# Patient Record
Sex: MALE | Race: WHITE | NOT HISPANIC OR LATINO | Employment: OTHER | URBAN - METROPOLITAN AREA
[De-identification: names, ages, dates, MRNs, and addresses within clinical notes are randomized per-mention and may not be internally consistent; named-entity substitution may affect disease eponyms.]

---

## 2017-03-13 ENCOUNTER — TELEPHONE (OUTPATIENT)
Dept: GASTROENTEROLOGY | Facility: CLINIC | Age: 72
End: 2017-03-13

## 2017-03-13 NOTE — TELEPHONE ENCOUNTER
----- Message from Tracie Bridges sent at 3/13/2017  1:43 PM CDT -----  No. [phone]     Patient received a letter.  He needs to schedule a 3 year follow up colonoscopy.   He would like a Wednesday in [place].  Please call.

## 2017-03-13 NOTE — TELEPHONE ENCOUNTER
Spoke with patient and a office visit is [facility] for 3/21/17, pt is on Plavix needs o/v prior to procedure. Old records scan in under media

## 2017-03-21 ENCOUNTER — OFFICE VISIT (OUTPATIENT)
Dept: GASTROENTEROLOGY | Facility: CLINIC | Age: 72
End: 2017-03-21
Payer: MEDICARE

## 2017-03-21 VITALS
HEART RATE: 53 BPM | BODY MASS INDEX: 30.55 KG/M2 | HEIGHT: 70 IN | WEIGHT: 213.38 LBS | SYSTOLIC BLOOD PRESSURE: 130 MMHG | DIASTOLIC BLOOD PRESSURE: 65 MMHG

## 2017-03-21 DIAGNOSIS — Z85.038 HISTORY OF COLON CANCER: Primary | ICD-10-CM

## 2017-03-21 DIAGNOSIS — I25.10 CORONARY ARTERY DISEASE INVOLVING NATIVE CORONARY ARTERY OF NATIVE HEART WITHOUT ANGINA PECTORIS: ICD-10-CM

## 2017-03-21 PROCEDURE — 99213 OFFICE O/P EST LOW 20 MIN: CPT | Mod: PBBFAC,PN | Performed by: INTERNAL MEDICINE

## 2017-03-21 PROCEDURE — 99999 PR PBB SHADOW E&M-EST. PATIENT-LVL III: CPT | Mod: PBBFAC,,, | Performed by: INTERNAL MEDICINE

## 2017-03-21 PROCEDURE — 99214 OFFICE O/P EST MOD 30 MIN: CPT | Mod: S$PBB,,, | Performed by: INTERNAL MEDICINE

## 2017-03-21 RX ORDER — FENOFIBRATE 134 MG/1
134 CAPSULE ORAL NIGHTLY
COMMUNITY
End: 2019-01-24 | Stop reason: SDUPTHER

## 2017-03-21 NOTE — PATIENT INSTRUCTIONS
Colonoscopy     A camera attached to a flexible tube with a viewing lens is used to take video pictures.     Colonoscopy is a test to view the inside of your lower digestive tract (colon and rectum). Sometimes it can show the last part of the small intestine (ileum). During the test, small pieces of tissue may be removed for testing. This is called a biopsy. Small growths, such as polyps, may also be removed.   Why is colonoscopy done?  The test is done to help look for colon cancer. And it can help find the source of abdominal pain, bleeding, and changes in bowel habits. It may be needed once a year, depending on factors such as your:  · Age  · Health history  · Family health history  · Symptoms  · Results from any prior colonoscopy  Risks and possible complications  These include:  · Bleeding               · A puncture or tear in the colon   · Risks of anesthesia  · A cancer lesion not being seen  Getting ready   To prepare for the test:  · Talk with your healthcare provider about the risks of the test (see below). Also ask your healthcare provider about alternatives to the test.  · Tell your healthcare provider about any medicines you take. Also tell him or her about any health conditions you may have.  · Make sure your rectum and colon are empty for the test. Follow the diet and bowel prep instructions exactly. If you dont, the test may need to be rescheduled.  · Plan for a friend or family member to drive you home after the test.     Colonoscopy provides an inside view of the entire colon.     You may discuss the results with your doctor right away or at a future visit.  During the test   The test is usually done in the hospital on an outpatient basis. This means you go home the same day. The procedure takes about 30 minutes. During that time:  · You are given relaxing (sedating) medicine through an IV line. You may be drowsy, or fully asleep.  · The healthcare provider will first give you a physical exam to  check for anal and rectal problems.  · Then the anus is lubricated and the scope inserted.  · If you are awake, you may have a feeling similar to needing to have a bowel movement. You may also feel pressure as air is pumped into the colon. Its OK to pass gas during the procedure.  · Biopsy, polyp removal, or other treatments may be done during the test.  After the test   You may have gas right after the test. It can help to try to pass it to help prevent later bloating. Your healthcare provider may discuss the results with you right away. Or you may need to schedule a follow-up visit to talk about the results. After the test, you can go back to your normal eating and other activities. You may be tired from the sedation and need to rest for a few hours.  Date Last Reviewed: 11/1/2016  © 1293-1285 [employer]. [address] [place] [address], [place] [address]. All rights reserved. This information is not intended as a substitute for professional medical care. Always follow your healthcare professional's instructions.

## 2017-03-21 NOTE — PROGRESS NOTES
Subjective:      Patient ID: Cy Rutherford is a 71 y.o. male.    Chief Complaint: Colonoscopy    HPI:   Patient is a 71-year-old male presenting for GI followup.    Patient has a prior history of a small rectal cancer treated with transanal resection in 2002.    He subsequently required chemotherapy and irradiation as well for local recurrence.  By 2007 he had a normal colonoscopy aside from sigmoid diverticula.  2013 a small colon adenoma was removed.  Patient has had no significant cardiac events since his last colonoscopy 2013.  Plavix was held for 5 days for that exam.  Coronary artery disease which required coronary artery stent placement 2011    past medical history includes diabetes, hypertension.    Diverticular bleed in 2002 .  He was noted to have diverticulosis and hemorrhoids.  Quit smoking in 1990.  Alcohol occasional.  Family history positive for colon polyps in his sister  Is currently on Plavix and aspirin.  GI systems review is negative    Review of patient's allergies indicates:  No Known Allergies  Past Medical History:   Diagnosis Date    Acute coronary syndrome     2011 ASMI    Coronary artery disease     Hypertension      Past Surgical History:   Procedure Laterality Date    COLONOSCOPY      CORONARY ANGIOPLASTY      MARIAN to LAD and LCX     Family History   Problem Relation Age of Onset    No Known Problems Mother     No Known Problems Father     Colon polyps Sister      Social History     Social History    Marital status:      Spouse name: N/A    Number of children: N/A    Years of education: N/A     Occupational History    Not on file.     Social History Main Topics    Smoking status: Never Smoker    Smokeless tobacco: Never Used    Alcohol use No    Drug use: No    Sexual activity: Not Currently     Other Topics Concern    Not on file     Social History Narrative    No narrative on file       Review of Systems:  Constitutional: Negative for appetite change.   HENT:  "Negative for trouble swallowing.   Eyes: Negative for photophobia.   Respiratory: Negative for cough and shortness of breath.   Cardiovascular: Negative for palpitations.   Gastrointestinal: See HPI for details.  Genitourinary: Negative for frequency and hematuria.   Skin: Negative for rash.   Neurological: Negative for weakness and headaches.   Hematological: Negative.   Psychiatric/Behavioral: Negative for suicidal ideas and behavioral problems.     Objective:     /65 (BP Location: Right arm, Patient Position: Sitting)  Pulse (!) 53  Ht 5' 10" (1.778 m)  Wt 96.8 kg (213 lb 6.4 oz)  BMI 30.62 kg/m2    Physical Exam:  Eyes: Pupils are equal, round, and reactive to light.   Neck: Supple. No mass  Cardiovascular: Regular rhythm . No murmur   Pulmonary/Chest: Lungs clear   Abdominal: Soft. No mass palpated. Nontender, no guarding. Positive bowel sounds   Musculoskeletal: No deformity. No edema.   Psychiatric: Alert and oriented    Assessment:     1. History of colon cancer    2. Coronary artery disease involving native coronary artery of native heart without angina pectoris      Plan:     Cy DARDEN was seen today for colonoscopy.    Diagnoses and all orders for this visit:    History of colon cancer    Coronary artery disease involving native coronary artery of native heart without angina pectoris      Plan:  Colonoscopy  Hold Plavix for 5 days prior to the procedure if okay with cardiology  Continue 81 mg aspirin, throughout    Primary physician German    "

## 2017-03-21 NOTE — MR AVS SNAPSHOT
[facility]  [facility] [address] [place] [address]  Phone: [phone]  Fax: [phone]                  Cy Rutherford   3/21/2017 4:00 PM   Office Visit    Description:  Male : 1945   Provider:  Ariel Garibay Jr., MD   Department:  [facility]           Reason for Visit     Colonoscopy           Diagnoses this Visit        Comments    History of colon cancer    -  Primary     Coronary artery disease involving native coronary artery of native heart without angina pectoris                To Do List           Future Appointments        Provider Department Dept Phone    3/30/2017 8:00 AM James Sanchez MD [facility] [phone]      Goals (5 Years of Data)     None      [facility]     [facility] -  Assistance  Registered nurses in the [facility] provide clinical advisement, health education, appointment booking, and other advisory services.  Call for this free service at [phone].             Medications           Message regarding Medications     Verify the changes and/or additions to your medication regime listed below are the same as discussed with your clinician today.  If any of these changes or additions are incorrect, please notify your healthcare provider.             Verify that the below list of medications is an accurate representation of the medications you are currently taking.  If none reported, the list may be blank. If incorrect, please contact your healthcare provider. Carry this list with you in case of emergency.           Current Medications     amlodipine (NORVASC) 10 MG tablet Take 1 tablet (10 mg total) by mouth once daily.    aspirin (ECOTRIN) 81 MG EC tablet Take 81 mg by mouth once daily.    clopidogrel (PLAVIX) 75 mg tablet Take 1 tablet (75 mg total) by mouth once daily.    fenofibrate micronized (LOFIBRA) 134 MG Cap Take 134 mg by mouth nightly.    insulin glargine  "(LANTUS) 100 unit/mL injection Inject into the skin every evening.      INVOKANA 100 mg Tab Take 1 tablet by mouth once daily.    losartan (COZAAR) 50 MG tablet Take 1 tablet by mouth once a day    metformin (GLUCOPHAGE) 500 MG tablet Take 1 tablet by mouth 2 (two) times daily.    metoprolol succinate (TOPROL-XL) 50 MG 24 hr tablet Take 1 tablet (50 mg total) by mouth once daily.    rosuvastatin (CRESTOR) 20 MG tablet Take 1 tablet (20 mg total) by mouth once daily.    hydrochlorothiazide (HYDRODIURIL) 25 MG tablet Take 1 tablet (25 mg total) by mouth once daily.           Clinical Reference Information           Your Vitals Were     BP Pulse Height Weight BMI    130/65 (BP Location: Right arm, Patient Position: Sitting) 53 5' 10" (1.778 m) 96.8 kg (213 lb 6.4 oz) 30.62 kg/m2      Blood Pressure          Most Recent Value    BP  130/65      Allergies as of 3/21/2017     No Known Allergies      Immunizations Administered on Date of Encounter - 3/21/2017     None      Instructions      Colonoscopy     A camera attached to a flexible tube with a viewing lens is used to take video pictures.     Colonoscopy is a test to view the inside of your lower digestive tract (colon and rectum). Sometimes it can show the last part of the small intestine (ileum). During the test, small pieces of tissue may be removed for testing. This is called a biopsy. Small growths, such as polyps, may also be removed.   Why is colonoscopy done?  The test is done to help look for colon cancer. And it can help find the source of abdominal pain, bleeding, and changes in bowel habits. It may be needed once a year, depending on factors such as your:  · Age  · Health history  · Family health history  · Symptoms  · Results from any prior colonoscopy  Risks and possible complications  These include:  · Bleeding               · A puncture or tear in the colon   · Risks of anesthesia  · A cancer lesion not being seen  Getting ready   To prepare for the " test:  · Talk with your healthcare provider about the risks of the test (see below). Also ask your healthcare provider about alternatives to the test.  · Tell your healthcare provider about any medicines you take. Also tell him or her about any health conditions you may have.  · Make sure your rectum and colon are empty for the test. Follow the diet and bowel prep instructions exactly. If you dont, the test may need to be rescheduled.  · Plan for a friend or family member to drive you home after the test.     Colonoscopy provides an inside view of the entire colon.     You may discuss the results with your doctor right away or at a future visit.  During the test   The test is usually done in the hospital on an outpatient basis. This means you go home the same day. The procedure takes about 30 minutes. During that time:  · You are given relaxing (sedating) medicine through an IV line. You may be drowsy, or fully asleep.  · The healthcare provider will first give you a physical exam to check for anal and rectal problems.  · Then the anus is lubricated and the scope inserted.  · If you are awake, you may have a feeling similar to needing to have a bowel movement. You may also feel pressure as air is pumped into the colon. Its OK to pass gas during the procedure.  · Biopsy, polyp removal, or other treatments may be done during the test.  After the test   You may have gas right after the test. It can help to try to pass it to help prevent later bloating. Your healthcare provider may discuss the results with you right away. Or you may need to schedule a follow-up visit to talk about the results. After the test, you can go back to your normal eating and other activities. You may be tired from the sedation and need to rest for a few hours.  Date Last Reviewed: 11/1/2016  © 0013-4260 [employer]. [address] [place] [address], [place] [address]. All rights reserved. This information is not intended as a substitute  for professional medical care. Always follow your healthcare professional's instructions.             Language Assistance Services     ATTENTION: Language assistance services are available, free of charge. Please call [phone].      ATENCIÓN: Si zonia alfaro, tiene a blackburn disposición servicios gratuitos de asistencia lingüística. Llame al [phone].     CHÚ Ý: N?u b?n nói Ti?ng Vi?t, có các d?ch v? h? tr? ngôn ng? mi?n phí dành cho b?n. G?i s? [phone].         [facility] complies with applicable Federal civil rights laws and does not discriminate on the basis of race, color, national origin, age, disability, or sex.

## 2017-03-30 ENCOUNTER — OFFICE VISIT (OUTPATIENT)
Dept: CARDIOLOGY | Facility: CLINIC | Age: 72
End: 2017-03-30
Payer: MEDICARE

## 2017-03-30 VITALS
BODY MASS INDEX: 30.49 KG/M2 | OXYGEN SATURATION: 98 % | SYSTOLIC BLOOD PRESSURE: 137 MMHG | WEIGHT: 213 LBS | HEART RATE: 58 BPM | DIASTOLIC BLOOD PRESSURE: 62 MMHG | HEIGHT: 70 IN

## 2017-03-30 DIAGNOSIS — I73.9 PAD (PERIPHERAL ARTERY DISEASE): ICD-10-CM

## 2017-03-30 DIAGNOSIS — E11.8 TYPE 2 DIABETES MELLITUS WITH COMPLICATION, UNSPECIFIED LONG TERM INSULIN USE STATUS: ICD-10-CM

## 2017-03-30 DIAGNOSIS — I24.9 ACUTE CORONARY SYNDROME: ICD-10-CM

## 2017-03-30 DIAGNOSIS — I10 ESSENTIAL HYPERTENSION: ICD-10-CM

## 2017-03-30 DIAGNOSIS — I25.10 CORONARY ARTERY DISEASE INVOLVING NATIVE CORONARY ARTERY OF NATIVE HEART WITHOUT ANGINA PECTORIS: Primary | ICD-10-CM

## 2017-03-30 DIAGNOSIS — Z95.5 STATUS POST CORONARY ARTERY STENT PLACEMENT: ICD-10-CM

## 2017-03-30 DIAGNOSIS — I21.09: ICD-10-CM

## 2017-03-30 PROCEDURE — 99999 PR PBB SHADOW E&M-EST. PATIENT-LVL III: CPT | Mod: PBBFAC,,, | Performed by: INTERNAL MEDICINE

## 2017-03-30 PROCEDURE — 99213 OFFICE O/P EST LOW 20 MIN: CPT | Mod: S$PBB,,, | Performed by: INTERNAL MEDICINE

## 2017-03-30 PROCEDURE — 99213 OFFICE O/P EST LOW 20 MIN: CPT | Mod: PBBFAC,PO | Performed by: INTERNAL MEDICINE

## 2017-03-30 RX ORDER — ROSUVASTATIN CALCIUM 20 MG/1
TABLET, FILM COATED ORAL
COMMUNITY
Start: 2017-01-14 | End: 2017-03-30 | Stop reason: SDUPTHER

## 2017-03-30 NOTE — LETTER
March 30, 2017      Ariel Garibay Jr., MD  [address]  [address]  [place] [address]           [facility]  [address], [address]  [facility] [address]  Phone: [phone]  Fax: [phone]          Patient: Cy Rutherford   MR Number: [id number]   YOB: 1945   Date of Visit: 3/30/2017       Dear Dr. Ariel Garibay Jr.:    Thank you for referring Cy Rutherford to me for evaluation. Attached you will find relevant portions of my assessment and plan of care.    If you have questions, please do not hesitate to call me. I look forward to following Cy Rutherford along with you.    Sincerely,    James Sanchez MD    Enclosure  CC:  No Recipients    If you would like to receive this communication electronically, please contact [email or web address] or [phone] to request more information on [facility] access.    For providers and/or their staff who would like to refer a patient to [facility], please contact us through our one-stop-shop provider referral line, [facility], at [phone].    If you feel you have received this communication in error or would no longer like to receive these types of communications, please e-mail [email or web address]

## 2017-03-30 NOTE — PROGRESS NOTES
Subjective:    Patient ID:  Cy Rutherford is a 71 y.o. male who presents for follow-up of Coronary Artery Disease and Peripheral Arterial Disease      HPI  70 y/o male former pt of Dr Fish. He has a hx of CAD presenting with syncope 2011 s/p PCI with MARIAN to LAD and LCX, PAD s/p PTA with MARIAN (SES) to LSFA and PTA to LTPT with resolution of claudication in LLE (RLE 1V AT run off to foot with exertional right calf cramping/claudication), HLD, DM. He is doing well from a cardiac perspective and has no active cardiac complaints. He denies CP, SOB/NASSAR, orthopnea, PND, palps, syncope. He is compliant with his meds. Remote smoking hx. Has RLE calf claudication after walking about half a mile. This has progressed since last visit where he stated he was able to walk about 1 mile before claudication. No non healing ulceration or evidence of limb ischemia.     Review of Systems   Constitution: Negative for weakness and malaise/fatigue.   HENT: Negative for congestion and headaches.    Eyes: Negative for blurred vision.   Cardiovascular: Positive for claudication. Negative for chest pain, cyanosis, dyspnea on exertion, irregular heartbeat, leg swelling, near-syncope, orthopnea, palpitations, paroxysmal nocturnal dyspnea and syncope.   Respiratory: Negative for shortness of breath.    Endocrine: Negative for polyuria.   Hematologic/Lymphatic: Negative for bleeding problem.   Skin: Negative for itching and rash.   Musculoskeletal: Negative for joint swelling, muscle cramps and muscle weakness.   Gastrointestinal: Negative for abdominal pain, hematemesis, hematochezia, melena, nausea and vomiting.   Genitourinary: Negative for dysuria and hematuria.   Neurological: Negative for dizziness, focal weakness, light-headedness and loss of balance.   Psychiatric/Behavioral: Negative for depression. The patient is not nervous/anxious.         Objective:    Physical Exam   Constitutional: He is oriented to person, place, and time. He  appears well-developed and well-nourished.   HENT:   Head: Normocephalic and atraumatic.   Neck: Neck supple. No JVD present.   Cardiovascular: Normal rate and regular rhythm.    Murmur heard.   Systolic murmur is present with a grade of 2/6   Pulses:       Carotid pulses are 2+ on the right side, and 2+ on the left side.       Radial pulses are 2+ on the right side, and 2+ on the left side.        Femoral pulses are 2+ on the right side, and 2+ on the left side.       Dorsalis pedis pulses are 1+ on the right side, and 1+ on the left side.   Pulmonary/Chest: Effort normal and breath sounds normal.   Abdominal: Soft. Bowel sounds are normal.   Musculoskeletal: He exhibits no edema.   Neurological: He is alert and oriented to person, place, and time.   Skin: Skin is warm and dry.   Psychiatric: He has a normal mood and affect. His behavior is normal. Thought content normal.         Assessment:       1. Coronary artery disease involving native coronary artery of native heart without angina pectoris    2. Acute coronary syndrome    3. Acute MI anterior wall first episode care    4. Essential hypertension    5. PAD (peripheral artery disease)    6. Type 2 diabetes mellitus with complication, unspecified long term insulin use status    7. Status post coronary artery stent placement      70 y/o male with hx and presentation as above. Doing well from a cardiac perspective with no active cardiac complaints, able to accomplish ADLs (>4 METs), minimal clinical risk factors. No absolute contraindications for upcoming procedure and he can hold plavix 5 days before and restart after.        Plan:       -The pt is at an acceptable risk from a cardiac perspective for upcoming noncardiac procedure  -OK to hold Plavix 5 days before procedure and restart immediately after  -f/u in 1 year

## 2017-04-03 ENCOUNTER — TELEPHONE (OUTPATIENT)
Dept: GASTROENTEROLOGY | Facility: CLINIC | Age: 72
End: 2017-04-03

## 2017-04-03 NOTE — TELEPHONE ENCOUNTER
----- Message from Ariel Garibay Jr., MD sent at 3/30/2017  1:29 PM CDT -----  This message is an okay to hold Plavix    Did you get this message as well?      ----- Message -----     From: James Sanchez MD     Sent: 3/30/2017  10:08 AM       To: Ariel Garibay Jr., MD

## 2017-04-06 RX ORDER — LOSARTAN POTASSIUM 50 MG/1
50 TABLET ORAL DAILY
Qty: 90 TABLET | Refills: 3 | Status: SHIPPED | OUTPATIENT
Start: 2017-04-06 | End: 2017-04-17 | Stop reason: SDUPTHER

## 2017-04-10 ENCOUNTER — TELEPHONE (OUTPATIENT)
Dept: GASTROENTEROLOGY | Facility: CLINIC | Age: 72
End: 2017-04-10

## 2017-04-10 NOTE — TELEPHONE ENCOUNTER
Patient is scheduled for Colonoscopy at [facility] on 4/20/17, prep information was given at ofice visit. Approval was given from  for patinet to hold Plavix for 5-7 days prior to procedure, pt was called and notified to hold is Plavix.

## 2017-04-17 RX ORDER — LOSARTAN POTASSIUM 50 MG/1
50 TABLET ORAL DAILY
Qty: 90 TABLET | Refills: 3 | Status: SHIPPED | OUTPATIENT
Start: 2017-04-17 | End: 2018-03-07 | Stop reason: SDUPTHER

## 2017-05-02 ENCOUNTER — TELEPHONE (OUTPATIENT)
Dept: GASTROENTEROLOGY | Facility: CLINIC | Age: 72
End: 2017-05-02

## 2017-09-25 DIAGNOSIS — I10 ESSENTIAL HYPERTENSION: ICD-10-CM

## 2017-09-25 DIAGNOSIS — I25.10 CORONARY ARTERY DISEASE, ANGINA PRESENCE UNSPECIFIED, UNSPECIFIED VESSEL OR LESION TYPE, UNSPECIFIED WHETHER NATIVE OR TRANSPLANTED HEART: ICD-10-CM

## 2017-09-26 RX ORDER — METOPROLOL SUCCINATE 50 MG/1
TABLET, EXTENDED RELEASE ORAL
Qty: 90 TABLET | Refills: 1 | Status: SHIPPED | OUTPATIENT
Start: 2017-09-26 | End: 2018-02-13 | Stop reason: SDUPTHER

## 2017-09-26 RX ORDER — CLOPIDOGREL BISULFATE 75 MG/1
TABLET ORAL
Qty: 90 TABLET | Refills: 1 | Status: SHIPPED | OUTPATIENT
Start: 2017-09-26 | End: 2018-02-13 | Stop reason: SDUPTHER

## 2017-09-26 RX ORDER — HYDROCHLOROTHIAZIDE 25 MG/1
TABLET ORAL
Qty: 90 TABLET | Refills: 1 | Status: SHIPPED | OUTPATIENT
Start: 2017-09-26 | End: 2018-02-13 | Stop reason: SDUPTHER

## 2017-09-26 RX ORDER — ROSUVASTATIN CALCIUM 20 MG/1
TABLET, COATED ORAL
Qty: 90 TABLET | Refills: 1 | Status: SHIPPED | OUTPATIENT
Start: 2017-09-26 | End: 2018-02-13 | Stop reason: SDUPTHER

## 2017-09-26 RX ORDER — AMLODIPINE BESYLATE 10 MG/1
TABLET ORAL
Qty: 90 TABLET | Refills: 1 | Status: SHIPPED | OUTPATIENT
Start: 2017-09-26 | End: 2018-02-13 | Stop reason: SDUPTHER

## 2017-11-10 ENCOUNTER — PATIENT MESSAGE (OUTPATIENT)
Dept: RESEARCH | Facility: HOSPITAL | Age: 72
End: 2017-11-10

## 2018-02-13 DIAGNOSIS — I10 ESSENTIAL HYPERTENSION: ICD-10-CM

## 2018-02-13 DIAGNOSIS — I25.10 CORONARY ARTERY DISEASE, ANGINA PRESENCE UNSPECIFIED, UNSPECIFIED VESSEL OR LESION TYPE, UNSPECIFIED WHETHER NATIVE OR TRANSPLANTED HEART: ICD-10-CM

## 2018-02-15 RX ORDER — ROSUVASTATIN CALCIUM 20 MG/1
TABLET, COATED ORAL
Qty: 90 TABLET | Refills: 1 | Status: SHIPPED | OUTPATIENT
Start: 2018-02-15 | End: 2018-09-24 | Stop reason: SDUPTHER

## 2018-02-15 RX ORDER — AMLODIPINE BESYLATE 10 MG/1
TABLET ORAL
Qty: 90 TABLET | Refills: 1 | Status: SHIPPED | OUTPATIENT
Start: 2018-02-15 | End: 2018-09-24 | Stop reason: SDUPTHER

## 2018-02-15 RX ORDER — METOPROLOL SUCCINATE 50 MG/1
TABLET, EXTENDED RELEASE ORAL
Qty: 90 TABLET | Refills: 1 | Status: SHIPPED | OUTPATIENT
Start: 2018-02-15 | End: 2018-06-12 | Stop reason: SDUPTHER

## 2018-02-15 RX ORDER — HYDROCHLOROTHIAZIDE 25 MG/1
TABLET ORAL
Qty: 90 TABLET | Refills: 1 | Status: SHIPPED | OUTPATIENT
Start: 2018-02-15 | End: 2018-09-24 | Stop reason: SDUPTHER

## 2018-02-15 RX ORDER — CLOPIDOGREL BISULFATE 75 MG/1
TABLET ORAL
Qty: 90 TABLET | Refills: 1 | Status: SHIPPED | OUTPATIENT
Start: 2018-02-15 | End: 2018-05-03

## 2018-02-28 DIAGNOSIS — I25.10 CORONARY ARTERY DISEASE, ANGINA PRESENCE UNSPECIFIED, UNSPECIFIED VESSEL OR LESION TYPE, UNSPECIFIED WHETHER NATIVE OR TRANSPLANTED HEART: ICD-10-CM

## 2018-02-28 DIAGNOSIS — I10 ESSENTIAL HYPERTENSION: ICD-10-CM

## 2018-03-01 RX ORDER — CLOPIDOGREL BISULFATE 75 MG/1
TABLET ORAL
Qty: 90 TABLET | Refills: 1 | Status: SHIPPED | OUTPATIENT
Start: 2018-03-01 | End: 2018-05-03

## 2018-03-08 RX ORDER — LOSARTAN POTASSIUM 50 MG/1
50 TABLET ORAL DAILY
Qty: 90 TABLET | Refills: 3 | Status: SHIPPED | OUTPATIENT
Start: 2018-03-08 | End: 2018-06-12 | Stop reason: SDUPTHER

## 2018-05-01 ENCOUNTER — HOSPITAL ENCOUNTER (EMERGENCY)
Facility: HOSPITAL | Age: 73
Discharge: HOME | End: 2018-05-01
Attending: FAMILY MEDICINE
Payer: MEDICARE

## 2018-05-01 VITALS
SYSTOLIC BLOOD PRESSURE: 193 MMHG | HEIGHT: 70 IN | OXYGEN SATURATION: 98 % | TEMPERATURE: 98 F | DIASTOLIC BLOOD PRESSURE: 86 MMHG | BODY MASS INDEX: 28.63 KG/M2 | HEART RATE: 68 BPM | WEIGHT: 200 LBS | RESPIRATION RATE: 18 BRPM

## 2018-05-01 DIAGNOSIS — L91.8 SKIN TAG: Primary | ICD-10-CM

## 2018-05-01 PROCEDURE — 99281 EMR DPT VST MAYX REQ PHY/QHP: CPT

## 2018-05-01 RX ORDER — AMOXICILLIN 500 MG
1 CAPSULE ORAL EVERY MORNING
COMMUNITY

## 2018-05-02 NOTE — ED PROVIDER NOTES
"Encounter Date: 5/1/2018       History     Chief Complaint   Patient presents with    Wound Check     pt reports difficulty controlling bleeding to R neck s/p "picking tick off last night but it may have been a skin tag"; reports being on Plavix presently     72-year-old male patient who tried to pull a skin tag off of his right side of his neck has some oozing the nurse had some pressure and it stopped bleeding otherwise patient has no other injuries no other areas of bleeding no signs of infection patient initially thought it was a tick although it is still a residual skin tag present.          Review of patient's allergies indicates:  No Known Allergies  Past Medical History:   Diagnosis Date    Acute coronary syndrome     2011 ASMI    Coronary artery disease     Hypertension      Past Surgical History:   Procedure Laterality Date    COLONOSCOPY      CORONARY ANGIOPLASTY      MARIAN to LAD and LCX     Family History   Problem Relation Age of Onset    No Known Problems Mother     No Known Problems Father     Colon polyps Sister      Social History   Substance Use Topics    Smoking status: Never Smoker    Smokeless tobacco: Never Used    Alcohol use No     Review of Systems   Constitutional: Negative for fever.   HENT: Negative for sore throat.    Respiratory: Negative for shortness of breath.    Cardiovascular: Negative for chest pain.   Gastrointestinal: Negative for nausea.   Genitourinary: Negative for dysuria.   Musculoskeletal: Negative for back pain.   Skin: Positive for wound. Negative for rash.   Neurological: Negative for weakness.   Hematological: Does not bruise/bleed easily.   All other systems reviewed and are negative.      Physical Exam     Initial Vitals [05/01/18 2008]   BP Pulse Resp Temp SpO2   (!) 193/86 68 18 97.9 °F (36.6 °C) 98 %      MAP       121.67         Physical Exam    Nursing note and vitals reviewed.  Constitutional: He appears well-developed and well-nourished.   HENT: "   Head: Normocephalic and atraumatic.   Eyes: Conjunctivae and EOM are normal. Pupils are equal, round, and reactive to light.   Neck: Normal range of motion. Neck supple.   Cardiovascular: Normal rate, regular rhythm and normal heart sounds.   Pulmonary/Chest: Breath sounds normal.   Abdominal: Soft. Bowel sounds are normal.   Musculoskeletal: Normal range of motion.   Neurological: He is alert. He has normal reflexes.   Skin:   Mild bleeding at the base of the skin tag cauterize with silver nitrate stick with no bruising or bleeding.         ED Course   Procedures  Labs Reviewed - No data to display          Medical Decision Making:   Initial Assessment:   Patient sitting in no distress and pleasant. Patient has no other complaints other than documented.     Differential Diagnosis:   Laceration  Cellulitis  Abscess  infection                        Clinical Impression:   The encounter diagnosis was Skin tag.                           Abhay Epstein MD  05/01/18 2030

## 2018-05-02 NOTE — ED NOTES
Pt thought he had a tick on him and pulled it - it was a skin tag. He is taking plavix at home and unable to control the bleeding. I held pressure for 10 minutes and the bleeding stopped and Dr. Epstein came in to put silver nitrate sticks on it.

## 2018-05-03 ENCOUNTER — OFFICE VISIT (OUTPATIENT)
Dept: CARDIOLOGY | Facility: CLINIC | Age: 73
End: 2018-05-03
Payer: MEDICARE

## 2018-05-03 VITALS
HEART RATE: 60 BPM | DIASTOLIC BLOOD PRESSURE: 60 MMHG | BODY MASS INDEX: 31.07 KG/M2 | HEIGHT: 68 IN | SYSTOLIC BLOOD PRESSURE: 120 MMHG | WEIGHT: 205 LBS

## 2018-05-03 DIAGNOSIS — I25.10 CORONARY ARTERY DISEASE INVOLVING NATIVE CORONARY ARTERY OF NATIVE HEART WITHOUT ANGINA PECTORIS: Primary | ICD-10-CM

## 2018-05-03 DIAGNOSIS — I10 ESSENTIAL HYPERTENSION: ICD-10-CM

## 2018-05-03 DIAGNOSIS — I73.9 PAD (PERIPHERAL ARTERY DISEASE): ICD-10-CM

## 2018-05-03 DIAGNOSIS — E78.5 HYPERLIPIDEMIA, UNSPECIFIED HYPERLIPIDEMIA TYPE: ICD-10-CM

## 2018-05-03 DIAGNOSIS — I24.9 ACUTE CORONARY SYNDROME: ICD-10-CM

## 2018-05-03 DIAGNOSIS — Z95.5 STATUS POST CORONARY ARTERY STENT PLACEMENT: ICD-10-CM

## 2018-05-03 DIAGNOSIS — I21.09: ICD-10-CM

## 2018-05-03 PROCEDURE — 99214 OFFICE O/P EST MOD 30 MIN: CPT | Mod: S$PBB,,, | Performed by: INTERNAL MEDICINE

## 2018-05-03 PROCEDURE — 99213 OFFICE O/P EST LOW 20 MIN: CPT | Mod: PBBFAC,PO | Performed by: INTERNAL MEDICINE

## 2018-05-03 PROCEDURE — 99999 PR PBB SHADOW E&M-EST. PATIENT-LVL III: CPT | Mod: PBBFAC,,, | Performed by: INTERNAL MEDICINE

## 2018-05-03 RX ORDER — NITROGLYCERIN 0.4 MG/1
0.4 TABLET SUBLINGUAL EVERY 5 MIN PRN
Qty: 30 TABLET | Refills: 11 | Status: ON HOLD | OUTPATIENT
Start: 2018-05-03 | End: 2018-05-11 | Stop reason: HOSPADM

## 2018-05-03 NOTE — PROGRESS NOTES
Subjective:    Patient ID:  Cy Rutherford is a 72 y.o. male who presents for evaluation of Coronary Artery Disease      HPI  73 y/o male former pt of Dr Fish. He has a hx of CAD presenting with syncope 2011 s/p PCI with MARIAN to LAD and LCX, PAD s/p PTA with MARIAN (SES) to LSFA and PTA to LTPT with resolution of claudication in LLE (RLE 1V AT run off to foot with exertional right calf cramping/claudication), HLD, DM. He is doing well from a cardiac perspective and has no active cardiac complaints. He denies regular CP, SOB/NASSAR, orthopnea, PND, palps, syncope. He is compliant with his meds. Remote smoking hx. No non healing ulceration or evidence of limb ischemia. Currently with Rodolfo IIa claudication, non lifestyle limiting and unchanged. Has very infrequent substernal mild chest tightness which lasts seconds and resolves spontaneously. Recently seen in ED for bleeding lesion that was difficult to control and had to be cauterized.     Review of Systems   Constitution: Negative for weakness and malaise/fatigue.   HENT: Negative for congestion.    Eyes: Negative for blurred vision.   Cardiovascular: Negative for chest pain, claudication, cyanosis, dyspnea on exertion, irregular heartbeat, leg swelling, near-syncope, orthopnea, palpitations, paroxysmal nocturnal dyspnea and syncope.   Respiratory: Negative for shortness of breath.    Endocrine: Negative for polyuria.   Hematologic/Lymphatic: Negative for bleeding problem.   Skin: Negative for itching and rash.   Musculoskeletal: Negative for joint swelling, muscle cramps and muscle weakness.   Gastrointestinal: Negative for abdominal pain, hematemesis, hematochezia, melena, nausea and vomiting.   Genitourinary: Negative for dysuria and hematuria.   Neurological: Negative for dizziness, focal weakness, headaches, light-headedness and loss of balance.   Psychiatric/Behavioral: Negative for depression. The patient is not nervous/anxious.         Objective:    Physical  Exam   Constitutional: He is oriented to person, place, and time. He appears well-developed and well-nourished.   HENT:   Head: Normocephalic and atraumatic.   Neck: Neck supple. No JVD present.   Cardiovascular: Normal rate and regular rhythm.    Murmur heard.   Systolic murmur is present with a grade of 2/6   Pulses:       Carotid pulses are 2+ on the right side, and 2+ on the left side.       Radial pulses are 2+ on the right side, and 2+ on the left side.        Femoral pulses are 2+ on the right side, and 2+ on the left side.       Dorsalis pedis pulses are 1+ on the right side, and 1+ on the left side.   Pulmonary/Chest: Effort normal and breath sounds normal.   Abdominal: Soft. Bowel sounds are normal.   Musculoskeletal: He exhibits no edema.   Neurological: He is alert and oriented to person, place, and time.   Skin: Skin is warm and dry.   Psychiatric: He has a normal mood and affect. His behavior is normal. Thought content normal.         Assessment:       1. Coronary artery disease involving native coronary artery of native heart without angina pectoris    2. Status post coronary artery stent placement    3. Acute MI anterior wall first episode care    4. Acute coronary syndrome    5. Essential hypertension    6. Hyperlipidemia, unspecified hyperlipidemia type    7. PAD (peripheral artery disease)      73 y/o female with hx and presentation as above. Doing well from a cardiac and PAD perspective. OK to D/C plavix. Counseled to stay active. Wife  in January.       Plan:       -D/C plavix  -f/u in 1 year

## 2018-05-07 ENCOUNTER — HOSPITAL ENCOUNTER (INPATIENT)
Facility: HOSPITAL | Age: 73
LOS: 1 days | Discharge: ANOTHER HOSPITAL | DRG: 064 | End: 2018-05-08
Attending: SURGERY | Admitting: INTERNAL MEDICINE
Payer: MEDICARE

## 2018-05-07 DIAGNOSIS — I63.9 STROKE: ICD-10-CM

## 2018-05-07 DIAGNOSIS — I63.9 CEREBROVASCULAR ACCIDENT (CVA), UNSPECIFIED MECHANISM: Primary | ICD-10-CM

## 2018-05-07 DIAGNOSIS — T86.19 HYPERTENSIVE ARTERIONEPHROSCLEROSIS OF TRANSPLANTED KIDNEY: ICD-10-CM

## 2018-05-07 DIAGNOSIS — I12.9 HYPERTENSIVE ARTERIONEPHROSCLEROSIS OF TRANSPLANTED KIDNEY: ICD-10-CM

## 2018-05-07 LAB
ALBUMIN SERPL BCP-MCNC: 4.2 G/DL
ALLENS TEST: ABNORMAL
ALP SERPL-CCNC: 46 U/L
ALT SERPL W/O P-5'-P-CCNC: 47 U/L
ANION GAP SERPL CALC-SCNC: 12 MMOL/L
APTT BLDCRRT: 27.9 SEC
AST SERPL-CCNC: 38 U/L
BASOPHILS # BLD AUTO: 0.03 K/UL
BASOPHILS NFR BLD: 0.4 %
BILIRUB SERPL-MCNC: 0.3 MG/DL
BUN SERPL-MCNC: 16 MG/DL
CALCIUM SERPL-MCNC: 9.8 MG/DL
CHLORIDE SERPL-SCNC: 103 MMOL/L
CO2 SERPL-SCNC: 29 MMOL/L
CREAT SERPL-MCNC: 0.88 MG/DL
DELSYS: ABNORMAL
DIFFERENTIAL METHOD: ABNORMAL
EOSINOPHIL # BLD AUTO: 0.1 K/UL
EOSINOPHIL NFR BLD: 1.4 %
ERYTHROCYTE [DISTWIDTH] IN BLOOD BY AUTOMATED COUNT: 14.7 %
EST. GFR  (AFRICAN AMERICAN): >60 ML/MIN/1.73 M^2
EST. GFR  (NON AFRICAN AMERICAN): >60 ML/MIN/1.73 M^2
GLUCOSE SERPL-MCNC: 214 MG/DL (ref 70–110)
GLUCOSE SERPL-MCNC: 235 MG/DL
HCO3 UR-SCNC: 30.6 MMOL/L (ref 24–28)
HCT VFR BLD AUTO: 45.7 %
HCT VFR BLD CALC: 41 %PCV (ref 36–54)
HGB BLD-MCNC: 14 G/DL
HGB BLD-MCNC: 14.9 G/DL
INR PPP: 1
LYMPHOCYTES # BLD AUTO: 0.9 K/UL
LYMPHOCYTES NFR BLD: 11.8 %
MCH RBC QN AUTO: 28.9 PG
MCHC RBC AUTO-ENTMCNC: 32.6 G/DL
MCV RBC AUTO: 89 FL
MONOCYTES # BLD AUTO: 0.5 K/UL
MONOCYTES NFR BLD: 7 %
NEUTROPHILS # BLD AUTO: 5.7 K/UL
NEUTROPHILS NFR BLD: 79 %
PCO2 BLDA: 43.6 MMHG (ref 35–45)
PH SMN: 7.45 [PH] (ref 7.35–7.45)
PLATELET # BLD AUTO: 256 K/UL
PMV BLD AUTO: 10.6 FL
PO2 BLDA: 44 MMHG (ref 40–60)
POC BE: 7 MMOL/L
POC IONIZED CALCIUM: 1.2 MMOL/L (ref 1.06–1.42)
POC SATURATED O2: 82 % (ref 95–100)
POC TCO2: 32 MMOL/L (ref 24–29)
POCT GLUCOSE: 129 MG/DL (ref 70–110)
POCT GLUCOSE: 214 MG/DL (ref 70–110)
POCT GLUCOSE: 73 MG/DL (ref 70–110)
POCT GLUCOSE: 84 MG/DL (ref 70–110)
POTASSIUM BLD-SCNC: 4.9 MMOL/L (ref 3.5–5.1)
POTASSIUM SERPL-SCNC: 4.1 MMOL/L
PROT SERPL-MCNC: 7 G/DL
PROTHROMBIN TIME: 11.2 SEC
RBC # BLD AUTO: 5.16 M/UL
SAMPLE: ABNORMAL
SITE: ABNORMAL
SODIUM BLD-SCNC: 140 MMOL/L (ref 136–145)
SODIUM SERPL-SCNC: 144 MMOL/L
T4 FREE SERPL-MCNC: 0.91 NG/DL
TSH SERPL DL<=0.005 MIU/L-ACNC: 4.97 UIU/ML
WBC # BLD AUTO: 7.26 K/UL

## 2018-05-07 PROCEDURE — 63600175 PHARM REV CODE 636 W HCPCS: Performed by: SURGERY

## 2018-05-07 PROCEDURE — 93010 ELECTROCARDIOGRAM REPORT: CPT | Mod: ,,, | Performed by: INTERNAL MEDICINE

## 2018-05-07 PROCEDURE — 85025 COMPLETE CBC W/AUTO DIFF WBC: CPT

## 2018-05-07 PROCEDURE — 93005 ELECTROCARDIOGRAM TRACING: CPT

## 2018-05-07 PROCEDURE — 25000003 PHARM REV CODE 250: Performed by: INTERNAL MEDICINE

## 2018-05-07 PROCEDURE — G0378 HOSPITAL OBSERVATION PER HR: HCPCS

## 2018-05-07 PROCEDURE — 85610 PROTHROMBIN TIME: CPT

## 2018-05-07 PROCEDURE — 25500020 PHARM REV CODE 255: Performed by: INTERNAL MEDICINE

## 2018-05-07 PROCEDURE — 94760 N-INVAS EAR/PLS OXIMETRY 1: CPT

## 2018-05-07 PROCEDURE — A9585 GADOBUTROL INJECTION: HCPCS | Performed by: INTERNAL MEDICINE

## 2018-05-07 PROCEDURE — G0425 INPT/ED TELECONSULT30: HCPCS | Mod: GT,,, | Performed by: PSYCHIATRY & NEUROLOGY

## 2018-05-07 PROCEDURE — 84443 ASSAY THYROID STIM HORMONE: CPT

## 2018-05-07 PROCEDURE — 82800 BLOOD PH: CPT

## 2018-05-07 PROCEDURE — 25000003 PHARM REV CODE 250: Performed by: SURGERY

## 2018-05-07 PROCEDURE — 99900035 HC TECH TIME PER 15 MIN (STAT)

## 2018-05-07 PROCEDURE — 96374 THER/PROPH/DIAG INJ IV PUSH: CPT

## 2018-05-07 PROCEDURE — A4216 STERILE WATER/SALINE, 10 ML: HCPCS | Performed by: INTERNAL MEDICINE

## 2018-05-07 PROCEDURE — 80053 COMPREHEN METABOLIC PANEL: CPT

## 2018-05-07 PROCEDURE — 84439 ASSAY OF FREE THYROXINE: CPT

## 2018-05-07 PROCEDURE — 82565 ASSAY OF CREATININE: CPT

## 2018-05-07 PROCEDURE — 85730 THROMBOPLASTIN TIME PARTIAL: CPT

## 2018-05-07 PROCEDURE — 82962 GLUCOSE BLOOD TEST: CPT

## 2018-05-07 PROCEDURE — 63600175 PHARM REV CODE 636 W HCPCS: Performed by: INTERNAL MEDICINE

## 2018-05-07 PROCEDURE — 99285 EMERGENCY DEPT VISIT HI MDM: CPT | Mod: 25

## 2018-05-07 RX ORDER — LABETALOL HYDROCHLORIDE 5 MG/ML
10 INJECTION, SOLUTION INTRAVENOUS
Status: DISCONTINUED | OUTPATIENT
Start: 2018-05-07 | End: 2018-05-08 | Stop reason: HOSPADM

## 2018-05-07 RX ORDER — SODIUM CHLORIDE 0.9 % (FLUSH) 0.9 %
3 SYRINGE (ML) INJECTION EVERY 8 HOURS
Status: DISCONTINUED | OUTPATIENT
Start: 2018-05-07 | End: 2018-05-08 | Stop reason: HOSPADM

## 2018-05-07 RX ORDER — CLOPIDOGREL BISULFATE 75 MG/1
75 TABLET ORAL DAILY
Status: DISCONTINUED | OUTPATIENT
Start: 2018-05-08 | End: 2018-05-07

## 2018-05-07 RX ORDER — ENOXAPARIN SODIUM 100 MG/ML
40 INJECTION SUBCUTANEOUS EVERY 24 HOURS
Status: DISCONTINUED | OUTPATIENT
Start: 2018-05-07 | End: 2018-05-08 | Stop reason: HOSPADM

## 2018-05-07 RX ORDER — CLOPIDOGREL BISULFATE 75 MG/1
75 TABLET ORAL DAILY
Status: DISCONTINUED | OUTPATIENT
Start: 2018-05-07 | End: 2018-05-07

## 2018-05-07 RX ORDER — CLOPIDOGREL BISULFATE 75 MG/1
300 TABLET ORAL ONCE
Status: DISCONTINUED | OUTPATIENT
Start: 2018-05-07 | End: 2018-05-07

## 2018-05-07 RX ORDER — GLUCAGON 1 MG
1 KIT INJECTION
Status: DISCONTINUED | OUTPATIENT
Start: 2018-05-07 | End: 2018-05-08 | Stop reason: HOSPADM

## 2018-05-07 RX ORDER — ASPIRIN 81 MG/1
81 TABLET ORAL DAILY
Status: DISCONTINUED | OUTPATIENT
Start: 2018-05-08 | End: 2018-05-08 | Stop reason: HOSPADM

## 2018-05-07 RX ORDER — SODIUM CHLORIDE 9 MG/ML
INJECTION, SOLUTION INTRAVENOUS CONTINUOUS
Status: DISCONTINUED | OUTPATIENT
Start: 2018-05-07 | End: 2018-05-08 | Stop reason: HOSPADM

## 2018-05-07 RX ORDER — CLOPIDOGREL BISULFATE 75 MG/1
75 TABLET ORAL DAILY
Status: DISCONTINUED | OUTPATIENT
Start: 2018-05-07 | End: 2018-05-08 | Stop reason: HOSPADM

## 2018-05-07 RX ORDER — NAPROXEN SODIUM 220 MG/1
81 TABLET, FILM COATED ORAL DAILY
Status: DISCONTINUED | OUTPATIENT
Start: 2018-05-08 | End: 2018-05-07

## 2018-05-07 RX ORDER — ATORVASTATIN CALCIUM 40 MG/1
80 TABLET, FILM COATED ORAL DAILY
Status: DISCONTINUED | OUTPATIENT
Start: 2018-05-08 | End: 2018-05-07

## 2018-05-07 RX ORDER — CLOPIDOGREL BISULFATE 75 MG/1
225 TABLET ORAL ONCE
Status: COMPLETED | OUTPATIENT
Start: 2018-05-07 | End: 2018-05-07

## 2018-05-07 RX ORDER — INSULIN ASPART 100 [IU]/ML
1-10 INJECTION, SOLUTION INTRAVENOUS; SUBCUTANEOUS EVERY 6 HOURS PRN
Status: DISCONTINUED | OUTPATIENT
Start: 2018-05-07 | End: 2018-05-08 | Stop reason: HOSPADM

## 2018-05-07 RX ORDER — GADOBUTROL 604.72 MG/ML
10 INJECTION INTRAVENOUS
Status: COMPLETED | OUTPATIENT
Start: 2018-05-07 | End: 2018-05-07

## 2018-05-07 RX ORDER — ASPIRIN 325 MG
325 TABLET, DELAYED RELEASE (ENTERIC COATED) ORAL
Status: COMPLETED | OUTPATIENT
Start: 2018-05-07 | End: 2018-05-07

## 2018-05-07 RX ORDER — ATORVASTATIN CALCIUM 40 MG/1
40 TABLET, FILM COATED ORAL DAILY
Status: DISCONTINUED | OUTPATIENT
Start: 2018-05-08 | End: 2018-05-08 | Stop reason: HOSPADM

## 2018-05-07 RX ORDER — HYDRALAZINE HYDROCHLORIDE 20 MG/ML
20 INJECTION INTRAMUSCULAR; INTRAVENOUS
Status: COMPLETED | OUTPATIENT
Start: 2018-05-07 | End: 2018-05-07

## 2018-05-07 RX ADMIN — CLOPIDOGREL BISULFATE 225 MG: 75 TABLET ORAL at 10:05

## 2018-05-07 RX ADMIN — NITROGLYCERIN 2 INCH: 20 OINTMENT TOPICAL at 11:05

## 2018-05-07 RX ADMIN — HYDRALAZINE HYDROCHLORIDE 20 MG: 20 INJECTION INTRAMUSCULAR; INTRAVENOUS at 11:05

## 2018-05-07 RX ADMIN — ASPIRIN 325 MG: 325 TABLET, DELAYED RELEASE ORAL at 02:05

## 2018-05-07 RX ADMIN — SODIUM CHLORIDE, PRESERVATIVE FREE 3 ML: 5 INJECTION INTRAVENOUS at 11:05

## 2018-05-07 RX ADMIN — GADOBUTROL 10 ML: 604.72 INJECTION INTRAVENOUS at 07:05

## 2018-05-07 RX ADMIN — CLOPIDOGREL BISULFATE 75 MG: 75 TABLET ORAL at 05:05

## 2018-05-07 RX ADMIN — ENOXAPARIN SODIUM 40 MG: 100 INJECTION SUBCUTANEOUS at 05:05

## 2018-05-07 RX ADMIN — SODIUM CHLORIDE: 0.9 INJECTION, SOLUTION INTRAVENOUS at 05:05

## 2018-05-07 NOTE — PLAN OF CARE
Problem: Patient Care Overview  Goal: Plan of Care Review  Outcome: Ongoing (interventions implemented as appropriate)   05/07/18 7666   Coping/Psychosocial   Plan Of Care Reviewed With patient   Received via stretcher from [facility] on stable conditions. Care plan reviewed with Pt verbalized plan of care. AAOx4, no respiratory distress noted, on room air. Orientation to room provided, voices understanding. NSR per cardiac monitor, no red alarm noted. Denies any pain of discomfort, education provided on medication effect and side effect, voices understanding. Safety measures maintained, call light within his reach, no apparent distress noted, bed in low position, bed alarm on, educated on the importance of calling as needed, voices understanding, stable at this time.

## 2018-05-07 NOTE — SUBJECTIVE & OBJECTIVE
Woke up with symptoms?: no  Last known normal: Last Known Normal Date: 05/06/18 Last Known Normal Time: 1700    Recent bleeding noted: no  Does the patient take any Blood Thinners? no  Medications: Antiplatelets:  aspirin      Past Medical History: hypertension, MI/CAD and PVD    Past Surgical History: no major surgeries within the last 2 weeks    Family History: no relevant history    Social History: no smoking, no drinking, no drugs    Allergies:   No known drug allergies    Review of Systems   Constitutional: Negative for chills, diaphoresis and fever.   HENT: Negative for hearing loss, tinnitus and trouble swallowing.    Eyes: Negative for photophobia and visual disturbance.   Respiratory: Negative for chest tightness and shortness of breath.    Cardiovascular: Negative for chest pain and palpitations.   Gastrointestinal: Negative for abdominal pain and vomiting.   Endocrine: Negative for cold intolerance and polyuria.   Genitourinary: Negative for hematuria.   Musculoskeletal: Negative for back pain, gait problem, myalgias and neck pain.   Skin: Negative for rash.   Neurological: Positive for weakness. Negative for dizziness, facial asymmetry, speech difficulty, numbness and headaches.   Hematological: Does not bruise/bleed easily.   Psychiatric/Behavioral: Negative for agitation, behavioral problems and confusion.     Objective:   Vitals: Blood pressure (!) 171/76, pulse 65, temperature 98.4 °F (36.9 °C), temperature source Oral, resp. rate (!) 21, weight 90.7 kg (200 lb), SpO2 98 %. BP: 171/76, Respiratory Rate: 16 and Heart Rate: 68    CT READ: Yes  No hemmorhage. No mass effect. No early infarct signs.     Physical Exam   Constitutional: He is oriented to person, place, and time. He appears well-developed and well-nourished.   HENT:   Head: Normocephalic and atraumatic.   Eyes: EOM are normal. Pupils are equal, round, and reactive to light.   Neck: Normal range of motion. Neck supple.   Cardiovascular:  Normal rate and regular rhythm.    Pulmonary/Chest: Effort normal. No respiratory distress.   Abdominal: Soft. He exhibits no mass.   Genitourinary:   Genitourinary Comments: No performed     Musculoskeletal: Normal range of motion. He exhibits no edema.   Neurological: He is oriented to person, place, and time. He displays normal reflexes. No cranial nerve deficit or sensory deficit. He exhibits normal muscle tone. Coordination normal.   Skin: No rash noted. No erythema.   Psychiatric: He has a normal mood and affect. His behavior is normal.

## 2018-05-07 NOTE — ED PROVIDER NOTES
Encounter Date: 5/7/2018       History     Chief Complaint   Patient presents with    Extremity Weakness     from Dr. Ricardo right arm weakness thst started at 5pm last eveing     Patient was sent over from his primary doctor's office for a acute stroke workup.  The patient stated that his right arm and his hand became weak around 5 PM last night 17 hours ago.  When he woke up this morning the disability was still there.  He has no complaint of a headache or any other symptoms with his legs or speech.  He drove his car to the doctor's office He denies any facial palsy.  He has a history of hypertension and coronary artery disease.  He was on Plavix but he is not on anticoagulants.  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   Extremity Weakness   This is a new problem. The current episode started yesterday. The problem occurs constantly. The problem has not changed since onset.Pertinent negatives include no chest pain, no headaches and no shortness of breath. Nothing aggravates the symptoms. Nothing relieves the symptoms.     Review of patient's allergies indicates:  No Known Allergies  Past Medical History:   Diagnosis Date    Acute coronary syndrome     2011 ASMI    Coronary artery disease     Hypertension      Past Surgical History:   Procedure Laterality Date    COLONOSCOPY      CORONARY ANGIOPLASTY      MARIAN to LAD and LCX     Family History   Problem Relation Age of Onset    No Known Problems Mother     No Known Problems Father     Colon polyps Sister      Social History   Substance Use Topics    Smoking status: Never Smoker    Smokeless tobacco: Never Used    Alcohol use No     Review of Systems   Constitutional: Negative.    Eyes: Negative.    Respiratory: Negative for shortness of breath.    Cardiovascular: Negative for chest pain.    Gastrointestinal: Negative.    Endocrine: Negative.    Genitourinary: Negative.    Musculoskeletal: Positive for extremity weakness.   Skin: Negative.    Allergic/Immunologic: Negative.    Neurological: Negative for headaches.   Hematological: Negative.    Psychiatric/Behavioral: Negative.        Physical Exam     Initial Vitals [05/07/18 1049]   BP Pulse Resp Temp SpO2   (!) 212/97 66 20 98.4 °F (36.9 °C) 99 %      MAP       135.33         Physical Exam    Nursing note and vitals reviewed.  Constitutional: He appears well-developed and well-nourished.   HENT:   Head: Normocephalic.   Eyes: Conjunctivae are normal.   Neck: Normal range of motion. Neck supple.   Cardiovascular: Normal rate and intact distal pulses.   Pulmonary/Chest: Breath sounds normal.   Abdominal: Soft.   Neurological: He is alert and oriented to person, place, and time. He displays normal reflexes. No cranial nerve deficit or sensory deficit.   Palsy of right arm, especially for fine motor but no paresis         ED Course   Procedures  Labs Reviewed   COMPREHENSIVE METABOLIC PANEL - Abnormal; Notable for the following:        Result Value    Glucose 235 (*)     ALT 47 (*)     All other components within normal limits   TSH - Abnormal; Notable for the following:     TSH 4.970 (*)     All other components within normal limits   CBC W/ AUTO DIFFERENTIAL - Abnormal; Notable for the following:     RDW 14.7 (*)     Lymph # 0.9 (*)     Gran% 79.0 (*)     Lymph% 11.8 (*)     All other components within normal limits   POCT GLUCOSE MONITORING CONTINUOUS - Abnormal; Notable for the following:     POC Glucose 214 (*)     All other components within normal limits   ISTAT PROCEDURE - Abnormal; Notable for the following:     POC PH 7.454 (*)     POC HCO3 30.6 (*)     POC SATURATED O2 82 (*)     POC TCO2 32 (*)     All other components within normal limits   POCT GLUCOSE - Abnormal; Notable for the following:     POCT Glucose 214 (*)     All other components  within normal limits   PROTIME-INR   APTT   T4, FREE     EKG Readings: (Independently Interpreted)   Rhythm: Normal Sinus Rhythm. Ectopy: No Ectopy. Conduction: Normal. ST Segments: Normal ST Segments. T Waves: Normal. Clinical Impression: Normal Sinus Rhythm          Medical Decision Making:   Initial Assessment:   17 hour history of right-sided motor deficit involving arm and hypertensive crisis  ED Management:  Code stroke was called on arrival and neurology consult on telemetry stroke and CT results shows a small left acute subcortical infarct.  Aspirin was ordered Blood pressure was 212/100 on admit.  Because the patient presented 17 hours after symptoms started and he has a history of coronary artery disease felt it was appropriate to lower the blood pressure to below 200.  The [facility] neurologist recommended admit to hospital Patient's neurologic symptoms have improved 30 minutes after he arrived to the ED  Other:   I discussed test(s) with the performing physician.                      Clinical Impression:   The primary encounter diagnosis was Cerebrovascular accident (CVA), unspecified mechanism. Diagnoses of Stroke and Hypertensive arterionephrosclerosis of transplanted kidney were also pertinent to this visit.                           SYDNEE Bonner III, MD  05/07/18 1227       SYDNEE Bonner III, MD  05/07/18 1236       SYDNEE Bonner III, MD  05/07/18 1308       SYDNEE Bonner III, MD  05/07/18 5856

## 2018-05-07 NOTE — CONSULTS
[facility]  Vascular Neurology  Comprehensive Stroke Center  Tele-Consultation Note      Consults    Consulting Provider: Spoke Physician:: SYDNEE GUTIÉRREZ  Current Providers  No providers found    Patient Location: [facility]  Spoke hospital nurse at bedside with patient assisting consultant.     Patient information was obtained from patient.       Assessment/Plan:   73 y/o with HTN, CAD s/p stenting, PAD s/p stent placement, presents with acute onset right sided weakness that started yesterday at 5 pm and remains virtually unchanged.  NIHSS 2. CT head without acute abnormality.  Suspect small acute left subcortical infarct. He is out of the window for treatment with iv alteplase, and his deficits don't suggest LVO.  Admit for stroke work up: MRI/MRA brain, MRA neck, TTE, lipid profile, hemoglobin A1c.  Neurology, PT consults. ASA, atorvastatin.        STROKE DOCUMENTATION     Acute Stroke Times:   Acute Stroke Times   Last Known Normal Date: 05/06/18  Last Known Normal Time: 1700  Symptom Onset Date: 05/06/18  Symptom Onset Time: 1750  Stroke Team Called Date: 05/07/18  Stroke Team Called Time: 1145  Stroke Team Arrival Date: 05/07/18  Stroke Team Arrival Time: 1145  CT Interpretation Time: 1137  Decision to Treat Time for Alteplase:  (No iv alteplase candidate)  Decision to Treat Time for IR:  (No IR candidate)    NIH Scale:  Interval: baseline (upon arrival/admit)  1a. Level Of Consciousness: 0-->Alert: keenly responsive  1b. LOC Questions: 0-->Answers both questions correctly  1c. LOC Commands: 0-->Performs both tasks correctly  2. Best Gaze: 0-->Normal  3. Visual: 0-->No visual loss  4. Facial Palsy: 0-->Normal symmetrical movements  5a. Motor Arm, Left: 0-->No drift: limb holds 90 (or 45) degrees for full 10 secs  5b. Motor Arm, Right: 1-->Drift: limb holds 90 (or 45) degrees, but drifts down before full 10 secs: does not hit bed or  other support  6a. Motor Leg, Left: 0-->No drift: leg holds 30 degree position for full 5 secs  6b. Motor Leg, Right: 1-->Drift: leg falls by the end of the 5-sec period but does not hit bed  7. Limb Ataxia: 0-->Absent  8. Sensory: 0-->Normal: no sensory loss  9. Best Language: 0-->No aphasia: normal  10. Dysarthria: 0-->Normal  11. Extinction and Inattention (formerly Neglect): 0-->No abnormality  Total (NIH Stroke Scale): 2     Modified Freedom Score: 0  [place] Coma Scale:15   ABCD2 Score:    [id number] Score:   HAS -BLED Score:   ICH Score:   Hunt & Soto Classification:       Diagnoses: small acute left subcortical infarct.   No new Assessment & Plan notes have been filed under this hospital service since the last note was generated.  Service: Vascular Neurology      Blood pressure (!) 171/76, pulse 65, temperature 98.4 °F (36.9 °C), temperature source Oral, resp. rate (!) 21, weight 90.7 kg (200 lb), SpO2 98 %.  Alteplase Eligible?: No  Alteplase Recommendation: Alteplase not recommended due to Outside of treatment window   Possible Interventional Revascularization Candidate? No; No significant neurological deficit    Disposition Recommendation: admit to inpatient      Subjective:     History of Present Illness: 71 y/o with HTN, CAD s/p stenting, PAD s/p stent placement, presents with acute onset right sided weakness that started yesterday at 5 pm and remains virtually unchanged. Never had similar symptoms before.  Said that yesterday afternoon he started noticing some trouble walking and using his right hand. Then, had persistence of such symptoms when he woke up today and decided to go and see his physician who sent him to the ED for stroke evaluation.  No notes on file      Woke up with symptoms?: no  Last known normal: Last Known Normal Date: 05/06/18 Last Known Normal Time: 1700    Recent bleeding noted: no  Does the patient take any Blood Thinners? no  Medications: Antiplatelets:  aspirin      Past  Medical History: hypertension, MI/CAD and PVD    Past Surgical History: no major surgeries within the last 2 weeks    Family History: no relevant history    Social History: no smoking, no drinking, no drugs    Allergies:   No known drug allergies    Review of Systems   Constitutional: Negative for chills, diaphoresis and fever.   HENT: Negative for hearing loss, tinnitus and trouble swallowing.    Eyes: Negative for photophobia and visual disturbance.   Respiratory: Negative for chest tightness and shortness of breath.    Cardiovascular: Negative for chest pain and palpitations.   Gastrointestinal: Negative for abdominal pain and vomiting.   Endocrine: Negative for cold intolerance and polyuria.   Genitourinary: Negative for hematuria.   Musculoskeletal: Negative for back pain, gait problem, myalgias and neck pain.   Skin: Negative for rash.   Neurological: Positive for weakness. Negative for dizziness, facial asymmetry, speech difficulty, numbness and headaches.   Hematological: Does not bruise/bleed easily.   Psychiatric/Behavioral: Negative for agitation, behavioral problems and confusion.     Objective:   Vitals: Blood pressure (!) 171/76, pulse 65, temperature 98.4 °F (36.9 °C), temperature source Oral, resp. rate (!) 21, weight 90.7 kg (200 lb), SpO2 98 %. BP: 171/76, Respiratory Rate: 16 and Heart Rate: 68    CT READ: Yes  No hemmorhage. No mass effect. No early infarct signs.     Physical Exam   Constitutional: He is oriented to person, place, and time. He appears well-developed and well-nourished.   HENT:   Head: Normocephalic and atraumatic.   Eyes: EOM are normal. Pupils are equal, round, and reactive to light.   Neck: Normal range of motion. Neck supple.   Cardiovascular: Normal rate and regular rhythm.    Pulmonary/Chest: Effort normal. No respiratory distress.   Abdominal: Soft. He exhibits no mass.   Genitourinary:   Genitourinary Comments: No performed     Musculoskeletal: Normal range of motion. He  exhibits no edema.   Neurological: He is oriented to person, place, and time. He displays normal reflexes. No cranial nerve deficit or sensory deficit. He exhibits normal muscle tone. Coordination normal.   Skin: No rash noted. No erythema.   Psychiatric: He has a normal mood and affect. His behavior is normal.             Recommended the emergency room physician to have a brief discussion with the patient and/or family if available regarding the risks and benefits of treatment, and to briefly document the occurrence of that discussion in his clinical encounter note.     The attending portion of this evaluation, treatment, and documentation was performed per Jaron Voss MD via audiovisual.    Billing code:  (non-intervention mild to moderate stroke, TIA, some mimics)    · This patient has a critical neurological condition/illness, with some potential for high morbidity and mortality.  · There is a moderate probability for acute neurological change leading to clinical and possibly life-threatening deterioration requiring highest level of physician preparedness for urgent intervention.  · Care was coordinated with other physicians involved in the patient's care.  · Radiologic studies and laboratory data were reviewed and interpreted, and plan of care was re-assessed based on the results.  · Diagnosis, treatment options and prognosis may have been discussed with the patient and/or family members or caregiver.      Consult End Time: 11:53 am    Jaron Voss MD  Comprehensive Stroke Center  Vascular Neurology   [facility]

## 2018-05-07 NOTE — ED NOTES
Results for WIL STAUFFER (MRN [id number]) as of 5/7/2018 11:54   Ref. Range 5/7/2018 11:45   POC Sodium Latest Ref Range: 136 - 145 mmol/L 140   POC Potassium Latest Ref Range: 3.5 - 5.1 mmol/L 4.9   POC Ionized Calcium Latest Ref Range: 1.06 - 1.42 mmol/L 1.20   POC Hematocrit Latest Ref Range: 36 - 54 %PCV 41   POC HEMOGLOBIN Latest Units: g/dL 14   POC PH Latest Ref Range: 7.35 - 7.45  7.454 (H)   POC PCO2 Latest Ref Range: 35 - 45 mmHg 43.6   POC PO2 Latest Ref Range: 40 - 60 mmHg 44   POC BE Latest Ref Range: -2 to 2 mmol/L 7   POC HCO3 Latest Ref Range: 24 - 28 mmol/L 30.6 (H)   POC SATURATED O2 Latest Ref Range: 95 - 100 % 82 (L)   POC TCO2 Latest Ref Range: 24 - 29 mmol/L 32 (H)   Sample Unknown VENOUS   DelSys Unknown Room Air   Allens Test Unknown N/A   Site Unknown Other   Results reported to Dr. Jagdish Bonner in physician mary @ 1146 on 5[facility]7/2018 [facility].

## 2018-05-07 NOTE — H&P
[facility] Internal Medicine History and Physical - Resident Note    Admitting Team: [facility] Internal Medicine Team A  Attending Physician: Dr. Acuna  Resident: Dr. Hand  Interns: Dr. Sanchez     Date of Admit: 5/7/2018    Chief Complaint     Focal weakness for 1 day     Subjective:      History of Present Illness:  Cy Rutherford is a 72 y.o. male who  has a past medical history of Acute coronary syndrome; Coronary artery disease; and Hypertension. DM II, PVD, HLD. The patient presented to [facility] on 5/7/2018 with a primary complaint of Extremity Weakness (from Dr. Ricardo right arm weakness thst started at 5pm last eveing)      Patient was in usual state of health until rising to go to restroom around 5pm, day prior to presentation when noticed discoordination of gait and abnormal use of R arm. The symptoms persisted to next morning so went to see his primary care doctor who directed him to emergency dept. Code stroke called and vascular neuro evaluated, patient not a candidate for tpa. Patient states his symptoms have persisted since onset, maybe have worsened slightly. He personally has not noticed droop of face but feels his voice is slightly slurred. Notably stopped his plavix as directed on Friday, had been taking since MI and stents in 2011.  Denies chest pain, palpitations, change in bowel or bladder function, fever, chills, shortness of breath, history of A fib, or headache/ vision change.     Past Medical History:  Past Medical History:   Diagnosis Date    Acute coronary syndrome     2011 ASMI    Coronary artery disease     Hypertension        Past Surgical History:  Past Surgical History:   Procedure Laterality Date    COLONOSCOPY      CORONARY ANGIOPLASTY      MARIAN to LAD and LCX       Allergies:  Review of patient's allergies indicates:  No Known Allergies    Home Medications:  Prior to Admission medications    Medication Sig Start Date End Date Taking? Authorizing Provider   amLODIPine  (NORVASC) 10 MG tablet TAKE 1 TABLET BY MOUTH ONCE DAILY 2/15/18  Yes KATHLEEN Jones ANP   aspirin (ECOTRIN) 81 MG EC tablet Take 81 mg by mouth once daily.   Yes Historical Provider, MD   fenofibrate micronized (LOFIBRA) 134 MG Cap Take 134 mg by mouth nightly.   Yes Historical Provider, MD   fish oil-omega-3 fatty acids 300-1,000 mg capsule Take by mouth once daily.   Yes Historical Provider, MD   hydroCHLOROthiazide (HYDRODIURIL) 25 MG tablet TAKE 1 TABLET BY MOUTH ONCE DAILY 2/15/18  Yes KATHLEEN Jones ANP   insulin glargine (LANTUS) 100 unit/mL injection Inject into the skin every evening.     Yes Historical Provider, MD   INVOKANA 100 mg Tab Take 1 tablet by mouth once daily. 5/9/16  Yes Historical Provider, MD   losartan (COZAAR) 50 MG tablet Take 1 tablet (50 mg total) by mouth once daily. 3/8/18  Yes James Sanchez MD   metformin (GLUCOPHAGE) 500 MG tablet Take 1 tablet by mouth 2 (two) times daily. 5/9/16  Yes Historical Provider, MD   metoprolol succinate (TOPROL-XL) 50 MG 24 hr tablet TAKE 1 TABLET BY MOUTH ONCE DAILY 2/15/18  Yes KATHLEEN Jones ANP   nitroGLYCERIN (NITROSTAT) 0.4 MG SL tablet Place 1 tablet (0.4 mg total) under the tongue every 5 (five) minutes as needed for Chest pain. 5/3/18 5/3/19 Yes Jaems Sanchez MD   rosuvastatin (CRESTOR) 20 MG tablet TAKE 1 TABLET BY MOUTH ONCE DAILY 2/15/18  Yes KATHLEEN Jones, ANP       Family History:  Family History   Problem Relation Age of Onset    No Known Problems Mother     No Known Problems Father     Colon polyps Sister        Social History:  Social History   Substance Use Topics    Smoking status: Never Smoker    Smokeless tobacco: Never Used    Alcohol use No   25 p/year smoker, stopped 30 years ago     Review of Systems:  Pertinent positives and negatives listed in HPI. All other systems are reviewed and are negative.    Health Maintaince :   Primary Care Physician: Heide Porter  Immunizations:    TDap is up to date.  Influenza is up to date.  Pneumovax is up to date.  Cancer Screening:  Colonoscopy: is up to date     Objective:   Last 24 Hour Vital Signs:  BP  Min: 122/58  Max: 212/97  Temp  Av.3 °F (36.8 °C)  Min: 98.2 °F (36.8 °C)  Max: 98.4 °F (36.9 °C)  Pulse  Av.8  Min: 60  Max: 71  Resp  Av.1  Min: 9  Max: 21  SpO2  Av.7 %  Min: 97 %  Max: 100 %  Weight  Av.7 kg (200 lb)  Min: 90.7 kg (200 lb)  Max: 90.7 kg (200 lb)  Body mass index is 30.41 kg/m².  No intake/output data recorded.    Physical Examination:  General: Alert and awake in no apparent distress  Head:  Normocephalic and atraumatic  Eyes:  PERRL; EOMi with anicteric sclera and clear conjunctivae  Mouth:  Oropharynx clear and without exudate; moist mucous membranes  Cardio:  Normal rate and regular rhythm with normal S1 and S2; no murmurs or rubs  Resp:  CTAB; respirations unlabored; no wheezes, crackles or rhonchi  Abdom: Soft, NTND with normoactive bowel sounds  Extrem: Warm and well-perfused with no clubbing, cyanosis or edema  Skin:  No rashes, lesions, or color changes  Pulses: 2+ and symmetric distally  Neuro:  AAOx3; cooperative and pleasant, slight decreased tone R cheek, mild slurring of speech, pronator drift of R arm but doesn't fall to bed, interosseous weakness of r hand and decreased cordination of R hand/ arm    Laboratory:  Most Recent Data:  CBC:   Lab Results   Component Value Date    WBC 7.26 2018    HGB 14.9 2018    HCT 41 2018     2018    MCV 89 2018    RDW 14.7 (H) 2018     BMP:   Lab Results   Component Value Date     2018    K 4.1 2018     2018    CO2 29 2018    BUN 16 2018    CREATININE 0.88 2018     (H) 2018    CALCIUM 9.8 2018    MG 1.8 2011     LFTs:   Lab Results   Component Value Date    PROT 7.0 2018    ALBUMIN 4.2 2018    BILITOT 0.3 2018    AST 38  05/07/2018    ALKPHOS 46 05/07/2018    ALT 47 (H) 05/07/2018     Coags:   Lab Results   Component Value Date    INR 1.0 05/07/2018     FLP:   Lab Results   Component Value Date    CHOL 139 06/09/2011    HDL 32 (L) 06/09/2011    LDLCALC 73.2 06/09/2011    TRIG 169 (H) 06/09/2011    CHOLHDL 23.0 06/09/2011     DM:   Lab Results   Component Value Date    HGBA1C 7.9 (H) 06/09/2011    HGBA1C 9.0 (H) 03/03/2011    LDLCALC 73.2 06/09/2011    CREATININE 0.88 05/07/2018     Thyroid:   Lab Results   Component Value Date    TSH 4.970 (H) 05/07/2018    FREET4 0.91 05/07/2018     Anemia: No results found for: IRON, TIBC, FERRITIN, [id number], FOLATE  Cardiac:   Lab Results   Component Value Date    TROPONINI 0.007 06/09/2011     Urinalysis: No results found for: LABURIN, COLORU, CLARITYU, SPECGRAV, LABSPEC, NITRITE, PROTEINUR, GLUCOSEU, KETONESU, UROBILINOGEN, BILIRUBINUR, BLOODU, RBCU, WBCUA    Trended Cardiac Data:  No results for input(s): TROPONINI, CKTOTAL, CKMB, BNP in the last 168 hours.    Other Results:  EKG (my interpretation): Sinus rhythm     Radiology:  Imaging Results          X-Ray Chest AP Portable (Final result)  Result time 05/07/18 12:45:01    Final result by Woody Medina MD (05/07/18 12:45:01)                 Impression:      No acute process seen.      Electronically signed by: Woody Medina MD  Date:    05/07/2018  Time:    12:45             Narrative:    EXAMINATION:  XR CHEST AP PORTABLE    CLINICAL HISTORY:  stroke;    FINDINGS:  Single view of the chest.    Cardiac silhouette is normal.  The lungs demonstrate no evidence of active disease.  No evidence of pleural effusion or pneumothorax.  Bones appear intact.                               CT Head Without Contrast (Final result)  Result time 05/07/18 11:08:05    Final result by Emmanuel Linder MD (05/07/18 11:08:05)                 Impression:      No acute abnormality.  There are chronic changes of cerebral atrophy.      Electronically signed  by: Jacob Banks MD  Date:    05/07/2018  Time:    11:08             Narrative:    EXAMINATION:  CT HEAD WITHOUT CONTRAST    CLINICAL HISTORY:  Stroke;    TECHNIQUE:  Low dose axial CT images obtained throughout the head without intravenous contrast. Sagittal and coronal reconstructions were performed.    COMPARISON:  None.    FINDINGS:  Intracranial compartment:    There are findings of cerebral atrophy with deepening of the cortical sulcal markings and dilatation of the lateral ventricles. There is patchy decreased attenuation in the periventricular white matter suggesting chronic changes of small vessel disease. No extra-axial blood or fluid collections.  There is atherosclerosis of intracranial vessels.    The brain parenchyma appears normal. No parenchymal mass, hemorrhage, edema or major vascular distribution infarct.    Skull/extracranial contents (limited evaluation): No fracture. Mastoid air cells and paranasal sinuses are essentially clear.                                   Assessment:     Cy Rutherford is a 72 y.o. male with:  Patient Active Problem List    Diagnosis Date Noted    Cerebrovascular accident (CVA) 05/07/2018    PAD (peripheral artery disease) 12/29/2014    Acute coronary syndrome     DM (diabetes mellitus) 11/15/2012    HTN (hypertension) 11/15/2012    Hyperlipidemia 11/15/2012    CAD (coronary artery disease) 11/15/2012    Status post coronary artery stent placement 11/15/2012    Acute MI anterior wall first episode care 11/15/2012        Plan:     Acute Cerebrovascular accident   - Patient with new onset slurring of speech, decreased coordination of R hand and arm, and gait instability with leaning on walls for balance around 5pm day prior to presentation. Symptoms persisted and may have slightly worsened since then. NIH SS of 4 on examination, reported to be NIH SS of 2 earlier today in ED by [facility] neuro consult: not a tpa candidate.  CT head without hemorrhage or mass  effect. Has history of vascular disease and metabolic syndrome. Hypertensive on presentation, Rec'd , IV hydralazine, and nitropaste in ED. The nitropaste was removed prior to transport to our facility.  on our exam. Will administer lipitor 80 mg, plavix loading dose and allow permissive HTN up to 220 systolic. Continue to monitor and consult vascular neurology for acute change. Will personally monitor the patient again in an hour and plan for MRI/MRA/TTE in AM. Send to ICU if change noted on exam, but now 24 hours out from onset.   [facility] neuro recs: MRI/MRA brain, MRA neck, TTE, lipid profile, hemoglobin A1c, Neurology, PT consults. ASA, atorvastatin.  - additional medical management as above    HTN  - permissive HTN as above, Home meds include norvasc 10mg qd, metoprolol XL 50mg qd, HCTZ 25mg qd, losartan 50mg qd    CAD, history of MI 2011 with stents X3, PAD with stents in legs bilaterally.   - on daily ASA, had stopped plavix on Friday as directed. Resume as above.     Diabetes Mellitus with long term insulin use, controlled  - most recent A1c 8   glucose on admission 235  currently home medications Metformin 500mg BID, invokana 300mg qd, Lantus 60u BID  - will repeat A1c if not done in last 3 months and administer basal insulin prn, SSI, accuchecks     HLD  - home meds crestor 20mg qd, fenofibrate 135mg qd, fish oil 1g BID   - lipitor as above, check lipid panel in AM     History of colon cancer, 2002  - s/p surgical resection, chemo, radiation. Gets regular c-scopes. No change in stools or hematochezia/melena     Health maintenance  - may benefit from one-time low dose CT chest for smoking history as outpatient.   Code Status: Full   Diet: NPO  DVT PPX: lovenox   Dispo: PT/Ot/speech, medical management of CVA   Est Length of Stay: 2 days       Jeni Sanchez MD  [facility] Internal [facility] HO-I  [facility] Internal [facility] Service Team A    [facility] Medicine Hospitalist Pager numbers:   [facility] Hospitalist Medicine  Team A (Armand/Baljit): [phone]  [place] Hospitalist Medicine Team B (Criss/Yoana):  [phone]

## 2018-05-08 ENCOUNTER — HOSPITAL ENCOUNTER (INPATIENT)
Facility: HOSPITAL | Age: 73
LOS: 4 days | Discharge: NURSING, REHAB OR LONG-TERM CARE | DRG: 064 | End: 2018-05-12
Attending: PSYCHIATRY & NEUROLOGY | Admitting: PSYCHIATRY & NEUROLOGY
Payer: MEDICARE

## 2018-05-08 VITALS
HEART RATE: 60 BPM | DIASTOLIC BLOOD PRESSURE: 73 MMHG | SYSTOLIC BLOOD PRESSURE: 155 MMHG | OXYGEN SATURATION: 99 % | RESPIRATION RATE: 16 BRPM | WEIGHT: 193.81 LBS | HEIGHT: 68 IN | TEMPERATURE: 98 F | BODY MASS INDEX: 29.37 KG/M2

## 2018-05-08 DIAGNOSIS — G93.6 CYTOTOXIC CEREBRAL EDEMA: ICD-10-CM

## 2018-05-08 DIAGNOSIS — I63.9 CVA (CEREBRAL VASCULAR ACCIDENT): ICD-10-CM

## 2018-05-08 DIAGNOSIS — I63.02 THROMBOTIC STROKE INVOLVING BASILAR ARTERY: ICD-10-CM

## 2018-05-08 PROBLEM — I67.2 INTRACRANIAL ATHEROSCLEROSIS: Status: ACTIVE | Noted: 2018-05-08

## 2018-05-08 PROBLEM — Z85.038 HISTORY OF COLON CANCER: Status: ACTIVE | Noted: 2018-05-08

## 2018-05-08 LAB
ALBUMIN SERPL BCP-MCNC: 3.4 G/DL
ALP SERPL-CCNC: 35 U/L
ALT SERPL W/O P-5'-P-CCNC: 30 U/L
ANION GAP SERPL CALC-SCNC: 10 MMOL/L
APTT BLDCRRT: 25.5 SEC
AST SERPL-CCNC: 25 U/L
BACTERIA #/AREA URNS HPF: NORMAL /HPF
BASOPHILS # BLD AUTO: 0.02 K/UL
BASOPHILS NFR BLD: 0.3 %
BILIRUB SERPL-MCNC: 0.5 MG/DL
BILIRUB UR QL STRIP: NEGATIVE
BUN SERPL-MCNC: 13 MG/DL
CALCIUM SERPL-MCNC: 8.9 MG/DL
CHLORIDE SERPL-SCNC: 107 MMOL/L
CHOLEST SERPL-MCNC: 165 MG/DL
CHOLEST/HDLC SERPL: 4.3 {RATIO}
CK MB SERPL-MCNC: 1.8 NG/ML
CK MB SERPL-RTO: 2.5 %
CK SERPL-CCNC: 72 U/L
CLARITY UR: CLEAR
CO2 SERPL-SCNC: 23 MMOL/L
COLOR UR: YELLOW
CREAT SERPL-MCNC: 0.8 MG/DL
DIFFERENTIAL METHOD: ABNORMAL
EOSINOPHIL # BLD AUTO: 0.1 K/UL
EOSINOPHIL NFR BLD: 1.9 %
ERYTHROCYTE [DISTWIDTH] IN BLOOD BY AUTOMATED COUNT: 14.3 %
EST. GFR  (AFRICAN AMERICAN): >60 ML/MIN/1.73 M^2
EST. GFR  (NON AFRICAN AMERICAN): >60 ML/MIN/1.73 M^2
ESTIMATED AVG GLUCOSE: 174 MG/DL
GLUCOSE SERPL-MCNC: 83 MG/DL
GLUCOSE UR QL STRIP: ABNORMAL
HBA1C MFR BLD HPLC: 7.7 %
HCT VFR BLD AUTO: 39.5 %
HDLC SERPL-MCNC: 38 MG/DL
HDLC SERPL: 23 %
HGB BLD-MCNC: 12.7 G/DL
HGB UR QL STRIP: NEGATIVE
INR PPP: 1
KETONES UR QL STRIP: NEGATIVE
LDLC SERPL CALC-MCNC: 80.6 MG/DL
LEUKOCYTE ESTERASE UR QL STRIP: NEGATIVE
LYMPHOCYTES # BLD AUTO: 1.3 K/UL
LYMPHOCYTES NFR BLD: 18.8 %
MAGNESIUM SERPL-MCNC: 2.1 MG/DL
MCH RBC QN AUTO: 28.3 PG
MCHC RBC AUTO-ENTMCNC: 32.2 G/DL
MCV RBC AUTO: 88 FL
MICROSCOPIC COMMENT: NORMAL
MONOCYTES # BLD AUTO: 0.5 K/UL
MONOCYTES NFR BLD: 6.7 %
NEUTROPHILS # BLD AUTO: 4.9 K/UL
NEUTROPHILS NFR BLD: 72 %
NITRITE UR QL STRIP: NEGATIVE
NONHDLC SERPL-MCNC: 127 MG/DL
PH UR STRIP: 6 [PH] (ref 5–8)
PHOSPHATE SERPL-MCNC: 3.1 MG/DL
PLATELET # BLD AUTO: 240 K/UL
PMV BLD AUTO: 10.1 FL
POCT GLUCOSE: 113 MG/DL (ref 70–110)
POCT GLUCOSE: 125 MG/DL (ref 70–110)
POCT GLUCOSE: 153 MG/DL (ref 70–110)
POCT GLUCOSE: 169 MG/DL (ref 70–110)
POCT GLUCOSE: 65 MG/DL (ref 70–110)
POCT GLUCOSE: 82 MG/DL (ref 70–110)
POCT GLUCOSE: 90 MG/DL (ref 70–110)
POTASSIUM SERPL-SCNC: 3.4 MMOL/L
PROT SERPL-MCNC: 6.1 G/DL
PROT UR QL STRIP: NEGATIVE
PROTHROMBIN TIME: 10.8 SEC
RBC # BLD AUTO: 4.49 M/UL
RBC #/AREA URNS HPF: 2 /HPF (ref 0–4)
SODIUM SERPL-SCNC: 140 MMOL/L
SP GR UR STRIP: 1.01 (ref 1–1.03)
SQUAMOUS #/AREA URNS HPF: 2 /HPF
TRIGL SERPL-MCNC: 232 MG/DL
TROPONIN I SERPL DL<=0.01 NG/ML-MCNC: 0.01 NG/ML
URN SPEC COLLECT METH UR: ABNORMAL
UROBILINOGEN UR STRIP-ACNC: NEGATIVE EU/DL
WBC # BLD AUTO: 6.74 K/UL
WBC #/AREA URNS HPF: 1 /HPF (ref 0–5)
YEAST URNS QL MICRO: NORMAL

## 2018-05-08 PROCEDURE — 25000003 PHARM REV CODE 250: Performed by: PHYSICIAN ASSISTANT

## 2018-05-08 PROCEDURE — 25000003 PHARM REV CODE 250: Performed by: STUDENT IN AN ORGANIZED HEALTH CARE EDUCATION/TRAINING PROGRAM

## 2018-05-08 PROCEDURE — 25000003 PHARM REV CODE 250: Performed by: INTERNAL MEDICINE

## 2018-05-08 PROCEDURE — 84100 ASSAY OF PHOSPHORUS: CPT

## 2018-05-08 PROCEDURE — 83735 ASSAY OF MAGNESIUM: CPT

## 2018-05-08 PROCEDURE — 81000 URINALYSIS NONAUTO W/SCOPE: CPT

## 2018-05-08 PROCEDURE — 94761 N-INVAS EAR/PLS OXIMETRY MLT: CPT

## 2018-05-08 PROCEDURE — 85610 PROTHROMBIN TIME: CPT

## 2018-05-08 PROCEDURE — 82550 ASSAY OF CK (CPK): CPT

## 2018-05-08 PROCEDURE — 99223 1ST HOSP IP/OBS HIGH 75: CPT | Mod: ,,, | Performed by: PSYCHIATRY & NEUROLOGY

## 2018-05-08 PROCEDURE — 85025 COMPLETE CBC W/AUTO DIFF WBC: CPT

## 2018-05-08 PROCEDURE — 85730 THROMBOPLASTIN TIME PARTIAL: CPT

## 2018-05-08 PROCEDURE — 20000000 HC ICU ROOM

## 2018-05-08 PROCEDURE — 99221 1ST HOSP IP/OBS SF/LOW 40: CPT | Mod: AI,GC,, | Performed by: PSYCHIATRY & NEUROLOGY

## 2018-05-08 PROCEDURE — 84484 ASSAY OF TROPONIN QUANT: CPT

## 2018-05-08 PROCEDURE — 80061 LIPID PANEL: CPT

## 2018-05-08 PROCEDURE — 25500020 PHARM REV CODE 255: Performed by: INTERNAL MEDICINE

## 2018-05-08 PROCEDURE — 63600175 PHARM REV CODE 636 W HCPCS: Performed by: STUDENT IN AN ORGANIZED HEALTH CARE EDUCATION/TRAINING PROGRAM

## 2018-05-08 PROCEDURE — 83036 HEMOGLOBIN GLYCOSYLATED A1C: CPT

## 2018-05-08 PROCEDURE — 36415 COLL VENOUS BLD VENIPUNCTURE: CPT

## 2018-05-08 PROCEDURE — 82553 CREATINE MB FRACTION: CPT

## 2018-05-08 PROCEDURE — 80053 COMPREHEN METABOLIC PANEL: CPT

## 2018-05-08 RX ORDER — SODIUM CHLORIDE 9 MG/ML
INJECTION, SOLUTION INTRAVENOUS CONTINUOUS
Status: DISCONTINUED | OUTPATIENT
Start: 2018-05-08 | End: 2018-05-09

## 2018-05-08 RX ORDER — SODIUM CHLORIDE 0.9 % (FLUSH) 0.9 %
3 SYRINGE (ML) INJECTION EVERY 8 HOURS
Status: DISCONTINUED | OUTPATIENT
Start: 2018-05-08 | End: 2018-05-08 | Stop reason: HOSPADM

## 2018-05-08 RX ORDER — ROSUVASTATIN CALCIUM 20 MG/1
40 TABLET, COATED ORAL DAILY
Status: DISCONTINUED | OUTPATIENT
Start: 2018-05-08 | End: 2018-05-12 | Stop reason: HOSPADM

## 2018-05-08 RX ORDER — AMOXICILLIN 250 MG
1 CAPSULE ORAL DAILY
Status: DISCONTINUED | OUTPATIENT
Start: 2018-05-08 | End: 2018-05-12 | Stop reason: HOSPADM

## 2018-05-08 RX ORDER — CLOPIDOGREL BISULFATE 75 MG/1
75 TABLET ORAL DAILY
Status: DISCONTINUED | OUTPATIENT
Start: 2018-05-08 | End: 2018-05-12 | Stop reason: HOSPADM

## 2018-05-08 RX ORDER — IBUPROFEN 200 MG
24 TABLET ORAL
Status: DISCONTINUED | OUTPATIENT
Start: 2018-05-08 | End: 2018-05-12 | Stop reason: HOSPADM

## 2018-05-08 RX ORDER — GLUCAGON 1 MG
1 KIT INJECTION
Status: CANCELLED | OUTPATIENT
Start: 2018-05-08

## 2018-05-08 RX ORDER — GLUCAGON 1 MG
1 KIT INJECTION
Status: DISCONTINUED | OUTPATIENT
Start: 2018-05-08 | End: 2018-05-12 | Stop reason: HOSPADM

## 2018-05-08 RX ORDER — HEPARIN SODIUM 5000 [USP'U]/ML
5000 INJECTION, SOLUTION INTRAVENOUS; SUBCUTANEOUS EVERY 8 HOURS
Status: DISCONTINUED | OUTPATIENT
Start: 2018-05-08 | End: 2018-05-08 | Stop reason: HOSPADM

## 2018-05-08 RX ORDER — ATORVASTATIN CALCIUM 20 MG/1
40 TABLET, FILM COATED ORAL DAILY
Status: DISCONTINUED | OUTPATIENT
Start: 2018-05-08 | End: 2018-05-08

## 2018-05-08 RX ORDER — NAPROXEN SODIUM 220 MG/1
81 TABLET, FILM COATED ORAL DAILY
Status: DISCONTINUED | OUTPATIENT
Start: 2018-05-08 | End: 2018-05-08 | Stop reason: HOSPADM

## 2018-05-08 RX ORDER — ROSUVASTATIN CALCIUM 20 MG/1
20 TABLET, COATED ORAL DAILY
Status: DISCONTINUED | OUTPATIENT
Start: 2018-05-08 | End: 2018-05-08

## 2018-05-08 RX ORDER — ASPIRIN 325 MG
325 TABLET ORAL DAILY
Status: DISCONTINUED | OUTPATIENT
Start: 2018-05-08 | End: 2018-05-08

## 2018-05-08 RX ORDER — SODIUM CHLORIDE 9 MG/ML
INJECTION, SOLUTION INTRAVENOUS CONTINUOUS
Status: DISCONTINUED | OUTPATIENT
Start: 2018-05-08 | End: 2018-05-08 | Stop reason: HOSPADM

## 2018-05-08 RX ORDER — INSULIN ASPART 100 [IU]/ML
0-5 INJECTION, SOLUTION INTRAVENOUS; SUBCUTANEOUS
Status: DISCONTINUED | OUTPATIENT
Start: 2018-05-08 | End: 2018-05-12 | Stop reason: HOSPADM

## 2018-05-08 RX ORDER — ASPIRIN 325 MG
325 TABLET ORAL DAILY
Status: DISCONTINUED | OUTPATIENT
Start: 2018-05-08 | End: 2018-05-12 | Stop reason: HOSPADM

## 2018-05-08 RX ORDER — IBUPROFEN 200 MG
16 TABLET ORAL
Status: DISCONTINUED | OUTPATIENT
Start: 2018-05-08 | End: 2018-05-12 | Stop reason: HOSPADM

## 2018-05-08 RX ORDER — INSULIN ASPART 100 [IU]/ML
7 INJECTION, SOLUTION INTRAVENOUS; SUBCUTANEOUS
Status: DISCONTINUED | OUTPATIENT
Start: 2018-05-08 | End: 2018-05-10

## 2018-05-08 RX ORDER — INSULIN ASPART 100 [IU]/ML
1-10 INJECTION, SOLUTION INTRAVENOUS; SUBCUTANEOUS EVERY 6 HOURS PRN
Status: CANCELLED | OUTPATIENT
Start: 2018-05-08

## 2018-05-08 RX ORDER — CLOPIDOGREL BISULFATE 75 MG/1
75 TABLET ORAL DAILY
Status: DISCONTINUED | OUTPATIENT
Start: 2018-05-08 | End: 2018-05-08 | Stop reason: HOSPADM

## 2018-05-08 RX ORDER — ACETAMINOPHEN 325 MG/1
650 TABLET ORAL EVERY 6 HOURS PRN
Status: DISCONTINUED | OUTPATIENT
Start: 2018-05-08 | End: 2018-05-12 | Stop reason: HOSPADM

## 2018-05-08 RX ADMIN — ATORVASTATIN CALCIUM 40 MG: 40 TABLET, FILM COATED ORAL at 08:05

## 2018-05-08 RX ADMIN — ASPIRIN 325 MG ORAL TABLET 325 MG: 325 PILL ORAL at 02:05

## 2018-05-08 RX ADMIN — DEXTROSE MONOHYDRATE 12.5 G: 500 INJECTION PARENTERAL at 12:05

## 2018-05-08 RX ADMIN — INSULIN ASPART 7 UNITS: 100 INJECTION, SOLUTION INTRAVENOUS; SUBCUTANEOUS at 04:05

## 2018-05-08 RX ADMIN — ASPIRIN 81 MG: 81 TABLET, COATED ORAL at 08:05

## 2018-05-08 RX ADMIN — IOHEXOL 100 ML: 350 INJECTION, SOLUTION INTRAVENOUS at 01:05

## 2018-05-08 RX ADMIN — STANDARDIZED SENNA CONCENTRATE AND DOCUSATE SODIUM 1 TABLET: 8.6; 5 TABLET, FILM COATED ORAL at 01:05

## 2018-05-08 RX ADMIN — CLOPIDOGREL BISULFATE 75 MG: 75 TABLET ORAL at 08:05

## 2018-05-08 RX ADMIN — SODIUM CHLORIDE: 0.9 INJECTION, SOLUTION INTRAVENOUS at 01:05

## 2018-05-08 NOTE — PLAN OF CARE
Report called to ICU Nurse Catalina, patient transferred to ICU via stretcher with heart monitor intact. Awake and alert, able to verbalize needs. Normal Saline infusing. Transferred with Telemetry-Med Surg Staff.

## 2018-05-08 NOTE — PT/OT/SLP PROGRESS
Occupational Therapy      Patient Name:  Cy Rutherford   MRN:  [id number]    Patient transferred to [facility]    Lam Denson OT  5/8/2018

## 2018-05-08 NOTE — PROGRESS NOTES
Pt arrived to ICU around 2149,  from 4th floor, via stretcher and telemetry monitor. CC of extremities weakness( Diagnosis of right side CVA , per MRI and MD notes). AAOx4, speech was clear not slurred, no vision changes, no nausea, denies any pain. Weakness of the right arm present, slight left facial droop, normal strength on the other extremities. Pt set up on ICU monitor, Physician informed of the admission, treatment initiated per order.     Pt  condition remained stable, permissive high blood pressure 169/79. Denies any pain.

## 2018-05-08 NOTE — PLAN OF CARE
Received report from Radiologist Néstor Alan Occlusion noted on MRI results will put a note in and to contact Physician. please see MRI results

## 2018-05-08 NOTE — CONSULTS
[facility]  Vascular Neurology  Comprehensive Stroke Center  Consult Note    Inpatient consult to Vascular (Stroke) Neurology  Consult performed by: CALISTA GALVEZ  Consult ordered by: LEEANNA GONSALEZ  Reason for consult: right side weakness    Inpatient consult to Vascular/Stroke Neurology - [facility]  Consult performed by: CALISTA GALVEZ  Consult ordered by: MISSY GOSS  Reason for consult: right side weakness        Assessment/Plan:     Patient is a 72 y.o. year old male with:    * Thrombotic stroke involving basilar artery    73 yo male with left anteromedial/anterolateral pontine perforators infarcts due to thrombosis/atheroma on the basilar artery.  Has diffuse atherosclersosis and multiple risk factors including uncontrolled HTN and DM. Presenting with right side weakness.    No clear intervention indicated for this disease process other than aggressive risk factor modification for secondary prevention and rehab efforts.    Antithrombotics for secondary stroke prevention: Antiplatelets: Aspirin: 325 mg daily  Clopidogrel: 75 mg daily    Statins for secondary stroke prevention and hyperlipidemia, if present:   Statins: Rosuvastatin- 40 mg daily    Aggressive risk factor modification: HTN, DM, HLD     Rehab efforts: PT/OT/SLP to evaluate and treat    Diagnostics ordered/pending: TTE to assess cardiac function/status     VTE prophylaxis: Heparin 5000 units SQ every 8 hours    BP parameters: Infarct: No intervention, SBP <220        Intracranial atherosclerosis    Clearly visualized on MRA/CTA.  Risk factor modification for secondary stroke prevention.        Cytotoxic cerebral edema    Visualized on MRI, consistent with acute stroke.  Monitor exam carefully as deterioration may signify edema/stroke territory expansion.        Essential hypertension    Stroke risk factor.  Manage medically without aggressive drops.        Type 2 diabetes mellitus with kidney  complication, without long-term current use of insulin    Stroke risk factor.  Uncontrolled.  Will need more aggressive management as outpatient.            STROKE DOCUMENTATION       Non-acute stroke transfer.    NIH Scale:  Interval: baseline (upon arrival/admit)  1a. Level Of Consciousness: 0-->Alert: keenly responsive  1b. LOC Questions: 0-->Answers both questions correctly  1c. LOC Commands: 0-->Performs both tasks correctly  2. Best Gaze: 0-->Normal  3. Visual: 0-->No visual loss  4. Facial Palsy: 1-->Minor paralysis (flattened nasolabial fold, asymmetry on smiling)  5a. Motor Arm, Left: 0-->No drift: limb holds 90 (or 45) degrees for full 10 secs  5b. Motor Arm, Right: 1-->Drift: limb holds 90 (or 45) degrees, but drifts down before full 10 secs: does not hit bed or other support  6a. Motor Leg, Left: 0-->No drift: leg holds 30 degree position for full 5 secs  6b. Motor Leg, Right: 0-->No drift: leg holds 30 degree position for full 5 secs  7. Limb Ataxia: 0-->Absent  8. Sensory: 0-->Normal: no sensory loss  9. Best Language: 0-->No aphasia: normal  10. Dysarthria: 1-->Mild-to-moderate dysarthria: patient slurs at least some words and, at worst, can be understood with some difficulty  11. Extinction and Inattention (formerly Neglect): 0-->No abnormality  Total (NIH Stroke Scale): 3    Modified [place] Score: 0  Eduarda Coma Scale:15     Thrombolysis Candidate? No, Out of window       Interventional Revascularization Candidate?   Is the patient eligible for mechanical endovascular reperfusion (JAMIN)?  No; No large vessel occlusion      Hemorrhagic change of an Ischemic Stroke: Does this patient have an ischemic stroke with hemorrhagic changes? No     Subjective:     History of Present Illness:  73 yo male who presented to [facility] with complaints of mild to moderate weakness on the right arm that began Sunday.  He hoped his symptoms would improve and waited until the next day to go to ED.  His symptoms had  not deteriorated.  He denies any associated numbness or gaze problems.  He has not had in the past.  He was not treated with alteplase nor thrombectomy since he was outside of treatment window and lacked signs or symptoms of large vessel occlusion.  He denies any clear triggers.  Risk factors include essential hypertension and uncontrolled diabetes.        Past Medical History:   Diagnosis Date    Acute coronary syndrome     2011 ASMI    Coronary artery disease     Hypertension      Past Surgical History:   Procedure Laterality Date    COLONOSCOPY      CORONARY ANGIOPLASTY      MARIAN to LAD and LCX     Family History   Problem Relation Age of Onset    No Known Problems Mother     No Known Problems Father     Colon polyps Sister      Social History   Substance Use Topics    Smoking status: Never Smoker    Smokeless tobacco: Never Used    Alcohol use No     Review of patient's allergies indicates:  No Known Allergies    Medications: I have reviewed the current medication administration record.    Prescriptions Prior to Admission   Medication Sig Dispense Refill Last Dose    amLODIPine (NORVASC) 10 MG tablet TAKE 1 TABLET BY MOUTH ONCE DAILY 90 tablet 1     aspirin (ECOTRIN) 81 MG EC tablet Take 81 mg by mouth once daily.   Taking    fenofibrate micronized (LOFIBRA) 134 MG Cap Take 134 mg by mouth nightly.   Taking    fish oil-omega-3 fatty acids 300-1,000 mg capsule Take by mouth once daily.       hydroCHLOROthiazide (HYDRODIURIL) 25 MG tablet TAKE 1 TABLET BY MOUTH ONCE DAILY 90 tablet 1     insulin glargine (LANTUS) 100 unit/mL injection Inject into the skin every evening.     Taking    INVOKANA 300 mg Tab tablet Take 300 mg by mouth once daily.    Taking    losartan (COZAAR) 50 MG tablet Take 1 tablet (50 mg total) by mouth once daily. 90 tablet 3     metformin (GLUCOPHAGE) 500 MG tablet Take 1 tablet by mouth 2 (two) times daily.   Taking    metoprolol succinate (TOPROL-XL) 50 MG 24 hr tablet  TAKE 1 TABLET BY MOUTH ONCE DAILY 90 tablet 1     nitroGLYCERIN (NITROSTAT) 0.4 MG SL tablet Place 1 tablet (0.4 mg total) under the tongue every 5 (five) minutes as needed for Chest pain. 30 tablet 11     rosuvastatin (CRESTOR) 20 MG tablet TAKE 1 TABLET BY MOUTH ONCE DAILY 90 tablet 1        Review of Systems   Constitutional: Negative for chills and fatigue.   HENT: Positive for congestion. Negative for nosebleeds and sore throat.    Eyes: Negative for photophobia and visual disturbance.   Respiratory: Negative for cough and shortness of breath.    Cardiovascular: Negative for chest pain and palpitations.   Gastrointestinal: Negative for abdominal pain, blood in stool, constipation, diarrhea, nausea and vomiting.   Endocrine: Negative for cold intolerance and heat intolerance.   Genitourinary: Negative for difficulty urinating and hematuria.   Musculoskeletal: Negative for arthralgias, back pain, joint swelling and neck pain.   Skin: Negative for color change and rash.   Neurological: Negative for light-headedness and headaches.   Psychiatric/Behavioral: Negative for confusion and hallucinations.     Objective:     Vital Signs (Most Recent):  Pulse: 74 (05/08/18 1142)  Resp: (!) 34 (05/08/18 1142)  SpO2: 98 % (05/08/18 1142)    Vital Signs Range (Last 24H):  Temp:  [97.6 °F (36.4 °C)-98.5 °F (36.9 °C)]   Pulse:  [51-71]   Resp:  [9-27]   BP: (122-198)/(58-84)   SpO2:  [96 %-100 %]     Physical Exam   Constitutional: He appears well-developed and well-nourished.   HENT:   Head: Normocephalic and atraumatic.   Right Ear: External ear normal.   Left Ear: External ear normal.   Eyes: Conjunctivae are normal. Pupils are equal, round, and reactive to light.   Neck: Normal range of motion.   Cardiovascular: Normal rate.    Pulmonary/Chest: Effort normal and breath sounds normal.   Abdominal: Soft.   Musculoskeletal: Normal range of motion.   Neurological: He is alert.   Skin: Skin is warm and dry.   Psychiatric: He  has a normal mood and affect. His behavior is normal. Judgment and thought content normal.   Vitals reviewed.      Neurological Exam:   LOC: alert  Attention Span: Good   Language: No aphasia  Articulation: No dysarthria  Orientation: Person, Place, Time   Visual Fields: Full  EOM (CN III, IV, VI): Full/intact  Pupils (CN II, III): PERRL  Facial Sensation (CN V): Normal  Facial Movement (CN VII): Lower facial weakness on the Right  Gag Reflex: present  Reflexes: Extensor plantar response  right  Motor: Arm left  Normal 5/5  Leg left  Normal 5/5  Arm right  Paresis: 4/5  Leg right Paresis: 4/5  Cebellar: No evidence of appendicular or axial ataxia  Sensation: Intact to light touch, temperature and vibration  Tone: Normal tone throughout      Laboratory:  CMP:   Recent Labs  Lab 05/08/18  0355   CALCIUM 8.9   ALBUMIN 3.4*   PROT 6.1      K 3.4*   CO2 23      BUN 13   CREATININE 0.8   ALKPHOS 35*   ALT 30   AST 25   BILITOT 0.5     CBC:   Recent Labs  Lab 05/08/18  0356   WBC 6.74   RBC 4.49*   HGB 12.7*   HCT 39.5*      MCV 88   MCH 28.3   MCHC 32.2     Lipid Panel:   Recent Labs  Lab 05/08/18  0010   CHOL 165   LDLCALC 80.6   HDL 38*   TRIG 232*     Coagulation:   Recent Labs  Lab 05/08/18  0356   INR 1.0   APTT 25.5     Hgb A1C:   Recent Labs  Lab 05/08/18  0010   HGBA1C 7.7*     TSH:   Recent Labs  Lab 05/07/18  1130   TSH 4.970*       Diagnostic Results:      Brain imaging:  MRI brain: 5.7.18  Small area of cytotoxic cerebral edema on the right pepe-kim consistent with acute ischemia of right anteromedial/anterolateral pontine arteries.        Vessel Imaging:  MRA and CTA head/neck: 5.7.18  Diffuse left vertebral and basilar/intracranial atherosclersosis with plaque at the level of the infarct seen on MRI.            Cardiac Evaluation:   EKG 5.7.18: normal sinus    Hector Shine MD  Vascular and Interventional Neurology Staff  Director of [facility]  [place]  [phone]

## 2018-05-08 NOTE — PLAN OF CARE
Patient with worsening of previously described stroke symptoms NIH SS now 6. I spoke with on call telestroke regarding worsening of symptoms as well as findings on MRA significant for partial thrombosis of L vertebral artery as well as basilar artery. The telestroke physician will reach out to vascular surgery regarding this case and recommended high dose plavix, hydration and maintenance of elevated BP, as the patient is beyond the window of candidacy for tPA or embolectomy.     Moving patient to ICU for closer monitoring and Q1hr neurochecks.     The telestroke physician will call Dr. Hand with additional recommendations for management after discussing with vascular surgery.     Jeni Sanchez MD  10:14 PM  05/07/2018

## 2018-05-08 NOTE — PLAN OF CARE
Spoke with telestroke physician who spoke with vascular neurology. Plan is to transfer patient to main campus for further evaluation/possible procedure. Will obtain CTA head and neck at request of telestroke physician. Understand that pt has received contrast for MRAs but given need to better understand process ongoing in patient with evolving neuro finding, believe risks of nephrotoxicity of contrast in patient with no evidence of renal disease are outweighed by better understanding disease processes.    Cirilo Hand  [facility] Medicine [facility] Medicine Service A

## 2018-05-08 NOTE — SUBJECTIVE & OBJECTIVE
Past Medical History:   Diagnosis Date    Acute coronary syndrome     2011 ASMI    Coronary artery disease     Hypertension      Past Surgical History:   Procedure Laterality Date    COLONOSCOPY      CORONARY ANGIOPLASTY      MARIAN to LAD and LCX     Family History   Problem Relation Age of Onset    No Known Problems Mother     No Known Problems Father     Colon polyps Sister      Social History   Substance Use Topics    Smoking status: Never Smoker    Smokeless tobacco: Never Used    Alcohol use No     Review of patient's allergies indicates:  No Known Allergies    Medications: I have reviewed the current medication administration record.    Prescriptions Prior to Admission   Medication Sig Dispense Refill Last Dose    amLODIPine (NORVASC) 10 MG tablet TAKE 1 TABLET BY MOUTH ONCE DAILY 90 tablet 1     aspirin (ECOTRIN) 81 MG EC tablet Take 81 mg by mouth once daily.   Taking    fenofibrate micronized (LOFIBRA) 134 MG Cap Take 134 mg by mouth nightly.   Taking    fish oil-omega-3 fatty acids 300-1,000 mg capsule Take by mouth once daily.       hydroCHLOROthiazide (HYDRODIURIL) 25 MG tablet TAKE 1 TABLET BY MOUTH ONCE DAILY 90 tablet 1     insulin glargine (LANTUS) 100 unit/mL injection Inject into the skin every evening.     Taking    INVOKANA 300 mg Tab tablet Take 300 mg by mouth once daily.    Taking    losartan (COZAAR) 50 MG tablet Take 1 tablet (50 mg total) by mouth once daily. 90 tablet 3     metformin (GLUCOPHAGE) 500 MG tablet Take 1 tablet by mouth 2 (two) times daily.   Taking    metoprolol succinate (TOPROL-XL) 50 MG 24 hr tablet TAKE 1 TABLET BY MOUTH ONCE DAILY 90 tablet 1     nitroGLYCERIN (NITROSTAT) 0.4 MG SL tablet Place 1 tablet (0.4 mg total) under the tongue every 5 (five) minutes as needed for Chest pain. 30 tablet 11     rosuvastatin (CRESTOR) 20 MG tablet TAKE 1 TABLET BY MOUTH ONCE DAILY 90 tablet 1        Review of Systems   Constitutional: Negative for chills and  fatigue.   HENT: Positive for congestion. Negative for nosebleeds and sore throat.    Eyes: Negative for photophobia and visual disturbance.   Respiratory: Negative for cough and shortness of breath.    Cardiovascular: Negative for chest pain and palpitations.   Gastrointestinal: Negative for abdominal pain, blood in stool, constipation, diarrhea, nausea and vomiting.   Endocrine: Negative for cold intolerance and heat intolerance.   Genitourinary: Negative for difficulty urinating and hematuria.   Musculoskeletal: Negative for arthralgias, back pain, joint swelling and neck pain.   Skin: Negative for color change and rash.   Neurological: Negative for light-headedness and headaches.   Psychiatric/Behavioral: Negative for confusion and hallucinations.     Objective:     Vital Signs (Most Recent):  Pulse: 74 (05/08/18 1142)  Resp: (!) 34 (05/08/18 1142)  SpO2: 98 % (05/08/18 1142)    Vital Signs Range (Last 24H):  Temp:  [97.6 °F (36.4 °C)-98.5 °F (36.9 °C)]   Pulse:  [51-71]   Resp:  [9-27]   BP: (122-198)/(58-84)   SpO2:  [96 %-100 %]     Physical Exam   Constitutional: He appears well-developed and well-nourished.   HENT:   Head: Normocephalic and atraumatic.   Right Ear: External ear normal.   Left Ear: External ear normal.   Eyes: Conjunctivae are normal. Pupils are equal, round, and reactive to light.   Neck: Normal range of motion.   Cardiovascular: Normal rate.    Pulmonary/Chest: Effort normal and breath sounds normal.   Abdominal: Soft.   Musculoskeletal: Normal range of motion.   Neurological: He is alert.   Skin: Skin is warm and dry.   Psychiatric: He has a normal mood and affect. His behavior is normal. Judgment and thought content normal.   Vitals reviewed.      Neurological Exam:   LOC: alert  Attention Span: Good   Language: No aphasia  Articulation: No dysarthria  Orientation: Person, Place, Time   Visual Fields: Full  EOM (CN III, IV, VI): Full/intact  Pupils (CN II, III): PERRL  Facial Sensation  (CN V): Normal  Facial Movement (CN VII): Lower facial weakness on the Right  Gag Reflex: present  Reflexes: Extensor plantar response  right  Motor: Arm left  Normal 5/5  Leg left  Normal 5/5  Arm right  Paresis: 4/5  Leg right Paresis: 4/5  Cebellar: No evidence of appendicular or axial ataxia  Sensation: Intact to light touch, temperature and vibration  Tone: Normal tone throughout      Laboratory:  CMP:   Recent Labs  Lab 05/08/18  0355   CALCIUM 8.9   ALBUMIN 3.4*   PROT 6.1      K 3.4*   CO2 23      BUN 13   CREATININE 0.8   ALKPHOS 35*   ALT 30   AST 25   BILITOT 0.5     CBC:   Recent Labs  Lab 05/08/18  0356   WBC 6.74   RBC 4.49*   HGB 12.7*   HCT 39.5*      MCV 88   MCH 28.3   MCHC 32.2     Lipid Panel:   Recent Labs  Lab 05/08/18  0010   CHOL 165   LDLCALC 80.6   HDL 38*   TRIG 232*     Coagulation:   Recent Labs  Lab 05/08/18  0356   INR 1.0   APTT 25.5     Hgb A1C:   Recent Labs  Lab 05/08/18  0010   HGBA1C 7.7*     TSH:   Recent Labs  Lab 05/07/18  1130   TSH 4.970*       Diagnostic Results:      Brain imaging:  MRI brain: 5.7.18  Small area of cytotoxic cerebral edema on the right pepe-kim consistent with acute ischemia of right anteromedial/anterolateral pontine arteries.        Vessel Imaging:  MRA and CTA head/neck: 5.7.18  Diffuse left vertebral and basilar/intracranial atherosclersosis with plaque at the level of the infarct seen on MRI.            Cardiac Evaluation:   EKG 5.7.18: normal sinus

## 2018-05-08 NOTE — HPI
PMHx HTN, CAD,DM, Aifib, CAD, history of MI 2011 with stents X3, PAD with stents in legs bilaterallyHistory of colon cancer, 2002  - s/p surgical resection, chemo  Patient ws sent over from his primary doctor's office for an acute stroke workup theophylline patient stated that his right arm and his hand became weak around 5 pm last night 17 hours ago. When he woke up this Am the disability was still there. He has no complaint of a headache or any other symptoms with his legs or speech. He drove his car to the doctor's office and he denies any facial palsy. He was on Plavix .     Cy Rutherford is a 72 y.o. male who  has a past medical history of Acute coronary syndrome; Coronary artery disease; and Hypertension. DM II, PVD, HLD. The patient presented to [facility] on 5/7/2018 with a primary complaint of Extremity Weakness (from Dr. Ricardo right arm weakness thst started at 5pm last eveing)        Patient was in usual state of health until rising to go to restroom around 5pm, day prior to presentation when noticed discoordination of gait and abnormal use of R arm. The symptoms persisted to next morning so went to see his primary care doctor who directed him to emergency dept. Code stroke called and vascular neuro evaluated, patient not a candidate for tpa. Patient states his symptoms have persisted since onset, maybe have worsened slightly. He personally has not noticed droop of face but feels his voice is slightly slurred. Notably stopped his plavix as directed on Friday, had been taking since MI and stents in 2011.  Denies chest pain, palpitations, change in bowel or bladder function, fever, chills, shortness of breath, history of A fib, or headache/ vision change.

## 2018-05-08 NOTE — NURSING
0855 Report called to Ms Otilio RIVERA at [facility], pt being transferred on stretcher at present via [facility] #[id number] on monitor in NAD, pt AAO x4, skin w/d/p. Pt to go to Room #3093.

## 2018-05-08 NOTE — ASSESSMENT & PLAN NOTE
Visualized on MRI, consistent with acute stroke.  Monitor exam carefully as deterioration may signify edema/stroke territory expansion.

## 2018-05-08 NOTE — PLAN OF CARE
Patient transferred from [facility] to [facility] for higher level of care       05/08/18 5419   Final Note   Assessment Type Final Discharge Note   Discharge Disposition Other Fac HI

## 2018-05-08 NOTE — PLAN OF CARE
Problem: Patient Care Overview  Goal: Plan of Care Review  Outcome: Ongoing (interventions implemented as appropriate)  Pt arrived to ICU around 2149,  from 4th floor, via stretcher and telemetry monitor. CC of extremities weakness( Diagnosis of right side CVA , per MRI and MD notes). AAOx4, speech was clear not slurred, no vision changes, no nausea, denies any pain. Weakness of the right arm present, slight left facial droop, normal strength on the other extremities. Pt set up on ICU monitor, Physician informed of the admission, treatment initiated per order.      Pt  condition remained stable, permissive high blood pressure 169/79. Denies any pain, void per urinal . Pt  had CTA  Head and Neck done, and plan to be transferred to [facility].

## 2018-05-08 NOTE — PT/OT/SLP PROGRESS
Physical Therapy      Patient Name:  Cy Rutherford   MRN:  [id number]    Patient transferred to [facility]    Chelsey Hawthorne, PT

## 2018-05-08 NOTE — PLAN OF CARE
Dr. Acuna team contacted at [phone] Dr. Sanchez returned call  notified of report from Radiologist.

## 2018-05-08 NOTE — PLAN OF CARE
Problem: Diabetes, Type 2 (Adult)  Goal: Signs and Symptoms of Listed Potential Problems Will be Absent, Minimized or Managed (Diabetes, Type 2)  Signs and symptoms of listed potential problems will be absent, minimized or managed by discharge/transition of care (reference Diabetes, Type 2 (Adult) CPG).   Outcome: Outcome(s) achieved Date Met: 05/08/18  Had one episode of hypoglycemia during the shift BG 65. Glucose given

## 2018-05-08 NOTE — PT/OT/SLP PROGRESS
Speech Language Pathology      Cy Rutherford  MRN: [id number]    MD orders received this date, Pt transferred to [facility].       ESTEFANI Isaac, CCC-SLP   5/8/2018

## 2018-05-08 NOTE — ASSESSMENT & PLAN NOTE
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  Has diffuse atherosclersosis and multiple risk factors including uncontrolled HTN and DM. Presenting with right side weakness.    No clear intervention indicated for this disease process other than aggressive risk factor modification for secondary prevention and rehab efforts.    Antithrombotics for secondary stroke prevention: Antiplatelets: Aspirin: 325 mg daily  Clopidogrel: 75 mg daily    Statins for secondary stroke prevention and hyperlipidemia, if present:   Statins: Rosuvastatin- 40 mg daily    Aggressive risk factor modification: HTN, DM, HLD     Rehab efforts: PT/OT/SLP to evaluate and treat    Diagnostics ordered/pending: TTE to assess cardiac function/status     VTE prophylaxis: Heparin 5000 units SQ every 8 hours    BP parameters: Infarct: No intervention, SBP <220

## 2018-05-09 LAB
ALLENS TEST: ABNORMAL
ANION GAP SERPL CALC-SCNC: 13 MMOL/L
BASOPHILS # BLD AUTO: 0.03 K/UL
BASOPHILS NFR BLD: 0.4 %
BUN SERPL-MCNC: 17 MG/DL
CALCIUM SERPL-MCNC: 8.6 MG/DL
CHLORIDE SERPL-SCNC: 111 MMOL/L
CO2 SERPL-SCNC: 18 MMOL/L
CREAT SERPL-MCNC: 0.8 MG/DL
DELSYS: ABNORMAL
DIASTOLIC DYSFUNCTION: NO
DIFFERENTIAL METHOD: ABNORMAL
EOSINOPHIL # BLD AUTO: 0.1 K/UL
EOSINOPHIL NFR BLD: 1.9 %
ERYTHROCYTE [DISTWIDTH] IN BLOOD BY AUTOMATED COUNT: 14.3 %
ERYTHROCYTE [SEDIMENTATION RATE] IN BLOOD BY WESTERGREN METHOD: 20 MM/H
EST. GFR  (AFRICAN AMERICAN): >60 ML/MIN/1.73 M^2
EST. GFR  (NON AFRICAN AMERICAN): >60 ML/MIN/1.73 M^2
ESTIMATED PA SYSTOLIC PRESSURE: 18.63
FIO2: 21
GLUCOSE SERPL-MCNC: 131 MG/DL
HCO3 UR-SCNC: 17.2 MMOL/L (ref 24–28)
HCT VFR BLD AUTO: 41 %
HGB BLD-MCNC: 13.1 G/DL
IMM GRANULOCYTES # BLD AUTO: 0.02 K/UL
IMM GRANULOCYTES NFR BLD AUTO: 0.3 %
LYMPHOCYTES # BLD AUTO: 1.1 K/UL
LYMPHOCYTES NFR BLD: 14.9 %
MAGNESIUM SERPL-MCNC: 2 MG/DL
MCH RBC QN AUTO: 28.9 PG
MCHC RBC AUTO-ENTMCNC: 32 G/DL
MCV RBC AUTO: 91 FL
MODE: ABNORMAL
MONOCYTES # BLD AUTO: 0.5 K/UL
MONOCYTES NFR BLD: 6.9 %
NEUTROPHILS # BLD AUTO: 5.6 K/UL
NEUTROPHILS NFR BLD: 75.6 %
NRBC BLD-RTO: 0 /100 WBC
PCO2 BLDA: 26.5 MMHG (ref 35–45)
PH SMN: 7.42 [PH] (ref 7.35–7.45)
PHOSPHATE SERPL-MCNC: 2.9 MG/DL
PLATELET # BLD AUTO: 252 K/UL
PMV BLD AUTO: 10.2 FL
PO2 BLDA: 95 MMHG (ref 80–100)
POC BE: -7 MMOL/L
POC SATURATED O2: 98 % (ref 95–100)
POC TCO2: 18 MMOL/L (ref 23–27)
POCT GLUCOSE: 126 MG/DL (ref 70–110)
POCT GLUCOSE: 157 MG/DL (ref 70–110)
POCT GLUCOSE: 186 MG/DL (ref 70–110)
POCT GLUCOSE: 312 MG/DL (ref 70–110)
POTASSIUM SERPL-SCNC: 4 MMOL/L
RBC # BLD AUTO: 4.53 M/UL
RETIRED EF AND QEF - SEE NOTES: 65 (ref 55–65)
SAMPLE: ABNORMAL
SITE: ABNORMAL
SODIUM SERPL-SCNC: 142 MMOL/L
WBC # BLD AUTO: 7.43 K/UL

## 2018-05-09 PROCEDURE — 63600175 PHARM REV CODE 636 W HCPCS: Performed by: PSYCHIATRY & NEUROLOGY

## 2018-05-09 PROCEDURE — 94761 N-INVAS EAR/PLS OXIMETRY MLT: CPT

## 2018-05-09 PROCEDURE — 85025 COMPLETE CBC W/AUTO DIFF WBC: CPT

## 2018-05-09 PROCEDURE — 25000003 PHARM REV CODE 250: Performed by: PSYCHIATRY & NEUROLOGY

## 2018-05-09 PROCEDURE — 99233 SBSQ HOSP IP/OBS HIGH 50: CPT | Mod: GC,,, | Performed by: PSYCHIATRY & NEUROLOGY

## 2018-05-09 PROCEDURE — 80048 BASIC METABOLIC PNL TOTAL CA: CPT

## 2018-05-09 PROCEDURE — 63600175 PHARM REV CODE 636 W HCPCS: Performed by: STUDENT IN AN ORGANIZED HEALTH CARE EDUCATION/TRAINING PROGRAM

## 2018-05-09 PROCEDURE — 25000003 PHARM REV CODE 250: Performed by: STUDENT IN AN ORGANIZED HEALTH CARE EDUCATION/TRAINING PROGRAM

## 2018-05-09 PROCEDURE — 82803 BLOOD GASES ANY COMBINATION: CPT

## 2018-05-09 PROCEDURE — 93306 TTE W/DOPPLER COMPLETE: CPT | Mod: 26,,, | Performed by: INTERNAL MEDICINE

## 2018-05-09 PROCEDURE — 99233 SBSQ HOSP IP/OBS HIGH 50: CPT | Mod: ,,, | Performed by: PHYSICIAN ASSISTANT

## 2018-05-09 PROCEDURE — 99900035 HC TECH TIME PER 15 MIN (STAT)

## 2018-05-09 PROCEDURE — C8929 TTE W OR WO FOL WCON,DOPPLER: HCPCS

## 2018-05-09 PROCEDURE — A4216 STERILE WATER/SALINE, 10 ML: HCPCS | Performed by: PHYSICIAN ASSISTANT

## 2018-05-09 PROCEDURE — 83735 ASSAY OF MAGNESIUM: CPT

## 2018-05-09 PROCEDURE — 20000000 HC ICU ROOM

## 2018-05-09 PROCEDURE — 84100 ASSAY OF PHOSPHORUS: CPT

## 2018-05-09 PROCEDURE — 36600 WITHDRAWAL OF ARTERIAL BLOOD: CPT

## 2018-05-09 PROCEDURE — 25000003 PHARM REV CODE 250: Performed by: PHYSICIAN ASSISTANT

## 2018-05-09 RX ORDER — HEPARIN SODIUM 5000 [USP'U]/ML
5000 INJECTION, SOLUTION INTRAVENOUS; SUBCUTANEOUS EVERY 8 HOURS
Status: DISCONTINUED | OUTPATIENT
Start: 2018-05-09 | End: 2018-05-12 | Stop reason: HOSPADM

## 2018-05-09 RX ORDER — SODIUM CHLORIDE 0.9 % (FLUSH) 0.9 %
3 SYRINGE (ML) INJECTION
Status: DISCONTINUED | OUTPATIENT
Start: 2018-05-09 | End: 2018-05-12 | Stop reason: HOSPADM

## 2018-05-09 RX ORDER — SODIUM CHLORIDE 0.9 % (FLUSH) 0.9 %
3 SYRINGE (ML) INJECTION EVERY 8 HOURS
Status: DISCONTINUED | OUTPATIENT
Start: 2018-05-09 | End: 2018-05-12 | Stop reason: HOSPADM

## 2018-05-09 RX ORDER — METOPROLOL TARTRATE 25 MG/1
25 TABLET, FILM COATED ORAL 2 TIMES DAILY
Status: DISCONTINUED | OUTPATIENT
Start: 2018-05-09 | End: 2018-05-12 | Stop reason: HOSPADM

## 2018-05-09 RX ADMIN — INSULIN ASPART 7 UNITS: 100 INJECTION, SOLUTION INTRAVENOUS; SUBCUTANEOUS at 08:05

## 2018-05-09 RX ADMIN — INSULIN ASPART 7 UNITS: 100 INJECTION, SOLUTION INTRAVENOUS; SUBCUTANEOUS at 12:05

## 2018-05-09 RX ADMIN — ROSUVASTATIN CALCIUM 40 MG: 20 TABLET, FILM COATED ORAL at 08:05

## 2018-05-09 RX ADMIN — HEPARIN SODIUM 5000 UNITS: 5000 INJECTION, SOLUTION INTRAVENOUS; SUBCUTANEOUS at 12:05

## 2018-05-09 RX ADMIN — METOPROLOL TARTRATE 25 MG: 25 TABLET ORAL at 12:05

## 2018-05-09 RX ADMIN — INSULIN ASPART 2 UNITS: 100 INJECTION, SOLUTION INTRAVENOUS; SUBCUTANEOUS at 10:05

## 2018-05-09 RX ADMIN — STANDARDIZED SENNA CONCENTRATE AND DOCUSATE SODIUM 1 TABLET: 8.6; 5 TABLET, FILM COATED ORAL at 08:05

## 2018-05-09 RX ADMIN — HEPARIN SODIUM 5000 UNITS: 5000 INJECTION, SOLUTION INTRAVENOUS; SUBCUTANEOUS at 10:05

## 2018-05-09 RX ADMIN — Medication 3 ML: at 10:05

## 2018-05-09 RX ADMIN — METOPROLOL TARTRATE 25 MG: 25 TABLET ORAL at 10:05

## 2018-05-09 RX ADMIN — ASPIRIN 325 MG ORAL TABLET 325 MG: 325 PILL ORAL at 08:05

## 2018-05-09 RX ADMIN — INSULIN ASPART 7 UNITS: 100 INJECTION, SOLUTION INTRAVENOUS; SUBCUTANEOUS at 07:05

## 2018-05-09 RX ADMIN — CLOPIDOGREL 75 MG: 75 TABLET, FILM COATED ORAL at 08:05

## 2018-05-09 NOTE — PLAN OF CARE
No acute events over night. See vital signs and assessments in flowsheets. See below for updates from shift...    Pulmonary: RA, O2 sats 98%    Cardiovascular: NSR 60-70s, -200s    Neurological: A/OX4, slight speech slur noted, Pupils 4+ brisk/equal, denies HA, face symmetrical at rest and with mvmt, Rt sided weakness/drift in UE/LE, Rt LE ataxia/wkness     Gastrointestinal: Last BM 5/8    Genitourinary: voids spontaneously via urinal     Endocrine: BG checked AC/HS, SS insulin not needed last night     Integumentary/Other: skin warm/dry/intact   Face and neck red/flushed  Bottom lip swollen on Rt side, pt reports biting lip when he eats    Infusions: NS @ 50mL/hr     Patient progressing towards goals as tolerated, plan of care communicated and reviewed with Cy Rutherford and family. All questions and concerns addressed. Will continue to monitor.

## 2018-05-09 NOTE — PLAN OF CARE
[facility] - [address] [facility]  [address] [place] [address]  Phone: [phone] Fax: [phone]    [facility] - [place], [place] - [address] [place] [address]  [address] [facility]  [address]  [facility] [address]  Phone: [phone] Fax: [phone]    This CM spoke with patient at bedside. Patient lives alone he states spouse  in January. Patient has a son and daughter-in-law.       18 1600   Discharge Assessment   Assessment Type Discharge Planning Assessment   Confirmed/corrected address and phone number on facesheet? Yes   Assessment information obtained from? Patient   Expected Length of Stay (days) 3   Communicated expected length of stay with patient/caregiver no   Prior to hospitilization cognitive status: Alert/Oriented   Prior to hospitalization functional status: Independent   Current cognitive status: Alert/Oriented   Current Functional Status: Needs Assistance   Facility Arrived From: (ambulance from [facility])   Lives With alone   Able to Return to Prior Arrangements unable to determine at this time (comments)   Is patient able to care for self after discharge? Unable to determine at this time (comments)   Who are your caregiver(s) and their phone number(s)? (daughter-in-law Brigitte [phone])   Patient's perception of discharge disposition home or selfcare   Readmission Within The Last 30 Days no previous admission in last 30 days   Patient currently being followed by outpatient case management? No   Patient currently receives any other outside agency services? No   Equipment Currently Used at Home walker, rolling   Do you have any problems affording any of your prescribed medications? No   Is the patient taking medications as prescribed? yes   Does the patient have transportation home? Yes   Transportation Available family or friend will provide   Does the patient receive services at the Coumadin Clinic? No   Discharge Plan A Rehab    Discharge Plan B Home;Home Health   Patient/Family In Agreement With Plan yes     Margy Terrell RN/BSN/DIMITRI  [phone]  [facility]

## 2018-05-09 NOTE — PLAN OF CARE
Problem: Patient Care Overview  Goal: Plan of Care Review  Patient denies any complaints,remains with right sided weakness.

## 2018-05-09 NOTE — ASSESSMENT & PLAN NOTE
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  Has diffuse atherosclersosis and multiple risk factors including uncontrolled HTN and DM. Presenting with right side weakness.    No clear intervention indicated for this disease process other than aggressive risk factor modification for secondary prevention and rehab efforts.     Antithrombotics for secondary stroke prevention: Antiplatelets: Aspirin: 325 mg daily  Clopidogrel: 75 mg daily    Statins for secondary stroke prevention and hyperlipidemia, if present: Statins: Rosuvastatin- 40 mg daily    Aggressive risk factor modification: HTN, DM, HLD     Rehab efforts: PT/OT/SLP to evaluate and treat    Diagnostics ordered/pending: none     VTE prophylaxis: Heparin 5000 units SQ every 8 hours    BP parameters: Infarct: No intervention, SBP <200

## 2018-05-09 NOTE — PROGRESS NOTES
[facility]  Neurocritical Care  Progress Note    Admit Date: 5/8/2018  Service Date: 05/09/2018  Length of Stay: 1    Subjective:     Chief Complaint: Thrombotic stroke involving basilar artery    History of Present Illness: Mr.Harry ADELSO Rutherford is a 72 year old with a PMHx of Hypertension, DM, CAD s/p stent, PVD s/p stent, Hx of colon cancer s/p chemo and radiation who presents as a transfer from [facility] to Neuro Critical Care s/p Occlusion of the V4 segment of the left vertebral artery along with partial thrombosis within the mid basilar with corresponding infarction in the left pepe-kim. Not a candidate for tpa. Not a candidate for intervention. On the night of 5/6 at approximately 5pm patient reports that he felt right arm weakness and  gait disturbances. This persisted the next morning and he presented to his PCP on 5/7 with the same complaints. Subsequently, he was sent from his PCP to [facility]. Stroke code was called and neuro vascular evaluated and patient not a candidate for tpa. Patient has not noticed facial droop but feels his voice is slightly slurred. Of note, his plavix was stopped on Friday as he had been taking it since MI and stents in 2011. Patient will be admitted to Neuro ICU for a higher level of care.     Hospital Course: 5/8 admit to [facility] s/p Occlusion of the V4 segment of the left vertebral artery and infarct in the left pepe-kim. Not a candiditate for TPA and not a candidate for intervention.   5/9 Added Home metoprolol and sub q Heparin for DVT ppx ; Stable for TTF    Interval History:  5/9 Added Home metoprolol and sub q Heparin for DVT ppx ; Stable for TTF  Review of Systems  Review of symptoms: denies all   Constitutional: Denies fevers or chills.  Pulmonary: Denies shortness of breath or cough.  Cardiology: Denies chest pain or palpitations.  GI: Denies abdominal pain or constipation.  Neurologic: Denies new weakness,  headache, or  paresthesias.  Objective:     Vitals:  Temp: 98.2 °F (36.8 °C)  Pulse: 64  Rhythm: normal sinus rhythm  BP: (!) 161/70  MAP (mmHg): 101  Resp: 17  SpO2: 98 %  O2 Device (Oxygen Therapy): room air    Temp  Min: 98 °F (36.7 °C)  Max: 98.2 °F (36.8 °C)  Pulse  Min: 63  Max: 85  BP  Min: 144/63  Max: 200/81  MAP (mmHg)  Min: 90  Max: 129  Resp  Min: 13  Max: 33  SpO2  Min: 96 %  Max: 99 %    05/08 0701 - 05/09 0700  In: 1279.2 [P.O.:440; I.V.:839.2]  Out: 1676 [Urine:1675]   Unmeasured Output  Urine Occurrence: 1  Stool Occurrence: 1       Physical Exam   Physical Exam:  GA: Awake, Alert, Oriented X 4, Follows commands comfortable, no acute distress.   HEENT: No scleral icterus or JVD.   Pulmonary: Clear to auscultation Anterior . No wheezing, crackles, or rhonchi.  Cardiac: RRR S1 & S2 w/o rubs/murmurs/gallops.   Abdominal: Bowel sounds present x 4. No appreciable hepatosplenomegaly.  Skin: No jaundice, rashes, or visible lesions.  Neuro:  --GCS: E4 V5 M6  --Mental Status:  Awake, Alert, Oriented X 4, Follows commands --CN II-XII grossly intact.   --Pupils 4mm, PERRL.   --Corneal reflex, gag, cough intact.  --LUE strength: 5/5  --RUE strength: 4/5 weakness and dysmetria   --LLE strength: 5/5  --RLE strength: 4/5 weakness   Unable to test gait due to level of consciousness.    Medications:  Continuous Scheduled  aspirin 325 mg Daily   clopidogrel 75 mg Daily   heparin (porcine) 5,000 Units Q8H   insulin aspart U-100 7 Units TIDWM   metoprolol tartrate 25 mg BID   rosuvastatin 40 mg Daily   senna-docusate 8.6-50 mg 1 tablet Daily   sodium chloride 0.9% 3 mL Q8H   PRN  acetaminophen 650 mg Q6H PRN   dextrose 50% 12.5 g PRN   dextrose 50% 25 g PRN   glucagon (human recombinant) 1 mg PRN   glucose 16 g PRN   glucose 24 g PRN   insulin aspart U-100 0-5 Units QID (AC + HS) PRN   sodium chloride 0.9% 3 mL PRN     Today I personally reviewed pertinent medications, lines/drains/airways, lab results, notably:    Diet  Diet  NPO  Diet NPO        Assessment/Plan:     Neuro   * Thrombotic stroke involving basilar artery    --Continue Neuro checks q 1hr  -- Not a candidate for intervention//  -- Vascular Neurology consulted  -- MRA Head reveals   -- SBP goal <180  -- Loaded with Plavix at outside hospital   -- Continue Plavix 75 mg daily  -- Continue ASA 81 mg daily   -- Sub q Heparin for DVT ppx  -- PT/OT/Speech   -- TTF Stroke Service           Cytotoxic cerebral edema    Continue to monitor for signs of deterioration with serial CT scans        Cardiac/Vascular   PAD (peripheral artery disease)    PAD s/p stent        Status post coronary artery stent placement    Hx of        CAD (coronary artery disease)    Hx of S/p stents        Hyperlipidemia    Continue Crestor        Essential hypertension    SBP goal less than 180  2D echo pending  Continue Amlodipine 10 mg daily   Added home metoprolol            Oncology   History of colon cancer    Hx of s/p chemo and radiation        Endocrine   Type 2 diabetes mellitus with kidney complication, without long-term current use of insulin    Continue to monitor BG  Continue SSI  Continue Aspart 7 units TID             Prophylaxis:  Venous Thromboembolism: chemical  Stress Ulcer: None  Ventilator Pneumonia: not applicable     Activity Orders          None        Full Code    LEVEL III  I have spent >45 min with this patient, with over 50% of this time spent coordinating care and speaking with the family      Arabella Perez PA-C  Neurocritical Care  [facility]

## 2018-05-09 NOTE — SUBJECTIVE & OBJECTIVE
Neurologic Chief Complaint: Right hemiparesis     Subjective:     Interval History: Patient is seen for follow-up neurological assessment and treatment recommendations:     Patient reports some increased weakness on the right side since yesterday. MRI noted L pontine infarct.     HPI, Past Medical, Family, and Social History remains the same as documented in the initial encounter.     Review of Systems   Constitutional: Negative for fever.   Eyes: Negative for visual disturbance.   Respiratory: Negative for shortness of breath.    Gastrointestinal: Negative for nausea and vomiting.   Neurological: Positive for weakness. Negative for headaches.     Scheduled Meds:   aspirin  325 mg Oral Daily    clopidogrel  75 mg Oral Daily    heparin (porcine)  5,000 Units Subcutaneous Q8H    insulin aspart U-100  7 Units Subcutaneous TIDWM    metoprolol tartrate  25 mg Oral BID    rosuvastatin  40 mg Oral Daily    senna-docusate 8.6-50 mg  1 tablet Oral Daily    sodium chloride 0.9%  3 mL Intravenous Q8H     Continuous Infusions:  PRN Meds:acetaminophen, dextrose 50%, dextrose 50%, glucagon (human recombinant), glucose, glucose, insulin aspart U-100, sodium chloride 0.9%    Objective:     Vital Signs (Most Recent):  Temp: 98.2 °F (36.8 °C) (05/09/18 1500)  Pulse: 64 (05/09/18 1500)  Resp: 17 (05/09/18 1500)  BP: (!) 161/70 (05/09/18 1500)  SpO2: 98 % (05/09/18 1206)  BP Location: Left arm    Vital Signs Range (Last 24H):  Temp:  [98 °F (36.7 °C)-98.2 °F (36.8 °C)]   Pulse:  [63-85]   Resp:  [13-33]   BP: (144-200)/()   SpO2:  [96 %-99 %]   BP Location: Left arm    Physical Exam   Constitutional: He is oriented to person, place, and time. He appears well-developed and well-nourished.   HENT:   Head: Normocephalic and atraumatic.   Eyes: EOM are normal. Pupils are equal, round, and reactive to light.   Neck: Normal range of motion. Neck supple.   Cardiovascular: Normal rate.    Pulmonary/Chest: Effort normal.    Musculoskeletal:   Right hemiparesis   Neurological: He is alert and oriented to person, place, and time. He exhibits normal muscle tone. Coordination normal.   Skin: Skin is warm and dry.       Neurological Exam:   LOC: alert  Attention Span: Good   Language: No aphasia  Articulation: Dysarthria  Orientation: Person, Place, Time   Visual Fields: Full  EOM (CN III, IV, VI): Full/intact  Motor: Arm left  Normal 5/5  Leg left  Normal 5/5  Arm right  Paresis: 2/5  Leg right Paresis: 3/5  Sensation: Intact to light touch, temperature and vibration  Tone: Normal tone throughout    Laboratory:  CMP:   Recent Labs  Lab 05/09/18  0656   CALCIUM 8.6*      K 4.0   CO2 18*   *   BUN 17   CREATININE 0.8     BMP:   Recent Labs  Lab 05/09/18  0656      K 4.0   *   CO2 18*   BUN 17   CREATININE 0.8   CALCIUM 8.6*     CBC:   Recent Labs  Lab 05/09/18  0656   WBC 7.43   RBC 4.53*   HGB 13.1*   HCT 41.0      MCV 91   MCH 28.9   MCHC 32.0     Lipid Panel:   Recent Labs  Lab 05/08/18  0010   CHOL 165   LDLCALC 80.6   HDL 38*   TRIG 232*     Coagulation:   Recent Labs  Lab 05/08/18  0356   INR 1.0   APTT 25.5     Platelet Aggregation Study: No results for input(s): PLTAGG, PLTAGINTERP, PLTAGREGLACO, ADPPLTAGGREG in the last 168 hours.  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  The findings represent embolic disease within the left pontine perforators.  Suggest vascular neurology consultation.    Diminished caliber of the left vertebral artery with multifocal areas of luminal narrowing involving the left vertebral artery.  The findings most likely represent the embolic  source for the patient's infarction in the kim.    Punctate old microhemorrhage in the left basal ganglia.  DWI     Vessel Imaging   MRA  As above.     Cardiac Imaging   ECHO 5/9/18:   CONCLUSIONS     1 - Normal left ventricular systolic function (EF 60-65%).     2 - Normal right ventricular systolic function .     3 - Normal left ventricular diastolic function.     4 - The estimated PA systolic pressure is 19 mmHg.

## 2018-05-09 NOTE — ASSESSMENT & PLAN NOTE
Clearly visualized on MRA/CTA. L vertebral with atherosclerosis, and involvement of the basilar artery as well, likely etiology of pontine stroke   Risk factor modification for secondary stroke prevention.

## 2018-05-09 NOTE — ASSESSMENT & PLAN NOTE
--Continue Neuro checks q 1hr  -- Not a candidate for intervention//  -- Vascular Neurology consulted  -- MRA Head reveals   -- SBP goal <180  -- Loaded with Plavix at outside hospital   -- Continue Plavix 75 mg daily  -- Continue ASA 81 mg daily   -- Sub q Heparin for DVT ppx  -- PT/OT/Speech   -- TTF Stroke Service

## 2018-05-09 NOTE — SUBJECTIVE & OBJECTIVE
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    Type 2 diabetes mellitus with kidney complication, without long-term current use of insulin 11/15/2012     Past Surgical History:   Procedure Laterality Date    COLONOSCOPY      CORONARY ANGIOPLASTY      MARIAN to LAD and LCX      Current Facility-Administered Medications on File Prior to Encounter   Medication Dose Route Frequency Provider Last Rate Last Dose    [COMPLETED] clopidogrel tablet 225 mg  225 mg Oral Once Cirilo Hand MD   225 mg at 05/07/18 2242    [COMPLETED] omnipaque 350 iohexol 100 mL  100 mL Intravenous ONCE PRN Raquel Acuna MD   100 mL at 05/08/18 0150    [DISCONTINUED] 0.9%  NaCl infusion   Intravenous Continuous SYDNEE Bonner III, MD 50 mL/hr at 05/07/18 1758      [DISCONTINUED] 0.9%  NaCl infusion   Intravenous Continuous BALTA Keen        [DISCONTINUED] aspirin chewable tablet 81 mg  81 mg Oral Daily BALTA Keen        [DISCONTINUED] aspirin EC tablet 81 mg  81 mg Oral Daily Cirilo Hand MD   81 mg at 05/08/18 0835    [DISCONTINUED] atorvastatin tablet 40 mg  40 mg Oral Daily Cirilo Hand MD   40 mg at 05/08/18 0835    [DISCONTINUED] clopidogrel tablet 75 mg  75 mg Oral Daily Cirilo Hand MD   75 mg at 05/08/18 0835    [DISCONTINUED] clopidogrel tablet 75 mg  75 mg Oral Daily BALTA Keen        [DISCONTINUED] dextrose 50% injection 12.5 g  12.5 g Intravenous PRN Cirilo Hand MD   12.5 g at 05/08/18 0025    [DISCONTINUED] enoxaparin injection 40 mg  40 mg Subcutaneous Daily Cirilo Hand MD   40 mg at 05/07/18 1758    [DISCONTINUED] glucagon (human recombinant) injection 1 mg  1 mg Intramuscular PRN Cirilo Hand MD         [DISCONTINUED] heparin (porcine) injection 5,000 Units  5,000 Units Subcutaneous Q8H BALTA Keen        [DISCONTINUED] insulin aspart U-100 pen 1-10 Units  1-10 Units Subcutaneous Q6H PRN Cirilo Hand MD        [DISCONTINUED] insulin detemir U-100 pen 60 Units  60 Units Subcutaneous QHS Cirilo Hand MD        [DISCONTINUED] labetalol injection 10 mg  10 mg Intravenous Q15 Min PRN Cirilo Hand MD        [DISCONTINUED] sodium chloride 0.9% bolus 1,000 mL  1,000 mL Intravenous Once Jeni Hector Sanchez MD        [DISCONTINUED] sodium chloride 0.9% flush 3 mL  3 mL Intravenous Q8H Cirilo Hand MD   3 mL at 05/07/18 2311    [DISCONTINUED] sodium chloride 0.9% flush 3 mL  3 mL Intravenous Q8H BALTA Keen         Current Outpatient Prescriptions on File Prior to Encounter   Medication Sig Dispense Refill    amLODIPine (NORVASC) 10 MG tablet TAKE 1 TABLET BY MOUTH ONCE DAILY 90 tablet 1    aspirin (ECOTRIN) 81 MG EC tablet Take 81 mg by mouth once daily.      fenofibrate micronized (LOFIBRA) 134 MG Cap Take 134 mg by mouth nightly.      fish oil-omega-3 fatty acids 300-1,000 mg capsule Take by mouth once daily.      hydroCHLOROthiazide (HYDRODIURIL) 25 MG tablet TAKE 1 TABLET BY MOUTH ONCE DAILY 90 tablet 1    insulin glargine (LANTUS) 100 unit/mL injection Inject into the skin every evening.        INVOKANA 300 mg Tab tablet Take 300 mg by mouth once daily.       losartan (COZAAR) 50 MG tablet Take 1 tablet (50 mg total) by mouth once daily. 90 tablet 3    metformin (GLUCOPHAGE) 500 MG tablet Take 1 tablet by mouth 2 (two) times daily.      metoprolol succinate (TOPROL-XL) 50 MG 24 hr tablet TAKE 1 TABLET BY MOUTH ONCE DAILY 90 tablet 1    nitroGLYCERIN (NITROSTAT) 0.4 MG SL tablet Place 1 tablet (0.4 mg total) under the tongue every 5 (five) minutes as needed for Chest pain. 30 tablet 11    rosuvastatin (CRESTOR) 20 MG tablet TAKE 1 TABLET BY  MOUTH ONCE DAILY 90 tablet 1      Allergies: Patient has no known allergies.    Family History   Problem Relation Age of Onset    No Known Problems Mother     No Known Problems Father     Colon polyps Sister      Social History   Substance Use Topics    Smoking status: Never Smoker    Smokeless tobacco: Never Used    Alcohol use No     Review of Systems   Review of symptoms: denies all   Constitutional: Denies fevers or chills.  Pulmonary: Denies shortness of breath or cough.  Cardiology: Denies chest pain or palpitations.  GI: Denies abdominal pain or constipation.  Neurologic: Denies new weakness,  headache, or paresthesias.  Objective:     Vitals:    Temp: 98.1 °F (36.7 °C)  Pulse: 69  Rhythm: normal sinus rhythm  BP: (!) 174/79  MAP (mmHg): 114  Resp: 19  SpO2: 98 %  O2 Device (Oxygen Therapy): room air    Temp  Min: 97.6 °F (36.4 °C)  Max: 98.2 °F (36.8 °C)  Pulse  Min: 51  Max: 80  BP  Min: 132/63  Max: 214/91  MAP (mmHg)  Min: 88  Max: 131  Resp  Min: 9  Max: 34  SpO2  Min: 96 %  Max: 100 %    No intake/output data recorded.   Unmeasured Output  Stool Occurrence: 1       Physical Exam   Physical Exam:  GA: Awake, Alert, Oriented X 4, Follows commands comfortable, no acute distress.   HEENT: No scleral icterus or JVD.   Pulmonary: Clear to auscultation Anterior . No wheezing, crackles, or rhonchi.  Cardiac: RRR S1 & S2 w/o rubs/murmurs/gallops.   Abdominal: Bowel sounds present x 4. No appreciable hepatosplenomegaly.  Skin: No jaundice, rashes, or visible lesions.  Neuro:  --GCS: E4 V5 M6  --Mental Status:  Awake, Alert, Oriented X 4, Follows commands --CN II-XII grossly intact.   --Pupils 4mm, PERRL.   --Corneal reflex, gag, cough intact.  --LUE strength: 5/5  --RUE strength: 4/5 weakness and dysmetria   --LLE strength: 5/5  --RLE strength: 4/5 weakness improving  Unable to test gait due to level of consciousness.    Today I personally reviewed pertinent medications, lines/drains/airways, lab results,  notably:

## 2018-05-09 NOTE — HOSPITAL COURSE
Patient is a 72 year old male with medical history of HTN, DM ,CAD, PAD who was admitted to stroke service for left pontine infarct.  CTA head neck showed basilar stenosis and left vert occlusion.  Etiology likely atheroembolic.  Patient was placed on dual antiplatelets and crestor 40mg qhs for secondary stroke prevention.  PT, OT, ST therapy evaluated patient and recommended inpatient rehab.  After inpatient rehab at [facility] he will follow up with PCP within 10days of discharge and in stroke clinic in 4-6 weeks.

## 2018-05-09 NOTE — SUBJECTIVE & OBJECTIVE
Interval History:  5/9 Added Home metoprolol and sub q Heparin for DVT ppx ; Stable for TTF  Review of Systems  Review of symptoms: denies all   Constitutional: Denies fevers or chills.  Pulmonary: Denies shortness of breath or cough.  Cardiology: Denies chest pain or palpitations.  GI: Denies abdominal pain or constipation.  Neurologic: Denies new weakness,  headache, or paresthesias.  Objective:     Vitals:  Temp: 98.2 °F (36.8 °C)  Pulse: 64  Rhythm: normal sinus rhythm  BP: (!) 161/70  MAP (mmHg): 101  Resp: 17  SpO2: 98 %  O2 Device (Oxygen Therapy): room air    Temp  Min: 98 °F (36.7 °C)  Max: 98.2 °F (36.8 °C)  Pulse  Min: 63  Max: 85  BP  Min: 144/63  Max: 200/81  MAP (mmHg)  Min: 90  Max: 129  Resp  Min: 13  Max: 33  SpO2  Min: 96 %  Max: 99 %    05/08 0701 - 05/09 0700  In: 1279.2 [P.O.:440; I.V.:839.2]  Out: 1676 [Urine:1675]   Unmeasured Output  Urine Occurrence: 1  Stool Occurrence: 1       Physical Exam   Physical Exam:  GA: Awake, Alert, Oriented X 4, Follows commands comfortable, no acute distress.   HEENT: No scleral icterus or JVD.   Pulmonary: Clear to auscultation Anterior . No wheezing, crackles, or rhonchi.  Cardiac: RRR S1 & S2 w/o rubs/murmurs/gallops.   Abdominal: Bowel sounds present x 4. No appreciable hepatosplenomegaly.  Skin: No jaundice, rashes, or visible lesions.  Neuro:  --GCS: E4 V5 M6  --Mental Status:  Awake, Alert, Oriented X 4, Follows commands --CN II-XII grossly intact.   --Pupils 4mm, PERRL.   --Corneal reflex, gag, cough intact.  --LUE strength: 5/5  --RUE strength: 4/5 weakness and dysmetria   --LLE strength: 5/5  --RLE strength: 4/5 weakness   Unable to test gait due to level of consciousness.    Medications:  Continuous Scheduled  aspirin 325 mg Daily   clopidogrel 75 mg Daily   heparin (porcine) 5,000 Units Q8H   insulin aspart U-100 7 Units TIDWM   metoprolol tartrate 25 mg BID   rosuvastatin 40 mg Daily   senna-docusate 8.6-50 mg 1 tablet Daily   sodium chloride 0.9%  3 mL Q8H   PRN  acetaminophen 650 mg Q6H PRN   dextrose 50% 12.5 g PRN   dextrose 50% 25 g PRN   glucagon (human recombinant) 1 mg PRN   glucose 16 g PRN   glucose 24 g PRN   insulin aspart U-100 0-5 Units QID (AC + HS) PRN   sodium chloride 0.9% 3 mL PRN     Today I personally reviewed pertinent medications, lines/drains/airways, lab results, notably:    Diet  Diet NPO  Diet NPO

## 2018-05-09 NOTE — ASSESSMENT & PLAN NOTE
--Continue Neuro checks q 1hr  -- Not a candidate for intervention//  -- Vascular Neurology consulted  -- MRA Head reveals   -- SBP goal <200  -- Loaded with Plavix at outside hospital   -- Continue Plavix 75 mg daily  -- Continue ASA 81 mg daily   -- PT/OT/Speech

## 2018-05-09 NOTE — H&P
[facility]  Neurocritical Care  History & Physical    Admit Date: 5/8/2018  Service Date: 05/08/2018  Length of Stay: 0    Subjective:     Chief Complaint: Thrombotic stroke involving basilar artery    History of Present Illness: Mr.Harry ADELSO Rutherford is a 72 year old with a PMHx of Hypertension, DM, CAD s/p stent, PVD s/p stent, Hx of colon cancer s/p chemo and radiation who presents as a transfer from [facility] to Neuro Critical Care s/p Occlusion of the V4 segment of the left vertebral artery along with partial thrombosis within the mid basilar with corresponding infarction in the left pepe-kim. Not a candidate for tpa. Not a candidate for intervention. On the night of 5/6 at approximately 5pm patient reports that he felt right arm weakness and  gait disturbances. This persisted the next morning and he presented to his PCP on 5/7 with the same complaints. Subsequently, he was sent from his PCP to [facility]. Stroke code was called and neuro vascular evaluated and patient not a candidate for tpa. Patient has not noticed facial droop but feels his voice is slightly slurred. Of note, his plavix was stopped on Friday as he had been taking it since MI and stents in 2011. Patient will be admitted to Neuro ICU for a higher level of care.     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    Type 2 diabetes mellitus with kidney complication, without long-term current use of insulin 11/15/2012     Past Surgical History:   Procedure Laterality Date    COLONOSCOPY      CORONARY ANGIOPLASTY      MARIAN to LAD and LCX      Current Facility-Administered Medications on File Prior to Encounter   Medication Dose Route Frequency Provider Last Rate Last Dose    [COMPLETED] clopidogrel tablet  225 mg  225 mg Oral Once Cirilo Hand MD   225 mg at 05/07/18 2242    [COMPLETED] omnipaque 350 iohexol 100 mL  100 mL Intravenous ONCE PRN Raquel Acuna MD   100 mL at 05/08/18 0150    [DISCONTINUED] 0.9%  NaCl infusion   Intravenous Continuous SYDNEE Bonner III, MD 50 mL/hr at 05/07/18 1758      [DISCONTINUED] 0.9%  NaCl infusion   Intravenous Continuous BALTA Keen        [DISCONTINUED] aspirin chewable tablet 81 mg  81 mg Oral Daily BALTA Keen        [DISCONTINUED] aspirin EC tablet 81 mg  81 mg Oral Daily Cirilo Hand MD   81 mg at 05/08/18 0835    [DISCONTINUED] atorvastatin tablet 40 mg  40 mg Oral Daily Cirilo Hand MD   40 mg at 05/08/18 0835    [DISCONTINUED] clopidogrel tablet 75 mg  75 mg Oral Daily Cirilo Hand MD   75 mg at 05/08/18 0835    [DISCONTINUED] clopidogrel tablet 75 mg  75 mg Oral Daily BALTA Keen        [DISCONTINUED] dextrose 50% injection 12.5 g  12.5 g Intravenous PRN Cirilo Hand MD   12.5 g at 05/08/18 0025    [DISCONTINUED] enoxaparin injection 40 mg  40 mg Subcutaneous Daily Cirilo Hand MD   40 mg at 05/07/18 1758    [DISCONTINUED] glucagon (human recombinant) injection 1 mg  1 mg Intramuscular PRN Cirilo Hand MD        [DISCONTINUED] heparin (porcine) injection 5,000 Units  5,000 Units Subcutaneous Q8H BALTA Keen        [DISCONTINUED] insulin aspart U-100 pen 1-10 Units  1-10 Units Subcutaneous Q6H PRN Cirilo Hand MD        [DISCONTINUED] insulin detemir U-100 pen 60 Units  60 Units Subcutaneous QHS Cirilo Hand MD        [DISCONTINUED] labetalol injection 10 mg  10 mg Intravenous Q15 Min PRN Cirilo Hand MD        [DISCONTINUED] sodium chloride 0.9% bolus 1,000 mL  1,000 mL Intravenous Once Jeni Sanchez MD        [DISCONTINUED] sodium chloride 0.9% flush 3 mL  3 mL Intravenous Q8H Cirilo Hand MD   3 mL at  05/07/18 2311    [DISCONTINUED] sodium chloride 0.9% flush 3 mL  3 mL Intravenous Q8H BALTA Keen         Current Outpatient Prescriptions on File Prior to Encounter   Medication Sig Dispense Refill    amLODIPine (NORVASC) 10 MG tablet TAKE 1 TABLET BY MOUTH ONCE DAILY 90 tablet 1    aspirin (ECOTRIN) 81 MG EC tablet Take 81 mg by mouth once daily.      fenofibrate micronized (LOFIBRA) 134 MG Cap Take 134 mg by mouth nightly.      fish oil-omega-3 fatty acids 300-1,000 mg capsule Take by mouth once daily.      hydroCHLOROthiazide (HYDRODIURIL) 25 MG tablet TAKE 1 TABLET BY MOUTH ONCE DAILY 90 tablet 1    insulin glargine (LANTUS) 100 unit/mL injection Inject into the skin every evening.        INVOKANA 300 mg Tab tablet Take 300 mg by mouth once daily.       losartan (COZAAR) 50 MG tablet Take 1 tablet (50 mg total) by mouth once daily. 90 tablet 3    metformin (GLUCOPHAGE) 500 MG tablet Take 1 tablet by mouth 2 (two) times daily.      metoprolol succinate (TOPROL-XL) 50 MG 24 hr tablet TAKE 1 TABLET BY MOUTH ONCE DAILY 90 tablet 1    nitroGLYCERIN (NITROSTAT) 0.4 MG SL tablet Place 1 tablet (0.4 mg total) under the tongue every 5 (five) minutes as needed for Chest pain. 30 tablet 11    rosuvastatin (CRESTOR) 20 MG tablet TAKE 1 TABLET BY MOUTH ONCE DAILY 90 tablet 1      Allergies: Patient has no known allergies.    Family History   Problem Relation Age of Onset    No Known Problems Mother     No Known Problems Father     Colon polyps Sister      Social History   Substance Use Topics    Smoking status: Never Smoker    Smokeless tobacco: Never Used    Alcohol use No     Review of Systems   Review of symptoms: denies all   Constitutional: Denies fevers or chills.  Pulmonary: Denies shortness of breath or cough.  Cardiology: Denies chest pain or palpitations.  GI: Denies abdominal pain or constipation.  Neurologic: Denies new weakness,  headache, or paresthesias.  Objective:      Vitals:    Temp: 98.1 °F (36.7 °C)  Pulse: 69  Rhythm: normal sinus rhythm  BP: (!) 174/79  MAP (mmHg): 114  Resp: 19  SpO2: 98 %  O2 Device (Oxygen Therapy): room air    Temp  Min: 97.6 °F (36.4 °C)  Max: 98.2 °F (36.8 °C)  Pulse  Min: 51  Max: 80  BP  Min: 132/63  Max: 214/91  MAP (mmHg)  Min: 88  Max: 131  Resp  Min: 9  Max: 34  SpO2  Min: 96 %  Max: 100 %    No intake/output data recorded.   Unmeasured Output  Stool Occurrence: 1       Physical Exam   Physical Exam:  GA: Awake, Alert, Oriented X 4, Follows commands comfortable, no acute distress.   HEENT: No scleral icterus or JVD.   Pulmonary: Clear to auscultation Anterior . No wheezing, crackles, or rhonchi.  Cardiac: RRR S1 & S2 w/o rubs/murmurs/gallops.   Abdominal: Bowel sounds present x 4. No appreciable hepatosplenomegaly.  Skin: No jaundice, rashes, or visible lesions.  Neuro:  --GCS: E4 V5 M6  --Mental Status:  Awake, Alert, Oriented X 4, Follows commands --CN II-XII grossly intact.   --Pupils 4mm, PERRL.   --Corneal reflex, gag, cough intact.  --LUE strength: 5/5  --RUE strength: 4/5 weakness and dysmetria   --LLE strength: 5/5  --RLE strength: 4/5 weakness improving  Unable to test gait due to level of consciousness.    Today I personally reviewed pertinent medications, lines/drains/airways, lab results, notably:        Assessment/Plan:     Neuro   * Thrombotic stroke involving basilar artery    --Continue Neuro checks q 1hr  -- Not a candidate for intervention//  -- Vascular Neurology consulted  -- MRA Head reveals   -- SBP goal <200  -- Loaded with Plavix at outside hospital   -- Continue Plavix 75 mg daily  -- Continue ASA 81 mg daily   -- PT/OT/Speech           Cytotoxic cerebral edema    Continue to monitor for signs of deterioration with serial CT scans        Cardiac/Vascular   PAD (peripheral artery disease)    PAD s/p stent        Status post coronary artery stent placement    Hx of        CAD (coronary artery disease)    Hx of S/p  stents        Hyperlipidemia    Restart Crestor        Essential hypertension    SBP goal less than 200   2D echo pending          Oncology   History of colon cancer    Hx of s/p chemo and radiation        Endocrine   Type 2 diabetes mellitus with kidney complication, without long-term current use of insulin    Continue to monitor BG  Continue SSI  Continue Aspart 7 units TID             Prophylaxis:  Venous Thromboembolism: mechanical  Stress Ulcer: None  Ventilator Pneumonia: not applicable     Activity Orders          None        Prior     Uninterrupted Critical Care/Counseling Time (not including procedures):  >57 min    I have spent >57min with this patient, with over 50% of this time spent coordinating care and speaking with the family      Arabella Perez PA-C  Neurocritical Care  [facility]

## 2018-05-09 NOTE — PROGRESS NOTES
[facility]  Vascular Neurology  Comprehensive Stroke Center  Progress Note    Assessment/Plan:     * Thrombotic stroke involving basilar artery    71 yo male with left anteromedial/anterolateral pontine perforators infarcts due to thrombosis/atheroma on the basilar artery.  Has diffuse atherosclersosis and multiple risk factors including uncontrolled HTN and DM. Presenting with right side weakness.    No clear intervention indicated for this disease process other than aggressive risk factor modification for secondary prevention and rehab efforts.     Antithrombotics for secondary stroke prevention: Antiplatelets: Aspirin: 325 mg daily  Clopidogrel: 75 mg daily    Statins for secondary stroke prevention and hyperlipidemia, if present: Statins: Rosuvastatin- 40 mg daily    Aggressive risk factor modification: HTN, DM, HLD     Rehab efforts: PT/OT/SLP to evaluate and treat    Diagnostics ordered/pending: none     VTE prophylaxis: Heparin 5000 units SQ every 8 hours    BP parameters: Infarct: No intervention, SBP <200        Cytotoxic cerebral edema    Visualized on MRI, consistent with acute stroke.  Monitor exam carefully as deterioration may signify edema/stroke territory expansion.        Intracranial atherosclerosis    Clearly visualized on MRA/CTA. L vertebral with atherosclerosis, and involvement of the basilar artery as well, likely etiology of pontine stroke   Risk factor modification for secondary stroke prevention.        Hyperlipidemia    LDL 80  Crestor 40mg daily, especially given intracranial atherosclerosis and CAD        Essential hypertension    Stroke risk factor.  Manage medically without aggressive drops.        Type 2 diabetes mellitus with kidney complication, without long-term current use of insulin    Stroke risk factor.  Uncontrolled, A1C 7.7   Will need more aggressive management as outpatient.             5/9/18: Patient reports some increased weakness on the right side  since yesterday. MRI noted L pontine infarct.     STROKE DOCUMENTATION        NIH Scale:  1a. Level Of Consciousness: 0-->Alert: keenly responsive  1b. LOC Questions: 0-->Answers both questions correctly  1c. LOC Commands: 0-->Performs both tasks correctly  2. Best Gaze: 0-->Normal  3. Visual: 0-->No visual loss  4. Facial Palsy: 0-->Normal symmetrical movements  5a. Motor Arm, Left: 0-->No drift: limb holds 90 (or 45) degrees for full 10 secs  5b. Motor Arm, Right: 2-->Some effort against gravity: limb cannot get to or maintain (if cued) 90 (or 45) degrees, drifts down to bed, but has some effort against gravity  6a. Motor Leg, Left: 0-->No drift: leg holds 30 degree position for full 5 secs  6b. Motor Leg, Right: 2-->Some effort against gravity: leg falls to bed by 5 secs, but has some effort against gravity  7. Limb Ataxia: 0-->Absent  8. Sensory: 0-->Normal: no sensory loss  9. Best Language: 0-->No aphasia: normal  10. Dysarthria: 1-->Mild-to-moderate dysarthria: patient slurs at least some words and, at worst, can be understood with some difficulty  11. Extinction and Inattention (formerly Neglect): 0-->No abnormality  Total (NIH Stroke Scale): 5       Modified Kristina Score: 0  Eduarda Coma Scale:    ABCD2 Score:    [id number] Score:   HAS -BLED Score:   ICH Score:   Hunt & Soto Classification:      Hemorrhagic change of an Ischemic Stroke: Does this patient have an ischemic stroke with hemorrhagic changes? No     Neurologic Chief Complaint: Right hemiparesis     Subjective:     Interval History: Patient is seen for follow-up neurological assessment and treatment recommendations:     Patient reports some increased weakness on the right side since yesterday. MRI noted L pontine infarct.     HPI, Past Medical, Family, and Social History remains the same as documented in the initial encounter.     Review of Systems   Constitutional: Negative for fever.   Eyes: Negative for visual disturbance.   Respiratory:  Negative for shortness of breath.    Gastrointestinal: Negative for nausea and vomiting.   Neurological: Positive for weakness. Negative for headaches.     Scheduled Meds:   aspirin  325 mg Oral Daily    clopidogrel  75 mg Oral Daily    heparin (porcine)  5,000 Units Subcutaneous Q8H    insulin aspart U-100  7 Units Subcutaneous TIDWM    metoprolol tartrate  25 mg Oral BID    rosuvastatin  40 mg Oral Daily    senna-docusate 8.6-50 mg  1 tablet Oral Daily    sodium chloride 0.9%  3 mL Intravenous Q8H     Continuous Infusions:  PRN Meds:acetaminophen, dextrose 50%, dextrose 50%, glucagon (human recombinant), glucose, glucose, insulin aspart U-100, sodium chloride 0.9%    Objective:     Vital Signs (Most Recent):  Temp: 98.2 °F (36.8 °C) (05/09/18 1500)  Pulse: 64 (05/09/18 1500)  Resp: 17 (05/09/18 1500)  BP: (!) 161/70 (05/09/18 1500)  SpO2: 98 % (05/09/18 1206)  BP Location: Left arm    Vital Signs Range (Last 24H):  Temp:  [98 °F (36.7 °C)-98.2 °F (36.8 °C)]   Pulse:  [63-85]   Resp:  [13-33]   BP: (144-200)/()   SpO2:  [96 %-99 %]   BP Location: Left arm    Physical Exam   Constitutional: He is oriented to person, place, and time. He appears well-developed and well-nourished.   HENT:   Head: Normocephalic and atraumatic.   Eyes: EOM are normal. Pupils are equal, round, and reactive to light.   Neck: Normal range of motion. Neck supple.   Cardiovascular: Normal rate.    Pulmonary/Chest: Effort normal.   Musculoskeletal:   Right hemiparesis   Neurological: He is alert and oriented to person, place, and time. He exhibits normal muscle tone. Coordination normal.   Skin: Skin is warm and dry.       Neurological Exam:   LOC: alert  Attention Span: Good   Language: No aphasia  Articulation: Dysarthria  Orientation: Person, Place, Time   Visual Fields: Full  EOM (CN III, IV, VI): Full/intact  Motor: Arm left  Normal 5/5  Leg left  Normal 5/5  Arm right  Paresis: 2/5  Leg right Paresis: 3/5  Sensation: Intact  to light touch, temperature and vibration  Tone: Normal tone throughout    Laboratory:  CMP:   Recent Labs  Lab 05/09/18  0656   CALCIUM 8.6*      K 4.0   CO2 18*   *   BUN 17   CREATININE 0.8     BMP:   Recent Labs  Lab 05/09/18  0656      K 4.0   *   CO2 18*   BUN 17   CREATININE 0.8   CALCIUM 8.6*     CBC:   Recent Labs  Lab 05/09/18  0656   WBC 7.43   RBC 4.53*   HGB 13.1*   HCT 41.0      MCV 91   MCH 28.9   MCHC 32.0     Lipid Panel:   Recent Labs  Lab 05/08/18  0010   CHOL 165   LDLCALC 80.6   HDL 38*   TRIG 232*     Coagulation:   Recent Labs  Lab 05/08/18  0356   INR 1.0   APTT 25.5     Platelet Aggregation Study: No results for input(s): PLTAGG, PLTAGINTERP, PLTAGREGLACO, ADPPLTAGGREG in the last 168 hours.  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  The findings represent embolic disease within the left pontine perforators.  Suggest vascular neurology consultation.    Diminished caliber of the left vertebral artery with multifocal areas of luminal narrowing involving the left vertebral artery.  The findings most likely represent the embolic source for the patient's infarction in the kim.    Punctate old microhemorrhage in the left basal ganglia.  DWI     Vessel Imaging   MRA  As above.     Cardiac Imaging   ECHO 5/9/18:   CONCLUSIONS     1 - Normal left ventricular systolic function (EF 60-65%).     2 - Normal right ventricular systolic function .     3 - Normal left ventricular diastolic function.     4 - The estimated PA systolic pressure is 19 mmHg.       Vale Fry PA-C  [facility]  Department of Vascular Neurology    [facility]

## 2018-05-09 NOTE — ASSESSMENT & PLAN NOTE
SBP goal less than 180  2D echo pending  Continue Amlodipine 10 mg daily   Added home metoprolol

## 2018-05-09 NOTE — HOSPITAL COURSE
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 Not a candiditate for TPA and not a candidate for intervention.   5/9 Added Home metoprolol and sub q Heparin for DVT ppx ; Stable for TTF    Interval Hx- no acute events. Adjusting insulin.

## 2018-05-10 LAB
ALBUMIN SERPL BCP-MCNC: 3.2 G/DL
ALP SERPL-CCNC: 38 U/L
ALT SERPL W/O P-5'-P-CCNC: 26 U/L
ANION GAP SERPL CALC-SCNC: 10 MMOL/L
AST SERPL-CCNC: 18 U/L
BASOPHILS # BLD AUTO: 0.03 K/UL
BASOPHILS NFR BLD: 0.5 %
BILIRUB SERPL-MCNC: 0.3 MG/DL
BUN SERPL-MCNC: 21 MG/DL
CALCIUM SERPL-MCNC: 8.7 MG/DL
CHLORIDE SERPL-SCNC: 110 MMOL/L
CO2 SERPL-SCNC: 22 MMOL/L
CREAT SERPL-MCNC: 0.9 MG/DL
DIFFERENTIAL METHOD: ABNORMAL
EOSINOPHIL # BLD AUTO: 0.1 K/UL
EOSINOPHIL NFR BLD: 1.9 %
ERYTHROCYTE [DISTWIDTH] IN BLOOD BY AUTOMATED COUNT: 14 %
EST. GFR  (AFRICAN AMERICAN): >60 ML/MIN/1.73 M^2
EST. GFR  (NON AFRICAN AMERICAN): >60 ML/MIN/1.73 M^2
GLUCOSE SERPL-MCNC: 203 MG/DL
HCT VFR BLD AUTO: 41 %
HGB BLD-MCNC: 13.4 G/DL
IMM GRANULOCYTES # BLD AUTO: 0.03 K/UL
IMM GRANULOCYTES NFR BLD AUTO: 0.5 %
LYMPHOCYTES # BLD AUTO: 1.4 K/UL
LYMPHOCYTES NFR BLD: 21.4 %
MAGNESIUM SERPL-MCNC: 2 MG/DL
MCH RBC QN AUTO: 29.2 PG
MCHC RBC AUTO-ENTMCNC: 32.7 G/DL
MCV RBC AUTO: 89 FL
MONOCYTES # BLD AUTO: 0.5 K/UL
MONOCYTES NFR BLD: 8.2 %
NEUTROPHILS # BLD AUTO: 4.4 K/UL
NEUTROPHILS NFR BLD: 67.5 %
NRBC BLD-RTO: 0 /100 WBC
PHOSPHATE SERPL-MCNC: 2.8 MG/DL
PLATELET # BLD AUTO: 237 K/UL
PMV BLD AUTO: 10.4 FL
POCT GLUCOSE: 201 MG/DL (ref 70–110)
POCT GLUCOSE: 215 MG/DL (ref 70–110)
POCT GLUCOSE: 218 MG/DL (ref 70–110)
POCT GLUCOSE: 315 MG/DL (ref 70–110)
POTASSIUM SERPL-SCNC: 4.1 MMOL/L
PROT SERPL-MCNC: 5.9 G/DL
RBC # BLD AUTO: 4.59 M/UL
SODIUM SERPL-SCNC: 142 MMOL/L
WBC # BLD AUTO: 6.46 K/UL

## 2018-05-10 PROCEDURE — 84100 ASSAY OF PHOSPHORUS: CPT

## 2018-05-10 PROCEDURE — A4216 STERILE WATER/SALINE, 10 ML: HCPCS | Performed by: PHYSICIAN ASSISTANT

## 2018-05-10 PROCEDURE — 25000003 PHARM REV CODE 250: Performed by: STUDENT IN AN ORGANIZED HEALTH CARE EDUCATION/TRAINING PROGRAM

## 2018-05-10 PROCEDURE — 83735 ASSAY OF MAGNESIUM: CPT

## 2018-05-10 PROCEDURE — 63600175 PHARM REV CODE 636 W HCPCS: Performed by: PSYCHIATRY & NEUROLOGY

## 2018-05-10 PROCEDURE — 25000003 PHARM REV CODE 250: Performed by: PSYCHIATRY & NEUROLOGY

## 2018-05-10 PROCEDURE — 99233 SBSQ HOSP IP/OBS HIGH 50: CPT | Mod: GC,,, | Performed by: PSYCHIATRY & NEUROLOGY

## 2018-05-10 PROCEDURE — 63600175 PHARM REV CODE 636 W HCPCS: Performed by: PHYSICIAN ASSISTANT

## 2018-05-10 PROCEDURE — 20600001 HC STEP DOWN PRIVATE ROOM

## 2018-05-10 PROCEDURE — 97535 SELF CARE MNGMENT TRAINING: CPT

## 2018-05-10 PROCEDURE — 25000003 PHARM REV CODE 250: Performed by: PHYSICIAN ASSISTANT

## 2018-05-10 PROCEDURE — 99233 SBSQ HOSP IP/OBS HIGH 50: CPT | Mod: ,,, | Performed by: PHYSICIAN ASSISTANT

## 2018-05-10 PROCEDURE — 80053 COMPREHEN METABOLIC PANEL: CPT

## 2018-05-10 PROCEDURE — 97165 OT EVAL LOW COMPLEX 30 MIN: CPT

## 2018-05-10 PROCEDURE — 85025 COMPLETE CBC W/AUTO DIFF WBC: CPT

## 2018-05-10 RX ORDER — LOSARTAN POTASSIUM 50 MG/1
100 TABLET ORAL DAILY
Status: DISCONTINUED | OUTPATIENT
Start: 2018-05-11 | End: 2018-05-12 | Stop reason: HOSPADM

## 2018-05-10 RX ORDER — INSULIN ASPART 100 [IU]/ML
8 INJECTION, SOLUTION INTRAVENOUS; SUBCUTANEOUS
Status: DISCONTINUED | OUTPATIENT
Start: 2018-05-10 | End: 2018-05-12

## 2018-05-10 RX ORDER — LOSARTAN POTASSIUM 50 MG/1
50 TABLET ORAL DAILY
Status: DISCONTINUED | OUTPATIENT
Start: 2018-05-10 | End: 2018-05-10

## 2018-05-10 RX ORDER — HYDRALAZINE HYDROCHLORIDE 20 MG/ML
INJECTION INTRAMUSCULAR; INTRAVENOUS
Status: DISPENSED
Start: 2018-05-10 | End: 2018-05-11

## 2018-05-10 RX ORDER — AMLODIPINE BESYLATE 10 MG/1
10 TABLET ORAL DAILY
Status: DISCONTINUED | OUTPATIENT
Start: 2018-05-10 | End: 2018-05-12 | Stop reason: HOSPADM

## 2018-05-10 RX ORDER — HYDRALAZINE HYDROCHLORIDE 20 MG/ML
10 INJECTION INTRAMUSCULAR; INTRAVENOUS ONCE
Status: COMPLETED | OUTPATIENT
Start: 2018-05-10 | End: 2018-05-10

## 2018-05-10 RX ADMIN — INSULIN ASPART 1 UNITS: 100 INJECTION, SOLUTION INTRAVENOUS; SUBCUTANEOUS at 09:05

## 2018-05-10 RX ADMIN — INSULIN ASPART 7 UNITS: 100 INJECTION, SOLUTION INTRAVENOUS; SUBCUTANEOUS at 12:05

## 2018-05-10 RX ADMIN — ASPIRIN 325 MG ORAL TABLET 325 MG: 325 PILL ORAL at 09:05

## 2018-05-10 RX ADMIN — METOPROLOL TARTRATE 25 MG: 25 TABLET ORAL at 09:05

## 2018-05-10 RX ADMIN — LOSARTAN POTASSIUM 50 MG: 50 TABLET, FILM COATED ORAL at 11:05

## 2018-05-10 RX ADMIN — INSULIN ASPART 7 UNITS: 100 INJECTION, SOLUTION INTRAVENOUS; SUBCUTANEOUS at 09:05

## 2018-05-10 RX ADMIN — CLOPIDOGREL 75 MG: 75 TABLET, FILM COATED ORAL at 09:05

## 2018-05-10 RX ADMIN — AMLODIPINE BESYLATE 10 MG: 10 TABLET ORAL at 05:05

## 2018-05-10 RX ADMIN — INSULIN ASPART 4 UNITS: 100 INJECTION, SOLUTION INTRAVENOUS; SUBCUTANEOUS at 12:05

## 2018-05-10 RX ADMIN — Medication 3 ML: at 02:05

## 2018-05-10 RX ADMIN — INSULIN DETEMIR 5 UNITS: 100 INJECTION, SOLUTION SUBCUTANEOUS at 11:05

## 2018-05-10 RX ADMIN — ROSUVASTATIN CALCIUM 40 MG: 20 TABLET, FILM COATED ORAL at 09:05

## 2018-05-10 RX ADMIN — HEPARIN SODIUM 5000 UNITS: 5000 INJECTION, SOLUTION INTRAVENOUS; SUBCUTANEOUS at 02:05

## 2018-05-10 RX ADMIN — Medication 3 ML: at 10:05

## 2018-05-10 RX ADMIN — HEPARIN SODIUM 5000 UNITS: 5000 INJECTION, SOLUTION INTRAVENOUS; SUBCUTANEOUS at 06:05

## 2018-05-10 RX ADMIN — HYDRALAZINE HYDROCHLORIDE 10 MG: 20 INJECTION INTRAMUSCULAR; INTRAVENOUS at 05:05

## 2018-05-10 RX ADMIN — INSULIN ASPART 8 UNITS: 100 INJECTION, SOLUTION INTRAVENOUS; SUBCUTANEOUS at 05:05

## 2018-05-10 RX ADMIN — HEPARIN SODIUM 5000 UNITS: 5000 INJECTION, SOLUTION INTRAVENOUS; SUBCUTANEOUS at 09:05

## 2018-05-10 RX ADMIN — STANDARDIZED SENNA CONCENTRATE AND DOCUSATE SODIUM 1 TABLET: 8.6; 5 TABLET, FILM COATED ORAL at 09:05

## 2018-05-10 NOTE — PT/OT/SLP EVAL
Physical Therapy Evaluation    Patient Name:  Cy Rutherford   MRN:  [id number]  Admitting Diagnosis:  Thrombotic stroke involving basilar artery   Recent Surgery: * No surgery found *      Recommendations:     Discharge Recommendations:  rehabilitation facility   Discharge Equipment Recommendations: wheelchair, bedside commode, bath bench   Barriers to discharge: Decreased caregiver support (pt lives alone)    Plan:     During this hospitalization, patient to be seen 4 x/week to address the above listed problems via gait training, therapeutic activities, therapeutic exercises, neuromuscular re-education  · Plan of Care Expires:  06/09/18   Plan of Care Reviewed with: patient    This Plan of care has been discussed with the patient who was involved in its development and understands and is in agreement with the identified goals and treatment plan    History:     Patient lives alone in [place], [place]  in a single family home with 1 OLESYA.  Patient reports being independent with ADLs.  Patient uses DME as follows: none.    DME owned (not currently used): rolling walker and single point cane.  Hand Dominance: right  Wears Glasses: yes    Roles/Repsonsibilities:   Work: retired.    Drive: yes.    Managing Medicines/Managing Home: yes.    Hobbies: rotary club.  Upon discharge, patient will have assistance from son and daughter-in-law.    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    Type 2 diabetes mellitus with kidney complication, without long-term current use of insulin 11/15/2012       Past Surgical History:   Procedure Laterality Date    COLONOSCOPY      CORONARY ANGIOPLASTY      MARIAN to LAD and LCX       Subjective     Communicated with NICOLE Littlejohn prior to session.  Patient found seated in bedside chair  "upon PT entry to room, agreeable to evaluation.    "I'd like to get closer to home."  Chief Complaint: right sided weakness, ataxia  Patient comments/goals: to get better  Pain/Comfort:  · Pain Rating 1: 0/10  · Pain Rating Post-Intervention 1: 0/10    Objective:     Patient found with: blood pressure cuff, telemetry     General Precautions: Standard, Cardiac aspiration, fall   Orthopedic Precautions:N/A   Braces: N/A   Respiratory Status: room air    Vital Signs (Most Recent):    Temp: 98.2 °F (36.8 °C) (05/10/18 0600)  Pulse: 64 (05/10/18 0815)  Resp: (!) 23 (05/10/18 0815)  BP: (!) 168/80 (05/10/18 0815)  SpO2: 98 % (05/10/18 0700)    Exam:  · Mental Status: Patient is oriented to Person, Place, Time and Situation and follows 100% of verbal commands. Pt is Alert and Cooperative during session.  · Skin: Visible skin intact  · Edema: none noted  · Sensation: Intact  · Hearing: Intact  · Vision:  Intact  · Coordination:  Impaired RLE when stepping, unable to test RUE due to weakness  · Facial Symmetry: right facial weakness  · Range of Motion:  · RUE: AAROM was WFL  · LUE: WFL  · RLE: WFL  · LLE: WFL  · Strength Exam:  · Upper Extremity Strength  (R) UE  (L) UE    Shoulder flexion: 2/5 Shoulder flexion: 5/5   Elbow flexion: 1/5 Elbow flexion: 5/5   Elbow extension: 1/5 Elbow extension: 5/5   Wrist flexion: 0/5 Wrist flexion: 5/5   Wrist extension: 0/5 Wrist extension: 5/5    1/5 : 5/5     · Lower Extremity Strength  (R) LE  (L) LE    Hip Flexion: 3/5 Hip flexion: 5/5   Knee flexion: 3/5 Knee flexion: 5/5   Knee extension: 3/5 Knee extension 5/5   Ankle dorsiflexion: 3/5 Ankle dorsiflexion: 5/5   Ankle plantarflexion: 3/5 Ankle plantarflexion: 5/5     Outcome Measures:   TINETTI BALANCE ASSESSMENT TOOL    Pedroetti ME, Cedric TF, Maria Elena R, Fall Risk Index for elderly patients based on number of chronic disabilities.Am J Med [id number]    Assistive Device  Normally Used: No  Assistive Device During " "Testing: No     BALANCE SECTION  Patient is seated in hard, armless chair;    1.Sitting Balance:   Steady; safe = 1  2.Rises from chair :  Unable without help = 0  3.Attempts to arise :  Able, requirese > 1 attempt = 1  4.Immediate standing Balance (first 5 seconds) : Steady but uses walker or other support = 1  5.Standing balance: Steady but wide stance (medal heel>4" apart) & uses cane or other support = 1  6.Nudged : Staggers, grabs, catches self = 1  7.Eyes closed: Unsteady = 0  8.Turning 360 degrees:  Discontinuous steps = 0 and Unsteady (grabs, staggers) = 0  9.Sitting Down : Uses arms or not a smooth motion = 1    Balance Score:  6/16    GAIT SECTION  Patient stands with therapist, walks across room (+/- aids), first at usual pace, then at rapid pace.    10.Initiation of Gait (Immediately after told to go.): Any hesitancy or multiple attempts to start = 0  11.Step length and height :  Step to=0  12.Foot Clearance :  L foot clears floor=1   13.Step symmetry: Right & Left step length not equal (estimate) = 0  14.Step continuity : Stoping or discontinuity between steps = 0  15.Path: Marked deviation = 0  16.Trunk : Marked sway or uses walking aid = 0  17.Walking Time: Heels apart = 0    Gait Score: 1/12  Balance score:6/16  Total Score=Balance + Gait Score: 7/28     Risk Indicators:    Tinetti Tool Score   Risk of Falls   ?18    High   19-23   Moderate   ?24   Low    Functional Mobility:  Bed Mobility:   · NT, pt found/returned to bedside chair    Transfers:   · Sit <> Stand Transfer: minimum assistance with HHa   · Stand <> Sit Transfer: minimum assistance with HHA   · Poor controlled descent      Gait:  · Patient ambulated: 6 steps forward/backward   · Patient required: moderate assist  · Patient used:  HHA  · Gait Pattern observed: 2-point gait  · Gait Deviation(s): decreased josh, increased time in double stance, decreased step length, decreased stride length, decreased toe-to-floor clearance and " decreased weight-shifting ability ataxic gait pattern  · Impairments due to: decreased strength & coordination of RLE  · Comments: pt with ataxic gait pattern, decreased R knee control in stance.    Therapeutic Activities & Exercises:     Education:  Patient provided with daily orientation and goals of this PT session. Patient agreed to participate in session. Patient aware of patient's deficits and therapy progression. They were educated to transfer with RN/PCT only; min A of 1 person. They were provided and educated on proper positioning in supine and in sitting with support of affected RUE extremities in order to increase awareness of extremity and to decrease the effects of immobility, specifically edema and pain. Encouraged patient to perform following daily exercises & mobility to increase endurance and decrease effects of bedrest: sit UIC majority of day. Time provided for therapeutic counseling and discussion of health disposition. All questions answered to patient's satisfaction, within scope of PT practice; voiced no other concerns. White board updated in patient's room, RN notified of session.    Patient left up in chair, with RUE propped on pillow for scapular support and edema management, head in midline, neutral pelvis & heels floated for skin protection with all lines intact, call button in reach and RN notified    AM-PAC 6 CLICK MOBILITY  Total Score:16     Assessment:     Cy Rutherford is a 72 y.o. male admitted with a medical diagnosis of Thrombotic stroke involving basilar artery.  He presents with the following impairments/functional limitations: weakness of right arm(s), right hand(s), right upper leg(s) or right lower leg(s), right facial droop, gait disturbance, balance disturbance. Pt with ataxic gait pattern. Pt is high fall risk evidenced by 7/28 on Tinetti Balance Assessment. Cy Rutherford's deficits effect their ability to safely and independently participate in self-care tasks and  functional mobility which increases their caregiver burden. Due to his physical therapy diagnosis of debility and deconditioning, they continue to benefit from acute PT services to address the following limitations: high fall risk, weakness, instability, the need for supervised instruction of exercise. Cy Rutherford's deficits effect their roles and responsibilities in which they were able to complete prior to admit. Education was provided to patient regarding importance of continued participation in therapy, patient's progress and discharge disposition. Pt would benefit from an intensive and collaborative PT/OT/SLP therapy program to improve quality of life and focus on recovery of impairments.     Problem List: weakness, impaired self care skills, impaired functional mobilty, gait instability, impaired balance, decreased upper extremity function, decreased lower extremity function, decreased safety awareness, impaired coordination  Rehab Prognosis: good; patient would benefit from acute skilled PT services to address these deficits and reach maximum level of function.      GOALS:    Physical Therapy Goals        Problem: Physical Therapy Goal    Goal Priority Disciplines Outcome Goal Variances Interventions   Physical Therapy Goal     PT/OT, PT Ongoing (interventions implemented as appropriate)     Description:  Goals to be met by: 5/20/2018    Patient will increase functional independence with mobility by performing:    Supine to sit with Minimal Assistance.  Sit to supine with Minimal Assistance.   Sit to stand transfer with Contact Guard Assistance using No Assistive Device and Rolling Walker.  Bed to chair transfer via Stand Pivot with Contact Guard Assistance using No Assistive Device and Rolling Walker.  Gait  x 50 feet with Contact Guard Assistance using Rolling Walker.    Dynamic standing for 10 minutes with Minimal Assistance using No Assistive Device.  Able to tolerate exercise for 15-20 reps with  independence.  Patient and family able to teachback stroke & positioning education independently.                       Clinical Decision Making:   HISTORY: Co-morbidities and personal factors that may impact the plan of care  Co-morbidities:   [] Time since onset of injury / illness / exacerbation [] Status of current condition []Patient's cognitive status and safety concerns  [] Multiple Medical Problems  Personal Factors:  [] Patient's age [] Prior Level of function [x] Patient's home situation (environment and family support) [] Patient's level of motivation [] Expected progression of patient  EXAMINATION: Body Structures and Functions, activity limitations and participation restrictions that may impact the plan of care  Body Regions:  [] Head [] Neck  [x] Trunk [x] Upper Extremity [x] Lower Extremity  Body Systems:  [x] For communication ability, affect, cognition, language, and learning style: the assessment of the ability to make needs known, consciousness, orientation, expected emotional /behavioral responses, and learning preferences  [x] For the neuromuscular system: a general assessment of gross coordinated movement (eg, balance, gait, locomotion, transfers, and transitions) and motor function (motor control and motor learning)  [x] For the musculoskeletal system: the assessment of gross symmetry, gross range of motion, gross strength, height, and weight  [] For the integumentary system: the assessment of pliability(texture), presence of scar formation, skin color, and skin integrity  [] For cardiovascular/pulmonary system: the assessment of heart rate, respiratory rate, blood pressure, and edema   Activity limitations:   [] Patient's cognitive status and safety concerns [] Status of current condition      [] Weight bearing restriction [] Cardiopulmunary Restriction  Participation Restrictions:   [] Goals and goal agreement with the patient  [] Rehab potential (prognosis) and probable outcome    CLINICAL  PRESENTATION:  [] stable [x] evolving [] unstable    On examination of body system using standardized tests and measures patient presents with 3 or more elements from any of the following: body structures and functions, activity limitations, and/or participation restrictions.  Leading to a clinical presentation that is considered evolving with changing characteristics  Evaluation Complexity:  Moderate - 41342      Time Tracking:     PT Received On: 05/10/18  PT Start Time: 1122     PT Stop Time: 1137  PT Total Time (min): 15 min     Billable Minutes: Evaluation 15    Rafia Busby PT, DPT  05/10/2018  Pager:[phone]

## 2018-05-10 NOTE — PROGRESS NOTES
[facility]  Neurocritical Care  Progress Note    Admit Date: 5/8/2018  Service Date: 05/10/2018  Length of Stay: 2    Subjective:     Chief Complaint: Thrombotic stroke involving basilar artery    History of Present Illness: Mr.Harry AEDLSO Rutherford is a 72 year old with a PMHx of Hypertension, DM, CAD s/p stent, PVD s/p stent, Hx of colon cancer s/p chemo and radiation who presents as a transfer from [facility] to Neuro Critical Care s/p Occlusion of the V4 segment of the left vertebral artery along with partial thrombosis within the mid basilar with corresponding infarction in the left pepe-kim. Not a candidate for tpa. Not a candidate for intervention. On the night of 5/6 at approximately 5pm patient reports that he felt right arm weakness and  gait disturbances. This persisted the next morning and he presented to his PCP on 5/7 with the same complaints. Subsequently, he was sent from his PCP to [facility]. Stroke code was called and neuro vascular evaluated and patient not a candidate for tpa. Patient has not noticed facial droop but feels his voice is slightly slurred. Of note, his plavix was stopped on Friday as he had been taking it since MI and stents in 2011. Patient will be admitted to Neuro ICU for a higher level of care.     Hospital Course: 5/8 admit to [facility] s/p Occlusion of the V4 segment of the left vertebral artery and infarct in the left pepe-kim. Not a candiditate for TPA and not a candidate for intervention.   5/9 Added Home metoprolol and sub q Heparin for DVT ppx ; Stable for TTF    Interval Hx- no acute events. Adjusting insulin.         Review of Systems:Constitutional: Denies fevers or chills.  Pulmonary: Denies shortness of breath or cough.  Cardiology: Denies chest pain or palpitations.  GI: Denies abdominal pain or constipation.  Neurologic: Denies new weakness, headache, or paresthesias.    Vitals:   Temp: 98.2 °F (36.8 °C)  Pulse: (!) 57  Rhythm: sinus  bradycardia  BP: (!) 185/111  MAP (mmHg): 140  Resp: (!) 25  SpO2: 99 %    Temp  Min: 98 °F (36.7 °C)  Max: 98.2 °F (36.8 °C)  Pulse  Min: 54  Max: 75  BP  Min: 148/70  Max: 213/84  MAP (mmHg)  Min: 100  Max: 145  Resp  Min: 14  Max: 28  SpO2  Min: 97 %  Max: 99 %     05/09 0701 - 05/10 0700  In: 1440 [P.O.:1140; I.V.:300]  Out: 2827 [Urine:2825]   Unmeasured Output  Urine Occurrence: 1  Stool Occurrence: 1     Physical Exam:  GA: Awake, Alert, Oriented X 4, Follows commands comfortable, no acute distress.   HEENT: No scleral icterus or JVD.   Pulmonary: Clear to auscultation Anterior . No wheezing, crackles, or rhonchi.  Cardiac: RRR S1 & S2 w/o rubs/murmurs/gallops.   Abdominal: Bowel sounds present x 4. No appreciable hepatosplenomegaly.  Skin: No jaundice, rashes, or visible lesions.  Neuro:  --GCS: E4 V5 M6  --Mental Status:  Awake, Alert, Oriented X 4, Follows commands --CN II-XII grossly intact.   --Pupils 4mm, PERRL.   --Corneal reflex, gag, cough intact.  4/5 RUE, full strength and sensation otherwise    Medications:   Continuous Scheduled  aspirin 325 mg Daily   clopidogrel 75 mg Daily   heparin (porcine) 5,000 Units Q8H   insulin aspart U-100 8 Units TIDWM   insulin detemir U-100 7 Units BID   losartan 50 mg Daily   metoprolol tartrate 25 mg BID   rosuvastatin 40 mg Daily   senna-docusate 8.6-50 mg 1 tablet Daily   sodium chloride 0.9% 3 mL Q8H   PRN  acetaminophen 650 mg Q6H PRN   dextrose 50% 12.5 g PRN   dextrose 50% 25 g PRN   glucagon (human recombinant) 1 mg PRN   glucose 16 g PRN   glucose 24 g PRN   insulin aspart U-100 0-5 Units QID (AC + HS) PRN   sodium chloride 0.9% 3 mL PRN      Today I independently reviewed pertinent medications, lines/drains/airways, imaging,   Assessment/Plan:     Neuro   * Thrombotic stroke involving basilar artery    - Not a candidate for intervention//  -- Vascular Neurology consulted  -- MRA Head reveals   -- SBP goal <180  -- Loaded with Plavix at outside hospital    -- Continue Plavix 75 mg daily  -- Continue ASA 81 mg daily   -- Sub q Heparin for DVT ppx  -- PT/OT/Speech   -- TTF Stroke Service           Cardiac/Vascular   CAD (coronary artery disease)    Hx of S/p stents        Hyperlipidemia    Continue Crestor        Essential hypertension    SBP goal less than 180  Continue Amlodipine 10 mg daily   Added home metoprolol            Oncology   History of colon cancer    Hx of s/p chemo and radiation        Endocrine   Type 2 diabetes mellitus with kidney complication, without long-term current use of insulin    Continue to monitor BG  Continue SSI  Increase aspart, add detemir            Prophylaxis:  Venous Thromboembolism: mechanical chemical  Stress Ulcer: None  Ventilator Pneumonia: not applicable     Activity Orders          None        Full Code    Angie Flanagan PA-C  Neurocritical Care  [facility]

## 2018-05-10 NOTE — PLAN OF CARE
Pt has transfer orders for 7th floor step down whenever bed becomes available. No acute events over night. See vital signs and assessments in flowsheets. See below for updates from shift...     Pulmonary: RA, O2 sats 98%     Cardiovascular: sinus sarah- NSR 55-60s, -200s     Neurological: A/OX4, slight speech slur noted, Pupils 4+ brisk/equal, denies HA, face symmetrical at rest and with mvmt, Spontaneous mvmt/no drift bilateral Lt extremities, no effort against gravity Rt UE, Rt sided weakness/drift/ataxia Rt LE     Gastrointestinal: Last BM 5/10     Genitourinary: voids spontaneously via urinal      Endocrine: BG checked AC/HS, SS administered per orders    Integumentary/Other: skin warm/dry/intact   Face and neck red/flushed  Bottom lip swollen on Rt side, pt reports biting lip when he eats  AM labs drawn and resulted, no replacements needed      Infusions: None     Patient progressing towards goals as tolerated, plan of care communicated and reviewed with Cy Rutherford and family. All questions and concerns addressed. Will continue to monitor.

## 2018-05-10 NOTE — PLAN OF CARE
Problem: Occupational Therapy Goal  Goal: Occupational Therapy Goal  Goals to be met by: 5/24    Patient will increase functional independence with ADLs by performing:    Feeding with Minimal Assistance.  UE Dressing with Minimal Assistance ( including tie and snap completion).  LE Dressing with Minimal Assistance.  Grooming while standing at sink with Contact Guard Assistance.  Toileting from toilet with Minimal Assistance for hygiene and clothing management.   Supine to sit with Stand-by Assistance.  Toilet transfer to toilet with Contact Guard Assistance.      Outcome: Ongoing (interventions implemented as appropriate)  Evaluation completed. Initiate POC.   Becki bruner OT  5/10/2018

## 2018-05-10 NOTE — PLAN OF CARE
Problem: Physical Therapy Goal  Goal: Physical Therapy Goal  Goals to be met by: 5/20/2018    Patient will increase functional independence with mobility by performing:    Supine to sit with Minimal Assistance.  Sit to supine with Minimal Assistance.   Sit to stand transfer with Contact Guard Assistance using No Assistive Device and Rolling Walker.  Bed to chair transfer via Stand Pivot with Contact Guard Assistance using No Assistive Device and Rolling Walker.  Gait  x 50 feet with Contact Guard Assistance using Rolling Walker.    Dynamic standing for 10 minutes with Minimal Assistance using No Assistive Device.  Able to tolerate exercise for 15-20 reps with independence.  Patient and family able to teachback stroke & positioning education independently.    Outcome: Ongoing (interventions implemented as appropriate)    Pt would benefit from Rehab upon discharge.  Appropriate transfer level with nursing staff: Bed <> Chair:  Stand Pivot with Minimal Assistance with 1 person.    Rafia Busby PT, DPT  5/10/2018  Pager: [phone]

## 2018-05-10 NOTE — ASSESSMENT & PLAN NOTE
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  Has diffuse atherosclersosis and multiple risk factors including uncontrolled HTN and DM. Presenting with right side weakness.  No intervention indicated.  Recommend optimizing medical management with DAPT and high dose statin.    Patient motivated working with therapy.  Good candidate for rehab.    Antithrombotics for secondary stroke prevention: Antiplatelets: Aspirin: 325 mg daily  Clopidogrel: 75 mg daily    Statins for secondary stroke prevention and hyperlipidemia, if present: Statins: Rosuvastatin- 40 mg daily    Aggressive risk factor modification: HTN, DM, HLD     Rehab efforts: PT/OT/SLP to evaluate and treat    Diagnostics ordered/pending: none     VTE prophylaxis: Heparin 5000 units SQ every 8 hours    BP parameters: Infarct: No intervention, SBP <200

## 2018-05-10 NOTE — PT/OT/SLP EVAL
"Occupational Therapy   Evaluation    Name: Cy Rutherford  MRN: [id number]  Admitting Diagnosis:  Thrombotic stroke involving basilar artery      Recommendations:     Discharge Recommendations: rehabilitation facility  Discharge Equipment Recommendations:   (TBD-- pending progress)  Barriers to discharge:  Decreased caregiver support    History:     Occupational Profile:  Living Environment: Pt lives alone in [place], [place]; 1SH with 1STE; bathroom contains a tub-shower combo with no DME.   Previous level of function: PTA, pt reports being I with ADL and functional mobility.   Roles and Routines: He enjoys going to [employer]; retired; drives   Equipment Owned:  walker, rolling  Assistance upon Discharge: Son and daughter-in-law live in [place] near by to assist; however they work during the day    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    Type 2 diabetes mellitus with kidney complication, without long-term current use of insulin 11/15/2012       Past Surgical History:   Procedure Laterality Date    COLONOSCOPY      CORONARY ANGIOPLASTY      MARIAN to LAD and LCX       Subjective     Chief Complaint: "My R side is weak"   Patient/Family stated goals: Return to PLOF  Communicated with: RN prior to session.  Pain/Comfort:  · Pain Rating 1: 0/10  · Pain Rating Post-Intervention 1: 0/10    Objective:     Patient found with: blood pressure cuff, peripheral IV, pulse ox (continuous), telemetry    General Precautions: Standard, fall   Orthopedic Precautions:N/A   Braces: N/A     Occupational Performance:    Bed Mobility:    · Patient completed Rolling/Turning to Left with  stand by assistance  · Patient completed Scooting/Bridging with contact guard assistance  · Patient completed Supine to Sit with " minimum assistance    Functional Mobility/Transfers:  · Patient completed Sit <> Stand Transfer with minimum assistance  with  no assistive device   · Patient completed Bed <> Chair Transfer using Stand Pivot technique with moderate assistance with no assistive device  · Patient completed Toilet Transfer Stand Pivot technique with minimal assistance and using grab bar with LUE  · Functional Mobility: Pt completed functional mobility to bathroom with assist of therapist and RN on either side; Pt required mod A for balance and safety using HHA. He demo's difficulty coordinating RLE when taking steps.     Activities of Daily Living:  · Feeding:  moderate assistance pt required assistance opening containers;required moderate assistance to feed self using RUE ( Built up spoon to promote functional grasp)   · Grooming: minimum assistance to wash hands while standing at sink   · Toileting: moderate assistance to complete toilet hygiene while standing     Cognitive/Visual Perceptual:  Cognitive/Psychosocial Skills:     -       Oriented to: Person, Place, Time and Situation   -       Follows Commands/attention:Follows multistep  commands  -       Communication: dysarthria  -       Memory: No Deficits noted  -       Safety awareness/insight to disability: intact   -       Mood/Affect/Coping skills/emotional control: Appropriate to situation  Visual/Perceptual:      -Intact; wears bifocals     Physical Exam:  Postural examination/scapula alignment:    -       Rounded shoulders  Skin integrity: Visible skin intact  Edema:  Mild RUE  Sensation:    -       Intact  Motor Planning:    -       Intact  Dominant hand:    -       RUE  Upper Extremity Range of Motion/strength     -       Right Upper Extremity: Full PROM ( no tone) Elbow flexion 3/5; shoulder flexion 2+/5  -       Left Upper Extremity: Full PROM; WFL 5/5    Strength:    -       Right Upper Extremity: 2/5  -       Left Upper Extremity: WFL 5/5  Fine Motor  "Coordination:    -       Impaired  RLE heel shin   Gross motor coordination: impaird    Patient left up in chair with all lines intact, call button in reach and RN notified    [facility] 6 Click:  [facility] Total Score: 15    Treatment & Education:  -Pt edu on OT role/POC  -Importance of OOB activity with staff assistance  -Safety during functional t/f and mobility  - White board updated  - Multiple self care tasks-- as noted above   - Therapist wrapped coban around fork handle to build up to facilitate functional grasp when feeding using RUE  - Edu pt on positioning of RUE when supine/in chair ( elevated and support to prevent edema)   Education:    Assessment:     Cy Rutherford is a 72 y.o. male with a medical diagnosis of Thrombotic stroke involving basilar artery.  He presents alert and oriented x4 with R sided weakness and coordination deficits, requiring increased level of skilled assistance at this time. Performance deficits affecting function are weakness, impaired endurance, gait instability, impaired balance, decreased upper extremity function, decreased lower extremity function, decreased safety awareness, impaired self care skills, impaired functional mobilty, impaired cognition, impaired fine motor.  Pt would benefit from rehab following d/c to continue to progress towards goals and improve quality of life.     Rehab Prognosis:  Good; patient would benefit from acute skilled OT services to address these deficits and reach maximum level of function.         Clinical Decision Makin.  OT Mod:  "Pt evaluation falls under moderate complexity for evaluation coding due to identification of 3-5 performance deficits noted as stated above. Eval required Min/Mod assistance to complete on this date and detailed assessment(s) were utilized. Moreover, an expanded review of history and occupational profile obtained with additional review of cognitive, physical and psychosocial hx."     Plan:     Patient to be seen 4 " x/week to address the above listed problems via self-care/home management, therapeutic activities, therapeutic exercises, neuromuscular re-education  · Plan of Care Expires: 06/07/18  · Plan of Care Reviewed with: patient    This Plan of care has been discussed with the patient who was involved in its development and understands and is in agreement with the identified goals and treatment plan    GOALS:    Occupational Therapy Goals        Problem: Occupational Therapy Goal    Goal Priority Disciplines Outcome Interventions   Occupational Therapy Goal     OT, PT/OT Ongoing (interventions implemented as appropriate)    Description:  Goals to be met by: 5/24    Patient will increase functional independence with ADLs by performing:    Feeding with Minimal Assistance.  UE Dressing with Minimal Assistance ( including tie and snap completion).  LE Dressing with Minimal Assistance.  Grooming while standing at sink with Contact Guard Assistance.  Toileting from toilet with Minimal Assistance for hygiene and clothing management.   Supine to sit with Stand-by Assistance.  Toilet transfer to toilet with Contact Guard Assistance.                        Time Tracking:     OT Date of Treatment: 05/10/18  OT Start Time: 0827  OT Stop Time: 0859  OT Total Time (min): 32 min    Billable Minutes:Evaluation 17  Self Care/Home Management 15    Becki bruner OT  5/10/2018

## 2018-05-10 NOTE — ASSESSMENT & PLAN NOTE
- Not a candidate for intervention//  -- Vascular Neurology consulted  -- MRA Head reveals   -- SBP goal <180  -- Loaded with Plavix at outside hospital   -- Continue Plavix 75 mg daily  -- Continue ASA 81 mg daily   -- Sub q Heparin for DVT ppx  -- PT/OT/Speech   -- TTF Stroke Service

## 2018-05-10 NOTE — PLAN OF CARE
Problem: Patient Care Overview  Goal: Plan of Care Review  Pt doing well today, moving right arm more this am, pt/ot to bedside to work with pt. , NCC team to bedside, bp/hyperglycemia meds increased, pt eating/drinking and using bathroom,  transfer orders in place, will continue to monitor closely

## 2018-05-10 NOTE — PLAN OF CARE
This CM met with patient at bedside, discuss therapy recommendation for Rehab. Patients states he is aware of Rehab recommendation. List of Rehab choices given, patient states he wants to look over list and will give a decision in the am. Will follow-up with patient on tomorrow.    Margy Terrell RN/BSN/CM  [phone]  [facility]

## 2018-05-10 NOTE — DISCHARGE SUMMARY
[facility] Medicine Discharge Summary    Primary Team: U Hospitalist Medicine Service Team A  Attending Physician: Armand  Resident: Yazan  Intern: Laura    Date of Admit: 5/7/2018  Date of Discharge: 5/8/2018    [facility]  Condition: stable    Discharge Diagnoses     Patient Active Problem List   Diagnosis    Type 2 diabetes mellitus with kidney complication, without long-term current use of insulin    Essential hypertension    Hyperlipidemia    CAD (coronary artery disease)    Status post coronary artery stent placement    Acute MI anterior wall first episode care    Acute coronary syndrome    PAD (peripheral artery disease)    Cerebrovascular accident (CVA)    History of colon cancer    Thrombotic stroke involving basilar artery    Intracranial atherosclerosis    Cytotoxic cerebral edema       Consultants and Procedures     Consultants:  Vascular neurology    Procedures:   none    Brief History of Present Illness      Cy Rutherford is a 72 y.o. male who  has a past medical history of Acute coronary syndrome; Coronary artery disease; and Hypertension. DM II, PVD, HLD. The patient presented to [facility] on 5/7/2018 with a primary complaint of Extremity Weakness (from Dr. Ricardo right arm weakness thst started at 5pm last eveing)        Patient was in usual state of health until rising to go to restroom around 5pm, day prior to presentation when noticed discoordination of gait and abnormal use of R arm. The symptoms persisted to next morning so went to see his primary care doctor who directed him to emergency dept. Code stroke called and vascular neuro evaluated, patient not a candidate for tpa. Patient states his symptoms have persisted since onset, maybe have worsened slightly. He personally has not noticed droop of face but feels his voice is slightly slurred. Notably stopped his plavix as directed on Friday, had been taking since MI and stents in 2011.  Denies chest  pain, palpitations, change in bowel or bladder function, fever, chills, shortness of breath, history of A fib, or headache/ vision change.     For the full HPI please refer to the History & Physical from this admission.    Hospital Course By Problem with Pertinent Findings     Acute Cerebrovascular accident   - Patient with new onset slurring of speech, decreased coordination of R hand and arm, and gait instability with leaning on walls for balance around 5pm day prior to presentation. Symptoms persisted and may have slightly worsened since then. NIH SS of 4 on examination, reported to be NIH SS of 2 earlier today in ED by [facility] neuro consult: not a tpa candidate.  CT head without hemorrhage or mass effect. Has history of vascular disease and metabolic syndrome. Hypertensive on presentation, Rec'd , IV hydralazine, and nitropaste in ED. The nitropaste was removed prior to transport to our facility.  on our exam. Will administer lipitor 80 mg, plavix loading dose and allow permissive HTN up to 220 systolic. Strength in R arm worsened overnight and MRI confirmed stroke with MRA showing possible thrombus in vertebral artery. Vascular surgery called and decision made to transfer to [facility] for possible procedure by vascular neurology. Sent to ICU for closer monitoring. Cont DAPT and high-intensity statin  - additional medical management as above     HTN  - permissive HTN as above, Home meds include norvasc 10mg qd, metoprolol XL 50mg qd, HCTZ 25mg qd, losartan 50mg qd     CAD, history of MI 2011 with stents X3, PAD with stents in legs bilaterally.   - on daily ASA, had stopped plavix on Friday as directed. Resume as above.      Diabetes Mellitus with long term insulin use, controlled  - most recent A1c 8   glucose on admission 235  currently home medications Metformin 500mg BID, invokana 300mg qd, Lantus 60u BID  - will repeat A1c if not done in last 3 months and administer basal insulin prn, SSI,  accuchecks      HLD  - home meds crestor 20mg qd, fenofibrate 135mg qd, fish oil 1g BID   - lipitor as above     History of colon cancer, 2002  - s/p surgical resection, chemo, radiation. Gets regular c-scopes. No change in stools or hematochezia/melena      Health maintenance  - may benefit from one-time low dose CT chest for smoking history as outpatient.     Discharge Medications        Medication List      ASK your doctor about these medications    amLODIPine 10 MG tablet  Commonly known as:  NORVASC  TAKE 1 TABLET BY MOUTH ONCE DAILY     aspirin 81 MG EC tablet  Commonly known as:  ECOTRIN     fenofibrate micronized 134 MG Cap  Commonly known as:  LOFIBRA     fish oil-omega-3 fatty acids 300-1,000 mg capsule     hydroCHLOROthiazide 25 MG tablet  Commonly known as:  HYDRODIURIL  TAKE 1 TABLET BY MOUTH ONCE DAILY     insulin glargine 100 unit/mL injection  Commonly known as:  LANTUS     INVOKANA 300 mg Tab tablet  Generic drug:  canagliflozin     losartan 50 MG tablet  Commonly known as:  COZAAR  Take 1 tablet (50 mg total) by mouth once daily.     metFORMIN 500 MG tablet  Commonly known as:  GLUCOPHAGE     metoprolol succinate 50 MG 24 hr tablet  Commonly known as:  TOPROL-XL  TAKE 1 TABLET BY MOUTH ONCE DAILY     nitroGLYCERIN 0.4 MG SL tablet  Commonly known as:  NITROSTAT  Place 1 tablet (0.4 mg total) under the tongue every 5 (five) minutes as needed for Chest pain.     rosuvastatin 20 MG tablet  Commonly known as:  CRESTOR  TAKE 1 TABLET BY MOUTH ONCE DAILY            Discharge Information:   Diet:  NPO until after procedure or speech eval    Physical Activity:  Determined after PT/OT eval    Instructions:  Transfer to [facility] NCC    Follow-Up Appointments:  To be established when discharged from [facility]    Cirilo Hand  [facility] [place] [facility], [facility]

## 2018-05-10 NOTE — SUBJECTIVE & OBJECTIVE
Neurologic Chief Complaint: Right hemiparesis     Subjective:     Interval History: Patient is seen for follow-up neurological assessment and treatment recommendations: No acute issues or events overnight.  Exam unchanged.      HPI, Past Medical, Family, and Social History remains the same as documented in the initial encounter.     Review of Systems   Constitutional: Negative for fever.   Eyes: Negative for visual disturbance.   Respiratory: Negative for shortness of breath.    Gastrointestinal: Negative for nausea and vomiting.   Neurological: Positive for speech difficulty and weakness.     Scheduled Meds:   aspirin  325 mg Oral Daily    clopidogrel  75 mg Oral Daily    heparin (porcine)  5,000 Units Subcutaneous Q8H    insulin aspart U-100  8 Units Subcutaneous TIDWM    insulin detemir U-100  7 Units Subcutaneous BID    losartan  50 mg Oral Daily    metoprolol tartrate  25 mg Oral BID    rosuvastatin  40 mg Oral Daily    senna-docusate 8.6-50 mg  1 tablet Oral Daily    sodium chloride 0.9%  3 mL Intravenous Q8H     Continuous Infusions:  PRN Meds:acetaminophen, dextrose 50%, dextrose 50%, glucagon (human recombinant), glucose, glucose, insulin aspart U-100, sodium chloride 0.9%    Objective:     Vital Signs (Most Recent):  Temp: 98 °F (36.7 °C) (05/10/18 1100)  Pulse: 62 (05/10/18 1400)  Resp: 16 (05/10/18 1400)  BP: (!) 188/80 (05/10/18 1400)  SpO2: 99 % (05/10/18 1400)  BP Location: Left arm    Vital Signs Range (Last 24H):  Temp:  [98 °F (36.7 °C)-98.2 °F (36.8 °C)]   Pulse:  [54-75]   Resp:  [14-28]   BP: (148-213)/()   SpO2:  [97 %-99 %]   BP Location: Left arm    Physical Exam   Constitutional: He appears well-developed and well-nourished.   Pulmonary/Chest: Effort normal.   Neurological: He exhibits normal muscle tone. Coordination normal.   Skin: Skin is warm and dry.   Psychiatric: He has a normal mood and affect. His behavior is normal. Judgment and thought content normal.        Neurological Exam:   LOC: alert  Attention Span: Good   Language: No aphasia  Articulation: Dysarthria  Orientation: Person, Place, Time   Visual Fields: Full  EOM (CN III, IV, VI): Full/intact  Motor: Arm left  Normal 5/5  Leg left  Normal 5/5  Arm right  Paresis: 2/5  Leg right Paresis: 3/5  Sensation: Intact to light touch, temperature and vibration  Tone: Normal tone throughout    Laboratory:  CMP:     Recent Labs  Lab 05/10/18  0340   CALCIUM 8.7   ALBUMIN 3.2*   PROT 5.9*      K 4.1   CO2 22*      BUN 21   CREATININE 0.9   ALKPHOS 38*   ALT 26   AST 18   BILITOT 0.3     BMP:     Recent Labs  Lab 05/10/18  0340      K 4.1      CO2 22*   BUN 21   CREATININE 0.9   CALCIUM 8.7     CBC:     Recent Labs  Lab 05/10/18  0340   WBC 6.46   RBC 4.59*   HGB 13.4*   HCT 41.0      MCV 89   MCH 29.2   MCHC 32.7     Lipid Panel:     Recent Labs  Lab 05/08/18  0010   CHOL 165   LDLCALC 80.6   HDL 38*   TRIG 232*     Coagulation:     Recent Labs  Lab 05/08/18  0356   INR 1.0   APTT 25.5     Platelet Aggregation Study: No results for input(s): PLTAGG, PLTAGINTERP, PLTAGREGLACO, ADPPLTAGGREG in the last 168 hours.  Hgb A1C:     Recent Labs  Lab 05/08/18  0010   HGBA1C 7.7*     TSH:     Recent Labs  Lab 05/07/18  1130   TSH 4.970*       Diagnostic Results     Brain Imaging   CTH 5/7/18:   No acute abnormality   MRI brain 5/7/18:   Occlusion of the V4 segment of the left vertebral artery along with partial thrombosis within the mid basilar with corresponding infarction in the left pepe-kim.  The findings represent embolic disease within the left pontine perforators.  Suggest vascular neurology consultation.    Diminished caliber of the left vertebral artery with multifocal areas of luminal narrowing involving the left vertebral artery.  The findings most likely represent the embolic source for the patient's infarction in the kim.    Punctate old microhemorrhage in the left basal ganglia.  DWI      Vessel Imaging   MRA  As above.     Cardiac Imaging   ECHO 5/9/18:   CONCLUSIONS     1 - Normal left ventricular systolic function (EF 60-65%).     2 - Normal right ventricular systolic function .     3 - Normal left ventricular diastolic function.     4 - The estimated PA systolic pressure is 19 mmHg.

## 2018-05-10 NOTE — PROGRESS NOTES
[facility]  Vascular Neurology  Comprehensive Stroke Center  Progress Note    Assessment/Plan:     * Thrombotic stroke involving basilar artery    73 yo male with left anteromedial/anterolateral pontine perforators infarcts due to thrombosis/atheroma on the basilar artery.  Has diffuse atherosclersosis and multiple risk factors including uncontrolled HTN and DM. Presenting with right side weakness.  No intervention indicated.  Recommend optimizing medical management with DAPT and high dose statin.    Patient motivated working with therapy.  Good candidate for rehab.    Antithrombotics for secondary stroke prevention: Antiplatelets: Aspirin: 325 mg daily  Clopidogrel: 75 mg daily    Statins for secondary stroke prevention and hyperlipidemia, if present: Statins: Rosuvastatin- 40 mg daily    Aggressive risk factor modification: HTN, DM, HLD     Rehab efforts: PT/OT/SLP to evaluate and treat    Diagnostics ordered/pending: none     VTE prophylaxis: Heparin 5000 units SQ every 8 hours    BP parameters: Infarct: No intervention, SBP <200        Cytotoxic cerebral edema    Visualized on MRI, consistent with acute stroke.  Monitor exam carefully as deterioration may signify edema/stroke territory expansion.        Intracranial atherosclerosis    Clearly visualized on MRA/CTA. L vertebral with atherosclerosis, and involvement of the basilar artery as well, likely etiology of pontine stroke   Risk factor modification for secondary stroke prevention.        Hyperlipidemia    LDL 80  Crestor 40mg daily in setting of atherosclerosis        Essential hypertension    Stroke risk factor.  Goal < 200 in setting of atherosclerosis        Type 2 diabetes mellitus with kidney complication, without long-term current use of insulin    Stroke risk factor.  Uncontrolled, A1C 7.7   Will need more aggressive management as outpatient.             5/9/18: Patient reports some increased weakness on the right side since  yesterday. MRI noted L pontine infarct.   5/10  Remains in NCC.  Symptoms about the same.    STROKE DOCUMENTATION        NIH Scale:  1a. Level Of Consciousness: 0-->Alert: keenly responsive  1b. LOC Questions: 0-->Answers both questions correctly  1c. LOC Commands: 0-->Performs both tasks correctly  2. Best Gaze: 0-->Normal  3. Visual: 0-->No visual loss  4. Facial Palsy: 0-->Normal symmetrical movements  5a. Motor Arm, Left: 0-->No drift: limb holds 90 (or 45) degrees for full 10 secs  5b. Motor Arm, Right: 2-->Some effort against gravity: limb cannot get to or maintain (if cued) 90 (or 45) degrees, drifts down to bed, but has some effort against gravity  6a. Motor Leg, Left: 0-->No drift: leg holds 30 degree position for full 5 secs  6b. Motor Leg, Right: 1-->Drift: leg falls by the end of the 5-sec period but does not hit bed  7. Limb Ataxia: 1-->Present in one limb  8. Sensory: 0-->Normal: no sensory loss  9. Best Language: 0-->No aphasia: normal  10. Dysarthria: 1-->Mild-to-moderate dysarthria: patient slurs at least some words and, at worst, can be understood with some difficulty  11. Extinction and Inattention (formerly Neglect): 0-->No abnormality  Total (NIH Stroke Scale): 5       Modified [place]    [place] Coma Scale:    ABCD2 Score:    [id number] Score:   HAS -BLED Score:   ICH Score:   Hunt & Soto Classification:      Hemorrhagic change of an Ischemic Stroke: Does this patient have an ischemic stroke with hemorrhagic changes? No     Neurologic Chief Complaint: Right hemiparesis     Subjective:     Interval History: Patient is seen for follow-up neurological assessment and treatment recommendations: No acute issues or events overnight.  Exam unchanged.      HPI, Past Medical, Family, and Social History remains the same as documented in the initial encounter.     Review of Systems   Constitutional: Negative for fever.   Eyes: Negative for visual disturbance.   Respiratory: Negative for shortness of  breath.    Gastrointestinal: Negative for nausea and vomiting.   Neurological: Positive for speech difficulty and weakness.     Scheduled Meds:   aspirin  325 mg Oral Daily    clopidogrel  75 mg Oral Daily    heparin (porcine)  5,000 Units Subcutaneous Q8H    insulin aspart U-100  8 Units Subcutaneous TIDWM    insulin detemir U-100  7 Units Subcutaneous BID    losartan  50 mg Oral Daily    metoprolol tartrate  25 mg Oral BID    rosuvastatin  40 mg Oral Daily    senna-docusate 8.6-50 mg  1 tablet Oral Daily    sodium chloride 0.9%  3 mL Intravenous Q8H     Continuous Infusions:  PRN Meds:acetaminophen, dextrose 50%, dextrose 50%, glucagon (human recombinant), glucose, glucose, insulin aspart U-100, sodium chloride 0.9%    Objective:     Vital Signs (Most Recent):  Temp: 98 °F (36.7 °C) (05/10/18 1100)  Pulse: 62 (05/10/18 1400)  Resp: 16 (05/10/18 1400)  BP: (!) 188/80 (05/10/18 1400)  SpO2: 99 % (05/10/18 1400)  BP Location: Left arm    Vital Signs Range (Last 24H):  Temp:  [98 °F (36.7 °C)-98.2 °F (36.8 °C)]   Pulse:  [54-75]   Resp:  [14-28]   BP: (148-213)/()   SpO2:  [97 %-99 %]   BP Location: Left arm    Physical Exam   Constitutional: He appears well-developed and well-nourished.   Pulmonary/Chest: Effort normal.   Neurological: He exhibits normal muscle tone. Coordination normal.   Skin: Skin is warm and dry.   Psychiatric: He has a normal mood and affect. His behavior is normal. Judgment and thought content normal.       Neurological Exam:   LOC: alert  Attention Span: Good   Language: No aphasia  Articulation: Dysarthria  Orientation: Person, Place, Time   Visual Fields: Full  EOM (CN III, IV, VI): Full/intact  Motor: Arm left  Normal 5/5  Leg left  Normal 5/5  Arm right  Paresis: 2/5  Leg right Paresis: 3/5  Sensation: Intact to light touch, temperature and vibration  Tone: Normal tone throughout    Laboratory:  CMP:     Recent Labs  Lab 05/10/18  0340   CALCIUM 8.7   ALBUMIN 3.2*   PROT  5.9*      K 4.1   CO2 22*      BUN 21   CREATININE 0.9   ALKPHOS 38*   ALT 26   AST 18   BILITOT 0.3     BMP:     Recent Labs  Lab 05/10/18  0340      K 4.1      CO2 22*   BUN 21   CREATININE 0.9   CALCIUM 8.7     CBC:     Recent Labs  Lab 05/10/18  0340   WBC 6.46   RBC 4.59*   HGB 13.4*   HCT 41.0      MCV 89   MCH 29.2   MCHC 32.7     Lipid Panel:     Recent Labs  Lab 05/08/18  0010   CHOL 165   LDLCALC 80.6   HDL 38*   TRIG 232*     Coagulation:     Recent Labs  Lab 05/08/18  0356   INR 1.0   APTT 25.5     Platelet Aggregation Study: No results for input(s): PLTAGG, PLTAGINTERP, PLTAGREGLACO, ADPPLTAGGREG in the last 168 hours.  Hgb A1C:     Recent Labs  Lab 05/08/18  0010   HGBA1C 7.7*     TSH:     Recent Labs  Lab 05/07/18  1130   TSH 4.970*       Diagnostic Results     Brain Imaging   CTH 5/7/18:   No acute abnormality   MRI brain 5/7/18:   Occlusion of the V4 segment of the left vertebral artery along with partial thrombosis within the mid basilar with corresponding infarction in the left pepe-kim.  The findings represent embolic disease within the left pontine perforators.  Suggest vascular neurology consultation.    Diminished caliber of the left vertebral artery with multifocal areas of luminal narrowing involving the left vertebral artery.  The findings most likely represent the embolic source for the patient's infarction in the kim.    Punctate old microhemorrhage in the left basal ganglia.  DWI     Vessel Imaging   MRA  As above.     Cardiac Imaging   ECHO 5/9/18:   CONCLUSIONS     1 - Normal left ventricular systolic function (EF 60-65%).     2 - Normal right ventricular systolic function .     3 - Normal left ventricular diastolic function.     4 - The estimated PA systolic pressure is 19 mmHg.       Namita Samuels NP  [facility]  Department of Vascular Neurology   [facility]

## 2018-05-11 ENCOUNTER — DOCUMENTATION ONLY (OUTPATIENT)
Dept: RESEARCH | Facility: HOSPITAL | Age: 73
End: 2018-05-11

## 2018-05-11 LAB
ALBUMIN SERPL BCP-MCNC: 3.4 G/DL
ALP SERPL-CCNC: 41 U/L
ALT SERPL W/O P-5'-P-CCNC: 30 U/L
ANION GAP SERPL CALC-SCNC: 12 MMOL/L
AST SERPL-CCNC: 23 U/L
BILIRUB SERPL-MCNC: 0.4 MG/DL
BUN SERPL-MCNC: 22 MG/DL
CALCIUM SERPL-MCNC: 8.9 MG/DL
CHLORIDE SERPL-SCNC: 109 MMOL/L
CO2 SERPL-SCNC: 22 MMOL/L
CREAT SERPL-MCNC: 0.9 MG/DL
EST. GFR  (AFRICAN AMERICAN): >60 ML/MIN/1.73 M^2
EST. GFR  (NON AFRICAN AMERICAN): >60 ML/MIN/1.73 M^2
GLUCOSE SERPL-MCNC: 193 MG/DL
MAGNESIUM SERPL-MCNC: 2.1 MG/DL
PHOSPHATE SERPL-MCNC: 3 MG/DL
POCT GLUCOSE: 194 MG/DL (ref 70–110)
POCT GLUCOSE: 252 MG/DL (ref 70–110)
POCT GLUCOSE: 259 MG/DL (ref 70–110)
POCT GLUCOSE: 283 MG/DL (ref 70–110)
POTASSIUM SERPL-SCNC: 4.2 MMOL/L
PROT SERPL-MCNC: 6.3 G/DL
SODIUM SERPL-SCNC: 143 MMOL/L

## 2018-05-11 PROCEDURE — G8997 SWALLOW GOAL STATUS: HCPCS | Mod: CH

## 2018-05-11 PROCEDURE — 83735 ASSAY OF MAGNESIUM: CPT

## 2018-05-11 PROCEDURE — G8996 SWALLOW CURRENT STATUS: HCPCS | Mod: CH

## 2018-05-11 PROCEDURE — 80053 COMPREHEN METABOLIC PANEL: CPT

## 2018-05-11 PROCEDURE — 99233 SBSQ HOSP IP/OBS HIGH 50: CPT | Mod: ,,, | Performed by: PSYCHIATRY & NEUROLOGY

## 2018-05-11 PROCEDURE — 36415 COLL VENOUS BLD VENIPUNCTURE: CPT

## 2018-05-11 PROCEDURE — 20600001 HC STEP DOWN PRIVATE ROOM

## 2018-05-11 PROCEDURE — 25000003 PHARM REV CODE 250: Performed by: PHYSICIAN ASSISTANT

## 2018-05-11 PROCEDURE — 84100 ASSAY OF PHOSPHORUS: CPT

## 2018-05-11 PROCEDURE — G8998 SWALLOW D/C STATUS: HCPCS | Mod: CH

## 2018-05-11 PROCEDURE — 63600175 PHARM REV CODE 636 W HCPCS: Performed by: PSYCHIATRY & NEUROLOGY

## 2018-05-11 PROCEDURE — 99222 1ST HOSP IP/OBS MODERATE 55: CPT | Mod: ,,, | Performed by: NURSE PRACTITIONER

## 2018-05-11 PROCEDURE — A4216 STERILE WATER/SALINE, 10 ML: HCPCS | Performed by: PHYSICIAN ASSISTANT

## 2018-05-11 PROCEDURE — 92523 SPEECH SOUND LANG COMPREHEN: CPT

## 2018-05-11 PROCEDURE — 25000003 PHARM REV CODE 250: Performed by: PSYCHIATRY & NEUROLOGY

## 2018-05-11 PROCEDURE — 97116 GAIT TRAINING THERAPY: CPT

## 2018-05-11 PROCEDURE — 25000003 PHARM REV CODE 250: Performed by: STUDENT IN AN ORGANIZED HEALTH CARE EDUCATION/TRAINING PROGRAM

## 2018-05-11 PROCEDURE — 92610 EVALUATE SWALLOWING FUNCTION: CPT

## 2018-05-11 RX ORDER — CLOPIDOGREL BISULFATE 75 MG/1
75 TABLET ORAL DAILY
Qty: 30 TABLET | Refills: 0
Start: 2018-05-12 | End: 2019-01-24 | Stop reason: SDUPTHER

## 2018-05-11 RX ORDER — INSULIN ASPART 100 [IU]/ML
8 INJECTION, SOLUTION INTRAVENOUS; SUBCUTANEOUS 3 TIMES DAILY
Qty: 1 BOX | Refills: 0 | Status: SHIPPED | OUTPATIENT
Start: 2018-05-11 | End: 2018-06-11 | Stop reason: DRUGHIGH

## 2018-05-11 RX ORDER — INSULIN ASPART 100 [IU]/ML
0-5 INJECTION, SOLUTION INTRAVENOUS; SUBCUTANEOUS
Refills: 0
Start: 2018-05-11 | End: 2018-06-11 | Stop reason: SDUPTHER

## 2018-05-11 RX ADMIN — ROSUVASTATIN CALCIUM 40 MG: 20 TABLET, FILM COATED ORAL at 09:05

## 2018-05-11 RX ADMIN — HEPARIN SODIUM 5000 UNITS: 5000 INJECTION, SOLUTION INTRAVENOUS; SUBCUTANEOUS at 09:05

## 2018-05-11 RX ADMIN — LOSARTAN POTASSIUM 100 MG: 50 TABLET, FILM COATED ORAL at 09:05

## 2018-05-11 RX ADMIN — ASPIRIN 325 MG ORAL TABLET 325 MG: 325 PILL ORAL at 09:05

## 2018-05-11 RX ADMIN — AMLODIPINE BESYLATE 10 MG: 10 TABLET ORAL at 09:05

## 2018-05-11 RX ADMIN — INSULIN ASPART 3 UNITS: 100 INJECTION, SOLUTION INTRAVENOUS; SUBCUTANEOUS at 11:05

## 2018-05-11 RX ADMIN — Medication 3 ML: at 06:05

## 2018-05-11 RX ADMIN — INSULIN ASPART 3 UNITS: 100 INJECTION, SOLUTION INTRAVENOUS; SUBCUTANEOUS at 05:05

## 2018-05-11 RX ADMIN — HEPARIN SODIUM 5000 UNITS: 5000 INJECTION, SOLUTION INTRAVENOUS; SUBCUTANEOUS at 05:05

## 2018-05-11 RX ADMIN — Medication 3 ML: at 01:05

## 2018-05-11 RX ADMIN — HEPARIN SODIUM 5000 UNITS: 5000 INJECTION, SOLUTION INTRAVENOUS; SUBCUTANEOUS at 01:05

## 2018-05-11 RX ADMIN — METOPROLOL TARTRATE 25 MG: 25 TABLET ORAL at 09:05

## 2018-05-11 RX ADMIN — INSULIN ASPART 1 UNITS: 100 INJECTION, SOLUTION INTRAVENOUS; SUBCUTANEOUS at 10:05

## 2018-05-11 RX ADMIN — CLOPIDOGREL 75 MG: 75 TABLET, FILM COATED ORAL at 09:05

## 2018-05-11 RX ADMIN — Medication 3 ML: at 10:05

## 2018-05-11 RX ADMIN — INSULIN ASPART 8 UNITS: 100 INJECTION, SOLUTION INTRAVENOUS; SUBCUTANEOUS at 11:05

## 2018-05-11 RX ADMIN — STANDARDIZED SENNA CONCENTRATE AND DOCUSATE SODIUM 1 TABLET: 8.6; 5 TABLET, FILM COATED ORAL at 09:05

## 2018-05-11 RX ADMIN — INSULIN ASPART 8 UNITS: 100 INJECTION, SOLUTION INTRAVENOUS; SUBCUTANEOUS at 05:05

## 2018-05-11 NOTE — PLAN OF CARE
05/11/18 1505   Final Note   Assessment Type Final Discharge Note   Discharge Disposition Rehab     Patient is discharged to [facility] today. Sw to arrange transportation to the facility.

## 2018-05-11 NOTE — PROGRESS NOTES
[facility]  Vascular Neurology  Comprehensive Stroke Center  Progress Note    Assessment/Plan:     * Thrombotic stroke involving basilar artery    71 yo male with left anteromedial/anterolateral pontine perforators infarcts due to thrombosis/atheroma on the basilar artery.  Has diffuse atherosclersosis and multiple risk factors including uncontrolled HTN and DM. Presenting with right side weakness.  No intervention indicated.  Recommend optimizing medical management with DAPT and high dose statin.    Patient motivated working with therapy.  Good candidate for rehab.    Antithrombotics for secondary stroke prevention: Antiplatelets: Aspirin: 325 mg daily  Clopidogrel: 75 mg daily    Statins for secondary stroke prevention and hyperlipidemia, if present: Statins: Rosuvastatin- 40 mg daily    Aggressive risk factor modification: HTN, DM, HLD     Rehab efforts: PT/OT/SLP to evaluate and treat    Diagnostics ordered/pending: none     VTE prophylaxis: Heparin 5000 units SQ every 8 hours    BP parameters: Infarct: No intervention, SBP <180        Cytotoxic cerebral edema    Visualized on MRI, consistent with acute stroke.    Monitor exam carefully as deterioration may signify edema/stroke territory expansion.        Intracranial atherosclerosis    Clearly visualized on MRA/CTA. L vertebral with atherosclerosis, and involvement of the basilar artery as well, likely etiology of pontine stroke   Risk factor modification for secondary stroke prevention.        PAD (peripheral artery disease)    Risk factor for stroke         CAD (coronary artery disease)    Risk factor for stroke  On DAPT and statin therapy         Hyperlipidemia    LDL 80  Crestor 40mg daily in setting of atherosclerosis        Essential hypertension    Stroke risk factor.  Goal < 180 in setting of atherosclerosis        Type 2 diabetes mellitus with kidney complication, without long-term current use of insulin    Stroke risk  factor.  Uncontrolled, A1C 7.7   Will need more aggressive management as outpatient.             5/9/18: Patient reports some increased weakness on the right side since yesterday. MRI noted L pontine infarct.   5/10  Remains in NCC.  Symptoms about the same.  [facility]     STROKE DOCUMENTATION        NIH Scale:  Interval: baseline (upon arrival/admit)  1a. Level Of Consciousness: 0-->Alert: keenly responsive  1b. LOC Questions: 0-->Answers both questions correctly  1c. LOC Commands: 0-->Performs both tasks correctly  2. Best Gaze: 0-->Normal  3. Visual: 0-->No visual loss  4. Facial Palsy: 1-->Minor paralysis (flattened nasolabial fold, asymmetry on smiling)  5a. Motor Arm, Left: 0-->No drift: limb holds 90 (or 45) degrees for full 10 secs  5b. Motor Arm, Right: 3-->No effort against gravity: limb falls  6a. Motor Leg, Left: 0-->No drift: leg holds 30 degree position for full 5 secs  6b. Motor Leg, Right: 1-->Drift: leg falls by the end of the 5-sec period but does not hit bed  7. Limb Ataxia: 0-->Absent  8. Sensory: 0-->Normal: no sensory loss  9. Best Language: 0-->No aphasia: normal  10. Dysarthria: 1-->Mild-to-moderate dysarthria: patient slurs at least some words and, at worst, can be understood with some difficulty  11. Extinction and Inattention (formerly Neglect): 0-->No abnormality  Total (NIH Stroke Scale): 6       Modified Kristina Score: 0  Eduarda Coma Scale:15   ABCD2 Score:    [id number] Score:   HAS -BLED Score:   ICH Score:   Hunt & Soto Classification:      Hemorrhagic change of an Ischemic Stroke: Does this patient have an ischemic stroke with hemorrhagic changes? No     Neurologic Chief Complaint: Right hemiparesis     Subjective:     Interval History: Patient is seen for follow-up neurological assessment and treatment recommendations: No acute issues or events overnight.  Exam unchanged.      HPI, Past Medical, Family, and Social History remains the same as documented in the initial  encounter.     Review of Systems   Constitutional: Negative for fever.   Eyes: Negative for visual disturbance.   Respiratory: Negative for shortness of breath.    Gastrointestinal: Negative for nausea and vomiting.   Neurological: Positive for speech difficulty and weakness.     Scheduled Meds:   amLODIPine  10 mg Oral Daily    aspirin  325 mg Oral Daily    clopidogrel  75 mg Oral Daily    heparin (porcine)  5,000 Units Subcutaneous Q8H    insulin aspart U-100  8 Units Subcutaneous TIDWM    insulin detemir U-100  7 Units Subcutaneous BID    losartan  100 mg Oral Daily    metoprolol tartrate  25 mg Oral BID    rosuvastatin  40 mg Oral Daily    senna-docusate 8.6-50 mg  1 tablet Oral Daily    sodium chloride 0.9%  3 mL Intravenous Q8H     Continuous Infusions:  PRN Meds:acetaminophen, dextrose 50%, dextrose 50%, glucagon (human recombinant), glucose, glucose, insulin aspart U-100, sodium chloride 0.9%    Objective:     Vital Signs (Most Recent):  Temp: 98.1 °F (36.7 °C) (05/11/18 1140)  Pulse: 64 (05/11/18 1141)  Resp: 17 (05/11/18 1140)  BP: (!) 187/85 (05/11/18 1140)  SpO2: 95 % (05/11/18 1140)  BP Location: Left arm    Vital Signs Range (Last 24H):  Temp:  [97.6 °F (36.4 °C)-98.4 °F (36.9 °C)]   Pulse:  [56-79]   Resp:  [16-29]   BP: (160-225)/()   SpO2:  [95 %-99 %]   BP Location: Left arm    Physical Exam   Constitutional: He appears well-developed and well-nourished.   Pulmonary/Chest: Effort normal.   Neurological: He exhibits normal muscle tone. Coordination normal.   Skin: Skin is warm and dry.   Psychiatric: He has a normal mood and affect. His behavior is normal. Judgment and thought content normal.       Neurological Exam:   LOC: alert  Attention Span: Good   Language: No aphasia  Articulation: Dysarthria  Orientation: Person, Place, Time   Visual Fields: Full  EOM (CN III, IV, VI): Full/intact  Motor: Arm left  Normal 5/5  Leg left  Normal 5/5  Arm right  Paresis: 2/5  Leg right Paresis:  3/5  Sensation: Intact to light touch, temperature and vibration  Tone: Normal tone throughout    Laboratory:  CMP:     Recent Labs  Lab 05/11/18  0413   CALCIUM 8.9   ALBUMIN 3.4*   PROT 6.3      K 4.2   CO2 22*      BUN 22   CREATININE 0.9   ALKPHOS 41*   ALT 30   AST 23   BILITOT 0.4     BMP:     Recent Labs  Lab 05/11/18  0413      K 4.2      CO2 22*   BUN 22   CREATININE 0.9   CALCIUM 8.9     CBC:     Recent Labs  Lab 05/10/18  0340   WBC 6.46   RBC 4.59*   HGB 13.4*   HCT 41.0      MCV 89   MCH 29.2   MCHC 32.7     Lipid Panel:     Recent Labs  Lab 05/08/18  0010   CHOL 165   LDLCALC 80.6   HDL 38*   TRIG 232*     Coagulation:     Recent Labs  Lab 05/08/18  0356   INR 1.0   APTT 25.5     Platelet Aggregation Study: No results for input(s): PLTAGG, PLTAGINTERP, PLTAGREGLACO, ADPPLTAGGREG in the last 168 hours.  Hgb A1C:     Recent Labs  Lab 05/08/18  0010   HGBA1C 7.7*     TSH:     Recent Labs  Lab 05/07/18  1130   TSH 4.970*       Diagnostic Results     Brain Imaging   CTH 5/7/18:   No acute abnormality     MRI brain 5/7/18:   Restricted diffusion in left kim  Cytotoxic cerebral edema        Vessel Imaging   CTA head/neck 05/08/18  No large vessel occlusion  Mid basilar plaque (soft tissue) 30% stenotic  Occlusion of the V4 segment of the left vert    Cardiac Imaging   ECHO 5/9/18:   CONCLUSIONS     1 - Normal left ventricular systolic function (EF 60-65%).     2 - Normal right ventricular systolic function .     3 - Normal left ventricular diastolic function.     4 - The estimated PA systolic pressure is 19 mmHg.       Marely Merino PA-C  Comprehensive Stroke Center  Department of Vascular Neurology   [facility]

## 2018-05-11 NOTE — SUBJECTIVE & OBJECTIVE
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    Type 2 diabetes mellitus with kidney complication, without long-term current use of insulin 11/15/2012     Past Surgical History:   Procedure Laterality Date    COLONOSCOPY      CORONARY ANGIOPLASTY      MARIAN to LAD and LCX     Review of patient's allergies indicates:  No Known Allergies    Scheduled Medications:    amLODIPine  10 mg Oral Daily    aspirin  325 mg Oral Daily    clopidogrel  75 mg Oral Daily    heparin (porcine)  5,000 Units Subcutaneous Q8H    insulin aspart U-100  8 Units Subcutaneous TIDWM    insulin detemir U-100  7 Units Subcutaneous BID    losartan  100 mg Oral Daily    metoprolol tartrate  25 mg Oral BID    rosuvastatin  40 mg Oral Daily    senna-docusate 8.6-50 mg  1 tablet Oral Daily    sodium chloride 0.9%  3 mL Intravenous Q8H       PRN Medications: acetaminophen, dextrose 50%, dextrose 50%, glucagon (human recombinant), glucose, glucose, insulin aspart U-100, sodium chloride 0.9%    Family History     Problem Relation (Age of Onset)    Colon polyps Sister    No Known Problems Mother, Father        Social History Main Topics    Smoking status: Never Smoker    Smokeless tobacco: Never Used    Alcohol use No    Drug use: No    Sexual activity: Not Currently     Review of Systems   Constitutional: Positive for activity change. Negative for chills, fatigue and fever.   HENT: Negative for drooling, hearing loss, trouble swallowing and voice change.    Eyes: Negative for pain and visual disturbance.   Respiratory: Negative for cough, shortness of breath and wheezing.    Cardiovascular: Negative for chest pain and palpitations.   Gastrointestinal: Negative for abdominal pain, nausea and vomiting.   Genitourinary:  Negative for difficulty urinating and flank pain.   Musculoskeletal: Positive for gait problem. Negative for arthralgias, back pain, myalgias and neck pain.   Skin: Negative for rash and wound.   Neurological: Positive for weakness. Negative for dizziness, numbness and headaches.   Psychiatric/Behavioral: Negative for agitation and hallucinations. The patient is not nervous/anxious.      Objective:     Vital Signs (Most Recent):  Temp: 98.1 °F (36.7 °C) (18 1140)  Pulse: 64 (18 1141)  Resp: 17 (18 1140)  BP: (!) 187/85 (18 1140)  SpO2: 95 % (18 1140)    Vital Signs (24h Range):  Temp:  [97.6 °F (36.4 °C)-98.4 °F (36.9 °C)] 98.1 °F (36.7 °C)  Pulse:  [56-79] 64  Resp:  [16-29] 17  SpO2:  [95 %-99 %] 95 %  BP: (160-225)/() 187/85     Body mass index is 29.53 kg/m².    Physical Exam   Constitutional: He is oriented to person, place, and time. He appears well-developed and well-nourished. No distress.   HENT:   Head: Normocephalic and atraumatic.   Right Ear: External ear normal.   Left Ear: External ear normal.   Nose: Nose normal.   Eyes: Right eye exhibits no discharge. Left eye exhibits no discharge. No scleral icterus.   Neck: Normal range of motion.   Cardiovascular: Normal rate, regular rhythm and intact distal pulses.    Pulmonary/Chest: Effort normal. No respiratory distress. He has no wheezes.   Abdominal: Soft. He exhibits no distension. There is no tenderness.   Musculoskeletal: Normal range of motion. He exhibits no edema or tenderness.   Neurological: He is alert and oriented to person, place, and time. He exhibits normal muscle tone.   -  Mental Status:  AAOx3.  Follows commands.  Answers correct age and .  Recent and remote memory intact.   -  Speech and language:  no aphasia, + dysarthria.  -  Facial movement (CN VII): mild droop.  -  Motor: RUE: 2/5.  LUE: 5/5.  RLE: 4-/5.  LLE: 5/5.  -  Tone:  Decreased RUE.   -  Sensory:  Intact to light touch and pin prick.    Skin: Skin is warm and dry. No rash noted.   Psychiatric: He has a normal mood and affect. His behavior is normal. Thought content normal.   Vitals reviewed.    NEUROLOGICAL EXAMINATION:     MENTAL STATUS   Oriented to person, place, and time.       Diagnostic Results:   Labs: Reviewed  X-Ray: Reviewed  CT: Reviewed  MRI: Reviewed  CTA: Reviewed

## 2018-05-11 NOTE — NURSING TRANSFER
Nursing Transfer Note      5/10/2018     Transfer To: Rm 729 from [facility] Rm 3093    Transfer via wheelchair    Transfer with cardiac monitoring    Transported by RN X1    Medicines sent: insulin pens sent     Chart send with patient: Yes    Notified: son & daughter in law present for transfer     Patient reassessed at: 5/10/18 @ 2020 (date, time)    Upon arrival to floor: patient oriented to room, call bell in reach and bed in lowest position, NICOLE Mcdaniel @ BS

## 2018-05-11 NOTE — PLAN OF CARE
Marcelo discussed the rehab options with the patient and he decided on [facility]. Cm explained that there are no rehabs in his area of [place]. The patient verbalized understanding. Cm will continue to follow.      1400  Cm spoke to the patient regarding discharge today. Marcelo explained that [facility] is willing to take him today or we could wait on [facility] to give us an answer but he may not leave until Monday. The patient stated no I want to go to [facility] if I can leave today. Sw notified. Cm will continue to follow.

## 2018-05-11 NOTE — PLAN OF CARE
RN can call report to [facility] at [phone]. REEMA arranged transport with [facility] for 5:30 PM pickup.    NICOLE lopes.    Berta Buenrostro, HEATHER  [facility]  Ext. [phone]

## 2018-05-11 NOTE — ASSESSMENT & PLAN NOTE
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      Recommendations  -  Encourage mobility, OOB in chair, and early ambulation as appropriate   -  PT/OT evaluate and treat  -  SLP speech and cognitive evaluate and treat  -  Monitor mood and sleep disturbances  -  Establish consistent sleep-wake cycle  -  Monitor for bowel and bladder dysfunction  -  Monitor for shoulder pain and subluxation  -  Monitor for spasticity  -  Monitor for and prevent skin breakdown and pressure ulcers  · Early mobility, repositioning/weight shifting every 20-30 minutes when sitting, turn patient every 2 hours, proper mattress/overlay and chair cushioning, pressure relief/heel protector boots  -  DVT prophylaxis:  DARRION, SCD

## 2018-05-11 NOTE — PT/OT/SLP PROGRESS
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    Plan:     During this hospitalization, patient to be seen 4 x/week to address the listed problems via gait training, therapeutic activities, therapeutic exercises, neuromuscular re-education  · Plan of Care Expires:  06/09/18   Plan of Care Reviewed with: patient    This Plan of care has been discussed with the patient who was involved in its development and understands and is in agreement with the identified goals and treatment plan    Subjective     Communicated with RN prior to session.  Patient found supine upon PT entry to room.   "'I don't feel stable."    Pain/Comfort:  · Pain Rating 1: 0/10  · Pain Rating Post-Intervention 1: 0/10    Objective:     Patient found with: telemetry, bed alarm   Mental Status: Patient is oriented to Person, Place, Time and Situation and follows 100% of verbal commands. Patient is Alert and Cooperative during session.    General Precautions: Standard, Cardiac aspiration, fall   Orthopedic Precautions:N/A   Braces: N/A   Respiratory Status: room air  Vital Signs (Most Recent):    Temp: 98.1 °F (36.7 °C) (05/11/18 1140)  Pulse: (!) 57 (05/11/18 1534)  Resp: 17 (05/11/18 1140)  BP: (!) 187/85 (05/11/18 1140)  SpO2: 95 % (05/11/18 1140)    Functional Mobility:  Bed Mobility:   · Supine to Sit: moderate assistance; from right side of bed    Transfers:   · Sit <> Stand Transfer: moderate assistance with HHA   · Stand <> Sit Transfer: moderate assistance with HHA   · Bed <> Chair Transfer: Stand Pivot technique with moderate assistance with " HHA  · Pt taking right lateral steps to chair      Gait:  · Patient ambulated: 8ft forward/backward   · Patient required: moderate assist  · Patient used:  HHA  · Gait Pattern observed: 2-point gait  · Gait Deviation(s): decreased josh, decreased step length, decreased stride length, increased stride width, decreased swing-to-stance ratio, decreased toe-to-floor clearance and decreased weight-shifting ability  · Impairments due to: decreased balance, decreased coordination of RLE  · Comments: PT supporting RUE. Pt with ataxic gait pattern. Pt required visual cues for appropriate R foot placement and verbal cues for R knee control in stance phase    Therapeutic Activities & Exercises:   Pre-gait activities: standing at bedside chair with vc's for foot placement, scooting to edge of surface and hand placement. Standing with lateral weight shift.    Education:  Patient provided with daily orientation and goals of this PT session. Patient agreed to participate in session. Patient aware of patient's deficits and therapy progression. They were educated to transfer with RN/PCT only; min A of 2 person. They were provided and educated on proper positioning in supine and in sitting with support of affected RUE extremities in order to increase awareness of extremity and to decrease the effects of immobility, specifically edema and pain. Encouraged patient to perform following daily exercises & mobility to increase endurance and decrease effects of bedrest: sit UIC for all meals. Time provided for therapeutic counseling and discussion of health disposition. All questions answered to patient's satisfaction, within scope of PT practice; voiced no other concerns. White board updated in patient's room, RN notified of session.    Patient left up in chair, with RUE propped on pillow for scapular support and edema management, head in midline, neutral pelvis & heels floated for skin protection with all lines intact, call button in  reach, chair alarm on and RN notified    AM-PAC 6 CLICK MOBILITY  Turning over in bed (including adjusting bedclothes, sheets and blankets)?: 3  Sitting down on and standing up from a chair with arms (e.g., wheelchair, bedside commode, etc.): 3  Moving from lying on back to sitting on the side of the bed?: 3  Moving to and from a bed to a chair (including a wheelchair)?: 3  Need to walk in hospital room?: 2  Climbing 3-5 steps with a railing?: 2  Total Score: 16     Assessment:     Cy Rutherford is a 72 y.o. male admitted with a medical diagnosis of Thrombotic stroke involving basilar artery.   Patient is making progress towards goals, participating well in this session. Pt with continued ataxia of RLE and  active movement of RUE. Pt is motivated to participate in session. Pt tearful at end of session due to realization of health disposition. Cy Lotts deficits effect their ability to safely and independently participate in self-care tasks and functional mobility which increases their caregiver burden. Due to his physical therapy diagnosis of debility and deconditioning, they continue to benefit from acute PT services to address the following limitations: high fall risk, weakness, instability, the need for supervised instruction of exercise. Cy Lotts deficits effect their roles and responsibilities in which they were able to complete prior to admit. Education was provided to patient & family regarding importance of continued participation in therapy, patient's progress and discharge disposition. Pt would benefit from Rehab upon discharge to improve quality of life and focus on recovery of impairments.     Problem List: weakness, impaired self care skills, impaired functional mobilty, gait instability, impaired balance, decreased safety awareness, decreased lower extremity function, impaired coordination. weakness, impaired self care skills, impaired functional mobilty, gait instability,  impaired balance, decreased safety awareness, decreased lower extremity function, impaired coordination  Rehab Prognosis: good; patient would benefit from acute skilled PT services to address these deficits and reach maximum level of function.      GOALS:    Physical Therapy Goals     Not on file          Multidisciplinary Problems (Resolved)        Problem: Physical Therapy Goal    Goal Priority Disciplines Outcome Goal Variances Interventions   Physical Therapy Goal   (Resolved)     PT/OT, PT Outcome(s) achieved     Description:  Goals to be met by: 5/20/2018    Patient will increase functional independence with mobility by performing:    Supine to sit with Minimal Assistance.  Sit to supine with Minimal Assistance.   Sit to stand transfer with Contact Guard Assistance using No Assistive Device and Rolling Walker.  Bed to chair transfer via Stand Pivot with Contact Guard Assistance using No Assistive Device and Rolling Walker.  Gait  x 50 feet with Contact Guard Assistance using Rolling Walker.    Dynamic standing for 10 minutes with Minimal Assistance using No Assistive Device.  Able to tolerate exercise for 15-20 reps with independence.  Patient and family able to teachback stroke & positioning education independently.                       Time Tracking:     PT Received On: 05/11/18  PT Start Time: 1331     PT Stop Time: 1413  PT Total Time (min): 42 min     Billable Minutes:   · Gait Training 42    Treatment Type: Treatment  PT/PTA: PT       Rafia Busby PT, DPT  05/11/2018  Pager: [phone]

## 2018-05-11 NOTE — HOSPITAL COURSE
5/10/18:  Evaluated by PT and OT.  Bed mobility SBA-Janel.  Sit to stand Janel and transfers modA.  Ambulated 6 steps forward and backwards modA.  Feeding modA, grooming Janel, and toileting modA.

## 2018-05-11 NOTE — CARE UPDATE
Stroke AF Study Note    The risks and benefits of participation in the Stroke AF Study were discussed with the patient and written informed consent was obtained by MITRA Alejandra ().  The patient was randomized to standard of care.    NIHSS = 5 (RUE 4; RLE 1); mRS = 4    Stroke Etiology = large vessel (L VA occlusion)    He will f/u with me in Stroke Clinic on 6/11/18 at 1:15p (appt scheduled).    Andi Nino MD

## 2018-05-11 NOTE — PLAN OF CARE
Problem: Physical Therapy Goal  Goal: Physical Therapy Goal  Goals to be met by: 5/20/2018    Patient will increase functional independence with mobility by performing:    Supine to sit with Minimal Assistance.  Sit to supine with Minimal Assistance.   Sit to stand transfer with Contact Guard Assistance using No Assistive Device and Rolling Walker.  Bed to chair transfer via Stand Pivot with Contact Guard Assistance using No Assistive Device and Rolling Walker.  Gait  x 50 feet with Contact Guard Assistance using Rolling Walker.    Dynamic standing for 10 minutes with Minimal Assistance using No Assistive Device.  Able to tolerate exercise for 15-20 reps with independence.  Patient and family able to teachback stroke & positioning education independently.     Outcome: Outcome(s) achieved Date Met: 05/11/18    Pt would benefit from Rehab upon discharge.  Appropriate transfer level with nursing staff: Bed <> Chair:  Stand Pivot with Minimal Assistance with 2 people.    Rafia Busby PT, DPT  5/11/2018  Pager: [phone]

## 2018-05-11 NOTE — SUBJECTIVE & OBJECTIVE
Neurologic Chief Complaint: Right hemiparesis     Subjective:     Interval History: Patient is seen for follow-up neurological assessment and treatment recommendations: No acute issues or events overnight.  Exam unchanged.      HPI, Past Medical, Family, and Social History remains the same as documented in the initial encounter.     Review of Systems   Constitutional: Negative for fever.   Eyes: Negative for visual disturbance.   Respiratory: Negative for shortness of breath.    Gastrointestinal: Negative for nausea and vomiting.   Neurological: Positive for speech difficulty and weakness.     Scheduled Meds:   amLODIPine  10 mg Oral Daily    aspirin  325 mg Oral Daily    clopidogrel  75 mg Oral Daily    heparin (porcine)  5,000 Units Subcutaneous Q8H    insulin aspart U-100  8 Units Subcutaneous TIDWM    insulin detemir U-100  7 Units Subcutaneous BID    losartan  100 mg Oral Daily    metoprolol tartrate  25 mg Oral BID    rosuvastatin  40 mg Oral Daily    senna-docusate 8.6-50 mg  1 tablet Oral Daily    sodium chloride 0.9%  3 mL Intravenous Q8H     Continuous Infusions:  PRN Meds:acetaminophen, dextrose 50%, dextrose 50%, glucagon (human recombinant), glucose, glucose, insulin aspart U-100, sodium chloride 0.9%    Objective:     Vital Signs (Most Recent):  Temp: 98.1 °F (36.7 °C) (05/11/18 1140)  Pulse: 64 (05/11/18 1141)  Resp: 17 (05/11/18 1140)  BP: (!) 187/85 (05/11/18 1140)  SpO2: 95 % (05/11/18 1140)  BP Location: Left arm    Vital Signs Range (Last 24H):  Temp:  [97.6 °F (36.4 °C)-98.4 °F (36.9 °C)]   Pulse:  [56-79]   Resp:  [16-29]   BP: (160-225)/()   SpO2:  [95 %-99 %]   BP Location: Left arm    Physical Exam   Constitutional: He appears well-developed and well-nourished.   Pulmonary/Chest: Effort normal.   Neurological: He exhibits normal muscle tone. Coordination normal.   Skin: Skin is warm and dry.   Psychiatric: He has a normal mood and affect. His behavior is normal. Judgment and  thought content normal.       Neurological Exam:   LOC: alert  Attention Span: Good   Language: No aphasia  Articulation: Dysarthria  Orientation: Person, Place, Time   Visual Fields: Full  EOM (CN III, IV, VI): Full/intact  Motor: Arm left  Normal 5/5  Leg left  Normal 5/5  Arm right  Paresis: 2/5  Leg right Paresis: 3/5  Sensation: Intact to light touch, temperature and vibration  Tone: Normal tone throughout    Laboratory:  CMP:     Recent Labs  Lab 05/11/18  0413   CALCIUM 8.9   ALBUMIN 3.4*   PROT 6.3      K 4.2   CO2 22*      BUN 22   CREATININE 0.9   ALKPHOS 41*   ALT 30   AST 23   BILITOT 0.4     BMP:     Recent Labs  Lab 05/11/18  0413      K 4.2      CO2 22*   BUN 22   CREATININE 0.9   CALCIUM 8.9     CBC:     Recent Labs  Lab 05/10/18  0340   WBC 6.46   RBC 4.59*   HGB 13.4*   HCT 41.0      MCV 89   MCH 29.2   MCHC 32.7     Lipid Panel:     Recent Labs  Lab 05/08/18  0010   CHOL 165   LDLCALC 80.6   HDL 38*   TRIG 232*     Coagulation:     Recent Labs  Lab 05/08/18  0356   INR 1.0   APTT 25.5     Platelet Aggregation Study: No results for input(s): PLTAGG, PLTAGINTERP, PLTAGREGLACO, ADPPLTAGGREG in the last 168 hours.  Hgb A1C:     Recent Labs  Lab 05/08/18  0010   HGBA1C 7.7*     TSH:     Recent Labs  Lab 05/07/18  1130   TSH 4.970*       Diagnostic Results     Brain Imaging   CTH 5/7/18:   No acute abnormality     MRI brain 5/7/18:   Restricted diffusion in left kim  Cytotoxic cerebral edema        Vessel Imaging   CTA head/neck 05/08/18  No large vessel occlusion  Mid basilar plaque (soft tissue) 30% stenotic  Occlusion of the V4 segment of the left vert    Cardiac Imaging   ECHO 5/9/18:   CONCLUSIONS     1 - Normal left ventricular systolic function (EF 60-65%).     2 - Normal right ventricular systolic function .     3 - Normal left ventricular diastolic function.     4 - The estimated PA systolic pressure is 19 mmHg.

## 2018-05-11 NOTE — ASSESSMENT & PLAN NOTE
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  Has diffuse atherosclersosis and multiple risk factors including uncontrolled HTN and DM. Presenting with right side weakness.  No intervention indicated.  Recommend optimizing medical management with DAPT and high dose statin.    Patient motivated working with therapy.  Good candidate for rehab.    Antithrombotics for secondary stroke prevention: Antiplatelets: Aspirin: 325 mg daily  Clopidogrel: 75 mg daily    Statins for secondary stroke prevention and hyperlipidemia, if present: Statins: Rosuvastatin- 40 mg daily    Aggressive risk factor modification: HTN, DM, HLD     Rehab efforts: PT/OT/SLP to evaluate and treat    Diagnostics ordered/pending: none     VTE prophylaxis: Heparin 5000 units SQ every 8 hours    BP parameters: Infarct: No intervention, SBP <180

## 2018-05-11 NOTE — CONSULTS
Inpatient consult to Physical Medicine Rehab  Consult performed by: MILA LITTLEJOHN  Consult ordered by: LIN SCHMITZ  Reason for consult: rehab evaluation       Reviewed patient history and current admission.  Rehab team following.  Full consult to follow.    KATHLEEN Bedoya, FNP-C  Physical Medicine & Rehabilitation   05/11/2018  Spectralink: [id number]

## 2018-05-11 NOTE — PLAN OF CARE
[facility]    FACILITY TRANSFER ORDERS      Patient Name: Cy Rutherford  YOB: 1945    PCP: Heide Porter MD   PCP Address: [address] [place] / [place] [address]  PCP Phone Number: [phone]  PCP Fax: [phone]    Encounter Date: 05/11/2018    Admit to:Inpatient Rehab     Vital Signs:  Routine    Diagnoses:   Active Hospital Problems    Diagnosis  POA    *Thrombotic stroke involving basilar artery [I63.02]  Yes     Priority: 1 - High    Cytotoxic cerebral edema [G93.6]  Yes     Priority: 2     Intracranial atherosclerosis [I67.2]  Yes    History of colon cancer [Z85.038]  Yes    PAD (peripheral artery disease) [I73.9]  Yes    Essential hypertension [I10]  Yes    Type 2 diabetes mellitus with kidney complication, without long-term current use of insulin [E11.29]  Yes    CAD (coronary artery disease) [I25.10]  Yes    Hyperlipidemia [E78.5]  Yes    Status post coronary artery stent placement [Z95.5]  Not Applicable      Resolved Hospital Problems    Diagnosis Date Resolved POA   No resolved problems to display.       Allergies:Review of patient's allergies indicates:  No Known Allergies    Diet: diabetic diet: 2000 calorie    Activities: Activity as tolerated    Nursing:n/a    Labs: per facility protocol.    CONSULTS:    Physical Therapy to evaluate and treat. , Occupational Therapy to evaluate and treat. and Speech Therapy to evaluate and treat for Language, Swallowing and Cognition.    MISCELLANEOUS CARE:  Routine Skin for Bedridden Patients: Apply moisture barrier cream to all skin folds and wet areas in perineal area daily and after baths and all bowel movements. and Diabetes Care:   SN to perform and educate Diabetic management with blood glucose monitoring:, Fingerstick blood sugar AC and HS and Report CBG < 60 or > 350 to physician.    WOUND CARE ORDERS  None    Medications: Review discharge medications with patient and family and provide education.       Current Discharge Medication List      START taking these medications    Details   clopidogrel (PLAVIX) 75 mg tablet Take 1 tablet (75 mg total) by mouth once daily.  Qty: 30 tablet, Refills: 0      !! insulin aspart U-100 (NOVOLOG) 100 unit/mL InPn pen Inject 8 Units into the skin 3 (three) times daily.  Qty: 1 Box, Refills: 0      !! insulin aspart U-100 (NOVOLOG) 100 unit/mL InPn pen Inject 0-5 Units into the skin before meals and at bedtime as needed (Hyperglycemia).  Refills: 0      insulin detemir U-100 (LEVEMIR FLEXTOUCH) 100 unit/mL (3 mL) SubQ InPn pen Inject 7 Units into the skin 2 (two) times daily.  Qty: 1 Box, Refills: 0       !! - Potential duplicate medications found. Please discuss with provider.      CONTINUE these medications which have NOT CHANGED    Details   amLODIPine (NORVASC) 10 MG tablet TAKE 1 TABLET BY MOUTH ONCE DAILY  Qty: 90 tablet, Refills: 1    Associated Diagnoses: Coronary artery disease, angina presence unspecified, unspecified vessel or lesion type, unspecified whether native or transplanted heart; Essential hypertension      aspirin (ECOTRIN) 81 MG EC tablet Take 81 mg by mouth once daily.      fenofibrate micronized (LOFIBRA) 134 MG Cap Take 134 mg by mouth nightly.      fish oil-omega-3 fatty acids 300-1,000 mg capsule Take by mouth once daily.      hydroCHLOROthiazide (HYDRODIURIL) 25 MG tablet TAKE 1 TABLET BY MOUTH ONCE DAILY  Qty: 90 tablet, Refills: 1    Associated Diagnoses: Coronary artery disease, angina presence unspecified, unspecified vessel or lesion type, unspecified whether native or transplanted heart; Essential hypertension      losartan (COZAAR) 50 MG tablet Take 1 tablet (50 mg total) by mouth once daily.  Qty: 90 tablet, Refills: 3      metoprolol succinate (TOPROL-XL) 50 MG 24 hr tablet TAKE 1 TABLET BY MOUTH ONCE DAILY  Qty: 90 tablet, Refills: 1    Associated Diagnoses: Coronary artery disease, angina presence unspecified, unspecified vessel or lesion  type, unspecified whether native or transplanted heart; Essential hypertension      rosuvastatin (CRESTOR) 20 MG tablet TAKE 1 TABLET BY MOUTH ONCE DAILY  Qty: 90 tablet, Refills: 1    Associated Diagnoses: Coronary artery disease, angina presence unspecified, unspecified vessel or lesion type, unspecified whether native or transplanted heart; Essential hypertension         STOP taking these medications       insulin glargine (LANTUS) 100 unit/mL injection Comments:   Reason for Stopping:         INVOKANA 300 mg Tab tablet Comments:   Reason for Stopping:         metformin (GLUCOPHAGE) 500 MG tablet Comments:   Reason for Stopping:         nitroGLYCERIN (NITROSTAT) 0.4 MG SL tablet Comments:   Reason for Stopping:                    _________________________________  Marely Merino PA-C  05/11/2018

## 2018-05-11 NOTE — PLAN OF CARE
SW sent rehab referrals to [facility] for [facility]. RE[facility] will follow up.    Berta Buenrostro LMSW  [facility]  Ext. [phone]

## 2018-05-11 NOTE — PLAN OF CARE
Problem: SLP Goal  Goal: SLP Goal  Speech Language Pathology Goals  Goals expected to be met by 5/18  1. Pt will tolerate regular diet with thin liquids with strict aspiration precautions.   2. Pt will complete delayed recall tasks utilizing simple memory strategies with 90% accy given min cues.   3. Pt will recall 3/3 simple speech strategies given min cues.  4. Pt will complete rapid speech drills given min cues for improve articulatory precision/coordination.    5. Pt will complete assessment of reading, writing, visual spatial skills to determine need for tx.  6. Pt will complete assessment of higher level cognitive skills to determine need for tx.       Outcome: Ongoing (interventions implemented as appropriate)  Evaluation completed.  Rec cont regular diet with thin liquids with strict aspiration precautions.  SLP to continue to follow. ESTEFANI Hernandez, CCC/SLP  5/11/2018

## 2018-05-11 NOTE — NURSING
Patient has discharge orders and will be transfer to Rehab unit [facility]. Report given to Nandini Steele RN and accepted. Cardiac monitor and IV access removed. Pressure and dressing applied. No s/s of IV site complications noted. No neuro changes noted from initial assessment. patient denied any discomfort. Awaiting for EMS.

## 2018-05-11 NOTE — PT/OT/SLP EVAL
"Speech Language Pathology Evaluation  Cognitive/Bedside Swallow    Patient Name:  Cy Rutherford   MRN:  [id number]  Admitting Diagnosis: Thrombotic stroke involving basilar artery    Recommendations:                  General Recommendations:  Dysphagia therapy, Speech/language therapy and Cognitive-linguistic therapy  Diet recommendations:  Regular, Thin   Aspiration Precautions: Strict aspiration precautions   General Precautions: Standard, aspiration, fall  Communication strategies:  none    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    Type 2 diabetes mellitus with kidney complication, without long-term current use of insulin 11/15/2012       Past Surgical History:   Procedure Laterality Date    COLONOSCOPY      CORONARY ANGIOPLASTY      MARIAN to LAD and LCX       Social History: Patient lives alone, indpt, retired ().    Prior Intubation HX:  None this admission    Prior diet: reg/thin.    Subjective     "You're right.  It is imprecise sometimes." re: speech      Pain/Comfort:  · Pain Rating 1: 0/10  · Pain Rating Post-Intervention 1: 0/10    Objective:     Cognitive Status:    Arousal/Alertness Appropriate response to stimuli  Attention No obvious deficits observed  further assessment to be completed, functional attn to task for evaluation  Orientation Oriented x4  Memory some decreased recall of recent information provided evident, Immediate Recall 100% up to 5 digits/words and Delayed 2/3 unassociated words recalled after delay  Problem Solving Categories 100%, Sequencing 100%, Solutions 100% and Compare/contrast 100%  Safety awareness Pt appears aware of obvious R weakness though somewhat resistant to possible speech/cognitive deficits though very receptive to " education.      Receptive Language:   Comprehension:   WFL  Questions Complex yes/no 100%, repeat x1-pt noted to repeat questions to himself prior to responding  Commands  multistep basic commands 100% given repeat    Pragmatics:    WFL    Expressive Language:  Verbal:    WFL, fluent  Naming Confrontation 100%, Divergent responsive 17 items stated in concrete cat in 1 min, 15-20 is WFL, and Single word responsive naming 100%        Motor Speech:  Dysarthria  mild    Voice:   WFL  Quality Wet-mild, intermittent, clears indptly    Visual-Spatial:  tba    Reading:   tba     Written Expression:   tba    Oral Musculature Evaluation  · Oral Musculature: WFL (mild lingual weakness, decreased lingual coordination)  · Dentition: present and adequate  · Secretion Management: adequate  · Mucosal Quality: adequate  · Mandibular Strength and Mobility: WFL  · Oral Labial Strength and Mobility: WFL  · Lingual Strength and Mobility: impaired strength, WFL (mild)  · Buccal Strength and Mobility: WFL  · Volitional Cough: WFL  · Volitional Swallow: WFL  · Voice Prior to PO Intake: clear, mild dysarthria    Bedside Swallow Eval:   Consistencies Assessed:  · Thin liquids    · Puree    · Solids       Oral Phase:   · WFL    Pharyngeal Phase:   · no overt clinical signs/symptoms of aspiration    Compensatory Strategies  · None    No wet vocal quality appreciated during or immediately after trials.  Some intermittent wet vocal quality noted during speech, language, cognitive evaluation, clears indptly.  SLP to monitor.     Treatment: Education provided re: role of SLP, results of evaluation, swallow precs, diet recs, speech strategies and ongoing SLP services.  Additional information on  difference between SNF and RHB and current recs for rehab per PT/OT provided at pt's request.  Pt verbalized understanding of all information provided.  No additional questions. White board updated.      Assessment:     Cy Rutherford is a 72 y.o. male  with an SLP diagnosis of mild Dysarthria and Cognitive-Linguistic Impairment.      Goals:    SLP Goals        Problem: SLP Goal    Goal Priority Disciplines Outcome   SLP Goal     SLP Ongoing (interventions implemented as appropriate)   Description:  Speech Language Pathology Goals  Goals expected to be met by 5/18  1. Pt will tolerate regular diet with thin liquids with strict aspiration precautions.   2. Pt will complete delayed recall tasks utilizing simple memory strategies with 90% accy given min cues.   3. Pt will recall 3/3 simple speech strategies given min cues.  4. Pt will complete rapid speech drills given min cues for improve articulatory precision/coordination.    5. Pt will complete assessment of reading, writing, visual spatial skills to determine need for tx.  6. Pt will complete assessment of higher level cognitive skills to determine need for tx.                         Plan:     · Patient to be seen:  4 x/week   · Plan of Care expires:  06/10/18  · Plan of Care reviewed with:  patient   · SLP Follow-Up:  Yes       Discharge recommendations:  Discharge Facility/Level Of Care Needs: rehabilitation facility   Barriers to Discharge:  Level of Skilled Assistance Needed      Time Tracking:     SLP Treatment Date:   05/11/18  Speech Start Time:  1127  Speech Stop Time:  1147     Speech Total Time (min):  20 min    Billable Minutes: Eval 12  and Eval Swallow and Oral Function 8    ESTEFANI Hernandez, CCC-SLP  05/11/2018

## 2018-05-11 NOTE — CONSULTS
[facility]  Physical Medicine & Rehab  Consult Note    Patient Name: Cy Rutherford  MRN: [id number]  Admission Date: 5/8/2018  Hospital Length of Stay: 3 days  Attending Physician: Cirilo Cool MD     Inpatient consult to Physical Medicine & Rehabilitation  Consult performed by: Dinorah Lott NP  Consult requested by:  Cirilo Cool MD    Collaborating Physician: Yamileth Niño MD  Reason for Consult:  assess rehabilitation needs    Consults  Subjective:     Principal Problem: Thrombotic stroke involving basilar artery    HPI: Cy Rutherford is a 72-year-old male with PMHx of HTN, HLD, DMII, CAD s/p stenting, PAC s/p stenting, and acute coronary syndrome.  Patient presented to [facility] on 5/7/18 with right-sided weakness x 1 day.  Tele-stroke consult completed.  CTH without acute pathology; however, not tPA candidate 2/2 outside of treatment window.  He was then transferred to [facility] for further management.  During admission, patient with worsening right-sided weakness.  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  He was then transferred to [facility] on 5/8/18 for possible intervention.  Upon admission, evaluated by Vascular Neurology and no intervention indicated.      Functional History: Patient lives in [place], alone, in a single story home with one step to enter.  Prior to admission, he was (I) with ADLs, including driving, and mobility.  He is right handed.  DME: RW.    Hospital Course: 5/10/18:  Evaluated by PT and OT.  Bed mobility SBA-Janel.  Sit to stand Janel and transfers modA.  Ambulated 6 steps forward and backwards modA.  Feeding modA, grooming Janel, and toileting modA.     Past Medical History:   Diagnosis Date    Acute coronary syndrome     2011 ASMI    Coronary artery disease     Essential hypertension 11/15/2012    Hypertension     Intracranial atherosclerosis 5/8/2018     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    Type 2 diabetes mellitus with kidney complication, without long-term current use of insulin 11/15/2012     Past Surgical History:   Procedure Laterality Date    COLONOSCOPY      CORONARY ANGIOPLASTY      MARIAN to LAD and LCX     Review of patient's allergies indicates:  No Known Allergies    Scheduled Medications:    amLODIPine  10 mg Oral Daily    aspirin  325 mg Oral Daily    clopidogrel  75 mg Oral Daily    heparin (porcine)  5,000 Units Subcutaneous Q8H    insulin aspart U-100  8 Units Subcutaneous TIDWM    insulin detemir U-100  7 Units Subcutaneous BID    losartan  100 mg Oral Daily    metoprolol tartrate  25 mg Oral BID    rosuvastatin  40 mg Oral Daily    senna-docusate 8.6-50 mg  1 tablet Oral Daily    sodium chloride 0.9%  3 mL Intravenous Q8H       PRN Medications: acetaminophen, dextrose 50%, dextrose 50%, glucagon (human recombinant), glucose, glucose, insulin aspart U-100, sodium chloride 0.9%    Family History     Problem Relation (Age of Onset)    Colon polyps Sister    No Known Problems Mother, Father        Social History Main Topics    Smoking status: Never Smoker    Smokeless tobacco: Never Used    Alcohol use No    Drug use: No    Sexual activity: Not Currently     Review of Systems   Constitutional: Positive for activity change. Negative for chills, fatigue and fever.   HENT: Negative for drooling, hearing loss, trouble swallowing and voice change.    Eyes: Negative for pain and visual disturbance.   Respiratory: Negative for cough, shortness of breath and wheezing.    Cardiovascular: Negative for chest pain and palpitations.   Gastrointestinal: Negative for abdominal pain, nausea and vomiting.   Genitourinary: Negative for difficulty urinating and flank pain.   Musculoskeletal: Positive for gait problem. Negative for arthralgias, back pain, myalgias and neck pain.   Skin: Negative for rash and wound.   Neurological:  Positive for weakness. Negative for dizziness, numbness and headaches.   Psychiatric/Behavioral: Negative for agitation and hallucinations. The patient is not nervous/anxious.      Objective:     Vital Signs (Most Recent):  Temp: 98.1 °F (36.7 °C) (18 1140)  Pulse: 64 (18 1141)  Resp: 17 (18 1140)  BP: (!) 187/85 (18 1140)  SpO2: 95 % (18 1140)    Vital Signs (24h Range):  Temp:  [97.6 °F (36.4 °C)-98.4 °F (36.9 °C)] 98.1 °F (36.7 °C)  Pulse:  [56-79] 64  Resp:  [16-29] 17  SpO2:  [95 %-99 %] 95 %  BP: (160-225)/() 187/85     Body mass index is 29.53 kg/m².    Physical Exam   Constitutional: He is oriented to person, place, and time. He appears well-developed and well-nourished. No distress.   HENT:   Head: Normocephalic and atraumatic.   Right Ear: External ear normal.   Left Ear: External ear normal.   Nose: Nose normal.   Eyes: Right eye exhibits no discharge. Left eye exhibits no discharge. No scleral icterus.   Neck: Normal range of motion.   Cardiovascular: Normal rate, regular rhythm and intact distal pulses.    Pulmonary/Chest: Effort normal. No respiratory distress. He has no wheezes.   Abdominal: Soft. He exhibits no distension. There is no tenderness.   Musculoskeletal: Normal range of motion. He exhibits no edema or tenderness.   Neurological: He is alert and oriented to person, place, and time. He exhibits normal muscle tone.   -  Mental Status:  AAOx3.  Follows commands.  Answers correct age and .  Recent and remote memory intact.   -  Speech and language:  no aphasia, + dysarthria.  -  Facial movement (CN VII): mild droop.  -  Motor: RUE: 2/5.  LUE: 5/5.  RLE: 4-/5.  LLE: 5/5.  -  Tone:  Decreased RUE.   -  Sensory:  Intact to light touch and pin prick.   Skin: Skin is warm and dry. No rash noted.   Psychiatric: He has a normal mood and affect. His behavior is normal. Thought content normal.   Vitals reviewed.    Diagnostic Results:   Labs: Reviewed  X-Ray:  Reviewed  CT: Reviewed  MRI: Reviewed  CTA: Reviewed    Assessment/Plan:     * Thrombotic stroke involving basilar artery    -  Presented with RSW  -  CTH unremarkable--> no tPA 2/2 outside of treatment window  -  MRI/MRA brain and neck revealed basilar artery thrombosis with associated L anteromedial/anterolateral pontine perforators infarcts  -  No intervention indicated   See hospital course for functional, cognitive/speech/language, and nutrition/swallow status.      Recommendations  -  Encourage mobility, OOB in chair, and early ambulation as appropriate   -  PT/OT evaluate and treat  -  SLP speech and cognitive evaluate and treat  -  Monitor mood and sleep disturbances  -  Establish consistent sleep-wake cycle  -  Monitor for bowel and bladder dysfunction  -  Monitor for shoulder pain and subluxation  -  Monitor for spasticity  -  Monitor for and prevent skin breakdown and pressure ulcers  · Early mobility, repositioning/weight shifting every 20-30 minutes when sitting, turn patient every 2 hours, proper mattress/overlay and chair cushioning, pressure relief/heel protector boots  -  DVT prophylaxis:  DARRION, SCD        Cytotoxic cerebral edema    -  2/2 stroke        Patient with rehab goals.  SLP evaluation pending.  Will follow progress and discuss with rehab team for final rehab recommendation.    Thank you for your consult.     JEFRY Bedoya  Department of Physical Medicine & Rehab  [facility]

## 2018-05-12 VITALS
SYSTOLIC BLOOD PRESSURE: 165 MMHG | HEIGHT: 68 IN | BODY MASS INDEX: 29.44 KG/M2 | RESPIRATION RATE: 18 BRPM | HEART RATE: 63 BPM | WEIGHT: 194.25 LBS | TEMPERATURE: 98 F | OXYGEN SATURATION: 97 % | DIASTOLIC BLOOD PRESSURE: 76 MMHG

## 2018-05-12 LAB
ALBUMIN SERPL BCP-MCNC: 3.3 G/DL
ALP SERPL-CCNC: 44 U/L
ALT SERPL W/O P-5'-P-CCNC: 41 U/L
ANION GAP SERPL CALC-SCNC: 9 MMOL/L
AST SERPL-CCNC: 33 U/L
BILIRUB SERPL-MCNC: 0.4 MG/DL
BUN SERPL-MCNC: 21 MG/DL
CALCIUM SERPL-MCNC: 9 MG/DL
CHLORIDE SERPL-SCNC: 109 MMOL/L
CO2 SERPL-SCNC: 25 MMOL/L
CREAT SERPL-MCNC: 0.9 MG/DL
EST. GFR  (AFRICAN AMERICAN): >60 ML/MIN/1.73 M^2
EST. GFR  (NON AFRICAN AMERICAN): >60 ML/MIN/1.73 M^2
GLUCOSE SERPL-MCNC: 235 MG/DL
MAGNESIUM SERPL-MCNC: 2 MG/DL
PHOSPHATE SERPL-MCNC: 3.2 MG/DL
POCT GLUCOSE: 197 MG/DL (ref 70–110)
POTASSIUM SERPL-SCNC: 4.1 MMOL/L
PROT SERPL-MCNC: 6.1 G/DL
SODIUM SERPL-SCNC: 143 MMOL/L

## 2018-05-12 PROCEDURE — A4216 STERILE WATER/SALINE, 10 ML: HCPCS | Performed by: PHYSICIAN ASSISTANT

## 2018-05-12 PROCEDURE — 83735 ASSAY OF MAGNESIUM: CPT

## 2018-05-12 PROCEDURE — 63600175 PHARM REV CODE 636 W HCPCS: Performed by: PSYCHIATRY & NEUROLOGY

## 2018-05-12 PROCEDURE — 63600175 PHARM REV CODE 636 W HCPCS: Performed by: NURSE PRACTITIONER

## 2018-05-12 PROCEDURE — 25000003 PHARM REV CODE 250: Performed by: PHYSICIAN ASSISTANT

## 2018-05-12 PROCEDURE — 84100 ASSAY OF PHOSPHORUS: CPT

## 2018-05-12 PROCEDURE — 25000003 PHARM REV CODE 250: Performed by: NURSE PRACTITIONER

## 2018-05-12 PROCEDURE — 99223 1ST HOSP IP/OBS HIGH 75: CPT | Mod: ,,, | Performed by: PHYSICAL MEDICINE & REHABILITATION

## 2018-05-12 PROCEDURE — 36415 COLL VENOUS BLD VENIPUNCTURE: CPT

## 2018-05-12 PROCEDURE — 25000003 PHARM REV CODE 250: Performed by: PSYCHIATRY & NEUROLOGY

## 2018-05-12 PROCEDURE — 25000003 PHARM REV CODE 250: Performed by: STUDENT IN AN ORGANIZED HEALTH CARE EDUCATION/TRAINING PROGRAM

## 2018-05-12 PROCEDURE — 80053 COMPREHEN METABOLIC PANEL: CPT

## 2018-05-12 RX ORDER — HYDRALAZINE HYDROCHLORIDE 25 MG/1
25 TABLET, FILM COATED ORAL EVERY 8 HOURS PRN
Status: DISCONTINUED | OUTPATIENT
Start: 2018-05-12 | End: 2018-05-12 | Stop reason: HOSPADM

## 2018-05-12 RX ORDER — INSULIN ASPART 100 [IU]/ML
10 INJECTION, SOLUTION INTRAVENOUS; SUBCUTANEOUS
Status: DISCONTINUED | OUTPATIENT
Start: 2018-05-12 | End: 2018-05-12 | Stop reason: HOSPADM

## 2018-05-12 RX ORDER — LABETALOL HYDROCHLORIDE 5 MG/ML
10 INJECTION, SOLUTION INTRAVENOUS EVERY 6 HOURS PRN
Status: DISCONTINUED | OUTPATIENT
Start: 2018-05-12 | End: 2018-05-12 | Stop reason: HOSPADM

## 2018-05-12 RX ORDER — INSULIN ASPART 100 [IU]/ML
10 INJECTION, SOLUTION INTRAVENOUS; SUBCUTANEOUS 3 TIMES DAILY
Refills: 0
Start: 2018-05-12 | End: 2018-06-11 | Stop reason: SDUPTHER

## 2018-05-12 RX ORDER — HYDRALAZINE HYDROCHLORIDE 20 MG/ML
10 INJECTION INTRAMUSCULAR; INTRAVENOUS EVERY 8 HOURS PRN
Status: DISCONTINUED | OUTPATIENT
Start: 2018-05-12 | End: 2018-05-12

## 2018-05-12 RX ADMIN — CLOPIDOGREL 75 MG: 75 TABLET, FILM COATED ORAL at 08:05

## 2018-05-12 RX ADMIN — HEPARIN SODIUM 5000 UNITS: 5000 INJECTION, SOLUTION INTRAVENOUS; SUBCUTANEOUS at 06:05

## 2018-05-12 RX ADMIN — HYDRALAZINE HYDROCHLORIDE 25 MG: 25 TABLET, FILM COATED ORAL at 06:05

## 2018-05-12 RX ADMIN — Medication 3 ML: at 06:05

## 2018-05-12 RX ADMIN — METOPROLOL TARTRATE 25 MG: 25 TABLET ORAL at 08:05

## 2018-05-12 RX ADMIN — ASPIRIN 325 MG ORAL TABLET 325 MG: 325 PILL ORAL at 08:05

## 2018-05-12 RX ADMIN — AMLODIPINE BESYLATE 10 MG: 10 TABLET ORAL at 08:05

## 2018-05-12 RX ADMIN — LOSARTAN POTASSIUM 100 MG: 50 TABLET, FILM COATED ORAL at 08:05

## 2018-05-12 RX ADMIN — STANDARDIZED SENNA CONCENTRATE AND DOCUSATE SODIUM 1 TABLET: 8.6; 5 TABLET, FILM COATED ORAL at 08:05

## 2018-05-12 RX ADMIN — ROSUVASTATIN CALCIUM 40 MG: 20 TABLET, FILM COATED ORAL at 08:05

## 2018-05-12 NOTE — DISCHARGE SUMMARY
[facility]  Vascular Neurology  Comprehensive Stroke Center  Discharge Summary     Summary:     Admit Date: 5/8/2018 11:11 AM    Discharge Date and Time:  05/12/2018 6:47 PM    Attending Physician: No att. providers found     Discharge Provider: Marely Merino PA-C    History of Present Illness: 73 yo male who presented to [facility] with complaints of mild to moderate weakness on the right arm that began Sunday.  He hoped his symptoms would improve and waited until the next day to go to ED.  His symptoms had not deteriorated.  He denies any associated numbness or gaze problems.  He has not had in the past.  He was not treated with alteplase nor thrombectomy since he was outside of treatment window and lacked signs or symptoms of large vessel occlusion.  He denies any clear triggers.  Risk factors include essential hypertension and uncontrolled diabetes.    Hospital Course (synopsis of major diagnoses, care, treatment, and services provided during the course of the hospital stay): Patient is a 72 year old male with medical history of HTN, DM ,CAD, PAD who was admitted to stroke service for left pontine infarct.  CTA head neck showed basilar stenosis and left vert occlusion.  Etiology likely atheroembolic.  Patient was placed on dual antiplatelets and crestor 40mg qhs for secondary stroke prevention.  PT, OT, ST therapy evaluated patient and recommended inpatient rehab.  After inpatient rehab at [facility] he will follow up with PCP within 10days of discharge and in stroke clinic in 4-6 weeks.        STROKE DOCUMENTATION         NIH Scale:  1a. Level Of Consciousness: 0-->Alert: keenly responsive  1b. LOC Questions: 0-->Answers both questions correctly  1c. LOC Commands: 0-->Performs both tasks correctly  2. Best Gaze: 0-->Normal  3. Visual: 0-->No visual loss  4. Facial Palsy: 1-->Minor paralysis (flattened nasolabial fold, asymmetry on smiling)  5a. Motor Arm, Left: 0-->No drift: limb  holds 90 (or 45) degrees for full 10 secs  5b. Motor Arm, Right: 3-->No effort against gravity: limb falls  6a. Motor Leg, Left: 0-->No drift: leg holds 30 degree position for full 5 secs  6b. Motor Leg, Right: 1-->Drift: leg falls by the end of the 5-sec period but does not hit bed  7. Limb Ataxia: 0-->Absent  8. Sensory: 0-->Normal: no sensory loss  9. Best Language: 0-->No aphasia: normal  10. Dysarthria: 1-->Mild-to-moderate dysarthria: patient slurs at least some words and, at worst, can be understood with some difficulty  11. Extinction and Inattention (formerly Neglect): 0-->No abnormality  Total (NIH Stroke Scale): 6        Modified [place] Score: 2  Braham Coma Scale:15   ABCD2 Score:    [id number] Score:   HAS -BLED Score:   ICH Score:   Hunt & Soto Classification:       Assessment/Plan:     Diagnostic Results:  Brain Imaging   CTH 5/7/18:   No acute abnormality      MRI brain 5/7/18:   Restricted diffusion in left kim  Cytotoxic cerebral edema         Vessel Imaging   CTA head/neck 05/08/18  No large vessel occlusion  Mid basilar plaque (soft tissue) 30% stenotic  Occlusion of the V4 segment of the left vert     Cardiac Imaging   ECHO 5/9/18:   CONCLUSIONS     1 - Normal left ventricular systolic function (EF 60-65%).     2 - Normal right ventricular systolic function .     3 - Normal left ventricular diastolic function.     4 - The estimated PA systolic pressure is 19 mmHg.          Interventions: None    Complications: None    Disposition: Rehab Facility    Final Active Diagnoses:    Diagnosis Date Noted POA    PRINCIPAL PROBLEM:  Thrombotic stroke involving basilar artery [I63.02] 05/08/2018 Yes    Cytotoxic cerebral edema [G93.6] 05/08/2018 Yes    Intracranial atherosclerosis [I67.2] 05/08/2018 Yes    History of colon cancer [Z85.038] 05/08/2018 Yes    PAD (peripheral artery disease) [I73.9] 12/29/2014 Yes    Essential hypertension [I10] 11/15/2012 Yes    Type 2 diabetes mellitus with kidney  complication, without long-term current use of insulin [E11.29] 11/15/2012 Yes    CAD (coronary artery disease) [I25.10] 11/15/2012 Yes    Hyperlipidemia [E78.5] 11/15/2012 Yes    Status post coronary artery stent placement [Z95.5] 11/15/2012 Not Applicable      Problems Resolved During this Admission:    Diagnosis Date Noted Date Resolved POA     * Thrombotic stroke involving basilar artery    73 yo male with left anteromedial/anterolateral pontine perforators infarcts due to thrombosis/atheroma on the basilar artery.  Has diffuse atherosclersosis and multiple risk factors including uncontrolled HTN and DM. Presenting with right side weakness.  No intervention indicated.    Recommend optimizing medical management with DAPT and high dose statin.    Patient motivated working with therapy.  Good candidate for rehab.    Antithrombotics for secondary stroke prevention: Antiplatelets: Aspirin: 325 mg daily  Clopidogrel: 75 mg daily  Statins for secondary stroke prevention and hyperlipidemia, if present: Statins: Rosuvastatin- 40 mg daily  Aggressive risk factor modification: HTN, DM, HLD   Disposition: inpatient rehab at [facility]           Cytotoxic cerebral edema    Visualized on MRI, consistent with acute stroke.    Monitor exam carefully as deterioration may signify edema/stroke territory expansion.        Intracranial atherosclerosis    Clearly visualized on MRA/CTA. L vertebral with atherosclerosis, and involvement of the basilar artery as well, likely etiology of pontine stroke   Risk factor modification for secondary stroke prevention.        PAD (peripheral artery disease)    Risk factor for stroke         CAD (coronary artery disease)    Risk factor for stroke  On DAPT and statin therapy         Hyperlipidemia    LDL 80  Crestor 40mg daily in setting of atherosclerosis        Essential hypertension    Stroke risk factor.  Goal < 180 in setting of atherosclerosis        Type 2 diabetes mellitus with kidney  complication, without long-term current use of insulin    Stroke risk factor.  Uncontrolled, A1C 7.7   Will need more aggressive management as outpatient.            Recommendations:     Post-discharge complication risks: Falls    Stroke Education given to: patient    Follow-up in Stroke Clinic in 4-6 weeks.     Discharge Plan:  Antithrombotics: Aspirin 81mg, Clopidogrel 75mg  Statin: Atorvastatin 40mg  Aggresive risk factor modification:  Hypertension  Diabetes  High Cholesterol  Diet  Exercise  Obesity    Follow Up:      Patient Instructions:   No discharge procedures on file.    Medications:  Reconciled Home Medications:      Medication List      START taking these medications    clopidogrel 75 mg tablet  Commonly known as:  PLAVIX  Take 1 tablet (75 mg total) by mouth once daily.     * insulin aspart U-100 100 unit/mL Inpn pen  Commonly known as:  NovoLOG  Inject 8 Units into the skin 3 (three) times daily.     * insulin aspart U-100 100 unit/mL Inpn pen  Commonly known as:  NovoLOG  Inject 0-5 Units into the skin before meals and at bedtime as needed (Hyperglycemia).     * insulin aspart U-100 100 unit/mL Inpn pen  Commonly known as:  NovoLOG  Inject 10 Units into the skin 3 (three) times daily.     insulin detemir U-100 100 unit/mL (3 mL) Inpn pen  Commonly known as:  LEVEMIR FLEXTOUCH  Inject 7 Units into the skin 2 (two) times daily.        * This list has 3 medication(s) that are the same as other medications prescribed for you. Read the directions carefully, and ask your doctor or other care provider to review them with you.            CONTINUE taking these medications    amLODIPine 10 MG tablet  Commonly known as:  NORVASC  TAKE 1 TABLET BY MOUTH ONCE DAILY     aspirin 81 MG EC tablet  Commonly known as:  ECOTRIN  Take 81 mg by mouth once daily.     fenofibrate micronized 134 MG Cap  Commonly known as:  LOFIBRA  Take 134 mg by mouth nightly.     fish oil-omega-3 fatty acids 300-1,000 mg capsule  Take by  mouth once daily.     hydroCHLOROthiazide 25 MG tablet  Commonly known as:  HYDRODIURIL  TAKE 1 TABLET BY MOUTH ONCE DAILY     losartan 50 MG tablet  Commonly known as:  COZAAR  Take 1 tablet (50 mg total) by mouth once daily.     metoprolol succinate 50 MG 24 hr tablet  Commonly known as:  TOPROL-XL  TAKE 1 TABLET BY MOUTH ONCE DAILY     rosuvastatin 20 MG tablet  Commonly known as:  CRESTOR  TAKE 1 TABLET BY MOUTH ONCE DAILY        STOP taking these medications    insulin glargine 100 unit/mL injection  Commonly known as:  LANTUS     INVOKANA 300 mg Tab tablet  Generic drug:  canagliflozin     metFORMIN 500 MG tablet  Commonly known as:  GLUCOPHAGE     nitroGLYCERIN 0.4 MG SL tablet  Commonly known as:  NITROSTAT            Marely Merino PA-C  Comprehensive Stroke Center  Department of Vascular Neurology   [facility]

## 2018-05-12 NOTE — PROGRESS NOTES
Called the [facility] (ext: [phone]) and spoke to Alexsander,to inquire about the status of patient transportation to [facility] via [facility]. Alexsander informed me that he contacted someone with [facility] and was told that they was given the wrong name (Neo Rutherford). Alexsander gave [facility] the correct patient's infomation and arranged for pick-up at 10:30 pm.   11:10 p.m. patient still on unit. Called Alexsander to check on status of pick-up. Alexsander informed me that someone at [facility] stated the the  was in [facility] for 45 mins and the Doctor told the  that the patient was going to be admitted, so the  left.  11:20 p.m.. I Spoke to Marion with [facility] and she informed me that there is only one  and he is on his way to [place].  11:25 Alexsander informed me that he arranged transportation through [facility] but it will take 30-45 minutes.  11:27 p.m. Informed Scotu Davis, House Supervisor at [facility] [address], [facility]. He informed me that the physican said it's to late reschedule pick-up for tomorrow. Informed Nicole Stroke NP, patient and  Alexsander,[facility].

## 2018-05-12 NOTE — PROGRESS NOTES
Called the [facility] (ext: [phone]) and spoke to Alexsander,to inquire about the status of patient transportation to [facility] via \[facility]\"". Alexsander informed me that he contacted someone with \[facility]\"" and was told that they was given the wrong name (Neo Rutherford). Alexsander gave \[facility]\"" the correct patient's infomation and arranged for pick-up at 10:30 pm.   11:10 p.m. patient still on unit. Called Alexsander to check on status of pick-up. Alexsander informed me that someone at \[facility]\"" stated the the  was in [facility] for 45 mins and the Doctor told the  that the patient was going to be admitted, so the  left.  11:20 p.m.. I Spoke to Marion with \[facility]\"" and she informed me that there is only one  and he is on his way to [place].  11:25 Alexsander informed me that he arranged transportation through [facility] but it will take 30-45 minutes.  11:27 p.m. Informed Scout Davis, House Supervisor at [facility] [address], [facility] [place] He informed me that the physican said it's to late reschedule pick-up for tomorrow. Informed Nicole Stroke NP, patient and  Alexsander,[facility].

## 2018-05-12 NOTE — ASSESSMENT & PLAN NOTE
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  Has diffuse atherosclersosis and multiple risk factors including uncontrolled HTN and DM. Presenting with right side weakness.  No intervention indicated.    Recommend optimizing medical management with DAPT and high dose statin.    Patient motivated working with therapy.  Good candidate for rehab.    Antithrombotics for secondary stroke prevention: Antiplatelets: Aspirin: 325 mg daily  Clopidogrel: 75 mg daily  Statins for secondary stroke prevention and hyperlipidemia, if present: Statins: Rosuvastatin- 40 mg daily  Aggressive risk factor modification: HTN, DM, HLD   Disposition: inpatient rehab at [facility]

## 2018-05-12 NOTE — NURSING
Patient was transferred to [facility] with EMS via wheelchair. Report given to Candy Castanon RN and accepted.No distress noted at this time.

## 2018-05-15 NOTE — PT/OT/SLP DISCHARGE
Physical Therapy Discharge Summary    Name: Cy Rutherford  MRN: [id number]   Principal Problem: Thrombotic stroke involving basilar artery     Patient Discharged from acute Physical Therapy on 5/15/18.  Please refer to prior PT noted date on 5/12/18 for functional status.     Assessment:     Patient appropriate for care in another setting.    Objective:     GOALS:    Physical Therapy Goals     Not on file          Multidisciplinary Problems (Resolved)        Problem: Physical Therapy Goal    Goal Priority Disciplines Outcome Goal Variances Interventions   Physical Therapy Goal   (Resolved)     PT/OT, PT Outcome(s) achieved     Description:  Goals to be met by: 5/20/2018    Patient will increase functional independence with mobility by performing:    Supine to sit with Minimal Assistance.  Sit to supine with Minimal Assistance.   Sit to stand transfer with Contact Guard Assistance using No Assistive Device and Rolling Walker.  Bed to chair transfer via Stand Pivot with Contact Guard Assistance using No Assistive Device and Rolling Walker.  Gait  x 50 feet with Contact Guard Assistance using Rolling Walker.    Dynamic standing for 10 minutes with Minimal Assistance using No Assistive Device.  Able to tolerate exercise for 15-20 reps with independence.  Patient and family able to teachback stroke & positioning education independently.                       Reasons for Discontinuation of Therapy Services  Transfer to alternate level of care.      Plan:     Patient Discharged to: Inpatient Rehab.    Rafia Busby, PT  5/15/2018

## 2018-05-16 NOTE — PHYSICIAN QUERY
PT Name: Cy Rutherford  MR #: [id number]     Physician Query Form - Documentation Clarification      CDS/: Sarai Adhikari RN, CDS               Contact information: [email or web address]    This form is a permanent document in the medical record.     Query Date: May 16, 2018    By submitting this query, we are merely seeking further clarification of documentation. Please utilize your independent clinical judgment when addressing the question(s) below.    The Medical record reflects the following:    Supporting Clinical Findings Location in Medical Record     --NCC team to bedside, bp/hyperglycemia meds increased    --Glucose: 235->131->235  --A1C 7.7     Nursing POC 5/10 @ 441p    Labs: 5/7->5/12     --Type 2 diabetes mellitus with kidney complication, without long-term current use of insulin    -Uncontrolled.   --Risk factors include essential hypertension and uncontrolled diabetes.     Vas Neuro C/S 5/8->DS                                                                            Doctor, Please specify diagnosis or diagnoses associated with above clinical findings.      Please further clarify the uncontrolled Type 2 diabetes mellitus:     Provider Use Only      [  x] Type 2 DM with hyperglycemia    [  ] Other (please specify): _______________                                                                                                             [  ] Clinically undetermined

## 2018-06-11 ENCOUNTER — OFFICE VISIT (OUTPATIENT)
Dept: NEUROLOGY | Facility: CLINIC | Age: 73
End: 2018-06-11
Payer: MEDICARE

## 2018-06-11 ENCOUNTER — DOCUMENTATION ONLY (OUTPATIENT)
Dept: RESEARCH | Facility: HOSPITAL | Age: 73
End: 2018-06-11

## 2018-06-11 DIAGNOSIS — G46.7 LACUNAR ATAXIC HEMIPARESIS: ICD-10-CM

## 2018-06-11 DIAGNOSIS — I67.9 LACUNAR ATAXIC HEMIPARESIS: ICD-10-CM

## 2018-06-11 DIAGNOSIS — I10 ESSENTIAL HYPERTENSION: ICD-10-CM

## 2018-06-11 DIAGNOSIS — Z86.73 HISTORY OF ISCHEMIC VERTEBROBASILAR ARTERY BRAINSTEM STROKE: ICD-10-CM

## 2018-06-11 DIAGNOSIS — Z00.6 RESEARCH SUBJECT: ICD-10-CM

## 2018-06-11 DIAGNOSIS — E78.5 HYPERLIPIDEMIA, UNSPECIFIED HYPERLIPIDEMIA TYPE: Primary | ICD-10-CM

## 2018-06-11 PROBLEM — I63.9 CEREBROVASCULAR ACCIDENT (CVA): Status: RESOLVED | Noted: 2018-05-07 | Resolved: 2018-06-11

## 2018-06-11 PROBLEM — I63.02 THROMBOTIC STROKE INVOLVING BASILAR ARTERY: Status: RESOLVED | Noted: 2018-05-08 | Resolved: 2018-06-11

## 2018-06-11 PROBLEM — G93.6 CYTOTOXIC CEREBRAL EDEMA: Status: RESOLVED | Noted: 2018-05-08 | Resolved: 2018-06-11

## 2018-06-11 PROCEDURE — 99214 OFFICE O/P EST MOD 30 MIN: CPT | Mod: S$PBB,,, | Performed by: PSYCHIATRY & NEUROLOGY

## 2018-06-11 PROCEDURE — 99999 PR PBB SHADOW E&M-EST. PATIENT-LVL I: CPT | Mod: PBBFAC,,, | Performed by: PSYCHIATRY & NEUROLOGY

## 2018-06-11 PROCEDURE — 99211 OFF/OP EST MAY X REQ PHY/QHP: CPT | Mod: PBBFAC | Performed by: PSYCHIATRY & NEUROLOGY

## 2018-06-11 NOTE — PROGRESS NOTES
Stroke AF Study Visit    Impression:  1. L pontine ischemic stroke due to L VA (V4) occlusion 5/6/18  2. Mild basilar stenosis  3. Stroke AF Study participant; randomized to usual care    Stroke risk factors: prior stroke, lg vessel cerebrovascular athero, HTN, DM ,CAD, PAD    Plan:  1. Continue ASA 81mg/d and clopidogrel 75mg/d (stroke occurred shortly after stopping clopidogrel)  2. Continue rosuvastatin  3. Continue PT/OT  4. Continue Stroke AF participation    Problem List Items Addressed This Visit        1 - High    Research subject    History of ischemic vertebrobasilar artery brainstem stroke    Overview     L pontine atherothromboembolic from L VA (V4) occlusion (also mild basilar athero) 5/6/18            2     Hyperlipidemia - Primary    Essential hypertension    Ataxic hemiparesis    Overview     right               Patient comes to clinic for f/u of stroke and for Stroke AF Study Visit.  He completed IPR and is participating in OP PT/OT.  No further ST needs. No recurrent stroke symptoms. No palpitations/sudden LH etc.  Tolerating medications (taking ASA 81mg/d, not 325).          /61 P 63 reg  Well-developed, well nourished.  Awake, alert and oriented.  Language is normal.  Mild R lower facial weakness, mild R ataxic hemiparesis.    mRS = 2  NIHSS = 5    Andi Nino MD

## 2018-06-12 ENCOUNTER — OFFICE VISIT (OUTPATIENT)
Dept: CARDIOLOGY | Facility: CLINIC | Age: 73
End: 2018-06-12
Payer: MEDICARE

## 2018-06-12 ENCOUNTER — TELEPHONE (OUTPATIENT)
Dept: ADMINISTRATIVE | Facility: CLINIC | Age: 73
End: 2018-06-12

## 2018-06-12 VITALS
DIASTOLIC BLOOD PRESSURE: 72 MMHG | BODY MASS INDEX: 27.28 KG/M2 | SYSTOLIC BLOOD PRESSURE: 167 MMHG | WEIGHT: 180 LBS | HEIGHT: 68 IN | HEART RATE: 70 BPM

## 2018-06-12 DIAGNOSIS — I67.2 INTRACRANIAL ATHEROSCLEROSIS: ICD-10-CM

## 2018-06-12 DIAGNOSIS — I67.9 LACUNAR ATAXIC HEMIPARESIS: ICD-10-CM

## 2018-06-12 DIAGNOSIS — I10 ESSENTIAL HYPERTENSION: ICD-10-CM

## 2018-06-12 DIAGNOSIS — I63.9 CEREBROVASCULAR ACCIDENT (CVA), UNSPECIFIED MECHANISM: ICD-10-CM

## 2018-06-12 DIAGNOSIS — Z86.73 HISTORY OF ISCHEMIC VERTEBROBASILAR ARTERY BRAINSTEM STROKE: ICD-10-CM

## 2018-06-12 DIAGNOSIS — I25.10 CORONARY ARTERY DISEASE, ANGINA PRESENCE UNSPECIFIED, UNSPECIFIED VESSEL OR LESION TYPE, UNSPECIFIED WHETHER NATIVE OR TRANSPLANTED HEART: Primary | ICD-10-CM

## 2018-06-12 DIAGNOSIS — E78.5 HYPERLIPIDEMIA, UNSPECIFIED HYPERLIPIDEMIA TYPE: ICD-10-CM

## 2018-06-12 DIAGNOSIS — E11.22 TYPE 2 DIABETES MELLITUS WITH CHRONIC KIDNEY DISEASE, WITHOUT LONG-TERM CURRENT USE OF INSULIN, UNSPECIFIED CKD STAGE: ICD-10-CM

## 2018-06-12 DIAGNOSIS — I73.9 PAD (PERIPHERAL ARTERY DISEASE): ICD-10-CM

## 2018-06-12 DIAGNOSIS — G46.7 LACUNAR ATAXIC HEMIPARESIS: ICD-10-CM

## 2018-06-12 DIAGNOSIS — Z95.5 STATUS POST CORONARY ARTERY STENT PLACEMENT: ICD-10-CM

## 2018-06-12 PROCEDURE — 99213 OFFICE O/P EST LOW 20 MIN: CPT | Mod: PBBFAC,PO | Performed by: INTERNAL MEDICINE

## 2018-06-12 PROCEDURE — 99999 PR PBB SHADOW E&M-EST. PATIENT-LVL III: CPT | Mod: PBBFAC,,, | Performed by: INTERNAL MEDICINE

## 2018-06-12 PROCEDURE — 99214 OFFICE O/P EST MOD 30 MIN: CPT | Mod: S$PBB,,, | Performed by: INTERNAL MEDICINE

## 2018-06-12 RX ORDER — LOSARTAN POTASSIUM 50 MG/1
50 TABLET ORAL 2 TIMES DAILY
Qty: 180 TABLET | Refills: 3 | Status: SHIPPED | OUTPATIENT
Start: 2018-06-12 | End: 2019-01-24 | Stop reason: SDUPTHER

## 2018-06-12 RX ORDER — METOPROLOL SUCCINATE 100 MG/1
100 TABLET, EXTENDED RELEASE ORAL DAILY
Qty: 90 TABLET | Refills: 3 | Status: SHIPPED | OUTPATIENT
Start: 2018-06-12 | End: 2018-07-26

## 2018-06-12 NOTE — PROGRESS NOTES
Home Health Recertification with [facility]. Dr. Yamileth Niño. SN, PT, OT and ST services.

## 2018-06-12 NOTE — PROGRESS NOTES
Subjective:    Patient ID:  Cy Rutherford is a 72 y.o. male who presents for follow-up of Coronary Artery Disease      HPI  73 y/o male former pt of Dr Fish. He has a hx of recent right sided stroke (pontine CVA from vertebrobasilar atherosclerosis - basilar stenosis and left vertebral occlusion), CAD presenting with syncope 2011 s/p PCI with MARIAN to LAD and LCX, PAD s/p PTA with MARIAN (SES) to LSFA and PTA to LTPT with resolution of claudication in LLE (RLE 1V AT run off to foot with exertional right calf cramping/claudication), HLD, DM.   Since last clinic visit had a stroke with residual right sided weakness. Receiving therapy. He denies regular CP, SOB/NASSAR, orthopnea, PND, palps, syncope. He is compliant with his meds. Remote smoking hx. No non healing ulceration or evidence of limb ischemia. Currently on DAPT again and statin for secondary stroke prevention. SBP at home ranges 130-160's.      Review of Systems   Constitution: Negative for weakness and malaise/fatigue.   HENT: Negative for congestion.    Eyes: Negative for blurred vision.   Cardiovascular: Negative for chest pain, claudication, cyanosis, dyspnea on exertion, irregular heartbeat, leg swelling, near-syncope, orthopnea, palpitations, paroxysmal nocturnal dyspnea and syncope.   Respiratory: Negative for shortness of breath.    Endocrine: Negative for polyuria.   Hematologic/Lymphatic: Negative for bleeding problem.   Skin: Negative for itching and rash.   Musculoskeletal: Positive for muscle weakness. Negative for joint swelling and muscle cramps.   Gastrointestinal: Negative for abdominal pain, hematemesis, hematochezia, melena, nausea and vomiting.   Genitourinary: Negative for dysuria and hematuria.   Neurological: Positive for focal weakness and loss of balance. Negative for dizziness, headaches and light-headedness.   Psychiatric/Behavioral: Negative for depression. The patient is not nervous/anxious.         Objective:    Physical Exam    Constitutional: He is oriented to person, place, and time. He appears well-developed and well-nourished.   HENT:   Head: Normocephalic and atraumatic.   Neck: Neck supple. No JVD present.   Cardiovascular: Normal rate and regular rhythm.    Murmur heard.   Systolic murmur is present with a grade of 2/6   Pulses:       Carotid pulses are 2+ on the right side, and 2+ on the left side.       Radial pulses are 2+ on the right side, and 2+ on the left side.        Femoral pulses are 2+ on the right side, and 2+ on the left side.       Posterior tibial pulses are 1+ on the right side, and 1+ on the left side.   Pulmonary/Chest: Effort normal and breath sounds normal.   Abdominal: Soft. Bowel sounds are normal.   Musculoskeletal: He exhibits edema.   Neurological: He is alert and oriented to person, place, and time.   Skin: Skin is warm and dry.   Psychiatric: He has a normal mood and affect. His behavior is normal. Thought content normal.         Assessment:       1. Coronary artery disease, angina presence unspecified, unspecified vessel or lesion type, unspecified whether native or transplanted heart    2. Essential hypertension    3. Cerebrovascular accident (CVA), unspecified mechanism    4. Status post coronary artery stent placement    5. History of ischemic vertebrobasilar artery brainstem stroke    6. Intracranial atherosclerosis    7. Ataxic hemiparesis    8. PAD (peripheral artery disease)    9. Hyperlipidemia, unspecified hyperlipidemia type    10. Type 2 diabetes mellitus with chronic kidney disease, without long-term current use of insulin, unspecified CKD stage      73 y/o male with hx and presentation as above. Doing well from a cardiac perspective. Recovering from CVA with residual right sided weakness. Will evaluate for arrhythmia with Holter monitor. Needs better BP control - will increase metoprolol.        Plan:       -Holter monitor x 48 hours  -Increase metoprolol to 100 mg daily  -f/u in 1 month

## 2018-06-14 ENCOUNTER — HOSPITAL ENCOUNTER (OUTPATIENT)
Dept: CARDIOLOGY | Facility: HOSPITAL | Age: 73
Discharge: HOME | End: 2018-06-14
Attending: INTERNAL MEDICINE
Payer: MEDICARE

## 2018-06-14 DIAGNOSIS — I25.10 CORONARY ARTERY DISEASE, ANGINA PRESENCE UNSPECIFIED, UNSPECIFIED VESSEL OR LESION TYPE, UNSPECIFIED WHETHER NATIVE OR TRANSPLANTED HEART: ICD-10-CM

## 2018-06-14 DIAGNOSIS — I10 ESSENTIAL HYPERTENSION: ICD-10-CM

## 2018-06-14 DIAGNOSIS — I63.9 CEREBROVASCULAR ACCIDENT (CVA), UNSPECIFIED MECHANISM: ICD-10-CM

## 2018-06-14 PROCEDURE — 93226 XTRNL ECG REC<48 HR SCAN A/R: CPT | Mod: PO

## 2018-06-14 PROCEDURE — 93227 XTRNL ECG REC<48 HR R&I: CPT | Mod: ,,, | Performed by: INTERNAL MEDICINE

## 2018-06-14 NOTE — PROGRESS NOTES
Stroke AF  PI: Andi Nino MD  Subject # [id number]      Mr Rutherford consented to the enroll in the Stroke AF study on 5/11/2018. The purpose, risks and benefits were discussed with the patient by the PI and . The patient had a chance to ask questions before signing the consent. A copy of the signed consent was given to the patient, a copy was scanned into the EMR, and the original was placed in the patient's chart.    Baseline procedures;  NIH and MRS done by PI  ED-5Q  B/P 187/85  Pulse 64  Weight 81.6 kg   Height 68 in (5'8)    TOAST-large vessel per PI  Subject does not have heart failure

## 2018-06-14 NOTE — PROGRESS NOTES
Stroke AF  Subject# [id number]      Mr Rutherford is present in clinic on 6/11/2018 for the one month study follow up. Mr Rutherford reports no cardiovascular or neurological symptoms at this time. He reports no palpitations.     No AE's to report    Will return in 6 months per study protocol

## 2018-06-19 ENCOUNTER — PATIENT MESSAGE (OUTPATIENT)
Dept: CARDIOLOGY | Facility: CLINIC | Age: 73
End: 2018-06-19

## 2018-07-26 ENCOUNTER — OFFICE VISIT (OUTPATIENT)
Dept: CARDIOLOGY | Facility: CLINIC | Age: 73
End: 2018-07-26
Payer: MEDICARE

## 2018-07-26 VITALS
SYSTOLIC BLOOD PRESSURE: 156 MMHG | HEART RATE: 57 BPM | DIASTOLIC BLOOD PRESSURE: 67 MMHG | WEIGHT: 202 LBS | HEIGHT: 68 IN | BODY MASS INDEX: 30.62 KG/M2

## 2018-07-26 DIAGNOSIS — G46.7 LACUNAR ATAXIC HEMIPARESIS: ICD-10-CM

## 2018-07-26 DIAGNOSIS — I21.09: ICD-10-CM

## 2018-07-26 DIAGNOSIS — I67.2 INTRACRANIAL ATHEROSCLEROSIS: ICD-10-CM

## 2018-07-26 DIAGNOSIS — I67.9 LACUNAR ATAXIC HEMIPARESIS: ICD-10-CM

## 2018-07-26 DIAGNOSIS — I25.10 CORONARY ARTERY DISEASE INVOLVING NATIVE CORONARY ARTERY OF NATIVE HEART WITHOUT ANGINA PECTORIS: Primary | ICD-10-CM

## 2018-07-26 DIAGNOSIS — Z85.038 HISTORY OF COLON CANCER: ICD-10-CM

## 2018-07-26 DIAGNOSIS — I10 ESSENTIAL HYPERTENSION: ICD-10-CM

## 2018-07-26 DIAGNOSIS — I24.9 ACUTE CORONARY SYNDROME: ICD-10-CM

## 2018-07-26 DIAGNOSIS — I73.9 PAD (PERIPHERAL ARTERY DISEASE): ICD-10-CM

## 2018-07-26 DIAGNOSIS — E11.22 TYPE 2 DIABETES MELLITUS WITH CHRONIC KIDNEY DISEASE, WITHOUT LONG-TERM CURRENT USE OF INSULIN, UNSPECIFIED CKD STAGE: ICD-10-CM

## 2018-07-26 DIAGNOSIS — Z86.73 HISTORY OF ISCHEMIC VERTEBROBASILAR ARTERY BRAINSTEM STROKE: ICD-10-CM

## 2018-07-26 DIAGNOSIS — E78.5 HYPERLIPIDEMIA, UNSPECIFIED HYPERLIPIDEMIA TYPE: ICD-10-CM

## 2018-07-26 DIAGNOSIS — Z95.5 STATUS POST CORONARY ARTERY STENT PLACEMENT: ICD-10-CM

## 2018-07-26 PROCEDURE — 99214 OFFICE O/P EST MOD 30 MIN: CPT | Mod: S$PBB,,, | Performed by: INTERNAL MEDICINE

## 2018-07-26 PROCEDURE — 99213 OFFICE O/P EST LOW 20 MIN: CPT | Mod: PBBFAC,PO | Performed by: INTERNAL MEDICINE

## 2018-07-26 PROCEDURE — 99999 PR PBB SHADOW E&M-EST. PATIENT-LVL III: CPT | Mod: PBBFAC,,, | Performed by: INTERNAL MEDICINE

## 2018-07-26 RX ORDER — METOPROLOL SUCCINATE 100 MG/1
100 TABLET, EXTENDED RELEASE ORAL 2 TIMES DAILY
COMMUNITY
End: 2019-01-15 | Stop reason: SDUPTHER

## 2018-07-26 NOTE — PROGRESS NOTES
Subjective:    Patient ID:  Cy Rutherford is a 72 y.o. male who presents for follow-up of Coronary Artery Disease      HPI  71 y/o male former pt of Dr Fish. He has a hx of recent right sided stroke (pontine CVA from vertebrobasilar atherosclerosis - basilar stenosis and left vertebral occlusion), CAD presenting with syncope 2011 s/p PCI with MARIAN to LAD and LCX, PAD s/p PTA with MARIAN (SES) to LSFA and PTA to LTPT with resolution of claudication in LLE (RLE 1V AT run off to foot with exertional right calf cramping/claudication), HLD, DM.   Last clinic visit was seen post CVA. Holter ordered without significant arrhythmias (did show PAT). He denies regular CP, SOB/NASSAR, orthopnea, PND, palps, syncope. Does have bilateral LE edema. He is compliant with his meds. Remote smoking hx. No non healing ulceration or evidence of limb ischemia. Currently on DAPT and statin for secondary stroke prevention. SBP mildly improved today and at home after increase in metoprolol. BP better at home. Right sided weakness improving with therapy.     Review of Systems   Constitution: Negative for weakness and malaise/fatigue.   HENT: Negative for congestion.    Eyes: Negative for blurred vision.   Cardiovascular: Positive for leg swelling. Negative for chest pain, claudication, cyanosis, dyspnea on exertion, irregular heartbeat, near-syncope, orthopnea, palpitations, paroxysmal nocturnal dyspnea and syncope.   Respiratory: Negative for shortness of breath.    Endocrine: Negative for polyuria.   Hematologic/Lymphatic: Negative for bleeding problem.   Skin: Negative for itching and rash.   Musculoskeletal: Positive for muscle weakness. Negative for joint swelling and muscle cramps.   Gastrointestinal: Negative for abdominal pain, hematemesis, hematochezia, melena, nausea and vomiting.   Genitourinary: Negative for dysuria and hematuria.   Neurological: Positive for focal weakness. Negative for dizziness, headaches, light-headedness and  loss of balance.   Psychiatric/Behavioral: Negative for depression. The patient is not nervous/anxious.         Objective:    Physical Exam   Constitutional: He is oriented to person, place, and time. He appears well-developed and well-nourished.   HENT:   Head: Normocephalic and atraumatic.   Neck: Neck supple. No JVD present.   Cardiovascular: Normal rate, regular rhythm and normal heart sounds.    Pulses:       Carotid pulses are 2+ on the right side, and 2+ on the left side.       Radial pulses are 2+ on the right side, and 2+ on the left side.        Femoral pulses are 2+ on the right side, and 2+ on the left side.       Posterior tibial pulses are 1+ on the right side, and 1+ on the left side.   Pulmonary/Chest: Effort normal and breath sounds normal.   Abdominal: Soft. Bowel sounds are normal.   Musculoskeletal: He exhibits edema.   Neurological: He is alert and oriented to person, place, and time.   Skin: Skin is warm and dry.   Psychiatric: He has a normal mood and affect. His behavior is normal. Thought content normal.         Assessment:       1. Coronary artery disease involving native coronary artery of native heart without angina pectoris    2. Acute MI anterior wall first episode care    3. Acute coronary syndrome    4. Essential hypertension    5. Hyperlipidemia, unspecified hyperlipidemia type    6. Status post coronary artery stent placement    7. PAD (peripheral artery disease)    8. Ataxic hemiparesis    9. Intracranial atherosclerosis    10. History of ischemic vertebrobasilar artery brainstem stroke    11. History of colon cancer    12. Type 2 diabetes mellitus with chronic kidney disease, without long-term current use of insulin, unspecified CKD stage      71 y/o pt with hx and presentation as above. Doing well from a cardiac perspective and tolerating meds well. BP improved and he is to continue to monitor. Low salt diet and med compliance.          Plan:       -Continue current medical  regimen  -f/u in 6 months

## 2018-08-16 ENCOUNTER — OFFICE VISIT (OUTPATIENT)
Dept: PHYSICAL MEDICINE AND REHAB | Facility: CLINIC | Age: 73
End: 2018-08-16
Payer: MEDICARE

## 2018-08-16 VITALS
SYSTOLIC BLOOD PRESSURE: 129 MMHG | HEIGHT: 68 IN | DIASTOLIC BLOOD PRESSURE: 65 MMHG | WEIGHT: 202 LBS | HEART RATE: 55 BPM | BODY MASS INDEX: 30.62 KG/M2

## 2018-08-16 DIAGNOSIS — Z74.09 IMPAIRED MOBILITY AND ADLS: ICD-10-CM

## 2018-08-16 DIAGNOSIS — G46.7 LACUNAR ATAXIC HEMIPARESIS: ICD-10-CM

## 2018-08-16 DIAGNOSIS — I67.9 LACUNAR ATAXIC HEMIPARESIS: ICD-10-CM

## 2018-08-16 DIAGNOSIS — Z86.73 HISTORY OF ISCHEMIC VERTEBROBASILAR ARTERY BRAINSTEM STROKE: Primary | ICD-10-CM

## 2018-08-16 DIAGNOSIS — Z78.9 IMPAIRED MOBILITY AND ADLS: ICD-10-CM

## 2018-08-16 DIAGNOSIS — I10 ESSENTIAL HYPERTENSION: ICD-10-CM

## 2018-08-16 PROCEDURE — 99213 OFFICE O/P EST LOW 20 MIN: CPT | Mod: PBBFAC,PO | Performed by: PHYSICAL MEDICINE & REHABILITATION

## 2018-08-16 PROCEDURE — 99214 OFFICE O/P EST MOD 30 MIN: CPT | Mod: S$PBB,,, | Performed by: PHYSICAL MEDICINE & REHABILITATION

## 2018-08-16 PROCEDURE — 99999 PR PBB SHADOW E&M-EST. PATIENT-LVL III: CPT | Mod: PBBFAC,,, | Performed by: PHYSICAL MEDICINE & REHABILITATION

## 2018-08-16 NOTE — PROGRESS NOTES
Initial PM&R Outpatient Evaluation    CC: Impaired mobility and ADLs    HPI: Cy Rutherford is a 72 y.o. 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   - Following w PCP, Endocrine and Neurology     Major medical issues since discharge: No medical issues, no ER visits   Diet: Eating well, appetite is good   Mood: stable   Sleep: stable   Bowel/bladder: stable, no accidents   Pain: Denies   Falls: 1, overreached when trying to make the bed. No injury.   Therapy: Completed HH, now in OP. Doing PT/OT.     SH: Lives in house [facility], alone. Currently not working     PMH:   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    Type 2 diabetes mellitus with kidney complication, without long-term current use of insulin 11/15/2012       PSH:   Past Surgical History:   Procedure Laterality Date    COLONOSCOPY      CORONARY ANGIOPLASTY      MARIAN to LAD and LCX     Current Outpatient Medications on File Prior to Visit   Medication Sig Dispense Refill    amLODIPine (NORVASC) 10 MG tablet TAKE 1 TABLET BY MOUTH ONCE DAILY 90 tablet 1    aspirin (ECOTRIN) 81 MG EC tablet Take 81 mg by mouth once daily.      clopidogrel (PLAVIX) 75 mg tablet Take 1 tablet (75 mg total) by mouth once daily. 30 tablet 0    fenofibrate micronized (LOFIBRA) 134 MG Cap Take 134 mg by mouth nightly.      fish oil-omega-3 fatty acids 300-1,000 mg capsule Take by mouth once daily.      hydroCHLOROthiazide (HYDRODIURIL) 25 MG tablet TAKE 1 TABLET  "BY MOUTH ONCE DAILY 90 tablet 1    insulin glargine (LANTUS) 100 unit/mL injection Inject 60 Units into the skin every morning.      losartan (COZAAR) 50 MG tablet Take 1 tablet (50 mg total) by mouth 2 (two) times daily. 180 tablet 3    metFORMIN (GLUCOPHAGE) 500 MG tablet Take 500 mg by mouth 2 (two) times daily with meals.      metoprolol succinate (TOPROL-XL) 100 MG 24 hr tablet Take 100 mg by mouth 2 (two) times daily.      rosuvastatin (CRESTOR) 20 MG tablet TAKE 1 TABLET BY MOUTH ONCE DAILY 90 tablet 1     No current facility-administered medications on file prior to visit.      Review of patient's allergies indicates:  No Known Allergies    Family History:   Family History   Problem Relation Age of Onset    No Known Problems Mother     No Known Problems Father     Colon polyps Sister           ROS:   CONSTITUTIONAL:  (-) weight change, fever, chills, night sweats   EYES:  (-)  double vision (-) blurry vision  EARS, NOSE, THROAT: (-) dysphagia (-) hearing loss  RESPIRATORY: (-)   shortness of breath  (-) cough  CARDIOVASCULAR (-) chest pain  (-) swelling  GENITOURINARY  (-) bladder incontinence  (-) hematuria  GASTROINTESTINAL(-)  bowel incontinence (-) nausea/vomiting   ENDOCRINE  (-)  heat or cold intolerance  (-) hair loss  PSYCHIATRIC  (-) depression  (-) anxiety  HEMATOL/LYMPHATIC (-) easy bruising (-) enlarged lymph nodes  ALLERGIC/IMMUN  (-) seasonal allergies (-) allergies to environmental stimuli    SKIN (-) changes (-) rashes (-) wounds  The rest of the review of systems is unremarkable.        Pertinent Prior Work Up (Imaging/EMGs):  Study Date Pertinent findings                      Physical Examination:  Vitals: /65   Pulse (!) 55   Ht 5' 8" (1.727 m)   Wt 91.6 kg (202 lb)   BMI 30.71 kg/m²    Gen: NAD, appearing stated age  Gait: slow josh, clears foot   Mood/affect: pleasant and appropriate    Speech: Normal language, volume, pitch  Cognition:  Awake, alert, oriented x " 3    Cardiovascular:   (- ) edema        Motor:    RUE - SA 3/5, EF/EE 4/5,  3/5  RLE - HF 4/5, KE 5/5, DF/PF   LUE/LLE WFL      Tone: no increased tone noted in bilateral upper and lower extremities.     Impression:  71 yo M with impaired mobility and ADLs secondary to stroke, doing well    Plan:    Diagnostics: none at this time   Medications: Risks/benefits reviewed. Reviewed BP meds, and importance of compliance and monitoring BP/HR  Therapy: Has completed HH, now in OP PT/OT.  Referral placed for a driving eval.    Other - reviewed fall prevention, and energy conservation. Also reviewed stay well hydrated in summer.        Follow up: 6 mo    Yamileth Niño MD   Spent 30 min review plan and with counseling

## 2018-09-11 ENCOUNTER — CLINICAL SUPPORT (OUTPATIENT)
Dept: REHABILITATION | Facility: HOSPITAL | Age: 73
End: 2018-09-11
Attending: PHYSICAL MEDICINE & REHABILITATION
Payer: MEDICARE

## 2018-09-11 DIAGNOSIS — Z86.73 HISTORY OF ISCHEMIC VERTEBROBASILAR ARTERY BRAINSTEM STROKE: ICD-10-CM

## 2018-09-11 NOTE — PROGRESS NOTES
"Occupational Therapy   Driving Evaluation    Cy Rutherford   MRN: [id number]     Referring Physician: Yamileth Niño MD  Diagnosis: L CVA   History: Pt is currently a licensed  but has been referred to Occupational Therapy by his MD for clinical and on-road testing to be used for driving evaluation.     [id number]   Exp 11/29/23    SUBJECTIVE:   " I started driving a few weeks ago but know that I need to be tested."     OBJECTIVE:   Physical Function Testing:    ROM:  [facility] L UE, R UE limited at shoulder and wrist to ~ 3/4 range due to weakness     Head / Neck ROM: [facility]   Pt is R hand dominant ( But since CVA he has been using his L hand for most tasks)    Strength: L L UE, R UE is limited at shoulder, wrist and hand and is not fully functional to be used for operating the steering wheel. His R ankle and LE are functional enough to operate the brake and accelerator  Balance:     Static and Dynamic sitting- Normal   Static Standing - Good     Dynamic standing - Good as Pt uses a straight cane in standing   Transfers and Mobility: Modified Independent for slightly increased time and use of straight cane   Rapid Pace Walk: 9.5 seconds which is slightly slower than the average standard of 8 seconds    Perceptual testing:    Letter cancellation:  34/34 passing score where testing was completed in 1 minute 49 seconds, average time for completion is 1 minute 15 seconds (Pt used his L hand to vick sheet which required more time)   Line bisection:  [facility]   Motor Free Visual Perception Test (MVPT) which assesses figure ground, visual  closure and visual memory, 33/36 , a passing score, where testing was competed in 7 minutes 52 seconds, average time for completion is 8 minutes   Right/Left discrimination: 4/4   Auditory discrimination: [facility]     Vision testing:     Glasses were worn during testing  Quick acuity and night vision: Marginal as Pt had difficulty with vision in his right eye (Pt reported that he has macular " degeneration in his R eye and is under a doctor's care but has not received treatment for it since his CVA.)   Color vision: 4/4,   Pt correctly identified 11/12 road signs and was 0/4 for depth perception  Both eye acuity: 20/30  Right eye acuity: 20/100  Left eye acuity: 20/30  Phoria which assesses binocular vision was normal.  Horizontal field test and nasal vision: Normal     Visual acuity minimum standards for the [facility] is 20/40 in one eye.    Cognitive testing:   Short term memory:  3/4,    Immediate # recall (Digit span) : 8,  High passing score    Cognitive sequencing of months of year in reverse:  No error, no delay   Long term memory:  WNL   Judgment questions regarding rules of the road:     Insight:  Good as Pt is understanding of reasons for testing     Communication:   Expressive aphasia:  Not found  Receptive aphasia:  Not found    Reaction time testin/100s or a second (avg of 3 trials) which is near than the average standard of 50/100s of a second and his transition from accelerator to brake was good    [facility] Mental Status Exam (UMS) which is used to  detect mild neurocognitive disorder and dementia: Pt scored 29/30 which  scores high in the Normal range.    In-car / on-road assessment:  Pt transferred to car Modified Independently needing mildly increased time and after orienting to the test vehicle, was able to adjust mirrors and seat, laya seat belt and start vehicle. Pt safely pulled car out of parking garage and drove on hospital grounds appropriately.  Pt then successfully drove on side streets per instruction stopping correctly at stop signs, negotiating turns correctly and encountering light neighborhood traffic. Pt then was then directed to a divided highway where Pt correctly obeyed traffic signs and signals, speed limits, followed at appropriate distances, changed lanes appropriately when asked and had no difficulty stopping vehicle at appropriate  distances. Pt drove at normal speeds keeping up with traffic flow and was comfortable in raining weather conditions. Pt was then directed to the [place] of [place] and after correctly merging into higher speed traffic, he was able to drive at higher speeds, was given instructions and was able to change lanes safely and correctly. Pt was then given instructions to get off at a specific exit and was able to exit correctly. Once off of the [place], he was asked to find his way back to hospital grounds without given cues for direction. Pt used his own judgement to find a route through traffic situations to return to hospital grounds. Pt returned the vehicle the designated parking spot in the garage and was able to parallel park with no difficulty.    The limitations in vision in his right eye and poor depth perception noted in clinical testing did not impair his ability to  distances in On road testing.     ASSESSMENT:   Mr Rutherford demonstrated the ability to operate an automobile this day and demonstrated good insight as he realized that a driving assessment is necessary to show that he is capable of driving. It is recommended that the Pt have a follow up vision test since his glasses are older and there is room for improvement.  Pt has successfully passed his driving evaluation this day to drive his automobile with no limitations.  Findings were notified to the office of Yamileth Niño MD. Changes in Pt medical status may warrant retesting in the future as the conclusion reached and the recommendations made reflect findings at the time of this evaluation.    PLAN:   Discharge patient from outpatient Occupational Therapy services as Pt and MD needs being met with completion and reporting of this evaluation.      ANA Joseph, S  Occupational Therapist, Certified  Rehabilitation Specialist  9/11/2018

## 2018-09-12 NOTE — HPI
73 yo male who presented to [facility] with complaints of mild to moderate weakness on the right arm that began Sunday.  He hoped his symptoms would improve and waited until the next day to go to ED.  His symptoms had not deteriorated.  He denies any associated numbness or gaze problems.  He has not had in the past.  He was not treated with alteplase nor thrombectomy since he was outside of treatment window and lacked signs or symptoms of large vessel occlusion.  He denies any clear triggers.  Risk factors include essential hypertension and uncontrolled diabetes.  
Cy Rutherford is a 72-year-old male with PMHx of HTN, HLD, DMII, CAD s/p stenting, PAC s/p stenting, and acute coronary syndrome.  Patient presented to [facility] on 5/7/18 with right-sided weakness x 1 day.  Tele-stroke consult completed.  CTH without acute pathology; however, not tPA candidate 2/2 outside of treatment window.  He was then transferred to [facility] for further management.  During admission, patient with worsening right-sided weakness.  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  He was then transferred to [facility] on 5/8/18 for possible intervention.  Upon admission, evaluated by Vascular Neurology and no intervention indicated.      Functional History: Patient lives in [place], alone, in a single story home with one step to enter.  Prior to admission, he was (I) with ADLs, including driving, and mobility.  He is right handed.  DME: [facility].  
Mr.Harry ADELSO Rutherford is a 72 year old with a PMHx of Hypertension, DM, CAD s/p stent, PVD s/p stent, Hx of colon cancer s/p chemo and radiation who presents as a transfer from [facility] to Neuro Critical Care s/p Occlusion of the V4 segment of the left vertebral artery along with partial thrombosis within the mid basilar with corresponding infarction in the left pepe-kim. Not a candidate for tpa. Not a candidate for intervention. On the night of 5/6 at approximately 5pm patient reports that he felt right arm weakness and  gait disturbances. This persisted the next morning and he presented to his PCP on 5/7 with the same complaints. Subsequently, he was sent from his PCP to [facility]. Stroke code was called and neuro vascular evaluated and patient not a candidate for tpa. Patient has not noticed facial droop but feels his voice is slightly slurred. Of note, his plavix was stopped on Friday as he had been taking it since MI and stents in 2011. Patient will be admitted to Neuro ICU for a higher level of care.   
BMI 27.5; no PU; no edema

## 2018-09-17 DIAGNOSIS — H54.7 VISUAL IMPAIRMENT: ICD-10-CM

## 2018-09-17 DIAGNOSIS — Z86.73 HISTORY OF ISCHEMIC VERTEBROBASILAR ARTERY BRAINSTEM STROKE: Primary | ICD-10-CM

## 2018-09-24 DIAGNOSIS — I10 ESSENTIAL HYPERTENSION: ICD-10-CM

## 2018-09-24 DIAGNOSIS — I25.10 CORONARY ARTERY DISEASE, ANGINA PRESENCE UNSPECIFIED, UNSPECIFIED VESSEL OR LESION TYPE, UNSPECIFIED WHETHER NATIVE OR TRANSPLANTED HEART: ICD-10-CM

## 2018-09-25 RX ORDER — HYDROCHLOROTHIAZIDE 25 MG/1
TABLET ORAL
Qty: 90 TABLET | Refills: 1 | Status: SHIPPED | OUTPATIENT
Start: 2018-09-25 | End: 2019-01-24

## 2018-09-25 RX ORDER — AMLODIPINE BESYLATE 10 MG/1
TABLET ORAL
Qty: 90 TABLET | Refills: 1 | Status: SHIPPED | OUTPATIENT
Start: 2018-09-25 | End: 2019-01-24 | Stop reason: SDUPTHER

## 2018-09-25 RX ORDER — ROSUVASTATIN CALCIUM 20 MG/1
TABLET, COATED ORAL
Qty: 90 TABLET | Refills: 1 | Status: SHIPPED | OUTPATIENT
Start: 2018-09-25 | End: 2019-01-24 | Stop reason: SDUPTHER

## 2018-09-25 RX ORDER — CLOPIDOGREL BISULFATE 75 MG/1
TABLET ORAL
Qty: 90 TABLET | Refills: 1 | Status: SHIPPED | OUTPATIENT
Start: 2018-09-25 | End: 2018-10-31 | Stop reason: SDUPTHER

## 2018-10-31 ENCOUNTER — OFFICE VISIT (OUTPATIENT)
Dept: NEUROLOGY | Facility: CLINIC | Age: 73
End: 2018-10-31
Payer: MEDICARE

## 2018-10-31 DIAGNOSIS — G46.7 LACUNAR ATAXIC HEMIPARESIS: ICD-10-CM

## 2018-10-31 DIAGNOSIS — I67.9 LACUNAR ATAXIC HEMIPARESIS: ICD-10-CM

## 2018-10-31 DIAGNOSIS — I10 ESSENTIAL HYPERTENSION: ICD-10-CM

## 2018-10-31 DIAGNOSIS — Z00.6 RESEARCH SUBJECT: Primary | ICD-10-CM

## 2018-10-31 DIAGNOSIS — Z86.73 HISTORY OF ISCHEMIC VERTEBROBASILAR ARTERY BRAINSTEM STROKE: ICD-10-CM

## 2018-10-31 DIAGNOSIS — E78.5 HYPERLIPIDEMIA, UNSPECIFIED HYPERLIPIDEMIA TYPE: ICD-10-CM

## 2018-10-31 DIAGNOSIS — I67.2 INTRACRANIAL ATHEROSCLEROSIS: ICD-10-CM

## 2018-10-31 PROCEDURE — 99214 OFFICE O/P EST MOD 30 MIN: CPT | Mod: S$PBB,,, | Performed by: PSYCHIATRY & NEUROLOGY

## 2018-10-31 PROCEDURE — 99999 PR PBB SHADOW E&M-EST. PATIENT-LVL I: CPT | Mod: PBBFAC,,, | Performed by: PSYCHIATRY & NEUROLOGY

## 2018-10-31 PROCEDURE — 99211 OFF/OP EST MAY X REQ PHY/QHP: CPT | Mod: PBBFAC | Performed by: PSYCHIATRY & NEUROLOGY

## 2018-10-31 NOTE — PROGRESS NOTES
Stroke AF Study Visit    Impression:  1. Stroke AF Study participant; randomized to usual care  2. 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 Mild basilar stenosis  4. HTN: SBP slightly above target    Stroke risk factors: prior stroke, lg vessel cerebrovascular athero, HTN, DM ,CAD, PAD    Plan:  1. Continue ASA 81mg/d and clopidogrel 75mg/d (stroke occurred shortly after stopping clopidogrel)  2. Continue rosuvastatin  3. F/U with Dr. Porter for BP and other primary issues  4. Continue Stroke AF participation    Problem List Items Addressed This Visit        1 - High    Research subject    History of ischemic vertebrobasilar artery brainstem stroke    Overview     L pontine atherothromboembolic from L VA (V4) occlusion (also mild basilar athero) 5/6/18            2     Hyperlipidemia - Primary    Essential hypertension    Ataxic hemiparesis    Overview     right               Patient comes to clinic for f/u of stroke and for Stroke AF Study Visit.  He completed therapy and has been cleared to drive.  No recurrent stroke symptoms. No palpitations/sudden LH etc.  Tolerating medications (taking ASA 81mg/d + clopidogrel 75mg/d as well as Crestor).          /56 P 50(reg)  Well-developed, well nourished.  Awake, alert and oriented.  Language is normal.  Very subtle R lower facial weakness, mild R ataxic hemiparesis.    mRS = 2  NIHSS = 5    Andi Nino MD

## 2018-11-29 ENCOUNTER — TELEPHONE (OUTPATIENT)
Dept: CARDIOLOGY | Facility: CLINIC | Age: 73
End: 2018-11-29

## 2018-11-29 NOTE — TELEPHONE ENCOUNTER
----- Message from Michael Mena sent at 11/29/2018  1:13 PM CST -----  Contact: [phone]  Patient requested to speak with the nurse about his clopidogrel (PLAVIX) because he is having oral surgery. Please call.

## 2018-11-30 ENCOUNTER — TELEPHONE (OUTPATIENT)
Dept: CARDIOLOGY | Facility: CLINIC | Age: 73
End: 2018-11-30

## 2018-11-30 NOTE — TELEPHONE ENCOUNTER
----- Message from Tracie Bridges sent at 11/30/2018 11:01 AM CST -----  No.  [phone]    Patient may be having dental surgery on 12/4/18.  He takes Plavix.  He has questions.    Please call.

## 2018-12-03 ENCOUNTER — TELEPHONE (OUTPATIENT)
Dept: CARDIOLOGY | Facility: CLINIC | Age: 73
End: 2018-12-03

## 2018-12-03 NOTE — TELEPHONE ENCOUNTER
Returned pt phone call    Pt stated he received a call from his dentist w/ instructions to hold off on Plavix 5 days prior to procedure and restart after    Pt just wanted to verify that this was okay as he had stopped Plavix in the past and three days after holding off on medication he had a stroke.

## 2018-12-03 NOTE — TELEPHONE ENCOUNTER
Reached out to pt     Pt was advised that per Dr. Sanchez, okay to hold off on the Plavix and restart after procedure

## 2018-12-03 NOTE — TELEPHONE ENCOUNTER
----- Message from Yamila Salcedo sent at 12/3/2018  1:13 PM CST -----  Contact: [phone]/self  Patient is requesting to speak with you concerning stopping his clopidogrel (PLAVIX) medication for a dental procedure.    Please call and advise

## 2019-01-15 RX ORDER — METOPROLOL SUCCINATE 100 MG/1
100 TABLET, EXTENDED RELEASE ORAL 2 TIMES DAILY
Qty: 180 TABLET | Refills: 3 | Status: SHIPPED | OUTPATIENT
Start: 2019-01-15 | End: 2019-01-24 | Stop reason: SDUPTHER

## 2019-01-15 NOTE — TELEPHONE ENCOUNTER
----- Message from Yamila Salcedo sent at 1/15/2019  3:37 PM CST -----  Contact: [phone]/self  Refill request for metoprolol succinate (TOPROL-XL) 100 MG 24 hr tablet  Pharmacy: [address] [facility]

## 2019-01-24 ENCOUNTER — OFFICE VISIT (OUTPATIENT)
Dept: CARDIOLOGY | Facility: CLINIC | Age: 74
End: 2019-01-24
Payer: COMMERCIAL

## 2019-01-24 VITALS
SYSTOLIC BLOOD PRESSURE: 162 MMHG | WEIGHT: 220 LBS | DIASTOLIC BLOOD PRESSURE: 68 MMHG | OXYGEN SATURATION: 98 % | HEIGHT: 68 IN | BODY MASS INDEX: 33.34 KG/M2 | HEART RATE: 58 BPM

## 2019-01-24 DIAGNOSIS — E78.5 HYPERLIPIDEMIA, UNSPECIFIED HYPERLIPIDEMIA TYPE: ICD-10-CM

## 2019-01-24 DIAGNOSIS — E11.22 TYPE 2 DIABETES MELLITUS WITH CHRONIC KIDNEY DISEASE, WITHOUT LONG-TERM CURRENT USE OF INSULIN, UNSPECIFIED CKD STAGE: ICD-10-CM

## 2019-01-24 DIAGNOSIS — Z86.73 HISTORY OF ISCHEMIC VERTEBROBASILAR ARTERY BRAINSTEM STROKE: ICD-10-CM

## 2019-01-24 DIAGNOSIS — I67.9 LACUNAR ATAXIC HEMIPARESIS: ICD-10-CM

## 2019-01-24 DIAGNOSIS — Z95.5 STATUS POST CORONARY ARTERY STENT PLACEMENT: ICD-10-CM

## 2019-01-24 DIAGNOSIS — Z85.038 HISTORY OF COLON CANCER: ICD-10-CM

## 2019-01-24 DIAGNOSIS — I25.10 CORONARY ARTERY DISEASE, ANGINA PRESENCE UNSPECIFIED, UNSPECIFIED VESSEL OR LESION TYPE, UNSPECIFIED WHETHER NATIVE OR TRANSPLANTED HEART: Primary | ICD-10-CM

## 2019-01-24 DIAGNOSIS — G46.7 LACUNAR ATAXIC HEMIPARESIS: ICD-10-CM

## 2019-01-24 DIAGNOSIS — I10 ESSENTIAL HYPERTENSION: ICD-10-CM

## 2019-01-24 DIAGNOSIS — I73.9 PAD (PERIPHERAL ARTERY DISEASE): ICD-10-CM

## 2019-01-24 DIAGNOSIS — I67.2 INTRACRANIAL ATHEROSCLEROSIS: ICD-10-CM

## 2019-01-24 PROCEDURE — 99214 OFFICE O/P EST MOD 30 MIN: CPT | Mod: S$GLB,,, | Performed by: INTERNAL MEDICINE

## 2019-01-24 PROCEDURE — 3045F PR MOST RECENT HEMOGLOBIN A1C LEVEL 7.0-9.0%: CPT | Mod: CPTII,S$GLB,, | Performed by: INTERNAL MEDICINE

## 2019-01-24 PROCEDURE — 1101F PT FALLS ASSESS-DOCD LE1/YR: CPT | Mod: CPTII,S$GLB,, | Performed by: INTERNAL MEDICINE

## 2019-01-24 PROCEDURE — 99999 PR PBB SHADOW E&M-EST. PATIENT-LVL III: CPT | Mod: PBBFAC,,, | Performed by: INTERNAL MEDICINE

## 2019-01-24 PROCEDURE — 3077F SYST BP >= 140 MM HG: CPT | Mod: CPTII,S$GLB,, | Performed by: INTERNAL MEDICINE

## 2019-01-24 PROCEDURE — 3078F DIAST BP <80 MM HG: CPT | Mod: CPTII,S$GLB,, | Performed by: INTERNAL MEDICINE

## 2019-01-24 PROCEDURE — 99999 PR PBB SHADOW E&M-EST. PATIENT-LVL III: ICD-10-PCS | Mod: PBBFAC,,, | Performed by: INTERNAL MEDICINE

## 2019-01-24 PROCEDURE — 3078F PR MOST RECENT DIASTOLIC BLOOD PRESSURE < 80 MM HG: ICD-10-PCS | Mod: CPTII,S$GLB,, | Performed by: INTERNAL MEDICINE

## 2019-01-24 PROCEDURE — 3045F PR MOST RECENT HEMOGLOBIN A1C LEVEL 7.0-9.0%: ICD-10-PCS | Mod: CPTII,S$GLB,, | Performed by: INTERNAL MEDICINE

## 2019-01-24 PROCEDURE — 3077F PR MOST RECENT SYSTOLIC BLOOD PRESSURE >= 140 MM HG: ICD-10-PCS | Mod: CPTII,S$GLB,, | Performed by: INTERNAL MEDICINE

## 2019-01-24 PROCEDURE — 99214 PR OFFICE/OUTPT VISIT, EST, LEVL IV, 30-39 MIN: ICD-10-PCS | Mod: S$GLB,,, | Performed by: INTERNAL MEDICINE

## 2019-01-24 PROCEDURE — 1101F PR PT FALLS ASSESS DOC 0-1 FALLS W/OUT INJ PAST YR: ICD-10-PCS | Mod: CPTII,S$GLB,, | Performed by: INTERNAL MEDICINE

## 2019-01-24 RX ORDER — CHLORTHALIDONE 25 MG/1
25 TABLET ORAL DAILY
Qty: 90 TABLET | Refills: 3 | Status: SHIPPED | OUTPATIENT
Start: 2019-01-24 | End: 2019-09-03 | Stop reason: SDUPTHER

## 2019-01-24 RX ORDER — FENOFIBRATE 134 MG/1
134 CAPSULE ORAL NIGHTLY
Qty: 90 CAPSULE | Refills: 3 | Status: SHIPPED | OUTPATIENT
Start: 2019-01-24 | End: 2023-01-17 | Stop reason: SDUPTHER

## 2019-01-24 RX ORDER — METOPROLOL SUCCINATE 100 MG/1
100 TABLET, EXTENDED RELEASE ORAL 2 TIMES DAILY
Qty: 180 TABLET | Refills: 3 | Status: SHIPPED | OUTPATIENT
Start: 2019-01-24 | End: 2019-09-03 | Stop reason: SDUPTHER

## 2019-01-24 RX ORDER — AMLODIPINE BESYLATE 10 MG/1
10 TABLET ORAL DAILY
Qty: 90 TABLET | Refills: 3 | Status: SHIPPED | OUTPATIENT
Start: 2019-01-24 | End: 2019-06-20

## 2019-01-24 RX ORDER — CLOPIDOGREL BISULFATE 75 MG/1
75 TABLET ORAL DAILY
Qty: 90 TABLET | Refills: 3
Start: 2019-01-24 | End: 2019-06-20 | Stop reason: SDUPTHER

## 2019-01-24 RX ORDER — LOSARTAN POTASSIUM 50 MG/1
50 TABLET ORAL 2 TIMES DAILY
Qty: 180 TABLET | Refills: 3 | Status: SHIPPED | OUTPATIENT
Start: 2019-01-24 | End: 2019-09-03 | Stop reason: SDUPTHER

## 2019-01-24 RX ORDER — ROSUVASTATIN CALCIUM 20 MG/1
20 TABLET, COATED ORAL DAILY
Qty: 90 TABLET | Refills: 3 | Status: SHIPPED | OUTPATIENT
Start: 2019-01-24 | End: 2019-09-03 | Stop reason: SDUPTHER

## 2019-01-24 NOTE — PROGRESS NOTES
Subjective:    Patient ID:  Cy Rutherford is a 73 y.o. male who presents for follow-up of Coronary Artery Disease      HPI     72 y/o male former pt of Dr Fish. 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   Prior to last clinic visit was seen post CVA. Holter ordered without significant arrhythmias (did show PAT). He denies regular CP, SOB/NASSAR, orthopnea, PND, palps, syncope. Does have bilateral LE edema which is mildly worsened. He is compliant with his meds. Remote smoking hx. No non healing ulceration or evidence of limb ischemia. Currently on DAPT and statin for secondary stroke prevention. Bp has been elevated. Right sided weakness improving with therapy.     Review of Systems   Constitution: Negative for weakness and malaise/fatigue.   HENT: Negative for congestion.    Eyes: Negative for blurred vision.   Cardiovascular: Positive for leg swelling. Negative for chest pain, claudication, cyanosis, dyspnea on exertion, irregular heartbeat, near-syncope, orthopnea, palpitations, paroxysmal nocturnal dyspnea and syncope.   Respiratory: Negative for shortness of breath.    Endocrine: Negative for polyuria.   Hematologic/Lymphatic: Negative for bleeding problem.   Skin: Negative for itching and rash.   Musculoskeletal: Positive for muscle weakness. Negative for joint swelling and muscle cramps.   Gastrointestinal: Negative for abdominal pain, hematemesis, hematochezia, melena, nausea and vomiting.   Genitourinary: Negative for dysuria and hematuria.   Neurological: Positive for focal weakness. Negative for dizziness, headaches,  light-headedness and loss of balance.   Psychiatric/Behavioral: Negative for depression. The patient is not nervous/anxious.         Objective:    Physical Exam   Constitutional: He is oriented to person, place, and time. He appears well-developed and well-nourished.   HENT:   Head: Normocephalic and atraumatic.   Neck: Neck supple. No JVD present.   Cardiovascular: Regular rhythm and normal heart sounds. Bradycardia present.   Pulses:       Carotid pulses are 2+ on the right side, and 2+ on the left side.       Radial pulses are 2+ on the right side, and 2+ on the left side.        Femoral pulses are 2+ on the right side, and 2+ on the left side.       Posterior tibial pulses are 1+ on the right side, and 1+ on the left side.   Pulmonary/Chest: Effort normal and breath sounds normal.   Abdominal: Soft. Bowel sounds are normal.   Musculoskeletal: He exhibits edema.   Neurological: He is alert and oriented to person, place, and time.   Skin: Skin is warm and dry.   Psychiatric: He has a normal mood and affect. His behavior is normal. Thought content normal.         Assessment:       1. Coronary artery disease, angina presence unspecified, unspecified vessel or lesion type, unspecified whether native or transplanted heart    2. Essential hypertension    3. PAD (peripheral artery disease)    4. Status post coronary artery stent placement    5. Hyperlipidemia, unspecified hyperlipidemia type    6. Ataxic hemiparesis    7. History of ischemic vertebrobasilar artery brainstem stroke    8. Intracranial atherosclerosis    9. History of colon cancer    10. Type 2 diabetes mellitus with chronic kidney disease, without long-term current use of insulin, unspecified CKD stage      72 y/o pt with hx and presentation as above. Doing well from a cardiac perspective and compensated from a HF perspective. Will D/C HCTZ and start Chlorthalidone for BP and leg edema. May add lasix next visit. BP log.       Plan:       -D/C HCTZ  -Start  chlorthalidone 25 mg daily  -BP log  -f/u in 1 month

## 2019-02-14 ENCOUNTER — OFFICE VISIT (OUTPATIENT)
Dept: PHYSICAL MEDICINE AND REHAB | Facility: CLINIC | Age: 74
End: 2019-02-14
Payer: MEDICARE

## 2019-02-14 VITALS
DIASTOLIC BLOOD PRESSURE: 65 MMHG | HEART RATE: 56 BPM | WEIGHT: 220 LBS | HEIGHT: 68 IN | SYSTOLIC BLOOD PRESSURE: 171 MMHG | BODY MASS INDEX: 33.34 KG/M2

## 2019-02-14 DIAGNOSIS — Z78.9 IMPAIRED MOBILITY AND ADLS: ICD-10-CM

## 2019-02-14 DIAGNOSIS — I67.9 LACUNAR ATAXIC HEMIPARESIS: ICD-10-CM

## 2019-02-14 DIAGNOSIS — Z86.73 HISTORY OF ISCHEMIC VERTEBROBASILAR ARTERY BRAINSTEM STROKE: Primary | ICD-10-CM

## 2019-02-14 DIAGNOSIS — G46.7 LACUNAR ATAXIC HEMIPARESIS: ICD-10-CM

## 2019-02-14 DIAGNOSIS — Z74.09 IMPAIRED MOBILITY AND ADLS: ICD-10-CM

## 2019-02-14 PROCEDURE — 1101F PR PT FALLS ASSESS DOC 0-1 FALLS W/OUT INJ PAST YR: ICD-10-PCS | Mod: CPTII,S$GLB,, | Performed by: PHYSICAL MEDICINE & REHABILITATION

## 2019-02-14 PROCEDURE — 99999 PR PBB SHADOW E&M-EST. PATIENT-LVL IV: CPT | Mod: PBBFAC,,, | Performed by: PHYSICAL MEDICINE & REHABILITATION

## 2019-02-14 PROCEDURE — 99213 PR OFFICE/OUTPT VISIT, EST, LEVL III, 20-29 MIN: ICD-10-PCS | Mod: S$GLB,,, | Performed by: PHYSICAL MEDICINE & REHABILITATION

## 2019-02-14 PROCEDURE — 99213 OFFICE O/P EST LOW 20 MIN: CPT | Mod: S$GLB,,, | Performed by: PHYSICAL MEDICINE & REHABILITATION

## 2019-02-14 PROCEDURE — 3077F PR MOST RECENT SYSTOLIC BLOOD PRESSURE >= 140 MM HG: ICD-10-PCS | Mod: CPTII,S$GLB,, | Performed by: PHYSICAL MEDICINE & REHABILITATION

## 2019-02-14 PROCEDURE — 3078F PR MOST RECENT DIASTOLIC BLOOD PRESSURE < 80 MM HG: ICD-10-PCS | Mod: CPTII,S$GLB,, | Performed by: PHYSICAL MEDICINE & REHABILITATION

## 2019-02-14 PROCEDURE — 1101F PT FALLS ASSESS-DOCD LE1/YR: CPT | Mod: CPTII,S$GLB,, | Performed by: PHYSICAL MEDICINE & REHABILITATION

## 2019-02-14 PROCEDURE — 3077F SYST BP >= 140 MM HG: CPT | Mod: CPTII,S$GLB,, | Performed by: PHYSICAL MEDICINE & REHABILITATION

## 2019-02-14 PROCEDURE — 3078F DIAST BP <80 MM HG: CPT | Mod: CPTII,S$GLB,, | Performed by: PHYSICAL MEDICINE & REHABILITATION

## 2019-02-14 PROCEDURE — 99999 PR PBB SHADOW E&M-EST. PATIENT-LVL IV: ICD-10-PCS | Mod: PBBFAC,,, | Performed by: PHYSICAL MEDICINE & REHABILITATION

## 2019-02-14 NOTE — PROGRESS NOTES
PM[facility] Outpatient F/U, last seen in 8/2018    CC: Impaired mobility and ADLs    HPI: Cy Rutherford is a 73 y.o. 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   - Following w PCP, Endocrine and Neurology     Interval Hx - No falls since last visit. Has had regular BP monitoring, follow with Cards. Has successfully completed driving eval.      Major medical issues since discharge: No medical issues, no ER visits   Diet: Eating well, appetite is good   Mood: stable   Sleep: stable   Bowel/bladder: stable, no accidents   Pain: Denies   Falls: 1, overreached when trying to make the bed. No injury.   Therapy: Completed OP PT/OT.   Functionally - walking w cane in community, and no cane in the home. MOd I to Ind w ADLs, No longer has WC/and has not used RW in awhile.     SH: Lives in house [facility], alone. Currently not working     PMH:   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    Type 2 diabetes mellitus with kidney complication, without long-term current use of insulin 11/15/2012       PSH:   Past Surgical History:   Procedure Laterality Date    COLONOSCOPY      CORONARY ANGIOPLASTY      MARIAN to LAD and LCX     Current Outpatient Medications on File Prior to Visit   Medication Sig Dispense Refill    amLODIPine (NORVASC) 10 MG tablet Take 1 tablet (10 mg total) by mouth once daily. 90 tablet 3    aspirin (ECOTRIN) 81 MG EC tablet Take 81 mg by mouth once daily.      chlorthalidone (HYGROTEN) 25 MG Tab Take 1  tablet (25 mg total) by mouth once daily. 90 tablet 3    clopidogrel (PLAVIX) 75 mg tablet Take 1 tablet (75 mg total) by mouth once daily. 90 tablet 3    fenofibrate micronized (LOFIBRA) 134 MG Cap Take 1 capsule (134 mg total) by mouth nightly. 90 capsule 3    fish oil-omega-3 fatty acids 300-1,000 mg capsule Take by mouth once daily.      insulin glargine (LANTUS) 100 unit/mL injection Inject 60 Units into the skin every morning.      insulin lispro (HUMALOG) 100 unit/mL injection Inject into the skin 3 (three) times daily before meals.      losartan (COZAAR) 50 MG tablet Take 1 tablet (50 mg total) by mouth 2 (two) times daily. 180 tablet 3    metFORMIN (GLUCOPHAGE) 500 MG tablet Take 500 mg by mouth 2 (two) times daily with meals.      metoprolol succinate (TOPROL-XL) 100 MG 24 hr tablet Take 1 tablet (100 mg total) by mouth 2 (two) times daily. 180 tablet 3    rosuvastatin (CRESTOR) 20 MG tablet Take 1 tablet (20 mg total) by mouth once daily. 90 tablet 3     No current facility-administered medications on file prior to visit.      Review of patient's allergies indicates:  No Known Allergies    Family History:   Family History   Problem Relation Age of Onset    No Known Problems Mother     No Known Problems Father     Colon polyps Sister           ROS:   CONSTITUTIONAL:  (-) weight change, fever, chills, night sweats   EYES:  (-)  double vision (-) blurry vision  EARS, NOSE, THROAT: (-) dysphagia (-) hearing loss  RESPIRATORY: (-)   shortness of breath  (-) cough  CARDIOVASCULAR (-) chest pain  (-) swelling  GENITOURINARY  (-) bladder incontinence  (-) hematuria  GASTROINTESTINAL(-)  bowel incontinence (-) nausea/vomiting   ENDOCRINE  (-)  heat or cold intolerance  (-) hair loss  PSYCHIATRIC  (-) depression  (-) anxiety  HEMATOL/LYMPHATIC (-) easy bruising (-) enlarged lymph nodes  ALLERGIC/IMMUN  (-) seasonal allergies (-) allergies to environmental stimuli    SKIN (-) changes (-) rashes (-)  "wounds  The rest of the review of systems is unremarkable.        Pertinent Prior Work Up (Imaging/EMGs):  Study Date Pertinent findings                      Physical Examination:  Vitals: BP (!) 171/65   Pulse (!) 56   Ht 5' 8" (1.727 m)   Wt 99.8 kg (220 lb)   BMI 33.45 kg/m²    Gen: NAD, appearing stated age  Gait: slow josh, clears foot   Mood/affect: pleasant and appropriate    Speech: Normal language, volume, pitch  Cognition:  Awake, alert, oriented x 3    Cardiovascular:   (- ) edema        Motor:    RUE - SA 4/5 (ROM much improved), EF/EE 4/5,  4/5  RLE - HF 4/5, KE 5/5, DF/PF   LUE/LLE WFL      Tone: no increased tone noted in bilateral upper and lower extremities.     Impression:  73 yo M with impaired mobility and ADLs secondary to stroke, doing well    Plan:    Diagnostics: none at this time   Medications: No new Rx.   Therapy:  Referral to OT given decreased (but improving ROM in R UE) and resulting impiarement in ADLs   -  RX for OT - "pls work on continuing to inc ROM  To RUE, and strengthening, including  strength"      Other   - reviewed fall prevention    - Also reviewed BP meds, and importance of compliance and monitoring BP/HR. Seeing PCP/Cards regularly.       Follow up: 6 mo    Yamileth Niño MD     "

## 2019-02-28 ENCOUNTER — OFFICE VISIT (OUTPATIENT)
Dept: CARDIOLOGY | Facility: CLINIC | Age: 74
End: 2019-02-28
Payer: MEDICARE

## 2019-02-28 VITALS
BODY MASS INDEX: 32.84 KG/M2 | SYSTOLIC BLOOD PRESSURE: 162 MMHG | DIASTOLIC BLOOD PRESSURE: 70 MMHG | OXYGEN SATURATION: 97 % | HEART RATE: 57 BPM | WEIGHT: 216 LBS

## 2019-02-28 DIAGNOSIS — I67.2 INTRACRANIAL ATHEROSCLEROSIS: ICD-10-CM

## 2019-02-28 DIAGNOSIS — Z95.5 STATUS POST CORONARY ARTERY STENT PLACEMENT: ICD-10-CM

## 2019-02-28 DIAGNOSIS — I25.10 CORONARY ARTERY DISEASE INVOLVING NATIVE CORONARY ARTERY OF NATIVE HEART WITHOUT ANGINA PECTORIS: Primary | ICD-10-CM

## 2019-02-28 DIAGNOSIS — E78.5 HYPERLIPIDEMIA, UNSPECIFIED HYPERLIPIDEMIA TYPE: ICD-10-CM

## 2019-02-28 DIAGNOSIS — Z86.73 HISTORY OF ISCHEMIC VERTEBROBASILAR ARTERY BRAINSTEM STROKE: ICD-10-CM

## 2019-02-28 DIAGNOSIS — I73.9 PAD (PERIPHERAL ARTERY DISEASE): ICD-10-CM

## 2019-02-28 DIAGNOSIS — I10 ESSENTIAL HYPERTENSION: ICD-10-CM

## 2019-02-28 PROCEDURE — 3078F PR MOST RECENT DIASTOLIC BLOOD PRESSURE < 80 MM HG: ICD-10-PCS | Mod: CPTII,S$GLB,, | Performed by: INTERNAL MEDICINE

## 2019-02-28 PROCEDURE — 1101F PT FALLS ASSESS-DOCD LE1/YR: CPT | Mod: CPTII,S$GLB,, | Performed by: INTERNAL MEDICINE

## 2019-02-28 PROCEDURE — 99214 OFFICE O/P EST MOD 30 MIN: CPT | Mod: S$GLB,,, | Performed by: INTERNAL MEDICINE

## 2019-02-28 PROCEDURE — 99214 PR OFFICE/OUTPT VISIT, EST, LEVL IV, 30-39 MIN: ICD-10-PCS | Mod: S$GLB,,, | Performed by: INTERNAL MEDICINE

## 2019-02-28 PROCEDURE — 99999 PR PBB SHADOW E&M-EST. PATIENT-LVL III: CPT | Mod: PBBFAC,,, | Performed by: INTERNAL MEDICINE

## 2019-02-28 PROCEDURE — 3077F SYST BP >= 140 MM HG: CPT | Mod: CPTII,S$GLB,, | Performed by: INTERNAL MEDICINE

## 2019-02-28 PROCEDURE — 99999 PR PBB SHADOW E&M-EST. PATIENT-LVL III: ICD-10-PCS | Mod: PBBFAC,,, | Performed by: INTERNAL MEDICINE

## 2019-02-28 PROCEDURE — 3077F PR MOST RECENT SYSTOLIC BLOOD PRESSURE >= 140 MM HG: ICD-10-PCS | Mod: CPTII,S$GLB,, | Performed by: INTERNAL MEDICINE

## 2019-02-28 PROCEDURE — 3078F DIAST BP <80 MM HG: CPT | Mod: CPTII,S$GLB,, | Performed by: INTERNAL MEDICINE

## 2019-02-28 PROCEDURE — 1101F PR PT FALLS ASSESS DOC 0-1 FALLS W/OUT INJ PAST YR: ICD-10-PCS | Mod: CPTII,S$GLB,, | Performed by: INTERNAL MEDICINE

## 2019-02-28 RX ORDER — DOXAZOSIN 2 MG/1
2 TABLET ORAL NIGHTLY
Qty: 30 TABLET | Refills: 11 | Status: SHIPPED | OUTPATIENT
Start: 2019-02-28 | End: 2019-03-28

## 2019-02-28 NOTE — PROGRESS NOTES
Subjective:    Patient ID:  Cy Rutherford is a 73 y.o. male who presents for follow-up of Coronary Artery Disease      HPI     74 y/o male former pt of Dr Fish. 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   Had CVA over the last year with residual weakness. Holter ordered without significant arrhythmias (did show PAT).   Had been having uncontrolled HTN and chlorthalidone added to regimen last clinic visit (also on losartan, metoprolol and amlodipine). BP log available and -170's, unchanged. He denies regular CP, SOB/NASSAR, orthopnea, PND, palps, syncope. Continues to have bilateral LE edema. Does not follow a low sodium diet. He is compliant with his meds. Remote smoking hx. No non healing ulceration or evidence of limb ischemia. Currently on DAPT and statin for secondary stroke prevention. About to start PT again soon.     Review of Systems   Constitution: Positive for weakness. Negative for malaise/fatigue.   HENT: Negative for congestion.    Eyes: Negative for blurred vision.   Cardiovascular: Negative for chest pain, claudication, cyanosis, dyspnea on exertion, irregular heartbeat, leg swelling, near-syncope, orthopnea, palpitations, paroxysmal nocturnal dyspnea and syncope.   Respiratory: Negative for shortness of breath.    Endocrine: Negative for polyuria.   Hematologic/Lymphatic: Negative for bleeding problem.   Skin: Negative for itching and rash.   Musculoskeletal: Positive for muscle weakness. Negative for joint swelling and muscle cramps.   Gastrointestinal: Negative for abdominal pain, hematemesis, hematochezia,  melena, nausea and vomiting.   Genitourinary: Negative for dysuria and hematuria.   Neurological: Positive for focal weakness. Negative for dizziness, headaches, light-headedness and loss of balance.   Psychiatric/Behavioral: Negative for depression. The patient is not nervous/anxious.         Objective:    Physical Exam   Constitutional: He is oriented to person, place, and time. He appears well-developed and well-nourished.   HENT:   Head: Normocephalic and atraumatic.   Neck: Neck supple. No JVD present.   Cardiovascular: Normal rate, regular rhythm and normal heart sounds.   Pulses:       Carotid pulses are 2+ on the right side, and 2+ on the left side.       Radial pulses are 2+ on the right side, and 2+ on the left side.        Femoral pulses are 2+ on the right side, and 2+ on the left side.       Posterior tibial pulses are 1+ on the right side, and 1+ on the left side.   Pulmonary/Chest: Effort normal and breath sounds normal.   Abdominal: Soft. Bowel sounds are normal.   Musculoskeletal: He exhibits edema.   Neurological: He is alert and oriented to person, place, and time.   Skin: Skin is warm and dry.   Psychiatric: He has a normal mood and affect. His behavior is normal. Thought content normal.         Assessment:       1. Coronary artery disease involving native coronary artery of native heart without angina pectoris    2. Status post coronary artery stent placement    3. PAD (peripheral artery disease)    4. Essential hypertension    5. Hyperlipidemia, unspecified hyperlipidemia type    6. History of ischemic vertebrobasilar artery brainstem stroke    7. Intracranial atherosclerosis      74 y/o pt with hx and presentation as above. Doing well from a cardiac perspective and compensated from a HF perspective. Will add doxazosin to regimen, BP log, and refer to Digital HTN clinic. Discussed the importance of low sodium diet. Discussed the importance of med compliance, heart healthy diet, and regular  exercise.        Plan:       -Doxazosin 2 mg nightly  -Continue current medical management  -BP log  -Referral to digital HTN clinic  -f/u in 1 month

## 2019-03-06 ENCOUNTER — PATIENT MESSAGE (OUTPATIENT)
Dept: ADMINISTRATIVE | Facility: OTHER | Age: 74
End: 2019-03-06

## 2019-03-07 ENCOUNTER — CLINICAL SUPPORT (OUTPATIENT)
Dept: REHABILITATION | Facility: HOSPITAL | Age: 74
End: 2019-03-07
Payer: MEDICARE

## 2019-03-07 DIAGNOSIS — R53.1 RIGHT SIDED WEAKNESS: ICD-10-CM

## 2019-03-07 PROCEDURE — 97165 OT EVAL LOW COMPLEX 30 MIN: CPT | Mod: PO

## 2019-03-07 NOTE — PLAN OF CARE
"  [facility] Occupational Therapy  Initial Evaluation     Date: 3/7/2019  Name: Cy Rutherford  Clinic Number: [id number]    Therapy Diagnosis:   Encounter Diagnosis   Name Primary?    Right sided weakness        Physician: Yamileth Niño MD    Physician Orders: OT Eval and Treat  Medical Diagnosis: History of ischemic vertebrobasilar artery brainstem stroke  Surgical Procedure and Date: n/a  Evaluation Date: 3/7/19  Insurance Authorization Period Expiration: 2/14/20  Plan of Care Certification Period: 3/7/19-5/2/19 (8 weeks)  Date of Return to MD: TBD    Visit # / Visits authorized: 1 / 1  Time In:10:10a  Time Out: 10:40a  Total Billable Time: 30 minutes    Precautions:  Standard    Subjective     Involved Side: Right sided weakness  Dominant Side: Right however; pt reports that he has been doing a lot with his left hand  Date of Onset: May 2018  History of Current Condition/Mechanism of Injury: CVA  Imaging: none noted in chart  Previous Therapy: Pt received Physical Therapy post release from hospital during period July 2018-Oct 2018    Past Medical History/Physical Systems Review:   Cy Rutherford  has a past medical history of Acute coronary syndrome, Coronary artery disease, Essential hypertension, Hypertension, Intracranial atherosclerosis, Thrombotic stroke involving basilar artery, and Type 2 diabetes mellitus with kidney complication, without long-term current use of insulin.    Cy Rutherford  has a past surgical history that includes Coronary angioplasty and Colonoscopy.    Cy DARDEN has a current medication list which includes the following prescription(s): amlodipine, aspirin, chlorthalidone, clopidogrel, doxazosin, fenofibrate micronized, fish oil-omega-3 fatty acids, insulin glargine, insulin lispro, losartan, metformin, metoprolol succinate, and rosuvastatin.    Review of patient's allergies indicates:  No Known Allergies     Patient's Goals for Therapy: " To get more movement in my " "right arm".    Pain:  Functional Pain Scale Rating 0-10:   n/a/10 on average  n/a/10 at best  10/10 at worst  Location: n/a    Occupation:    Pt is currently retired as a .       Functional Limitations/Social History:    Previous functional status includes: Independent with all ADLs.     Current FunctionalStatus   Home/Living environment : Pt lives alone in a private house with access to bedroom, bathroom and kitchen on 1st level. Pt reports that the home in handicap equipped secondary to his wife having a stroke in the past      Limitation of Functional Status as follows:   ADLs/IADLs:     - Feeding: Independent     - Bathing: Independent with grab bars.     - Dressing/Grooming: Independent     - Driving: Indpendent    - Household Management: Requires assistance with cleaning the house. Pt reports difficulty with washing dishes and cooking meals secondary to decreased strength in right hand.       Objective     Pt arrived to clinic ambulating with slightly unsteady gait. Pt reports that he uses a cane however; only for longer distances.      Range of Motion and Manual Muscle Test  Shoulder Left MMT Right MMT   Flexion WFL 5/5 100 4/5   Abduction WFL 5/5 70 4-/5   ER at 0 WFL 5/5 0 4-/5   ER at 90 WFL 5/5 unable    IR WFL 5/5 unable      Elbow Left MMT Right MMT   Flexion WFL 5/5 120 4/5   Extension WFL 5/5 -30 4-/5   Pronation WFL 5/5 70 4/5   Supination WFL 5/5 40 4-/5     Wrist Left MMT Right MMT   Flexion WFL 5/5 60 4-/5   Extension WFL 5/5 35 4-/5   Radial Deviation WFL 5/5 25 4-/5   Ulnar Deviation WFL 5/5 20 4-/5   Supination WFL 5/5 40 4-/5   Pronation WFL 5/5 70 4/5          Strength (Dyanmometer) and Pinch Strength (Pinch Gauge)  Measured in pounds and psi.    3/7/2019 3/7/2019    Left Right   Rung II 80 30   Key Pinch 20 11   3pt Pinch 15 6   2pt Pinch 20 9     Pt with significant noted weakness to right upper extremity      CMS Impairment/Limitation/Restriction for FOTO " Cerebrovascular Disorders Survey    Therapist reviewed FOTO scores for Cy Rutherford on 3/7/2019.   FOTO documents entered into [employer] - see Media section.    Limitation Score: 37%  Category: Carrying    Current : CJ = at least 20% but < 40% impaired, limited or restricted  Goal: CJ = at least 20% but < 40% impaired, limited or restricted         Treatment     Home Exercise Program/Education:  Issued HEP (see patient instructions in EMR) and educated on modality use for pain management . Exercises were reviewed and Cy was able to demonstrate them prior to the end of the session.   Pt received a written copy of exercises to perform at home. Cy demonstrated good  understanding of the education provided.  Pt was advised to perform these exercises free of pain, and to stop performing them if pain occurs.    Patient/Family Education: role of OT, goals for OT, scheduling/cancellations - pt verbalized understanding. Discussed insurance limitations with patient.    Additional Education provided: Discussed fall prevention     Assessment     Cy Rutherford is a 73 y.o. male referred to outpatient occupational therapy and presents with a medical diagnosis of History of ischemic vertebrobasilar artery brainstem stroke, resulting in right sided weakness and demonstrates limitations as described in the chart below. Following medical record review it is determined that pt will benefit from occupational therapy services in order to maximize pain free and/or functional use of right upper extremity. The following goals were discussed with the patient and patient is in agreement with them as to be addressed in the treatment plan. The patient's rehab potential is Good.     Anticipated barriers to occupational therapy: none noted.  Pt has no cultural, educational or language barriers to learning provided.    Profile and History Assessment of Occupational Performance Level of Clinical Decision Making Complexity Score    Occupational Profile:   Cy Rutherford is a 73 y.o. male who lives alone and is retired Cy Rutherford has difficulty with  ADLs and IADLs as listed previously, which  affecting his/her daily functional abilities.      Comorbidities:    has a past medical history of Acute coronary syndrome, Coronary artery disease, Essential hypertension, Hypertension, Intracranial atherosclerosis, Thrombotic stroke involving basilar artery, and Type 2 diabetes mellitus with kidney complication, without long-term current use of insulin.    Medical and Therapy History Review:   Brief               Performance Deficits    Physical:  Muscle Power/Strength    Strength  Pinch Strength    Cognitive:  No Deficits    Psychosocial:    No Deficits     Clinical Decision Making:  moderate    Assessment Process:  Comprehensive Assessments    Modification/Need for Assistance:  Not Necessary    Intervention Selection:  Multiple Treatment Options       low  Based on PMHX, co morbidities , data from assessments and functional level of assistance required with task and clinical presentation directly impacting function.       The following goals were discussed with the patient and patient is in agreement with them as to be addressed in the treatment plan.     Goals:   Goals to be met in 4 weeks: (4/4/19)  1)   Initiate Hep   2)   Pt will increase R shoulder AROM by degrees grossly for improved performance with overhead ADL's  3)   Pt will demonstrate increased MMT by at least 1/2 grade grossly in RUE  4)   Increase AROM 5 degrees in wrist supination plane of motion to increase functional right hand use for participation in ADLs and IADLs.   5)   Increase  strength 2-3 lbs. to grasp heavier items during household management tasks  6)   Increase pinch 1-3 psis for turning keys and opening up water bottles        Long Term (by discharge):  1)   Independent with HEP  2)   Patient to score no more than 20% limitation on FOTO to demonstrate  improved perception of functional right hand use.  3)   Pt will return to near to prior level of function for ADLs and household management reporting I or Mod I with ADLs.       Plan   Certification Period/Plan of care expiration: 3/7/2019 to 5/2/2019 (8 weeks)    Outpatient Occupational Therapy 2 time weekly for 8 weeks to include the following interventions:  Manual therapy/joint mobilizations, Modalities for pain management, Therapeutic exercises/activities., Strengthening and Energy Conservation .          Sayda Suarez MS OTR/L

## 2019-03-07 NOTE — PATIENT INSTRUCTIONS
[facility], OCCUPATIONAL THERAPY  HOME EXERCISE PROGRAM       Complete the following exercises with 10 repetitions each, 2-4 x/day.     AROM: Supination / Pronation   With your elbow by your side, turn your palm up then turn your palm down.     AROM: Wrist Flexion / Extension               Bend your wrist forward and back as far as possible.      AROM: Wrist Radial / Ulnar Deviation  Bend your wrist from side to side as far as possible.      [facility]  OCCUPATIONAL THERAPY  HOME EXERCISE PROGRAM         Stand facing table, palms resting comfortably on surface. Slowly move body over hands until gentle stretch is felt in forearms. Hold 3 seconds.  Repeat 10 times per session. Do 2-4 sessions per day.  Copyright © [facility]. All rights reserved.          [facility]  OCCUPATIONAL THERAPY  HOME EXERCISE PROGRAM         Sitting with elbows on table and palms together, slowly lower wrists toward table until stretch is felt. Be sure to keep palms together throughout stretch. Hold 3 seconds. Relax.  Repeat 10 times. Do 2-4 sessions per day.  Copyright © [facility]. All rights reserved.          [facility]  OCCUPATIONAL THERAPY  HOME EXERCISE PROGRAM     Complete the following strengthening program 2x/day.                       _          [facility]  OCCUPATIONAL THERAPY  HOME EXERCISE PROGRAM       Stand 2 feet from wall with both hands on wall. Perform a push-up.  Repeat 10 times per set. Rest 3 seconds after set. Do 10 sets per session.    Copyright © I. All rights reserved.            Sayda Suarez MS OTR/L  Occupational Therapist

## 2019-03-12 ENCOUNTER — CLINICAL SUPPORT (OUTPATIENT)
Dept: REHABILITATION | Facility: HOSPITAL | Age: 74
End: 2019-03-12
Payer: MEDICARE

## 2019-03-12 DIAGNOSIS — R53.1 RIGHT SIDED WEAKNESS: ICD-10-CM

## 2019-03-12 PROCEDURE — 97110 THERAPEUTIC EXERCISES: CPT | Mod: PO

## 2019-03-12 PROCEDURE — 97530 THERAPEUTIC ACTIVITIES: CPT | Mod: PO

## 2019-03-12 NOTE — PROGRESS NOTES
Occupational Therapy Daily Treatment Note     Date: 3/12/2019  Name: Cy Rutherford  Clinic Number: [id number]    Therapy Diagnosis:   Encounter Diagnosis   Name Primary?    Right sided weakness      Physician: Yamileth Niño MD    Physician Orders: OT Eval and Treat  Medical Diagnosis: History of ischemic vertebrobasilar artery brainstem stroke  Surgical Procedure and Date: n/a  Evaluation Date: 3/7/19  Insurance Authorization Period Expiration: 2/14/20  Plan of Care Certification Period: 3/7/19-5/2/19 (8 weeks)  Date of Return to MD: TBD      Visit # / Visits authorized: 2 / 20  Time In: 5:10p  Time Out: 5:55p  Total Billable Time: 45 minutes (2TE, 1TA)    Precautions:  Standard      Subjective     Pt reports: Pt reports that he has been compliant with home exercise program given last session.   Response to previous treatment: Please refer to initial evaluation dated 3/7/19.  Functional change: none noted    Pain: 0/10  Location: n/a    Objective     Cy received therapeutic exercises for 15 minutes including:      Cy participated in dynamic functional therapeutic activities to improve functional performance for   minutes, including:       Date: 3/12/19     Visit: 2   Shoulder/scapular strengthening with red theraband  -rows   -triceps extension    - biceps curls   - flex & abd  10 reps each   Seated shoulder abd/FF, 2# 2 x 10 reps   Shoulder chest press, seated with 2# dowel 2 sets x 10 reps   Dynamic scapular stabilization while in supine, shoulder @90* flex 4 sets x 10 sec each   UBE, L2.5 10 minutes (5 mins forward, 5 mins backwards)   Digiflex red 2 x 10 reps     Pulleys (flex/ext & abd/add) 3 mins each   Red Putty (power ) 10 reps          Home Exercises and Education Provided     Education provided:     Written Home Exercises Provided: Patient instructed to cont prior HEP.  Exercises were reviewed and Cy was able to demonstrate them prior to the end of the session.  Cy demonstrated good   understanding of the HEP provided.   .   See EMR under Patient Instructions for exercises provided prior visit.        Assessment     Cy is progressing well towards his goals and there are no updates to goals at this time. Pt prognosis is Good.     Pt will continue to benefit from skilled outpatient occupational therapy to address the deficits listed in the problem list on initial evaluation provide pt/family education and to maximize pt's level of independence in the home and community environment.     Anticipated barriers to occupational therapy: none noted    Pt's spiritual, cultural and educational needs considered and pt agreeable to plan of care and goals.    Goals:  Goals to be met in 4 weeks: (4/4/19)  1)   Initiate Hep   2)   Pt will increase R shoulder AROM by degrees grossly for improved performance with overhead ADL's  3)   Pt will demonstrate increased MMT by at least 1/2 grade grossly in RUE  4)   Increase AROM 5 degrees in wrist supination plane of motion to increase functional right hand use for participation in ADLs and IADLs.   5)   Increase  strength 2-3 lbs. to grasp heavier items during household management tasks  6)   Increase pinch 1-3 psis for turning keys and opening up water bottles           Long Term (by discharge):  1)   Independent with HEP  2)   Patient to score no more than 20% limitation on FOTO to demonstrate improved perception of functional right hand use.  3)   Pt will return to near to prior level of function for ADLs and household management reporting I or Mod I with ADLs.            Plan   Certification Period/Plan of care expiration: 3/7/2019 to 5/2/2019 (8 weeks)     Outpatient Occupational Therapy 2 time weekly for 8 weeks to include the following interventions:  Manual therapy/joint mobilizations, Modalities for pain management, Therapeutic exercises/activities., Strengthening and Energy Conservation .      Sayda Suarez MS OTR/L

## 2019-03-20 ENCOUNTER — PATIENT OUTREACH (OUTPATIENT)
Dept: OTHER | Facility: OTHER | Age: 74
End: 2019-03-20

## 2019-03-20 NOTE — LETTER
March 20, 2019     Cy Rutherford  [address] [facility] [address] [place] [address]       Dear Cy DARDEN,    Welcome to [facility]! Our goal is to make care effective, proactive and convenient by using data you send us from home to better treat your chronic conditions.          My name is Nayeli Ortega, and I am your dedicated Digital Medicine clinician. As an expert in medication management, I will help ensure that the medications you are taking continue to provide the intended benefits and help you reach your goals. You can reach me directly at [phone] or by sending me a message directly through your [facility] account.        I am Crystal Goode and I will be your health . My job is to help you identify lifestyle changes to improve your disease control. We will talk about nutrition, exercise, and other ways you may be able to adjust your current habits to better your health. Additionally, we will help ensure you are completing the tests and screenings that are necessary to help manage your conditions. You can reach me directly at [phone] or by sending me a message directly through your [facility] account.    Most importantly, YOU are at the center of this team. Together, we will work to improve your overall health and encourage you to meet your goals for a healthier lifestyle.     What we expect from YOU:  · Please take frequent home blood pressure measurements. We ask that you take at least 1 blood pressure reading per week, but more information will better help us get you know you. Be sure you rest for a few minutes before taking the reading in a quiet, comfortable place.     Be available to receive phone calls or [facility] messages, when appropriate, from your care team. Please let us know if there are any specific days or times that work best for us to reach you via phone.     Complete routine tests and screenings. Dont worry, we will help keep you on track!           What you should expect from  your Digital Medicine Care Team:   We will work with you to create a personalized plan of care and provide you with encouragement and education, including regarding lifestyle changes, that could help you manage your disease states.     We will adjust your current medications, if needed, and continue to monitor your long-term progress.     We will provide you and your physician with monthly progress reports after you have been in the program for more than 30 days.     We will send you reminders through [facility] and text messages to help ensure you do not miss any testing deadlines to help manage your disease states.    You will be able to reach us by phone or through your [facility] account by clicking our names under Care Team on the right side of the home screen.    I look forward to working with you to achieve your blood pressure goals!    We look forward to working with you to help manage your health,    Sincerely,    Your Digital Medicine Team    Please visit our websites to learn more:   · Hypertension: [email or web address]      Remember, we are not available for emergencies. If you have an emergency, please contact your doctors office directly or call [facility] ([phone] or [phone]) or 001.

## 2019-03-20 NOTE — PROGRESS NOTES
Digital Medicine Enrollment Call    Introduced Mr. Cy Rutherford to Digital Medicine.     Discussed program expectations and requirements.    Introduced digital medicine care team.     Reviewed the importance of self-monitoring for digital medicine participation.     Reviewed that the Digital Medicine team is not available for emergencies and instructed the patient to call 911 or [facility] ([phone] or [phone]) if one arises.            Last 5 Patient Entered Readings                                      Current 30 Day Average: 157/79     Recent Readings 3/20/2019 3/19/2019 3/18/2019    SBP (mmHg) 186 164 120    DBP (mmHg) 80 76 80    Pulse 56 51 80

## 2019-03-25 ENCOUNTER — CLINICAL SUPPORT (OUTPATIENT)
Dept: REHABILITATION | Facility: HOSPITAL | Age: 74
End: 2019-03-25
Payer: MEDICARE

## 2019-03-25 DIAGNOSIS — R53.1 RIGHT SIDED WEAKNESS: ICD-10-CM

## 2019-03-25 PROCEDURE — 97110 THERAPEUTIC EXERCISES: CPT | Mod: PO

## 2019-03-25 NOTE — PROGRESS NOTES
Occupational Therapy Daily Treatment Note     Date: 3/25/2019  Name: Cy Rutherford  Clinic Number: [id number]    Therapy Diagnosis:   Encounter Diagnosis   Name Primary?    Right sided weakness      Physician: Yamileth Niño MD    Physician Orders: OT Eval and Treat  Medical Diagnosis: History of ischemic vertebrobasilar artery brainstem stroke  Surgical Procedure and Date: n/a  Evaluation Date: 3/7/19  Insurance Authorization Period Expiration: 2/14/20  Plan of Care Certification Period: 3/7/19-5/2/19 (8 weeks)  Date of Return to MD: TBD      Visit # / Visits authorized: 3 / 20  Time In: 1:15p  Time Out: 2:00p  Total Billable Time: 45 minutes (3TE)    Precautions:  Standard      Subjective     Pt reports: Pt reports that he has been compliant with home exercise program given last session.   Response to previous treatment: Pt is tolerating therapy sessions well.   Functional change: none noted    Pain: 0/10  Location: n/a    Objective     Cy received therapeutic exercises for 30 minutes including: (please refer to chart)        Cy participated in dynamic functional therapeutic activities to improve functional performance for 15 minutes, including: (please refer to chart)       Date: 3/25/19     Visit: 3   Shoulder/scapular strengthening with theraband  -rows (red)  -triceps extension  (red)  - biceps curls (red)  - flex & abd (yellow) 10 reps each   Seated shoulder abd/FF, 1# 2 x 10 reps   Shoulder FF in supine, chest press, seated with 3# dowel 2 sets x 10 reps   Dynamic scapular stabilization while in supine, shoulder @90* flex 4 sets x 10 sec each   UBE, L2.5 10 minutes (5 mins forward, 5 mins backwards)   Digiflex red 2 x 10 reps     Pulleys (flex/ext & abd/add) 3 mins each   Red Putty (power ) 10 reps            Home Exercises and Education Provided     Education provided:     Written Home Exercises Provided: Patient instructed to cont prior HEP.  Exercises were reviewed and Cy was able to  demonstrate them prior to the end of the session.  Cy demonstrated good  understanding of the HEP provided.   .   See EMR under Patient Instructions for exercises provided prior visit.        Assessment     Cy is progressing well towards his goals and there are no updates to goals at this time. Pt prognosis is Good.     Pt will continue to benefit from skilled outpatient occupational therapy to address the deficits listed in the problem list on initial evaluation provide pt/family education and to maximize pt's level of independence in the home and community environment.     Anticipated barriers to occupational therapy: none noted    Pt's spiritual, cultural and educational needs considered and pt agreeable to plan of care and goals.    Goals:  Goals to be met in 4 weeks: (4/4/19)  1)   Initiate Hep   2)   Pt will increase R shoulder AROM by degrees grossly for improved performance with overhead ADL's  3)   Pt will demonstrate increased MMT by at least 1/2 grade grossly in RUE  4)   Increase AROM 5 degrees in wrist supination plane of motion to increase functional right hand use for participation in ADLs and IADLs.   5)   Increase  strength 2-3 lbs. to grasp heavier items during household management tasks  6)   Increase pinch 1-3 psis for turning keys and opening up water bottles           Long Term (by discharge):  1)   Independent with HEP  2)   Patient to score no more than 20% limitation on FOTO to demonstrate improved perception of functional right hand use.  3)   Pt will return to near to prior level of function for ADLs and household management reporting I or Mod I with ADLs.            Plan   Certification Period/Plan of care expiration: 3/7/2019 to 5/2/2019 (8 weeks)     Outpatient Occupational Therapy 2 time weekly for 8 weeks to include the following interventions:  Manual therapy/joint mobilizations, Modalities for pain management, Therapeutic exercises/activities., Strengthening and Energy  Conservation .      Sayda Suarez MS OTR/L

## 2019-03-28 ENCOUNTER — OFFICE VISIT (OUTPATIENT)
Dept: CARDIOLOGY | Facility: CLINIC | Age: 74
End: 2019-03-28
Payer: MEDICARE

## 2019-03-28 ENCOUNTER — CLINICAL SUPPORT (OUTPATIENT)
Dept: REHABILITATION | Facility: HOSPITAL | Age: 74
End: 2019-03-28
Payer: MEDICARE

## 2019-03-28 VITALS
OXYGEN SATURATION: 97 % | BODY MASS INDEX: 33.91 KG/M2 | WEIGHT: 223 LBS | SYSTOLIC BLOOD PRESSURE: 128 MMHG | DIASTOLIC BLOOD PRESSURE: 56 MMHG | HEART RATE: 54 BPM

## 2019-03-28 DIAGNOSIS — R53.1 RIGHT SIDED WEAKNESS: ICD-10-CM

## 2019-03-28 DIAGNOSIS — I73.9 PAD (PERIPHERAL ARTERY DISEASE): ICD-10-CM

## 2019-03-28 DIAGNOSIS — E78.5 HYPERLIPIDEMIA, UNSPECIFIED HYPERLIPIDEMIA TYPE: ICD-10-CM

## 2019-03-28 DIAGNOSIS — I67.9 LACUNAR ATAXIC HEMIPARESIS: ICD-10-CM

## 2019-03-28 DIAGNOSIS — I10 ESSENTIAL HYPERTENSION: Primary | ICD-10-CM

## 2019-03-28 DIAGNOSIS — E11.22 TYPE 2 DIABETES MELLITUS WITH CHRONIC KIDNEY DISEASE, WITHOUT LONG-TERM CURRENT USE OF INSULIN, UNSPECIFIED CKD STAGE: ICD-10-CM

## 2019-03-28 DIAGNOSIS — Z86.73 HISTORY OF ISCHEMIC VERTEBROBASILAR ARTERY BRAINSTEM STROKE: ICD-10-CM

## 2019-03-28 DIAGNOSIS — I25.10 CORONARY ARTERY DISEASE INVOLVING NATIVE CORONARY ARTERY OF NATIVE HEART WITHOUT ANGINA PECTORIS: ICD-10-CM

## 2019-03-28 DIAGNOSIS — G46.7 LACUNAR ATAXIC HEMIPARESIS: ICD-10-CM

## 2019-03-28 DIAGNOSIS — I67.2 INTRACRANIAL ATHEROSCLEROSIS: ICD-10-CM

## 2019-03-28 DIAGNOSIS — Z85.038 HISTORY OF COLON CANCER: ICD-10-CM

## 2019-03-28 DIAGNOSIS — Z95.5 STATUS POST CORONARY ARTERY STENT PLACEMENT: ICD-10-CM

## 2019-03-28 PROCEDURE — 3074F SYST BP LT 130 MM HG: CPT | Mod: CPTII,S$GLB,, | Performed by: INTERNAL MEDICINE

## 2019-03-28 PROCEDURE — 3045F PR MOST RECENT HEMOGLOBIN A1C LEVEL 7.0-9.0%: CPT | Mod: CPTII,S$GLB,, | Performed by: INTERNAL MEDICINE

## 2019-03-28 PROCEDURE — 99999 PR PBB SHADOW E&M-EST. PATIENT-LVL III: ICD-10-PCS | Mod: PBBFAC,,, | Performed by: INTERNAL MEDICINE

## 2019-03-28 PROCEDURE — 3045F PR MOST RECENT HEMOGLOBIN A1C LEVEL 7.0-9.0%: ICD-10-PCS | Mod: CPTII,S$GLB,, | Performed by: INTERNAL MEDICINE

## 2019-03-28 PROCEDURE — 3078F PR MOST RECENT DIASTOLIC BLOOD PRESSURE < 80 MM HG: ICD-10-PCS | Mod: CPTII,S$GLB,, | Performed by: INTERNAL MEDICINE

## 2019-03-28 PROCEDURE — 99999 PR PBB SHADOW E&M-EST. PATIENT-LVL III: CPT | Mod: PBBFAC,,, | Performed by: INTERNAL MEDICINE

## 2019-03-28 PROCEDURE — 1101F PT FALLS ASSESS-DOCD LE1/YR: CPT | Mod: CPTII,S$GLB,, | Performed by: INTERNAL MEDICINE

## 2019-03-28 PROCEDURE — 3078F DIAST BP <80 MM HG: CPT | Mod: CPTII,S$GLB,, | Performed by: INTERNAL MEDICINE

## 2019-03-28 PROCEDURE — 99214 PR OFFICE/OUTPT VISIT, EST, LEVL IV, 30-39 MIN: ICD-10-PCS | Mod: S$GLB,,, | Performed by: INTERNAL MEDICINE

## 2019-03-28 PROCEDURE — 1101F PR PT FALLS ASSESS DOC 0-1 FALLS W/OUT INJ PAST YR: ICD-10-PCS | Mod: CPTII,S$GLB,, | Performed by: INTERNAL MEDICINE

## 2019-03-28 PROCEDURE — 3074F PR MOST RECENT SYSTOLIC BLOOD PRESSURE < 130 MM HG: ICD-10-PCS | Mod: CPTII,S$GLB,, | Performed by: INTERNAL MEDICINE

## 2019-03-28 PROCEDURE — 99214 OFFICE O/P EST MOD 30 MIN: CPT | Mod: S$GLB,,, | Performed by: INTERNAL MEDICINE

## 2019-03-28 PROCEDURE — 97530 THERAPEUTIC ACTIVITIES: CPT | Mod: PO

## 2019-03-28 PROCEDURE — 97110 THERAPEUTIC EXERCISES: CPT | Mod: PO

## 2019-03-28 RX ORDER — DOXAZOSIN 4 MG/1
4 TABLET ORAL NIGHTLY
Qty: 90 TABLET | Refills: 3 | Status: SHIPPED | OUTPATIENT
Start: 2019-03-28 | End: 2019-06-20

## 2019-03-28 NOTE — PROGRESS NOTES
Subjective:    Patient ID:  Cy Rutherford is a 73 y.o. male who presents for follow-up of Hypertension      HPI    74 y/o male former pt of Dr Fish. 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   Had CVA over the last year with residual weakness. Holter ordered without significant arrhythmias (did show PAT).   Had been having uncontrolled HTN and chlorthalidone added to regimen (also on losartan, metoprolol and amlodipine). -160's with readin in the 180's. He denies regular CP, SOB/NASSAR, orthopnea, PND, palps, syncope. Continues to have bilateral LE edema. He is compliant with his meds. Remote smoking hx. No non healing ulceration or evidence of limb ischemia. Currently on DAPT and statin for secondary stroke prevention.     Review of Systems   Constitution: Negative for malaise/fatigue.   HENT: Negative for congestion.    Eyes: Negative for blurred vision.   Cardiovascular: Negative for chest pain, claudication, cyanosis, dyspnea on exertion, irregular heartbeat, leg swelling, near-syncope, orthopnea, palpitations, paroxysmal nocturnal dyspnea and syncope.   Respiratory: Negative for shortness of breath.    Endocrine: Negative for polyuria.   Hematologic/Lymphatic: Negative for bleeding problem.   Skin: Negative for itching and rash.   Musculoskeletal: Positive for muscle weakness. Negative for joint swelling and muscle cramps.   Gastrointestinal: Negative for abdominal pain, hematemesis, hematochezia, melena, nausea and vomiting.   Genitourinary: Negative for dysuria and hematuria.   Neurological: Positive for focal weakness.  Negative for dizziness, headaches, light-headedness, loss of balance and weakness.   Psychiatric/Behavioral: Negative for depression. The patient is not nervous/anxious.         Objective:    Physical Exam   Constitutional: He is oriented to person, place, and time. He appears well-developed and well-nourished.   HENT:   Head: Normocephalic and atraumatic.   Neck: Neck supple. No JVD present.   Cardiovascular: Normal rate, regular rhythm and normal heart sounds.   Pulses:       Carotid pulses are 2+ on the right side, and 2+ on the left side.       Radial pulses are 2+ on the right side, and 2+ on the left side.        Femoral pulses are 2+ on the right side, and 2+ on the left side.       Dorsalis pedis pulses are 2+ on the right side, and 2+ on the left side.        Posterior tibial pulses are 2+ on the right side, and 2+ on the left side.   Pulmonary/Chest: Effort normal and breath sounds normal.   Abdominal: Soft. Bowel sounds are normal.   Musculoskeletal: He exhibits edema.   Neurological: He is alert and oriented to person, place, and time.   Right sided weakness   Skin: Skin is warm and dry.   Psychiatric: He has a normal mood and affect. His behavior is normal. Thought content normal.         Assessment:       1. Essential hypertension    2. Coronary artery disease involving native coronary artery of native heart without angina pectoris    3. PAD (peripheral artery disease)    4. Hyperlipidemia, unspecified hyperlipidemia type    5. Status post coronary artery stent placement    6. History of ischemic vertebrobasilar artery brainstem stroke    7. Ataxic hemiparesis    8. Intracranial atherosclerosis    9. History of colon cancer    10. Type 2 diabetes mellitus with chronic kidney disease, without long-term current use of insulin, unspecified CKD stage    11. Right sided weakness      72 y/o pt with hx and presentation as above. Doing well from a cardiac perspective and compensated from a HF perspective. BP  improved uncontrolled. Will increase doxazosin. Discussed the etiology, evaluation, and management of HTN. Discussed the importance of med compliance, heart healthy diet, and regular exercise.          Plan:       -Increase doxazosin to 4 mg daily  -Cont with digital HTN clinic  -f/u in 3 months

## 2019-03-28 NOTE — PROGRESS NOTES
"  Occupational Therapy Daily Treatment Note     Date: 3/28/2019  Name: Cy Rutherford  Clinic Number: [id number]    Therapy Diagnosis:   Encounter Diagnosis   Name Primary?    Right sided weakness      Physician: Yamileth Niño MD    Physician Orders: OT Eval and Treat  Medical Diagnosis: History of ischemic vertebrobasilar artery brainstem stroke  Surgical Procedure and Date: n/a  Evaluation Date: 3/7/19  Insurance Authorization Period Expiration: 2/14/20  Plan of Care Certification Period: 3/7/19-5/2/19 (8 weeks)  Date of Return to MD: TBD      Visit # / Visits authorized: 4 / 20  Time In: 2:00p  Time Out: 2:45p  Total Billable Time: 45 minutes (2TE, 1TA)    Precautions:  Standard      Subjective     Pt reports: "I'm not having much issues at home" (referencing use of right arm). Pt reports that he has been compliant with home exercise program given last session.   Response to previous treatment: Pt is tolerating therapy sessions well.   Functional change: none noted    Pain: 0/10  Location: n/a    Objective     Cy received therapeutic exercises for 30 minutes including: (please refer to chart)        Cy participated in dynamic functional therapeutic activities to improve functional performance for 15 minutes, including: (please refer to chart)       Date: 3/28/19     Visit: 4   Shoulder/scapular strengthening with red theraband  -rows   -triceps extension    - biceps curls   - flex & abd  10 reps each   Seated shoulder abd/FF, 1# 2 x 10 reps   Shoulder FF in supine, chest press, seated with 3# dowel 2 sets x 10 reps   Dynamic scapular stabilization while in supine, shoulder @90* flex 4 sets x 10 sec each   UBE, L2.5 10 minutes (5 mins forward, 5 mins backwards)   Digiflex red 2 x 10 reps     Pulleys (flex/ext & abd/add) 3 mins each   Red Putty (power ) (not today)   Cone Reaching  7 cones x 2 reps   Ball catch and toss 2 x 10 reps             Home Exercises and Education Provided     Education provided: "     Written Home Exercises Provided: Patient instructed to cont prior HEP.  Exercises were reviewed and Cy was able to demonstrate them prior to the end of the session.  Cy demonstrated good  understanding of the HEP provided.   .   See EMR under Patient Instructions for exercises provided prior visit.        Assessment     Cy is progressing well towards his goals and there are no updates to goals at this time. Pt prognosis is Good.     Pt will continue to benefit from skilled outpatient occupational therapy to address the deficits listed in the problem list on initial evaluation provide pt/family education and to maximize pt's level of independence in the home and community environment.     Anticipated barriers to occupational therapy: none noted    Pt's spiritual, cultural and educational needs considered and pt agreeable to plan of care and goals.    Goals:  Goals to be met in 4 weeks: (4/4/19)  1)   Initiate Hep   2)   Pt will increase R shoulder AROM by degrees grossly for improved performance with overhead ADL's  3)   Pt will demonstrate increased MMT by at least 1/2 grade grossly in RUE  4)   Increase AROM 5 degrees in wrist supination plane of motion to increase functional right hand use for participation in ADLs and IADLs.   5)   Increase  strength 2-3 lbs. to grasp heavier items during household management tasks  6)   Increase pinch 1-3 psis for turning keys and opening up water bottles           Long Term (by discharge):  1)   Independent with HEP  2)   Patient to score no more than 20% limitation on FOTO to demonstrate improved perception of functional right hand use.  3)   Pt will return to near to prior level of function for ADLs and household management reporting I or Mod I with ADLs.            Plan   Certification Period/Plan of care expiration: 3/7/2019 to 5/2/2019 (8 weeks)     Outpatient Occupational Therapy 2 time weekly for 8 weeks to include the following interventions:  Manual  therapy/joint mobilizations, Modalities for pain management, Therapeutic exercises/activities., Strengthening and Energy Conservation .      Sayda Suarez MS OTR/L

## 2019-04-01 ENCOUNTER — CLINICAL SUPPORT (OUTPATIENT)
Dept: REHABILITATION | Facility: HOSPITAL | Age: 74
End: 2019-04-01
Payer: MEDICARE

## 2019-04-01 DIAGNOSIS — R53.1 RIGHT SIDED WEAKNESS: ICD-10-CM

## 2019-04-01 PROCEDURE — 97530 THERAPEUTIC ACTIVITIES: CPT | Mod: PO

## 2019-04-01 PROCEDURE — 97110 THERAPEUTIC EXERCISES: CPT | Mod: PO

## 2019-04-01 NOTE — PROGRESS NOTES
"  Occupational Therapy Daily Treatment Note     Date: 4/1/2019  Name: Cy Rutherford  Clinic Number: [id number]    Therapy Diagnosis:   Encounter Diagnosis   Name Primary?    Right sided weakness      Physician: Yamileth Niño MD    Physician Orders: OT Eval and Treat  Medical Diagnosis: History of ischemic vertebrobasilar artery brainstem stroke  Surgical Procedure and Date: n/a  Evaluation Date: 3/7/19  Insurance Authorization Period Expiration: 2/14/20  Plan of Care Certification Period: 3/7/19-5/2/19 (8 weeks)  Date of Return to MD: TBD      Visit # / Visits authorized: 5 / 20  Time In: 11:20a  Time Out: 11:55a  Total Billable Time: 30 minutes (1TE, 1TA)    Precautions:  Standard      Subjective     Pt reports: "It still can't go all the way back, but it's getting better" (referencing right arm). Pt reports that he has been compliant with home exercise program given last session.   Response to previous treatment: Pt is tolerating therapy sessions well.   Functional change: none noted    Pain: 0/10  Location: n/a    Objective     Cy received therapeutic exercises for 30 minutes including: (please refer to chart)     Date: 4/1/19     Visit: 5   Shoulder/scapular strengthening with red theraband  -rows   -triceps extension    - biceps curls   - flex & abd  15 reps each   Seated shoulder abd/FF, 1# 2 x 10 reps   Shoulder FF in supine, chest press, seated with 2# dowel 15 reps   Dynamic scapular stabilization while in supine, shoulder @90* flex 4 sets x 10 sec each   UBE, L2.5 (not today)   Digiflex red (not today)     Pulleys (flex/ext & abd/add) (not today)   Red Putty (power ) 2 minutes   Cone Reaching  7 cones x 2 reps   Ball catch and toss in various planes (not today)   Snow [place] 15 reps   Wall crawls R  5 reps   Flexbar-green sup/pro 20 reps each             Home Exercises and Education Provided     Education provided:     Written Home Exercises Provided: Patient instructed to cont prior " HEP.  Exercises were reviewed and Cy was able to demonstrate them prior to the end of the session.  Cy demonstrated good  understanding of the HEP provided.   .   See EMR under Patient Instructions for exercises provided prior visit.        Assessment     Cy is progressing well towards his goals and there are no updates to goals at this time. Pt prognosis is Good.     Pt will continue to benefit from skilled outpatient occupational therapy to address the deficits listed in the problem list on initial evaluation provide pt/family education and to maximize pt's level of independence in the home and community environment.     Anticipated barriers to occupational therapy: none noted    Pt's spiritual, cultural and educational needs considered and pt agreeable to plan of care and goals.    Goals:  Goals to be met in 4 weeks: (4/4/19)  1)   Initiate Hep   2)   Pt will increase R shoulder AROM by degrees grossly for improved performance with overhead ADL's  3)   Pt will demonstrate increased MMT by at least 1/2 grade grossly in RUE  4)   Increase AROM 5 degrees in wrist supination plane of motion to increase functional right hand use for participation in ADLs and IADLs.   5)   Increase  strength 2-3 lbs. to grasp heavier items during household management tasks  6)   Increase pinch 1-3 psis for turning keys and opening up water bottles        Long Term (by discharge):  1)   Independent with HEP  2)   Patient to score no more than 20% limitation on FOTO to demonstrate improved perception of functional right hand use.  3)   Pt will return to near to prior level of function for ADLs and household management reporting I or Mod I with ADLs.            Plan   Certification Period/Plan of care expiration: 3/7/2019 to 5/2/2019 (8 weeks)     Outpatient Occupational Therapy 2 time weekly for 8 weeks to include the following interventions:  Manual therapy/joint mobilizations, Modalities for pain management, Therapeutic  exercises/activities., Strengthening and Energy Conservation .      Sayda Suarez MS OTR/L

## 2019-04-03 ENCOUNTER — CLINICAL SUPPORT (OUTPATIENT)
Dept: REHABILITATION | Facility: HOSPITAL | Age: 74
End: 2019-04-03
Payer: MEDICARE

## 2019-04-03 ENCOUNTER — PATIENT OUTREACH (OUTPATIENT)
Dept: OTHER | Facility: OTHER | Age: 74
End: 2019-04-03

## 2019-04-03 DIAGNOSIS — R53.1 RIGHT SIDED WEAKNESS: ICD-10-CM

## 2019-04-03 PROCEDURE — 97110 THERAPEUTIC EXERCISES: CPT | Mod: PO

## 2019-04-03 PROCEDURE — 97530 THERAPEUTIC ACTIVITIES: CPT | Mod: PO

## 2019-04-03 NOTE — PROGRESS NOTES
"  Occupational Therapy Daily Treatment Note     Date: 4/3/2019  Name: Cy Rutherford  Clinic Number: [id number]    Therapy Diagnosis:   Encounter Diagnosis   Name Primary?    Right sided weakness      Physician: Yamileth Niño MD    Physician Orders: OT Eval and Treat  Medical Diagnosis: History of ischemic vertebrobasilar artery brainstem stroke  Surgical Procedure and Date: n/a  Evaluation Date: 3/7/19  Insurance Authorization Period Expiration: 2/14/20  Plan of Care Certification Period: 3/7/19-5/2/19 (8 weeks)  Date of Return to MD: TBD      Visit # / Visits authorized: 6 / 20  Time In: 10:15a  Time Out: 11:00a  Total Billable Time: 45 minutes (2TE, 1TA)    Precautions:  Standard      Subjective     Pt reports: "I" (referencing right arm). Pt reports that he has been compliant with home exercise program given last session.   Response to previous treatment: Pt is tolerating therapy sessions well.   Functional change: none noted    Pain: 0/10  Location: n/a    Objective     Cy received therapeutic exercises for 30 minutes including: (please refer to chart)      Cy received therapeutic activities for 15 minutes including: (please refer to chart)       Date: 4/3/19     Visit: 6   Shoulder/scapular strengthening with red theraband  -rows   -triceps extension    - biceps curls vi  - flex & abd  15 reps each   Seated shoulder abd/FF, 1# 2 x 10 reps   -Shoulder FF seated,   -chest press, seated with 2#  dowel 10 reps   Dynamic scapular stabilization while in supine, shoulder @90* flex (not today)   UBE, L2.5 10 minutes (5 mins forward, 5 mins backward)   Digiflex red (not today)     Pulleys (flex/ext & abd/add) 3 mins each   Red Putty (power ) (not today)   Cone Reaching with facilitation of external rotation (behind head) 7 cones x 2 reps   Ball catch and toss in various planes (not today)   Snow [place] supine on mat (not today)   Wall crawls R  5 reps   Flexbar-green sup/pro (not today)    Shoulder Arc " flex/abd  2 reps            Home Exercises and Education Provided     Education provided:     Written Home Exercises Provided: Patient instructed to cont prior HEP.  Exercises were reviewed and Cy was able to demonstrate them prior to the end of the session.  Cy demonstrated good  understanding of the HEP provided.   .   See EMR under Patient Instructions for exercises provided prior visit.        Assessment     Cy is progressing well towards his goals and there are no updates to goals at this time. Pt prognosis is Good.     Pt will continue to benefit from skilled outpatient occupational therapy to address the deficits listed in the problem list on initial evaluation provide pt/family education and to maximize pt's level of independence in the home and community environment.     Anticipated barriers to occupational therapy: none noted    Pt's spiritual, cultural and educational needs considered and pt agreeable to plan of care and goals.    Goals:  Goals to be met in 4 weeks: (4/4/19)  1)   Initiate Hep   2)   Pt will increase R shoulder AROM by degrees grossly for improved performance with overhead ADL's  3)   Pt will demonstrate increased MMT by at least 1/2 grade grossly in RUE  4)   Increase AROM 5 degrees in wrist supination plane of motion to increase functional right hand use for participation in ADLs and IADLs.   5)   Increase  strength 2-3 lbs. to grasp heavier items during household management tasks  6)   Increase pinch 1-3 psis for turning keys and opening up water bottles        Long Term (by discharge):  1)   Independent with HEP  2)   Patient to score no more than 20% limitation on FOTO to demonstrate improved perception of functional right hand use.  3)   Pt will return to near to prior level of function for ADLs and household management reporting I or Mod I with ADLs.            Plan   Certification Period/Plan of care expiration: 3/7/2019 to 5/2/2019 (8 weeks)     Outpatient  Occupational Therapy 2 time weekly for 8 weeks to include the following interventions:  Manual therapy/joint mobilizations, Modalities for pain management, Therapeutic exercises/activities., Strengthening and Energy Conservation .      Sayda Suarez MS OTR/L

## 2019-04-08 ENCOUNTER — CLINICAL SUPPORT (OUTPATIENT)
Dept: REHABILITATION | Facility: HOSPITAL | Age: 74
End: 2019-04-08
Payer: MEDICARE

## 2019-04-08 DIAGNOSIS — R53.1 RIGHT SIDED WEAKNESS: ICD-10-CM

## 2019-04-08 PROCEDURE — 97110 THERAPEUTIC EXERCISES: CPT | Mod: PO

## 2019-04-08 PROCEDURE — 97112 NEUROMUSCULAR REEDUCATION: CPT | Mod: PO

## 2019-04-08 NOTE — PROGRESS NOTES
"  Occupational Therapy Daily Treatment Note     Date: 4/8/2019  Name: Cy Rutherford  Clinic Number: [id number]    Therapy Diagnosis:   Encounter Diagnosis   Name Primary?    Right sided weakness      Physician: Yamileth Niño MD    Physician Orders: OT Eval and Treat  Medical Diagnosis: History of ischemic vertebrobasilar artery brainstem stroke  Surgical Procedure and Date: n/a  Evaluation Date: 3/7/19  Insurance Authorization Period Expiration: 2/14/20  Plan of Care Certification Period: 3/7/19-5/2/19 (8 weeks)  Date of Return to MD: TBD      Visit # / Visits authorized: 7 / 20  Time In: 10:00 am  Time Out: 11:05 am  Total Billable Time: 65 minutes (3TE, 1NM)    Precautions:  Standard      Subjective     Pt reports: "I think it is getting better, but it is still weak" (referencing right arm). Pt reports that he has been compliant with home exercise program given last session.   Response to previous treatment: Pt is tolerating therapy sessions well.   Functional change: none noted    Pain: 0/10  Location: n/a    Objective     Cy received therapeutic exercises for 45 minutes including: (please refer to chart)      Cy received neuromuscular re-education for 20 minutes including: (please refer to chart)     Date: 4/3/19     Visit: 7   Postural re-training:  -shrugs  -circles  -scap retract 10 reps each   Shoulder/scapular strengthening with red theraband  -rows   -scap retraction  -triceps extension    - biceps curls vi  - flex & abd  15 reps each   Seated shoulder abd/FF, 1# 2 x 10 reps   -Shoulder FF seated,   -chest press, seated with 2#  dowel 2/10 reps   Dynamic scapular stabilization supine & SL 10 reps each   UBE, L2.5 10 minutes (5 mins forward, 5 mins backward)   Digiflex red (not today)     Pulleys (flex/ext & abd/add) 3 mins each   Red Putty   -grasp/manipulation 5 reps   T-putty-Red  -Log roll & tripod pinch  -dowel digs 1 trial each   Cone Reaching with facilitation of external rotation (behind " head) 7 cones x 2 reps   Chair Push Ups 10 reps   Ball catch and toss in various planes (not today)   [place] supine on mat (not today)   Wall crawls R  5 reps   Flexbar-green sup/pro (not today)    Shoulder Arc flex/abd  2 reps      Home Exercises and Education Provided     Education provided:     Written Home Exercises Provided: Patient instructed to cont prior HEP.  Exercises were reviewed and Cy was able to demonstrate them prior to the end of the session.  Cy demonstrated good  understanding of the HEP provided.   .   See EMR under Patient Instructions for exercises provided prior visit.        Assessment     Cy is progressing well towards his goals and there are no updates to goals at this time. Pt prognosis is Good.     Pt will continue to benefit from skilled outpatient occupational therapy to address the deficits listed in the problem list on initial evaluation provide pt/family education and to maximize pt's level of independence in the home and community environment.     Anticipated barriers to occupational therapy: none noted    Pt's spiritual, cultural and educational needs considered and pt agreeable to plan of care and goals.    Goals:  Goals to be met in 4 weeks: (4/4/19)  1)   Initiate Hep   2)   Pt will increase R shoulder AROM by degrees grossly for improved performance with overhead ADL's  3)   Pt will demonstrate increased MMT by at least 1/2 grade grossly in RUE  4)   Increase AROM 5 degrees in wrist supination plane of motion to increase functional right hand use for participation in ADLs and IADLs.   5)   Increase  strength 2-3 lbs. to grasp heavier items during household management tasks  6)   Increase pinch 1-3 psis for turning keys and opening up water bottles        Long Term (by discharge):  1)   Independent with HEP  2)   Patient to score no more than 20% limitation on FOTO to demonstrate improved perception of functional right hand use.  3)   Pt will return to near to  prior level of function for ADLs and household management reporting I or Mod I with ADLs.            Plan   Certification Period/Plan of care expiration: 3/7/2019 to 5/2/2019 (8 weeks)     Outpatient Occupational Therapy 2 time weekly for 8 weeks to include the following interventions:  Manual therapy/joint mobilizations, Modalities for pain management, Therapeutic exercises/activities., Strengthening and Energy Conservation .      Sayda Suarez MS OTR/JAKY Sullivan, OTL

## 2019-04-10 ENCOUNTER — CLINICAL SUPPORT (OUTPATIENT)
Dept: REHABILITATION | Facility: HOSPITAL | Age: 74
End: 2019-04-10
Payer: MEDICARE

## 2019-04-10 DIAGNOSIS — R53.1 RIGHT SIDED WEAKNESS: ICD-10-CM

## 2019-04-10 PROCEDURE — 97530 THERAPEUTIC ACTIVITIES: CPT | Mod: PO

## 2019-04-10 PROCEDURE — 97110 THERAPEUTIC EXERCISES: CPT | Mod: PO

## 2019-04-10 NOTE — PROGRESS NOTES
"  Occupational Therapy Daily Treatment Note     Date: 4/10/2019  Name: Cy Rutherford  Clinic Number: [id number]    Therapy Diagnosis:   Encounter Diagnosis   Name Primary?    Right sided weakness      Physician: Yamileth Niño MD    Physician Orders: OT Eval and Treat  Medical Diagnosis: History of ischemic vertebrobasilar artery brainstem stroke  Surgical Procedure and Date: n/a  Evaluation Date: 3/7/19  Insurance Authorization Period Expiration: 2/14/20  Plan of Care Certification Period: 3/7/19-5/2/19 (8 weeks)  Date of Return to MD: TBD      Visit # / Visits authorized: 8 / 20  Time In: 10:30 am  Time Out: 11:15 am  Total Billable Time: 45 minutes (2TE, 1TA)    Precautions:  Standard      Subjective     Pt reports:  "Those are hard" (referencing shoulder FF exercise while seated). Pt reports that he has been compliant with home exercise program given last session.   Response to previous treatment: Pt is tolerating therapy sessions well.   Functional change: none noted    Pain: 0/10  Location: n/a    Objective     Cy received therapeutic exercises for 30 minutes including: (please refer to chart)      Cy received neuromuscular re-education for 15 minutes including: (please refer to chart)     Date: 4/10/19     Visit: 8   Postural re-training:  -shrugs  -circles  -scap retract 10 reps each   Shoulder/scapular strengthening with red theraband  -rows   -scap retraction  -triceps extension    - biceps curls vi  - flex & abd  15 reps each   Seated shoulder abd/FF, 1# 10 reps   -Shoulder FF seated,   -chest press, seated with 2#  dowel 2/10 reps   Dynamic scapular stabilization supine & SL 10 reps each   UBE, L2.5 10 minutes (5 mins forward, 5 mins backward)   Digiflex red (not today)     Pulleys (flex/ext & abd/add) (not today)   Red Putty   -grasp/manipulation 2 mins    T-putty-Red  -Log roll & tripod pinch  -dowel digs (not today)   Cone Reaching with facilitation of external rotation (behind head) (not " today)   Chair Push Ups (not today)   Ball catch and toss in various planes (not today)   [place] supine on mat (not today)   Wall crawls R  (not today)   Flexbar-green sup/pro (not today)    Shoulder Arc flex/abd (not today)      Home Exercises and Education Provided     Education provided:     Written Home Exercises Provided: Patient instructed to cont prior HEP.  Exercises were reviewed and Cy was able to demonstrate them prior to the end of the session.  Cy demonstrated good  understanding of the HEP provided.   .   See EMR under Patient Instructions for exercises provided prior visit.        Assessment     Cy is progressing well towards his goals and there are no updates to goals at this time. Pt prognosis is Good.     Pt will continue to benefit from skilled outpatient occupational therapy to address the deficits listed in the problem list on initial evaluation provide pt/family education and to maximize pt's level of independence in the home and community environment.     Anticipated barriers to occupational therapy: none noted    Pt's spiritual, cultural and educational needs considered and pt agreeable to plan of care and goals.    Goals:  Goals to be met in 4 weeks: (4/4/19)  1)   Initiate Hep --met  2)   Pt will increase R shoulder AROM by degrees grossly for improved performance with overhead ADL's  3)   Pt will demonstrate increased MMT by at least 1/2 grade grossly in RUE  4)   Increase AROM 5 degrees in wrist supination plane of motion to increase functional right hand use for participation in ADLs and IADLs.   5)   Increase  strength 2-3 lbs. to grasp heavier items during household management tasks  6)   Increase pinch 1-3 psis for turning keys and opening up water bottles        Long Term (by discharge):  1)   Independent with HEP  2)   Patient to score no more than 20% limitation on FOTO to demonstrate improved perception of functional right hand use.  3)   Pt will return to near  to prior level of function for ADLs and household management reporting I or Mod I with ADLs.            Plan   Certification Period/Plan of care expiration: 3/7/2019 to 5/2/2019 (8 weeks)     Outpatient Occupational Therapy 2 time weekly for 8 weeks to include the following interventions:  Manual therapy/joint mobilizations, Modalities for pain management, Therapeutic exercises/activities., Strengthening and Energy Conservation .      Sayda Suarez MS OTR/JAKY Sullivan, OTL

## 2019-04-15 ENCOUNTER — CLINICAL SUPPORT (OUTPATIENT)
Dept: REHABILITATION | Facility: HOSPITAL | Age: 74
End: 2019-04-15
Payer: MEDICARE

## 2019-04-15 DIAGNOSIS — R53.1 RIGHT SIDED WEAKNESS: ICD-10-CM

## 2019-04-15 PROCEDURE — 97110 THERAPEUTIC EXERCISES: CPT | Mod: PO

## 2019-04-15 PROCEDURE — 97112 NEUROMUSCULAR REEDUCATION: CPT | Mod: PO

## 2019-04-15 NOTE — PROGRESS NOTES
Occupational Therapy Daily Treatment Note     Date: 4/15/2019  Name: Cy Rutherford  Clinic Number: [id number]    Therapy Diagnosis:   Encounter Diagnosis   Name Primary?    Right sided weakness      Physician: Yamileth Niño MD    Physician Orders: OT Eval and Treat  Medical Diagnosis: History of ischemic vertebrobasilar artery brainstem stroke  Surgical Procedure and Date: n/a  Evaluation Date: 3/7/19  Insurance Authorization Period Expiration: 2/14/20  Plan of Care Certification Period: 3/7/19-5/2/19 (8 weeks)  Date of Return to MD: TBD      Visit # / Visits authorized: 9 / 20  Time In: 10:00 am  Time Out: 11:00 am  Total Billable Time: 60 minutes (1NM, 3TE)    Precautions:  Standard      Subjective     Pt reports:  Patient denies any pain, except for some muscle soreness, that he attributes to post ex soreness. Pt reports that he has been compliant with home exercise program given last session.   Response to previous treatment: Pt is tolerating therapy sessions well.   Functional change: none noted    Pain: 0/10  Location: n/a    Objective     Cy received therapeutic exercises for 40 minutes including: (please refer to chart)      Cy received neuromuscular re-education for 20 minutes including: (please refer to chart)    4/15/2019 Visit: 9   Postural re-training:  -shrugs  -circles  -scap retract 10 reps each   NDT:   -WB Sitting side push-up  -Transitional movement using RUE 10 reps each, with NM facilitation    UBE, L2.5 10 minutes (5 mins forward, 5 mins backward)   Pulleys (flex/ext & abd/add) 3 min   Shoulder/scapular strengthening with red theraband  -rows   -scap retraction  -triceps extension    - biceps curls 15 reps each   Seated shoulder press overhead, 2# 10 reps   Seated Shoulder  -FF (elbow ext)   -chest press, seated with 2#  dowel 1/10 reps each   Dynamic scapular stabilization, supine & SL (CW/CCW) circles 10 reps each w/ assist   Digiflex red (not today)   Red Putty    -grasp/manipulation 3 reps   T-putty-Red  -Log roll & tripod pinch  -dowel digs I trial each each ex   [facility]one Reaching with facilitation of external rotation (behind head) (not today)   Chair Push Ups (not today)   Ball catch and toss in various planes (not today)   Wall crawls R  5 reps with assist   Shoulder Arc flex/abd (not today)      Home Exercises and Education Provided     Education provided:  Bed mobility ex, including seated R side push-ups and SL position on your R side side to push-up to sitting position    Written Home Exercises Provided: Patient instructed to cont prior HEP.  Exercises were reviewed and Cy was able to demonstrate them prior to the end of the session.  Cy demonstrated good  understanding of the HEP provided.   .   See EMR under Patient Instructions for exercises provided prior visit.        Assessment     Cy is progressing well towards his goals and there are no updates to goals at this time. Pt prognosis is Good.     Pt will continue to benefit from skilled outpatient occupational therapy to address the deficits listed in the problem list on initial evaluation provide pt/family education and to maximize pt's level of independence in the home and community environment.     Anticipated barriers to occupational therapy: none noted    Pt's spiritual, cultural and educational needs considered and pt agreeable to plan of care and goals.    Goals:  Goals to be met in 4 weeks: (4/4/19)  1)   Initiate Hep --met  2)   Pt will increase R shoulder AROM by degrees grossly for improved performance with overhead ADL's  3)   Pt will demonstrate increased MMT by at least 1/2 grade grossly in RUE  4)   Increase AROM 5 degrees in wrist supination plane of motion to increase functional right hand use for participation in ADLs and IADLs.   5)   Increase  strength 2-3 lbs. to grasp heavier items during household management tasks  6)   Increase pinch 1-3 psis for turning keys and opening up  water bottles        Long Term (by discharge):  1)   Independent with HEP  2)   Patient to score no more than 20% limitation on FOTO to demonstrate improved perception of functional right hand use.  3)   Pt will return to near to prior level of function for ADLs and household management reporting I or Mod I with ADLs.            Plan   Certification Period/Plan of care expiration: 3/7/2019 to 5/2/2019 (8 weeks)     Outpatient Occupational Therapy 2 time weekly for 8 weeks to include the following interventions:  Manual therapy/joint mobilizations, Modalities for pain management, Therapeutic exercises/activities., Strengthening and Energy Conservation .      Jhony Sullivan, OTL

## 2019-04-17 ENCOUNTER — CLINICAL SUPPORT (OUTPATIENT)
Dept: REHABILITATION | Facility: HOSPITAL | Age: 74
End: 2019-04-17
Payer: MEDICARE

## 2019-04-17 DIAGNOSIS — R53.1 RIGHT SIDED WEAKNESS: ICD-10-CM

## 2019-04-17 PROCEDURE — 97110 THERAPEUTIC EXERCISES: CPT | Mod: PO

## 2019-04-17 PROCEDURE — 97112 NEUROMUSCULAR REEDUCATION: CPT | Mod: PO

## 2019-04-17 PROCEDURE — G8987 SELF CARE CURRENT STATUS: HCPCS | Mod: CJ,PO

## 2019-04-17 NOTE — PROGRESS NOTES
Occupational Therapy Daily Treatment Note     Date: 4/17/2019  Name: Cy Rutherford  Clinic Number: [id number]    Therapy Diagnosis:   Encounter Diagnosis   Name Primary?    Right sided weakness      Physician: Yamileth Niño MD    Physician Orders: OT Eval and Treat  Medical Diagnosis: History of ischemic vertebrobasilar artery brainstem stroke  Surgical Procedure and Date: n/a  Evaluation Date: 3/7/19  Insurance Authorization Period Expiration: 2/14/20  Plan of Care Certification Period: 3/7/19-5/2/19 (8 weeks)  Date of Return to MD: TBD      Visit # / Visits authorized: 10 / 20  Time In: 10:10 am  Time Out: 11:00 am  Total Billable Time: 60 minutes (1NM, 3TE)    Precautions:  Standard      Subjective     Pt reports:  Patient feels he is getting stronger in his RUE. He reports doing his HEP at least 1x/day.   Response to previous treatment: Pt is tolerating therapy sessions well. No c/o pain or soreness since last treatment  Functional change: none noted    Pain: 0/10  Location: n/a    Objective     Cy received therapeutic exercises for 40 minutes including: (please refer to log below)    yC received neuromuscular re-education for 20 minutes including: (please refer to log below)    4/17/2019 Visit: 10   NDT:   -WB Sitting side push-up  -Transitional movement using RUE 10 reps each, with NM facilitation    UBE, L2.5 10 minutes (5 mins forward, 5 mins backward)   Pulleys (flex/ext & abd/add) 3 min   Shoulder/scapular strengthening with red theraband  -rows   -scap retraction  -triceps extension    - biceps curls 15 reps each   Seated shoulder press overhead, 2# 10 reps   Seated Shoulder  -FF (elbow ext)   -chest press, seated with 2#  dowel 1/10 reps each   Dynamic scapular stabilization, supine & SL (CW/CCW) circles 10 reps each w/ assist   Digiflex red (not today)   Red Putty   -grasp/manipulation 5 reps   T-putty-Red  -Log roll & tripod pinch  -dowel digs 2 trial each each ex   Cone Reaching with  facilitation of external rotation (behind head) (not today)   Chair Push Ups (not today)   Ball catch and toss in various planes (not today)   Wall crawls R  (not today)   Shoulder Arc flex/abd (not today)      Home Exercises and Education Provided     Education provided:  Bed mobility ex, including seated R side push-ups and SL position on your R side side to push-up to sitting position    Written Home Exercises Provided: Patient instructed to cont prior HEP.  Exercises were reviewed and Cy was able to demonstrate them prior to the end of the session.  Cy demonstrated good  understanding of the HEP provided.   .   See EMR under Patient Instructions for exercises provided prior visit.        Assessment     Cy is progressing well towards his goals and there are no updates to goals at this time. Pt prognosis is Good.  FOTO completeed indicattes improvement in functioning in ADL/IADL's, see attached for details    Pt will continue to benefit from skilled outpatient occupational therapy to address the deficits listed in the problem list on initial evaluation provide pt/family education and to maximize pt's level of independence in the home and community environment.     Anticipated barriers to occupational therapy: none noted    Pt's spiritual, cultural and educational needs considered and pt agreeable to plan of care and goals.    Goals:  Goals to be met in 4 weeks: (4/4/19)  1)   Initiate Hep --met  2)   Pt will increase R shoulder AROM by degrees grossly for improved performance with overhead ADL's  3)   Pt will demonstrate increased MMT by at least 1/2 grade grossly in RUE  4)   Increase AROM 5 degrees in wrist supination plane of motion to increase functional right hand use for participation in ADLs and IADLs.   5)   Increase  strength 2-3 lbs. to grasp heavier items during household management tasks  6)   Increase pinch 1-3 psis for turning keys and opening up water bottles        Long Term (by  discharge):  1)   Independent with HEP  2)   Patient to score no more than 20% limitation on FOTO to demonstrate improved perception of functional right hand use.  3)   Pt will return to near to prior level of function for ADLs and household management reporting I or Mod I with ADLs.      Plan   Certification Period/Plan of care expiration: 3/7/2019 to 5/2/2019 (8 weeks)     Outpatient Occupational Therapy 2 time weekly for 8 weeks to include the following interventions:  Manual therapy/joint mobilizations, Modalities for pain management, Therapeutic exercises/activities., Strengthening and Energy Conservation .      Jhony Sullivan, OTL

## 2019-04-22 ENCOUNTER — CLINICAL SUPPORT (OUTPATIENT)
Dept: REHABILITATION | Facility: HOSPITAL | Age: 74
End: 2019-04-22
Payer: MEDICARE

## 2019-04-22 DIAGNOSIS — R53.1 RIGHT SIDED WEAKNESS: ICD-10-CM

## 2019-04-22 PROCEDURE — 97112 NEUROMUSCULAR REEDUCATION: CPT | Mod: PO

## 2019-04-22 PROCEDURE — 97110 THERAPEUTIC EXERCISES: CPT | Mod: PO

## 2019-04-22 NOTE — PROGRESS NOTES
Occupational Therapy Daily Treatment Note     Date: 4/22/2019  Name: Cy Rutherford  Clinic Number: [id number]    Therapy Diagnosis:   Encounter Diagnosis   Name Primary?    Right sided weakness      Physician: Yamileth Niño MD    Physician Orders: OT Eval and Treat  Medical Diagnosis: History of ischemic vertebrobasilar artery brainstem stroke  Surgical Procedure and Date: n/a  Evaluation Date: 3/7/19  Insurance Authorization Period Expiration: 2/14/20  Plan of Care Certification Period: 3/7/19-5/2/19 (8 weeks)  Date of Return to MD: TBD      Visit # / Visits authorized: 11 / 20  Time In: 10:20 am  Time Out: 11:30 am  Total Billable Time: 60 minutes (1NM, 3TE)    Precautions:  Standard      Subjective     Pt reports:  Patient reports increased ability to reach farther with his RUE. He also reports having more  strength and coordination in his R hand compared to initial evaluation. He continues to perform his HEP as recommended.    Response to previous treatment: Pt is tolerating therapy sessions well. No c/o pain or soreness since last treatment  Functional change: none noted    Pain: 0/10  Location: n/a    Objective     Cy received therapeutic exercises for 25 minutes including: (please refer to log below)    Cy received neuromuscular re-education for 15 minutes including: (please refer to log below)    4/22/2019 Visit: 1   NDT:   -WB Sitting side push-up  -Transitional movement using RUE   )not today)   NM: Proprioceptive feed back w/ BodyBlade-3 ways 10 sec for 2 trials   UBE, L2.5 10 minutes (5 mins forward, 5 mins backward)   Pulleys (flex/ext & abd/add) (not today)   Shoulder/scapular strengthening with red theraband  -rows   -scap retraction  -triceps extension    - biceps curls 15 reps each   Seated shoulder press overhead, 2# (not today)   Seated Shoulder  -FF (elbow ext)   -chest press, seated with 2#  dowel 1/10 reps each   Dynamic scapular stabilization, supine & SL (CW/CCW) circles 10 reps  each w/ assist   Digiflex red (not today)   Red Putty   -grasp/manipulation 5 reps   T-putty-Red  -Log roll & tripod pinch  -dowel digs 2 trial each each ex        Range of Motion and Manual Muscle Test  Shoulder Left MMT Right MMT   Flexion WFL 5/5 111 (+11) 4/5   Abduction WFL 5/5 90 (+20) 4-/5   ER at 0 WFL 5/5 30 (+30) 4-/5   ER at 90 WFL 5/5 52  2+/5   IR WFL 5/5 L1  2+/5          Supination   65 3/5       Strength (Dyanmometer) and Pinch Strength (Pinch Gauge)  Measured in pounds and psi.     3/7/2019 3/7/2019      Left Right Right   Rung II 80 30 34   Jay Pinch 20 11 11.5   3pt Pinch 15 6 7   2pt Pinch 20 9 5 (L=10) *     * Discrepancy in measures possibly due to inconsistency and/or inter-rater reliability of pinch testing  from 2 different therapists    Home Exercises and Education Provided     Education provided:  Reviewed HEP and progression of strengthening exercises to include core muscle strengthening.     Written Home Exercises Provided: Patient instructed to cont prior HEP.  Exercises were reviewed and Cy was able to demonstrate them prior to the end of the session.  Cy demonstrated good  understanding of the HEP provided.   .   See EMR under Patient Instructions for exercises provided prior visit.        Assessment     Cy is progressing well towards his goals and there are no updates to goals at this time. Pt prognosis is Good.  FOTO completeed indicattes improvement in functioning in ADL/IADL's, see attached for details    Pt will continue to benefit from skilled outpatient occupational therapy to address the deficits listed in the problem list on initial evaluation provide pt/family education and to maximize pt's level of independence in the home and community environment.     Anticipated barriers to occupational therapy: none noted    Pt's spiritual, cultural and educational needs considered and pt agreeable to plan of care and goals.    Goals:  Goals to be met in 4 weeks:  (4/4/19)  1)   Initiate Hep-met  2)   Pt will increase R shoulder AROM by 10 degrees grossly for improved performance with overhead ADL's-met  3)   Pt will demonstrate increased MMT by at least 1/2 grade grossly in RUE-met  4)   Increase AROM 5 degrees in wrist supination plane of motion to increase functional right hand use for participation in ADLs and IADLs-met   5)   Increase  strength 2-3 lbs. to grasp heavier items during household management tasks-met  6)   Increase pinch 1-3 psis for turning keys and opening up water bottles-part met        Long Term (by discharge):  1)   Independent with HEP  2)   Patient to score no more than 20% limitation on FOTO to demonstrate improved perception of functional right hand use.  3)   Pt will return to near to prior level of function for ADLs and household management reporting I or Mod I with ADLs.      Plan   Certification Period/Plan of care expiration: 3/7/2019 to 5/2/2019 (8 weeks)     Outpatient Occupational Therapy 2 time weekly for 8 weeks to include the following interventions:  Manual therapy/joint mobilizations, Modalities for pain management, Therapeutic exercises/activities., Strengthening and Energy Conservation .      Jhony Sullivan, OTL

## 2019-04-24 ENCOUNTER — CLINICAL SUPPORT (OUTPATIENT)
Dept: REHABILITATION | Facility: HOSPITAL | Age: 74
End: 2019-04-24
Payer: MEDICARE

## 2019-04-24 DIAGNOSIS — R53.1 RIGHT SIDED WEAKNESS: ICD-10-CM

## 2019-04-24 PROCEDURE — 97110 THERAPEUTIC EXERCISES: CPT | Mod: PO

## 2019-04-24 PROCEDURE — 97112 NEUROMUSCULAR REEDUCATION: CPT | Mod: PO

## 2019-04-24 NOTE — PROGRESS NOTES
Occupational Therapy Daily Treatment Note     Date: 4/24/2019  Name: Cy Rutherford  Clinic Number: [id number]    Therapy Diagnosis:   Encounter Diagnosis   Name Primary?    Right sided weakness      Physician: Yamileth Niño MD    Physician Orders: OT Eval and Treat  Medical Diagnosis: History of ischemic vertebrobasilar artery brainstem stroke  Surgical Procedure and Date: n/a  Evaluation Date: 3/7/19  Insurance Authorization Period Expiration: 2/14/20  Plan of Care Certification Period: 3/7/19-5/2/19 (8 weeks)  Date of Return to MD: TBD      Visit # / Visits authorized: 12 / 20  Time In: 10:05 am  Time Out: 11:10 am  Total Billable Time: 65 minutes (1NM, 3TE)    Precautions:  Standard      Subjective     Pt reports:  Good compliance and no problems with progression of HEP.   Response to previous treatment: Pt is tolerating therapy sessions well. No c/o pain or soreness since last treatment  Functional change: increased coordination and ability to use RUE in light resistive activity    Pain: 0/10  Location: n/a    Objective     Cy received therapeutic exercises for 45 minutes including: (please refer to log below)    Cy received neuromuscular re-education for 20 minutes including: (please refer to log below)    4/24/2019 Visit: 12   NDT:   -WB Sitting side push-up  -Transitional movement using RUE  -Wt shifting engaging trunk, LE's & UE's   20 reps each   NM: Proprioceptive feed back w/ BodyBlade-3 ways 10 sec for 2 trials   UBE, L2.5 10 minutes (5 mins forward, 5 mins backward)   Pulleys (flex/ext & abd/add) 3 min each   Theraband  -rows-green  -scap retraction-green  -triceps extension -red   - biceps curls-green 10 reps each   Seated shoulder press overhead, 2# 1/10 reps   Seated Shoulder  -FF (elbow ext)   -chest press, seated with 2#  dowel 1/10 reps each   Dynamic scapular stabilization, supine & SL (CW/CCW) circles  SL Abd to 90 & ER 10 reps each w/ assist   Smiles & frowns Green T-bar; 15 reps    Deluxe Gripper-setting 3 3/10 reps   Red Putty   -grasp/manipulation 5 reps   T-putty-Red  -Log roll & tripod pinch  -dowel digs (not today)        Home Exercises and Education Provided     Education provided:  Reviewed HEP for strengthening RUE and core     Written Home Exercises Provided: Patient instructed to cont prior HEP.  Exercises were reviewed and Cy was able to demonstrate them prior to the end of the session.  Cy demonstrated good  understanding of the HEP provided.   .   See EMR under Patient Instructions for exercises provided prior visit.        Assessment     Cy is progressing well towards his goals and there are no updates to goals at this time. Pt prognosis is Good.  FOTO completeed indicattes improvement in functioning in ADL/IADL's, see attached for details    Pt will continue to benefit from skilled outpatient occupational therapy to address the deficits listed in the problem list on initial evaluation provide pt/family education and to maximize pt's level of independence in the home and community environment.     Anticipated barriers to occupational therapy: none noted    Pt's spiritual, cultural and educational needs considered and pt agreeable to plan of care and goals.    Goals:  Goals to be met in 4 weeks: (4/4/19)  1)   Initiate Hep-met  2)   Pt will increase R shoulder AROM by 10 degrees grossly for improved performance with overhead ADL's-met  3)   Pt will demonstrate increased MMT by at least 1/2 grade grossly in RUE-met  4)   Increase AROM 5 degrees in wrist supination plane of motion to increase functional right hand use for participation in ADLs and IADLs-met   5)   Increase  strength 2-3 lbs. to grasp heavier items during household management tasks-met  6)   Increase pinch 1-3 psis for turning keys and opening up water bottles-part met        Long Term (by discharge):  1)   Independent with HEP  2)   Patient to score no more than 20% limitation on FOTO to demonstrate  improved perception of functional right hand use.  3)   Pt will return to near to prior level of function for ADLs and household management reporting I or Mod I with ADLs.      Plan     Certification Period/Plan of care expiration: 3/7/2019 to 5/2/2019 (8 weeks)     Outpatient Occupational Therapy 2 time weekly for 8 weeks to include the following interventions:  Manual therapy/joint mobilizations, Modalities for pain management, Therapeutic exercises/activities., Strengthening and Energy Conservation .      Jhony Sullivan, OTL

## 2019-04-29 ENCOUNTER — CLINICAL SUPPORT (OUTPATIENT)
Dept: REHABILITATION | Facility: HOSPITAL | Age: 74
End: 2019-04-29
Payer: MEDICARE

## 2019-04-29 DIAGNOSIS — R53.1 RIGHT SIDED WEAKNESS: ICD-10-CM

## 2019-04-29 PROCEDURE — 97112 NEUROMUSCULAR REEDUCATION: CPT | Mod: PO

## 2019-04-29 PROCEDURE — 97110 THERAPEUTIC EXERCISES: CPT | Mod: PO

## 2019-04-29 NOTE — PROGRESS NOTES
Occupational Therapy Daily Treatment Note     Date: 4/29/2019  Name: Cy Rutherford  Clinic Number: [id number]    Therapy Diagnosis:   Encounter Diagnosis   Name Primary?    Right sided weakness      Physician: Yamileth Niño MD    Physician Orders: OT Eval and Treat  Medical Diagnosis: History of ischemic vertebrobasilar artery brainstem stroke  Surgical Procedure and Date: n/a  Evaluation Date: 3/7/19  Insurance Authorization Period Expiration: 2/14/20  Plan of Care Certification Period: 3/7/19-5/2/19 (8 weeks)  Date of Return to MD: TBD      Visit # / Visits authorized: 13 / 20  Time In: 10:00 am  Time Out: 11:00 am  Total Billable Time: 60 minutes (1NM, 3TE)    Precautions:  Standard      Subjective     Pt reports:  Condition is improving, as he .is gaining greater coordination, but feels his LE's & balance are worsening.   Response to previous treatment: Pt is tolerating therapy sessions well. No c/o pain or soreness since last treatment  Functional change: increased coordination and ability to use RUE in light resistive activity    Pain: 0/10  Location: n/a    Objective     Cy received therapeutic exercises for 45 minutes including: (please refer to log below)    Cy received neuromuscular re-education for 15 minutes including: (please refer to log below)    4/29/2019 Visit: 13   NDT:   -WB Sitting side push-up  -Transitional movement using RUE  -Wt shifting engaging trunk, LE's & UE's   (not today)   NM: Proprioceptive feed back w/ BodyBlade-3 ways  Rebounder-1# ball 10 sec for 2 trials  2/10 thow/catch sequence   UBE, L2.5 10 minutes (5 mins forward, 5 mins backward)   Pulleys (flex/ext & abd/add) 3 min each   Theraband  -rows-red  -scap retraction-red  -triceps extension -red   - biceps curls-green 2/10 reps each   Seated shoulder press overhead, 2# 2/10 reps   Seated Shoulder  -FF (elbow ext)   -chest press, seated with 2#  dowel 2/10 reps each   Dynamic scapular stabilization, supine & SL (CW/CCW)  circles  SL Abd to 90 & ER 10 reps each, 1# wt   Dexterciser 3 min   Smiles & frowns Green T-bar; 2/10 reps   Deluxe Gripper-setting 3 3/10 reps   Red Putty   -grasp/manipulation (not today)   T-putty-Red  -Log roll & tripod pinch  -dowel digs (not today)        Home Exercises and Education Provided     Education provided:  Importance of performing HEP for strengthening, as recommended     Written Home Exercises Provided: Patient instructed to cont prior HEP.  Exercises were reviewed and Cy was able to demonstrate them prior to the end of the session.  Cy demonstrated good  understanding of the HEP provided.   .   See EMR under Patient Instructions for exercises provided prior visit.        Assessment     Cy is progressing well towards his goals and there are no updates to goals at this time. Pt prognosis is Good.  FOTO completeed indicattes improvement in functioning in ADL/IADL's, see attached for details    Pt will continue to benefit from skilled outpatient occupational therapy to address the deficits listed in the problem list on initial evaluation provide pt/family education and to maximize pt's level of independence in the home and community environment.     Anticipated barriers to occupational therapy: none noted    Pt's spiritual, cultural and educational needs considered and pt agreeable to plan of care and goals.    Goals:  Goals to be met in 4 weeks: (4/4/19)  1)   Initiate Hep-met  2)   Pt will increase R shoulder AROM by 10 degrees grossly for improved performance with overhead ADL's-met  3)   Pt will demonstrate increased MMT by at least 1/2 grade grossly in RUE-met  4)   Increase AROM 5 degrees in wrist supination plane of motion to increase functional right hand use for participation in ADLs and IADLs-met   5)   Increase  strength 2-3 lbs. to grasp heavier items during household management tasks-met  6)   Increase pinch 1-3 psis for turning keys and opening up water bottles-part  met        Long Term (by discharge):  1)   Independent with HEP  2)   Patient to score no more than 20% limitation on FOTO to demonstrate improved perception of functional right hand use.  3)   Pt will return to near to prior level of function for ADLs and household management reporting I or Mod I with ADLs.      Plan     Certification Period/Plan of care expiration: 3/7/2019 to 5/2/2019 (8 weeks)     Outpatient Occupational Therapy 2 time weekly for 8 weeks to include the following interventions:  Manual therapy/joint mobilizations, Modalities for pain management, Therapeutic exercises/activities., Strengthening and Energy Conservation .      Jhony Sullivan, OTL

## 2019-04-30 ENCOUNTER — PATIENT OUTREACH (OUTPATIENT)
Dept: OTHER | Facility: OTHER | Age: 74
End: 2019-04-30

## 2019-04-30 NOTE — PROGRESS NOTES
Last 5 Patient Entered Readings                                      Current 30 Day Average: 147/69     Recent Readings 4/30/2019 4/30/2019 4/30/2019 4/28/2019 4/28/2019    SBP (mmHg) 154 166 168 162 163    DBP (mmHg) 69 72 132 66 71    Pulse 48 49 51 56 56          Patient submitted elevated reading of 168/132 today, then several minutes later submitted reading of 166/72. Will address further next outreach later this week.

## 2019-04-30 NOTE — PROGRESS NOTES
HPI:  Mr. Cy Rutherford is a 73 y.o. male who is newly enrolled in the [facility]. Pertinent PMH includes DM, CAD h/o MI. Reviewed allergies and current list of medications on file. Patient takes medications at 8 AM and 8 PM. He is concerned that readings remain elevated despite increased dose of doxazosin. patient's BP controlled at last cardiologist visit and patient reports similar reading at office visit 2 days ago  Reviewed technique and patient is checking blood pressure correctly with respect to positioning and timing after medications however, he keeps device on the .       Last 5 Patient Entered Readings                                      Current 30 Day Average: 147/69     Recent Readings 4/30/2019 4/30/2019 4/30/2019 4/28/2019 4/28/2019    SBP (mmHg) 154 166 168 162 163    DBP (mmHg) 69 72 132 66 71    Pulse 48 49 51 56 56          Patient denies s/s of hypotension (lightheadedness, dizziness, nausea, fatigue) associated with low readings. Instructed patient to inform me if this occurs, patient confirms understanding.    Patient denies s/s of hypertension (SOB, CP, severe headaches, changes in vision) associated with high readings. Instructed patient to go to the ED if BP >180/110 and accompanied by hypertensive s/s, patient confirms understanding.    Assessment:  Patient's current 30-day average is not at goal of <130/80 mmHg.    Plan:  Continue current regimen.  Asked patient to remove device from the  as this may compromise the battery and result in inaccurate readings.   He will check regu;don on  Patients health , Crystal Goode, will be following up every 3-4 weeks.   I will continue to monitor regularly and will follow-up in 2 weeks, sooner if blood pressure begins to trend upward or downward.     Current medication regimen:  Hypertension Medications             amLODIPine (NORVASC) 10 MG tablet Take 1 tablet (10 mg total) by mouth once daily.     chlorthalidone (HYGROTEN) 25 MG Tab Take 1 tablet (25 mg total) by mouth once daily.    doxazosin (CARDURA) 4 MG tablet Take 1 tablet (4 mg total) by mouth every evening.    losartan (COZAAR) 50 MG tablet Take 1 tablet (50 mg total) by mouth 2 (two) times daily.    metoprolol succinate (TOPROL-XL) 100 MG 24 hr tablet Take 1 tablet (100 mg total) by mouth 2 (two) times daily.        Patient has my contact information and knows to call with any concerns or clinical changes.

## 2019-04-30 NOTE — PROGRESS NOTES
Last 5 Patient Entered Readings                                      Current 30 Day Average: 147/69     Recent Readings 4/30/2019 4/30/2019 4/30/2019 4/28/2019 4/28/2019    SBP (mmHg) 154 166 168 162 163    DBP (mmHg) 69 72 132 66 71    Pulse 48 49 51 56 56        DM attempted to reach patient today, push outreach 1 week.

## 2019-05-03 DIAGNOSIS — I67.9 LACUNAR ATAXIC HEMIPARESIS: Primary | ICD-10-CM

## 2019-05-03 DIAGNOSIS — G46.7 LACUNAR ATAXIC HEMIPARESIS: Primary | ICD-10-CM

## 2019-05-03 DIAGNOSIS — Z74.09 IMPAIRED MOBILITY AND ADLS: ICD-10-CM

## 2019-05-03 DIAGNOSIS — Z78.9 IMPAIRED MOBILITY AND ADLS: ICD-10-CM

## 2019-05-07 NOTE — PROGRESS NOTES
"Last 5 Patient Entered Readings                                      Current 30 Day Average: 154/71     Recent Readings 4/30/2019 4/30/2019 4/30/2019 4/28/2019 4/28/2019    SBP (mmHg) 154 166 168 162 163    DBP (mmHg) 69 72 132 66 71    Pulse 48 49 51 56 56        Discussed elevated reading of 168/132 on 4/30, denied hypertensive symptoms.    Patient reports he is feeling "fine", recently returned from a cruise.    Patient has been taking BP readings while sitting in a chair, arm supported, sitting for 1-2 minutes, and waiting at least an hour after taking BP medications. Reviewed proper BP technique and importance of adherence to ensure accurate readings.        [facility]  Follow Up    Lifestyle Modifications:    1.Dietary Modifications (Sodium intake <2,000mg/day, food labels, dining out): Reports efforts to reduce salt intake, but eating "microwave meals" 500-600 mg sodium per meal. Has trouble cooking, recovering from a stroke that affected right side of body. Typical meals consist of the following:    Breakfast: yogurt and Thai Aj breakfast sandwich (600-700 mg sodium)   Lunch: ham, roast beef or chicken salad sandwich   Dinner: microwave meals   Snacks: before bed- peanut butter crackers    Beverages: diet coke or lemonade with meals, limited water intake     2.Physical Activity: Reports "moderate" activity level, stays active with occupational therapy, grocery shopping and house work. Reports using stationary bike and occupational therapy exercises at home.     3.Medication Therapy: Patient has been compliant with the medication regimen.    4.Patient has the following medication side effects/concerns: none     Follow up with Mr. Cy Rutherford completed. No further questions or concerns. Will continue to follow up to achieve health goals.    "

## 2019-05-10 ENCOUNTER — CLINICAL SUPPORT (OUTPATIENT)
Dept: REHABILITATION | Facility: HOSPITAL | Age: 74
End: 2019-05-10
Payer: MEDICARE

## 2019-05-10 DIAGNOSIS — R53.1 RIGHT SIDED WEAKNESS: ICD-10-CM

## 2019-05-10 PROCEDURE — 97110 THERAPEUTIC EXERCISES: CPT | Mod: PO

## 2019-05-10 PROCEDURE — 97112 NEUROMUSCULAR REEDUCATION: CPT | Mod: PO

## 2019-05-10 NOTE — PROGRESS NOTES
Outpatient Therapy Updated Plan of Care     Visit Date: 5/10/2019  Name: Cy Rutherford  Clinic Number: [id number]    Therapy Diagnosis:   Encounter Diagnosis   Name Primary?    Right sided weakness      Physician: Yamileth Niño MD    Physician Orders: OT Eval and Treat  Medical Diagnosis: History of ischemic vertebrobasilar artery brainstem stroke  Surgical Procedure and Date: n/a  Evaluation Date: 3/7/19  Insurance Authorization Period Expiration: 2/14/20  Initial Plan of Care Certification Period: 3/7/19-5/2/19  Date of Return to MD: SUZETTE    Total Visits Received: 13  Cancelled Visits: 0  No Show Visits: 0    Current Certification Period:  05/02/19 to 05/30/19  Precautions:  Standard  Visits from Evaluation Date:  13  Functional Level Prior to Evaluation:  Independent    Subjective     Update: Patient feels condition has improved overall, however, unresolved weakness and incoordination in RUE continues to affect ADL/IADL's     Objective     Update: See objective measures below in treatment note for details    Assessment     Update: See treatment note assessment below for details    Previous Short Term Goals Status:   6/6 STG's met; 3/3 LTG's part met  New Short Term Goals Status:   (STG's=LTG's) by discharge    1)  Patient independent in final Hep  2)  Patient with R shoulder AROM WFL, all planes of mobility for improved performance with overhead ADL's  3)  Pt will demonstrate increased MMT by at least 1/2 grade grossly in RUE  4)  Increase  strength 2-3 lbs. to grasp heavier items during household management tasks  5)  Increase pinch 1-3 psis for turning keys and opening up water bottles  6)   Patient to score no more than 20% limitation on FOTO to demonstrate improved perception of functional right hand use.    Long Term Goal Status:   modified:  As above  Reasons for Recertification of Therapy:   Updated Plan of Care needed    Plan     Updated Certification Period: 5/10/2019 to 05/30/19  Recommended  Treatment Plan: 2 times per week for 4 weeks: Manual Therapy, Neuromuscular Re-ed, Patient Education, Self Care, Therapeutic Activites and Therapeutic Exercise  Other Recommendations: Complete Physical Therapy Evaluation, per MD orders. Patient is currently scheduled for an appointment    Jhony Sullivan OT  5/10/2019      I CERTIFY THE NEED FOR THESE SERVICES FURNISHED UNDER THIS PLAN OF TREATMENT AND WHILE UNDER MY CARE    Physician's comments:        Physician's Signature: ___________________________________________________          Occupational Therapy Daily Treatment Note     Date: 5/10/2019  Name: Cy Rutherford  Clinic Number: [id number]    Therapy Diagnosis:   Encounter Diagnosis   Name Primary?    Right sided weakness      Physician: Yamileth Niño MD    Visit # / Visits authorized: 14 / 20  Time In: 10:05 am  Time Out: 11:05 am  Total Billable Time: 30 minutes (1NM, 1TE)      Subjective     Pt reports:  He has returned from being out of town for a week and is ready to return to his therapy for his RUE.  He is looking forward to addressing balance & coordination issues with his LE's at his scheduled PT evaluation next week. .   Response to previous treatment: Pt is tolerating therapy sessions well. No c/o pain or soreness since last treatment  Functional change: increased coordination and ability to use RUE in light resistive activity    Pain: 0/10  Location: n/a    Objective     Objective measures taken today are as follows;  Range of Motion and Manual Muscle Test  Shoulder Right MMT   Flexion 120 (+9) 4/5   Abduction 100 (+10) 4/5   ER at 0 40 (+10) 4/5   ER at 90 60 (+8)  3+/5   IR L3 (+)  2+/5        Supination 68 (+3) 4/5       Strength (Dyanmometer) and Pinch Strength (Pinch Gauge)  Measured in pounds and psi.     3/7/2019 3/7/2019 5/10/2019     Left Right Right   Rung II 80 30 42 (+8)   Jay Pinch 20 11 13 (+1.5)   3pt Pinch 15 6 9 (+2)   2pt Pinch 20 9 10 (+1)       Cy received therapeutic  exercises for 15 minutes with 30 minutes supervised and neuromuscular re-education for 15 minutes includin/10/2019 Visit: 13   NDT:   -WB Sitting side push-up  -Transitional movement using RUE  -Wt shifting engaging trunk, LE's & UE's 10 reps each w/ assist and cues by therapist   NM: Proprioceptive feed back w/ BodyBlade-3 ways  Rebounder-1# ball (not today)   UBE, L2.5 10 minutes (5 mins forward, 5 mins backward)   Pulleys (flex/ext & abd/add) 3 min each   Theraband  -rows-red  -scap retraction-red  -triceps extension -red   - biceps curls-green 2/10 reps each   Supine Dowel Shoulder  -FF (elbow ext)   -chest press 15 reps each, 3#    Dynamic scapular stabilization, supine & SL (CW/CCW) circles  SL Abd to 90 & ER 10 reps each, 1# wt   Dexterciser 3 min   Smiles & frowns Green T-bar; 2/10 reps   Deluxe Gripper-setting 3 3/10 reps   Red Putty   -grasp/manipulation (not today)   T-putty-Red  -Log roll & tripod pinch  -dowel digs (not today)        Home Exercises and Education Provided     Education provided:  Reviewed HEP      Written Home Exercises Provided: Patient instructed to cont prior HEP.  Exercises were reviewed and Cy was able to demonstrate them prior to the end of the session.  Cy demonstrated good  understanding of the HEP provided.   .   See EMR under Patient Instructions for exercises provided prior visit.        Assessment     Cy is progressing well towards his goals and there are Plan of Care has been updated above for appropriate goals at this time. Pt prognosis is Good.  FOTO completeed indicattes improvement in functioning in ADL/IADL's, see attached for details    Pt will continue to benefit from skilled outpatient occupational therapy to address the deficits listed in the problem list on initial evaluation provide pt/family education and to maximize pt's level of independence in the home and community environment.     Anticipated barriers to occupational therapy: none noted    Pt's  spiritual, cultural and educational needs considered and pt agreeable to plan of care and goals.    Goals:  Goals to be met in 4 weeks: (4/4/19)  1)   Initiate Hep-met  2)   Pt will increase R shoulder AROM by 10 degrees grossly for improved performance with overhead ADL's-met  3)   Pt will demonstrate increased MMT by at least 1/2 grade grossly in RUE-met  4)   Increase AROM 5 degrees in wrist supination plane of motion to increase functional R hand use for participation in ADL/IADLs-met   5)   Increase  strength 2-3 lbs. to grasp heavier items during household management tasks-met  6)   Increase pinch 1-3 psis for turning keys and opening up water bottles-part met        Long Term (by discharge):  1)   Independent with HEP-part met/ongoing  2)   Patient to score no more than 20% limitation on FOTO to demonstrate improved perception of functional right hand use.-part met/ongoing  3)   Pt will return to near to prior level of function for ADLs and household management reporting I or Mod I with ADLs.-part met/ongoing     Plan     Certification Period/Plan of care expiration:  5/10/2019 to 05/30/19     Outpatient Occupational Therapy 2 time weekly for 8 weeks to include the following interventions:  Manual therapy/joint mobilizations, Modalities for pain management, Therapeutic exercises/activities., Strengthening and Energy Conservation .      Jhony Sullivan, OTL

## 2019-05-15 ENCOUNTER — PATIENT OUTREACH (OUTPATIENT)
Dept: OTHER | Facility: OTHER | Age: 74
End: 2019-05-15

## 2019-05-15 NOTE — PROGRESS NOTES
HPI:  Called Mr. Cy Rutherford for hypertension follow-up. Patient reports adherence to medication regimen with no complaints. Reports weakness secondary to recent stroke. Patient see's PT and OT and feels that he is not back to baseline. Some swelling in ankles and feet on occasion.      Last 5 Patient Entered Readings                                      Current 30 Day Average: 131/57     Recent Readings 6/6/2019 6/5/2019 6/5/2019 6/4/2019 6/3/2019    SBP (mmHg) 132 136 140 118 121    DBP (mmHg) 47 54 56 50 47    Pulse 46 48 50 47 47          Patient denies s/s of hypotension (lightheadedness, dizziness, nausea, fatigue) associated with low readings. Instructed patient to inform me if this occurs, patient confirms understanding.    Patient denies s/s of hypertension (SOB, CP, severe headaches, changes in vision) associated with high readings. Instructed patient to go to the ED if BP >180/110 and accompanied by hypertensive s/s, patient confirms understanding.    Assessment:  Patient's current 30-day average is at goal of <130/80 mmHg.    Plan:  Continue current regimen however, patient will begin taking amlodipine nightly.   Consider reducing amlodipine if swelling continues.   Patients health , Crystal Goode, will be following up every 3-4 weeks.   I will continue to monitor regularly and will follow-up in 2 to 3 weeks, sooner if blood pressure begins to trend upward or downward.     Current medication regimen:  Hypertension Medications             amLODIPine (NORVASC) 10 MG tablet Take 1 tablet (10 mg total) by mouth once daily.    chlorthalidone (HYGROTEN) 25 MG Tab Take 1 tablet (25 mg total) by mouth once daily.    doxazosin (CARDURA) 4 MG tablet Take 1 tablet (4 mg total) by mouth every evening.    losartan (COZAAR) 50 MG tablet Take 1 tablet (50 mg total) by mouth 2 (two) times daily.    metoprolol succinate (TOPROL-XL) 100 MG 24 hr tablet Take 1 tablet (100 mg total) by mouth 2 (two) times daily.         Patient has my contact information and knows to call with any concerns or clinical changes.

## 2019-05-16 ENCOUNTER — CLINICAL SUPPORT (OUTPATIENT)
Dept: REHABILITATION | Facility: HOSPITAL | Age: 74
End: 2019-05-16
Payer: MEDICARE

## 2019-05-16 DIAGNOSIS — I69.398 IMPAIRED BALANCE AS LATE EFFECT OF CEREBROVASCULAR ACCIDENT: ICD-10-CM

## 2019-05-16 DIAGNOSIS — R53.1 RIGHT SIDED WEAKNESS: ICD-10-CM

## 2019-05-16 DIAGNOSIS — M62.89 TIGHTNESS OF RIGHT GASTROCNEMIUS MUSCLE: ICD-10-CM

## 2019-05-16 DIAGNOSIS — R26.9 ABNORMALITY OF GAIT AS LATE EFFECT OF CEREBROVASCULAR ACCIDENT (CVA): ICD-10-CM

## 2019-05-16 DIAGNOSIS — R26.89 IMPAIRED BALANCE AS LATE EFFECT OF CEREBROVASCULAR ACCIDENT: ICD-10-CM

## 2019-05-16 DIAGNOSIS — R29.898 WEAKNESS OF RIGHT LOWER EXTREMITY: ICD-10-CM

## 2019-05-16 DIAGNOSIS — I69.398 ABNORMALITY OF GAIT AS LATE EFFECT OF CEREBROVASCULAR ACCIDENT (CVA): ICD-10-CM

## 2019-05-16 PROCEDURE — G8979 MOBILITY GOAL STATUS: HCPCS | Mod: CI,PO

## 2019-05-16 PROCEDURE — G8978 MOBILITY CURRENT STATUS: HCPCS | Mod: CJ,PO

## 2019-05-16 PROCEDURE — 97110 THERAPEUTIC EXERCISES: CPT | Mod: PO

## 2019-05-16 PROCEDURE — 97162 PT EVAL MOD COMPLEX 30 MIN: CPT | Mod: PO

## 2019-05-16 PROCEDURE — 97530 THERAPEUTIC ACTIVITIES: CPT | Mod: PO

## 2019-05-16 PROCEDURE — 97112 NEUROMUSCULAR REEDUCATION: CPT | Mod: PO

## 2019-05-16 NOTE — PLAN OF CARE
[facility]  Physical Therapy Neurological Rehabilitation Initial Evaluation    Name: Cy Rutherford  Clinic Number: [id number]    Therapy Diagnosis:   Encounter Diagnoses   Name Primary?   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     Abnormality of gait as late effect of cerebrovascular accident (CVA)     Tightness of right gastrocnemius muscle      Physician: Yamileth Niño MD    Physician Orders: PT Eval and Treat   Medical Diagnosis from Referral: Lacunar ataxic hemiparesis; Impaired mobility and ADLs  Evaluation Date: 5/16/2019  Authorization Period Expiration: 12/31/2019  Plan of Care Expiration: 07/16/2019  Visit # / Visits authorized: 1/ 20    Time In: 11:00 am  Time Out: 12:00 pm  Total Billable Time: 60 minutes    Precautions: Standard and Fall    Subjective   Date of onset: May 2018  History of current condition - Cy reports: he is having difficulty with walking a long distance as he gets tired in his hips and starts to drag his R foot. He does have a cane that he uses outside the home If he has a grocery cart to hold on to when shopping he has no problem. He does have to take his time going up stairs, has to use grab bars to step over the side of the tub, has difficulty doing yrd work and working on his pool due to the uneven ground.      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    Medications:   Cy DARDEN has a current medication list which includes  the following prescription(s): amlodipine, aspirin, chlorthalidone, clopidogrel, doxazosin, fenofibrate micronized, fish oil-omega-3 fatty acids, insulin glargine, insulin lispro, losartan, metformin, metoprolol succinate, multivitamin with minerals, and rosuvastatin.    Allergies:   Review of patient's allergies indicates:  No Known Allergies     Imaging, CT scan films: head and neck 5/07/2018  Impression       Soft tissue plaque within the mid to distal basilar resulting approximately 30% luminal narrowing.  No evidence of thrombus within the basilar.    Multifocal luminal narrowing of the left vertebral artery with occlusion of the V4 segment of the left vertebral artery.  The chronicity of this is uncertain.    Area of hypoattenuation in the left kim, in keeping with area of recent infarction described on the preceding MRI of the brain.    Additional findings as above.         Prior Therapy: PT previously following CVA  Social History:  lives alone  Falls: none  DME: Straight cane and grab bars  Home Environment: single story home  Exercise Routine / History: walking for exercise 3x week, doing his HEP 4x a week  Family Present at time of Eval: none  Occupation: retired   Prior Level of Function: Independent with all ADLs  Current Level of Function: Mod independent, unable to perform usual yard work    Pain:  Current 0/10, worst 0/10, best 0/10   Location: N/A  Description:N/A  Aggravating Factors: N/A  Easing Factors: N/A    Pts goals: improve on balance and get to the point I don't tire out as much with the legs.     Objective     Observation: Patient is a 73 year old male, who presents to the clinic in no apparent distress.       Posture: wide JORDAN, increased lumbar lordosis in standing.       Gait: hip circumduction on R, drop foot R, wide JORDAN.    Range of Motion: AROM (PROM): WFL AROM    Strength:  Hip Left Right   Flexion 4+/5 3+/5   Abduction 5/5 3/5   Adduction 5/5 3-/5   Extension NT  NT     Knee Left Right   Extension 5/5 4/5   Flexion 5/5 5/5     Ankle Left Right   Dorsiflexion 5/5 3+/5   Plantarflexion 5/5 5/5   Inversion 5/5 5/5   Eversion 5/5 4-/5       Special Tests:  TUG 11.75 seconds - indicates a risk for falls  Sit to stand 6 reps - indicates a risk for falls    Joint Mobility: R ankle accessory mobility equal to L ankle.     Palpation: No tenderness to palpation at this time    Sensation: light touch intact    Flexibility: SLR L 50 degrees, SLR R 45 degrees, R gastroc tightness         CMS Impairment/Limitation/Restriction for FOTO CVA Survey    Therapist reviewed FOTO scores for Cy Rutherford on 5/16/2019.   FOTO documents entered into [employer] - see Media section.    Limitation Score: 36%  Category: Mobility    Current : CJ = at least 20% but < 40% impaired, limited or restricted  Goal: CI = at least 1% but < 20% impaired, limited or restricted  Discharge: N/A at this time         TREATMENT   Treatment Time In: 11:50 am  Treatment Time Out: 12:00 pm  Total Treatment time separate from Evaluation: 10 minutes    Cy received therapeutic exercises to develop strength for 10 minutes including:  Resisted ankle dorsiflexion and eversion with red resistance band    Home Exercises and Patient Education Provided    Education provided:   - compliance with HEP  - role of PT and goals for PT    Written Home Exercises Provided: yes.  Exercises were reviewed and Cy was able to demonstrate them prior to the end of the session.  Cy demonstrated good  understanding of the education provided.     See EMR under Patient Instructions for exercises provided 5/16/2019.    Assessment   Cy DARDEN is a 73 y.o. male referred to outpatient Physical Therapy with a medical diagnosis of Lacunar ataxic hemiparesis; Impaired mobility and ADLs. Pt presents with weakness of right lower extremity, tightness of both hamstrings and right gastroc, abnormal gait pattern, and balance issues that limits his perform his  usual yard work, ambulate for a prolonged period of time, ascend/descend stairs with a reciprocal gait pattern, and perform transfers safely. His current score on FOTO CVA Survey places him in the 20%<40% impaired, limited, or restricted category.     Pt prognosis is Good.   Pt will benefit from skilled outpatient Physical Therapy to address the deficits stated above and in the chart below, provide pt/family education, and to maximize pt's level of independence.     Plan of care discussed with patient: Yes  Pt's spiritual, cultural and educational needs considered and patient is agreeable to the plan of care and goals as stated below:     Anticipated Barriers for therapy: none    Medical Necessity is demonstrated by the following  History  Co-morbidities and personal factors that may impact the plan of care Co-morbidities:   diabetes and history of CVA    Personal Factors:   no deficits     moderate   Examination  Body Structures and Functions, activity limitations and participation restrictions that may impact the plan of care Body Regions:   lower extremities    Body Systems:    strength  balance  gait  motor learning  flexibility    Participation Restrictions:   Difficulty with ambulation, ascending/descneding stairs, balance on uneven surfaces, transfers.    Activity limitations:   Learning and applying knowledge  no deficits    General Tasks and Commands  no deficits    Communication  no deficits    Mobility  lifting and carrying objects  walking  ascending/descending stairs    Self care  no deficits    Domestic Life  shopping  doing yard work    Interactions/Relationships  no deficits    Life Areas  no deficits    Community and Social Life  community life  recreation and leisure         moderate   Clinical Presentation evolving clinical presentation with changing clinical characteristics moderate   Decision Making/ Complexity Score: moderate     Goals:  Short Term Goals: 4 weeks   1. This patient will be  independent with a basic HEP.  2. This patient will increase R LE strength by 1 grade in order to be able to ambulate greater than 300 feet with a normal gait pattern with no LOB and no AD.  3. Patient will be able to achieve less than or equal to 10 seconds on the Timed Up and Go decreasing patient's risk for falling with household ambulation.   4. Patient will be able to achieve greater than or equal to 84 on the FOTO CVA Survey placing patient in 1%<20% impaired, limited, or restricted category demonstrating overall improved functional ability with lower extremity.   Long Term Goals: 8 weeks   1. This patient will be independent with an updated HEP.  2. This patient will increase R LE strength to 5/5 in order to be able to ascend/descend stairs with a reciprocal gait pattern.  3. Patient will be able to achieve less than or equal to 9 seconds on the Timed Up and Go decreasing patient's risk for falling with ambulation on uneven surfaces in the community.   4. Patient will be able to achieve greater than or equal to 90 on the FOTO CVA Survey placing patient in 1%<20% impaired, limited, or restricted category demonstrating overall improved functional ability with lower extremity.     Plan   Plan of care Certification: 5/16/2019 to 7/16/2019.    Outpatient Physical Therapy 2 times weekly for 8 weeks to include the following interventions: Manual Therapy, Moist Heat/ Ice, Neuromuscular Re-ed, Patient Education, Therapeutic Exercise and IASTM. Dry needling with manual therapy techniques to decrease pain, inflammation and swelling, increase circulation and promote healing process.      Nitza Haddad, PT

## 2019-05-16 NOTE — PROGRESS NOTES
Occupational Therapy Daily Treatment Note     Date: 2019  Name: Cy Rutherford  Clinic Number: [id number]    Therapy Diagnosis:   Encounter Diagnosis   Name Primary?    Right sided weakness      Physician: Yamileth Niño MD    Physician Orders: OT Eval and Treat  Medical Diagnosis: History of ischemic vertebrobasilar artery brainstem stroke  Surgical Procedure and Date: n/a  Evaluation Date: 3/7/19  Insurance Authorization Period Expiration: 20  Current Plan of Care Certification Period: 5/10/2019 to 19  Date of Return to MD: TBD  Visit # / Visits authorized: 15 / 20    Time In: 10:05 am  Time Out: 11:05 am  Total Billable Time: 60 minutes (1NM, 2TE, 1TA)      Subjective     Pt reports:  He has returned from being out of town for a week and is ready to return to his therapy for his RUE.  He is looking forward to addressing balance & coordination issues with his LE's at his scheduled PT evaluation next week. .   Response to previous treatment: Pt is tolerating therapy sessions well. No c/o pain or soreness since last treatment  Functional change: increased coordination and ability to use RUE in light resistive activity    Pain: 0/10  Location: n/a    Objective     Objective measures taken today:  9 Peg Test Right   Removed    Replaced    Time 1.05 sec       Cy received therapeutic exercises for 40 minutes includin2019 Visit: 15   NDT:   -WB Sitting side push-up  -Transitional movement using RUE  -Wt shifting engaging trunk, LE's & UE's 10 reps each w/ assist and cues by therapist   NM: Proprioceptive feed back w/ BodyBlade-3 ways  Rebounder-1# ball (not today)   UBE, L 3.0 10 minutes (5 mins forward, 5 mins backward)   Pulleys (flex/ext & abd/add) 3 min each   Theraband  -rows-red  -scap retraction-red  -triceps extension -red   - biceps curls-green 2/10 reps each   Supine Dowel Shoulder  -FF (elbow ext)   -chest press 15 reps each, 3#    Dynamic scapular stabilization, supine & SL  (CW/CCW) circles  SL Abd to 90 & ER 10 reps each, 1# wt   Dexterciser 3 min   Smiles & frowns Green T-bar; 2/10 reps   Deluxe Gripper-setting 4 2/10 reps   Red Putty   -grasp/manipulation 5 reps   -Log roll & tripod pinch  -dowel digs 2 trial each      Cy DARDEN received therapeutic activity for 20 minutes as follows:  FMC      9 Peg  1 trial    Pom pom  w/ CP  2 trials, red CP    [place] manipulation  2 trials, 15 coins    Isospheres 1 min        Home Exercises and Education Provided     Education provided:  Reviewed HEP      Written Home Exercises Provided: Patient instructed to cont prior HEP.  Exercises were reviewed and Cy was able to demonstrate them prior to the end of the session.  Cy demonstrated good  understanding of the HEP provided.   .   See EMR under Patient Instructions for exercises provided prior visit.        Assessment     Cy is progressing well towards his goals and there are Plan of Care has been updated above for appropriate goals at this time. Pt prognosis is Good.  FOTO completeed indicattes improvement in functioning in ADL/IADL's, see attached for details    Pt will continue to benefit from skilled outpatient occupational therapy to address the deficits listed in the problem list on initial evaluation provide pt/family education and to maximize pt's level of independence in the home and community environment.     Anticipated barriers to occupational therapy: none noted    Pt's spiritual, cultural and educational needs considered and pt agreeable to plan of care and goals.    Goals: (STG's=LTG's)    1)  Patient independent in final Hep  2)  Patient with R shoulder AROM WFL, all planes of mobility for improved performance with overhead ADL's  3)  Pt will demonstrate increased MMT by at least 1/2 grade grossly in RUE  4)  Increase  strength 2-3 lbs. to grasp heavier items during household management tasks  5)  Increase pinch 1-3 psis for turning keys and opening up water  bottles  6)   Patient to score no more than 20% limitation on FOTO to demonstrate improved perception of functional right hand use.    Plan     Certification Period/Plan of care expiration:  5/10/2019 to 05/30/19     Certification Period: 5/16/2019 to 05/30/19  Recommended Treatment Plan: 2 times per week for 4 weeks: Manual Therapy, Neuromuscular Re-ed, Patient Education, Self Care, Therapeutic Activites and Therapeutic Exercise  Other Recommendations: Complete Physical Therapy Evaluation, per MD orders. Patient is currently scheduled for an appointment    KARISSA Simmons

## 2019-05-16 NOTE — PATIENT INSTRUCTIONS
Dorsiflexion: Resisted        Facing anchor, tubing around right foot, pull toward face.   Repeat 10 times per set. Do 3 sets per session. Do 2 sessions per day.     https://F?[employer] F?rsat.[employer]/8     Copyright © [employer]. All rights reserved.   Eversion: Resisted        With right foot in tubing loop, hold tubing around other foot to resist and turn foot out.  Repeat 10 times per set. Do 3 sets per session. Do 2 sessions per day.     https://F?[employer] F?rsat.[employer]/14     Copyright © [employer]. All rights reserved.

## 2019-05-17 PROBLEM — R26.89 IMPAIRED BALANCE AS LATE EFFECT OF CEREBROVASCULAR ACCIDENT: Status: ACTIVE | Noted: 2019-05-17

## 2019-05-17 PROBLEM — R29.898 WEAKNESS OF RIGHT LOWER EXTREMITY: Status: ACTIVE | Noted: 2019-05-17

## 2019-05-17 PROBLEM — R26.9 ABNORMALITY OF GAIT AS LATE EFFECT OF CEREBROVASCULAR ACCIDENT (CVA): Status: ACTIVE | Noted: 2019-05-17

## 2019-05-17 PROBLEM — M62.89 TIGHTNESS OF RIGHT GASTROCNEMIUS MUSCLE: Status: ACTIVE | Noted: 2019-05-17

## 2019-05-17 PROBLEM — I69.398 IMPAIRED BALANCE AS LATE EFFECT OF CEREBROVASCULAR ACCIDENT: Status: ACTIVE | Noted: 2019-05-17

## 2019-05-17 PROBLEM — I69.398 ABNORMALITY OF GAIT AS LATE EFFECT OF CEREBROVASCULAR ACCIDENT (CVA): Status: ACTIVE | Noted: 2019-05-17

## 2019-05-22 ENCOUNTER — TELEPHONE (OUTPATIENT)
Dept: NEUROLOGY | Facility: CLINIC | Age: 74
End: 2019-05-22

## 2019-05-22 NOTE — TELEPHONE ENCOUNTER
Stroke AF Study 12-month Visit    We called patient for Stroke AF Study 12-month visit.  No symptoms suggestive of recurrent cerebrovascular ischemia.  No difficulty tolerating ASA, clopidogrel and rosuvastatin.  No palpitations/LH.    mRS = 2     Impression:  1. Stroke AF Study participant; randomized to usual care  2. 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 Mild basilar stenosis     Stroke risk factors: prior stroke, lg vessel cerebrovascular athero, HTN, DM ,CAD, PAD     Plan:  1. Continue ASA 81mg/d and clopidogrel 75mg/d (stroke occurred shortly after stopping clopidogrel)  2. Continue rosuvastatin  3. Continue Stroke AF participation    Andi Nino MD

## 2019-05-23 ENCOUNTER — CLINICAL SUPPORT (OUTPATIENT)
Dept: REHABILITATION | Facility: HOSPITAL | Age: 74
End: 2019-05-23
Payer: MEDICARE

## 2019-05-23 DIAGNOSIS — R26.89 IMPAIRED BALANCE AS LATE EFFECT OF CEREBROVASCULAR ACCIDENT: ICD-10-CM

## 2019-05-23 DIAGNOSIS — R53.1 RIGHT SIDED WEAKNESS: ICD-10-CM

## 2019-05-23 DIAGNOSIS — I69.398 ABNORMALITY OF GAIT AS LATE EFFECT OF CEREBROVASCULAR ACCIDENT (CVA): ICD-10-CM

## 2019-05-23 DIAGNOSIS — R29.898 WEAKNESS OF RIGHT LOWER EXTREMITY: ICD-10-CM

## 2019-05-23 DIAGNOSIS — I69.398 IMPAIRED BALANCE AS LATE EFFECT OF CEREBROVASCULAR ACCIDENT: ICD-10-CM

## 2019-05-23 DIAGNOSIS — M62.89 TIGHTNESS OF RIGHT GASTROCNEMIUS MUSCLE: ICD-10-CM

## 2019-05-23 DIAGNOSIS — R26.9 ABNORMALITY OF GAIT AS LATE EFFECT OF CEREBROVASCULAR ACCIDENT (CVA): ICD-10-CM

## 2019-05-23 PROCEDURE — 97110 THERAPEUTIC EXERCISES: CPT | Mod: PO

## 2019-05-23 PROCEDURE — 97530 THERAPEUTIC ACTIVITIES: CPT | Mod: PO

## 2019-05-23 NOTE — PROGRESS NOTES
"  Occupational Therapy Daily Treatment Note     Date: 5/23/2019  Name: Cy Rutherford  Clinic Number: [id number]    Therapy Diagnosis:   Encounter Diagnosis   Name Primary?    Right sided weakness      Physician: Yamileth Niño MD    Physician Orders: OT Eval and Treat  Medical Diagnosis: History of ischemic vertebrobasilar artery brainstem stroke  Surgical Procedure and Date: n/a  Evaluation Date: 3/7/19  Insurance Authorization Period Expiration: 2/14/20  Current Plan of Care Certification Period: 5/10/2019 to 05/30/19  Date of Return to MD: TBD  Visit # / Visits authorized: 15 / 20    Time In: 3:50 pm  Time Out: 4:50 pm  Total Billable Time: 60 minutes 2TE, 2TA)      Subjective     Pt reports:  "Hand movements are difficult for me. I have to really focus and concentrate"   Response to previous treatment: Pt is tolerating therapy sessions well. No c/o pain or soreness since last treatment  Functional change: able to lift objects ~ 5 lbs, with minimal difficutly    Pain: 0/10  Location: n/a    Objective     Cy received therapeutic exercises for 30 minutes including:  UBE (forward/backwards) 10 minutes, lvl 3.0   Pulleys (flex/ext & abd/add) (not today) 3 min each   Theraband  -rows-red  -scap retraction-red  -triceps extension -red   - biceps curls-green (not today) 2/10 reps each   Seated Dowel Shoulder Ex  -FF (elbow ext)   -chest press  -biceps curl 10 reps each, 3#    Dynamic scapular stabilization, supine & SL (CW/CCW) circles  SL Abd to 90 & ER (not today)10 reps each, 1# wt       Dowel swings (sup/pron) 2/10 reps, 1# wt   Dexterciser 3 min   Wrist 3 ways over wedge  10 reps each, 2# wt   Smiles & frowns Green T-bar; 15 reps   Deluxe Gripper-setting 4 2/10 reps   Red Putty   -grasp/manipulation (not today) 5 reps   -Log roll & tripod pinch  -dowel digs (not today) 2 trial each      Cy DARDEN received therapeutic activity for 30 minutes as follows:  FMC     -9 Peg  1 trial w/ assist   -Pom pom  w/ CP "  2 trials, red CP   -[place] manipulation  2 trials, 15 coins   -Isospheres 3 min, Step-by-step w/ assist    Isolated hand/finger patterns    -ok, 1, 2, 3, 4, 5 signs 5 reps each   -Finger Extension lifts 5 reps each, w/ assist to isolate   -Thumb to finger opposition 5 reps each, w/ assist       Home Exercises and Education Provided     Education provided:  Reviewed HEP      Written Home Exercises Provided: Patient instructed to cont prior HEP.  Exercises were reviewed and Cy was able to demonstrate them prior to the end of the session.  Cy demonstrated good  understanding of the HEP provided.   .   See EMR under Patient Instructions for exercises provided prior visit.        Assessment     Patient is noted to have improvement in overall R shoulder-elbow strength, but presents with continued weakness in R forearm/wrist/hand.  His FMC continues to be moderate to significant impaired, but he is making steady progress with each visit.  His hand movements are slightly more accurate and fluid compared to last 2 treatment sessions.  Cy is progressing well towards his goals and there are Plan of Care has been updated above for appropriate goals at this time. Pt prognosis is Good.  FOTO completeed indicattes improvement in functioning in ADL/IADL's, see attached for details    Pt will continue to benefit from skilled outpatient occupational therapy to address the deficits listed in the problem list on initial evaluation provide pt/family education and to maximize pt's level of independence in the home and community environment.     Anticipated barriers to occupational therapy: none noted    Pt's spiritual, cultural and educational needs considered and pt agreeable to plan of care and goals.    Goals: (STG's=LTG's)    1)  Patient independent in final Hep  2)  Patient with R shoulder AROM WFL, all planes of mobility for improved performance with overhead ADL's  3)  Pt will demonstrate increased MMT by at least 1/2  grade grossly in RUE  4)  Increase  strength 2-3 lbs. to grasp heavier items during household management tasks  5)  Increase pinch 1-3 psis for turning keys and opening up water bottles  6)   Patient to score no more than 20% limitation on FOTO to demonstrate improved perception of functional right hand use.    Plan     Certification Period/Plan of care expiration:  5/10/2019 to 05/30/19     Certification Period: 5/16/2019 to 05/30/19  Recommended Treatment Plan: 2 times per week for 4 weeks: Manual Therapy, Neuromuscular Re-ed, Patient Education, Self Care, Therapeutic Activites and Therapeutic Exercise  Other Recommendations: Complete Physical Therapy Evaluation, per MD orders. Patient is currently scheduled for an appointment    KARISSA Simmons

## 2019-05-23 NOTE — PROGRESS NOTES
"  Physical Therapy Daily Treatment Note     Name: Cy Rutherford  Clinic Number: [id number]    Therapy Diagnosis:   Encounter Diagnoses   Name Primary?   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  He was compliant with home exercise program.  Response to previous treatment: no soreness after initial evaluation  Functional change: walking better    Pain: 0/10  Location: N/A    Objective     Cy received therapeutic exercises to develop strength, flexibility and core stabilization for 25 minutes including:    Date 5/23/19   Visit 2/20   FOTO 2/5   FTF 6/16/2019   POC exp 7/16/2019   G code   2/10  6/16/2019   Vs total   total 60.64  173.84       Hamstring str. Next?   Gastroc Str. 10 x 10"       Ankle  DF  eversion RTB  1 x 15  1 x 15   SAQ 3 x 10   SLR 1 x 10   Hip Abd 1 x 10 RTB   Hip add 10 x 3" w/ ball       LAQs Next?   Seated Hip Flex Next?       TKE    Hip Abd    Hip Flex    Hip Ext    HS Curls        Step Ups    Step Downs    Initials DG     Patient was screened for use of kinesiotape and was cleared for use.  1. Has the patient ever had a reaction to the adhesive in bandaids? Yes/No  2. Has the patient ever uses athletic/kinesiotape in the past? Yes/No  3. Is the patient hemodynamically impaired (PE, DVT, CHF, Kidney failure)? " Yes/No  4. Can the PT/PTA apply the tape to your skin? Yes/No    Patient was instructed on duration to wear the tape, proper drying techniques for the tape, and to remove the tape if he/she had any issues with it.    Patient verbalized understanding of these instructions.    2 I strips were applied to R ankle/foot to increase R ankle dorsiflexion when ambulating.    Home Exercises Provided and Patient Education Provided     Education provided:   - continue with HEP to his tolerance.   - remove kinesiotape if any adverse reactions occur.     Written Home Exercises Provided: yes.  Exercises were reviewed and Cy was able to demonstrate them prior to the end of the session.  Cy demonstrated good  understanding of the education provided.     See EMR under Patient Instructions for exercises provided 5/23/2019.    Assessment     Patient was able to perform all of today's new exercises with no increase in symptoms prior to leaving the clinic. He required frequent verbal cues to avoid using momentum with his SLR and tactile cues to avoid substitutions with ankle theraband exercises.   Cy is progressing well towards his goals.   Pt prognosis is Good.     Pt will continue to benefit from skilled outpatient physical therapy to address the deficits listed in the problem list box on initial evaluation, provide pt/family education and to maximize pt's level of independence in the home and community environment.     Pt's spiritual, cultural and educational needs considered and pt agreeable to plan of care and goals.     Anticipated barriers to physical therapy: none    Goals:   Short Term Goals: 4 weeks   1. This patient will be independent with a basic HEP. IN PROGRESS  2. This patient will increase R LE strength by 1 grade in order to be able to ambulate greater than 300 feet with a normal gait pattern with no LOB and no AD. IN PROGRESS  3. Patient will be able to achieve less than or equal to 10 seconds on the Timed Up  and Go decreasing patient's risk for falling with household ambulation. IN PROGRESS  4. Patient will be able to achieve greater than or equal to 84 on the FOTO CVA Survey placing patient in 1%<20% impaired, limited, or restricted category demonstrating overall improved functional ability with lower extremity. IN PROGRESS  Long Term Goals: 8 weeks   1. This patient will be independent with an updated HEP. IN PROGRESS  2. This patient will increase R LE strength to 5/5 in order to be able to ascend/descend stairs with a reciprocal gait pattern. IN PROGRESS  3. Patient will be able to achieve less than or equal to 9 seconds on the Timed Up and Go decreasing patient's risk for falling with ambulation on uneven surfaces in the community. IN PROGRESS  4. Patient will be able to achieve greater than or equal to 90 on the FOTO CVA Survey placing patient in 1%<20% impaired, limited, or restricted category demonstrating overall improved functional ability with lower extremity. IN PROGRESS    Plan     Begin seated hip flexion and LAQ next visit, increase reps with all exercises next visit.     Nitza Haddad, PT

## 2019-05-23 NOTE — PATIENT INSTRUCTIONS
Gastroc, Sitting (Passive)        Sit with strap or towel around ball of foot. Gently pull toward body. Hold 10 seconds.   Repeat 10 times per session. Do 2 sessions per day.    Copyright © [employer]. All rights reserved.   Strengthening: Straight Leg Raise (Phase 1)        Tighten muscles on front of right thigh, then lift leg from surface, keeping knee locked.   Repeat 10 times per set. Do 3 sets per session. Do 2 sessions per day.     https://[employer]/614     Copyright © [employer]. All rights reserved.   Strengthening: Hip Adduction - Isometric        With ball or folded pillow between knees, squeeze knees together. Hold 3 seconds.  Repeat 10 times per set. Do 3 sets per session. Do 2 sessions per day.     https://[employer]/612     Copyright © [employer]. All rights reserved.   Strengthening: Hip Abductor - Resisted        With band looped around both legs above knees, push thighs apart.  Repeat 10 times per set. Do 3 sets per session. Do 2 sessions per day.     https://[employer]/688     Copyright © [employer]. All rights reserved.   Bridging        Slowly raise buttocks from floor, keeping stomach tight.  Repeat 10 times per set. Do 3 sets per session. Do 2 sessions per day.     https://[employer]/1096     Copyright © [employer]. All rights reserved.

## 2019-05-29 ENCOUNTER — CLINICAL SUPPORT (OUTPATIENT)
Dept: REHABILITATION | Facility: HOSPITAL | Age: 74
End: 2019-05-29
Payer: MEDICARE

## 2019-05-29 DIAGNOSIS — M62.89 TIGHTNESS OF RIGHT GASTROCNEMIUS MUSCLE: ICD-10-CM

## 2019-05-29 DIAGNOSIS — I69.398 ABNORMALITY OF GAIT AS LATE EFFECT OF CEREBROVASCULAR ACCIDENT (CVA): ICD-10-CM

## 2019-05-29 DIAGNOSIS — I69.398 IMPAIRED BALANCE AS LATE EFFECT OF CEREBROVASCULAR ACCIDENT: ICD-10-CM

## 2019-05-29 DIAGNOSIS — R26.9 ABNORMALITY OF GAIT AS LATE EFFECT OF CEREBROVASCULAR ACCIDENT (CVA): ICD-10-CM

## 2019-05-29 DIAGNOSIS — R53.1 RIGHT SIDED WEAKNESS: ICD-10-CM

## 2019-05-29 DIAGNOSIS — R29.898 WEAKNESS OF RIGHT LOWER EXTREMITY: ICD-10-CM

## 2019-05-29 DIAGNOSIS — R26.89 IMPAIRED BALANCE AS LATE EFFECT OF CEREBROVASCULAR ACCIDENT: ICD-10-CM

## 2019-05-29 PROCEDURE — 97110 THERAPEUTIC EXERCISES: CPT | Mod: PO

## 2019-05-29 PROCEDURE — 97530 THERAPEUTIC ACTIVITIES: CPT | Mod: PO

## 2019-05-29 NOTE — PROGRESS NOTES
"  Occupational Therapy Daily Treatment Note     Date: 5/29/2019  Name: Cy Rutherford  Clinic Number: [id number]    Therapy Diagnosis:   Encounter Diagnosis   Name Primary?    Right sided weakness      Physician: Yamileth Niño MD    Physician Orders: OT Eval and Treat  Medical Diagnosis: History of ischemic vertebrobasilar artery brainstem stroke  Surgical Procedure and Date: n/a  Evaluation Date: 3/7/19  Insurance Authorization Period Expiration: 2/14/20  Current Plan of Care Certification Period: 5/10/2019 to 05/30/19  Date of Return to MD: TBD  Visit # / Visits authorized: 16 / 20    Time In: 10:00 am  Time Out: 11:00 am  Total Billable Time: 60 minutes (2TE, 2TA)      Subjective     Pt reports:  "I think my arm is getting stronger, but the coordination is more challenging for me    Occupational Therapy Daily Treatment Note     Date: 5/29/2019  Name: Cy Rutherford  Clinic Number: [id number]    Therapy Diagnosis:   Encounter Diagnosis   Name Primary?    Right sided weakness      Physician: Yamileth Niño MD    Physician Orders: OT Eval and Treat  Medical Diagnosis: History of ischemic vertebrobasilar artery brainstem stroke  Surgical Procedure and Date: n/a  Evaluation Date: 3/7/19  Insurance Authorization Period Expiration: 2/14/20  Current Plan of Care Certification Period: 5/10/2019 to 05/30/19  Date of Return to MD: TBD  Visit # / Visits authorized: 15 / 20    Time In: 3:50 pm  Time Out: 4:50 pm  Total Billable Time: 60 minutes 2TE, 2TA)      Subjective     Pt reports:  "Hand movements are difficult for me. I have to really focus and concentrate"   Response to previous treatment: Pt is tolerating therapy sessions well. No c/o pain or soreness since last treatment  Functional change: able to lift objects ~ 5 lbs, with minimal difficutly    Pain: 0/10  Location: n/a    Objective     Cy received therapeutic exercises for 30 minutes including:  UBE (forward/backwards) 10 minutes, lvl 3.0   Pulleys (flex/ext & " abd/add) (not today) 3 min each   Theraband  -rows-red  -scap retraction-red  -triceps extension -red   - biceps curls-green (not today) 2/10 reps each   Seated Dowel Shoulder Ex  -FF (elbow ext)   -chest press  -biceps curl 10 reps each, 3#    Dynamic scapular stabilization, supine & SL (CW/CCW) circles  SL Abd to 90 & ER (not today)10 reps each, 1# wt       Dowel swings (sup/pron) 2/10 reps, 1# wt   Dexterciser 3 min   Wrist 3 ways over wedge  10 reps each, 2# wt   Smiles & frowns Green T-bar; 15 reps   Deluxe Gripper-setting 4 2/10 reps   Red Putty   -grasp/manipulation (not today) 5 reps   -Log roll & tripod pinch  -dowel digs (not today) 2 trial each      Cy DARDEN received therapeutic activity for 30 minutes as follows:  FMC     -9 Peg  1 trial w/ assist   -Pom pom  w/ CP  2 trials, red CP   -[place] manipulation  2 trials, 15 coins   -Isospheres 3 min, Step-by-step w/ assist    Isolated hand/finger patterns    -ok, 1, 2, 3, 4, 5 signs 5 reps each   -Finger Extension lifts 5 reps each, w/ assist to isolate   -Thumb to finger opposition 5 reps each, w/ assist       Home Exercises and Education Provided     Education provided:  Reviewed HEP      Written Home Exercises Provided: Patient instructed to cont prior HEP.  Exercises were reviewed and Cy was able to demonstrate them prior to the end of the session.  Cy demonstrated good  understanding of the HEP provided.   .   See EMR under Patient Instructions for exercises provided prior visit.        Assessment     Patient is noted to have improvement in overall R shoulder-elbow strength, but presents with continued weakness in R forearm/wrist/hand.  His FMC continues to be moderate to significant impaired, but he is making steady progress with each visit.  His hand movements are slightly more accurate and fluid compared to last 2 treatment sessions.  Cy is progressing well towards his goals and there are Plan of Care has been updated above for  appropriate goals at this time. Pt prognosis is Good.  FOTO completeed indicattes improvement in functioning in ADL/IADL's, see attached for details    Pt will continue to benefit from skilled outpatient occupational therapy to address the deficits listed in the problem list on initial evaluation provide pt/family education and to maximize pt's level of independence in the home and community environment.     Anticipated barriers to occupational therapy: none noted    Pt's spiritual, cultural and educational needs considered and pt agreeable to plan of care and goals.    Goals: (STG's=LTG's)    1)  Patient independent in final Hep  2)  Patient with R shoulder AROM WFL, all planes of mobility for improved performance with overhead ADL's  3)  Pt will demonstrate increased MMT by at least 1/2 grade grossly in RUE  4)  Increase  strength 2-3 lbs. to grasp heavier items during household management tasks  5)  Increase pinch 1-3 psis for turning keys and opening up water bottles  6)   Patient to score no more than 20% limitation on FOTO to demonstrate improved perception of functional right hand use.    Plan     Certification Period/Plan of care expiration:  5/10/2019 to 05/30/19  Update Plan of Care at next visit     Certification Period: 5/16/2019 to 05/30/19  Recommended Treatment Plan: 2 times per week for 4 weeks: Manual Therapy, Neuromuscular Re-ed, Patient Education, Self Care, Therapeutic Activites and Therapeutic Exercise  Other Recommendations: Complete Physical Therapy Evaluation, per MD orders. Patient is currently scheduled for an appointment    KARISSA Simmons

## 2019-05-29 NOTE — PROGRESS NOTES
"  Physical Therapy Daily Treatment Note     Name: Cy Rutherford  Clinic Number: [id number]    Therapy Diagnosis:   Encounter Diagnoses   Name Primary?   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  He was compliant with home exercise program.  Response to previous treatment: no soreness after initial evaluation  Functional change: walking better    Pain: 0/10  Location: N/A    Objective     Cy received therapeutic exercises to develop strength, flexibility and core stabilization for 45 minutes including:    Date 05/29/19 5/23/19   Visit 3/20 2/20   FOTO 3/5 2/5   FTF 06/16/19 6/16/2019   POC exp 07/16/19 7/16/2019   G code   3/10  06/16/19 2/10  6/16/2019   Vs total   total 90.96  264.80 60.64  173.84        Hamstring str. 10 x 10" Next?   Gastroc Str. 10 x 10" 10 x 10"        Ankle - right  - DF  - eversion RTB  1 x 15  1 x 15 RTB  1 x 15  1 x 15   SAQ 3 x 10 3 x 10   SLR 1 x 15 1 x 10   Hip Abd 2 x 10 RTB 1 x 10 RTB   Hip add 15 x 3" w/ ball 10 x 3" w/ ball        LAQs 1 x 10 Next?   Seated Hip Flex 1 x 10 Next?        TKE ---    Hip Abd ---    Hip Flex ---    Hip Ext ---    HS Curls ---         Step Ups ---    Step Downs ---         Initials GWA 1/6 DG     Patient was screened for use of kinesiotape and was cleared " for use.  1. Has the patient ever had a reaction to the adhesive in bandaids? Yes/No  2. Has the patient ever uses athletic/kinesiotape in the past? Yes/No  3. Is the patient hemodynamically impaired (PE, DVT, CHF, Kidney failure)? Yes/No  4. Can the PT/PTA apply the tape to your skin? Yes/No    Patient was instructed on duration to wear the tape, proper drying techniques for the tape, and to remove the tape if he/she had any issues with it.    Patient verbalized understanding of these instructions.    2 I strips were applied to R ankle/foot to increase R ankle dorsiflexion when ambulating.    Home Exercises Provided and Patient Education Provided     Education provided:   - continue with HEP.   - remove kinesiotape if any adverse reactions occur.     Written Home Exercises Provided: yes.  Exercises were reviewed and Cy was able to demonstrate them prior to the end of the session.  Cy demonstrated good  understanding of the education provided.     See EMR under Patient Instructions for exercises provided 05/29/2019.    Assessment     Patient completed above exercises with cues to avoid using momentum with SLR, cues to avoid substitutions with ankle theraband exercises and had no difficulty with new exercises added along with today's progressions with no increase in symptoms prior to leaving the clinic.     Cy is progressing well towards his goals.   Pt prognosis is Good.     Pt will continue to benefit from skilled outpatient physical therapy to address the deficits listed in the problem list box on initial evaluation, provide pt/family education and to maximize pt's level of independence in the home and community environment.     Pt's spiritual, cultural and educational needs considered and pt agreeable to plan of care and goals.     Anticipated barriers to physical therapy: none    Goals:   Short Term Goals: 4 weeks   1. This patient will be independent with a basic HEP. IN PROGRESS  2. This patient will  increase R LE strength by 1 grade in order to be able to ambulate greater than 300 feet with a normal gait pattern with no LOB and no AD. IN PROGRESS  3. Patient will be able to achieve less than or equal to 10 seconds on the Timed Up and Go decreasing patient's risk for falling with household ambulation. IN PROGRESS  4. Patient will be able to achieve greater than or equal to 84 on the FOTO CVA Survey placing patient in 1%<20% impaired, limited, or restricted category demonstrating overall improved functional ability with lower extremity. IN PROGRESS    Long Term Goals: 8 weeks   1. This patient will be independent with an updated HEP. IN PROGRESS  2. This patient will increase R LE strength to 5/5 in order to be able to ascend/descend stairs with a reciprocal gait pattern. IN PROGRESS  3. Patient will be able to achieve less than or equal to 9 seconds on the Timed Up and Go decreasing patient's risk for falling with ambulation on uneven surfaces in the community. IN PROGRESS  4. Patient will be able to achieve greater than or equal to 90 on the FOTO CVA Survey placing patient in 1%<20% impaired, limited, or restricted category demonstrating overall improved functional ability with lower extremity. IN PROGRESS    Plan     Increase reps on seated hip flexion and LAQ next visit.     Edgar Nelson, PTA

## 2019-05-31 ENCOUNTER — CLINICAL SUPPORT (OUTPATIENT)
Dept: REHABILITATION | Facility: HOSPITAL | Age: 74
End: 2019-05-31
Payer: MEDICARE

## 2019-05-31 DIAGNOSIS — R53.1 RIGHT SIDED WEAKNESS: ICD-10-CM

## 2019-05-31 DIAGNOSIS — M62.89 TIGHTNESS OF RIGHT GASTROCNEMIUS MUSCLE: ICD-10-CM

## 2019-05-31 DIAGNOSIS — I69.398 IMPAIRED BALANCE AS LATE EFFECT OF CEREBROVASCULAR ACCIDENT: ICD-10-CM

## 2019-05-31 DIAGNOSIS — I69.398 ABNORMALITY OF GAIT AS LATE EFFECT OF CEREBROVASCULAR ACCIDENT (CVA): ICD-10-CM

## 2019-05-31 DIAGNOSIS — R26.9 ABNORMALITY OF GAIT AS LATE EFFECT OF CEREBROVASCULAR ACCIDENT (CVA): ICD-10-CM

## 2019-05-31 DIAGNOSIS — R26.89 IMPAIRED BALANCE AS LATE EFFECT OF CEREBROVASCULAR ACCIDENT: ICD-10-CM

## 2019-05-31 DIAGNOSIS — R29.898 WEAKNESS OF RIGHT LOWER EXTREMITY: ICD-10-CM

## 2019-05-31 PROCEDURE — 97110 THERAPEUTIC EXERCISES: CPT | Mod: PO

## 2019-05-31 PROCEDURE — 97112 NEUROMUSCULAR REEDUCATION: CPT | Mod: PO

## 2019-05-31 PROCEDURE — 97530 THERAPEUTIC ACTIVITIES: CPT | Mod: PO

## 2019-05-31 NOTE — PROGRESS NOTES
"  Occupational Therapy Daily Treatment Note     Date: 5/31/2019  Name: Cy Rutherford  Clinic Number: [id number]    Therapy Diagnosis:   Encounter Diagnosis   Name Primary?    Right sided weakness      Physician: Yamileth Niño MD    Physician Orders: OT Eval and Treat  Medical Diagnosis: History of ischemic vertebrobasilar artery brainstem stroke  Surgical Procedure and Date: n/a  Evaluation Date: 3/7/19  Insurance Authorization Period Expiration: 2/14/20  Current Plan of Care Certification Period: 5/10/2019 to 05/30/19  Date of Return to MD: TBD  Visit # / Visits authorized: 16 / 20    Time In: 10:00 am  Time Out: 11:00 am  Total Billable Time: 60 minutes (2TE, 2TA)      Subjective     Pt reports:  "I think my arm is getting stronger, but the coordination is more challenging for me    Occupational Therapy Daily Treatment Note     Date: 5/31/2019  Name: Cy Rutherford  Clinic Number: [id number]    Therapy Diagnosis:   Encounter Diagnosis   Name Primary?    Right sided weakness      Physician: Yamileth Niño MD    Physician Orders: OT Eval and Treat  Medical Diagnosis: History of ischemic vertebrobasilar artery brainstem stroke  Surgical Procedure and Date: n/a  Evaluation Date: 3/7/19  Insurance Authorization Period Expiration: 2/14/20  Current Plan of Care Certification Period: 5/10/2019 to 05/30/19  Date of Return to MD: TBD  Visit # / Visits authorized: 15 / 20    Time In: 11:00 pm  Time Out: 11:55 am  Total Billable Time: 30 minutes (1TE, 1TA)       Subjective     Pt reports:  "I feel a little worn out today"   Response to previous treatment: Pt is tolerating therapy sessions well. No c/o pain or soreness since last treatment  Functional change: increased ability to button buttons on shirts; hold objects longer with R hand    Pain: 0/10  Location: n/a    Objective     Cy received therapeutic exercises for 15 minutes direct care and 15 supervised including:  UBE (forward/backwards) 10 minutes, lvl 3.0 "   Pulleys (flex/ext & abd/add) 3 min each   Theraband  -rows-red  -scap retraction-red  -triceps extension -red   - biceps curls-green  -ER-red 2/10 reps each   Seated Dowel Shoulder Ex  -FF (elbow ext)   -chest press  -biceps curl 10 reps each, 3#    Dynamic scapular stabilization, supine & SL (CW/CCW) circles  SL Abd to 90 & ER (not today)10 reps each, 1# wt       Dowel swings (sup/pron) 2/10 reps, 1# wt   Dexterciser 3 min   Wrist 3 ways over wedge  (not today) 10 reps each, 2# wt   Smiles & frowns Green T-bar; 15 reps   Deluxe Gripper-setting 4 2/10 reps   Red Putty   -grasp/manipulation (not today) 5 reps   -Log roll & tripod pinch  -dowel digs (Not today) 2 trial each      Cy DARDEN received therapeutic activity for 20 minutes direct care and 5 minutes supervised, as follows:  FMC     -9 Peg 1 trial    -Pom pom  w/ CP 2 trials, red CP   -[place] manipulation on towel (not today) 2 trials, 15 coins   -Isospheres 3 min, Step-by-step     Isolated hand/finger patterns    -ok, 1, 2, 3, 4, 5 signs 5 reps eachv w/ assit   -Finger Extension lifts 5 reps each, w/ assist to isolate   -Thumb to finger opposition 5 reps each, w/ assist       Home Exercises and Education Provided     Education provided:  Reviewed HEP      Written Home Exercises Provided: Patient instructed to cont prior HEP.  Exercises were reviewed and Cy was able to demonstrate them prior to the end of the session.  Cy demonstrated good  understanding of the HEP provided.   .   See EMR under Patient Instructions for exercises provided prior visit.        Assessment     Patient noted to have some fatigue mid way through treatment today, but he demonstrated better form through all activity/ex today.  His FMC continues to be moderately impaired, but he is making steady progress with each visit.  His hand movements are more accurate and fluid compared to last 2 treatment sessions.  Cy is progressing well towards his goals and there are Plan of  Care has been updated above for appropriate goals at this time. Pt prognosis is Good.      Pt will continue to benefit from skilled outpatient occupational therapy to address the deficits listed in the problem list on initial evaluation provide pt/family education and to maximize pt's level of independence in the home and community environment.     Anticipated barriers to occupational therapy: none noted    Pt's spiritual, cultural and educational needs considered and pt agreeable to plan of care and goals.    Goals: (STG's=LTG's)    1)  Patient independent in final Hep  2)  Patient with R shoulder AROM WFL, all planes of mobility for improved performance with overhead ADL's  3)  Pt will demonstrate increased MMT by at least 1/2 grade grossly in RUE  4)  Increase  strength 2-3 lbs. to grasp heavier items during household management tasks  5)  Increase pinch 1-3 psis for turning keys and opening up water bottles  6)   Patient to score no more than 20% limitation on FOTO to demonstrate improved perception of functional right hand use.    Plan     Certification Period/Plan of care expiration:  5/10/2019 to 05/30/19  Update Plan of Care at next visit     Certification Period: 5/16/2019 to 05/30/19  Recommended Treatment Plan: 2 times per week for 4 weeks: Manual Therapy, Neuromuscular Re-ed, Patient Education, Self Care, Therapeutic Activites and Therapeutic Exercise  Other Recommendations: Complete Physical Therapy Evaluation, per MD orders. Patient is currently scheduled for an appointment    KARISSA Simmons

## 2019-05-31 NOTE — PROGRESS NOTES
"  Physical Therapy Daily Treatment Note     Name: Cy Rutherford  Clinic Number: [id number]    Therapy Diagnosis:   Encounter Diagnoses   Name Primary?   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  He was compliant with home exercise program.  Response to previous treatment: no soreness after last visit  Functional change: walking better    Pain: 0/10  Location: N/A    Objective     Cy received therapeutic exercises to develop strength, flexibility and core stabilization for 15 minutes including:    Date 05/31/19 05/29/19 5/23/19   Visit 4/20 3/20 2/20   FOTO 4/5 3/5 2/5   FTF 6/16/19 06/16/19 6/16/2019   POC exp 07/16/19 07/16/19 7/16/2019   G code   4/10  06/16/19 3/10  06/16/19 2/10  6/16/2019   Vs total   total 64.79  329.59 90.96  264.80 60.64  173.84         Hamstring str. 10 x 10" 10 x 10" Next?   Gastroc Str. 10 x 10" 10 x 10" 10 x 10"         Ankle - right  - DF  - eversion RTB  2 x 10  2 x 10 RTB  1 x 15  1 x 15 RTB  1 x 15  1 x 15   SAQ 2 x 10 x 1# 3 x 10 3 x 10   SLR 2 x 10 1 x 15 1 x 10   Hip Abd 3 x 10 RTB 2 x 10 RTB 1 x 10 RTB   Hip add 20 x 3" w/ ball 15 x 3" w/ ball 10 x 3" w/ ball         LAQs 2 x 10 1 x 10 Next?   Seated Hip Flex 2 x 10 1 x 10 Next?         Hip Abd -- ---    Hip Flex -- ---    Hip " "Ext Next? ---    HS Curls -- ---          Step Ups -- ---    Step Downs -- ---    Min squats Next?     Initials DG GWA 1/6 DG     Patient was screened for use of kinesiotape and was cleared for use.  1. Has the patient ever had a reaction to the adhesive in bandaids? Yes/No  2. Has the patient ever uses athletic/kinesiotape in the past? Yes/No  3. Is the patient hemodynamically impaired (PE, DVT, CHF, Kidney failure)? Yes/No  4. Can the PT/PTA apply the tape to your skin? Yes/No    Patient was instructed on duration to wear the tape, proper drying techniques for the tape, and to remove the tape if he/she had any issues with it.    Patient verbalized understanding of these instructions.    2 "I strips" were applied to R ankle/foot to increase R ankle dorsiflexion when ambulating.    Cy received neuromuscular reeducation x 10 minutes of static standing 1 x 30" eyes open, 1 x 30" eyes closed, tandem stance 1 x 30" each LE, semit tandem stance 1 x 30" each each LE, side stepping inside bars 4 x 8 ft, tandem walking inside parallel bars 4 x 8 feet. All with SBA and verbal cues.     Home Exercises Provided and Patient Education Provided     Education provided:   -begin performing hamstring stretch in a long sitting position.     Written Home Exercises Provided: yes.  Exercises were reviewed and Cy was able to demonstrate them prior to the end of the session.  Cy demonstrated good  understanding of the education provided.     See EMR under Patient Instructions for exercises provided 05/29/2019.    Assessment     Patient performed all of today's progressions and new exercises with no increase in symptoms prior to leaving the clinic. He required occasional verbal cues to keep his R LE in a neutral position while performing strengthening exercises on L LE. When performing dynamic balance activities he required SBA for safety and verbal cues to not drag his feet. He demonstrated ankle strategies for most static " standing activities but used ankle, hip, and arm strategies with tandem stance.      Cy is progressing well towards his goals.   Pt prognosis is Good.     Pt will continue to benefit from skilled outpatient physical therapy to address the deficits listed in the problem list box on initial evaluation, provide pt/family education and to maximize pt's level of independence in the home and community environment.     Pt's spiritual, cultural and educational needs considered and pt agreeable to plan of care and goals.     Anticipated barriers to physical therapy: none    Goals:   Short Term Goals: 4 weeks   1. This patient will be independent with a basic HEP. IN PROGRESS  2. This patient will increase R LE strength by 1 grade in order to be able to ambulate greater than 300 feet with a normal gait pattern with no LOB and no AD. IN PROGRESS  3. Patient will be able to achieve less than or equal to 10 seconds on the Timed Up and Go decreasing patient's risk for falling with household ambulation. IN PROGRESS  4. Patient will be able to achieve greater than or equal to 84 on the FOTO CVA Survey placing patient in 1%<20% impaired, limited, or restricted category demonstrating overall improved functional ability with lower extremity. IN PROGRESS    Long Term Goals: 8 weeks   1. This patient will be independent with an updated HEP. IN PROGRESS  2. This patient will increase R LE strength to 5/5 in order to be able to ascend/descend stairs with a reciprocal gait pattern. IN PROGRESS  3. Patient will be able to achieve less than or equal to 9 seconds on the Timed Up and Go decreasing patient's risk for falling with ambulation on uneven surfaces in the community. IN PROGRESS  4. Patient will be able to achieve greater than or equal to 90 on the FOTO CVA Survey placing patient in 1%<20% impaired, limited, or restricted category demonstrating overall improved functional ability with lower extremity. IN PROGRESS    Plan      Continue with neuromuscular reeducation and increase reps with seated exercises next visit. Begin mini squats next visit.     Nitza Haddad, PT

## 2019-05-31 NOTE — PATIENT INSTRUCTIONS
Hamstring Stretch        Sitting with left leg straight on bed, and foot of other leg on floor, lean forward toward toes of straight leg. Hold 10 seconds.  Repeat 10 times. Do 2 sessions per day. Repeat with the right leg.      [email or web address]     Copyright © [employer]. All rights reserved.

## 2019-06-04 ENCOUNTER — PATIENT OUTREACH (OUTPATIENT)
Dept: OTHER | Facility: OTHER | Age: 74
End: 2019-06-04

## 2019-06-04 NOTE — PROGRESS NOTES
"Last 5 Patient Entered Readings                                      Current 30 Day Average: 131/58     Recent Readings 6/4/2019 6/3/2019 6/1/2019 5/31/2019 5/29/2019    SBP (mmHg) 118 121 128 130 143    DBP (mmHg) 50 47 53 54 60    Pulse 47 47 47 48 54        Reports feeling well, no complaints.   Discussed low diastolic readings, denies signs/symptoms of hypotension.   Reports he charged BP cuff last week.   Patient reports fatigue in the morning, after walking to get the paper. Also reports pain in calves of legs. Task placed for DM clinician to discuss further.    Digital Medicine: Health  Follow Up    Lifestyle Modifications:    1.Dietary Modifications (Sodium intake <2,000mg/day, food labels, dining out): Reports limited water intake, typically drinking "a little bit" at each meal. Drinks 1 diet coke per day and 1 glass of lemonade. Patient will try to increase intake, especially between meals.     2.Physical Activity: Patient stays active walking around the house and going to physical therapy twice per week.     3.Medication Therapy: Patient has been compliant with the medication regimen.    4.Patient has the following medication side effects/concerns: None    Follow up with Mr. Benoit ADELSO Rutherford completed. No further questions or concerns. Will continue to follow up to achieve health goals.    "

## 2019-06-05 ENCOUNTER — CLINICAL SUPPORT (OUTPATIENT)
Dept: REHABILITATION | Facility: HOSPITAL | Age: 74
End: 2019-06-05
Payer: MEDICARE

## 2019-06-05 DIAGNOSIS — R29.898 WEAKNESS OF RIGHT LOWER EXTREMITY: ICD-10-CM

## 2019-06-05 DIAGNOSIS — R26.9 ABNORMALITY OF GAIT AS LATE EFFECT OF CEREBROVASCULAR ACCIDENT (CVA): ICD-10-CM

## 2019-06-05 DIAGNOSIS — R26.89 IMPAIRED BALANCE AS LATE EFFECT OF CEREBROVASCULAR ACCIDENT: ICD-10-CM

## 2019-06-05 DIAGNOSIS — I69.398 IMPAIRED BALANCE AS LATE EFFECT OF CEREBROVASCULAR ACCIDENT: ICD-10-CM

## 2019-06-05 DIAGNOSIS — M62.89 TIGHTNESS OF RIGHT GASTROCNEMIUS MUSCLE: ICD-10-CM

## 2019-06-05 DIAGNOSIS — R53.1 RIGHT SIDED WEAKNESS: ICD-10-CM

## 2019-06-05 DIAGNOSIS — I69.398 ABNORMALITY OF GAIT AS LATE EFFECT OF CEREBROVASCULAR ACCIDENT (CVA): ICD-10-CM

## 2019-06-05 PROCEDURE — 97112 NEUROMUSCULAR REEDUCATION: CPT | Mod: PO

## 2019-06-05 PROCEDURE — 97110 THERAPEUTIC EXERCISES: CPT | Mod: PO

## 2019-06-05 NOTE — PROGRESS NOTES
"  Physical Therapy Daily Treatment Note     Name: Cy Rutherford  Clinic Number: [id number]    Therapy Diagnosis:   Encounter Diagnoses   Name Primary?   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  He was compliant with home exercise program.  Response to previous treatment: no soreness after last visit  Functional change: walking better    Pain: 0/10  Location: N/A    Objective     Cy received therapeutic exercises to develop strength, flexibility and core stabilization for 38 minutes including:    Date 06/05/19 05/31/19 05/29/19 5/23/19   Visit 5/20 4/20 3/20 2/20   FOTO 5/5 4/5 3/5 2/5   FTF 06/16/19 6/16/19 06/16/19 6/16/2019   POC exp 07/16/19 07/16/19 07/16/19 7/16/2019   G code   5/10  06/16/19 4/10  06/16/19 3/10  06/16/19 2/10  6/16/2019   Vs total   total 90.96  420.55 64.79  329.59 90.96  264.80 60.64  173.84          Hamstring str. 10 x 10" 10 x 10" 10 x 10" Next?   Gastroc Str. 10 x 10" 10 x 10" 10 x 10" 10 x 10"          Ankle - right  - DF  - eversion RTB  3 x 10  3 x 10 RTB  2 x 10  2 x 10 RTB  1 x 15  1 x 15 RTB  1 x 15  1 x 15   SAQ 3 x 10 x 1# 2 x 10 x 1# 3 x 10 3 x 10   SLR 3 x 10 2 x 10 1 x 15 1 x 10   Hip Abd 3 x 10 RTB 3 x 10 RTB 2 x 10 RTB 1 x 10 RTB   Hip add 20 x 3" w/ ball 20 x 3" w/ ball 15 x 3" w/ ball 10 x 3" w/ ball        " "  LAQs 3 x 10 2 x 10 1 x 10 Next?   Seated Hip Flex 2 x 10 2 x 10 1 x 10 Next?          Hip Abd --- -- ---    Hip Flex --- -- ---    Hip Ext 1 x 10 Next? ---    HS Curls --- -- ---           Step Ups --- -- ---    Step Downs --- -- ---    Min squats 1 x 10 Next?            Initials GWA 1/6 DG GWA 1/6 DG       Functional limitation reporting disability percentage was obtained through use of FOTO cerebrovascular disorder Survey indicating 39% disability     Cy received neuromuscular reeducation activities x 10 minutes including: static standing 1 x 30" eyes open, 1 x 30" eyes closed, tandem stance 1 x 30" each LE, semit tandem stance 1 x 30" each each LE, side stepping inside bars 4 x 8 ft, tandem walking inside parallel bars 4 x 8 feet. All with SBA and verbal cues.     Home Exercises Provided and Patient Education Provided     Education provided:   - focus on stationary object with balancing activities.     Written Home Exercises Provided: yes.  Exercises were reviewed and Cy was able to demonstrate them prior to the end of the session.  Cy demonstrated good  understanding of the education provided.     See EMR under Patient Instructions for exercises provided 05/29/2019.    Assessment     Patient performed all of today's progressions and new exercises with no increase in symptoms prior to leaving the clinic. He required occasional verbal cues to keep his R LE in a neutral position while performing strengthening exercises on L LE. When performing dynamic balance activities he required SBA for safety and verbal cues to not drag his feet. He demonstrated ankle strategies for most static standing activities but used ankle, hip, and arm strategies with tandem stance.      Cy is progressing well towards his goals.   Pt prognosis is Good.     Pt will continue to benefit from skilled outpatient physical therapy to address the deficits listed in the problem list box on initial evaluation, provide pt/family " education and to maximize pt's level of independence in the home and community environment.     Pt's spiritual, cultural and educational needs considered and pt agreeable to plan of care and goals.     Anticipated barriers to physical therapy: none    Goals:   Short Term Goals: 4 weeks   1. This patient will be independent with a basic HEP. IN PROGRESS  2. This patient will increase R LE strength by 1 grade in order to be able to ambulate greater than 300 feet with a normal gait pattern with no LOB and no AD. IN PROGRESS  3. Patient will be able to achieve less than or equal to 10 seconds on the Timed Up and Go decreasing patient's risk for falling with household ambulation. IN PROGRESS  4. Patient will be able to achieve greater than or equal to 84 on the FOTO CVA Survey placing patient in 1%<20% impaired, limited, or restricted category demonstrating overall improved functional ability with lower extremity. IN PROGRESS    Long Term Goals: 8 weeks   1. This patient will be independent with an updated HEP. IN PROGRESS  2. This patient will increase R LE strength to 5/5 in order to be able to ascend/descend stairs with a reciprocal gait pattern. IN PROGRESS  3. Patient will be able to achieve less than or equal to 9 seconds on the Timed Up and Go decreasing patient's risk for falling with ambulation on uneven surfaces in the community. IN PROGRESS  4. Patient will be able to achieve greater than or equal to 90 on the FOTO CVA Survey placing patient in 1%<20% impaired, limited, or restricted category demonstrating overall improved functional ability with lower extremity. IN PROGRESS    Plan     Increase reps on mini squats next visit.     Edgar Nelson, PTA

## 2019-06-05 NOTE — PROGRESS NOTES
Outpatient Therapy Updated Plan of Care     Visit Date: 6/5/2019  Name: Cy Rutherford  Clinic Number: [id number]    Therapy Diagnosis:   Encounter Diagnosis   Name Primary?    Right sided weakness      Physician: Yamileth Niño MD    Physician Orders: OT Eval and Treat  Medical Diagnosis: History of ischemic vertebrobasilar artery brainstem stroke  Surgical Procedure and Date: n/a  Evaluation Date: 3/7/19  Insurance Authorization Period Expiration: 2/14/20  Initial Plan of Care Certification Period: 3/7/19-5/2/19  Date of Return to MD: SUZETTE    Total Visits Received: 18  Cancelled Visits: 0  No Show Visits: 0    Current Certification Period:  05/02/19 to 05/30/19  Precautions:  Standard  Visits from Evaluation Date:  13  Functional Level Prior to Evaluation:  Independent    Subjective     Update: Patient feels he is getting stronger and is able to do more with his Shante, however, he continues to have some weakness and incoordination in RUE that affects his functioning in ADL/IADL's     Objective     Update:   Objective measures taken today are as follows;  Range of Motion and Manual Muscle Test   05/10/19 05/10/19 05/10/19 05/10/19 05/10/19   Shoulder AROM MMT AROM PROM MMT   Flexion 120 (+9) 4/5 125 (+5) 130 4/5   Abduction 100 (+10) 4/5 100 (=) 125 4/5   ER at 0 40 (+10) 4/5 50 (+10)  4+/5   ER at 90 60 (+8)  3+/5 80 (+20)  4/5   IR L3 (+)  2+/5 L1  3/5           Supination 68 (+3) 4/5 72 (+4)  4/5       Strength (Dyanmometer) and Pinch Strength (Pinch Gauge)  Measured in pounds and psi.     3/7/2019 3/7/2019 05/10/19 06/05/19     Left Right Right Right   Rung II 80 30 42 (+8) 35 *   Jay Pinch 20 11 13 (+1.5) 11 *   3pt Pinch 15 6 9 (+2) 9 *   2pt Pinch 20 9 10 (+1) 5 *     * measures taken after performing exercises today    Assessment     Update: Patient presents with stiffness in in his R scap/shoulder that has not progressed much since last assessment, however, he does report not performing his HEP for  stretching of his shoulder, as recommended.  He did have an improvement in ROM after MT and stretching of his R shoulder today, therefore, he has potential for improvement. Recommend patient to continue OT to address areas of deficit remaining that are affecting his ability to function in ADL/IADL's.      Previous Short Term Goals Status:   6/6 STG's met; 3/3 LTG's part met  New Short Term Goals Status:   (STG's=LTG's) by discharge    1)  Patient independent in final Hep-ongoing  2)  Patient with R shoulder AROM WFL, all planes of mobility for improved performance with overhead ADL's-not met  3)  Pt will demonstrate increased MMT by at least 1/2 grade grossly in RUE-part met  4)  Increase  strength 2-3 lbs. to grasp heavier items during household management tasks-part met (not consistent)  5)  Increase pinch 1-3 psis for turning keys and opening up water bottles-part met (not consistent)  6)   Patient to score no more than 20% limitation on FOTO to demonstrate improved perception of functional right hand use.-ongoing    Long Term Goal Status:   modified:  As above  Reasons for Recertification of Therapy:   Updated Plan of Care needed    Plan     Updated Certification Period:  05/30/19 to 06/30/19  Recommended Treatment Plan: 2 times per week for 4 weeks: Manual Therapy, Neuromuscular Re-ed, Patient Education, Self Care, Therapeutic Activites and Therapeutic Exercise     Jhony Sullivan OT  6/5/2019      I CERTIFY THE NEED FOR THESE SERVICES FURNISHED UNDER THIS PLAN OF TREATMENT AND WHILE UNDER MY CARE    Physician's comments:        Physician's Signature: ___________________________________________________          Occupational Therapy Daily Treatment Note     Date: 6/5/2019  Name: Cy DARDEN Krish  Clinic Number: [id number]    Therapy Diagnosis:   Encounter Diagnosis   Name Primary?    Right sided weakness      Physician: Yamileth Niño MD    Visit # / Visits authorized: 14 / 20  Time In: 11:00 am  Time Out:  12:00 pm  Total Billable Time: 60 minutes (1NM, 3TE)      Subjective     Pt reports:  He feels he is getting stronger, but is still having some difficulty with C activities using his R hand.    Response to previous treatment: Pt is tolerating therapy sessions well. No c/o pain or soreness since last treatment  Functional change: increased ability to manipulate object and open various containers with minimal difficulty     Pain: 0/10  Location: n/a    Objective       Cy received therapeutic exercises for 45 minutes and neuromuscular re-education for 15 minutes including:  NDT:   -WB Sitting side push-up  -Transitional movement using RUE  -Wt shifting engaging trunk, LE's & UE's 10 reps each w/ assist and cues by therapist   NM: Proprioceptive feed back w/ BodyBlade-3 ways  Rebounder-1# ball (not today)   UBE, L2.5 10 minutes (5 mins forward, 5 mins backward)   Pulleys (flex/ext & abd/add) 3 min each   Theraband  -rows-red  -scap retraction-red  -triceps extension -red   - biceps curls-green 2/10 reps each   Supine Dowel Shoulder  -FF (elbow ext)   -chest press 15 reps each, 3#    Dynamic scapular stabilization, supine & SL (CW/CCW) circles  SL Abd to 90 & ER 10 reps each, 1# wt   Dexterciser 3 min   Smiles & frowns Green T-bar; 2/10 reps   Deluxe Gripper-setting 3 3/10 reps   Red Putty   -grasp/manipulation (not today)   T-putty-Red  -Log roll & tripod pinch  -dowel digs (not today)        Home Exercises and Education Provided     Education provided:  Reviewed HEP  And discussed importance of performing HEP as recommended to achieve optimal outcome and progress. Patient verbalized understanding of all instruction provided today.     Written Home Exercises Provided: Patient instructed to cont prior HEP.  Exercises were reviewed and Cy was able to demonstrate them prior to the end of the session.  Cy demonstrated good  understanding of the HEP provided.   .   See EMR under Patient Instructions for exercises  provided prior visit.        Assessment     See above assessment for details    Anticipated barriers to occupational therapy: compliance with HEP; possible cognitive impairments in memory    Pt's spiritual, cultural and educational needs considered and pt agreeable to plan of care and goals.     Plan     See Plan above for details    Jhony Sullivan OTL

## 2019-06-07 ENCOUNTER — CLINICAL SUPPORT (OUTPATIENT)
Dept: REHABILITATION | Facility: HOSPITAL | Age: 74
End: 2019-06-07
Payer: MEDICARE

## 2019-06-07 DIAGNOSIS — I69.398 ABNORMALITY OF GAIT AS LATE EFFECT OF CEREBROVASCULAR ACCIDENT (CVA): ICD-10-CM

## 2019-06-07 DIAGNOSIS — R53.1 RIGHT SIDED WEAKNESS: ICD-10-CM

## 2019-06-07 DIAGNOSIS — R26.89 IMPAIRED BALANCE AS LATE EFFECT OF CEREBROVASCULAR ACCIDENT: ICD-10-CM

## 2019-06-07 DIAGNOSIS — I69.398 IMPAIRED BALANCE AS LATE EFFECT OF CEREBROVASCULAR ACCIDENT: ICD-10-CM

## 2019-06-07 DIAGNOSIS — R29.898 WEAKNESS OF RIGHT LOWER EXTREMITY: ICD-10-CM

## 2019-06-07 DIAGNOSIS — M62.89 TIGHTNESS OF RIGHT GASTROCNEMIUS MUSCLE: ICD-10-CM

## 2019-06-07 DIAGNOSIS — R26.9 ABNORMALITY OF GAIT AS LATE EFFECT OF CEREBROVASCULAR ACCIDENT (CVA): ICD-10-CM

## 2019-06-07 PROCEDURE — 97110 THERAPEUTIC EXERCISES: CPT | Mod: PO

## 2019-06-07 PROCEDURE — 97112 NEUROMUSCULAR REEDUCATION: CPT | Mod: PO

## 2019-06-07 PROCEDURE — 97530 THERAPEUTIC ACTIVITIES: CPT | Mod: PO

## 2019-06-07 NOTE — PROGRESS NOTES
"  Physical Therapy Daily Treatment Note     Name: Cy Rutherford  Clinic Number: [id number]    Therapy Diagnosis:   Encounter Diagnoses   Name Primary?   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  He was compliant with home exercise program.  Response to previous treatment: no soreness after last visit  Functional change: walking better    Pain: 0/10  Location: N/A    Objective     Cy received therapeutic exercises to develop strength, flexibility and core stabilization for 43 minutes including:    Date 06/07/19 06/05/19 05/31/19 05/29/19 5/23/19   Visit 6/20 5/20 4/20 3/20 2/20   FOTO --- 5/5 4/5 3/5 2/5   FTF 06/16/19 06/16/19 6/16/19 06/16/19 6/16/2019   POC exp 07/16/19 07/16/19 07/16/19 07/16/19 7/16/2019   G code   6/10  06/16/19 5/10  06/16/19 4/10  06/16/19 3/10  06/16/19 2/10  6/16/2019   Vs total   total 125.43  545.98 90.96  420.55 64.79  329.59 90.96  264.80 60.64  173.84           Hamstring str. 10 x 10" 10 x 10" 10 x 10" 10 x 10" Next?   Gastroc Str. 10 x 10" 10 x 10" 10 x 10" 10 x 10" 10 x 10"           Ankle - right  - DF  - eversion RTB  3 x 10  3 x 10 RTB  3 x 10  3 x 10 RTB  2 x 10  2 x 10 RTB  1 x 15  1 x 15 RTB  1 x 15  1 x 15   SAQ 3 x 10 x 1# 3 x 10 x 1# 2 x 10 x 1# 3 x 10 3 x 10   SLR 3 x 10 3 x 10 2 x 10 1 x 15 1 x 10   Hip Abd 2 " "x 10 GTB 3 x 10 RTB 3 x 10 RTB 2 x 10 RTB 1 x 10 RTB   Hip add 25 x 3" w/ ball 20 x 3" w/ ball 20 x 3" w/ ball 15 x 3" w/ ball 10 x 3" w/ ball           LAQs 3 x 10 3 x 10 2 x 10 1 x 10 Next?   Seated Hip Flex 3 x 10 2 x 10 2 x 10 1 x 10 Next?           Hip Abd --- --- -- ---    Hip Flex --- --- -- ---    Hip Ext 2 x 10 1 x 10 Next? ---    HS Curls --- --- -- ---            Step Ups -- --- -- ---    Step Downs --- --- -- ---    Min squats 2 x  1 x 10 Next?             Initials GWA 2/6 GWA 1/6 DG GWA 1/6 DG         Cy received neuromuscular reeducation activities x 10 minutes including: static standing 1 x 30" eyes open, 1 x 30" eyes closed, tandem stance 1 x 30" each LE, semit tandem stance 1 x 30" each each LE, side stepping inside bars 4 x 8 ft, tandem walking inside parallel bars 4 x 8 feet. All with SBA and verbal cues.     Home Exercises Provided and Patient Education Provided     Education provided:   - focus on stationary object with balancing activities.     Written Home Exercises Provided: yes.  Exercises were reviewed and Cy was able to demonstrate them prior to the end of the session.  Cy demonstrated good  understanding of the education provided.     See EMR under Patient Instructions for exercises provided 05/29/2019.    Assessment     Patient performed all of today's progressions and new exercises with no increase in symptoms prior to leaving the clinic. He required occasional verbal cues to keep his R LE in a neutral position while performing strengthening exercises on L LE. When performing dynamic balance activities he required SBA for safety and verbal cues to not drag his feet. He demonstrated ankle strategies for most static standing activities but used ankle, hip, and arm strategies with tandem stance.      Cy is progressing well towards his goals.   Pt prognosis is Good.     Pt will continue to benefit from skilled outpatient physical therapy to address the deficits listed in the " problem list box on initial evaluation, provide pt/family education and to maximize pt's level of independence in the home and community environment.     Pt's spiritual, cultural and educational needs considered and pt agreeable to plan of care and goals.     Anticipated barriers to physical therapy: none    Goals:   Short Term Goals: 4 weeks   1. This patient will be independent with a basic HEP. IN PROGRESS  2. This patient will increase R LE strength by 1 grade in order to be able to ambulate greater than 300 feet with a normal gait pattern with no LOB and no AD. IN PROGRESS  3. Patient will be able to achieve less than or equal to 10 seconds on the Timed Up and Go decreasing patient's risk for falling with household ambulation. IN PROGRESS  4. Patient will be able to achieve greater than or equal to 84 on the FOTO CVA Survey placing patient in 1%<20% impaired, limited, or restricted category demonstrating overall improved functional ability with lower extremity. IN PROGRESS    Long Term Goals: 8 weeks   1. This patient will be independent with an updated HEP. IN PROGRESS  2. This patient will increase R LE strength to 5/5 in order to be able to ascend/descend stairs with a reciprocal gait pattern. IN PROGRESS  3. Patient will be able to achieve less than or equal to 9 seconds on the Timed Up and Go decreasing patient's risk for falling with ambulation on uneven surfaces in the community. IN PROGRESS  4. Patient will be able to achieve greater than or equal to 90 on the FOTO CVA Survey placing patient in 1%<20% impaired, limited, or restricted category demonstrating overall improved functional ability with lower extremity. IN PROGRESS    Plan     Increase reps on mini squats next visit.     Edgar Nelson, PTA

## 2019-06-07 NOTE — PROGRESS NOTES
"    Occupational Therapy Daily Treatment Note     Date: 6/7/2019  Name: Cy Rutherford  Clinic Number: [id number]    Therapy Diagnosis:   Encounter Diagnosis   Name Primary?    Right sided weakness      Physician: Yamileth Niño MD    Physician Orders: OT Eval and Treat  Medical Diagnosis: History of ischemic vertebrobasilar artery brainstem stroke  Surgical Procedure and Date: n/a  Evaluation Date: 3/7/19  Insurance Authorization Period Expiration: 2/14/20  Current Plan of Care Certification Period: 05/30/19 to 06/30/19  Date of Return to MD: TBD  Visit # / Visits authorized: 19 / 20    Time In: 11:00 pm  Time Out: 12:00 pm  Total Billable Time: 60 minutes (3TE, 1TA)       Subjective     Pt reports:  "my hand stays swollen"   Response to previous treatment: Pt is tolerating therapy sessions well. No c/o pain or soreness since last treatment  Functional change: increased ability to button buttons on shirts; hold objects longer with R hand    Pain: 0/10  Location: n/a    Objective     Cy received therapeutic exercises for 40 minutes direct care including:  UBE (forward/backwards) 10 minutes, lvl 3.0   Pulleys (flex/ext & abd/add) 3 min each   Side lying ABD to 90 & ER 10 reps   Side lying Scap Stabilization CW/CCW 10 reps   Side lying Ext/IR stretch 3 reps, 30 sec hold   Supine dowel shoulder flex stretch 10 reps   Theraband  -rows-red  -scap retraction-red  -triceps extension -red   - biceps curls-green  -ER-red 2/10 reps each   Seated Dowel Biceps curl 10 reps each, 3#    Dynamic scapular stabilization, supine & SL (CW/CCW) circles  SL Abd to 90 & ER (not today)10 reps each, 1# wt       Dowel swings (sup/pron) (not today) 2/10 reps, 1# wt   Dexterciser 3 min   Wrist 3 ways over wedge  (not today) 10 reps each, 2# wt   Smiles & frowns (not today) Green T-bar; 15 reps   Deluxe Gripper-setting 4 (not today) 2/10 reps   T-Putty Yellow/green   -grasp/manipulation 5 reps   -Log roll & tripod pinch 2 trial each "   -dowel digs 3 min      Cy DARDEN received therapeutic activity for 20 minutes direct care and 5 minutes supervised, as follows:  FMC     -9 Peg 1 trial w/ some drops   -Pom pom  w/ CP 2 trials, red CP   -[place] manipulation on towel (not today) 2 trials, 15 coins   -Isospheres (not today) 3 min, Step-by-step     Isolated hand/finger patterns    -ok, 1, 2, 3, 4, 5 signs (not today) 5 reps eachv w/ assit   -Finger Extension lifts (not today) 5 reps each, w/ assist to isolate   -Thumb to finger opposition (not today) 5 reps each, w/ assist     Discussed edema management with compression sleeves & gloves. Patient is agreeable to obtain these products and verbalizes understanding of use.     Home Exercises and Education Provided     Education provided:  Reviewed HEP      Written Home Exercises Provided: Patient instructed to cont prior HEP.  Exercises were reviewed and Cy was able to demonstrate them prior to the end of the session.  Cy demonstrated good  understanding of the HEP provided.   .   See EMR under Patient Instructions for exercises provided prior visit.        Assessment     Patient with improved FMC and R shoulder ROM by end of treatment today.  He is tolerating treatment well with some fatigue, but recovers quicker compared to initial evaluation and treatment sessions.  Cy is progressing well towards his goals and there are Plan of Care has been updated above for appropriate goals at this time. Pt prognosis is Good.      Pt will continue to benefit from skilled outpatient occupational therapy to address the deficits listed in the problem list on initial evaluation provide pt/family education and to maximize pt's level of independence in the home and community environment.     Anticipated barriers to occupational therapy: none noted    Pt's spiritual, cultural and educational needs considered and pt agreeable to plan of care and goals.    Goals: (STG's=LTG's)    1)  Patient independent in final  Hep  2)  Patient with R shoulder AROM WFL, all planes of mobility for improved performance with overhead ADL's  3)  Pt will demonstrate increased MMT by at least 1/2 grade grossly in RUE  4)  Increase  strength 2-3 lbs. to grasp heavier items during household management tasks  5)  Increase pinch 1-3 psis for turning keys and opening up water bottles  6)   Patient to score no more than 20% limitation on FOTO to demonstrate improved perception of functional right hand use.    Plan     Updated Certification Period:  05/30/19 to 06/30/19  Recommended Treatment Plan: 2 times per week for 4 weeks: Manual Therapy, Neuromuscular Re-ed, Patient Education, Self Care, Therapeutic Activites and Therapeutic Exercise  Jhony Sullivan, ALISONL

## 2019-06-10 ENCOUNTER — CLINICAL SUPPORT (OUTPATIENT)
Dept: REHABILITATION | Facility: HOSPITAL | Age: 74
End: 2019-06-10
Payer: MEDICARE

## 2019-06-10 DIAGNOSIS — R26.9 ABNORMALITY OF GAIT AS LATE EFFECT OF CEREBROVASCULAR ACCIDENT (CVA): ICD-10-CM

## 2019-06-10 DIAGNOSIS — I69.398 ABNORMALITY OF GAIT AS LATE EFFECT OF CEREBROVASCULAR ACCIDENT (CVA): ICD-10-CM

## 2019-06-10 DIAGNOSIS — R29.898 WEAKNESS OF RIGHT LOWER EXTREMITY: ICD-10-CM

## 2019-06-10 DIAGNOSIS — I69.398 IMPAIRED BALANCE AS LATE EFFECT OF CEREBROVASCULAR ACCIDENT: ICD-10-CM

## 2019-06-10 DIAGNOSIS — R26.89 IMPAIRED BALANCE AS LATE EFFECT OF CEREBROVASCULAR ACCIDENT: ICD-10-CM

## 2019-06-10 DIAGNOSIS — M62.89 TIGHTNESS OF RIGHT GASTROCNEMIUS MUSCLE: ICD-10-CM

## 2019-06-10 PROCEDURE — 97112 NEUROMUSCULAR REEDUCATION: CPT | Mod: PO

## 2019-06-10 PROCEDURE — 97110 THERAPEUTIC EXERCISES: CPT | Mod: PO

## 2019-06-12 ENCOUNTER — CLINICAL SUPPORT (OUTPATIENT)
Dept: REHABILITATION | Facility: HOSPITAL | Age: 74
End: 2019-06-12
Payer: MEDICARE

## 2019-06-12 DIAGNOSIS — R26.89 IMPAIRED BALANCE AS LATE EFFECT OF CEREBROVASCULAR ACCIDENT: ICD-10-CM

## 2019-06-12 DIAGNOSIS — I69.398 ABNORMALITY OF GAIT AS LATE EFFECT OF CEREBROVASCULAR ACCIDENT (CVA): ICD-10-CM

## 2019-06-12 DIAGNOSIS — R29.898 WEAKNESS OF RIGHT LOWER EXTREMITY: ICD-10-CM

## 2019-06-12 DIAGNOSIS — I69.398 IMPAIRED BALANCE AS LATE EFFECT OF CEREBROVASCULAR ACCIDENT: ICD-10-CM

## 2019-06-12 DIAGNOSIS — R26.9 ABNORMALITY OF GAIT AS LATE EFFECT OF CEREBROVASCULAR ACCIDENT (CVA): ICD-10-CM

## 2019-06-12 DIAGNOSIS — M62.89 TIGHTNESS OF RIGHT GASTROCNEMIUS MUSCLE: ICD-10-CM

## 2019-06-12 PROCEDURE — 97110 THERAPEUTIC EXERCISES: CPT | Mod: PO

## 2019-06-12 NOTE — PROGRESS NOTES
"  Physical Therapy Daily Treatment Note     Name: Cy Rutherford  Clinic Number: [id number]    Therapy Diagnosis:   Encounter Diagnoses   Name Primary?   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  He was compliant with home exercise program.  Response to previous treatment: He felt like he got a good workout after last visit.   Functional change: n/a    Pain: 0/10  Location: N/A    Objective     Cy received therapeutic exercises to develop strength, flexibility and core stabilization for 25 minutes including:    Date 06/12/19 06/10/19 06/07/19 06/05/19 05/31/19 05/29/19 5/23/19   Visit 8/20 7/20 6/20 5/20 4/20 3/20 2/20   FOTO --- -- --- 5/5 4/5 3/5 2/5   FTF 06/16/19 06/16/19 06/16/19 06/16/19 6/16/19 06/16/19 6/16/2019   POC exp 07/16/19 07/16/19 07/16/19 07/16/19 07/16/19 07/16/19 7/16/2019   G code   8/10  06/16/19 7/10  06/16/19 6/10  06/16/19 5/10  06/16/19 4/10  06/16/19 3/10  06/16/19 2/10  6/16/2019   Vs total  MC total 60.64  732.05 125.43  671.41 125.43  545.98 90.96  420.55 64.79  329.59 90.96  264.80 60.64  173.84             Hamstring str. 10 x 10" 10 x 10" 10 x 10" 10 x 10" 10 x 10" 10 x 10" Next?   Gastroc Str. 10 x 10" 10 x 10" 10 x 10" 10 x 10" 10 x 10" 10 x 10" 10 x 10"             Ankle - right  - DF  - eversion " "GTB  3 x 10  3 x 10 GTB  2 x 10  2 x 10 RTB  3 x 10  3 x 10 RTB  3 x 10  3 x 10 RTB  2 x 10  2 x 10 RTB  1 x 15  1 x 15 RTB  1 x 15  1 x 15   SAQ 3 x 10 x 1# 2 x 10 x 1# 3 x 10 x 1# 3 x 10 x 1# 2 x 10 x 1# 3 x 10 3 x 10   SLR 3 x 10 x 1# 2 x 10 x 1# 3 x 10 3 x 10 2 x 10 1 x 15 1 x 10   Hip Abd 1 x 10 SL 3 x 10 GTB 2 x 10 GTB 3 x 10 RTB 3 x 10 RTB 2 x 10 RTB 1 x 10 RTB   Hip add 1 x 10 SL 25 x 3" w/ ball 25 x 3" w/ ball 20 x 3" w/ ball 20 x 3" w/ ball 15 x 3" w/ ball 10 x 3" w/ ball             LAQs 3 x 10 x 1# 2 x 10 x 1# 3 x 10 3 x 10 2 x 10 1 x 10 Next?   Seated Hip Flex 2 x 10 x 1# 2 x 10 x 1# 3 x 10 2 x 10 2 x 10 1 x 10 Next?             Hip Abd --- -- --- --- -- ---    Hip Flex --- -- --- --- -- ---    Hip Ext 2 x 10 2 x 10 2 x 10 1 x 10 Next? ---    HS Curls --- -- --- --- -- ---              Step Ups --- -- -- --- -- ---    Step Downs --- -- --- --- -- ---    Min squats 1 x 15 1 x 10 2 x  1 x 10 Next?               Initials GWA 1/6 JM GWA 2/6 GWA 1/6 DG GWA 1/6 DG       Cy received neuromuscular reeducation activities x 10 minutes including:  NOT TODAY  static standing 1 x 30" eyes open, 1 x 30" eyes closed, tandem stance 1 x 30" each LE, side stepping inside bars 4 x 8 ft. All with SBA and verbal cues.     Home Exercises Provided and Patient Education Provided     Education provided:   - focus on stepping higher when walking to clear his feet.  - patient educated that representative from [facility] would be meeting with him during his scheduled PT visit on Wed to discuss the possibility of an AFO.    Written Home Exercises Provided: yes.  Exercises were reviewed and Cy was able to demonstrate them prior to the end of the session.  Cy demonstrated good  understanding of the education provided.     See EMR under Patient Instructions for exercises provided 05/29/2019.    Assessment     Patient performed above exercise program with verbal cueing for proper technique, occasional verbal cues to " keep his R LE in a neutral position while performing strengthening exercises on L LE and had no difficulty with today's progressions with no increase in symptoms prior to leaving the clinic.     Cy is progressing well towards his goals.     Pt prognosis is Good.     Pt will continue to benefit from skilled outpatient physical therapy to address the deficits listed in the problem list box on initial evaluation, provide pt/family education and to maximize pt's level of independence in the home and community environment.     Pt's spiritual, cultural and educational needs considered and pt agreeable to plan of care and goals.     Anticipated barriers to physical therapy: none    Goals:   Short Term Goals: 4 weeks   1. This patient will be independent with a basic HEP. IN PROGRESS  2. This patient will increase R LE strength by 1 grade in order to be able to ambulate greater than 300 feet with a normal gait pattern with no LOB and no AD. IN PROGRESS  3. Patient will be able to achieve less than or equal to 10 seconds on the Timed Up and Go decreasing patient's risk for falling with household ambulation. IN PROGRESS  4. Patient will be able to achieve greater than or equal to 84 on the FOTO CVA Survey placing patient in 1%<20% impaired, limited, or restricted category demonstrating overall improved functional ability with lower extremity. IN PROGRESS    Long Term Goals: 8 weeks   1. This patient will be independent with an updated HEP. IN PROGRESS  2. This patient will increase R LE strength to 5/5 in order to be able to ascend/descend stairs with a reciprocal gait pattern. IN PROGRESS  3. Patient will be able to achieve less than or equal to 9 seconds on the Timed Up and Go decreasing patient's risk for falling with ambulation on uneven surfaces in the community. IN PROGRESS  4. Patient will be able to achieve greater than or equal to 90 on the FOTO CVA Survey placing patient in 1%<20% impaired, limited, or restricted  category demonstrating overall improved functional ability with lower extremity. IN PROGRESS    Plan     Increase reps on sidelying hip abduction and adduction next visit.      Edgar Nelson, PTA

## 2019-06-12 NOTE — PATIENT INSTRUCTIONS
Strengthening: Hip Abduction (Side-Lying)        Tighten muscles on side of left thigh, then lift leg from surface, keeping knee locked.   Repeat 10 times per set. Do 1 sets per session. Do 2 sessions per day.     [email or web address]       Strengthening: Hip Adduction (Side-Lying)        Tighten muscles on inside of right thigh, then lift leg from surface, keeping knee locked.   Repeat 10 times per set. Do 1 sets per session. Do 2 sessions per day.     [email or web address]     Copyright © [employer]. All rights reserved.

## 2019-06-17 ENCOUNTER — CLINICAL SUPPORT (OUTPATIENT)
Dept: REHABILITATION | Facility: HOSPITAL | Age: 74
End: 2019-06-17
Payer: MEDICARE

## 2019-06-17 ENCOUNTER — DOCUMENTATION ONLY (OUTPATIENT)
Dept: REHABILITATION | Facility: HOSPITAL | Age: 74
End: 2019-06-17

## 2019-06-17 DIAGNOSIS — I69.398 IMPAIRED BALANCE AS LATE EFFECT OF CEREBROVASCULAR ACCIDENT: ICD-10-CM

## 2019-06-17 DIAGNOSIS — R26.9 ABNORMALITY OF GAIT AS LATE EFFECT OF CEREBROVASCULAR ACCIDENT (CVA): ICD-10-CM

## 2019-06-17 DIAGNOSIS — M62.89 TIGHTNESS OF RIGHT GASTROCNEMIUS MUSCLE: ICD-10-CM

## 2019-06-17 DIAGNOSIS — R26.89 IMPAIRED BALANCE AS LATE EFFECT OF CEREBROVASCULAR ACCIDENT: ICD-10-CM

## 2019-06-17 DIAGNOSIS — I69.398 ABNORMALITY OF GAIT AS LATE EFFECT OF CEREBROVASCULAR ACCIDENT (CVA): ICD-10-CM

## 2019-06-17 DIAGNOSIS — R29.898 WEAKNESS OF RIGHT LOWER EXTREMITY: ICD-10-CM

## 2019-06-17 PROCEDURE — 97110 THERAPEUTIC EXERCISES: CPT | Mod: PO

## 2019-06-17 PROCEDURE — G8979 MOBILITY GOAL STATUS: HCPCS | Mod: CI,PO

## 2019-06-17 PROCEDURE — G8978 MOBILITY CURRENT STATUS: HCPCS | Mod: CJ,PO

## 2019-06-17 NOTE — PROGRESS NOTES
Physical Therapy Daily Treatment Note     Name: Cy Rutherford  Clinic Number: [id number]    Therapy Diagnosis:   Encounter Diagnoses   Name Primary?   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 He did some walking over the weekend and had a busy day for Father's Day.  He was compliant with home exercise program.  Response to previous treatment: He felt a little tired after last visit.   Functional change: n/a    Pain: 0/10  Location: N/A    Objective   Pt was reassessed x 5 min. Including MMT:    Strength:  Hip Left Right   Flexion 4+/5 3+/5   Abduction 5/5 4-/5   Adduction 5/5 3+/5   Extension NT NT      Knee Left Right   Extension 5/5 4+/5   Flexion 5/5 5/5      Ankle Left Right   Dorsiflexion 5/5 3+/5   Plantarflexion 5/5 5/5   Inversion 5/5 5/5   Eversion 5/5 4-/5         Cy received therapeutic exercises to develop strength, flexibility and core stabilization for 20 minutes including:    Date 06/17/19 06/12/19 06/10/19 06/07/19 06/05/19 05/31/19 05/29/19 5/23/19   Visit 9/20 8/20 7/20 6/20 5/20 4/20 3/20 2/20   FOTO -- --- -- --- 5/5 4/5 3/5 2/5   FTF 07/17/19 06/16/19 06/16/19 06/16/19 06/16/19 6/16/19 06/16/19 6/16/2019   POC exp 07/16/19 07/16/19 07/16/19 07/16/19 07/16/19 07/16/19 07/16/19 7/16/2019   G code   9/10  07/17/19 8/10  06/16/19  "7/10  06/16/19 6/10  06/16/19 5/10  06/16/19 4/10  06/16/19 3/10  06/16/19 2/10  6/16/2019   Vs total  MC total 60.64  792.69 60.64  732.05 125.43  671.41 125.43  545.98 90.96  420.55 64.79  329.59 90.96  264.80 60.64  173.84              Hamstring str. 10 x 10" 10 x 10" 10 x 10" 10 x 10" 10 x 10" 10 x 10" 10 x 10" Next?   Gastroc Str. 10 x 10" 10 x 10" 10 x 10" 10 x 10" 10 x 10" 10 x 10" 10 x 10" 10 x 10"              Ankle - right  - DF  - eversion   3 x 10 GTB  3 x 10 RTB GTB  3 x 10  3 x 10 GTB  2 x 10  2 x 10 RTB  3 x 10  3 x 10 RTB  3 x 10  3 x 10 RTB  2 x 10  2 x 10 RTB  1 x 15  1 x 15 RTB  1 x 15  1 x 15   SAQ 3 x 10 x 1# 3 x 10 x 1# 2 x 10 x 1# 3 x 10 x 1# 3 x 10 x 1# 2 x 10 x 1# 3 x 10 3 x 10   SLR 3 x 10 x 1# 3 x 10 x 1# 2 x 10 x 1# 3 x 10 3 x 10 2 x 10 1 x 15 1 x 10   Hip Abd NT 1 x 10 SL 3 x 10 GTB 2 x 10 GTB 3 x 10 RTB 3 x 10 RTB 2 x 10 RTB 1 x 10 RTB   Hip add NT 1 x 10 SL 25 x 3" w/ ball 25 x 3" w/ ball 20 x 3" w/ ball 20 x 3" w/ ball 15 x 3" w/ ball 10 x 3" w/ ball              LAQs NT 3 x 10 x 1# 2 x 10 x 1# 3 x 10 3 x 10 2 x 10 1 x 10 Next?   Seated Hip Flex NT 2 x 10 x 1# 2 x 10 x 1# 3 x 10 2 x 10 2 x 10 1 x 10 Next?              Hip Abd -- --- -- --- --- -- ---    Hip Flex -- --- -- --- --- -- ---    Hip Ext NT 2 x 10 2 x 10 2 x 10 1 x 10 Next? ---    HS Curls -- --- -- --- --- -- ---     --          Step Ups -- --- -- -- --- -- ---    Step Downs -- --- -- --- --- -- ---    Min squats NT 1 x 15 1 x 10 2 x  1 x 10 Next?                Initials ELENITA GWA 1/6 ELENITA GWA 2/6 GWA 1/6 CIARA GWA 1/6 CIARA     Representative from [facility] assessed and fitted the patient for an ankle-foot arthrosis (AFO)  today x 35 min.. Assessment included fitting, gait training after the AFO was applied to his R LE, education on how the AFO will affect his gait, and education on when and how to wear the AFO.     Home Exercises Provided and Patient Education Provided     Education provided:   - AFO education from [facility] representative     Written Home Exercises Provided: yes.  Exercises were reviewed and Cy was able to demonstrate them prior to the end of the session.  Cy demonstrated good  understanding of the education provided.     See EMR under Patient Instructions for exercises provided 05/29/2019.    Assessment   Pt performed above exercises with no increase in symptoms. He had some difficulty with ankle eversion exercise today, possibly due to fatigue from a busy weekend during which he reported he did some walking. Pt was assessed and fitted for an AFO for R LE today. His gait was assessed after AFO was applied and he showed increased dorsiflexion on R LE during swing. His FOTO cerebrovascular disorder survey showed that he is currently 37% limited with mobility being the primary concern. This places the pt in at least 20% but less than 40% impaired category. MMT scores taken today indicated an improvement in pt's R hip strength and R quad strength since his evaluation.    Cy is progressing well towards his goals.     Pt prognosis is Good.     Pt will continue to benefit from skilled outpatient physical therapy to address the deficits listed in the problem list box on initial evaluation, provide pt/family education and to maximize pt's level of independence in the home and community environment.     Pt's spiritual, cultural and educational needs considered and pt agreeable to plan of care and goals.     Anticipated barriers to physical therapy: none    Goals:   Short Term Goals: 4 weeks   1. This patient will be independent with a basic HEP. IN PROGRESS  2. This patient will increase R LE strength by 1 grade in order to be able to ambulate greater than 300 feet with a normal gait pattern with no LOB and no AD. IN PROGRESS  3. Patient will be able to achieve less than or equal to 10 seconds on the Timed Up and Go decreasing patient's risk for falling with household ambulation. IN PROGRESS  4. Patient  will be able to achieve greater than or equal to 84 on the FOTO CVA Survey placing patient in 1%<20% impaired, limited, or restricted category demonstrating overall improved functional ability with lower extremity. IN PROGRESS    Long Term Goals: 8 weeks   1. This patient will be independent with an updated HEP. IN PROGRESS  2. This patient will increase R LE strength to 5/5 in order to be able to ascend/descend stairs with a reciprocal gait pattern. IN PROGRESS  3. Patient will be able to achieve less than or equal to 9 seconds on the Timed Up and Go decreasing patient's risk for falling with ambulation on uneven surfaces in the community. IN PROGRESS  4. Patient will be able to achieve greater than or equal to 90 on the FOTO CVA Survey placing patient in 1%<20% impaired, limited, or restricted category demonstrating overall improved functional ability with lower extremity. IN PROGRESS    Plan     Resume exercises next visit, including ankle eversion with GTB. Increase reps on sidelying hip abduction and adduction next visit.      Vishnu Doe, SPT

## 2019-06-17 NOTE — PROGRESS NOTES
Face to Face PTA Conference performed with Edgar Nelson, PTA regarding patient's current status, overall progress, and plan of care    Face to Face PTA Conference performed with Nitza Haddad, PT regarding patient's current status, overall progress, and plan of care    Edgar Nelson  6/18/2019

## 2019-06-19 ENCOUNTER — CLINICAL SUPPORT (OUTPATIENT)
Dept: REHABILITATION | Facility: HOSPITAL | Age: 74
End: 2019-06-19
Payer: MEDICARE

## 2019-06-19 DIAGNOSIS — I69.398 IMPAIRED BALANCE AS LATE EFFECT OF CEREBROVASCULAR ACCIDENT: ICD-10-CM

## 2019-06-19 DIAGNOSIS — M62.89 TIGHTNESS OF RIGHT GASTROCNEMIUS MUSCLE: ICD-10-CM

## 2019-06-19 DIAGNOSIS — I69.398 ABNORMALITY OF GAIT AS LATE EFFECT OF CEREBROVASCULAR ACCIDENT (CVA): ICD-10-CM

## 2019-06-19 DIAGNOSIS — I67.9 LACUNAR ATAXIC HEMIPARESIS: Primary | ICD-10-CM

## 2019-06-19 DIAGNOSIS — R26.9 ABNORMALITY OF GAIT AS LATE EFFECT OF CEREBROVASCULAR ACCIDENT (CVA): ICD-10-CM

## 2019-06-19 DIAGNOSIS — R29.898 WEAKNESS OF RIGHT LOWER EXTREMITY: ICD-10-CM

## 2019-06-19 DIAGNOSIS — R26.89 IMPAIRED BALANCE AS LATE EFFECT OF CEREBROVASCULAR ACCIDENT: ICD-10-CM

## 2019-06-19 DIAGNOSIS — G46.7 LACUNAR ATAXIC HEMIPARESIS: Primary | ICD-10-CM

## 2019-06-19 PROCEDURE — 97110 THERAPEUTIC EXERCISES: CPT | Mod: PO

## 2019-06-19 NOTE — PROGRESS NOTES
"  Physical Therapy Daily Treatment Note     Name: Cy Rutherford  Clinic Number: [id number]    Therapy Diagnosis:   Encounter Diagnoses   Name Primary?   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     Abnormality of gait as late effect of cerebrovascular accident (CVA)     Tightness of right gastrocnemius muscle      Physician: Yamileth Niño MD    Visit Date: 6/19/2019    Physician Orders: PT Eval and Treat   Medical Diagnosis from Referral: Lacunar ataxic hemiparesis; Impaired mobility and ADLs  Evaluation Date: 5/16/2019  Authorization Period Expiration: 12/31/2019  Plan of Care Expiration: 07/16/2019  Visit # / Visits authorized: 10/ 20    Time In: 10:00 am  Time Out: 10:58 am  Total Billable Time: 35 minutes    Precautions: Standard and Fall    Subjective     Pt reports: no pain today.  He was compliant with home exercise program.  Response to previous treatment: He felt a little tired after last visit.   Functional change: n/a    Pain: 0/10  Location: N/A    Objective     Cy received therapeutic exercises to develop strength, flexibility and core stabilization for 35 minutes including:      Date 06/19/19 06/17/19 06/12/19 06/10/19 06/07/19 06/05/19 05/31/19 05/29/19 5/23/19   Visit 10/20 9/20 8/20 7/20 6/20 5/20 4/20 3/20 2/20   FOTO --- -- --- -- --- 5/5 4/5 3/5 2/5   FTF 07/17/19 07/17/19 06/16/19 06/16/19 06/16/19 06/16/19 6/16/19 06/16/19 6/16/2019   POC exp 07/16/19 07/16/19 07/16/19 07/16/19 07/16/19 07/16/19 07/16/19 07/16/19 7/16/2019   G code   2/10  07/17/19 9/10  07/17/19 8/10  06/16/19 7/10  06/16/19 6/10  06/16/19 5/10  06/16/19 4/10  06/16/19 3/10  06/16/19 2/10  6/16/2019   Vs total  MC total 60.64  853.33 60.64  792.69 60.64  732.05 125.43  671.41 125.43  545.98 90.96  420.55 64.79  329.59 90.96  264.80 60.64  173.84               Hamstring str. 10 x 10" 10 x 10" 10 x 10" 10 x 10" 10 x 10" 10 x 10" 10 x 10" 10 x 10" Next?   Gastroc " "Str. 10 x 10" 10 x 10" 10 x 10" 10 x 10" 10 x 10" 10 x 10" 10 x 10" 10 x 10" 10 x 10"               Ankle - right  - DF  - eversion   3 x 10 GTB  3 x 10 RTB   3 x 10 GTB  3 x 10 RTB GTB  3 x 10  3 x 10 GTB  2 x 10  2 x 10 RTB  3 x 10  3 x 10 RTB  3 x 10  3 x 10 RTB  2 x 10  2 x 10 RTB  1 x 15  1 x 15 RTB  1 x 15  1 x 15   SAQ 3 x 10 x 1# 3 x 10 x 1# 3 x 10 x 1# 2 x 10 x 1# 3 x 10 x 1# 3 x 10 x 1# 2 x 10 x 1# 3 x 10 3 x 10   SLR 3 x 10 x 1# 3 x 10 x 1# 3 x 10 x 1# 2 x 10 x 1# 3 x 10 3 x 10 2 x 10 1 x 15 1 x 10   Hip Abd - SL 2 x 10 NT 1 x 10 SL 3 x 10 GTB 2 x 10 GTB 3 x 10 RTB 3 x 10 RTB 2 x 10 RTB 1 x 10 RTB   Hip add - SL 2 x 10 NT 1 x 10 SL 25 x 3" w/ ball 25 x 3" w/ ball 20 x 3" w/ ball 20 x 3" w/ ball 15 x 3" w/ ball 10 x 3" w/ ball               LAQs 3 x 10 x 1# NT 3 x 10 x 1# 2 x 10 x 1# 3 x 10 3 x 10 2 x 10 1 x 10 Next?   Seated Hip Flex 3 x 10 x 1# NT 2 x 10 x 1# 2 x 10 x 1# 3 x 10 2 x 10 2 x 10 1 x 10 Next?               Hip Abd --- -- --- -- --- --- -- ---    Hip Flex --- -- --- -- --- --- -- ---    Hip Ext 3 x 10 NT 2 x 10 2 x 10 2 x 10 1 x 10 Next? ---    HS Curls --- -- --- -- --- --- -- ---                Step Ups --- -- --- -- -- --- -- ---    Step Downs --- -- --- -- --- --- -- ---    Min squats 1 x 15 NT 1 x 15 1 x 10 2 x  1 x 10 Next?                 Initials GWA 1/6 ELENITA GWA 1/6 ELENITA GWA 2/6 GWA 1/6 CIARA GWA 1/6 DG       Home Exercises Provided and Patient Education Provided     Education provided:   - avoid substitutions with side lying.     Written Home Exercises Provided: yes.  Exercises were reviewed and Cy was able to demonstrate them prior to the end of the session.  Cy demonstrated good  understanding of the education provided.     See EMR under Patient Instructions for exercises provided 05/29/2019.    Assessment     Pt performed above exercises requiring cues for proper positiioning with side lying exercises and had no difficulty with today's progressions with no increase in symptoms " prior to leaving the clinic.  Patient continues with some difficulty with ankle eversion exercise.      Cy is progressing well towards his goals.     Pt prognosis is Good.     Pt will continue to benefit from skilled outpatient physical therapy to address the deficits listed in the problem list box on initial evaluation, provide pt/family education and to maximize pt's level of independence in the home and community environment.     Pt's spiritual, cultural and educational needs considered and pt agreeable to plan of care and goals.     Anticipated barriers to physical therapy: none    Goals:   Short Term Goals: 4 weeks   1. This patient will be independent with a basic HEP. IN PROGRESS  2. This patient will increase R LE strength by 1 grade in order to be able to ambulate greater than 300 feet with a normal gait pattern with no LOB and no AD. IN PROGRESS  3. Patient will be able to achieve less than or equal to 10 seconds on the Timed Up and Go decreasing patient's risk for falling with household ambulation. IN PROGRESS  4. Patient will be able to achieve greater than or equal to 84 on the FOTO CVA Survey placing patient in 1%<20% impaired, limited, or restricted category demonstrating overall improved functional ability with lower extremity. IN PROGRESS    Long Term Goals: 8 weeks   1. This patient will be independent with an updated HEP. IN PROGRESS  2. This patient will increase R LE strength to 5/5 in order to be able to ascend/descend stairs with a reciprocal gait pattern. IN PROGRESS  3. Patient will be able to achieve less than or equal to 9 seconds on the Timed Up and Go decreasing patient's risk for falling with ambulation on uneven surfaces in the community. IN PROGRESS  4. Patient will be able to achieve greater than or equal to 90 on the FOTO CVA Survey placing patient in 1%<20% impaired, limited, or restricted category demonstrating overall improved functional ability with lower extremity. IN  PROGRESS    Plan     ncrease weights on SAQs and LAQs next visit.      Edgar Nelson, PTA

## 2019-06-20 ENCOUNTER — TELEPHONE (OUTPATIENT)
Dept: CARDIOLOGY | Facility: CLINIC | Age: 74
End: 2019-06-20

## 2019-06-20 ENCOUNTER — OFFICE VISIT (OUTPATIENT)
Dept: CARDIOLOGY | Facility: CLINIC | Age: 74
End: 2019-06-20
Payer: MEDICARE

## 2019-06-20 ENCOUNTER — PATIENT MESSAGE (OUTPATIENT)
Dept: CARDIOLOGY | Facility: CLINIC | Age: 74
End: 2019-06-20

## 2019-06-20 VITALS
OXYGEN SATURATION: 97 % | DIASTOLIC BLOOD PRESSURE: 60 MMHG | SYSTOLIC BLOOD PRESSURE: 144 MMHG | WEIGHT: 225.31 LBS | HEART RATE: 56 BPM | BODY MASS INDEX: 34.26 KG/M2

## 2019-06-20 DIAGNOSIS — Z86.73 HISTORY OF ISCHEMIC VERTEBROBASILAR ARTERY BRAINSTEM STROKE: ICD-10-CM

## 2019-06-20 DIAGNOSIS — I10 ESSENTIAL HYPERTENSION: ICD-10-CM

## 2019-06-20 DIAGNOSIS — I67.2 INTRACRANIAL ATHEROSCLEROSIS: ICD-10-CM

## 2019-06-20 DIAGNOSIS — E78.5 HYPERLIPIDEMIA, UNSPECIFIED HYPERLIPIDEMIA TYPE: ICD-10-CM

## 2019-06-20 DIAGNOSIS — R29.898 WEAKNESS OF RIGHT LOWER EXTREMITY: ICD-10-CM

## 2019-06-20 DIAGNOSIS — I73.9 PAD (PERIPHERAL ARTERY DISEASE): ICD-10-CM

## 2019-06-20 DIAGNOSIS — I67.9 LACUNAR ATAXIC HEMIPARESIS: ICD-10-CM

## 2019-06-20 DIAGNOSIS — Z85.038 HISTORY OF COLON CANCER: ICD-10-CM

## 2019-06-20 DIAGNOSIS — R26.9 ABNORMALITY OF GAIT AS LATE EFFECT OF CEREBROVASCULAR ACCIDENT (CVA): ICD-10-CM

## 2019-06-20 DIAGNOSIS — G46.7 LACUNAR ATAXIC HEMIPARESIS: ICD-10-CM

## 2019-06-20 DIAGNOSIS — I69.398 ABNORMALITY OF GAIT AS LATE EFFECT OF CEREBROVASCULAR ACCIDENT (CVA): ICD-10-CM

## 2019-06-20 DIAGNOSIS — I25.10 CORONARY ARTERY DISEASE, ANGINA PRESENCE UNSPECIFIED, UNSPECIFIED VESSEL OR LESION TYPE, UNSPECIFIED WHETHER NATIVE OR TRANSPLANTED HEART: ICD-10-CM

## 2019-06-20 DIAGNOSIS — E11.22 TYPE 2 DIABETES MELLITUS WITH CHRONIC KIDNEY DISEASE, WITHOUT LONG-TERM CURRENT USE OF INSULIN, UNSPECIFIED CKD STAGE: ICD-10-CM

## 2019-06-20 DIAGNOSIS — Z95.5 STATUS POST CORONARY ARTERY STENT PLACEMENT: ICD-10-CM

## 2019-06-20 DIAGNOSIS — I21.09: ICD-10-CM

## 2019-06-20 DIAGNOSIS — I25.10 CORONARY ARTERY DISEASE INVOLVING NATIVE CORONARY ARTERY OF NATIVE HEART WITHOUT ANGINA PECTORIS: Primary | ICD-10-CM

## 2019-06-20 PROCEDURE — 1101F PR PT FALLS ASSESS DOC 0-1 FALLS W/OUT INJ PAST YR: ICD-10-PCS | Mod: CPTII,S$GLB,, | Performed by: INTERNAL MEDICINE

## 2019-06-20 PROCEDURE — 3078F DIAST BP <80 MM HG: CPT | Mod: CPTII,S$GLB,, | Performed by: INTERNAL MEDICINE

## 2019-06-20 PROCEDURE — 1101F PT FALLS ASSESS-DOCD LE1/YR: CPT | Mod: CPTII,S$GLB,, | Performed by: INTERNAL MEDICINE

## 2019-06-20 PROCEDURE — 99999 PR PBB SHADOW E&M-EST. PATIENT-LVL III: ICD-10-PCS | Mod: PBBFAC,,, | Performed by: INTERNAL MEDICINE

## 2019-06-20 PROCEDURE — 3077F PR MOST RECENT SYSTOLIC BLOOD PRESSURE >= 140 MM HG: ICD-10-PCS | Mod: CPTII,S$GLB,, | Performed by: INTERNAL MEDICINE

## 2019-06-20 PROCEDURE — 3077F SYST BP >= 140 MM HG: CPT | Mod: CPTII,S$GLB,, | Performed by: INTERNAL MEDICINE

## 2019-06-20 PROCEDURE — 3078F PR MOST RECENT DIASTOLIC BLOOD PRESSURE < 80 MM HG: ICD-10-PCS | Mod: CPTII,S$GLB,, | Performed by: INTERNAL MEDICINE

## 2019-06-20 PROCEDURE — 99214 PR OFFICE/OUTPT VISIT, EST, LEVL IV, 30-39 MIN: ICD-10-PCS | Mod: S$GLB,,, | Performed by: INTERNAL MEDICINE

## 2019-06-20 PROCEDURE — 99214 OFFICE O/P EST MOD 30 MIN: CPT | Mod: S$GLB,,, | Performed by: INTERNAL MEDICINE

## 2019-06-20 PROCEDURE — 99999 PR PBB SHADOW E&M-EST. PATIENT-LVL III: CPT | Mod: PBBFAC,,, | Performed by: INTERNAL MEDICINE

## 2019-06-20 RX ORDER — AMLODIPINE BESYLATE 5 MG/1
5 TABLET ORAL DAILY
Qty: 90 TABLET | Refills: 3 | Status: SHIPPED | OUTPATIENT
Start: 2019-06-20 | End: 2019-09-03 | Stop reason: SDUPTHER

## 2019-06-20 RX ORDER — DOXAZOSIN 8 MG/1
8 TABLET ORAL NIGHTLY
Qty: 90 TABLET | Refills: 3 | Status: SHIPPED | OUTPATIENT
Start: 2019-06-20 | End: 2019-09-03 | Stop reason: SDUPTHER

## 2019-06-20 RX ORDER — CLOPIDOGREL BISULFATE 75 MG/1
75 TABLET ORAL DAILY
Qty: 90 TABLET | Refills: 3
Start: 2019-06-20 | End: 2019-09-03 | Stop reason: SDUPTHER

## 2019-06-20 NOTE — TELEPHONE ENCOUNTER
----- Message from Deepika Waldrop sent at 6/20/2019  2:52 PM CDT -----  Contact: self, [phone]  Patient states his Plavix prescription order did not make it to his pharmacy.

## 2019-06-20 NOTE — PROGRESS NOTES
Subjective:    Patient ID:  Cy Rutherford is a 73 y.o. male who presents for follow-up of Hypertension      HPI     72 y/o male former pt of Dr Fish. 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   Had CVA last year with residual weakness. Holter ordered without significant arrhythmias (did show PAT).   Had been having uncontrolled HTN and chlorthalidone added to regimen along with doxazosin (also on losartan, metoprolol and amlodipine). -140's since last visit with increase in doxazosin. He denies regular CP, SOB/NASSAR, orthopnea, PND, palps, syncope. Continues to have bilateral LE edema. He is compliant with his meds. Remote smoking hx. No non healing ulceration or evidence of limb ischemia. Currently on DAPT and statin for secondary stroke prevention.     Review of Systems   Constitution: Negative for malaise/fatigue.   HENT: Negative for congestion.    Eyes: Negative for blurred vision.   Cardiovascular: Positive for leg swelling. Negative for chest pain, claudication, cyanosis, dyspnea on exertion, irregular heartbeat, near-syncope, orthopnea, palpitations, paroxysmal nocturnal dyspnea and syncope.   Respiratory: Negative for shortness of breath.    Endocrine: Negative for polyuria.   Hematologic/Lymphatic: Negative for bleeding problem.   Skin: Negative for itching and rash.   Musculoskeletal: Positive for muscle weakness. Negative for joint swelling and muscle cramps.   Gastrointestinal: Negative for abdominal pain, hematemesis, hematochezia, melena, nausea and vomiting.   Genitourinary: Negative for dysuria and hematuria.    Neurological: Positive for focal weakness and weakness. Negative for dizziness, headaches, light-headedness and loss of balance.   Psychiatric/Behavioral: Negative for depression. The patient is not nervous/anxious.         Objective:    Physical Exam   Constitutional: He is oriented to person, place, and time. He appears well-developed and well-nourished.   HENT:   Head: Normocephalic and atraumatic.   Neck: Neck supple. No JVD present.   Cardiovascular: Normal rate, regular rhythm and normal heart sounds.   Pulses:       Carotid pulses are 2+ on the right side, and 2+ on the left side.       Radial pulses are 2+ on the right side, and 2+ on the left side.        Femoral pulses are 2+ on the right side, and 2+ on the left side.  Pulmonary/Chest: Effort normal and breath sounds normal.   Abdominal: Soft. Bowel sounds are normal.   Musculoskeletal: He exhibits edema.   Neurological: He is alert and oriented to person, place, and time.   Right sided weakness   Skin: Skin is warm and dry.   Psychiatric: He has a normal mood and affect. His behavior is normal. Thought content normal.         Assessment:       1. Coronary artery disease involving native coronary artery of native heart without angina pectoris    2. Status post coronary artery stent placement    3. PAD (peripheral artery disease)    4. Acute MI anterior wall first episode care    5. Essential hypertension    6. Hyperlipidemia, unspecified hyperlipidemia type    7. Abnormality of gait as late effect of cerebrovascular accident (CVA)    8. Ataxic hemiparesis    9. History of ischemic vertebrobasilar artery brainstem stroke    10. Intracranial atherosclerosis    11. Weakness of right lower extremity    12. Type 2 diabetes mellitus with chronic kidney disease, without long-term current use of insulin, unspecified CKD stage    13. History of colon cancer    14. Coronary artery disease, angina presence unspecified, unspecified vessel or lesion type,  unspecified whether native or transplanted heart      72 y/o pt with hx and presentation as above. Doing well from a cardiac perspective and compensated from a HF perspective. BP better controlled. Will decrease dose of amlodipine to see if contributing to leg edema. Will increase doxazosin. May require intermittent lasix. Discussed the etiology, evaluation, and management of CAD, HTN. Discussed the importance of med compliance, heart healthy diet, and regular exercise.        Plan:       -Decrease amlodipine to 5 mg daily  -Increase doxazosin to 8 mg daily  -BP log  -f/u in 1 month

## 2019-06-24 ENCOUNTER — CLINICAL SUPPORT (OUTPATIENT)
Dept: REHABILITATION | Facility: HOSPITAL | Age: 74
End: 2019-06-24
Payer: MEDICARE

## 2019-06-24 DIAGNOSIS — R26.9 ABNORMALITY OF GAIT AS LATE EFFECT OF CEREBROVASCULAR ACCIDENT (CVA): ICD-10-CM

## 2019-06-24 DIAGNOSIS — R29.898 WEAKNESS OF RIGHT LOWER EXTREMITY: ICD-10-CM

## 2019-06-24 DIAGNOSIS — R26.89 IMPAIRED BALANCE AS LATE EFFECT OF CEREBROVASCULAR ACCIDENT: ICD-10-CM

## 2019-06-24 DIAGNOSIS — I69.398 ABNORMALITY OF GAIT AS LATE EFFECT OF CEREBROVASCULAR ACCIDENT (CVA): ICD-10-CM

## 2019-06-24 DIAGNOSIS — M62.89 TIGHTNESS OF RIGHT GASTROCNEMIUS MUSCLE: ICD-10-CM

## 2019-06-24 DIAGNOSIS — I69.398 IMPAIRED BALANCE AS LATE EFFECT OF CEREBROVASCULAR ACCIDENT: ICD-10-CM

## 2019-06-24 PROCEDURE — 97110 THERAPEUTIC EXERCISES: CPT | Mod: PO

## 2019-06-24 NOTE — PROGRESS NOTES
Physical Therapy Daily Treatment Note     Name: Cy Rutherford  Clinic Number: [id number]    Therapy Diagnosis:   Encounter Diagnoses   Name Primary?   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     Abnormality of gait as late effect of cerebrovascular accident (CVA)     Tightness of right gastrocnemius muscle      Physician: Yamileth Niño MD    Visit Date: 6/24/2019    Physician Orders: PT Eval and Treat   Medical Diagnosis from Referral: Lacunar ataxic hemiparesis; Impaired mobility and ADLs  Evaluation Date: 5/16/2019  Authorization Period Expiration: 12/31/2019  Plan of Care Expiration: 07/16/2019  Visit # / Visits authorized: 11/ 20    Time In: 10:00 am  Time Out: 10:52 am  Total Billable Time: 30 minutes    Precautions: Standard and Fall    Subjective     Pt reports: He has been wearing his AFO about 4 hours per day but after that his R ankle begins to hurt. Pt states he also feels he has less endurance than he had before when wearing the AFO. He only walked a little bit over the weekend.  He was compliant with home exercise program.  Response to previous treatment: He felt a little tired after last visit.   Functional change: n/a    Pain: 0/10  Location: N/A    Objective     Cy received therapeutic exercises to develop strength, flexibility and core stabilization for 25 minutes including:      Date 06/24/19 06/19/19 06/17/19 06/12/19 06/10/19 06/07/19 06/05/19 05/31/19 05/29/19 5/23/19   Visit 11/20 10/20 9/20 8/20 7/20 6/20 5/20 4/20 3/20 2/20   FOTO -- --- -- --- -- --- 5/5 4/5 3/5 2/5   FTF 07/17/19 07/17/19 07/17/19 06/16/19 06/16/19 06/16/19 06/16/19 6/16/19 06/16/19 6/16/2019   POC exp 07/16/19 07/16/19 07/16/19 07/16/19 07/16/19 07/16/19 07/16/19 07/16/19 07/16/19 7/16/2019   G code   3/10  07/17/19 2/10  07/17/19 9/10  07/17/19 8/10  06/16/19 7/10  06/16/19 6/10  06/16/19 5/10  06/16/19 4/10  06/16/19 3/10  06/16/19 2/10  6/16/2019   Vs  "total  MC total 90.96  944.29 60.64  853.33 60.64  792.69 60.64  732.05 125.43  671.41 125.43  545.98 90.96  420.55 64.79  329.59 90.96  264.80 60.64  173.84                Hamstring str. 10 x 10" 10 x 10" 10 x 10" 10 x 10" 10 x 10" 10 x 10" 10 x 10" 10 x 10" 10 x 10" Next?   Gastroc Str. 10 x 10" 10 x 10" 10 x 10" 10 x 10" 10 x 10" 10 x 10" 10 x 10" 10 x 10" 10 x 10" 10 x 10"                Ankle - right  - DF  - eversion   3 x 10 GTB  3 x 10 RTB   3 x 10 GTB  3 x 10 RTB   3 x 10 GTB  3 x 10 RTB GTB  3 x 10  3 x 10 GTB  2 x 10  2 x 10 RTB  3 x 10  3 x 10 RTB  3 x 10  3 x 10 RTB  2 x 10  2 x 10 RTB  1 x 15  1 x 15 RTB  1 x 15  1 x 15   SAQ 2 x 10 x 1.5# 3 x 10 x 1# 3 x 10 x 1# 3 x 10 x 1# 2 x 10 x 1# 3 x 10 x 1# 3 x 10 x 1# 2 x 10 x 1# 3 x 10 3 x 10   SLR 2 x 10 x 1.5# 3 x 10 x 1# 3 x 10 x 1# 3 x 10 x 1# 2 x 10 x 1# 3 x 10 3 x 10 2 x 10 1 x 15 1 x 10   Hip Abd - SL 2 x 10 2 x 10 NT 1 x 10 SL 3 x 10 GTB 2 x 10 GTB 3 x 10 RTB 3 x 10 RTB 2 x 10 RTB 1 x 10 RTB   Hip add - SL 2 x 10 2 x 10 NT 1 x 10 SL 25 x 3" w/ ball 25 x 3" w/ ball 20 x 3" w/ ball 20 x 3" w/ ball 15 x 3" w/ ball 10 x 3" w/ ball                LAQs 2 x 10 x 1.5# 3 x 10 x 1# NT 3 x 10 x 1# 2 x 10 x 1# 3 x 10 3 x 10 2 x 10 1 x 10 Next?   Seated Hip Flex 2 x 10 x 1.5# 3 x 10 x 1# NT 2 x 10 x 1# 2 x 10 x 1# 3 x 10 2 x 10 2 x 10 1 x 10 Next?                Hip Abd -- --- -- --- -- --- --- -- ---    Hip Flex -- --- -- --- -- --- --- -- ---    Hip Ext 3 x 10 3 x 10 NT 2 x 10 2 x 10 2 x 10 1 x 10 Next? ---    HS Curls -- --- -- --- -- --- --- -- ---                 // bars             Step Ups 1 x 10 L1 --- -- --- -- -- --- -- ---    Step Downs -- --- -- --- -- --- --- -- ---    Mini squats 1 x 15 1 x 15 NT 1 x 15 1 x 10 2 x  1 x 10 Next?     Static standing eyes open 1 x 1'            Static standing eyes closed 1 x 30"            Heel to toe standing 1 x 30" ea                         Initials  GWA 1/6 JM GWA 1/6 JM GWA 2/6 GWA 1/6 CIARA GWA 1/6 CIARA Benoit " received neuromuscular re-education in parallel bars to increase standing balance for 5 minutes including: static standing with eyes open and closed and heel to toe standing with eyes open, with close guard assist for safety.     Home Exercises Provided and Patient Education Provided     Education provided:   - wear the AFO for a little bit longer each day so that he can get used to it. He may be a little sore but that's normal since he is working different muscles than he is used to.    Written Home Exercises Provided: yes.  Exercises were reviewed and Cy was able to demonstrate them prior to the end of the session.  Cy demonstrated good  understanding of the education provided.     See EMR under Patient Instructions for exercises provided 05/29/2019.    Assessment     Pt ambulated into department without his AFO today. He reported that the AFO hurts his R ankle after wearing it for about 4 hours per day. He performed today's progressions with no increase in symptoms prior to leaving the clinic and verbal cues for correct form during side lying exercises. He also required verbal cues to keep his trunk straight during standing hip extension exercises. Heel to toe standing presented a challenge to pt's balance demonstrated by using the parallel bars multiple times to regain his balance.     Cy is progressing well towards his goals.     Pt prognosis is Good.     Pt will continue to benefit from skilled outpatient physical therapy to address the deficits listed in the problem list box on initial evaluation, provide pt/family education and to maximize pt's level of independence in the home and community environment.     Pt's spiritual, cultural and educational needs considered and pt agreeable to plan of care and goals.     Anticipated barriers to physical therapy: none    Goals:   Short Term Goals: 4 weeks   1. This patient will be independent with a basic HEP. IN PROGRESS  2. This patient will increase R LE  strength by 1 grade in order to be able to ambulate greater than 300 feet with a normal gait pattern with no LOB and no AD. IN PROGRESS  3. Patient will be able to achieve less than or equal to 10 seconds on the Timed Up and Go decreasing patient's risk for falling with household ambulation. IN PROGRESS  4. Patient will be able to achieve greater than or equal to 84 on the FOTO CVA Survey placing patient in 1%<20% impaired, limited, or restricted category demonstrating overall improved functional ability with lower extremity. IN PROGRESS    Long Term Goals: 8 weeks   1. This patient will be independent with an updated HEP. IN PROGRESS  2. This patient will increase R LE strength to 5/5 in order to be able to ascend/descend stairs with a reciprocal gait pattern. IN PROGRESS  3. Patient will be able to achieve less than or equal to 9 seconds on the Timed Up and Go decreasing patient's risk for falling with ambulation on uneven surfaces in the community. IN PROGRESS  4. Patient will be able to achieve greater than or equal to 90 on the FOTO CVA Survey placing patient in 1%<20% impaired, limited, or restricted category demonstrating overall improved functional ability with lower extremity. IN PROGRESS    Plan     Add perturbations to static standing with eyes closed next visit.     Vishnu Doe, SPT

## 2019-06-26 ENCOUNTER — CLINICAL SUPPORT (OUTPATIENT)
Dept: REHABILITATION | Facility: HOSPITAL | Age: 74
End: 2019-06-26
Payer: MEDICARE

## 2019-06-26 DIAGNOSIS — R29.898 WEAKNESS OF RIGHT LOWER EXTREMITY: ICD-10-CM

## 2019-06-26 DIAGNOSIS — I69.398 IMPAIRED BALANCE AS LATE EFFECT OF CEREBROVASCULAR ACCIDENT: ICD-10-CM

## 2019-06-26 DIAGNOSIS — R26.9 ABNORMALITY OF GAIT AS LATE EFFECT OF CEREBROVASCULAR ACCIDENT (CVA): ICD-10-CM

## 2019-06-26 DIAGNOSIS — R53.1 RIGHT SIDED WEAKNESS: ICD-10-CM

## 2019-06-26 DIAGNOSIS — M62.89 TIGHTNESS OF RIGHT GASTROCNEMIUS MUSCLE: ICD-10-CM

## 2019-06-26 DIAGNOSIS — R26.89 IMPAIRED BALANCE AS LATE EFFECT OF CEREBROVASCULAR ACCIDENT: ICD-10-CM

## 2019-06-26 DIAGNOSIS — I69.398 ABNORMALITY OF GAIT AS LATE EFFECT OF CEREBROVASCULAR ACCIDENT (CVA): ICD-10-CM

## 2019-06-26 PROCEDURE — 97530 THERAPEUTIC ACTIVITIES: CPT | Mod: PO

## 2019-06-26 PROCEDURE — 97110 THERAPEUTIC EXERCISES: CPT | Mod: PO

## 2019-06-26 NOTE — PROGRESS NOTES
"    Occupational Therapy Daily Treatment Note     Date: 6/26/2019  Name: Cy Rutherford  Clinic Number: [id number]    Therapy Diagnosis:   Encounter Diagnosis   Name Primary?    Right sided weakness      Physician: Yamileth Niño MD    Physician Orders: OT Eval and Treat  Medical Diagnosis: History of ischemic vertebrobasilar artery brainstem stroke  Surgical Procedure and Date: n/a  Evaluation Date: 3/7/19  Insurance Authorization Period Expiration: 2/14/20  Current Plan of Care Certification Period: 05/30/19 to 06/30/19  Date of Return to MD: TBD  Visit # / Visits authorized: 20 / 20    Time In: 11:00 pm  Time Out: 12:00 pm  Total Billable Time: 35 minutes (1TE, 1TA)       Subjective     Pt reports:  "the strength is coming back, but the coordination is slow to return"   Response to previous treatment: Pt is tolerating therapy sessions well. No c/o pain or soreness since last treatment  Functional change: increased ability to button buttons on shirts; hold objects longer with R hand    Pain: 0/10  Location: n/a    Objective     Cy received therapeutic exercises for 20 minutes direct care and 15 minutes of supervised including:  UBE (forward/backwards) 10 minutes, lvl 3.0   Pulleys (flex/ext & abd/add) 3 min each   Side lying ABD to 90 & ER 10 reps   Side lying Scap Stabilization CW/CCW 10 reps   Side lying Ext/IR stretch 3 reps, 30 sec hold   Supine dowel shoulder flex stretch 10 reps   Theraband  -rows-red  -scap retraction-red  -triceps extension -red   - biceps curls-green  -ER-red 2/10 reps each   Seated Dowel Biceps curl 10 reps each, 3#    Dynamic scapular stabilization, supine & SL (CW/CCW) circles  SL Abd to 90 & ER (not today)10 reps each, 1# wt   Dowel ER 2/10 reps w/ assist   Dowel swings (sup/pron) (not today) 2/10 reps, 1# wt   Dexterciser 3 min   Wrist 3 ways over wedge  10 reps each, 2# wt   Smiles & frowns Green T-bar; 15 reps   Deluxe Gripper-setting 4 2/10 reps   T-Putty red "   -grasp/manipulation 5 reps   -Log roll & tripod pinch 2 trial each   -dowel digs 3 min      Cy DARDEN received therapeutic activity for 15 minutes direct care and 10 minutes supervised, as follows:  FMC     -9 Peg 1 trial w/ some drops   -Pom pom  w/ CP 2 trials, red CP   -[place] manipulation on towel (not today) 2 trials, 15 coins   -Isospheres  3 min, Step-by-step     Isolated hand/finger patterns    -ok, 1, 2, 3, 4, 5 signs 3 reps eachv w/ assit   -Finger Extension lifts 3 reps each, w/ assist to isolate   -Thumb to finger opposition 5 reps each     Discussed edema management with compression sleeves & gloves. Patient is agreeable to obtain these products and verbalizes understanding of use.     Home Exercises and Education Provided     Education provided:  Reviewed HEP      Written Home Exercises Provided: Patient instructed to cont prior HEP.  Exercises were reviewed and Cy was able to demonstrate them prior to the end of the session.  Cy demonstrated good  understanding of the HEP provided.     See EMR under Patient Instructions for exercises provided prior visit.        Assessment     Patient tolerated treatment well today with minimal signs of fatigue.   He continues to have difficulty with FMC and R shoulder ER ROM, but has potential for improvement with continued OT.  Cy is progressing well towards his goals and there are Plan of Care has been updated above for appropriate goals at this time. Pt prognosis is Good.      Pt will continue to benefit from skilled outpatient occupational therapy to address the deficits listed in the problem list on initial evaluation provide pt/family education and to maximize pt's level of independence in the home and community environment.     Anticipated barriers to occupational therapy: none noted    Pt's spiritual, cultural and educational needs considered and pt agreeable to plan of care and goals.    Goals: (STG's=LTG's)    1)  Patient independent in final  Hep  2)  Patient with R shoulder AROM WFL, all planes of mobility for improved performance with overhead ADL's  3)  Pt will demonstrate increased MMT by at least 1/2 grade grossly in RUE  4)  Increase  strength 2-3 lbs. to grasp heavier items during household management tasks  5)  Increase pinch 1-3 psis for turning keys and opening up water bottles  6)   Patient to score no more than 20% limitation on FOTO to demonstrate improved perception of functional right hand use.    Plan     Updated Certification Period:  05/30/19 to 06/30/19  Recommended Treatment Plan: 2 times per week for 4 weeks: Manual Therapy, Neuromuscular Re-ed, Patient Education, Self Care, Therapeutic Activites and Therapeutic Exercise    Jhony Sullivan, ALISONL

## 2019-06-26 NOTE — PROGRESS NOTES
Physical Therapy Daily Treatment Note     Name: yC Rutherford  Clinic Number: [id number]    Therapy Diagnosis:   Encounter Diagnoses   Name Primary?   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   He was compliant with home exercise program.  Response to previous treatment: He felt a little tired after last visit.   Functional change: n/a    Pain: 0/10  Location: N/A    Objective     Cy received therapeutic exercises to develop strength, flexibility and core stabilization for 25 minutes including:      Date 06/26/19 06/24/19 06/19/19 06/17/19 06/12/19 06/10/19 06/07/19 06/05/19 05/31/19 05/29/19 5/23/19   Visit 12/20 11/20 10/20 9/20 8/20 7/20 6/20 5/20 4/20 3/20 2/20   FOTO --- -- --- -- --- -- --- 5/5 4/5 3/5 2/5   FTF 07/17/19 07/17/19 07/17/19 07/17/19 06/16/19 06/16/19 06/16/19 06/16/19 6/16/19 06/16/19 6/16/2019   POC exp 07/16/19 07/16/19 07/16/19 07/16/19 07/16/19 07/16/19 07/16/19 07/16/19 07/16/19 07/16/19 7/16/2019   G code   4/10  07/17/19 3/10  07/17/19 2/10  07/17/19 9/10  07/17/19 8/10  06/16/19 7/10  06/16/19 6/10  06/16/19 5/10  06/16/19 4/10  06/16/19 3/10  06/16/19 2/10  6/16/2019   Vs total   total 60.64  1004.93 90.96  944.29 60.64  853.33 60.64  792.69  "60.64  732.05 125.43  671.41 125.43  545.98 90.96  420.55 64.79  329.59 90.96  264.80 60.64  173.84                 Hamstring str. 10 x 10" 10 x 10" 10 x 10" 10 x 10" 10 x 10" 10 x 10" 10 x 10" 10 x 10" 10 x 10" 10 x 10" Next?   Gastroc Str. 10 x 10" 10 x 10" 10 x 10" 10 x 10" 10 x 10" 10 x 10" 10 x 10" 10 x 10" 10 x 10" 10 x 10" 10 x 10"                 Ankle - right  - DF  - eversion   3 x 10 GTB  3 x 10 RTB   3 x 10 GTB  3 x 10 RTB   3 x 10 GTB  3 x 10 RTB   3 x 10 GTB  3 x 10 RTB GTB  3 x 10  3 x 10 GTB  2 x 10  2 x 10 RTB  3 x 10  3 x 10 RTB  3 x 10  3 x 10 RTB  2 x 10  2 x 10 RTB  1 x 15  1 x 15 RTB  1 x 15  1 x 15   SAQ 2 x 10 x 1.5# 2 x 10 x 1.5# 3 x 10 x 1# 3 x 10 x 1# 3 x 10 x 1# 2 x 10 x 1# 3 x 10 x 1# 3 x 10 x 1# 2 x 10 x 1# 3 x 10 3 x 10   SLR 2 x 10 x 1.5# 2 x 10 x 1.5# 3 x 10 x 1# 3 x 10 x 1# 3 x 10 x 1# 2 x 10 x 1# 3 x 10 3 x 10 2 x 10 1 x 15 1 x 10   Hip Abd - SL 2 x 10 2 x 10 2 x 10 NT 1 x 10 SL 3 x 10 GTB 2 x 10 GTB 3 x 10 RTB 3 x 10 RTB 2 x 10 RTB 1 x 10 RTB   Hip add - SL 2 x 10 2 x 10 2 x 10 NT 1 x 10 SL 25 x 3" w/ ball 25 x 3" w/ ball 20 x 3" w/ ball 20 x 3" w/ ball 15 x 3" w/ ball 10 x 3" w/ ball                 LAQs 2 x 10 x 1.5# 2 x 10 x 1.5# 3 x 10 x 1# NT 3 x 10 x 1# 2 x 10 x 1# 3 x 10 3 x 10 2 x 10 1 x 10 Next?   Seated Hip Flex 2 x 10 x 1.5# 2 x 10 x 1.5# 3 x 10 x 1# NT 2 x 10 x 1# 2 x 10 x 1# 3 x 10 2 x 10 2 x 10 1 x 10 Next?                 Hip Abd --- -- --- -- --- -- --- --- -- ---    Hip Flex --- -- --- -- --- -- --- --- -- ---    Hip Ext 3 x 10 3 x 10 3 x 10 NT 2 x 10 2 x 10 2 x 10 1 x 10 Next? ---    HS Curls --- -- --- -- --- -- --- --- -- ---                  // bars              Step Ups L1  1 x 10 1 x 10 L1 --- -- --- -- -- --- -- ---    Step Downs --- -- --- -- --- -- --- --- -- ---    Mini squats 1 x 15 1 x 15 1 x 15 NT 1 x 15 1 x 10 2 x  1 x 10 Next?     Static standing eyes open 1 x 1" w/ perturbations  1 x 1'            Static standing eyes closed 1 x 30" 1 x 30"         " "   Heel to toe standing 1 x 30" 3a 1 x 30" ea                          Initials GWA 1/6 JM GWA 1/6 JM GWA 1/6 JM GWA 2/6 GWA 1/6 DG GWA 1/6 DG     Cy received neuromuscular re-education in parallel bars to increase standing balance for 5 minutes including: static standing with eyes open w/ perturbations and closed and heel to toe standing with eyes open, with close guard assist for safety.     Home Exercises Provided and Patient Education Provided     Education provided:   - reminded patient to wear the AFO for a little bit longer each day so that he can get used to it.    Written Home Exercises Provided: yes.  Exercises were reviewed and Cy was able to demonstrate them prior to the end of the session.  Cy demonstrated good  understanding of the education provided.     See EMR under Patient Instructions for exercises provided 05/29/2019.    Assessment     Pt ambulated into department with his AFO today.  Patient performed above exercise program with verbal cueing for correct form during side lying exercises, cues to keep his trunk straight during standing hip extension exercises and had no difficulty with today's progressions with no increase in symptoms prior to leaving the clinic.        Cy is progressing well towards his goals.     Pt prognosis is Good.     Pt will continue to benefit from skilled outpatient physical therapy to address the deficits listed in the problem list box on initial evaluation, provide pt/family education and to maximize pt's level of independence in the home and community environment.     Pt's spiritual, cultural and educational needs considered and pt agreeable to plan of care and goals.     Anticipated barriers to physical therapy: none    Goals:   Short Term Goals: 4 weeks   1. This patient will be independent with a basic HEP. IN PROGRESS  2. This patient will increase R LE strength by 1 grade in order to be able to ambulate greater than 300 feet with a normal gait pattern " with no LOB and no AD. IN PROGRESS  3. Patient will be able to achieve less than or equal to 10 seconds on the Timed Up and Go decreasing patient's risk for falling with household ambulation. IN PROGRESS  4. Patient will be able to achieve greater than or equal to 84 on the FOTO CVA Survey placing patient in 1%<20% impaired, limited, or restricted category demonstrating overall improved functional ability with lower extremity. IN PROGRESS    Long Term Goals: 8 weeks   1. This patient will be independent with an updated HEP. IN PROGRESS  2. This patient will increase R LE strength to 5/5 in order to be able to ascend/descend stairs with a reciprocal gait pattern. IN PROGRESS  3. Patient will be able to achieve less than or equal to 9 seconds on the Timed Up and Go decreasing patient's risk for falling with ambulation on uneven surfaces in the community. IN PROGRESS  4. Patient will be able to achieve greater than or equal to 90 on the FOTO CVA Survey placing patient in 1%<20% impaired, limited, or restricted category demonstrating overall improved functional ability with lower extremity. IN PROGRESS    Plan     Increase reps on seated exercises next visit.     Edgar Nelson, PTA

## 2019-07-01 ENCOUNTER — CLINICAL SUPPORT (OUTPATIENT)
Dept: REHABILITATION | Facility: HOSPITAL | Age: 74
End: 2019-07-01
Payer: MEDICARE

## 2019-07-01 DIAGNOSIS — R26.9 ABNORMALITY OF GAIT AS LATE EFFECT OF CEREBROVASCULAR ACCIDENT (CVA): ICD-10-CM

## 2019-07-01 DIAGNOSIS — I69.398 ABNORMALITY OF GAIT AS LATE EFFECT OF CEREBROVASCULAR ACCIDENT (CVA): ICD-10-CM

## 2019-07-01 DIAGNOSIS — R29.898 WEAKNESS OF RIGHT LOWER EXTREMITY: ICD-10-CM

## 2019-07-01 DIAGNOSIS — M62.89 TIGHTNESS OF RIGHT GASTROCNEMIUS MUSCLE: ICD-10-CM

## 2019-07-01 DIAGNOSIS — R26.89 IMPAIRED BALANCE AS LATE EFFECT OF CEREBROVASCULAR ACCIDENT: ICD-10-CM

## 2019-07-01 DIAGNOSIS — I69.398 IMPAIRED BALANCE AS LATE EFFECT OF CEREBROVASCULAR ACCIDENT: ICD-10-CM

## 2019-07-01 PROCEDURE — 97110 THERAPEUTIC EXERCISES: CPT | Mod: PO

## 2019-07-03 ENCOUNTER — PATIENT OUTREACH (OUTPATIENT)
Dept: OTHER | Facility: OTHER | Age: 74
End: 2019-07-03

## 2019-07-03 ENCOUNTER — CLINICAL SUPPORT (OUTPATIENT)
Dept: REHABILITATION | Facility: HOSPITAL | Age: 74
End: 2019-07-03
Payer: MEDICARE

## 2019-07-03 DIAGNOSIS — R29.898 WEAKNESS OF RIGHT LOWER EXTREMITY: ICD-10-CM

## 2019-07-03 DIAGNOSIS — R26.89 IMPAIRED BALANCE AS LATE EFFECT OF CEREBROVASCULAR ACCIDENT: ICD-10-CM

## 2019-07-03 DIAGNOSIS — M62.89 TIGHTNESS OF RIGHT GASTROCNEMIUS MUSCLE: ICD-10-CM

## 2019-07-03 DIAGNOSIS — I69.398 ABNORMALITY OF GAIT AS LATE EFFECT OF CEREBROVASCULAR ACCIDENT (CVA): ICD-10-CM

## 2019-07-03 DIAGNOSIS — R26.9 ABNORMALITY OF GAIT AS LATE EFFECT OF CEREBROVASCULAR ACCIDENT (CVA): ICD-10-CM

## 2019-07-03 DIAGNOSIS — I69.398 IMPAIRED BALANCE AS LATE EFFECT OF CEREBROVASCULAR ACCIDENT: ICD-10-CM

## 2019-07-03 PROCEDURE — 97110 THERAPEUTIC EXERCISES: CPT | Mod: PO

## 2019-07-03 NOTE — PATIENT INSTRUCTIONS
ABDUCTION: Standing (Active)        Stand, feet flat. Lift right leg out to side.   Complete 1 sets of 10 repetitions. Perform 2 sessions per day.     [email or web address]

## 2019-07-03 NOTE — PROGRESS NOTES
HPI:  Called Mr. Cy Rutherford for hypertension digital medicine follow-up. Reduced amlodipine and started nightly and YAZMIN has improved. Doxazosin also, increased and patient denies experiencing side effects. Attributes elevated BP reading to being rushed and has been working to relax prior to readings.     Last 5 Patient Entered Readings                                      Current 30 Day Average: 136/58     Recent Readings 6/29/2019 6/28/2019 6/28/2019 6/27/2019 6/27/2019    SBP (mmHg) 120 149 176 155 177    DBP (mmHg) 52 61 76 67 75    Pulse 48 50 56 51 53          Patient denies s/s of hypotension (lightheadedness, dizziness, nausea, fatigue) associated with low readings. Instructed patient to inform me if this occurs, patient confirms understanding.    Patient denies s/s of hypertension (SOB, CP, severe headaches, changes in vision) associated with high readings. Instructed patient to go to the ED if BP >180/110 and accompanied by hypertensive s/s, patient confirms understanding.    Assessment:  Patient's current 30-day average is slightly above goal of <130/80 mmHg.       Plan:  Continue current regimen.  Consider changing losartan to more potent ARB or metoprolol to carvedilol if additional BP control is needed.   Patients health , Crystal Goode, will follow-up as scheduled.    I will continue to monitor regularly and will follow-up in 3 to 4 weeks, sooner if blood pressure begins to trend upward or downward.     Current medication regimen:  Hypertension Medications             amLODIPine (NORVASC) 5 MG tablet Take 1 tablet (5 mg total) by mouth once daily.    chlorthalidone (HYGROTEN) 25 MG Tab Take 1 tablet (25 mg total) by mouth once daily.    doxazosin (CARDURA) 8 MG Tab Take 1 tablet (8 mg total) by mouth every evening.    losartan (COZAAR) 50 MG tablet Take 1 tablet (50 mg total) by mouth 2 (two) times daily.    metoprolol succinate (TOPROL-XL) 100 MG 24 hr tablet Take 1 tablet (100 mg total) by  mouth 2 (two) times daily.        Patient has my contact information and knows to call with any concerns or clinical changes.

## 2019-07-03 NOTE — PROGRESS NOTES
Physical Therapy Daily Treatment Note     Name: Cy Rutherford  Clinic Number: [id number]    Therapy Diagnosis:   Encounter Diagnoses   Name Primary?   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   He was compliant with home exercise program.  Response to previous treatment: He felt a little tired after last visit.   Functional change: n/a    Pain: 0/10  Location: N/A    Objective     Cy received therapeutic exercises to develop strength, flexibility and core stabilization for 40 minutes including:      Date 07/03/19 07/01/19 06/26/19 06/24/19 06/19/19 06/17/19 06/12/19 06/10/19 06/07/19 06/05/19   Visit 14/20 13/20 12/20 11/20 10/20 9/20 8/20 7/20 6/20 5/20   FOTO --- --- --- -- --- -- --- -- --- 5/5   FTF 07/17/19 07/17/19 07/17/19 07/17/19 07/17/19 07/17/19 06/16/19 06/16/19 06/16/19 06/16/19   POC exp 07/16/19 07/16/19 07/16/19 07/16/19 07/16/19 07/16/19 07/16/19 07/16/19 07/16/19 07/16/19   G code   6/10  07/17/19 5/10  07/17/19 4/10  07/17/19 3/10  07/17/19 2/10  07/17/19 9/10  07/17/19 8/10  06/16/19 7/10  06/16/19 6/10  06/16/19 5/10  06/16/19   Vs total  MC total 90.96  1186.85 90.96  1095.89 60.64  1004.93 90.96  944.29 60.64  853.33 60.64  792.69 60.64  732.05 125.43  671.41  "125.43  545.98 90.96  420.55                Hamstring str. 10 x 10" 10 x 10" 10 x 10" 10 x 10" 10 x 10" 10 x 10" 10 x 10" 10 x 10" 10 x 10" 10 x 10"   Gastroc Str. 10 x 10" 10 x 10" 10 x 10" 10 x 10" 10 x 10" 10 x 10" 10 x 10" 10 x 10" 10 x 10" 10 x 10"                Ankle - right  - DF  - eversion Not today - wearing AFO Not today - wearing AFO   3 x 10 GTB  3 x 10 RTB   3 x 10 GTB  3 x 10 RTB   3 x 10 GTB  3 x 10 RTB   3 x 10 GTB  3 x 10 RTB GTB  3 x 10  3 x 10 GTB  2 x 10  2 x 10 RTB  3 x 10  3 x 10 RTB  3 x 10  3 x 10   SAQ 3 x 10 x 1.5# 3 x 10 x 1.5# 2 x 10 x 1.5# 2 x 10 x 1.5# 3 x 10 x 1# 3 x 10 x 1# 3 x 10 x 1# 2 x 10 x 1# 3 x 10 x 1# 3 x 10 x 1#   SLR 3 x 10 x 1.5# 2 x 10 x 1.5# 2 x 10 x 1.5# 2 x 10 x 1.5# 3 x 10 x 1# 3 x 10 x 1# 3 x 10 x 1# 2 x 10 x 1# 3 x 10 3 x 10   Hip Abd - SL 2 x 10 2 x 10 2 x 10 2 x 10 2 x 10 NT 1 x 10 SL 3 x 10 GTB 2 x 10 GTB 3 x 10 RTB   Hip add - SL 2 x 10 2 x 10 2 x 10 2 x 10 2 x 10 NT 1 x 10 SL 25 x 3" w/ ball 25 x 3" w/ ball 20 x 3" w/ ball                LAQs 3 x 10 x 1.5# 3 x 10 x 1.5# 2 x 10 x 1.5# 2 x 10 x 1.5# 3 x 10 x 1# NT 3 x 10 x 1# 2 x 10 x 1# 3 x 10 3 x 10   Seated Hip Flex 3 x 10 x 1.5# 3 x 10 x 1.5# 2 x 10 x 1.5# 2 x 10 x 1.5# 3 x 10 x 1# NT 2 x 10 x 1# 2 x 10 x 1# 3 x 10 2 x 10                Hip Abd 1 x 10 --- --- -- --- -- --- -- --- ---   Hip Ext 3 x 10 3 x 10 3 x 10 3 x 10 3 x 10 NT 2 x 10 2 x 10 2 x 10 1 x 10                // bars             Step Ups L1  1 x 15 L1  1 x 15 L1  1 x 10 1 x 10 L1 --- -- --- -- -- ---   Mini squats 1 x 20 1 x 20 1 x 15 1 x 15 1 x 15 NT 1 x 15 1 x 10 2 x  1 x 10   Static standing eyes open 1 x 1" w/ perturbations 1 x 1" w/ perturbations 1 x 1" w/ perturbations  1 x 1'         Static standing eyes closed 2 x 30" 2 x 30" 1 x 30" 1 x 30"         Heel to toe standing 1 x 30" ea 1 x 30" ea 1 x 30" ea 1 x 30" ea                      Initials GWA 3/6 GWA 2/6 GWA 1/6  GWA 1/6  GWA 1/6  GWA 2/6 GWA 1/6     Cy received " neuromuscular re-education in parallel bars to increase standing balance for 5 minutes including: static standing with eyes open w/ perturbations and eyes closed and heel to toe standing with eyes open, with close guard assist for safety.     Home Exercises Provided and Patient Education Provided     Education provided:   - to avoid hip circumduction with stiep ups.     Written Home Exercises Provided: yes.  Exercises were reviewed and Cy was able to demonstrate them prior to the end of the session.  Cy demonstrated good  understanding of the education provided.     See EMR under Patient Instructions for exercises provided 07/03/2019.    Assessment     Pt ambulated into department with his AFO today.  Patient performed above exercise program with verbal cueing for correct form during side lying exercises, cues to keep his trunk straight during standing hip extension exercises, cues to avoid hip circumduction with step ups and had no difficulty with new exercise added along with today's progressions with no increase in symptoms prior to leaving the clinic.        Cy is progressing well towards his goals.     Pt prognosis is Good.     Pt will continue to benefit from skilled outpatient physical therapy to address the deficits listed in the problem list box on initial evaluation, provide pt/family education and to maximize pt's level of independence in the home and community environment.     Pt's spiritual, cultural and educational needs considered and pt agreeable to plan of care and goals.     Anticipated barriers to physical therapy: none    Goals:   Short Term Goals: 4 weeks   1. This patient will be independent with a basic HEP. IN PROGRESS  2. This patient will increase R LE strength by 1 grade in order to be able to ambulate greater than 300 feet with a normal gait pattern with no LOB and no AD. IN PROGRESS  3. Patient will be able to achieve less than or equal to 10 seconds on the Timed Up and Go  decreasing patient's risk for falling with household ambulation. IN PROGRESS  4. Patient will be able to achieve greater than or equal to 84 on the FOTO CVA Survey placing patient in 1%<20% impaired, limited, or restricted category demonstrating overall improved functional ability with lower extremity. IN PROGRESS    Long Term Goals: 8 weeks   1. This patient will be independent with an updated HEP. IN PROGRESS  2. This patient will increase R LE strength to 5/5 in order to be able to ascend/descend stairs with a reciprocal gait pattern. IN PROGRESS  3. Patient will be able to achieve less than or equal to 9 seconds on the Timed Up and Go decreasing patient's risk for falling with ambulation on uneven surfaces in the community. IN PROGRESS  4. Patient will be able to achieve greater than or equal to 90 on the FOTO CVA Survey placing patient in 1%<20% impaired, limited, or restricted category demonstrating overall improved functional ability with lower extremity. IN PROGRESS    Plan     Increase reps on step ups and standing hip abduction next visit.     Edgar Nelson, PTA

## 2019-07-05 ENCOUNTER — PATIENT MESSAGE (OUTPATIENT)
Dept: OTHER | Facility: OTHER | Age: 74
End: 2019-07-05

## 2019-07-08 ENCOUNTER — CLINICAL SUPPORT (OUTPATIENT)
Dept: REHABILITATION | Facility: HOSPITAL | Age: 74
End: 2019-07-08
Payer: MEDICARE

## 2019-07-08 DIAGNOSIS — R26.9 ABNORMALITY OF GAIT AS LATE EFFECT OF CEREBROVASCULAR ACCIDENT (CVA): ICD-10-CM

## 2019-07-08 DIAGNOSIS — M62.89 TIGHTNESS OF RIGHT GASTROCNEMIUS MUSCLE: ICD-10-CM

## 2019-07-08 DIAGNOSIS — I69.398 IMPAIRED BALANCE AS LATE EFFECT OF CEREBROVASCULAR ACCIDENT: ICD-10-CM

## 2019-07-08 DIAGNOSIS — I69.398 ABNORMALITY OF GAIT AS LATE EFFECT OF CEREBROVASCULAR ACCIDENT (CVA): ICD-10-CM

## 2019-07-08 DIAGNOSIS — R29.898 WEAKNESS OF RIGHT LOWER EXTREMITY: ICD-10-CM

## 2019-07-08 DIAGNOSIS — R26.89 IMPAIRED BALANCE AS LATE EFFECT OF CEREBROVASCULAR ACCIDENT: ICD-10-CM

## 2019-07-08 PROCEDURE — 97112 NEUROMUSCULAR REEDUCATION: CPT | Mod: PO

## 2019-07-08 PROCEDURE — 97110 THERAPEUTIC EXERCISES: CPT | Mod: PO

## 2019-07-08 NOTE — PROGRESS NOTES
Physical Therapy Daily Treatment Note     Name: Cy Rutherford  Clinic Number: [id number]    Therapy Diagnosis:   Encounter Diagnoses   Name Primary?   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 He wants to continue physical therapy to work on improving his standing and walking endurance.   He was compliant with home exercise program.  Response to previous treatment: He felt a little tired after last visit but okay other than that.   Functional change: n/a    Pain: 0/10  Location: N/A    Objective     Cy received therapeutic exercises to develop strength, flexibility and core stabilization for 46 minutes including:      Date 07/08/19 07/03/19 07/01/19 06/26/19 06/24/19 06/19/19 06/17/19 06/12/19 06/10/19 06/07/19 06/05/19   Visit 15/20 14/20 13/20 12/20 11/20 10/20 9/20 8/20 7/20 6/20 5/20   FOTO -- --- --- --- -- --- -- --- -- --- 5/5   FTF 07/17/19 07/17/19 07/17/19 07/17/19 07/17/19 07/17/19 07/17/19 06/16/19 06/16/19 06/16/19 06/16/19   POC exp 07/16/19 07/16/19 07/16/19 07/16/19 07/16/19 07/16/19 07/16/19 07/16/19 07/16/19 07/16/19 07/16/19   G code   7/10  07/17/19 6/10  07/17/19 5/10  07/17/19 4/10  07/17/19 3/10  07/17/19 2/10  07/17/19 9/10  07/17/19 8/10  06/16/19  "7/10  06/16/19 6/10  06/16/19 5/10  06/16/19   Vs total   total 125.43  1312.28 90.96  1186.85 90.96  1095.89 60.64  1004.93 90.96  944.29 60.64  853.33 60.64  792.69 60.64  732.05 125.43  671.41 125.43  545.98 90.96  420.55                 Hamstring str. 10 x 10" 10 x 10" 10 x 10" 10 x 10" 10 x 10" 10 x 10" 10 x 10" 10 x 10" 10 x 10" 10 x 10" 10 x 10"   Gastroc Str. 10 x 10" 10 x 10" 10 x 10" 10 x 10" 10 x 10" 10 x 10" 10 x 10" 10 x 10" 10 x 10" 10 x 10" 10 x 10"                 Ankle - right  - DF  - eversion 3 x 10 GTB  2 x 10 GTB Not today - wearing AFO Not today - wearing AFO   3 x 10 GTB  3 x 10 RTB   3 x 10 GTB  3 x 10 RTB   3 x 10 GTB  3 x 10 RTB   3 x 10 GTB  3 x 10 RTB GTB  3 x 10  3 x 10 GTB  2 x 10  2 x 10 RTB  3 x 10  3 x 10 RTB  3 x 10  3 x 10   SAQ 3 x 10 x 1.5# 3 x 10 x 1.5# 3 x 10 x 1.5# 2 x 10 x 1.5# 2 x 10 x 1.5# 3 x 10 x 1# 3 x 10 x 1# 3 x 10 x 1# 2 x 10 x 1# 3 x 10 x 1# 3 x 10 x 1#   SLR 3 x 10 x 1.5# 3 x 10 x 1.5# 2 x 10 x 1.5# 2 x 10 x 1.5# 2 x 10 x 1.5# 3 x 10 x 1# 3 x 10 x 1# 3 x 10 x 1# 2 x 10 x 1# 3 x 10 3 x 10   Hip Abd - SL 2 x 10 2 x 10 2 x 10 2 x 10 2 x 10 2 x 10 NT 1 x 10 SL 3 x 10 GTB 2 x 10 GTB 3 x 10 RTB   Hip add - SL 2 x 10 2 x 10 2 x 10 2 x 10 2 x 10 2 x 10 NT 1 x 10 SL 25 x 3" w/ ball 25 x 3" w/ ball 20 x 3" w/ ball                 LAQs 3 x 10 x 1.5# 3 x 10 x 1.5# 3 x 10 x 1.5# 2 x 10 x 1.5# 2 x 10 x 1.5# 3 x 10 x 1# NT 3 x 10 x 1# 2 x 10 x 1# 3 x 10 3 x 10   Seated Hip Flex 3 x 10 x 1.5# 3 x 10 x 1.5# 3 x 10 x 1.5# 2 x 10 x 1.5# 2 x 10 x 1.5# 3 x 10 x 1# NT 2 x 10 x 1# 2 x 10 x 1# 3 x 10 2 x 10                 Hip Abd 1 x 15 1 x 10 --- --- -- --- -- --- -- --- ---   Hip Ext 3 x 10 3 x 10 3 x 10 3 x 10 3 x 10 3 x 10 NT 2 x 10 2 x 10 2 x 10 1 x 10                 // bars              Step Ups L2 2 x 10 L1  1 x 15 L1  1 x 15 L1  1 x 10 1 x 10 L1 --- -- --- -- -- ---   Mini squats 1 x 20 1 x 20 1 x 20 1 x 15 1 x 15 1 x 15 NT 1 x 15 1 x 10 2 x  1 x 10   Static standing eyes open " "Not today 1 x 1" w/ perturbations 1 x 1" w/ perturbations 1 x 1" w/ perturbations  1 x 1'         Static standing eyes closed Not today 2 x 30" 2 x 30" 1 x 30" 1 x 30"         Heel to toe standing Not today 1 x 30" ea 1 x 30" ea 1 x 30" ea 1 x 30" ea         Hip hike on L1 step 1 x 10             Side stepping  4 x 15 ft             Standing on airex pad 1 x 1"                           Initials  GWA 3/6 GWA 2/6 GWA 1/6  GWA 1/6  GWA 1/6  GWA 2/6 GWA 1/6     Cy received neuromuscular re-education in parallel bars to increase standing balance for 8 minutes including: side stepping outside parallel bars and standing on airex pad inside parallel bars, both with close guard assist for safety.    Home Exercises Provided and Patient Education Provided     Education provided:   - continue home exercise program    Written Home Exercises Provided: yes.  Exercises were reviewed and Cy was able to demonstrate them prior to the end of the session.  Cy demonstrated good  understanding of the education provided.     See EMR under Patient Instructions for exercises provided 07/03/2019.    Assessment     Pt ambulated into department without his AFO today. He performed above exercise program with verbal cueing for correct form during side lying exercises and cues to keep his trunk straight during standing hip exercises. He was able to complete new side stepping exercise with cues to increase hip and knee flexion for R foot clearance. He had no increase in symptoms prior to leaving the clinic.     Cy is progressing well towards his goals.     Pt prognosis is Good.     Pt will continue to benefit from skilled outpatient physical therapy to address the deficits listed in the problem list box on initial evaluation, provide pt/family education and to maximize pt's level of independence in the home and community environment.     Pt's spiritual, cultural and educational needs considered and pt agreeable to plan of care " and goals.     Anticipated barriers to physical therapy: none    Goals:   Short Term Goals: 4 weeks   1. This patient will be independent with a basic HEP. IN PROGRESS  2. This patient will increase R LE strength by 1 grade in order to be able to ambulate greater than 300 feet with a normal gait pattern with no LOB and no AD. IN PROGRESS  3. Patient will be able to achieve less than or equal to 10 seconds on the Timed Up and Go decreasing patient's risk for falling with household ambulation. IN PROGRESS  4. Patient will be able to achieve greater than or equal to 84 on the FOTO CVA Survey placing patient in 1%<20% impaired, limited, or restricted category demonstrating overall improved functional ability with lower extremity. IN PROGRESS    Long Term Goals: 8 weeks   1. This patient will be independent with an updated HEP. IN PROGRESS  2. This patient will increase R LE strength to 5/5 in order to be able to ascend/descend stairs with a reciprocal gait pattern. IN PROGRESS  3. Patient will be able to achieve less than or equal to 9 seconds on the Timed Up and Go decreasing patient's risk for falling with ambulation on uneven surfaces in the community. IN PROGRESS  4. Patient will be able to achieve greater than or equal to 90 on the FOTO CVA Survey placing patient in 1%<20% impaired, limited, or restricted category demonstrating overall improved functional ability with lower extremity. IN PROGRESS    Plan     Continue with plan of care progressing toward PT goals. Increase standing hip abduction reps next visit.     Vishnu Doe, SPT

## 2019-07-10 ENCOUNTER — CLINICAL SUPPORT (OUTPATIENT)
Dept: REHABILITATION | Facility: HOSPITAL | Age: 74
End: 2019-07-10
Payer: MEDICARE

## 2019-07-10 DIAGNOSIS — M62.89 TIGHTNESS OF RIGHT GASTROCNEMIUS MUSCLE: ICD-10-CM

## 2019-07-10 DIAGNOSIS — R26.89 IMPAIRED BALANCE AS LATE EFFECT OF CEREBROVASCULAR ACCIDENT: ICD-10-CM

## 2019-07-10 DIAGNOSIS — I69.398 ABNORMALITY OF GAIT AS LATE EFFECT OF CEREBROVASCULAR ACCIDENT (CVA): ICD-10-CM

## 2019-07-10 DIAGNOSIS — I69.398 IMPAIRED BALANCE AS LATE EFFECT OF CEREBROVASCULAR ACCIDENT: ICD-10-CM

## 2019-07-10 DIAGNOSIS — R29.898 WEAKNESS OF RIGHT LOWER EXTREMITY: ICD-10-CM

## 2019-07-10 DIAGNOSIS — R26.9 ABNORMALITY OF GAIT AS LATE EFFECT OF CEREBROVASCULAR ACCIDENT (CVA): ICD-10-CM

## 2019-07-10 PROCEDURE — 97110 THERAPEUTIC EXERCISES: CPT | Mod: PO

## 2019-07-10 PROCEDURE — 97112 NEUROMUSCULAR REEDUCATION: CPT | Mod: PO

## 2019-07-10 PROCEDURE — G8979 MOBILITY GOAL STATUS: HCPCS | Mod: CI,PO

## 2019-07-10 PROCEDURE — G8978 MOBILITY CURRENT STATUS: HCPCS | Mod: CJ,PO

## 2019-07-10 NOTE — PROGRESS NOTES
Physical Therapy Daily Treatment Note     Name: Cy Rutherford  Clinic Number: [id number]    Therapy Diagnosis:   Encounter Diagnoses   Name Primary?   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   He was compliant with home exercise program.  Response to previous treatment: He felt a little tired after last visit but okay other than that.   Functional change: improved gait when wearing his AFO    Pain: 0/10  Location: N/A    Objective     Cy received therapeutic exercises to develop strength, flexibility and core stabilization for 15 minutes including:      Date 07/10/19 07/08/19 07/03/19 07/01/19 06/26/19 06/24/19 06/19/19 06/17/19 06/12/19 06/10/19 06/07/19 06/05/19   Visit 16/20 15/20 14/20 13/20 12/20 11/20 10/20 9/20 8/20 7/20 6/20 5/20   FOTO -- -- --- --- --- -- --- -- --- -- --- 5/5   FTF 07/17/19 07/17/19 07/17/19 07/17/19 07/17/19 07/17/19 07/17/19 07/17/19 06/16/19 06/16/19 06/16/19 06/16/19   POC exp 08/16/19 07/16/19 07/16/19 07/16/19 07/16/19 07/16/19 07/16/19 07/16/19 07/16/19 07/16/19 07/16/19 07/16/19   G code   8/10  07/17/19 7/10  07/17/19 6/10  07/17/19 5/10  07/17/19 4/10  07/17/19 3/10  07/17/19 2/10  07/17/19 9/10  07/17/19 8/10  06/16/19 7/10  06/16/19 6/10  06/16/19  "5/10  06/16/19   Vs total   total  125.43  1312.28 90.96  1186.85 90.96  1095.89 60.64  1004.93 90.96  944.29 60.64  853.33 60.64  792.69 60.64  732.05 125.43  671.41 125.43  545.98 90.96  420.55                  Hamstring str. Not today 10 x 10" 10 x 10" 10 x 10" 10 x 10" 10 x 10" 10 x 10" 10 x 10" 10 x 10" 10 x 10" 10 x 10" 10 x 10"   Gastroc Str. Not today 10 x 10" 10 x 10" 10 x 10" 10 x 10" 10 x 10" 10 x 10" 10 x 10" 10 x 10" 10 x 10" 10 x 10" 10 x 10"                  Ankle - right  - DF  - eversion Not today 3 x 10 GTB  2 x 10 GTB Not today - wearing AFO Not today - wearing AFO   3 x 10 GTB  3 x 10 RTB   3 x 10 GTB  3 x 10 RTB   3 x 10 GTB  3 x 10 RTB   3 x 10 GTB  3 x 10 RTB GTB  3 x 10  3 x 10 GTB  2 x 10  2 x 10 RTB  3 x 10  3 x 10 RTB  3 x 10  3 x 10   SAQ Not today 3 x 10 x 1.5# 3 x 10 x 1.5# 3 x 10 x 1.5# 2 x 10 x 1.5# 2 x 10 x 1.5# 3 x 10 x 1# 3 x 10 x 1# 3 x 10 x 1# 2 x 10 x 1# 3 x 10 x 1# 3 x 10 x 1#   SLR Not today 3 x 10 x 1.5# 3 x 10 x 1.5# 2 x 10 x 1.5# 2 x 10 x 1.5# 2 x 10 x 1.5# 3 x 10 x 1# 3 x 10 x 1# 3 x 10 x 1# 2 x 10 x 1# 3 x 10 3 x 10   Hip Abd - SL Not today 2 x 10 2 x 10 2 x 10 2 x 10 2 x 10 2 x 10 NT 1 x 10 SL 3 x 10 GTB 2 x 10 GTB 3 x 10 RTB   Hip add - SL Not today 2 x 10 2 x 10 2 x 10 2 x 10 2 x 10 2 x 10 NT 1 x 10 SL 25 x 3" w/ ball 25 x 3" w/ ball 20 x 3" w/ ball                  LAQs 1 x 10 RTB 3 x 10 x 1.5# 3 x 10 x 1.5# 3 x 10 x 1.5# 2 x 10 x 1.5# 2 x 10 x 1.5# 3 x 10 x 1# NT 3 x 10 x 1# 2 x 10 x 1# 3 x 10 3 x 10   Seated Hip Flex Not today 3 x 10 x 1.5# 3 x 10 x 1.5# 3 x 10 x 1.5# 2 x 10 x 1.5# 2 x 10 x 1.5# 3 x 10 x 1# NT 2 x 10 x 1# 2 x 10 x 1# 3 x 10 2 x 10   Seated HS curl 1 x 10 RTB              Hip Abd 1 x 10 RTB 1 x 15 1 x 10 --- --- -- --- -- --- -- --- ---   Hip Ext 1 x 10 RTB 3 x 10 3 x 10 3 x 10 3 x 10 3 x 10 3 x 10 NT 2 x 10 2 x 10 2 x 10 1 x 10   Hip Flex 1 x 10 RTB              Hip Add 1 x 10 RTB              // bars               Step Ups Not today L2 2 x 10 L1  " "1 x 15 L1  1 x 15 L1  1 x 10 1 x 10 L1 --- -- --- -- -- ---   Mini squats Not today 1 x 20 1 x 20 1 x 20 1 x 15 1 x 15 1 x 15 NT 1 x 15 1 x 10 2 x  1 x 10   Static standing eyes open Not today Not today 1 x 1" w/ perturbations 1 x 1" w/ perturbations 1 x 1" w/ perturbations  1 x 1'         Static standing eyes closed Not today Not today 2 x 30" 2 x 30" 1 x 30" 1 x 30"         Heel to toe standing 2 x 30" ea Not today 1 x 30" ea 1 x 30" ea 1 x 30" ea 1 x 30" ea         Hip hike on L1 step Not today 1 x 10             Side stepping  See below 4 x 15 ft             Standing on airex pad Not today 1 x 1"                            Initials DG JM GWA 3/6 GWA 2/6 GWA 1/6 JM GWA 1/6 JM GWA 1/6  GWA 2/6 GWA 1/6     Cy received neuromuscular re-education in parallel bars to increase standing balance for 10 minutes including: side stepping outside parallel bars 4 x 13 feet, retro walking 4 x 13 feet, and weaving around 4 cones 6 x 10 feet. All performed with CGA to SBA for safety.    Strength:  Hip Left Right   Flexion 4+/5 4/5   Abduction 5/5 4-/5   Adduction 5/5 4/5   Extension 3+/5 3+/5      Knee Left Right   Extension 5/5 4+/5   Flexion 5/5 5/5      Ankle Left Right   Dorsiflexion 5/5 3+/5   Plantarflexion 5/5 5/5   Inversion 5/5 5/5   Eversion 5/5 4-/5      TUG - 10.85 seconds - indicates he is at risk for falls  30 second sit to stand - 7 reps indicates he is at risk for falls  Home Exercises Provided and Patient Education Provided     Education provided:   - continue to focus on increased hip flexion when ambulating with and without the AFO.     Written Home Exercises Provided: yes.  Exercises were reviewed and Cy was able to demonstrate them prior to the end of the session.  Cy demonstrated good  understanding of the education provided.     See EMR under Patient Instructions for exercises provided 07/03/2019.    Assessment     Currently the patient displays an increase in R LE strength compared to his last " reassessment and he is able to ambulate with increased hip flexion and knee flexion with and without his AFO. His scores on TUG and 30 second sit to stand have increased but continue to indicate his is at risk for falls. When performing dynamic and static balance activities today he demonstrates ankle, hip, and knee strategies but had no LOB. When performing his standing strengthening exercises he required occasional verbal and tactile cues for posture and to avoid substitutions.     Cy is progressing well towards his goals.     Pt prognosis is Good.     Pt will continue to benefit from skilled outpatient physical therapy to address the deficits listed in the problem list box on initial evaluation, provide pt/family education and to maximize pt's level of independence in the home and community environment.     Pt's spiritual, cultural and educational needs considered and pt agreeable to plan of care and goals.     Anticipated barriers to physical therapy: none    Goals:   Short Term Goals: 4 weeks   1. This patient will be independent with a basic HEP. MET  2. This patient will increase R LE strength by 1 grade in order to be able to ambulate greater than 300 feet with a normal gait pattern with no LOB and no AD. MET  3. Patient will be able to achieve less than or equal to 10 seconds on the Timed Up and Go decreasing patient's risk for falling with household ambulation. IN PROGRESS  4. Patient will be able to achieve greater than or equal to 84 on the FOTO CVA Survey placing patient in 1%<20% impaired, limited, or restricted category demonstrating overall improved functional ability with lower extremity. IN PROGRESS    Long Term Goals: 8 weeks   1. This patient will be independent with an updated HEP. IN PROGRESS  2. This patient will increase R LE strength to 5/5 in order to be able to ascend/descend stairs with a reciprocal gait pattern. IN PROGRESS  3. Patient will be able to achieve less than or equal to 9  seconds on the Timed Up and Go decreasing patient's risk for falling with ambulation on uneven surfaces in the community. IN PROGRESS  4. Patient will be able to achieve greater than or equal to 90 on the FOTO CVA Survey placing patient in 1%<20% impaired, limited, or restricted category demonstrating overall improved functional ability with lower extremity. IN PROGRESS    Plan     Continue with plan of care progressing toward PT goals. Continue with standing exercises with resistance and discontinue mat exercises next visit. Increase reps with standing exercises next visit.    Nitza Haddad, PT

## 2019-07-10 NOTE — PLAN OF CARE
Outpatient Therapy Updated Plan of Care     Visit Date: 7/10/2019  Name: Cy Rutherford  Clinic Number: [id number]    Therapy Diagnosis:   Encounter Diagnoses   Name Primary?   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     Abnormality of gait as late effect of cerebrovascular accident (CVA)     Tightness of right gastrocnemius muscle      Physician: Yamileth Niño MD    Physician Orders: PT Eval and Treat   Medical Diagnosis from Referral: Lacunar ataxic hemiparesis; Impaired mobility and ADLs  Evaluation Date: 5/16/2019    Total Visits Received: 16  Cancelled Visits: 0  No Show Visits: 0    Current Certification Period:  5/16/2019 to 7/16/2019  Precautions:  Standard and Fall  Visits from Evaluation Date:  16  Functional Level Prior to Evaluation:   Mod independent, unable to perform usual yard work    Subjective     Update: Pt reports: no pain this morning, but he still fatigues easily when walking with AFO.   He was compliant with home exercise program.  Response to previous treatment: He felt a little tired after last visit but okay other than that.   Functional change: improved gait when wearing his AFO     Pain: 0/10  Location: N/A  Objective     Update: Strength:  Hip Left Right   Flexion 4+/5 4/5   Abduction 5/5 4-/5   Adduction 5/5 4/5   Extension 3+/5 3+/5      Knee Left Right   Extension 5/5 4+/5   Flexion 5/5 5/5      Ankle Left Right   Dorsiflexion 5/5 3+/5   Plantarflexion 5/5 5/5   Inversion 5/5 5/5   Eversion 5/5 4-/5      TUG - 10.85 seconds - indicates he is at risk for falls  30 second sit to stand - 7 reps indicates he is at risk for falls  Assessment     Update: Currently the patient displays an increase in R LE strength compared to his last reassessment and he is able to ambulate with increased hip flexion and knee flexion with and without his AFO. His scores on TUG and 30 second sit to stand have increased but continue to indicate  his is at risk for falls. When performing dynamic and static balance activities today he demonstrates ankle, hip, and knee strategies but had no LOB. When performing his standing strengthening exercises he required occasional verbal and tactile cues for posture and to avoid substitutions.     Previous Short Term Goals Status:   Short Term Goals: 4 weeks   1. This patient will be independent with a basic HEP. MET  2. This patient will increase R LE strength by 1 grade in order to be able to ambulate greater than 300 feet with a normal gait pattern with no LOB and no AD. MET  3. Patient will be able to achieve less than or equal to 10 seconds on the Timed Up and Go decreasing patient's risk for falling with household ambulation. IN PROGRESS  4. Patient will be able to achieve greater than or equal to 84 on the FOTO CVA Survey placing patient in 1%<20% impaired, limited, or restricted category demonstrating overall improved functional ability with lower extremity. IN PROGRESS    Long Term Goals: 8 weeks   1. This patient will be independent with an updated HEP. IN PROGRESS  2. This patient will increase R LE strength to 5/5 in order to be able to ascend/descend stairs with a reciprocal gait pattern. IN PROGRESS  3. Patient will be able to achieve less than or equal to 9 seconds on the Timed Up and Go decreasing patient's risk for falling with ambulation on uneven surfaces in the community. IN PROGRESS  4. Patient will be able to achieve greater than or equal to 90 on the FOTO CVA Survey placing patient in 1%<20% impaired, limited, or restricted category demonstrating overall improved functional ability with lower extremity. IN PROGRESS  New Short Term Goals Status:   Continue with STG #3 &4, LTG #1-4  Long Term Goal Status:   continue per initial plan of care.  Reasons for Recertification of Therapy:   Conitnued weakness of R LE, balance issues, unsteady gait, and decreased functional mobility.     Plan     Updated  Certification Period: 7/10/2019 to 8/10/19  Recommended Treatment Plan: 2 times per week for 4 weeks: Gait Training, Manual Therapy, Moist Heat/ Ice, Neuromuscular Re-ed, Patient Education, Therapeutic Exercise and IASTM  Other Recommendations: sara Haddad, PT  7/10/2019      I CERTIFY THE NEED FOR THESE SERVICES FURNISHED UNDER THIS PLAN OF TREATMENT AND WHILE UNDER MY CARE    Physician's comments:        Physician's Signature: ___________________________________________________

## 2019-07-10 NOTE — PATIENT INSTRUCTIONS
Strengthening: Hip Adduction - Resisted        With tubing around left leg, bring leg across body.  Repeat 10 times per set. Do 3 sets per session. Do 2 sessions per day.     Strengthening: Hip Abduction - Resisted        With tubing around right leg, other side toward anchor, extend leg out from side.  Repeat 10 times per set. Do 3 sets per session. Do 2 sessions per day.     Strengthening: Hip Extension - Resisted        With tubing around right ankle, face anchor and pull leg straight back.  Repeat 10 times per set. Do 3 sets per session. Do 2 sessions per day.     Strengthening: Hip Flexion - Resisted        With tubing around left ankle, anchor behind, bring leg forward, keeping knee straight.  Repeat 10 times per set. Do 3 sets per session. Do 2 sessions per day.     Hamstring Curl: Resisted (Sitting)        Facing anchor with tubing on right ankle, leg straight out, bend knee.  Repeat 10 times per set. Do 3 sets per session. Do 2 sessions per day.        Knee Extension: Sitting (Single Leg)        Sitting, face away from anchor, knee flexed, tubing looped around foot. Extend knee.  Repeat 10 times per set. Repeat with other leg. Do 3 sets per session. Do 2 sessions per day.  Inglis Height: Mid-shin     [email or web address].[email or web address]     Copyright © I. All rights reserved.

## 2019-07-15 ENCOUNTER — CLINICAL SUPPORT (OUTPATIENT)
Dept: REHABILITATION | Facility: HOSPITAL | Age: 74
End: 2019-07-15
Payer: MEDICARE

## 2019-07-15 ENCOUNTER — PATIENT OUTREACH (OUTPATIENT)
Dept: OTHER | Facility: OTHER | Age: 74
End: 2019-07-15

## 2019-07-15 DIAGNOSIS — M62.89 TIGHTNESS OF RIGHT GASTROCNEMIUS MUSCLE: ICD-10-CM

## 2019-07-15 DIAGNOSIS — R53.1 RIGHT SIDED WEAKNESS: ICD-10-CM

## 2019-07-15 DIAGNOSIS — I69.398 ABNORMALITY OF GAIT AS LATE EFFECT OF CEREBROVASCULAR ACCIDENT (CVA): ICD-10-CM

## 2019-07-15 DIAGNOSIS — R26.89 IMPAIRED BALANCE AS LATE EFFECT OF CEREBROVASCULAR ACCIDENT: ICD-10-CM

## 2019-07-15 DIAGNOSIS — R29.898 WEAKNESS OF RIGHT LOWER EXTREMITY: ICD-10-CM

## 2019-07-15 DIAGNOSIS — R26.9 ABNORMALITY OF GAIT AS LATE EFFECT OF CEREBROVASCULAR ACCIDENT (CVA): ICD-10-CM

## 2019-07-15 DIAGNOSIS — I69.398 IMPAIRED BALANCE AS LATE EFFECT OF CEREBROVASCULAR ACCIDENT: ICD-10-CM

## 2019-07-15 PROCEDURE — 97530 THERAPEUTIC ACTIVITIES: CPT | Mod: PO

## 2019-07-15 PROCEDURE — 97110 THERAPEUTIC EXERCISES: CPT | Mod: PO

## 2019-07-15 PROCEDURE — 97112 NEUROMUSCULAR REEDUCATION: CPT | Mod: PO

## 2019-07-15 NOTE — PROGRESS NOTES
"    Occupational Therapy Daily Treatment Note     Date: 7/15/2019  Name: Cy Rutherford  Clinic Number: [id number]    Therapy Diagnosis:   Encounter Diagnosis   Name Primary?    Right sided weakness      Physician: Yamileth Niño MD    Physician Orders: OT Eval and Treat  Medical Diagnosis: History of ischemic vertebrobasilar artery brainstem stroke  Surgical Procedure and Date: n/a  Evaluation Date: 3/7/19  Insurance Authorization Period Expiration: 2/14/20  Current Plan of Care Certification Period: 05/30/19 to 06/30/19  Date of Return to MD: TBD  Visit # / Visits authorized: 1 / 20 (21 total)    Time In: 3:00 pm  Time Out: 4:00 pm  Total Billable Time: 60 minutes (3TE, 1TA)       Subjective     Pt reports: "My hand is getting a little stronger, but I still get swelling in it"   Response to previous treatment: no adverse reactions. Increased activity tolerance noted  Functional change: increased ability to button buttons on shirts; hold objects longer with R hand    Pain: 0/10  Location: n/a    Objective     Cy received therapeutic exercises for 45 minutes direct care including:  UBE (forward/backwards) 10 minutes, lvl 3.0   Pulleys (flex/ext & abd/add) 3 min each   Side lying ABD to 90 & ER 10 reps, w/ 2# wt w/ assist   Side lying Scap Stabilization CW/CCW 10 reps, w/ 2# wt w/ assist   Side lying Ext/IR stretch 3 reps, 30 sec hold   Supine dowel chest press 10 reps, 2# wt   Supine dowel shoulder flex stretch 10 reps, 2# wt   Supine tricep extension 10 reps, 2# wt   Theraband  -rows-red  -scap retraction-red  -triceps extension -red   - biceps curls-green  -ER-red (not today) 2/10 reps each   Biceps curl (not today) 10 reps each, 3#    Dowel ER 2/10 reps w/ assist   Wrist 3 ways over wedge  (not today) 10 reps each, 2# wt   Smiles & frowns Green T-bar; 2/15 reps   Deluxe Gripper-setting 4 3/10 reps   T-Putty red   -grasp/manipulation (not today) 5 reps   -Log roll & tripod pinch (not today) 2 trial each "   -dowel digs (not today) 3 min      Cy DARDEN received therapeutic activity for 15 minutes direct care, as follows:  FMC     -9 Peg 1 trial w/ some drops   -Pom pom  w/ CP (not today) 2 trials, red CP   -Noble manipulation on towel (not today) 2 trials, 15 coins   -Isospheres  3 min, Step-by-step     Isolated hand/finger patterns    -ok, 1, 2, 3, 4, 5 signs (not today) 3 reps eachv w/ assit   -Finger Extension lifts (not today) 3 reps each, w/ assist to isolate   -Thumb to finger opposition (not today) 5 reps each       Home Exercises and Education Provided     Education provided:  Reviewed HEP; discussed importance of performing HEP, as recommended for optimal outcome; added table slides for shld flex, abd & ER    Written Home Exercises Provided: Patient instructed to cont prior HEP.  Exercises were reviewed and Cy was able to demonstrate them prior to the end of the session.  Cy demonstrated good  understanding of the HEP provided.     See EMR under Patient Instructions for exercises provided prior visit.        Assessment     Patient tolerated treatment well today with minimal signs of fatigue.   He continues to have difficulty with FMC and R shoulder ER ROM, but has potential for improvement with continued OT.  Cy is progressing well towards his goals and there are Plan of Care has been updated above for appropriate goals at this time. Pt prognosis is Good.      Pt will continue to benefit from skilled outpatient occupational therapy to address the deficits listed in the problem list on initial evaluation provide pt/family education and to maximize pt's level of independence in the home and community environment.     Anticipated barriers to occupational therapy: none noted    Pt's spiritual, cultural and educational needs considered and pt agreeable to plan of care and goals.    Goals: (STG's=LTG's)    1)  Patient independent in final Hep  2)  Patient with R shoulder AROM WFL, all planes of  mobility for improved performance with overhead ADL's  3)  Pt will demonstrate increased MMT by at least 1/2 grade grossly in RUE  4)  Increase  strength 2-3 lbs. to grasp heavier items during household management tasks  5)  Increase pinch 1-3 psis for turning keys and opening up water bottles  6)   Patient to score no more than 20% limitation on FOTO to demonstrate improved perception of functional right hand use.    Plan     Updated Certification Period:  05/30/19 to 06/30/19  Recommended Treatment Plan: 2 times per week for 4 weeks: Manual Therapy, Neuromuscular Re-ed, Patient Education, Self Care, Therapeutic Activites and Therapeutic Exercise    Jhony Sullivan, OTL

## 2019-07-15 NOTE — PROGRESS NOTES
Physical Therapy Daily Treatment Note     Name: Cy Rutherford  Clinic Number: [id number]    Therapy Diagnosis:   Encounter Diagnoses   Name Primary?   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   He was compliant with home exercise program.  Response to previous treatment: He felt a little tired after last visit but okay other than that.   Functional change: improved gait when wearing his AFO    Pain: 0/10  Location: N/A    Objective     Cy received therapeutic exercises to develop strength, flexibility and core stabilization for 45 minutes including:      Date 07/15/19 07/10/19 07/08/19 07/03/19 07/01/19 06/26/19 06/24/19 06/19/19 06/17/19 06/12/19 06/10/19 06/07/19 06/05/19   Visit 17/20 16/20 15/20 14/20 13/20 12/20 11/20 10/20 9/20 8/20 7/20 6/20 5/20   FOTO --- -- -- --- --- --- -- --- -- --- -- --- 5/5   FTF 07/17/19 07/17/19 07/17/19 07/17/19 07/17/19 07/17/19 07/17/19 07/17/19 07/17/19 06/16/19 06/16/19 06/16/19 06/16/19   POC exp 08/16/19 08/16/19 07/16/19 07/16/19 07/16/19 07/16/19 07/16/19 07/16/19 07/16/19 07/16/19 07/16/19 07/16/19 07/16/19   G code   9/10  07/17/19 8/10  07/17/19 7/10  07/17/19 6/10  07/17/19 5/10  07/17/19 4/10  07/17/19 3/10  07/17/19 2/10  07/17/19 9/10  07/17/19 8/10  06/16/19 7/10  06/16/19 6/10  06/16/19  "5/10  06/16/19   Vs total   total 125.43  1502.50 64.79  1377.07 125.43  1312.28 90.96  1186.85 90.96  1095.89 60.64  1004.93 90.96  944.29 60.64  853.33 60.64  792.69 60.64  732.05 125.43  671.41 125.43  545.98 90.96  420.55                                   LAQs 3 x 10 x 1.5# 1 x 10 RTB 3 x 10 x 1.5# 3 x 10 x 1.5# 3 x 10 x 1.5# 2 x 10 x 1.5# 2 x 10 x 1.5# 3 x 10 x 1# NT 3 x 10 x 1# 2 x 10 x 1# 3 x 10 3 x 10   Seated Hip Flex 3 x 10 x 1.5# Not today 3 x 10 x 1.5# 3 x 10 x 1.5# 3 x 10 x 1.5# 2 x 10 x 1.5# 2 x 10 x 1.5# 3 x 10 x 1# NT 2 x 10 x 1# 2 x 10 x 1# 3 x 10 2 x 10   Seated HS curl 2 x 10 RTB 1 x 10 RTB              Hip Abd 2 x 10 RTB 1 x 10 RTB 1 x 15 1 x 10 --- --- -- --- -- --- -- --- ---   Hip Ext 2 x 10 RTB 1 x 10 RTB 3 x 10 3 x 10 3 x 10 3 x 10 3 x 10 3 x 10 NT 2 x 10 2 x 10 2 x 10 1 x 10   Hip Flex 2 x 10 RTB 1 x 10 RTB              Hip Add 2 x 10 RTB 1 x 10 RTB              // bars                Step Ups L2  2 x 10 Not today L2 2 x 10 L1  1 x 15 L1  1 x 15 L1  1 x 10 1 x 10 L1 --- -- --- -- -- ---   Mini squats 1 x 20 Not today 1 x 20 1 x 20 1 x 20 1 x 15 1 x 15 1 x 15 NT 1 x 15 1 x 10 2 x  1 x 10   Static standing eyes open 1 x 1" w/ perturbations Not today Not today 1 x 1" w/ perturbations 1 x 1" w/ perturbations 1 x 1" w/ perturbations  1 x 1'         Static standing eyes closed 2 x 30"  Not today Not today 2 x 30" 2 x 30" 1 x 30" 1 x 30"         Heel to toe standing 2 x 30" ea 2 x 30" ea Not today 1 x 30" ea 1 x 30" ea 1 x 30" ea 1 x 30" ea         Hip hike on L1 step 1 x 10 Not today 1 x 10             Side stepping  See below See below 4 x 15 ft             Standing on airex pad 1 x 1' Not today 1 x 1"                             Initials GWA 1/6 DG JM GWA 3/6 GWA 2/6 GWA 1/6 JM GWA 1/6 JM GWA 1/6 JM GWA 2/6 GWA 1/6     Cy received neuromuscular re-education outside parallel bars to increase standing balance for 10 minutes including:  All performed with CGA to SBA for safety.    Date  " 07/15/19 7/10/19        Side stepping  4 x 13 ft 4 x 13 ft   Retro walking  4 x 15 ft 4 x 13 ft   Weaving around 4 cones 6 x 10 ft 6 x 10 ft            Home Exercises Provided and Patient Education Provided     Education provided:   - continue to focus on increased hip flexion when ambulating with and without the AFO.     Written Home Exercises Provided: yes.  Exercises were reviewed and Cy was able to demonstrate them prior to the end of the session.  Cy demonstrated good  understanding of the education provided.     See EMR under Patient Instructions for exercises provided 07/10/2019.    Assessment     Currently the patient displays an increased hip flexion and knee flexion with and without his AFO.  When performing dynamic and static balance activities today he demonstrates ankle, hip, and knee strategies but had no LOB.  When performing his standing strengthening exercises he required occasional verbal and tactile cues for posture and to avoid substitutions.  Patient had no difficulty with today's progressions.    Cy is progressing well towards his goals.     Pt prognosis is Good.     Pt will continue to benefit from skilled outpatient physical therapy to address the deficits listed in the problem list box on initial evaluation, provide pt/family education and to maximize pt's level of independence in the home and community environment.     Pt's spiritual, cultural and educational needs considered and pt agreeable to plan of care and goals.     Anticipated barriers to physical therapy: none    Goals:   Short Term Goals: 4 weeks   1. This patient will be independent with a basic HEP. MET  2. This patient will increase R LE strength by 1 grade in order to be able to ambulate greater than 300 feet with a normal gait pattern with no LOB and no AD. MET  3. Patient will be able to achieve less than or equal to 10 seconds on the Timed Up and Go decreasing patient's risk for falling with household ambulation.  IN PROGRESS  4. Patient will be able to achieve greater than or equal to 84 on the FOTO CVA Survey placing patient in 1%<20% impaired, limited, or restricted category demonstrating overall improved functional ability with lower extremity. IN PROGRESS    Long Term Goals: 8 weeks   1. This patient will be independent with an updated HEP. IN PROGRESS  2. This patient will increase R LE strength to 5/5 in order to be able to ascend/descend stairs with a reciprocal gait pattern. IN PROGRESS  3. Patient will be able to achieve less than or equal to 9 seconds on the Timed Up and Go decreasing patient's risk for falling with ambulation on uneven surfaces in the community. IN PROGRESS  4. Patient will be able to achieve greater than or equal to 90 on the FOTO CVA Survey placing patient in 1%<20% impaired, limited, or restricted category demonstrating overall improved functional ability with lower extremity. IN PROGRESS    Plan     Continue with plan of care progressing toward PT goals.  Increase reps with standing exercises next visit.    Edgar Nelson, PTA

## 2019-07-15 NOTE — PROGRESS NOTES
"Last 5 Patient Entered Readings                                      Current 30 Day Average: 139/61     Recent Readings 7/15/2019 7/14/2019 7/12/2019 6/29/2019 6/28/2019    SBP (mmHg) 141 138 129 120 149    DBP (mmHg) 59 58 57 52 61    Pulse 48 47 49 48 50        Reports feeling well, no complaints.     Recently received new BP device.    Digital Medicine: Health  Follow Up    Lifestyle Modifications:    1.Dietary Modifications (Sodium intake <2,000mg/day, food labels, dining out): Reports limited water intake. Verbalized intent to use a "big cup" and start drinking a glass before drinking soda. Will assess in 4-6 weeks.     2.Physical Activity:  Patient stays active walking around the house and going to physical therapy twice per week.     3.Medication Therapy: Patient has been compliant with the medication regimen.    4.Patient has the following medication side effects/concerns: none  (Frequency/Alleviating factors/Precipitating factors, etc.)     Follow up with Mr. Cy Rutherford completed. No further questions or concerns. Will continue to follow up to achieve health goals.    "

## 2019-07-17 ENCOUNTER — CLINICAL SUPPORT (OUTPATIENT)
Dept: REHABILITATION | Facility: HOSPITAL | Age: 74
End: 2019-07-17
Payer: MEDICARE

## 2019-07-17 DIAGNOSIS — I69.398 ABNORMALITY OF GAIT AS LATE EFFECT OF CEREBROVASCULAR ACCIDENT (CVA): ICD-10-CM

## 2019-07-17 DIAGNOSIS — I69.398 IMPAIRED BALANCE AS LATE EFFECT OF CEREBROVASCULAR ACCIDENT: ICD-10-CM

## 2019-07-17 DIAGNOSIS — R29.898 WEAKNESS OF RIGHT LOWER EXTREMITY: ICD-10-CM

## 2019-07-17 DIAGNOSIS — M62.89 TIGHTNESS OF RIGHT GASTROCNEMIUS MUSCLE: ICD-10-CM

## 2019-07-17 DIAGNOSIS — R53.1 RIGHT SIDED WEAKNESS: ICD-10-CM

## 2019-07-17 DIAGNOSIS — R26.9 ABNORMALITY OF GAIT AS LATE EFFECT OF CEREBROVASCULAR ACCIDENT (CVA): ICD-10-CM

## 2019-07-17 DIAGNOSIS — R26.89 IMPAIRED BALANCE AS LATE EFFECT OF CEREBROVASCULAR ACCIDENT: ICD-10-CM

## 2019-07-17 PROCEDURE — 97530 THERAPEUTIC ACTIVITIES: CPT | Mod: PO

## 2019-07-17 PROCEDURE — 97112 NEUROMUSCULAR REEDUCATION: CPT | Mod: PO

## 2019-07-17 PROCEDURE — 97110 THERAPEUTIC EXERCISES: CPT | Mod: PO

## 2019-07-17 NOTE — PROGRESS NOTES
Physical Therapy Daily Treatment Note     Name: Cy Rutherford  Clinic Number: [id number]    Therapy Diagnosis:   Encounter Diagnoses   Name Primary?   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   He was compliant with home exercise program.  Response to previous treatment: He felt a little tired after last visit but okay other than that.   Functional change: improved gait when wearing his AFO    Pain: 0/10  Location: N/A    Objective     Cy received therapeutic exercises to develop strength, flexibility and core stabilization for 45 minutes including:      Date 07/17/19 07/15/19 07/10/19 07/08/19 07/03/19 07/01/19 06/26/19 06/24/19 06/19/19 06/17/19 06/12/19 06/10/19 06/07/19 06/05/19   Visit 18/20 17/20 16/20 15/20 14/20 13/20 12/20 11/20 10/20 9/20 8/20 7/20 6/20 5/20   FOTO --- --- -- -- --- --- --- -- --- -- --- -- --- 5/5   FTF 8/17/19 07/17/19 07/17/19 07/17/19 07/17/19 07/17/19 07/17/19 07/17/19 07/17/19 07/17/19 06/16/19 06/16/19 06/16/19 06/16/19   POC exp 08/16/19 08/16/19 08/16/19 07/16/19 07/16/19 07/16/19 07/16/19 07/16/19 07/16/19 07/16/19 07/16/19 07/16/19 07/16/19 07/16/19   G code   3/10  08/10/19 9/10  07/17/19 8/10  07/17/19 7/10  07/17/19 6/10  07/17/19  "5/10  07/17/19 4/10  07/17/19 3/10  07/17/19 2/10  07/17/19 9/10  07/17/19 8/10  06/16/19 7/10  06/16/19 6/10  06/16/19 5/10  06/16/19   Vs total   total 64.79  1567.29   125.43  1502.50 64.79  1377.07 125.43  1312.28 90.96  1186.85 90.96  1095.89 60.64  1004.93 90.96  944.29 60.64  853.33 60.64  792.69 60.64  732.05 125.43  671.41 125.43  545.98 90.96  420.55                                     LAQs 3 x 10 x 2# 3 x 10 x 1.5# 1 x 10 RTB 3 x 10 x 1.5# 3 x 10 x 1.5# 3 x 10 x 1.5# 2 x 10 x 1.5# 2 x 10 x 1.5# 3 x 10 x 1# NT 3 x 10 x 1# 2 x 10 x 1# 3 x 10 3 x 10   Seated Hip Flex 3 x 10 x 2# 3 x 10 x 1.5# Not today 3 x 10 x 1.5# 3 x 10 x 1.5# 3 x 10 x 1.5# 2 x 10 x 1.5# 2 x 10 x 1.5# 3 x 10 x 1# NT 2 x 10 x 1# 2 x 10 x 1# 3 x 10 2 x 10   Seated HS curl 3 x 10 GTB 2 x 10 RTB 1 x 10 RTB              Hip Abd 3 x 10 RTB 2 x 10 RTB 1 x 10 RTB 1 x 15 1 x 10 --- --- -- --- -- --- -- --- ---   Hip Ext 3 x 10 RTB 2 x 10 RTB 1 x 10 RTB 3 x 10 3 x 10 3 x 10 3 x 10 3 x 10 3 x 10 NT 2 x 10 2 x 10 2 x 10 1 x 10   Hip Flex 3 x 10 RTB 2 x 10 RTB 1 x 10 RTB              Hip Add 3 x 10 RTB 2 x 10 RTB 1 x 10 RTB              // bars                 Step Ups L2  2 x10 L2  2 x 10 Not today L2 2 x 10 L1  1 x 15 L1  1 x 15 L1  1 x 10 1 x 10 L1 --- -- --- -- -- ---   Mini squats  1 x 20 Not today 1 x 20 1 x 20 1 x 20 1 x 15 1 x 15 1 x 15 NT 1 x 15 1 x 10 2 x  1 x 10   Static standing eyes open 1 x 1' w/ perturbations 1 x 1" w/ perturbations Not today Not today 1 x 1" w/ perturbations 1 x 1" w/ perturbations 1 x 1" w/ perturbations  1 x 1'         Static standing eyes closed 2 x 30" 2 x 30"  Not today Not today 2 x 30" 2 x 30" 1 x 30" 1 x 30"         Heel to toe standing 2 x 30" 2 x 30" ea 2 x 30" ea Not today 1 x 30" ea 1 x 30" ea 1 x 30" ea 1 x 30" ea         Hip hike on L1 step 1 x 10 1 x 10 Not today 1 x 10             Side stepping  See below See below See below 4 x 15 ft             Standing on airex pad 1 x 1" 1 x 1' Not today 1 x 1"    "                           Initials MA GWA 1/6 CIARA KWONG GWA 3/6 GWA 2/6 GWA 1/6 ELENITA GWA 1/6 ELENITA GWA 1/6 ELENITA GWA 2/6 GWA 1/6     Cy received neuromuscular re-education outside parallel bars to increase standing balance for 10 minutes including:  All performed with CGA to SBA for safety.    Date  07/17/19 07/15/19 7/10/19         Side stepping  4 x 15 ft 4 x 13 ft 4 x 13 ft   Retro walking   4 x 15 ft 4 x 15 ft 4 x 13 ft   Weaving around 4 cones 8 x 15 ft 6 x 10 ft 6 x 10 ft             Home Exercises Provided and Patient Education Provided     Education provided:   - continue to focus on increased hip flexion when ambulating with and without the AFO.     Written Home Exercises Provided: yes.  Exercises were reviewed and Cy was able to demonstrate them prior to the end of the session.  Cy demonstrated good  understanding of the education provided.     See EMR under Patient Instructions for exercises provided 07/10/2019.    Assessment     Cy continues to have difficulty advancing right foot and requires verbal and tactile cues to maintain center of gravity while in narrow base of support. He has weakness in hip abductors and difficulty performing hip hikes.  Patient will continue with functional strengthening and balance exercise to improve tolerance to prolonged standing/walking.    Cy is progressing well towards his goals.     Pt prognosis is Good.     Pt will continue to benefit from skilled outpatient physical therapy to address the deficits listed in the problem list box on initial evaluation, provide pt/family education and to maximize pt's level of independence in the home and community environment.     Pt's spiritual, cultural and educational needs considered and pt agreeable to plan of care and goals.     Anticipated barriers to physical therapy: none    Goals:   Short Term Goals: 4 weeks   1. This patient will be independent with a basic HEP. MET  2. This patient will increase R LE strength by 1 grade  in order to be able to ambulate greater than 300 feet with a normal gait pattern with no LOB and no AD. MET  3. Patient will be able to achieve less than or equal to 10 seconds on the Timed Up and Go decreasing patient's risk for falling with household ambulation. IN PROGRESS  4. Patient will be able to achieve greater than or equal to 84 on the FOTO CVA Survey placing patient in 1%<20% impaired, limited, or restricted category demonstrating overall improved functional ability with lower extremity. IN PROGRESS    Long Term Goals: 8 weeks   1. This patient will be independent with an updated HEP. IN PROGRESS  2. This patient will increase R LE strength to 5/5 in order to be able to ascend/descend stairs with a reciprocal gait pattern. IN PROGRESS  3. Patient will be able to achieve less than or equal to 9 seconds on the Timed Up and Go decreasing patient's risk for falling with ambulation on uneven surfaces in the community. IN PROGRESS  4. Patient will be able to achieve greater than or equal to 90 on the FOTO CVA Survey placing patient in 1%<20% impaired, limited, or restricted category demonstrating overall improved functional ability with lower extremity. IN PROGRESS    Plan     Continue with plan of care progressing toward PT goals.  Increase resistance with standing exercises next visit.    Dionicio Davis, PT

## 2019-07-17 NOTE — PROGRESS NOTES
Outpatient Therapy Updated Plan of Care     Visit Date: 7/17/2019  Name: Cy Rutherford  Clinic Number: [id number]    Therapy Diagnosis:   Encounter Diagnosis   Name Primary?    Right sided weakness      Physician: Yamileth Niño MD    Physician Orders: OT Eval and Treat  Medical Diagnosis: History of ischemic vertebrobasilar artery brainstem stroke  Surgical Procedure and Date: n/a  Evaluation Date: 3/7/19  Insurance Authorization Period Expiration: 2/14/20  Initial Plan of Care Certification Period: 3/7/19-5/2/19  Date of Return to MD: SUZETTE    Total Visits Received: 18  Cancelled Visits: 0  No Show Visits: 0    Current Certification Period:  06/30/19 to 07/30/19  Precautions:  Standard  Visits from Evaluation Date:  1  Functional Level Prior to Evaluation:  Independent    Subjective     Update: Patient feels he is getting stronger and is able to do more with his Shante, however, he continues to have some weakness and incoordination in RUE that affects his functioning in ADL/IADL's     Objective     Observations:  Moderate to minimal edema throughout RUE    Update:   Objective measures taken today are as follows;  Range of Motion and Manual Muscle Test   05/10/19 05/10/19 06/05/19 06/05/19 07/17/19   Shoulder AROM MMT AROM PROM AROM   Flexion 120 (+9) 4/5 125 (+5) 130 120   Abduction 100 (+10) 4/5 100 (=) 125 100   ER at 0 40 (+10) 4/5 50 (+10)  55 (+5)   ER at 90 60 (+8)  3+/5 80 (+20)  82 (+2)   IR L3 (+)  2+/5 L1  L1-T12           Supination 68 (+3) 4/5 72 (+4)  65       Strength (Dyanmometer) and Pinch Strength (Pinch Gauge)  Measured in pounds and psi.     3/7/2019 3/7/2019 05/10/19 06/05/19 07/17/19     Left Right Right Right Right   Rung II 80 30 42 (+8) 35 * 38   Key Pinch 20 11 13 (+1.5) 11 * 9   3pt Pinch 15 6 9 (+2) 9 * 6   2pt Pinch 20 9 10 (+1) 5 * 4       9 Peg Test Left  Right   Removed 9/9 8/9   Replaced 9/9 3/9   Time 34 sec 1.06 sec       Assessment     Update: Patient presents with stiffness  in in his R scap/shoulder that has not progressed much since last assessment, however, he does report not performing his HEP for stretching of his shoulder, as recommended.  He did have an improvement in ROM after MT and stretching of his R shoulder today, therefore, he has potential for improvement. Recommend patient to continue OT to address areas of deficit remaining that are affecting his ability to function in ADL/IADL's.      Previous Short Term Goals Status:   6/6 STG's met; 3/3 LTG's part met  New Short Term Goals Status:   (STG's=LTG's) by discharge    1)  Patient independent in final Hep-ongoing  2)  Patient with R shoulder AROM WFL, all planes of mobility for improved performance with overhead ADL's-not met  3)  Pt will demonstrate increased MMT by at least 1/2 grade grossly in RUE-part met  4)  Increase  strength 2-3 lbs. to grasp heavier items during household management tasks-part met (not consistent)  5)  Increase pinch 1-3 psis for turning keys and opening up water bottles-part met (not consistent)  6)   Patient to score no more than 20% limitation on FOTO to demonstrate improved perception of functional right hand use.-ongoing    Long Term Goal Status:   modified:  As above  Reasons for Recertification of Therapy:   Updated Plan of Care needed    Plan     Updated Certification Period:  06/30/19 to 07/30/19  Recommended Treatment Plan: 2 times per week for 4 weeks: Manual Therapy, Neuromuscular Re-ed, Patient Education, Self Care, Therapeutic Activites and Therapeutic Exercise     Jhony Sullivan, OT  7/17/2019      I CERTIFY THE NEED FOR THESE SERVICES FURNISHED UNDER THIS PLAN OF TREATMENT AND WHILE UNDER MY CARE    Physician's comments:        Physician's Signature: ___________________________________________________          Occupational Therapy Daily Treatment Note     Date: 7/17/2019  Name: Cy J Krish  [facility] Number: [id number]    Therapy Diagnosis:   Encounter Diagnosis   Name Primary?  "   Right sided weakness          Physician: Yamileth Niño MD    Physician Orders: OT Eval and Treat  Medical Diagnosis: History of ischemic vertebrobasilar artery brainstem stroke  Surgical Procedure and Date: n/a  Evaluation Date: 3/7/19  Insurance Authorization Period Expiration: 2/14/20  Current Plan of Care Certification Period: 05/30/19 to 06/30/19  Date of Return to MD: TBD  Visit # / Visits authorized: 2 / 20 (22 total)    Time In: 1:00 pm  Time Out: 2:00:00 pm  Total Billable Time: 60 minutes (3TE, 1TA)       Subjective     Pt reports: "My hand is getting a little stronger, but I still get swelling in it"   Response to previous treatment: no adverse reactions. Increased activity tolerance noted  Functional change: increased ability to button buttons on shirts; hold objects longer with R hand    Pain: 0/10  Location: n/a    Objective     Cy received therapeutic exercises for 45 minutes direct care including:  UBE (forward/backwards) 10 minutes, lvl 3.0   Pulleys (flex/ext & abd/add) 3 min each   Side lying ABD to 90 & ER 10 reps, w/ 2# wt w/ assist   Side lying Scap Stabilization CW/CCW 10 reps, w/ 2# wt w/ assist   Side lying Ext/IR stretch 3 reps, 30 sec hold   Supine dowel chest press 10 reps, 2# wt   Supine dowel shoulder flex stretch 10 reps, 2# wt   Supine tricep extension 10 reps, 2# wt   Theraband  -rows-red  -scap retraction-red  -triceps extension -red   - biceps curls-green  -ER-red (not today) 2/10 reps each   Biceps curl (not today) 10 reps each, 3#    Dowel ER 2/10 reps w/ assist   Wrist 3 ways over wedge  (not today) 10 reps each, 2# wt   Smiles & frowns Green T-bar; 2/15 reps   Deluxe Gripper-setting 4 3/10 reps   T-Putty red   -molding 2 min   -grasp/manipulation (not today) 5 reps   -Log roll & tripod pinch (not today) 2 trial each   -dowel digs (not today) 3 min      Cy DARDEN received therapeutic activity for 15 minutes direct care, as follows:  FMC     -9 Peg 1 trial w/ some drops "   -Pom pom  w/ CP (not today) 2 trials, red CP   -Mansfield manipulation on towel (not today) 2 trials, 15 coins   -Isospheres  3 min, Step-by-step     Isolated hand/finger patterns    -ok, 1, 2, 3, 4, 5 signs (not today) 3 reps eachv w/ assit   -Finger Extension lifts (not today) 3 reps each, w/ assist to isolate   -Thumb to finger opposition (not today) 5 reps each       Home Exercises and Education Provided     Education provided:  Reviewed HEP; discussed importance of performing HEP, as recommended for optimal outcome; added table slides for shld flex, abd & ER    Written Home Exercises Provided: Patient instructed to cont prior HEP.  Exercises were reviewed and Cy was able to demonstrate them prior to the end of the session.  Cy demonstrated good  understanding of the HEP provided.     See EMR under Patient Instructions for exercises provided prior visit.        Assessment     See above assessment for details.   Pt prognosis is Good.      Pt will continue to benefit from skilled outpatient occupational therapy to address the deficits listed in the problem list on initial evaluation provide pt/family education and to maximize pt's level of independence in the home and community environment.     Anticipated barriers to occupational therapy: none noted    Pt's spiritual, cultural and educational needs considered and pt agreeable to plan of care and goals.      Plan     Updated Certification Period:  06/30/19 to 07/30/19  Recommended Treatment Plan: 2 times per week for 4 weeks: Manual Therapy, Neuromuscular Re-ed, Patient Education, Self Care, Therapeutic Activites and Therapeutic Exercise    Jhony Sullivan OTL

## 2019-07-22 ENCOUNTER — TELEPHONE (OUTPATIENT)
Dept: PHYSICAL MEDICINE AND REHAB | Facility: CLINIC | Age: 74
End: 2019-07-22

## 2019-07-22 ENCOUNTER — CLINICAL SUPPORT (OUTPATIENT)
Dept: REHABILITATION | Facility: HOSPITAL | Age: 74
End: 2019-07-22
Payer: MEDICARE

## 2019-07-22 DIAGNOSIS — M62.89 TIGHTNESS OF RIGHT GASTROCNEMIUS MUSCLE: ICD-10-CM

## 2019-07-22 DIAGNOSIS — I69.398 IMPAIRED BALANCE AS LATE EFFECT OF CEREBROVASCULAR ACCIDENT: ICD-10-CM

## 2019-07-22 DIAGNOSIS — I69.398 ABNORMALITY OF GAIT AS LATE EFFECT OF CEREBROVASCULAR ACCIDENT (CVA): ICD-10-CM

## 2019-07-22 DIAGNOSIS — R26.89 IMPAIRED BALANCE AS LATE EFFECT OF CEREBROVASCULAR ACCIDENT: ICD-10-CM

## 2019-07-22 DIAGNOSIS — R29.898 WEAKNESS OF RIGHT LOWER EXTREMITY: ICD-10-CM

## 2019-07-22 DIAGNOSIS — R26.9 ABNORMALITY OF GAIT AS LATE EFFECT OF CEREBROVASCULAR ACCIDENT (CVA): ICD-10-CM

## 2019-07-22 PROCEDURE — 97110 THERAPEUTIC EXERCISES: CPT | Mod: PO

## 2019-07-22 PROCEDURE — 97112 NEUROMUSCULAR REEDUCATION: CPT | Mod: PO

## 2019-07-22 NOTE — PROGRESS NOTES
Physical Therapy Daily Treatment Note     Name: Cy Rutherford  Clinic Number: [id number]    Therapy Diagnosis:   Encounter Diagnoses   Name Primary?   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     Abnormality of gait as late effect of cerebrovascular accident (CVA)     Tightness of right gastrocnemius muscle      Physician: Yamileth Niño MD    Visit Date: 7/22/2019    Physician Orders: PT Eval and Treat   Medical Diagnosis from Referral: Lacunar ataxic hemiparesis; Impaired mobility and ADLs  Evaluation Date: 5/16/2019  Authorization Period Expiration: 12/31/2019  Plan of Care Expiration: 07/16/2019  Visit # / Visits authorized: 18/ 20    Time In: 11:00 am  Time Out: 11:58 am   Total Billable Time: 58 minutes    Precautions: Standard and Fall    Subjective     Pt reports:  He is able to wear the AFO for about 6 hours before he has to take it off.   He was compliant with home exercise program.  Response to previous treatment: a little tired after the last visit.   Functional change: improved gait when wearing his AFO    Pain: 0/10  Location: N/A    Objective     Cy received therapeutic exercises to develop strength, flexibility and core stabilization for 43 minutes including:      Date 07/22/19 07/17/19 07/15/19 07/10/19 07/08/19 07/03/19 07/01/19 06/26/19 06/24/19 06/19/19 06/17/19 06/12/19 06/10/19 06/07/19 06/05/19   Visit 19/20 18/20 17/20 16/20 15/20 14/20 13/20 12/20 11/20 10/20 9/20 8/20 7/20 6/20 5/20   FOTO -- --- --- -- -- --- --- --- -- --- -- --- -- --- 5/5   FTF 8/17/19 8/17/19 07/17/19 07/17/19 07/17/19 07/17/19 07/17/19 07/17/19 07/17/19 07/17/19 07/17/19 06/16/19 06/16/19 06/16/19 06/16/19   POC exp 08/16/19 08/16/19 08/16/19 08/16/19 07/16/19 07/16/19 07/16/19 07/16/19 07/16/19 07/16/19 07/16/19 07/16/19 07/16/19 07/16/19 07/16/19   G code   4/10  8/10/19 3/10  08/10/19 9/10  07/17/19 8/10  07/17/19 7/10  07/17/19 6/10  07/17/19  "5/10  07/17/19 4/10  07/17/19 3/10  07/17/19 2/10  07/17/19 9/10  07/17/19 8/10  06/16/19 7/10  06/16/19 6/10  06/16/19 5/10  06/16/19   Vs total   total 125.43  1692.72 64.79  1567.29 125.43  1502.50 64.79  1377.07 125.43  1312.28 90.96  1186.85 90.96  1095.89 60.64  1004.93 90.96  944.29 60.64  853.33 60.64  792.69 60.64  732.05 125.43  671.41 125.43  545.98 90.96  420.55                                       LAQs 3 x 10 x 2# 3 x 10 x 2# 3 x 10 x 1.5# 1 x 10 RTB 3 x 10 x 1.5# 3 x 10 x 1.5# 3 x 10 x 1.5# 2 x 10 x 1.5# 2 x 10 x 1.5# 3 x 10 x 1# NT 3 x 10 x 1# 2 x 10 x 1# 3 x 10 3 x 10   Seated Hip Flex 3 x 10 x 2# 3 x 10 x 2# 3 x 10 x 1.5# Not today 3 x 10 x 1.5# 3 x 10 x 1.5# 3 x 10 x 1.5# 2 x 10 x 1.5# 2 x 10 x 1.5# 3 x 10 x 1# NT 2 x 10 x 1# 2 x 10 x 1# 3 x 10 2 x 10   Seated HS curl 2 x 10 x 2# 3 x 10 GTB 2 x 10 RTB 1 x 10 RTB              Hip Abd 3 x 10 RTB 3 x 10 RTB 2 x 10 RTB 1 x 10 RTB 1 x 15 1 x 10 --- --- -- --- -- --- -- --- ---   Hip Ext 3 x 10 RTB 3 x 10 RTB 2 x 10 RTB 1 x 10 RTB 3 x 10 3 x 10 3 x 10 3 x 10 3 x 10 3 x 10 NT 2 x 10 2 x 10 2 x 10 1 x 10   Hip Flex 3 x 10 RTB 3 x 10 RTB 2 x 10 RTB 1 x 10 RTB              Hip Add 3 x 10 RTB 3 x 10 RTB 2 x 10 RTB 1 x 10 RTB              // bars                  Step Ups L2  3 x 10 L2  2 x10 L2  2 x 10 Not today L2 2 x 10 L1  1 x 15 L1  1 x 15 L1  1 x 10 1 x 10 L1 --- -- --- -- -- ---   Mini squats Not today  1 x 20 Not today 1 x 20 1 x 20 1 x 20 1 x 15 1 x 15 1 x 15 NT 1 x 15 1 x 10 2 x  1 x 10   Static standing eyes open Not today 1 x 1' w/ perturbations 1 x 1" w/ perturbations Not today Not today 1 x 1" w/ perturbations 1 x 1" w/ perturbations 1 x 1" w/ perturbations  1 x 1'         Static standing eyes closed Not today 2 x 30" 2 x 30"  Not today Not today 2 x 30" 2 x 30" 1 x 30" 1 x 30"         Heel to toe standing Not today 2 x 30" 2 x 30" ea 2 x 30" ea Not today 1 x 30" ea 1 x 30" ea 1 x 30" ea 1 x 30" ea         Hip hike on L1 step 1 x 10 1 x 10 1 x " "10 Not today 1 x 10             Side stepping  See below See below See below See below 4 x 15 ft             Standing on airex pad 2 x 1' 1 x 1" 1 x 1' Not today 1 x 1"                               Initials DG MA GWA 1/6 DG ELENITA GWA 3/6 GWA 2/6 GWA 1/6 JM GWA 1/6 JM GWA 1/6 JM GWA 2/6 GWA 1/6     Cy received neuromuscular re-education outside parallel bars to increase standing balance for 15 minutes including:  All performed with CGA to SBA for safety.    Date  07/22/19 07/17/19 07/15/19 7/10/19          Side stepping  4 x 15 ft 4 x 15 ft 4 x 13 ft 4 x 13 ft   Retro walking  4 x 15 ft  4 x 15 ft 4 x 15 ft 4 x 13 ft   Weaving around 4 cones 4 x 10 ft 8 x 15 ft 6 x 10 ft 6 x 10 ft              Home Exercises Provided and Patient Education Provided     Education provided:   - focusing on increasing hip flexion when walking and ascending curbs.     Written Home Exercises Provided: yes.  Exercises were reviewed and Cy was able to demonstrate them prior to the end of the session.  Cy demonstrated good  understanding of the education provided.     See EMR under Patient Instructions for exercises provided 07/10/2019.    Assessment     Patient continues to require frequent verbal cues to avoid hip circumduction when walking and when performing step ups. He had several small LOB when performing dynamic balance activities that he was able to correct with a stepping strategy. When performing standing hip exercises he required frequent tactile and verbal cues to avoid substitutions, especially with hip flexion exercises  He also required frequent rest breaks today with all standing exercises. He did perform all of today's activities with no increase in symptoms prior to leaving the clinic.   Cy is progressing well towards his goals.     Pt prognosis is Good.     Pt will continue to benefit from skilled outpatient physical therapy to address the deficits listed in the problem list box on initial evaluation, provide " pt/family education and to maximize pt's level of independence in the home and community environment.     Pt's spiritual, cultural and educational needs considered and pt agreeable to plan of care and goals.     Anticipated barriers to physical therapy: none    Goals:   Short Term Goals: 4 weeks   1. This patient will be independent with a basic HEP. MET  2. This patient will increase R LE strength by 1 grade in order to be able to ambulate greater than 300 feet with a normal gait pattern with no LOB and no AD. MET  3. Patient will be able to achieve less than or equal to 10 seconds on the Timed Up and Go decreasing patient's risk for falling with household ambulation. IN PROGRESS  4. Patient will be able to achieve greater than or equal to 84 on the FOTO CVA Survey placing patient in 1%<20% impaired, limited, or restricted category demonstrating overall improved functional ability with lower extremity. IN PROGRESS    Long Term Goals: 8 weeks   1. This patient will be independent with an updated HEP. IN PROGRESS  2. This patient will increase R LE strength to 5/5 in order to be able to ascend/descend stairs with a reciprocal gait pattern. IN PROGRESS  3. Patient will be able to achieve less than or equal to 9 seconds on the Timed Up and Go decreasing patient's risk for falling with ambulation on uneven surfaces in the community. IN PROGRESS  4. Patient will be able to achieve greater than or equal to 90 on the FOTO CVA Survey placing patient in 1%<20% impaired, limited, or restricted category demonstrating overall improved functional ability with lower extremity. IN PROGRESS    Plan     Continue with plan of care progressing toward PT goals.  Go to green resistance band with standing hip exercises and resume static standing exercises next visit.     Nitza Haddad, PT

## 2019-07-22 NOTE — TELEPHONE ENCOUNTER
Good morning, Dr. Niño can you go and and place the new order for the AFO for this patient and the patient's last note will have mention foot drop.  Can you complete and inform me when it is done, thanks

## 2019-07-23 DIAGNOSIS — R26.9 ABNORMALITY OF GAIT AS LATE EFFECT OF CEREBROVASCULAR ACCIDENT (CVA): Primary | ICD-10-CM

## 2019-07-23 DIAGNOSIS — I69.398 ABNORMALITY OF GAIT AS LATE EFFECT OF CEREBROVASCULAR ACCIDENT (CVA): Primary | ICD-10-CM

## 2019-07-25 ENCOUNTER — CLINICAL SUPPORT (OUTPATIENT)
Dept: REHABILITATION | Facility: HOSPITAL | Age: 74
End: 2019-07-25
Payer: MEDICARE

## 2019-07-25 ENCOUNTER — OFFICE VISIT (OUTPATIENT)
Dept: CARDIOLOGY | Facility: CLINIC | Age: 74
End: 2019-07-25
Payer: MEDICARE

## 2019-07-25 VITALS
OXYGEN SATURATION: 97 % | BODY MASS INDEX: 34.1 KG/M2 | DIASTOLIC BLOOD PRESSURE: 64 MMHG | SYSTOLIC BLOOD PRESSURE: 134 MMHG | WEIGHT: 224.31 LBS | HEART RATE: 50 BPM

## 2019-07-25 DIAGNOSIS — I67.9 LACUNAR ATAXIC HEMIPARESIS: ICD-10-CM

## 2019-07-25 DIAGNOSIS — E11.22 TYPE 2 DIABETES MELLITUS WITH CHRONIC KIDNEY DISEASE, WITHOUT LONG-TERM CURRENT USE OF INSULIN, UNSPECIFIED CKD STAGE: ICD-10-CM

## 2019-07-25 DIAGNOSIS — E78.5 HYPERLIPIDEMIA, UNSPECIFIED HYPERLIPIDEMIA TYPE: ICD-10-CM

## 2019-07-25 DIAGNOSIS — R29.898 WEAKNESS OF RIGHT LOWER EXTREMITY: ICD-10-CM

## 2019-07-25 DIAGNOSIS — R26.9 ABNORMALITY OF GAIT AS LATE EFFECT OF CEREBROVASCULAR ACCIDENT (CVA): ICD-10-CM

## 2019-07-25 DIAGNOSIS — I10 ESSENTIAL HYPERTENSION: ICD-10-CM

## 2019-07-25 DIAGNOSIS — M62.89 TIGHTNESS OF RIGHT GASTROCNEMIUS MUSCLE: ICD-10-CM

## 2019-07-25 DIAGNOSIS — G46.7 LACUNAR ATAXIC HEMIPARESIS: ICD-10-CM

## 2019-07-25 DIAGNOSIS — R26.89 IMPAIRED BALANCE AS LATE EFFECT OF CEREBROVASCULAR ACCIDENT: ICD-10-CM

## 2019-07-25 DIAGNOSIS — Z95.5 STATUS POST CORONARY ARTERY STENT PLACEMENT: ICD-10-CM

## 2019-07-25 DIAGNOSIS — I69.398 IMPAIRED BALANCE AS LATE EFFECT OF CEREBROVASCULAR ACCIDENT: ICD-10-CM

## 2019-07-25 DIAGNOSIS — R60.0 BILATERAL LEG EDEMA: ICD-10-CM

## 2019-07-25 DIAGNOSIS — I73.9 PAD (PERIPHERAL ARTERY DISEASE): ICD-10-CM

## 2019-07-25 DIAGNOSIS — I69.398 ABNORMALITY OF GAIT AS LATE EFFECT OF CEREBROVASCULAR ACCIDENT (CVA): ICD-10-CM

## 2019-07-25 DIAGNOSIS — I25.10 CORONARY ARTERY DISEASE INVOLVING NATIVE CORONARY ARTERY OF NATIVE HEART WITHOUT ANGINA PECTORIS: Primary | ICD-10-CM

## 2019-07-25 PROCEDURE — 97110 THERAPEUTIC EXERCISES: CPT | Mod: PO

## 2019-07-25 PROCEDURE — 3078F DIAST BP <80 MM HG: CPT | Mod: CPTII,S$GLB,, | Performed by: INTERNAL MEDICINE

## 2019-07-25 PROCEDURE — 3075F SYST BP GE 130 - 139MM HG: CPT | Mod: CPTII,S$GLB,, | Performed by: INTERNAL MEDICINE

## 2019-07-25 PROCEDURE — 3075F PR MOST RECENT SYSTOLIC BLOOD PRESS GE 130-139MM HG: ICD-10-PCS | Mod: CPTII,S$GLB,, | Performed by: INTERNAL MEDICINE

## 2019-07-25 PROCEDURE — 99999 PR PBB SHADOW E&M-EST. PATIENT-LVL III: ICD-10-PCS | Mod: PBBFAC,,, | Performed by: INTERNAL MEDICINE

## 2019-07-25 PROCEDURE — 99214 PR OFFICE/OUTPT VISIT, EST, LEVL IV, 30-39 MIN: ICD-10-PCS | Mod: S$GLB,,, | Performed by: INTERNAL MEDICINE

## 2019-07-25 PROCEDURE — 97112 NEUROMUSCULAR REEDUCATION: CPT | Mod: PO

## 2019-07-25 PROCEDURE — 3078F PR MOST RECENT DIASTOLIC BLOOD PRESSURE < 80 MM HG: ICD-10-PCS | Mod: CPTII,S$GLB,, | Performed by: INTERNAL MEDICINE

## 2019-07-25 PROCEDURE — 1101F PR PT FALLS ASSESS DOC 0-1 FALLS W/OUT INJ PAST YR: ICD-10-PCS | Mod: CPTII,S$GLB,, | Performed by: INTERNAL MEDICINE

## 2019-07-25 PROCEDURE — 1101F PT FALLS ASSESS-DOCD LE1/YR: CPT | Mod: CPTII,S$GLB,, | Performed by: INTERNAL MEDICINE

## 2019-07-25 PROCEDURE — 99999 PR PBB SHADOW E&M-EST. PATIENT-LVL III: CPT | Mod: PBBFAC,,, | Performed by: INTERNAL MEDICINE

## 2019-07-25 PROCEDURE — 99214 OFFICE O/P EST MOD 30 MIN: CPT | Mod: S$GLB,,, | Performed by: INTERNAL MEDICINE

## 2019-07-25 RX ORDER — FUROSEMIDE 20 MG/1
20 TABLET ORAL DAILY
Qty: 30 TABLET | Refills: 11 | Status: ON HOLD | OUTPATIENT
Start: 2019-07-25 | End: 2019-12-18 | Stop reason: HOSPADM

## 2019-07-25 NOTE — PROGRESS NOTES
Subjective:    Patient ID:  Cy Rutherford is a 73 y.o. male who presents for follow-up of Hypertension; Coronary Artery Disease; and Shortness of Breath      HPI     72 y/o male former pt of Dr Fish. 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   Had CVA last year with residual weakness. Holter ordered without significant arrhythmias (did show PAT).   Had been having uncontrolled HTN and chlorthalidone added to regimen along with doxazosin (also on losartan, metoprolol and amlodipine). -130's with increase in doxazosin and decrease in amlodipine (leg swellin). He denies regular CP, SOB/NASSAR, orthopnea, PND, palps, syncope. Continues to have bilateral LE edema. He is compliant with his meds. Remote smoking hx. No non healing ulceration or evidence of limb ischemia. Currently on DAPT and statin for secondary stroke prevention.     Review of Systems   Constitution: Negative for malaise/fatigue.   HENT: Negative for congestion.    Eyes: Negative for blurred vision.   Cardiovascular: Positive for leg swelling. Negative for chest pain, claudication, cyanosis, dyspnea on exertion, irregular heartbeat, near-syncope, orthopnea, palpitations, paroxysmal nocturnal dyspnea and syncope.   Respiratory: Negative for shortness of breath.    Endocrine: Negative for polyuria.   Hematologic/Lymphatic: Negative for bleeding problem.   Skin: Negative for itching and rash.   Musculoskeletal: Negative for joint swelling, muscle cramps and muscle weakness.   Gastrointestinal: Negative for abdominal pain, hematemesis, hematochezia, melena, nausea and  vomiting.   Genitourinary: Negative for dysuria and hematuria.   Neurological: Positive for focal weakness. Negative for dizziness, headaches, light-headedness, loss of balance and weakness.   Psychiatric/Behavioral: Negative for depression. The patient is not nervous/anxious.         Objective:    Physical Exam   Constitutional: He is oriented to person, place, and time. He appears well-developed and well-nourished.   HENT:   Head: Normocephalic and atraumatic.   Neck: Neck supple. No JVD present.   Cardiovascular: Normal rate, regular rhythm and normal heart sounds.   Pulses:       Carotid pulses are 2+ on the right side, and 2+ on the left side.       Radial pulses are 2+ on the right side, and 2+ on the left side.        Femoral pulses are 2+ on the right side, and 2+ on the left side.       Dorsalis pedis pulses are 2+ on the right side, and 2+ on the left side.        Posterior tibial pulses are 2+ on the right side, and 2+ on the left side.   Pulmonary/Chest: Effort normal and breath sounds normal.   Abdominal: Soft. Bowel sounds are normal.   Musculoskeletal: He exhibits edema.   Neurological: He is alert and oriented to person, place, and time.   Skin: Skin is warm and dry.   Psychiatric: He has a normal mood and affect. His behavior is normal. Thought content normal.         Assessment:       1. Coronary artery disease involving native coronary artery of native heart without angina pectoris    2. PAD (peripheral artery disease)    3. Essential hypertension    4. Hyperlipidemia, unspecified hyperlipidemia type    5. Status post coronary artery stent placement    6. Abnormality of gait as late effect of cerebrovascular accident (CVA)    7. Ataxic hemiparesis    8. Type 2 diabetes mellitus with chronic kidney disease, without long-term current use of insulin, unspecified CKD stage    9. Bilateral leg edema      72 y/o pt with hx and presentation as above. Doing well from a cardiac perspective and compensated  from a HF perspective. BP better controlled on current regimen and I am reluctant to change it. Will add intermittent lasix 2-3 times weekly. Will check BMP next Friday.  Discussed the etiology, evaluation, and management of HTN. Discussed the importance of med compliance, heart healthy diet, and regular exercise.        Plan:       -Add lasix 20 mg M/W/F for now  -BMP next week  -BP log   -f/u in 1 month

## 2019-07-25 NOTE — PROGRESS NOTES
Physical Therapy Daily Treatment Note     Name: Cy Rutherford  Clinic Number: [id number]    Therapy Diagnosis:   Encounter Diagnoses   Name Primary?   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     Abnormality of gait as late effect of cerebrovascular accident (CVA)     Tightness of right gastrocnemius muscle      Physician: Yamileth Niño MD    Visit Date: 7/25/2019    Physician Orders: PT Eval and Treat   Medical Diagnosis from Referral: Lacunar ataxic hemiparesis; Impaired mobility and ADLs  Evaluation Date: 5/16/2019  Authorization Period Expiration: 12/31/2019  Plan of Care Expiration: 07/16/2019  Visit # / Visits authorized: 20/ 20    Time In: 11:00 am  Time Out: 11:40 am  Total Billable Time:  minutes    Precautions: Standard and Fall    Subjective     Pt reports: not wearing AFO as he went to [facility] this morning. He is aasre from working out yesterday.   He was compliant with home exercise program.  Response to previous treatment: a little tired after the last visit.   Functional change: improved gait when wearing his AFO    Pain: 0/10  Location: N/A    Objective     Cy received therapeutic exercises to develop strength, flexibility and core stabilization for 15 minutes including:      Date 07/25/19 07/22/19 07/17/19 07/15/19 07/10/19 07/08/19 07/03/19 07/01/19 06/26/19 06/24/19 06/19/19 06/17/19 06/12/19 06/10/19 06/07/19 06/05/19   Visit 20/20 19/20 18/20 17/20 16/20 15/20 14/20 13/20 12/20 11/20 10/20 9/20 8/20 7/20 6/20 5/20   FOTO -- -- --- --- -- -- --- --- --- -- --- -- --- -- --- 5/5   FTF 08/17/19 8/17/19 8/17/19 07/17/19 07/17/19 07/17/19 07/17/19 07/17/19 07/17/19 07/17/19 07/17/19 07/17/19 06/16/19 06/16/19 06/16/19 06/16/19   POC exp 08/16/19 08/16/19 08/16/19 08/16/19 08/16/19 07/16/19 07/16/19 07/16/19 07/16/19 07/16/19 07/16/19 07/16/19 07/16/19 07/16/19 07/16/19 07/16/19   G code   5/10  8/10/19 4/10  8/10/19 3/10  08/10/19  "9/10  07/17/19 8/10  07/17/19 7/10  07/17/19 6/10  07/17/19 5/10  07/17/19 4/10  07/17/19 3/10  07/17/19 2/10  07/17/19 9/10  07/17/19 8/10  06/16/19 7/10  06/16/19 6/10  06/16/19 5/10  06/16/19   Vs total  MC total 99.26  1791.98 125.43  1692.72 64.79  1567.29 125.43  1502.50 64.79  1377.07 125.43  1312.28 90.96  1186.85 90.96  1095.89 60.64  1004.93 90.96  944.29 60.64  853.33 60.64  792.69 60.64  732.05 125.43  671.41 125.43  545.98 90.96  420.55                                         LAQs 2 x 10 x 3# 3 x 10 x 2# 3 x 10 x 2# 3 x 10 x 1.5# 1 x 10 RTB 3 x 10 x 1.5# 3 x 10 x 1.5# 3 x 10 x 1.5# 2 x 10 x 1.5# 2 x 10 x 1.5# 3 x 10 x 1# NT 3 x 10 x 1# 2 x 10 x 1# 3 x 10 3 x 10   Seated Hip Flex 2 x 10 x 3# 3 x 10 x 2# 3 x 10 x 2# 3 x 10 x 1.5# Not today 3 x 10 x 1.5# 3 x 10 x 1.5# 3 x 10 x 1.5# 2 x 10 x 1.5# 2 x 10 x 1.5# 3 x 10 x 1# NT 2 x 10 x 1# 2 x 10 x 1# 3 x 10 2 x 10   Seated HS curl 2 x 10 x 3# 2 x 10 x 2# 3 x 10 GTB 2 x 10 RTB 1 x 10 RTB              Hip Abd 2 x 10 GTB 3 x 10 RTB 3 x 10 RTB 2 x 10 RTB 1 x 10 RTB 1 x 15 1 x 10 --- --- -- --- -- --- -- --- ---   Hip Ext 2 x 10 GTB 3 x 10 RTB 3 x 10 RTB 2 x 10 RTB 1 x 10 RTB 3 x 10 3 x 10 3 x 10 3 x 10 3 x 10 3 x 10 NT 2 x 10 2 x 10 2 x 10 1 x 10   Hip Flex 2 x 10 GTB 3 x 10 RTB 3 x 10 RTB 2 x 10 RTB 1 x 10 RTB              Hip Add 2 x 10 GTB 3 x 10 RTB 3 x 10 RTB 2 x 10 RTB 1 x 10 RTB              // bars                   Step Ups L2 3 x 10 L2  3 x 10 L2  2 x10 L2  2 x 10 Not today L2 2 x 10 L1  1 x 15 L1  1 x 15 L1  1 x 10 1 x 10 L1 --- -- --- -- -- ---   Mini squats 1 x 20 Not today  1 x 20 Not today 1 x 20 1 x 20 1 x 20 1 x 15 1 x 15 1 x 15 NT 1 x 15 1 x 10 2 x  1 x 10   Static standing eyes open Not today Not today 1 x 1' w/ perturbations 1 x 1" w/ perturbations Not today Not today 1 x 1" w/ perturbations 1 x 1" w/ perturbations 1 x 1" w/ perturbations  1 x 1'         Static standing eyes closed Not today Not today 2 x 30" 2 x 30"  Not today Not today 2 x " "30" 2 x 30" 1 x 30" 1 x 30"         Heel to toe standing 2 x 30" Not today 2 x 30" 2 x 30" ea 2 x 30" ea Not today 1 x 30" ea 1 x 30" ea 1 x 30" ea 1 x 30" ea         Hip hike on L1 step 1 x 15 1 x 10 1 x 10 1 x 10 Not today 1 x 10             Side stepping  See below See below See below See below See below 4 x 15 ft             Standing on airex pad 2 x 1' 2 x 1' 1 x 1" 1 x 1' Not today 1 x 1"                                Initials DG DG MA GWA 1/6 DG JM GWA 3/6 GWA 2/6 GWA 1/6 JM GWA 1/6 JM GWA 1/6 JM GWA 2/6 GWA 1/6     Cy received neuromuscular re-education outside parallel bars to increase standing balance for 25 minutes including:  All performed with CGA to SBA for safety.    Date  07/25/19 07/22/19 07/17/19 07/15/19 7/10/19           Side stepping  4 x 15 ft 4 x 15 ft 4 x 15 ft 4 x 13 ft 4 x 13 ft   Retro walking  4 x 15 ft 4 x 15 ft  4 x 15 ft 4 x 15 ft 4 x 13 ft   Weaving around 4 cones 4 x 10 ft 4 x 10 ft 8 x 15 ft 6 x 10 ft 6 x 10 ft               Home Exercises Provided and Patient Education Provided     Education provided:   - focusing on increasing hip flexion when walking and ascending curbs.     Written Home Exercises Provided: yes.  Exercises were reviewed and Cy was able to demonstrate them prior to the end of the session.  Cy demonstrated good  understanding of the education provided.     See EMR under Patient Instructions for exercises provided 07/10/2019.    Assessment     Patient ambulated into the clinic without his AFO using an ankle whip, hip circumduction gait pattern with occasional dragging of the toes on his R LE. He required CGA with dynamic balance activities due to several small LOB that he used a stepping strategy to maintain his balance, as he would catch his toes on the floor as he is not wearing his AFO today. He also required 3 sitting rest breaks due to complaints of fatigue. He was able to perform all of today's progressions with no increase in symptoms prior to " leaving the clinic.   Cy is progressing well towards his goals.     Pt prognosis is Good.     Pt will continue to benefit from skilled outpatient physical therapy to address the deficits listed in the problem list box on initial evaluation, provide pt/family education and to maximize pt's level of independence in the home and community environment.     Pt's spiritual, cultural and educational needs considered and pt agreeable to plan of care and goals.     Anticipated barriers to physical therapy: none    Goals:   Short Term Goals: 4 weeks   1. This patient will be independent with a basic HEP. MET  2. This patient will increase R LE strength by 1 grade in order to be able to ambulate greater than 300 feet with a normal gait pattern with no LOB and no AD. MET  3. Patient will be able to achieve less than or equal to 10 seconds on the Timed Up and Go decreasing patient's risk for falling with household ambulation. IN PROGRESS  4. Patient will be able to achieve greater than or equal to 84 on the FOTO CVA Survey placing patient in 1%<20% impaired, limited, or restricted category demonstrating overall improved functional ability with lower extremity. IN PROGRESS    Long Term Goals: 8 weeks   1. This patient will be independent with an updated HEP. IN PROGRESS  2. This patient will increase R LE strength to 5/5 in order to be able to ascend/descend stairs with a reciprocal gait pattern. IN PROGRESS  3. Patient will be able to achieve less than or equal to 9 seconds on the Timed Up and Go decreasing patient's risk for falling with ambulation on uneven surfaces in the community. IN PROGRESS  4. Patient will be able to achieve greater than or equal to 90 on the FOTO CVA Survey placing patient in 1%<20% impaired, limited, or restricted category demonstrating overall improved functional ability with lower extremity. IN PROGRESS    Plan     Continue with plan of care progressing toward PT goals. Increase reps with standing  hip exercises and begin lateral step ups next visit. .     Nitza Haddad, PT

## 2019-07-29 ENCOUNTER — PATIENT MESSAGE (OUTPATIENT)
Dept: CARDIOLOGY | Facility: CLINIC | Age: 74
End: 2019-07-29

## 2019-07-29 ENCOUNTER — CLINICAL SUPPORT (OUTPATIENT)
Dept: REHABILITATION | Facility: HOSPITAL | Age: 74
End: 2019-07-29
Payer: MEDICARE

## 2019-07-29 DIAGNOSIS — R26.9 ABNORMALITY OF GAIT AS LATE EFFECT OF CEREBROVASCULAR ACCIDENT (CVA): ICD-10-CM

## 2019-07-29 DIAGNOSIS — R53.1 RIGHT SIDED WEAKNESS: ICD-10-CM

## 2019-07-29 DIAGNOSIS — R29.898 WEAKNESS OF RIGHT LOWER EXTREMITY: ICD-10-CM

## 2019-07-29 DIAGNOSIS — I69.398 IMPAIRED BALANCE AS LATE EFFECT OF CEREBROVASCULAR ACCIDENT: ICD-10-CM

## 2019-07-29 DIAGNOSIS — M62.89 TIGHTNESS OF RIGHT GASTROCNEMIUS MUSCLE: ICD-10-CM

## 2019-07-29 DIAGNOSIS — R26.89 IMPAIRED BALANCE AS LATE EFFECT OF CEREBROVASCULAR ACCIDENT: ICD-10-CM

## 2019-07-29 DIAGNOSIS — I69.398 ABNORMALITY OF GAIT AS LATE EFFECT OF CEREBROVASCULAR ACCIDENT (CVA): ICD-10-CM

## 2019-07-29 PROCEDURE — 97110 THERAPEUTIC EXERCISES: CPT | Mod: PO

## 2019-07-29 PROCEDURE — 97112 NEUROMUSCULAR REEDUCATION: CPT | Mod: PO

## 2019-07-29 NOTE — PROGRESS NOTES
Physical Therapy Daily Treatment Note     Name: Cy Rutherford  Clinic Number: [id number]    Therapy Diagnosis:   Encounter Diagnoses   Name Primary?   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   He was compliant with home exercise program.  Response to previous treatment: a little tired after the last visit.   Functional change: improved gait when wearing his AFO    Pain: 0/10  Location: N/A    Objective     Cy received therapeutic exercises to develop strength, flexibility and core stabilization for 30 minutes including:      Date 07/29/19 07/25/19 07/22/19 07/17/19 07/15/19 07/10/19 07/08/19 07/03/19 07/01/19 06/26/19 06/24/19 06/19/19 06/17/19 06/12/19 06/10/19 06/07/19 06/05/19   Visit 21/ pending 20/20 19/20 18/20 17/20 16/20 15/20 14/20 13/20 12/20 11/20 10/20 9/20 8/20 7/20 6/20 5/20   FOTO --- -- -- --- --- -- -- --- --- --- -- --- -- --- -- --- 5/5   FTF 08/17/19 08/17/19 8/17/19 8/17/19 07/17/19 07/17/19 07/17/19 07/17/19 07/17/19 07/17/19 07/17/19 07/17/19 07/17/19 06/16/19 06/16/19 06/16/19 06/16/19   POC exp 08/18/19 08/16/19 08/16/19 08/16/19 08/16/19 08/16/19 07/16/19 07/16/19 07/16/19 07/16/19 07/16/19 07/16/19 07/16/19 07/16/19 07/16/19 07/16/19 07/16/19   G code   6/10  08/10/19 5/10  8/10/19 4/10  8/10/19  "3/10  08/10/19 9/10  07/17/19 8/10  07/17/19 7/10  07/17/19 6/10  07/17/19 5/10  07/17/19 4/10  07/17/19 3/10  07/17/19 2/10  07/17/19 9/10  07/17/19 8/10  06/16/19 7/10  06/16/19 6/10  06/16/19 5/10  06/16/19   Vs total   total 129.58  1921.56 99.26  1791.98 125.43  1692.72 64.79  1567.29 125.43  1502.50 64.79  1377.07 125.43  1312.28 90.96  1186.85 90.96  1095.89 60.64  1004.93 90.96  944.29 60.64  853.33 60.64  792.69 60.64  732.05 125.43  671.41 125.43  545.98 90.96  420.55                                           LAQs 3 x 10 x 3# 2 x 10 x 3# 3 x 10 x 2# 3 x 10 x 2# 3 x 10 x 1.5# 1 x 10 RTB 3 x 10 x 1.5# 3 x 10 x 1.5# 3 x 10 x 1.5# 2 x 10 x 1.5# 2 x 10 x 1.5# 3 x 10 x 1# NT 3 x 10 x 1# 2 x 10 x 1# 3 x 10 3 x 10   Seated Hip Flex 2 x 10 x 3# 2 x 10 x 3# 3 x 10 x 2# 3 x 10 x 2# 3 x 10 x 1.5# Not today 3 x 10 x 1.5# 3 x 10 x 1.5# 3 x 10 x 1.5# 2 x 10 x 1.5# 2 x 10 x 1.5# 3 x 10 x 1# NT 2 x 10 x 1# 2 x 10 x 1# 3 x 10 2 x 10   Seated HS curl Not today 2 x 10 x 3# 2 x 10 x 2# 3 x 10 GTB 2 x 10 RTB 1 x 10 RTB              Standing:                    Hip Abd 3 x 10 GTB 2 x 10 GTB 3 x 10 RTB 3 x 10 RTB 2 x 10 RTB 1 x 10 RTB 1 x 15 1 x 10 --- --- -- --- -- --- -- --- ---   Hip Ext 3 x 10 GTB 2 x 10 GTB 3 x 10 RTB 3 x 10 RTB 2 x 10 RTB 1 x 10 RTB 3 x 10 3 x 10 3 x 10 3 x 10 3 x 10 3 x 10 NT 2 x 10 2 x 10 2 x 10 1 x 10   Hip Flex 3 x 10 GTB 2 x 10 GTB 3 x 10 RTB 3 x 10 RTB 2 x 10 RTB 1 x 10 RTB              Hip Add 3 x 10 GTB 2 x 10 GTB 3 x 10 RTB 3 x 10 RTB 2 x 10 RTB 1 x 10 RTB              // bars                    Step Ups L2  3 x 10 L2 3 x 10 L2  3 x 10 L2  2 x10 L2  2 x 10 Not today L2 2 x 10 L1  1 x 15 L1  1 x 15 L1  1 x 10 1 x 10 L1 --- -- --- -- -- ---   Lateral step ups L1  1 x 10                   Mini squats 1 x 20 1 x 20 Not today  1 x 20 Not today 1 x 20 1 x 20 1 x 20 1 x 15 1 x 15 1 x 15 NT 1 x 15 1 x 10 2 x  1 x 10   Static standing eyes open Not today Not today Not today 1 x 1' w/ perturbations 1 x 1" " "w/ perturbations Not today Not today 1 x 1" w/ perturbations 1 x 1" w/ perturbations 1 x 1" w/ perturbations  1 x 1'         Static standing eyes closed Not today Not today Not today 2 x 30" 2 x 30"  Not today Not today 2 x 30" 2 x 30" 1 x 30" 1 x 30"         Heel to toe standing 2 x 30" 2 x 30" Not today 2 x 30" 2 x 30" ea 2 x 30" ea Not today 1 x 30" ea 1 x 30" ea 1 x 30" ea 1 x 30" ea         Hip hike on L1 step 1 x 15 1 x 15 1 x 10 1 x 10 1 x 10 Not today 1 x 10             Side stepping  See below See below See below See below See below See below 4 x 15 ft             Standing on airex pad 2 x 1' 2 x 1' 2 x 1' 1 x 1" 1 x 1' Not today 1 x 1"                                 Initials GWA 1/6 DG DG MA GWA 1/6 DG JM GWA 3/6 GWA 2/6 GWA 1/6 JM GWA 1/6 JM GWA 1/6 JM GWA 2/6 GWA 1/6     Cy received neuromuscular re-education outside parallel bars to increase standing balance for 25 minutes including:  All performed with CGA to SBA for safety.    Date  07/29/19 07/25/19 07/22/19 07/17/19 07/15/19 7/10/19            Side stepping  4 x 15 ft 4 x 15 ft 4 x 15 ft 4 x 15 ft 4 x 13 ft 4 x 13 ft   Retro walking  4 x 15 ft 4 x 15 ft 4 x 15 ft  4 x 15 ft 4 x 15 ft 4 x 13 ft   Weaving around 4 cones 4 x 15 ft 4 x 10 ft 4 x 10 ft 8 x 15 ft 6 x 10 ft 6 x 10 ft                Home Exercises Provided and Patient Education Provided     Education provided:   - focusing on increasing hip flexion when walking and ascending curbs.     Written Home Exercises Provided: yes.  Exercises were reviewed and Cy was able to demonstrate them prior to the end of the session.  Cy demonstrated good  understanding of the education provided.     See EMR under Patient Instructions for exercises provided 07/10/2019.    Assessment     Patient ambulated into the clinic with his AFO on.  Patient performed above exercise program with verbal cueing for proper technique, required 3 sitting rest breaks due to complaints of fatigue and had no difficulty " with today's progressions.with no increase in symptoms prior to leaving the clinic.   Cy is progressing well towards his goals.     Pt prognosis is Good.     Pt will continue to benefit from skilled outpatient physical therapy to address the deficits listed in the problem list box on initial evaluation, provide pt/family education and to maximize pt's level of independence in the home and community environment.     Pt's spiritual, cultural and educational needs considered and pt agreeable to plan of care and goals.     Anticipated barriers to physical therapy: none    Goals:   Short Term Goals: 4 weeks   1. This patient will be independent with a basic HEP. MET  2. This patient will increase R LE strength by 1 grade in order to be able to ambulate greater than 300 feet with a normal gait pattern with no LOB and no AD. MET  3. Patient will be able to achieve less than or equal to 10 seconds on the Timed Up and Go decreasing patient's risk for falling with household ambulation. IN PROGRESS  4. Patient will be able to achieve greater than or equal to 84 on the FOTO CVA Survey placing patient in 1%<20% impaired, limited, or restricted category demonstrating overall improved functional ability with lower extremity. IN PROGRESS    Long Term Goals: 8 weeks   1. This patient will be independent with an updated HEP. IN PROGRESS  2. This patient will increase R LE strength to 5/5 in order to be able to ascend/descend stairs with a reciprocal gait pattern. IN PROGRESS  3. Patient will be able to achieve less than or equal to 9 seconds on the Timed Up and Go decreasing patient's risk for falling with ambulation on uneven surfaces in the community. IN PROGRESS  4. Patient will be able to achieve greater than or equal to 90 on the FOTO CVA Survey placing patient in 1%<20% impaired, limited, or restricted category demonstrating overall improved functional ability with lower extremity. IN PROGRESS    Plan     Continue with plan  of care progressing toward PT goals. Increase reps with standing hip exercises next visit. .     Edgar Nelson, PTA

## 2019-07-29 NOTE — PROGRESS NOTES
Outpatient Therapy Updated Plan of Care     Visit Date: 7/29/2019  Name: Cy Rutherford  Clinic Number: [id number]    Therapy Diagnosis:   Encounter Diagnosis   Name Primary?    Right sided weakness      Physician: Yamileth Niño MD    Physician Orders: OT Eval and Treat  Medical Diagnosis: History of ischemic vertebrobasilar artery brainstem stroke  Surgical Procedure and Date: n/a  Evaluation Date: 3/7/19  Insurance Authorization Period Expiration: 2/14/20  Initial Plan of Care Certification Period: 3/7/19-5/2/19  Date of Return to MD: [email or web address]    Total Visits Received: 23  Cancelled Visits: 0  No Show Visits: 0    Current Certification Period:  07/30/19 to 08/30/19  Precautions:  Standard  Functional Level Prior to Evaluation:  Independent    Subjective     Update: Patient reports he is more compliant with R shoulder ROM/stretches. He also feels he is getting stronger and is able to do more with his RUE.      Objective     Observations:  Minimal edema throughout RUE    Update:   Objective measures taken today are as follows;  Range of Motion and Manual Muscle Test  Right 05/10/19 05/10/19 06/05/19 06/05/19 07/17/19 07/29/19   Shoulder AROM MMT AROM PROM AROM AROM   Flexion 120 (+9) 4/5 125 (+5) 130 120 120   Abduction 100 (+10) 4/5 100 (=) 125 100 115   ER at 0 40 (+10) 4/5 50 (+10)  55 (+5)    ER at 90 60 (+8)  3+/5 80 (+20)  82 (+2)    IR L3 (+)  2+/5 L1  L1-T12             Supination 68 (+3) 4/5 72 (+4)  65        Strength (Dyanmometer) and Pinch Strength (Pinch Gauge)  Measured in pounds and psi.     3/7/2019 3/7/2019 05/10/19 06/05/19 07/17/19 07/29/19     Left Right Right Right Right Right   Rung II 80 30 42 (+8) 35 * 38 40   Key Pinch 20 11 13 (+1.5) 11 * 9 10   3pt Pinch 15 6 9 (+2) 9 * 6 10   2pt Pinch 20 9 10 (+1) 5 * 4 4       9 Peg Test Left  Right   Removed 9/9 8/9   Replaced 9/9 3/9   Time 34 sec 1.06 sec       Assessment     Update: Patient has made progress towards increased ROM and strength in  RUE.  He is making steady progress & is able to use it more functionally in ADL/IADL's.  . Recommend patient to continue OT to address areas of deficit remaining that are affecting his ability to function in ADL/IADL's.      Previous Short Term Goals Status:   6/6 STG's met; 3/3 LTG's part met  New Short Term Goals Status:   (STG's=LTG's) by discharge    1)  Patient independent in final Hep-ongoing  2)  Patient with R shoulder AROM WFL, all planes of mobility for improved performance with overhead ADL's-not met  3)  Pt will demonstrate increased MMT by at least 1/2 grade grossly in RUE-part met  4)  Increase  strength 2-3 lbs. to grasp heavier items during household management tasks-part met (not consistent)  5)  Increase pinch 1-3 psis for turning keys and opening up water bottles-part met (not consistent)  6)   Patient to score no more than 20% limitation on FOTO to demonstrate improved perception of functional right hand use.-ongoing    Long Term Goal Status:   modified:  As above  Reasons for Recertification of Therapy:   Updated Plan of Care needed    Plan     Updated Certification Period:  07/30/19 to 08/30/19  Recommended Treatment Plan: 2 times per week for 4 weeks: Manual Therapy, Neuromuscular Re-ed, Patient Education, Self Care, Therapeutic Activites and Therapeutic Exercise     Jhony Sullivan OT  7/29/2019      I CERTIFY THE NEED FOR THESE SERVICES FURNISHED UNDER THIS PLAN OF TREATMENT AND WHILE UNDER MY CARE    Physician's comments:        Physician's Signature: ___________________________________________________          Occupational Therapy Daily Treatment Note     Date: 7/29/2019  Name: Cy Garciasdden  [facility] Number: [id number]    Therapy Diagnosis:   Encounter Diagnosis   Name Primary?    Right sided weakness          Physician: Yamileth Niño MD    Physician Orders: OT Eval and Treat  Medical Diagnosis: History of ischemic vertebrobasilar artery brainstem stroke  Surgical Procedure and  "Date: n/a  Evaluation Date: 3/7/19  Insurance Authorization Period Expiration: 2/14/20  Current Plan of Care Certification Period: 07/30/19 to 08/30/19  Date of Return to MD: SUZETTE  Visit # / Visits authorized: 3 / 20 (23 total)    Time In: 10:00 am  Time Out: 11:00:00 am  Total Billable Time: 60 minutes (4TE)       Subjective     Pt reports: "I'm reaching for things and doing more with my R arm"   Response to previous treatment: no adverse reactions. Increased activity tolerance noted  Functional change: increased ability to button buttons on shirts; hold objects longer with R hand    Pain: 0/10  Location: n/a    Objective     Cy received therapeutic exercises for 55 minutes direct care including:  UBE (forward/backwards) 10 minutes, lvl 3.0   Pulleys (flex/ext & abd/add) 3 min each   Side lying ABD to 90 & ER 10 reps, w/ 2# wt w/ assist   Side lying Scap Stabilization CW/CCW 10 reps, w/ 2# wt w/ assist   Side lying Ext/IR stretch 3 reps, 30 sec hold   Supine dowel chest press (not today) 10 reps, 2# wt   Supine dowel shoulder flex stretch (not today) 10 reps, 2# wt   Supine tricep extension 10 reps, 2# wt   Theraband  -rows-red  -scap retraction-red  -triceps extension -red   - biceps curls-green  -ER-red (not today) 2/10 reps each   Biceps curl (not today) 10 reps each, 3#    Dowel ER 2/10 reps w/ assist   Wrist 3 ways over wedge  10 reps each, 2# wt   Smiles & frowns Green T-bar; 2/15 reps   Deluxe Gripper-setting 4 3/10 reps   T-Putty red   -molding 2 min   -grasp/manipulation 5 reps   -Log roll & tripod pinch 2 trial each   -dowel digs 3 min      Cy DARDEN received therapeutic activity for 5 minutes direct care, as follows:  FMC     -9 Peg 1 trial (drops on replacing)   -Pom pom  w/ CP (not today) 2 trials, red CP   -Roark manipulation on towel (not today) 2 trials, 15 coins   -Isospheres (not today) 3 min, Step-by-step     Isolated hand/finger patterns    -ok, 1, 2, 3, 4, 5 signs (not today) 3 reps eachv " w/ assit       Home Exercises and Education Provided     Education provided:  Reviewed HEP; reiterated benefits of good compliance of performing HEP for optimal outcome;     Written Home Exercises Provided: Patient instructed to cont prior HEP.  Exercises were reviewed and Cy was able to demonstrate them prior to the end of the session.  Cy demonstrated good  understanding of the HEP provided.     See EMR under Patient Instructions for exercises provided prior visit.        Assessment     See above assessment for details.   Pt prognosis is Good.      Pt will continue to benefit from skilled outpatient occupational therapy to address the deficits listed in the problem list on initial evaluation provide pt/family education and to maximize pt's level of independence in the home and community environment.     Anticipated barriers to occupational therapy: none noted    Pt's spiritual, cultural and educational needs considered and pt agreeable to plan of care and goals.      Plan     Updated Certification Period:  07/30/19 to 08/30/19  Recommended Treatment Plan: 2 times per week for 4 weeks: Manual Therapy, Neuromuscular Re-ed, Patient Education, Self Care, Therapeutic Activites and Therapeutic Exercise    Jhony Sullivan OTL

## 2019-07-30 ENCOUNTER — LAB VISIT (OUTPATIENT)
Dept: LAB | Facility: HOSPITAL | Age: 74
End: 2019-07-30
Attending: INTERNAL MEDICINE
Payer: MEDICARE

## 2019-07-30 DIAGNOSIS — R60.0 BILATERAL LEG EDEMA: ICD-10-CM

## 2019-07-30 LAB
ANION GAP SERPL CALC-SCNC: 7 MMOL/L (ref 8–16)
BUN SERPL-MCNC: 23 MG/DL (ref 2–20)
CALCIUM SERPL-MCNC: 9.5 MG/DL (ref 8.7–10.5)
CHLORIDE SERPL-SCNC: 102 MMOL/L (ref 95–110)
CO2 SERPL-SCNC: 31 MMOL/L (ref 23–29)
CREAT SERPL-MCNC: 0.94 MG/DL (ref 0.5–1.4)
EST. GFR  (AFRICAN AMERICAN): >60 ML/MIN/1.73 M^2
EST. GFR  (NON AFRICAN AMERICAN): >60 ML/MIN/1.73 M^2
GLUCOSE SERPL-MCNC: 72 MG/DL (ref 70–110)
POTASSIUM SERPL-SCNC: 3.7 MMOL/L (ref 3.5–5.1)
SODIUM SERPL-SCNC: 140 MMOL/L (ref 136–145)

## 2019-07-30 PROCEDURE — 36415 COLL VENOUS BLD VENIPUNCTURE: CPT | Mod: PO

## 2019-07-30 PROCEDURE — 80048 BASIC METABOLIC PNL TOTAL CA: CPT | Mod: PO

## 2019-07-31 ENCOUNTER — CLINICAL SUPPORT (OUTPATIENT)
Dept: REHABILITATION | Facility: HOSPITAL | Age: 74
End: 2019-07-31
Payer: MEDICARE

## 2019-07-31 DIAGNOSIS — R26.9 ABNORMALITY OF GAIT AS LATE EFFECT OF CEREBROVASCULAR ACCIDENT (CVA): ICD-10-CM

## 2019-07-31 DIAGNOSIS — R26.89 IMPAIRED BALANCE AS LATE EFFECT OF CEREBROVASCULAR ACCIDENT: ICD-10-CM

## 2019-07-31 DIAGNOSIS — I69.398 IMPAIRED BALANCE AS LATE EFFECT OF CEREBROVASCULAR ACCIDENT: ICD-10-CM

## 2019-07-31 DIAGNOSIS — R29.898 WEAKNESS OF RIGHT LOWER EXTREMITY: ICD-10-CM

## 2019-07-31 DIAGNOSIS — M62.89 TIGHTNESS OF RIGHT GASTROCNEMIUS MUSCLE: ICD-10-CM

## 2019-07-31 DIAGNOSIS — I69.398 ABNORMALITY OF GAIT AS LATE EFFECT OF CEREBROVASCULAR ACCIDENT (CVA): ICD-10-CM

## 2019-07-31 DIAGNOSIS — R53.1 RIGHT SIDED WEAKNESS: ICD-10-CM

## 2019-07-31 PROCEDURE — 97112 NEUROMUSCULAR REEDUCATION: CPT | Mod: PO

## 2019-07-31 PROCEDURE — 97110 THERAPEUTIC EXERCISES: CPT | Mod: PO

## 2019-07-31 NOTE — PROGRESS NOTES
Physical Therapy Daily Treatment Note     Name: Cy Rutherford  Clinic Number: [id number]    Therapy Diagnosis:   Encounter Diagnoses   Name Primary?   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    He was compliant with home exercise program.  Response to previous treatment: tired and a little soreness the next day after last session.   Functional change: improved gait when wearing his AFO    Pain: 0/10  Location: N/A    Objective     Cy received therapeutic exercises to develop strength, flexibility and core stabilization for 15 minutes including:      Date 07/31/19 07/29/19 07/25/19 07/22/19 07/17/19 07/15/19 07/10/19 07/08/19 07/03/19 07/01/19 06/26/19 06/24/19 06/19/19 06/17/19 06/12/19 06/10/19 06/07/19 06/05/19   Visit 22/pending 21/ pending 20/20 19/20 18/20 17/20 16/20 15/20 14/20 13/20 12/20 11/20 10/20 9/20 8/20 7/20 6/20 5/20   FOTO -- --- -- -- --- --- -- -- --- --- --- -- --- -- --- -- --- 5/5   FTF 08/17/19 08/17/19 08/17/19 8/17/19 8/17/19 07/17/19 07/17/19 07/17/19 07/17/19 07/17/19 07/17/19 07/17/19 07/17/19 07/17/19 06/16/19 06/16/19 06/16/19 06/16/19   POC exp 08/18/19 08/18/19 08/16/19 08/16/19 08/16/19 08/16/19 08/16/19 07/16/19 07/16/19 07/16/19 07/16/19 07/16/19 07/16/19 07/16/19 07/16/19 07/16/19  07/16/19 07/16/19   G code   7/10  08/10/19 6/10  08/10/19 5/10  8/10/19 4/10  8/10/19 3/10  08/10/19 9/10  07/17/19 8/10  07/17/19 7/10  07/17/19 6/10  07/17/19 5/10  07/17/19 4/10  07/17/19 3/10  07/17/19 2/10  07/17/19 9/10  07/17/19 8/10  06/16/19 7/10  06/16/19 6/10  06/16/19 5/10  06/16/19   Vs total  MC total 99.26  2020.82 129.58  1921.56 99.26  1791.98 125.43  1692.72 64.79  1567.29 125.43  1502.50 64.79  1377.07 125.43  1312.28 90.96  1186.85 90.96  1095.89 60.64  1004.93 90.96  944.29 60.64  853.33 60.64  792.69 60.64  732.05 125.43  671.41 125.43  545.98 90.96  420.55                                             LAQs 3 x 10 x 3# 3 x 10 x 3# 2 x 10 x 3# 3 x 10 x 2# 3 x 10 x 2# 3 x 10 x 1.5# 1 x 10 RTB 3 x 10 x 1.5# 3 x 10 x 1.5# 3 x 10 x 1.5# 2 x 10 x 1.5# 2 x 10 x 1.5# 3 x 10 x 1# NT 3 x 10 x 1# 2 x 10 x 1# 3 x 10 3 x 10   Seated Hip Flex 3 x 10 x 3# 2 x 10 x 3# 2 x 10 x 3# 3 x 10 x 2# 3 x 10 x 2# 3 x 10 x 1.5# Not today 3 x 10 x 1.5# 3 x 10 x 1.5# 3 x 10 x 1.5# 2 x 10 x 1.5# 2 x 10 x 1.5# 3 x 10 x 1# NT 2 x 10 x 1# 2 x 10 x 1# 3 x 10 2 x 10   Seated HS curl 3 x 10 x 3# Not today 2 x 10 x 3# 2 x 10 x 2# 3 x 10 GTB 2 x 10 RTB 1 x 10 RTB              Standing:                     Hip Abd 3 x 10 GTB 3 x 10 GTB 2 x 10 GTB 3 x 10 RTB 3 x 10 RTB 2 x 10 RTB 1 x 10 RTB 1 x 15 1 x 10 --- --- -- --- -- --- -- --- ---   Hip Ext 3 x 10 GTB 3 x 10 GTB 2 x 10 GTB 3 x 10 RTB 3 x 10 RTB 2 x 10 RTB 1 x 10 RTB 3 x 10 3 x 10 3 x 10 3 x 10 3 x 10 3 x 10 NT 2 x 10 2 x 10 2 x 10 1 x 10   Hip Flex 3 x 10 GTB 3 x 10 GTB 2 x 10 GTB 3 x 10 RTB 3 x 10 RTB 2 x 10 RTB 1 x 10 RTB              Hip Add 3 x 10 GTB 3 x 10 GTB 2 x 10 GTB 3 x 10 RTB 3 x 10 RTB 2 x 10 RTB 1 x 10 RTB              // bars                     Step Ups L2 3 x 10 L2  3 x 10 L2 3 x 10 L2  3 x 10 L2  2 x10 L2  2 x 10 Not today L2 2 x 10 L1  1 x 15 L1  1 x 15 L1  1 x 10 1 x 10 L1 --- -- --- -- -- ---   Lateral step ups L1 1 x 15 L1  1 x 10                   Mini  "squats Not today 1 x 20 1 x 20 Not today  1 x 20 Not today 1 x 20 1 x 20 1 x 20 1 x 15 1 x 15 1 x 15 NT 1 x 15 1 x 10 2 x  1 x 10   Static standing eyes open Not today Not today Not today Not today 1 x 1' w/ perturbations 1 x 1" w/ perturbations Not today Not today 1 x 1" w/ perturbations 1 x 1" w/ perturbations 1 x 1" w/ perturbations  1 x 1'         Static standing eyes closed Not today Not today Not today Not today 2 x 30" 2 x 30"  Not today Not today 2 x 30" 2 x 30" 1 x 30" 1 x 30"         Heel to toe standing 2 x 30" 2 x 30" 2 x 30" Not today 2 x 30" 2 x 30" ea 2 x 30" ea Not today 1 x 30" ea 1 x 30" ea 1 x 30" ea 1 x 30" ea         Hip hike on L1 step 1 x 20 1 x 15 1 x 15 1 x 10 1 x 10 1 x 10 Not today 1 x 10             Side stepping  See below See below See below See below See below See below See below 4 x 15 ft             Standing on airex pad 2 x 1' 2 x 1' 2 x 1' 2 x 1' 1 x 1" 1 x 1' Not today 1 x 1"                                  Initials DG GWA 1/6 DG DG MA GWA 1/6 DG JM GWA 3/6 GWA 2/6 GWA 1/6 JM GWA 1/6 JM GWA 1/6 JM GWA 2/6 GWA 1/6     Cy received neuromuscular re-education outside parallel bars to increase standing balance for 37 minutes including:  All performed with CGA to SBA for safety.    Date  07/31/19 07/29/19 07/25/19 07/22/19 07/17/19 07/15/19 7/10/19             Side stepping  4 x 15 ft 4 x 15 ft 4 x 15 ft 4 x 15 ft 4 x 15 ft 4 x 13 ft 4 x 13 ft   Retro walking  4 x 15 ft 4 x 15 ft 4 x 15 ft 4 x 15 ft  4 x 15 ft 4 x 15 ft 4 x 13 ft   Weaving around 4 cones 6 x 10 ft 4 x 15 ft 4 x 10 ft 4 x 10 ft 8 x 15 ft 6 x 10 ft 6 x 10 ft                 Home Exercises Provided and Patient Education Provided     Education provided:   - performing his HEP on a regular basis and wearing his AFO daily.     Written Home Exercises Provided: yes.  Exercises were reviewed and Cy was able to demonstrate them prior to the end of the session.  Cy demonstrated good  understanding of the education " provided.     See EMR under Patient Instructions for exercises provided 07/10/2019.    Assessment     Patient continues to required occasional verbal cues to flex his knee and hip when performing step ups straight and lateral. He also required one tactile cue for posture with standing hip exercises. He demonstrated ankle, hip, and arm strategies when performing tandem stance to maintain his balance. When performing his dynamic balance activities he required occasional verbal cues to increase step height in order to not drag his right foot. He performed all of today's activities with no increase in symptoms prior to leaving the clinic.   Cy is progressing well towards his goals.     Pt prognosis is Good.     Pt will continue to benefit from skilled outpatient physical therapy to address the deficits listed in the problem list box on initial evaluation, provide pt/family education and to maximize pt's level of independence in the home and community environment.     Pt's spiritual, cultural and educational needs considered and pt agreeable to plan of care and goals.     Anticipated barriers to physical therapy: none    Goals:   Short Term Goals: 4 weeks   1. This patient will be independent with a basic HEP. MET  2. This patient will increase R LE strength by 1 grade in order to be able to ambulate greater than 300 feet with a normal gait pattern with no LOB and no AD. MET  3. Patient will be able to achieve less than or equal to 10 seconds on the Timed Up and Go decreasing patient's risk for falling with household ambulation. IN PROGRESS  4. Patient will be able to achieve greater than or equal to 84 on the FOTO CVA Survey placing patient in 1%<20% impaired, limited, or restricted category demonstrating overall improved functional ability with lower extremity. IN PROGRESS    Long Term Goals: 8 weeks   1. This patient will be independent with an updated HEP. IN PROGRESS  2. This patient will increase R LE strength  to 5/5 in order to be able to ascend/descend stairs with a reciprocal gait pattern. IN PROGRESS  3. Patient will be able to achieve less than or equal to 9 seconds on the Timed Up and Go decreasing patient's risk for falling with ambulation on uneven surfaces in the community. IN PROGRESS  4. Patient will be able to achieve greater than or equal to 90 on the FOTO CVA Survey placing patient in 1%<20% impaired, limited, or restricted category demonstrating overall improved functional ability with lower extremity. IN PROGRESS    Plan     Continue with plan of care progressing toward PT goals. Increase step height with straight step ups and reps with lateral step ups next visit.     Nitza Haddad, PT

## 2019-07-31 NOTE — PROGRESS NOTES
"    Occupational Therapy Daily Treatment Note     Date: 7/31/2019  Name: Cy Rutherford  Clinic Number: [id number]    Therapy Diagnosis:   Encounter Diagnosis   Name Primary?    Right sided weakness          Physician: Yamileth Niño MD    Physician Orders: OT Eval and Treat  Medical Diagnosis: History of ischemic vertebrobasilar artery brainstem stroke  Surgical Procedure and Date: n/a  Evaluation Date: 3/7/19  Insurance Authorization Period Expiration: 2/14/20  Current Plan of Care Certification Period: 07/30/19 to 08/30/19  Date of Return to MD: TBD  Visit # / Visits authorized: 4 / 20 (23 total)    Time In: 10:00 am  Time Out: 11:00:00 am  Total Billable Time: 60 minutes (4TE)       Subjective     Pt reports: "I got a good workout today, by both PT & OT"   Response to previous treatment: no adverse reactions. Increased activity tolerance noted  Functional change: increased ability to button buttons on shirts; hold objects longer with R hand    Pain: 0/10  Location: n/a    Objective     Cy received therapeutic exercises for 30 minutes direct care and 30 minutes of supervised care including:  UBE (forward/backwards) 10 minutes, lvl 3.0   Pulleys (flex/ext & abd/add) 3 min each   Side lying ABD to 90 & ER (not today) 10 reps, w/ 2# wt w/ assist   Side lying Scap Stabilization CW/CCW (not today) 10 reps, w/ 2# wt w/ assist   Side lying Ext/IR stretch (not today) 3 reps, 30 sec hold   Supine dowel chest press 10 reps, 3# wt   Supine dowel shoulder flex stretch 10 reps, 3# wt   Supine tricep extension 10 reps, 2# wt   Theraband  -rows-red  -scap retraction-red  -triceps extension -red   - biceps curls-green  -ER-red 2/10 reps each   Biceps curl (not today) 10 reps each, 3#    Dowel ER 2/10 reps w/ assist   Wrist 3 ways over wedge  (not today) 10 reps each, 2# wt   Smiles & frowns Green T-bar; 2/15 reps   Deluxe Gripper-setting 4 3/10 reps   T-Putty red   -molding 2 min   -grasp/manipulation 5 reps   -Log roll & " tripod pinch 2 trial each   -dowel digs 3 min      Cy DARDEN received therapeutic activity for 5 minutes direct care, as follows:  FMC     -9 Peg 1 trial (drops on replacing)   -Pom pom  w/ CP 2 trials, red CP   -[place] manipulation on towel 2 trials, 15 coins   -Isospheres 3 min, Step-by-step         Home Exercises and Education Provided     Education provided:  Reviewed HEP; reiterated benefits of good compliance of performing HEP for optimal outcome;     Written Home Exercises Provided: Patient instructed to cont prior HEP.  Exercises were reviewed and Cy was able to demonstrate them prior to the end of the session.  Cy demonstrated good  understanding of the HEP provided.     See EMR under Patient Instructions for exercises provided prior visit.        Assessment     Patient tolerated treatment well.  He shows some signs of fatigue towards the end of treatment, but was able to complete all TE's expected today.   Pt prognosis is Good.      Pt will continue to benefit from skilled outpatient occupational therapy to address the deficits listed in the problem list on initial evaluation provide pt/family education and to maximize pt's level of independence in the home and community environment.     Anticipated barriers to occupational therapy: none noted    Pt's spiritual, cultural and educational needs considered and pt agreeable to plan of care and goals.      Plan     Updated Certification Period:  07/30/19 to 08/30/19    Recommended Treatment Plan: 2 times per week for 4 weeks: Manual Therapy, Neuromuscular Re-ed, Patient Education, Self Care, Therapeutic Activites and Therapeutic Exercise    Jhony Sullivan OTL

## 2019-08-05 ENCOUNTER — CLINICAL SUPPORT (OUTPATIENT)
Dept: REHABILITATION | Facility: HOSPITAL | Age: 74
End: 2019-08-05
Payer: MEDICARE

## 2019-08-05 DIAGNOSIS — I69.398 IMPAIRED BALANCE AS LATE EFFECT OF CEREBROVASCULAR ACCIDENT: ICD-10-CM

## 2019-08-05 DIAGNOSIS — R53.1 RIGHT SIDED WEAKNESS: ICD-10-CM

## 2019-08-05 DIAGNOSIS — M62.89 TIGHTNESS OF RIGHT GASTROCNEMIUS MUSCLE: ICD-10-CM

## 2019-08-05 DIAGNOSIS — R26.9 ABNORMALITY OF GAIT AS LATE EFFECT OF CEREBROVASCULAR ACCIDENT (CVA): ICD-10-CM

## 2019-08-05 DIAGNOSIS — R29.898 WEAKNESS OF RIGHT LOWER EXTREMITY: ICD-10-CM

## 2019-08-05 DIAGNOSIS — I69.398 ABNORMALITY OF GAIT AS LATE EFFECT OF CEREBROVASCULAR ACCIDENT (CVA): ICD-10-CM

## 2019-08-05 DIAGNOSIS — R26.89 IMPAIRED BALANCE AS LATE EFFECT OF CEREBROVASCULAR ACCIDENT: ICD-10-CM

## 2019-08-05 PROCEDURE — 97110 THERAPEUTIC EXERCISES: CPT | Mod: PO

## 2019-08-05 PROCEDURE — 97112 NEUROMUSCULAR REEDUCATION: CPT | Mod: PO

## 2019-08-05 NOTE — PROGRESS NOTES
Physical Therapy Daily Treatment Note     Name: Cy Rutherford  Clinic Number: [id number]    Therapy Diagnosis:   Encounter Diagnoses   Name Primary?   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  He was compliant with home exercise program.  Response to previous treatment: just tired after the last session   Functional change: improved gait when wearing his AFO    Pain: 0/10  Location: N/A    Objective     Cy received therapeutic exercises to develop strength, flexibility and core stabilization for 25 minutes including:      Date 08/05/19 07/31/19 07/29/19 07/25/19 07/22/19 07/17/19 07/15/19 07/10/19 07/08/19 07/03/19 07/01/19 06/26/19 06/24/19 06/19/19 06/17/19 06/12/19 06/10/19 06/07/19 06/05/19   Visit 23/pending 22/pending 21/ pending 20/20 19/20 18/20 17/20 16/20 15/20 14/20 13/20 12/20 11/20 10/20 9/20 8/20 7/20 6/20 5/20   FOTO -- -- --- -- -- --- --- -- -- --- --- --- -- --- -- --- -- --- 5/5   FT 0817/19 08/17/19 08/17/19 08/17/19 8/17/19 8/17/19 07/17/19 07/17/19 07/17/19 07/17/19 07/17/19 07/17/19 07/17/19 07/17/19 07/17/19 06/16/19 06/16/19 06/16/19 06/16/19   POC exp 08/18/19 08/18/19 08/18/19 08/16/19 08/16/19 08/16/19 08/16/19 08/16/19 07/16/19 07/16/19 07/16/19 07/16/19 07/16/19  07/16/19 07/16/19 07/16/19 07/16/19 07/16/19 07/16/19   G code   8/10  08/10/19 7/10  08/10/19 6/10  08/10/19 5/10  8/10/19 4/10  8/10/19 3/10  08/10/19 9/10  07/17/19 8/10  07/17/19 7/10  07/17/19 6/10  07/17/19 5/10  07/17/19 4/10  07/17/19 3/10  07/17/19 2/10  07/17/19 9/10  07/17/19 8/10  06/16/19 7/10  06/16/19 6/10  06/16/19 5/10  06/16/19   Vs total   total 129.58  2150.40 99.26  2020.82 129.58  1921.56 99.26  1791.98 125.43  1692.72 64.79  1567.29 125.43  1502.50 64.79  1377.07 125.43  1312.28 90.96  1186.85 90.96  1095.89 60.64  1004.93 90.96  944.29 60.64  853.33 60.64  792.69 60.64  732.05 125.43  671.41 125.43  545.98 90.96  420.55                                               LAQs 3 x 10 x 3# 3 x 10 x 3# 3 x 10 x 3# 2 x 10 x 3# 3 x 10 x 2# 3 x 10 x 2# 3 x 10 x 1.5# 1 x 10 RTB 3 x 10 x 1.5# 3 x 10 x 1.5# 3 x 10 x 1.5# 2 x 10 x 1.5# 2 x 10 x 1.5# 3 x 10 x 1# NT 3 x 10 x 1# 2 x 10 x 1# 3 x 10 3 x 10   Seated Hip Flex 3 x 10 x 3# 3 x 10 x 3# 2 x 10 x 3# 2 x 10 x 3# 3 x 10 x 2# 3 x 10 x 2# 3 x 10 x 1.5# Not today 3 x 10 x 1.5# 3 x 10 x 1.5# 3 x 10 x 1.5# 2 x 10 x 1.5# 2 x 10 x 1.5# 3 x 10 x 1# NT 2 x 10 x 1# 2 x 10 x 1# 3 x 10 2 x 10   Seated HS curl 3 x 10 x 3# 3 x 10 x 3# Not today 2 x 10 x 3# 2 x 10 x 2# 3 x 10 GTB 2 x 10 RTB 1 x 10 RTB              Standing:                      Hip Abd 3 x 10 GTB 3 x 10 GTB 3 x 10 GTB 2 x 10 GTB 3 x 10 RTB 3 x 10 RTB 2 x 10 RTB 1 x 10 RTB 1 x 15 1 x 10 --- --- -- --- -- --- -- --- ---   Hip Ext 3 x 10 GTB 3 x 10 GTB 3 x 10 GTB 2 x 10 GTB 3 x 10 RTB 3 x 10 RTB 2 x 10 RTB 1 x 10 RTB 3 x 10 3 x 10 3 x 10 3 x 10 3 x 10 3 x 10 NT 2 x 10 2 x 10 2 x 10 1 x 10   Hip Flex 3 x 10 GTB 3 x 10 GTB 3 x 10 GTB 2 x 10 GTB 3 x 10 RTB 3 x 10 RTB 2 x 10 RTB 1 x 10 RTB              Hip Add 3 x 10 GTB 3 x 10 GTB 3 x 10 GTB 2 x 10 GTB 3 x 10 RTB 3 x 10 RTB 2 x 10 RTB 1 x 10 RTB              // bars                      Step Ups L2 3 x 10 L2 3 x 10 L2  3 x 10 L2 3 x 10 L2  3 x 10 L2  2 x10  "L2  2 x 10 Not today L2 2 x 10 L1  1 x 15 L1  1 x 15 L1  1 x 10 1 x 10 L1 --- -- --- -- -- ---   Lateral step ups L1 2 x 10 L1 1 x 15 L1  1 x 10                   Mini squats 1 x 20 Not today 1 x 20 1 x 20 Not today  1 x 20 Not today 1 x 20 1 x 20 1 x 20 1 x 15 1 x 15 1 x 15 NT 1 x 15 1 x 10 2 x  1 x 10   Static standing eyes open Not today Not today Not today Not today Not today 1 x 1' w/ perturbations 1 x 1" w/ perturbations Not today Not today 1 x 1" w/ perturbations 1 x 1" w/ perturbations 1 x 1" w/ perturbations  1 x 1'         Static standing eyes closed [place] today Not today Not today Not today Not today 2 x 30" 2 x 30"  Not today Not today 2 x 30" 2 x 30" 1 x 30" 1 x 30"         Heel to toe standing 2 x 30" 2 x 30" 2 x 30" 2 x 30" Not today 2 x 30" 2 x 30" ea 2 x 30" ea Not today 1 x 30" ea 1 x 30" ea 1 x 30" ea 1 x 30" ea         Hip hike on L1 step Not today 1 x 20 1 x 15 1 x 15 1 x 10 1 x 10 1 x 10 Not today 1 x 10             Side stepping  See below See below See below See below See below See below See below See below 4 x 15 ft             Standing on airex pad 2 x 1' 2 x 1' 2 x 1' 2 x 1' 2 x 1' 1 x 1" 1 x 1' Not today 1 x 1"                                   Initials DG DG GWA 1/6 DG DG MA GWA 1/6 DG JM GWA 3/6 GWA 2/6 GWA 1/6 JM GWA 1/6 JM GWA 1/6 JM GWA 2/6 GWA 1/6     Cy received neuromuscular re-education outside parallel bars to increase standing balance for 30 minutes including:  All performed with CGA to SBA for safety.    Date  08/05/19 07/31/19 07/29/19 07/25/19 07/22/19 07/17/19 07/15/19 7/10/19              Side stepping  4 x 15 ft 4 x 15 ft 4 x 15 ft 4 x 15 ft 4 x 15 ft 4 x 15 ft 4 x 13 ft 4 x 13 ft   Retro walking  4 x 15 ft 4 x 15 ft 4 x 15 ft 4 x 15 ft 4 x 15 ft  4 x 15 ft 4 x 15 ft 4 x 13 ft   Weaving around 4 cones 6 x 10 ft 6 x 10 ft 4 x 15 ft 4 x 10 ft 4 x 10 ft 8 x 15 ft 6 x 10 ft 6 x 10 ft                  Home Exercises Provided and Patient Education Provided     Education " "provided:   - continuing to wear his AFO as long his toes drag or "get stuck to the floor".     Written Home Exercises Provided: yes.  Exercises were reviewed and Cy was able to demonstrate them prior to the end of the session.  Cy demonstrated good  understanding of the education provided.     See EMR under Patient Instructions for exercises provided 07/10/2019.    Assessment     Patient occasionally used a stepping strategy to maintain his balance when weaving around the cones and ankle, hip, and arm strategies with tandem stance. He required occasional verbal cues to avoid substitutions with standing theraband exercises and two sitting rest breaks during the session due to complaints of fatigue. He performed all of today's activities with no increase in symptoms prior to leaving the clinic.   Cy is progressing well towards his goals.     Pt prognosis is Good.     Pt will continue to benefit from skilled outpatient physical therapy to address the deficits listed in the problem list box on initial evaluation, provide pt/family education and to maximize pt's level of independence in the home and community environment.     Pt's spiritual, cultural and educational needs considered and pt agreeable to plan of care and goals.     Anticipated barriers to physical therapy: none    Goals:   Short Term Goals: 4 weeks   1. This patient will be independent with a basic HEP. MET  2. This patient will increase R LE strength by 1 grade in order to be able to ambulate greater than 300 feet with a normal gait pattern with no LOB and no AD. MET  3. Patient will be able to achieve less than or equal to 10 seconds on the Timed Up and Go decreasing patient's risk for falling with household ambulation. IN PROGRESS  4. Patient will be able to achieve greater than or equal to 84 on the FOTO CVA Survey placing patient in 1%<20% impaired, limited, or restricted category demonstrating overall improved functional ability with lower " extremity. IN PROGRESS    Long Term Goals: 8 weeks   1. This patient will be independent with an updated HEP. IN PROGRESS  2. This patient will increase R LE strength to 5/5 in order to be able to ascend/descend stairs with a reciprocal gait pattern. IN PROGRESS  3. Patient will be able to achieve less than or equal to 9 seconds on the Timed Up and Go decreasing patient's risk for falling with ambulation on uneven surfaces in the community. IN PROGRESS  4. Patient will be able to achieve greater than or equal to 90 on the FOTO CVA Survey placing patient in 1%<20% impaired, limited, or restricted category demonstrating overall improved functional ability with lower extremity. IN PROGRESS    Plan     Reassess patient next visit for possible discharge from physical therapy.     Nitza Haddad, PT

## 2019-08-05 NOTE — PROGRESS NOTES
"    Occupational Therapy Daily Treatment Note     Date: 8/5/2019  Name: Cy Rutherford  Clinic Number: [id number]    Therapy Diagnosis:   Encounter Diagnosis   Name Primary?    Right sided weakness          Physician: Yamileth Niño MD    Physician Orders: OT Eval and Treat  Medical Diagnosis: History of ischemic vertebrobasilar artery brainstem stroke  Surgical Procedure and Date: n/a  Evaluation Date: 3/7/19  Insurance Authorization Period Expiration: 2/14/20  Current Plan of Care Certification Period: 07/30/19 to 08/30/19  Date of Return to MD: TBD  Visit # / Visits authorized: 5 / 20 (24 total)    Time In: 10:05 am  Time Out: 11:00:00 am  Total Billable Time: 55 minutes (4TE)       Subjective     Pt reports: "I can get a little more motion in my arm now. I just need to get some more strength back"  Patient also reports starting a diuretic that his MD prescribed for his ankle swelling.    Response to previous treatment: no adverse reactions. Increased activity tolerance noted  Functional change: increased ability to button buttons on shirts; hold objects longer with R hand    Pain: 0/10  Location: n/a    Objective     Cy received therapeutic exercises for 55 minutes direct care including:    UBE (forward/backwards) 10 minutes, lvl 3.0   Pulleys (flex/ext & abd/add) 5 min each, with 5 sec stretch at end range of R shld   Side lying ABD to 90 & ER 10 reps, w/ 3# wt w/ assist   Side lying Scap Stabilization CW/CCW 10 reps, w/ 3# wt w/ assist   Side lying Ext/IR stretch 3 reps, 30 sec hold   Supine dowel chest press 10 reps, 4# wt, hands shld width apart   Supine dowel shoulder flex stretch 10 reps, 4# wt, hands shld width apart   Supine tricep extension 10 reps, 3# db   Theraband  -rows-red  -scap retraction-red  -triceps extension -red   - biceps curls-green  -ER-red (not today)2/10 reps each   Biceps curl-dowel 10 reps each, 5#    Wrist 3 ways over wedge  (not today) 10 reps each, 2# wt   Smiles & frowns Green " T-bar; 2/15 reps   Deluxe Gripper-setting 4 (not today) 3/10 reps   T-Putty red   -molding 3 min   -grasp/manipulation 3 reps   -Log roll & tripod pinch (not today) 2 trial each   -dowel digs 3 min        Home Exercises and Education Provided     Education provided:  Reviewed HEP; discussed modifications to some HEP for accomodation of decreased shoulder ROM; patient was provided with a written handout of Shoulder T-band exercises and a red T-band today    Written Home Exercises Provided: Patient instructed to cont prior HEP.  Exercises were reviewed and Cy was able to demonstrate them prior to the end of the session.  Cy demonstrated good  understanding of the HEP provided.     See EMR under Patient Instructions for exercises provided prior visit.        Assessment     Patient noted to have less generalized edema in his RUE today, possibly due to the benefits of beginning a diuretic medication.  He tolerated treatment well today.  Patient continues to have mild to moderate dysmetria in gross motor movement patterns, but is noted to have increased activity tolerance to progression of resistance in TE's.   Pt prognosis is Good.      Pt will continue to benefit from skilled outpatient occupational therapy to address the deficits listed in the problem list on initial evaluation provide pt/family education and to maximize pt's level of independence in the home and community environment.     Anticipated barriers to occupational therapy: none noted    Pt's spiritual, cultural and educational needs considered and pt agreeable to plan of care and goals.      Plan     Updated Certification Period:  07/30/19 to 08/30/19    Recommended Treatment Plan: 2 times per week for 4 weeks: Manual Therapy, Neuromuscular Re-ed, Patient Education, Self Care, Therapeutic Activites and Therapeutic Exercise    Jhony Sullivan OTL

## 2019-08-06 ENCOUNTER — PATIENT OUTREACH (OUTPATIENT)
Dept: OTHER | Facility: OTHER | Age: 74
End: 2019-08-06

## 2019-08-06 NOTE — PROGRESS NOTES
HPI:  Called Mr. Cy Rutherford for hypertension digital medicine follow-up. Patient reports adherence to medication regimen with no complaints. Taking furosemide on MWF and BP is slightly lower on those days. He continues to work on hydration and feels that he is doing much better.     Last 5 Patient Entered Readings                                      Current 30 Day Average: 130/58     Recent Readings 8/7/2019 8/6/2019 8/4/2019 8/3/2019 8/2/2019    SBP (mmHg) 133 115 153 130 111    DBP (mmHg) 54 45 67 54 47    Pulse 51 46 54 53 49          Patient denies s/s of hypotension (lightheadedness, dizziness, nausea, fatigue) associated with low readings. Instructed patient to inform me if this occurs, patient confirms understanding.    Patient denies s/s of hypertension (SOB, CP, severe headaches, changes in vision) associated with high readings. Instructed patient to go to the ED if BP >180/110 and accompanied by hypertensive s/s, patient confirms understanding.    Assessment:  Patient's current 30-day average is slightly above goal of <130/80 mmHg.     Plan:  Continue current regimen.   Patients health , Crystal Goode, will follow-up as scheduled.    I will continue to monitor regularly and will follow-up in 6 weeks, sooner if blood pressure begins to trend upward or downward.     Current medication regimen:  Hypertension Medications             amLODIPine (NORVASC) 5 MG tablet Take 1 tablet (5 mg total) by mouth once daily.    chlorthalidone (HYGROTEN) 25 MG Tab Take 1 tablet (25 mg total) by mouth once daily.    doxazosin (CARDURA) 8 MG Tab Take 1 tablet (8 mg total) by mouth every evening.    furosemide (LASIX) 20 MG tablet Take 1 tablet (20 mg total) by mouth once daily.    losartan (COZAAR) 50 MG tablet Take 1 tablet (50 mg total) by mouth 2 (two) times daily.    metoprolol succinate (TOPROL-XL) 100 MG 24 hr tablet Take 1 tablet (100 mg total) by mouth 2 (two) times daily.        Patient has my contact  information and knows to call with any concerns or clinical changes.

## 2019-08-07 ENCOUNTER — CLINICAL SUPPORT (OUTPATIENT)
Dept: REHABILITATION | Facility: HOSPITAL | Age: 74
End: 2019-08-07
Payer: MEDICARE

## 2019-08-07 DIAGNOSIS — M62.89 TIGHTNESS OF RIGHT GASTROCNEMIUS MUSCLE: ICD-10-CM

## 2019-08-07 DIAGNOSIS — R26.89 IMPAIRED BALANCE AS LATE EFFECT OF CEREBROVASCULAR ACCIDENT: ICD-10-CM

## 2019-08-07 DIAGNOSIS — R26.9 ABNORMALITY OF GAIT AS LATE EFFECT OF CEREBROVASCULAR ACCIDENT (CVA): ICD-10-CM

## 2019-08-07 DIAGNOSIS — I69.398 ABNORMALITY OF GAIT AS LATE EFFECT OF CEREBROVASCULAR ACCIDENT (CVA): ICD-10-CM

## 2019-08-07 DIAGNOSIS — R29.898 WEAKNESS OF RIGHT LOWER EXTREMITY: ICD-10-CM

## 2019-08-07 DIAGNOSIS — R53.1 RIGHT SIDED WEAKNESS: ICD-10-CM

## 2019-08-07 DIAGNOSIS — I69.398 IMPAIRED BALANCE AS LATE EFFECT OF CEREBROVASCULAR ACCIDENT: ICD-10-CM

## 2019-08-07 PROCEDURE — G8979 MOBILITY GOAL STATUS: HCPCS | Mod: CI,PO

## 2019-08-07 PROCEDURE — G8980 MOBILITY D/C STATUS: HCPCS | Mod: CJ,PO

## 2019-08-07 PROCEDURE — 97110 THERAPEUTIC EXERCISES: CPT | Mod: PO

## 2019-08-07 NOTE — PROGRESS NOTES
"    Occupational Therapy Daily Treatment Note     Date: 8/7/2019  Name: Cy Rutherford  Clinic Number: [id number]    Therapy Diagnosis:   Encounter Diagnosis   Name Primary?    Right sided weakness          Physician: Yamileth Niño MD    Physician Orders: OT Eval and Treat  Medical Diagnosis: History of ischemic vertebrobasilar artery brainstem stroke  Surgical Procedure and Date: n/a  Evaluation Date: 3/7/19  Insurance Authorization Period Expiration: 2/14/20  Current Plan of Care Certification Period: 07/30/19 to 08/30/19  Date of Return to MD: TBD  Visit # / Visits authorized: 6 / 20 (24 total)    Time In: 10:05 am  Time Out: 11:00:00 am  Total Billable Time: 55 minutes (4TE)       Subjective     Pt reports: "the strength is coming back, but it is slow. I can reach and use my hand better"  Patient also reports starting a diuretic that his MD prescribed for his ankle swelling.    Response to previous treatment: no adverse reactions. Increased activity tolerance noted  Functional change: increased ability to button buttons on shirts; hold objects longer with R hand    Pain: 0/10  Location: n/a    Objective     Cy received therapeutic exercises for 55 minutes direct care including:    UBE (forward/backwards) 10 minutes, lvl 3.0   Pulleys (flex/ext & abd/add) 5 min each, with 5 sec stretch at end range of R shld   Side lying ABD to 90 & ER 10 reps, w/ 3# wt w/ assist   Side lying Scap Stabilization CW/CCW 10 reps, w/ 3# wt w/ assist   Side lying Ext/IR stretch 3 reps, 30 sec hold   Supine dowel chest press 2/10 reps, 4# wt, hands shld width   Supine dowel shoulder flex stretch 2/10 reps, 4# wt, hands shld width   Supine tricep extension 10 reps, 3# db   Theraband  -rows-red  -scap retraction-red  -triceps extension -red   - biceps curls-green  -ER-red (not today)2/10 reps each   Biceps curl-dowel 10 reps each, 5#    Wrist 3 ways over wedge  (not today) 10 reps each, 2# wt   Smiles & frowns Green T-bar; 2/15 reps "   Deluxe Gripper-setting 4 3/10 reps   T-Putty red   -molding 3 min   -grasp/manipulation 3 reps   -Log roll & tripod pinch 2 trial each   -dowel digs 3 min        Home Exercises and Education Provided     Education provided:  Reviewed HEP; discussed modifications to some HEP for accomodation of decreased shoulder ROM; patient was provided with a written handout of Shoulder T-band exercises and a red T-band today    Written Home Exercises Provided: Patient instructed to cont prior HEP.  Exercises were reviewed and Cy was able to demonstrate them prior to the end of the session.  Cy demonstrated good  understanding of the HEP provided.     See EMR under Patient Instructions for exercises provided prior visit.        Assessment     Patient is making steady gains for increased RUE functioning, as his strength is improving.  He is tolerating treatment well.  Pt prognosis is Good.      Pt will continue to benefit from skilled outpatient occupational therapy to address the deficits listed in the problem list on initial evaluation provide pt/family education and to maximize pt's level of independence in the home and community environment.     Anticipated barriers to occupational therapy: none noted    Pt's spiritual, cultural and educational needs considered and pt agreeable to plan of care and goals.      Plan     Updated Certification Period:  07/30/19 to 08/30/19    Recommended Treatment Plan: 2 times per week for 4 weeks: Manual Therapy, Neuromuscular Re-ed, Patient Education, Self Care, Therapeutic Activites and Therapeutic Exercise    Jhony Sullivan OTL

## 2019-08-07 NOTE — PROGRESS NOTES
Physical Therapy Daily Treatment Note     Name: Cy Rutherford  Clinic Number: [id number]    Therapy Diagnosis:   Encounter Diagnoses   Name Primary?   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   He was compliant with home exercise program.  Response to previous treatment: just tired after the last session   Functional change: improved gait when wearing his AFO    Pain: 0/10  Location: N/A    Objective     Cy received therapeutic exercises to develop strength, flexibility and core stabilization, along with reassessment for 50 minutes including:      Date 08/07/19 08/05/19 07/31/19 07/29/19 07/25/19 07/22/19 07/17/19 07/15/19 07/10/19 07/08/19 07/03/19 07/01/19 06/26/19 06/24/19 06/19/19 06/17/19 06/12/19 06/10/19 06/07/19 06/05/19   Visit 24/30 23/pending 22/pending 21/ pending 20/20 19/20 18/20 17/20 16/20 15/20 14/20 13/20 12/20 11/20 10/20 9/20 8/20 7/20 6/20 5/20   FOTO -- -- -- --- -- -- --- --- -- -- --- --- --- -- --- -- --- -- --- 5/5   FTF 08/17/19 0817/19 08/17/19 08/17/19 08/17/19 8/17/19 8/17/19 07/17/19 07/17/19 07/17/19 07/17/19 07/17/19 07/17/19 07/17/19 07/17/19 07/17/19 06/16/19 06/16/19 06/16/19 06/16/19   POC exp 08/10/19 08/18/19 08/18/19 08/18/19 08/16/19 08/16/19 08/16/19 08/16/19  08/16/19 07/16/19 07/16/19 07/16/19 07/16/19 07/16/19 07/16/19 07/16/19 07/16/19 07/16/19 07/16/19 07/16/19   G code   9/10  8/10/19 8/10  08/10/19 7/10  08/10/19 6/10  08/10/19 5/10  8/10/19 4/10  8/10/19 3/10  08/10/19 9/10  07/17/19 8/10  07/17/19 7/10  07/17/19 6/10  07/17/19 5/10  07/17/19 4/10  07/17/19 3/10  07/17/19 2/10  07/17/19 9/10  07/17/19 8/10  06/16/19 7/10  06/16/19 6/10  06/16/19 5/10  06/16/19   Vs total  MC total  129.58  2150.40 99.26  2020.82 129.58  1921.56 99.26  1791.98 125.43  1692.72 64.79  1567.29 125.43  1502.50 64.79  1377.07 125.43  1312.28 90.96  1186.85 90.96  1095.89 60.64  1004.93 90.96  944.29 60.64  853.33 60.64  792.69 60.64  732.05 125.43  671.41 125.43  545.98 90.96  420.55                                                 LAQs Not today 3 x 10 x 3# 3 x 10 x 3# 3 x 10 x 3# 2 x 10 x 3# 3 x 10 x 2# 3 x 10 x 2# 3 x 10 x 1.5# 1 x 10 RTB 3 x 10 x 1.5# 3 x 10 x 1.5# 3 x 10 x 1.5# 2 x 10 x 1.5# 2 x 10 x 1.5# 3 x 10 x 1# NT 3 x 10 x 1# 2 x 10 x 1# 3 x 10 3 x 10   Seated Hip Flex Not today 3 x 10 x 3# 3 x 10 x 3# 2 x 10 x 3# 2 x 10 x 3# 3 x 10 x 2# 3 x 10 x 2# 3 x 10 x 1.5# Not today 3 x 10 x 1.5# 3 x 10 x 1.5# 3 x 10 x 1.5# 2 x 10 x 1.5# 2 x 10 x 1.5# 3 x 10 x 1# NT 2 x 10 x 1# 2 x 10 x 1# 3 x 10 2 x 10   Seated HS curl Not today 3 x 10 x 3# 3 x 10 x 3# Not today 2 x 10 x 3# 2 x 10 x 2# 3 x 10 GTB 2 x 10 RTB 1 x 10 RTB              Standing:                       Hip Abd 3 x 10 GTB 3 x 10 GTB 3 x 10 GTB 3 x 10 GTB 2 x 10 GTB 3 x 10 RTB 3 x 10 RTB 2 x 10 RTB 1 x 10 RTB 1 x 15 1 x 10 --- --- -- --- -- --- -- --- ---   Hip Ext 3 x 10 GTB 3 x 10 GTB 3 x 10 GTB 3 x 10 GTB 2 x 10 GTB 3 x 10 RTB 3 x 10 RTB 2 x 10 RTB 1 x 10 RTB 3 x 10 3 x 10 3 x 10 3 x 10 3 x 10 3 x 10 NT 2 x 10 2 x 10 2 x 10 1 x 10   Hip Flex 3 x 10 GTB 3 x 10 GTB 3 x 10 GTB 3 x 10 GTB 2 x 10 GTB 3 x 10 RTB 3 x 10 RTB 2 x 10 RTB 1 x 10 RTB              Hip Add 3 x 10 GTB 3 x 10 GTB 3 x 10 GTB 3 x 10 GTB 2 x 10 GTB 3 x 10 RTB  "3 x 10 RTB 2 x 10 RTB 1 x 10 RTB              // bars                       Step Ups L2 3 x 10 L2 3 x 10 L2 3 x 10 L2  3 x 10 L2 3 x 10 L2  3 x 10 L2  2 x10 L2  2 x 10 Not today L2 2 x 10 L1  1 x 15 L1  1 x 15 L1  1 x 10 1 x 10 L1 --- -- --- -- -- ---   Lateral step ups L1 2 x 10 L1 2 x 10 L1 1 x 15 L1  1 x 10                   Mini squats 1 x 20 1 x 20 Not today 1 x 20 1 x 20 Not today  1 x 20 Not today 1 x 20 1 x 20 1 x 20 1 x 15 1 x 15 1 x 15 NT 1 x 15 1 x 10 2 x  1 x 10   Static standing eyes open Not today Not today Not today Not today Not today Not today 1 x 1' w/ perturbations 1 x 1" w/ perturbations Not today Not today 1 x 1" w/ perturbations 1 x 1" w/ perturbations 1 x 1" w/ perturbations  1 x 1'         Static standing eyes closed Not today Nott today Not today Not today Not today Not today 2 x 30" 2 x 30"  Not today Not today 2 x 30" 2 x 30" 1 x 30" 1 x 30"         Heel to toe standing 2 x 30" 2 x 30" 2 x 30" 2 x 30" 2 x 30" Not today 2 x 30" 2 x 30" ea 2 x 30" ea Not today 1 x 30" ea 1 x 30" ea 1 x 30" ea 1 x 30" ea         Hip hike on L1 step Not today Not today 1 x 20 1 x 15 1 x 15 1 x 10 1 x 10 1 x 10 Not today 1 x 10             Side stepping  Not today See below See below See below See below See below See below See below See below 4 x 15 ft             Standing on airex pad 2 x1' 2 x 1' 2 x 1' 2 x 1' 2 x 1' 2 x 1' 1 x 1" 1 x 1' Not today 1 x 1"                                    Initials DG DG DG GWA 1/6 DG DG MA GWA 1/6 DG JM GWA 3/6 GWA 2/6 GWA 1/6 JM GWA 1/6 JM GWA 1/6 JM GWA 2/6 GWA 1/6     Cy received neuromuscular re-education outside parallel bars to increase standing balance for 0 minutes including:  All performed with CGA to SBA for safety.    Date  08/07/19 08/05/19 07/31/19 07/29/19 07/25/19 07/22/19 07/17/19 07/15/19 7/10/19               Side stepping  Not today 4 x 15 ft 4 x 15 ft 4 x 15 ft 4 x 15 ft 4 x 15 ft 4 x 15 ft 4 x 13 ft 4 x 13 ft   Retro walking  Not today 4 x 15 ft 4 x 15 " ft 4 x 15 ft 4 x 15 ft 4 x 15 ft  4 x 15 ft 4 x 15 ft 4 x 13 ft   Weaving around 4 cones Not today 6 x 10 ft 6 x 10 ft 4 x 15 ft 4 x 10 ft 4 x 10 ft 8 x 15 ft 6 x 10 ft 6 x 10 ft                 Update: Strength:  Hip Left Right   Flexion 5/5 4/5   Abduction 4+/5 3/5   Adduction 5/5 5/5   Extension 4/5 3+/5      Knee Left Right   Extension 5/5 5/5   Flexion 5/5 5/5      Ankle Left Right   Dorsiflexion 5/5 3/5   Plantarflexion 5/5 4/5   Inversion 5/5 4/5   Eversion 5/5 3/5      TUG - 10.92  seconds - indicates he is at risk for falls  30 second sit to stand - 8 reps indicates he is at risk for falls  FOTO CVA Survey 32% impaired, limited, or restricted    Home Exercises Provided and Patient Education Provided     Education provided:   - continuing to perform his HEP on a regular basis to maintain his strength and balance.     Written Home Exercises Provided: yes.  Exercises were reviewed and Cy was able to demonstrate them prior to the end of the session.  Cy demonstrated good  understanding of the education provided.     See EMR under Patient Instructions for exercises provided 07/10/2019.    Assessment     Patient demonstrates a minimal increase in his LE strength compared to his last reassessment and his balance test scores also demonstrate minimal change compared to his last reassessment. His gait has improved when wearing his AFO to increased hip and knee flexion and heel strike. His current score on FOTO CVA Survey places him in the 20%<40% impaired, limited, or restricted category. He is independent with his HEP and has met all goals set for him to his max rehab potential at this time. He is ready for discharge to his [facility].   Cy is progressing well towards his goals.     Pt prognosis is Good.     Pt's spiritual, cultural and educational needs considered and pt agreeable to plan of care and goals.     Anticipated barriers to physical therapy: none    Goals:   Short Term Goals: 4 weeks   1. This patient  will be independent with a basic HEP. MET  2. This patient will increase R LE strength by 1 grade in order to be able to ambulate greater than 300 feet with a normal gait pattern with no LOB and no AD. MET  3. Patient will be able to achieve less than or equal to 10 seconds on the Timed Up and Go decreasing patient's risk for falling with household ambulation. PARTIALLY MET  4. Patient will be able to achieve greater than or equal to 84 on the FOTO CVA Survey placing patient in 1%<20% impaired, limited, or restricted category demonstrating overall improved functional ability with lower extremity.  PARTIALLY MET    Long Term Goals: 8 weeks   1. This patient will be independent with an updated HEP. MET  2. This patient will increase R LE strength to 5/5 in order to be able to ascend/descend stairs with a reciprocal gait pattern. PARTIALLY MET  3. Patient will be able to achieve less than or equal to 9 seconds on the Timed Up and Go decreasing patient's risk for falling with ambulation on uneven surfaces in the community. PARTIALLY MET  4. Patient will be able to achieve greater than or equal to 90 on the FOTO CVA Survey placing patient in 1%<20% impaired, limited, or restricted category demonstrating overall improved functional ability with lower extremity.  PARTIALLY MET    Plan     Discharge to [facility] as patient has reached his max rehab potential at this time.     Nitza Haddad, PT

## 2019-08-09 NOTE — PLAN OF CARE
Outpatient Therapy Discharge Summary     Name: Cy Rutherford  Clinic Number: [id number]    Therapy Diagnosis:   Encounter Diagnoses   Name Primary?   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 This patient will be independent with a basic HEP. MET  2. This patient will increase R LE strength by 1 grade in order to be able to ambulate greater than 300 feet with a normal gait pattern with no LOB and no AD. MET  3. Patient will be able to achieve less than or equal to 10 seconds on the Timed Up and Go decreasing patient's risk for falling with household ambulation. PARTIALLY MET  4. Patient will be able to achieve greater than or equal to 84 on the FOTO CVA Survey placing patient in 1%<20% impaired, limited, or restricted category demonstrating overall improved functional ability with lower extremity.  PARTIALLY MET    Long Term Goals: 8 weeks   1. This patient will be independent with an updated HEP. MET  2. This patient will increase R LE strength to 5/5 in order to be able to ascend/descend stairs with a reciprocal gait pattern. PARTIALLY MET  3. Patient will be able to achieve less than or equal to 9 seconds on the Timed Up and Go decreasing patient's risk for falling with ambulation on uneven surfaces in the community. PARTIALLY MET  4. Patient will be able to achieve greater than or equal to 90 on the FOTO CVA Survey placing patient in 1%<20% impaired, limited, or restricted category  demonstrating overall improved functional ability with lower extremity.  PARTIALLY MET    Discharge reason: Patient has reached the maximum rehab potential for the present time    Plan   This patient is discharged from Physical Therapy

## 2019-08-12 ENCOUNTER — CLINICAL SUPPORT (OUTPATIENT)
Dept: REHABILITATION | Facility: HOSPITAL | Age: 74
End: 2019-08-12
Payer: MEDICARE

## 2019-08-12 DIAGNOSIS — R53.1 RIGHT SIDED WEAKNESS: ICD-10-CM

## 2019-08-12 PROCEDURE — 97530 THERAPEUTIC ACTIVITIES: CPT | Mod: PO

## 2019-08-12 NOTE — PROGRESS NOTES
Occupational Therapy Daily Treatment Note     Date: 8/12/2019  Name: Cy Rutherford  Clinic Number: [id number]    Therapy Diagnosis:   Encounter Diagnosis   Name Primary?    Right sided weakness          Physician: Yamileth Niño MD    Physician Orders: OT Eval and Treat  Medical Diagnosis: History of ischemic vertebrobasilar artery brainstem stroke  Surgical Procedure and Date: n/a  Evaluation Date: 3/7/19  Insurance Authorization Period Expiration: 2/14/20  Current Plan of Care Certification Period: 07/30/19 to 08/30/19  Date of Return to MD: TBD  Visit # / Visits authorized: 7 / 20 (25)    Time In: 10:00 am  Time Out: 11:00:00 am  Total Billable Time: 40 minutes (3TE)       Subjective     Pt reports: he is able to  light objects and carry the groceries using my RUE  Patient also reports starting a diuretic that his MD prescribed for his ankle swelling.    Response to previous treatment: no adverse reactions. Increased activity tolerance noted  Functional change:  & hold objects, including grocery bags using RUE     Pain: 0/10  Location: n/a    Objective     Cy received therapeutic exercises for 40 minutes direct care and 20 minutes of supervised care including:    UBE (forward/backwards) 10 minutes, lvl 3.0   Pulleys (flex/ext & abd/add) 5 min each, with 5 sec stretch at end range of R shld   Side lying ABD to 90 & ER 10 reps, w/ 3# wt w/ assist   Side lying Scap Stabilization CW/CCW 10 reps, w/ 3# wt w/ assist   Side lying Ext/IR stretch 3 reps, 30 sec hold   Supine dowel chest press 2/10 reps, 4# wt, hands shld width   Supine dowel shoulder flex stretch 2/10 reps, 4# wt, hands shld width   Supine tricep extension 10 reps, 3# db   Theraband  -rows-red  -scap retraction-red  -triceps extension -red   - biceps curls-green  -ER-red (not today)2/10 reps each   Biceps curl-dowel 10 reps each, 5#    Wrist 3 ways over wedge  (not today) 10 reps each, 2# wt   Smiles & frowns Green T-bar; 2/15 reps    Deluxe Gripper-setting 4 3/10 reps   T-Putty red   -molding 3 min   -grasp/manipulation 3 reps   -Log roll & tripod pinch 2 trial each   -dowel digs 3 min        Home Exercises and Education Provided     Education provided:  Reviewed HEP; discussed modifications to some HEP for accomodation of decreased shoulder ROM; patient was provided with a written handout of Shoulder T-band exercises and a red T-band today    Written Home Exercises Provided: Patient instructed to cont prior HEP.  Exercises were reviewed and Cy was able to demonstrate them prior to the end of the session.  Cy demonstrated good  understanding of the HEP provided.     See EMR under Patient Instructions for exercises provided prior visit.        Assessment     Patient noted to have an improvement in RUE core stabilization during TE's today. He is tolerating treatment well.  Pt prognosis is Good.      Pt will continue to benefit from skilled outpatient occupational therapy to address the deficits listed in the problem list on initial evaluation provide pt/family education and to maximize pt's level of independence in the home and community environment.     Anticipated barriers to occupational therapy: none noted    Pt's spiritual, cultural and educational needs considered and pt agreeable to plan of care and goals.      Plan     Updated Certification Period:  07/30/19 to 08/30/19    Recommended Treatment Plan: 2 times per week for 4 weeks: Manual Therapy, Neuromuscular Re-ed, Patient Education, Self Care, Therapeutic Activites and Therapeutic Exercise    Jhony Sullivan OTL

## 2019-08-14 ENCOUNTER — CLINICAL SUPPORT (OUTPATIENT)
Dept: REHABILITATION | Facility: HOSPITAL | Age: 74
End: 2019-08-14
Payer: MEDICARE

## 2019-08-14 DIAGNOSIS — R53.1 RIGHT SIDED WEAKNESS: ICD-10-CM

## 2019-08-14 PROCEDURE — 97110 THERAPEUTIC EXERCISES: CPT | Mod: PO

## 2019-08-14 NOTE — PROGRESS NOTES
"    Occupational Therapy Daily Treatment Note     Date: 8/14/2019  Name: Cy Rutherford  Clinic Number: [id number]    Therapy Diagnosis:   Encounter Diagnosis   Name Primary?    Right sided weakness          Physician: Yamileth Niño MD    Physician Orders: OT Eval and Treat  Medical Diagnosis: History of ischemic vertebrobasilar artery brainstem stroke  Surgical Procedure and Date: n/a  Evaluation Date: 3/7/19  Insurance Authorization Period Expiration: 2/14/20  Current Plan of Care Certification Period: 07/30/19 to 08/30/19  Date of Return to MD: TBD  Visit # / Visits authorized: 8 / 20 (26)    Time In: 10:00 am  Time Out: 11:00:00 am  Total Billable Time: 60 minutes (4TE)       Subjective     Pt reports: "my shoulder is getting stronger, but it seems like it's always so tight. It's mostly in the morning though"  Response to previous treatment: no adverse reactions. Increased activity tolerance noted  Functional change:  & hold objects, including grocery bags using RUE     Pain: 0/10  Location: n/a    Objective     Cy received therapeutic exercises for 60 minutes direct care including:    UBE (forward/backwards) 10 minutes, lvl 3.0   Pulleys (flex/ext & abd/add) 5 min each, with 5 sec stretch at end range of R shld   Side lying ABD to 90 & ER 10 reps, w/ 3# wt w/ assist   Side lying Scap Stabilization CW/CCW 10 reps, w/ 3# wt w/ assist   Side lying Ext/IR stretch 3 reps, 30 sec hold   Supine dowel chest press 2/10 reps, 4# wt, hands shld width   Supine dowel shoulder flex stretch 2/10 reps, 4# wt, hands shld width   Supine tricep extension 10 reps, 3# db   Theraband  -rows-red  -scap retraction-red  -triceps extension -red   Horiz Abd (w/ assist)  - biceps curls-green  -ER-red 10 reps each   Biceps curl-dowel (not today) 10 reps each, 5#    Wrist 3 ways over wedge  (not today) 10 reps each, 2# wt   Smiles & frowns Green T-bar; 2/15 reps   Deluxe Gripper-setting 4 3/10 reps   T-Putty red   -molding (not " today) 3 min   -grasp/manipulation (not today) 3 reps   -Log roll & tripod pinch 2 trial each   -dowel digs 3 min        Home Exercises and Education Provided     Education provided:  Reviewed HEP; discussed modifications to some HEP for accomodation of decreased shoulder ROM; patient was provided with a written handout of Shoulder T-band exercises and a red T-band today    Written Home Exercises Provided: Patient instructed to cont prior HEP.  Exercises were reviewed and Cy was able to demonstrate them prior to the end of the session.  Cy demonstrated good  understanding of the HEP provided.     See EMR under Patient Instructions for exercises provided prior visit.        Assessment     Patient continues to tolerate treatment well. He does have some fatigue by the end of treatment session, but he is able to complete all planned TE's.  He appears to be gaining greater core strength of his RUE.  Pt prognosis is Good.      Pt will continue to benefit from skilled outpatient occupational therapy to address the deficits listed in the problem list on initial evaluation provide pt/family education and to maximize pt's level of independence in the home and community environment.     Anticipated barriers to occupational therapy: none noted    Pt's spiritual, cultural and educational needs considered and pt agreeable to plan of care and goals.    Plan     Updated Certification Period:  07/30/19 to 08/30/19    Recommended Treatment Plan: 2 times per week for 4 weeks: Manual Therapy, Neuromuscular Re-ed, Patient Education, Self Care, Therapeutic Activites and Therapeutic Exercise    Jhony Sullivan OTL

## 2019-08-21 ENCOUNTER — CLINICAL SUPPORT (OUTPATIENT)
Dept: REHABILITATION | Facility: HOSPITAL | Age: 74
End: 2019-08-21
Payer: MEDICARE

## 2019-08-21 DIAGNOSIS — R53.1 RIGHT SIDED WEAKNESS: ICD-10-CM

## 2019-08-21 PROCEDURE — 97110 THERAPEUTIC EXERCISES: CPT | Mod: PO

## 2019-08-21 NOTE — PROGRESS NOTES
"    Occupational Therapy Daily Treatment Note     Date: 8/21/2019  Name: Cy Rutherford  Clinic Number: [id number]    Therapy Diagnosis:   Encounter Diagnosis   Name Primary?    Right sided weakness          Physician: Yamileth Niño MD    Physician Orders: OT Eval and Treat  Medical Diagnosis: History of ischemic vertebrobasilar artery brainstem stroke  Surgical Procedure and Date: n/a  Evaluation Date: 3/7/19  Insurance Authorization Period Expiration: 2/14/20  Current Plan of Care Certification Period: 07/30/19 to 08/30/19  Date of Return to MD: TBD  Visit # / Visits authorized: 9 / 20 (26)    Time In: 11:00 am  Time Out: 12:00:00 pm  Total Billable Time: 60 minutes (4TE)       Subjective     Pt reports: "my shoulder is getting stronger, but it seems like it's always so tight. It's mostly in the morning though"  Response to previous treatment: no adverse reactions. Increased activity tolerance noted  Functional change:  & hold objects, including grocery bags using RUE     Pain: 0/10  Location: n/a    Objective     Cy received therapeutic exercises for 60 minutes direct care including:    UBE (forward/backwards) 10 minutes, lvl 3.0   Pulleys (flex/ext & abd/add) 5 min each, with 5 sec stretch at end range of R shld   Side lying ABD to 90 & ER 10 reps, w/ 3# wt w/ assist   Side lying Scap Stabilization CW/CCW 10 reps, w/ 3# wt w/ assist   Side lying Ext/IR stretch 3 reps, 30 sec hold   Supine dowel chest press 2/10 reps, 4# wt, hands shld width   Supine dowel shoulder flex stretch 2/10 reps, 4# wt, hands shld width   Supine tricep extension 10 reps, 3# db   Theraband  -rows-red  -scap retraction-red  -triceps extension -red   Horiz Abd (w/ assist)  - biceps curls-green  -ER-red 10 reps each   Biceps curl-dowel 10 reps each, 6#    Wrist 3 ways over wedge  10 reps each, 3# wt   Smiles & frowns Green T-bar; 2/15 reps   Deluxe Gripper-setting 4 4/10 reps   T-Putty red   -molding 3 min   -grasp/manipulation 3 " reps   -Log roll & tripod pinch 2 trial each   -dowel digs 3 min        Home Exercises and Education Provided     Education provided:  Reviewed HEP; discussed modifications to some HEP for accomodation of decreased shoulder ROM; patient was provided with a written handout of Shoulder T-band exercises and a red T-band today    Written Home Exercises Provided: Patient instructed to cont prior HEP.  Exercises were reviewed and Cy was able to demonstrate them prior to the end of the session.  Cy demonstrated good  understanding of the HEP provided.     See EMR under Patient Instructions for exercises provided prior visit.        Assessment     Patient tolerated progression of treatment well today. He had minimal fatigue.  Pt prognosis is Good.      Pt will continue to benefit from skilled outpatient occupational therapy to address the deficits listed in the problem list on initial evaluation provide pt/family education and to maximize pt's level of independence in the home and community environment.     Anticipated barriers to occupational therapy: none noted    Pt's spiritual, cultural and educational needs considered and pt agreeable to plan of care and goals.    Plan     Updated Certification Period:  07/30/19 to 08/30/19    Recommended Treatment Plan: 2 times per week for 4 weeks: Manual Therapy, Neuromuscular Re-ed, Patient Education, Self Care, Therapeutic Activites and Therapeutic Exercise    Jhony Sullivan, ALISONL

## 2019-08-26 ENCOUNTER — CLINICAL SUPPORT (OUTPATIENT)
Dept: REHABILITATION | Facility: HOSPITAL | Age: 74
End: 2019-08-26
Payer: MEDICARE

## 2019-08-26 DIAGNOSIS — R53.1 RIGHT SIDED WEAKNESS: ICD-10-CM

## 2019-08-26 PROCEDURE — 97110 THERAPEUTIC EXERCISES: CPT | Mod: PO

## 2019-08-26 NOTE — PROGRESS NOTES
"    Occupational Therapy Daily Treatment Note     Date: 8/26/2019  Name: Cy Rutherford  Clinic Number: [id number]    Therapy Diagnosis:   Encounter Diagnosis   Name Primary?    Right sided weakness          Physician: Yamileth Niño MD    Physician Orders: OT Eval and Treat  Medical Diagnosis: History of ischemic vertebrobasilar artery brainstem stroke  Surgical Procedure and Date: n/a  Evaluation Date: 3/7/19  Insurance Authorization Period Expiration: 2/14/20  Current Plan of Care Certification Period: 07/30/19 to 08/30/19  Date of Return to MD: TBD  Visit # / Visits authorized: 10 / 20 (27)    Time In: 9:55 am  Time Out: 11:00 am  Total Billable Time: 65 minutes (4TE)       Subjective     Pt reports: "I have a little more coordination in my hand now. I'm not dropping as much stuff"  Response to previous treatment: no adverse reactions. Increased activity tolerance noted  Functional change:  & hold objects, including grocery bags using RUE     Pain: 0/10  Location: n/a    Objective     Cy received therapeutic exercises for 65 minutes direct care including:    UBE (forward/backwards) 10 minutes, lvl 3.5   Pulleys (flex/ext & abd/add) 5 min each, with 5 sec stretch at end range of R shld   Side lying ABD to 90 & ER 10 reps, w/ 0# wt w/ out assist   Side lying Scap Stabilization CW/CCW (not today) 10 reps, w/ 3# wt w/ assist   Side lying Ext/IR stretch 3 reps, 30 sec hold   Supine dowel chest press (not today) 2/10 reps, 4# wt    Supine dowel shoulder flex stretch 2/10 reps, 4# wt, hands shld width   Supine tricep extension 2/10 reps, 2#    Theraband  -rows-red  -scap retraction-red  -triceps extension -red   Horiz Abd (w/ assist)  - biceps curls-green  -ER-red (not today) 10 reps each   Biceps curl-dowel 10 reps each, 6#    Wrist 3 ways over wedge  (not today) 10 reps each, 3# wt   Smiles & frowns Green T-bar; 2/15 reps   Deluxe Gripper-setting 4 4/10 reps   Octopus In-hand manipulation  3 trials     "   T-Putty red   -molding 3 min   -grasp/manipulation 3 reps   -Log roll & tripod pinch 2 trial each   -dowel digs 3 min   -rocking/slicing with wedge tool 3 min        Home Exercises and Education Provided     Education provided:  Reviewed HEP    Written Home Exercises Provided: Patient instructed to cont prior HEP.  Exercises were reviewed and Cy was able to demonstrate them prior to the end of the session.  Cy demonstrated good  understanding of the HEP provided.     See EMR under Patient Instructions for exercises provided prior visit.        Assessment     Patient continues to tolerate progression of treatment well. No signs of fatigue.  Increased gross & fine motor coordination is noted today during TE's.  Pt prognosis is Good.      Pt will continue to benefit from skilled outpatient occupational therapy to address the deficits listed in the problem list on initial evaluation provide pt/family education and to maximize pt's level of independence in the home and community environment.     Anticipated barriers to occupational therapy: none noted    Pt's spiritual, cultural and educational needs considered and pt agreeable to plan of care and goals.    Plan     Updated Certification Period:  07/30/19 to 08/30/19    Recommended Treatment Plan: 2 times per week for 4 weeks: Manual Therapy, Neuromuscular Re-ed, Patient Education, Self Care, Therapeutic Activites and Therapeutic Exercise    Jhony Sullivan OTL

## 2019-08-28 ENCOUNTER — CLINICAL SUPPORT (OUTPATIENT)
Dept: REHABILITATION | Facility: HOSPITAL | Age: 74
End: 2019-08-28
Payer: MEDICARE

## 2019-08-28 DIAGNOSIS — R53.1 RIGHT SIDED WEAKNESS: ICD-10-CM

## 2019-08-28 PROCEDURE — 97110 THERAPEUTIC EXERCISES: CPT | Mod: PO

## 2019-08-28 NOTE — PROGRESS NOTES
"    Occupational Therapy Daily Treatment Note     Date: 8/28/2019  Name: Cy Rutherford  Clinic Number: [id number]    Therapy Diagnosis:   Encounter Diagnosis   Name Primary?    Right sided weakness          Physician: Yamileth Niño MD    Physician Orders: OT Eval and Treat  Medical Diagnosis: History of ischemic vertebrobasilar artery brainstem stroke  Surgical Procedure and Date: n/a  Evaluation Date: 3/7/19  Insurance Authorization Period Expiration: 2/14/20  Current Plan of Care Certification Period: 07/30/19 to 08/30/19  Date of Return to MD: TBD  Visit # / Visits authorized: 11 / 20 (28)    Time In: 11:00 am  Time Out: 12:00 pm   Total Billable Time: 60 minutes (4TE)       Subjective     Pt reports: "I do exercises everyday, but not all of them. Some days I don't do the putty exercises"  Response to previous treatment: no adverse reactions. Increased activity tolerance noted  Functional change:  & hold objects, including grocery bags using RUE     Pain: 0/10  Location: n/a    Objective     Cy received therapeutic exercises for 60 minutes direct care including:    UBE (forward/backwards) 10 minutes, lvl 3.0   Pulleys (flex/ext & abd/add) 5 min each, with 5 sec stretch at end range of R shld   Side lying ABD to 90 & ER 10 reps, w/ 0# wt w/ out assist   Side lying Scap Stabilization CW/CCW (not today) 10 reps, w/ 3# wt w/ assist   Side lying Ext/IR stretch 3 reps, 30 sec hold   Seated dowel chest press 2/10 reps, 4# wt    Seated dowel shoulder flex stretch 2/10 reps, 4# wt, hands shld width   Dowel Biceps curl 2/10 reps each, 6#    Supine tricep extension 2/10 reps, 2#    Theraband  -rows-red  -scap retraction-red  -triceps extension -red   Horiz Abd (w/ assist)  - biceps curls-green  -ER-red (not today) 10 reps each       Wrist 3 ways over wedge  10 reps each, 3# wt   Smiles & frowns Green T-bar; 2/15 reps   Deluxe Gripper-setting 4 4/10 reps   Octopus In-hand manipulation  3 trials       T-Putty red "   -molding 3 min   -grasp/manipulation 3 reps   -Log roll & tripod pinch (not today) 2 trial each   -dowel digs 3 min   -rocking/slicing with wedge tool (not today) 3 min        Home Exercises and Education Provided     Education provided:  Reviewed HEP    Written Home Exercises Provided: Patient instructed to cont prior HEP.  Exercises were reviewed and Cy was able to demonstrate them prior to the end of the session.  Cy demonstrated good  understanding of the HEP provided.     See EMR under Patient Instructions for exercises provided prior visit.        Assessment     Patient continues to tolerate progression of treatment well. No signs of fatigue.  Increased gross & fine motor coordination is noted today during TE's.  Pt prognosis is Good.      Pt will continue to benefit from skilled outpatient occupational therapy to address the deficits listed in the problem list on initial evaluation provide pt/family education and to maximize pt's level of independence in the home and community environment.     Anticipated barriers to occupational therapy: none noted    Pt's spiritual, cultural and educational needs considered and pt agreeable to plan of care and goals.    Plan     Updated Certification Period:  07/30/19 to 08/30/19    Recommended Treatment Plan: 2 times per week for 4 weeks: Manual Therapy, Neuromuscular Re-ed, Patient Education, Self Care, Therapeutic Activites and Therapeutic Exercise    Jhony Sullivan OTL

## 2019-08-30 ENCOUNTER — CLINICAL SUPPORT (OUTPATIENT)
Dept: REHABILITATION | Facility: HOSPITAL | Age: 74
End: 2019-08-30
Payer: MEDICARE

## 2019-08-30 DIAGNOSIS — I25.10 CORONARY ARTERY DISEASE, ANGINA PRESENCE UNSPECIFIED, UNSPECIFIED VESSEL OR LESION TYPE, UNSPECIFIED WHETHER NATIVE OR TRANSPLANTED HEART: ICD-10-CM

## 2019-08-30 DIAGNOSIS — R53.1 RIGHT SIDED WEAKNESS: ICD-10-CM

## 2019-08-30 DIAGNOSIS — I10 ESSENTIAL HYPERTENSION: ICD-10-CM

## 2019-08-30 PROCEDURE — 97110 THERAPEUTIC EXERCISES: CPT | Mod: PO

## 2019-08-30 RX ORDER — ROSUVASTATIN CALCIUM 20 MG/1
20 TABLET, COATED ORAL DAILY
Qty: 90 TABLET | Refills: 3 | Status: CANCELLED | OUTPATIENT
Start: 2019-08-30

## 2019-08-30 RX ORDER — CLOPIDOGREL BISULFATE 75 MG/1
75 TABLET ORAL DAILY
Qty: 90 TABLET | Refills: 3 | Status: CANCELLED
Start: 2019-08-30 | End: 2020-08-29

## 2019-08-30 RX ORDER — CHLORTHALIDONE 25 MG/1
25 TABLET ORAL DAILY
Qty: 90 TABLET | Refills: 3 | Status: CANCELLED | OUTPATIENT
Start: 2019-08-30 | End: 2020-08-29

## 2019-08-30 RX ORDER — AMLODIPINE BESYLATE 5 MG/1
5 TABLET ORAL DAILY
Qty: 90 TABLET | Refills: 3 | Status: CANCELLED | OUTPATIENT
Start: 2019-08-30

## 2019-08-30 NOTE — TELEPHONE ENCOUNTER
[facility] pharmacy faxed over refill request for pt   Amlodipine, chlorthalidone, crestor and clopidogrel

## 2019-08-30 NOTE — PROGRESS NOTES
Outpatient Therapy Discharge Summary     Name: Cy Rutherford  Clinic Number: [id number]    Therapy Diagnosis:   Encounter Diagnosis   Name Primary?    Right sided weakness      Physician: Yamileth Niño MD    Physician Orders: OT Eval and Treat  Medical Diagnosis: History of ischemic vertebrobasilar artery brainstem stroke  Surgical Procedure and Date: n/a  Evaluation Date: 3/7/19    Date of Last visit: 8/30/2019   Total Visits Received: 29  Cancelled Visits: 1  No Show Visits: 0      Objective     Objective measures taken today are as follows;  Range of Motion and Manual Muscle Test  Right 05/10/19 05/10/19 06/05/19 06/05/19 07/17/19 07/29/19 08/30/19 08/30/19   Shoulder AROM MMT AROM PROM AROM AROM AROM MMT   Flexion 120 (+9) 4/5 125 (+5) 130 120 120 126 (+6) 4+/5   Abduction 100 (+10) 4/5 100 (=) 125 100 115 130 (+15) 4/5   ER at 0 40 (+10) 4/5 50 (+10)   55 (+5)   50 (-5) 4+/5   ER at 90 60 (+8)  3+/5 80 (+20)   82 (+2)   80 (-2) 4/5   IR L3 (+)  2+/5 L1   L1-T12   L1-T12 (=) 3+/5                     Supination 68 (+3) 4/5 72 (+4)   65   70 (+5) 4/5       Strength (Dyanmometer) and Pinch Strength (Pinch Gauge)  Measured in pounds and psi.     3/7/2019 3/7/2019 05/10/19 06/05/19 07/17/19 07/29/19 08/30/19     Left Right Right Right Right Right Right   Rung II 80 30 42 (+8) 35 * 38 40 42 (+2)   Jay Pinch 20 11 13 (+1.5) 11 * 9 10 10 (=)   3pt Pinch 15 6 9 (+2) 9 * 6 10 8.5 (-1.5)   2pt Pinch 20 9 10 (+1) 5 * 4 4 5 (+1)          07/29/19 07/29/19 08/30/19   9 Peg Test Left  Right Right   Removed 9/9 8/9 9/9   Replaced 9/9 3/9 5/9   Time 34 sec 1.06 sec 1.10 sec          Assessment      Patient has made good progress towards all goals set at initial evaluation, including increased RUE ROM, strength and coordination for improved functioning in light to moderate ADL/IADL's.  He has reached maximum benefit from OT at this time and would benefit from continuing HEP to progress with strengthening.     Goals:   1)   Patient independent in final Hep-met  2)  Patient with R shoulder AROM WFL, all planes of mobility for improved performance with overhead ADL's- met  3)  Pt will demonstrate increased MMT by at least 1/2 grade grossly in RUE-met for 4/6 planes tested  4)  Increase  strength 2-3 lbs. to grasp heavier items during household management tasks-met   5)  Increase pinch 1-3 psis for turning keys and opening up water bottles-met  6)   Patient to score no more than 20% limitation on FOTO to demonstrate improved perception of functional right hand use.-met       Discharge reason: Patient has met all of his/her goals and Patient has reached the maximum rehab potential for the present time    Plan     This patient is discharged from Occupational Therapy        Occupational Therapy Daily Treatment Note     Date: 8/30/2019  Name: Cy Rutherford  Clinic Number: [id number]    Therapy Diagnosis:   Encounter Diagnosis   Name Primary?    Right sided weakness          Physician: Yamileth Niño MD    Insurance Authorization Period Expiration: 2/14/20  Current Plan of Care Certification Period: 07/30/19 to 08/30/19  Date of Return to MD: TBD  Visit # / Visits authorized: 11 / 20 (29)    Time In: 11:00 am  Time Out: 12:00 pm   Total Billable Time: 40 minutes (3TE)       Subjective     Pt reports: he feels he can do a lot more in his ADL/IADL's using his RUE compared to when he started OT  Response to previous treatment: no adverse reactions. Increased activity tolerance noted  Functional change:  & hold objects, including grocery bags using RUE     Pain: 0/10  Location: n/a    Objective     Cy received therapeutic exercises for 40 minutes direct care and 20 minutes of supervised care including:  Objective measures taken today, see above for details    UBE (forward/backwards) 10 minutes, lvl 3.0   Pulleys (flex/ext & abd/add) 5 min each, with 5 sec stretch at end range of R shld   Side lying ABD to 90 & ER 10 reps, w/ 0# wt  w/ out assist   Side lying Scap Stabilization CW/CCW (not today) 10 reps, w/ 3# wt w/ assist       Wrist 3 ways over wedge  10 reps each, 3# wt   Smiles & frowns Green T-bar; 2/15 reps   Deluxe Gripper-setting 4 4/10 reps   Octopus In-hand manipulation  3 trials       T-Putty red   -molding 3 min   -grasp/manipulation 3 reps   -Log roll & tripod pinch 2 trial each   -dowel digs 3 min   -rocking/slicing with wedge tool (not today) 3 min        Home Exercises and Education Provided     Education provided:  Instructions provided on final HEP recommendations    Written Home Exercises Provided: Patient instructed to cont prior HEP.  Exercises were reviewed and Cy was able to demonstrate them prior to the end of the session.  Cy demonstrated good  understanding of the HEP provided.     See EMR under Patient Instructions for exercises provided prior visit.        Assessment     See assessment above for details for D/C summary    Pt's spiritual, cultural and educational needs considered and pt agreeable to plan of care and goals.    Plan     D/C from OT at his time with patient to follow up with referring MD, as directed and continue OT as instructed today    KARISSA Simmons

## 2019-09-03 DIAGNOSIS — I25.10 CORONARY ARTERY DISEASE, ANGINA PRESENCE UNSPECIFIED, UNSPECIFIED VESSEL OR LESION TYPE, UNSPECIFIED WHETHER NATIVE OR TRANSPLANTED HEART: ICD-10-CM

## 2019-09-03 DIAGNOSIS — I10 ESSENTIAL HYPERTENSION: ICD-10-CM

## 2019-09-03 NOTE — TELEPHONE ENCOUNTER
----- Message from Yamila Salcedo sent at 9/3/2019  3:32 PM CDT -----  Contact: [facility] [phone]  [facility] is requesting NEW 90 day prescriptions for the followin. amLODIPine (NORVASC) 5 MG tablet  2. chlorthalidone (HYGROTEN) 25 MG Tab  3. clopidogrel (PLAVIX) 75 mg tablet  4. doxazosin (CARDURA) 8 MG Tab  5. losartan (COZAAR) 50 MG tablet  6. metoprolol succinate (TOPROL-XL) 100 MG 24 hr tablet  7. rosuvastatin (CRESTOR) 20 MG tablet  PHARMACY: [facility]

## 2019-09-04 ENCOUNTER — PATIENT OUTREACH (OUTPATIENT)
Dept: OTHER | Facility: OTHER | Age: 74
End: 2019-09-04

## 2019-09-04 NOTE — PROGRESS NOTES
"Last 5 Patient Entered Readings                                      Current 30 Day Average: 133/56     Recent Readings 9/4/2019 9/4/2019 9/3/2019 9/2/2019 8/29/2019    SBP (mmHg) 158 168 129 170 119    DBP (mmHg) 66 73 52 76 47    Pulse 56 54 47 55 48        Patient attributes "spikes" in BP readings to medication "wearing off".     Digital Medicine: Health  Follow Up    Lifestyle Modifications:    1.Dietary Modifications (Sodium intake <2,000mg/day, food labels, dining out): Reports his water intake has increased "some", but does not specifically track. Reports drinking 1 soda per day. Reports continued efforts to limit sodium intake, reports limiting meals to "5-600 mg" sodium. Reports eating pre-prepared meals from the grocery store, but is reading food labels.     2.Physical Activity: Reports tightness in right hip and calves muscles when he wakes up, but improves by lunch time. Reports he is working with physical therapist about this and verbalized intent to discuss with cardiologist tomorrow.     3.Medication Therapy: Patient has been compliant with the medication regimen.    4.Patient has the following medication side effects/concerns: none    Follow up with Mr. Cy DARDEN Krish completed. No further questions or concerns. Will continue to follow up to achieve health goals.    "

## 2019-09-05 ENCOUNTER — OFFICE VISIT (OUTPATIENT)
Dept: CARDIOLOGY | Facility: CLINIC | Age: 74
End: 2019-09-05
Payer: MEDICARE

## 2019-09-05 VITALS
SYSTOLIC BLOOD PRESSURE: 134 MMHG | OXYGEN SATURATION: 97 % | WEIGHT: 222.5 LBS | DIASTOLIC BLOOD PRESSURE: 58 MMHG | BODY MASS INDEX: 33.83 KG/M2 | HEART RATE: 47 BPM

## 2019-09-05 DIAGNOSIS — Z95.5 STATUS POST CORONARY ARTERY STENT PLACEMENT: ICD-10-CM

## 2019-09-05 DIAGNOSIS — R53.1 RIGHT SIDED WEAKNESS: ICD-10-CM

## 2019-09-05 DIAGNOSIS — I10 ESSENTIAL HYPERTENSION: ICD-10-CM

## 2019-09-05 DIAGNOSIS — I21.09: ICD-10-CM

## 2019-09-05 DIAGNOSIS — I69.398 ABNORMALITY OF GAIT AS LATE EFFECT OF CEREBROVASCULAR ACCIDENT (CVA): ICD-10-CM

## 2019-09-05 DIAGNOSIS — G46.7 LACUNAR ATAXIC HEMIPARESIS: ICD-10-CM

## 2019-09-05 DIAGNOSIS — I67.9 LACUNAR ATAXIC HEMIPARESIS: ICD-10-CM

## 2019-09-05 DIAGNOSIS — E11.22 TYPE 2 DIABETES MELLITUS WITH CHRONIC KIDNEY DISEASE, WITHOUT LONG-TERM CURRENT USE OF INSULIN, UNSPECIFIED CKD STAGE: ICD-10-CM

## 2019-09-05 DIAGNOSIS — R26.9 ABNORMALITY OF GAIT AS LATE EFFECT OF CEREBROVASCULAR ACCIDENT (CVA): ICD-10-CM

## 2019-09-05 DIAGNOSIS — I25.10 CORONARY ARTERY DISEASE INVOLVING NATIVE CORONARY ARTERY OF NATIVE HEART WITHOUT ANGINA PECTORIS: Primary | ICD-10-CM

## 2019-09-05 DIAGNOSIS — I24.9 ACUTE CORONARY SYNDROME: ICD-10-CM

## 2019-09-05 DIAGNOSIS — Z85.038 HISTORY OF COLON CANCER: ICD-10-CM

## 2019-09-05 DIAGNOSIS — I73.9 PAD (PERIPHERAL ARTERY DISEASE): ICD-10-CM

## 2019-09-05 DIAGNOSIS — I67.2 INTRACRANIAL ATHEROSCLEROSIS: ICD-10-CM

## 2019-09-05 DIAGNOSIS — E78.5 HYPERLIPIDEMIA, UNSPECIFIED HYPERLIPIDEMIA TYPE: ICD-10-CM

## 2019-09-05 PROCEDURE — 3075F SYST BP GE 130 - 139MM HG: CPT | Mod: CPTII,S$GLB,, | Performed by: INTERNAL MEDICINE

## 2019-09-05 PROCEDURE — 99214 PR OFFICE/OUTPT VISIT, EST, LEVL IV, 30-39 MIN: ICD-10-PCS | Mod: S$GLB,,, | Performed by: INTERNAL MEDICINE

## 2019-09-05 PROCEDURE — 3075F PR MOST RECENT SYSTOLIC BLOOD PRESS GE 130-139MM HG: ICD-10-PCS | Mod: CPTII,S$GLB,, | Performed by: INTERNAL MEDICINE

## 2019-09-05 PROCEDURE — 99999 PR PBB SHADOW E&M-EST. PATIENT-LVL III: CPT | Mod: PBBFAC,,, | Performed by: INTERNAL MEDICINE

## 2019-09-05 PROCEDURE — 3078F PR MOST RECENT DIASTOLIC BLOOD PRESSURE < 80 MM HG: ICD-10-PCS | Mod: CPTII,S$GLB,, | Performed by: INTERNAL MEDICINE

## 2019-09-05 PROCEDURE — 3078F DIAST BP <80 MM HG: CPT | Mod: CPTII,S$GLB,, | Performed by: INTERNAL MEDICINE

## 2019-09-05 PROCEDURE — 1101F PT FALLS ASSESS-DOCD LE1/YR: CPT | Mod: CPTII,S$GLB,, | Performed by: INTERNAL MEDICINE

## 2019-09-05 PROCEDURE — 99214 OFFICE O/P EST MOD 30 MIN: CPT | Mod: S$GLB,,, | Performed by: INTERNAL MEDICINE

## 2019-09-05 PROCEDURE — 99999 PR PBB SHADOW E&M-EST. PATIENT-LVL III: ICD-10-PCS | Mod: PBBFAC,,, | Performed by: INTERNAL MEDICINE

## 2019-09-05 PROCEDURE — 1101F PR PT FALLS ASSESS DOC 0-1 FALLS W/OUT INJ PAST YR: ICD-10-PCS | Mod: CPTII,S$GLB,, | Performed by: INTERNAL MEDICINE

## 2019-09-05 RX ORDER — ROSUVASTATIN CALCIUM 20 MG/1
20 TABLET, COATED ORAL DAILY
Qty: 90 TABLET | Refills: 3 | Status: SHIPPED | OUTPATIENT
Start: 2019-09-05 | End: 2020-10-08

## 2019-09-05 RX ORDER — DOXAZOSIN 8 MG/1
8 TABLET ORAL NIGHTLY
Qty: 90 TABLET | Refills: 3 | Status: SHIPPED | OUTPATIENT
Start: 2019-09-05 | End: 2020-09-24

## 2019-09-05 RX ORDER — CLOPIDOGREL BISULFATE 75 MG/1
75 TABLET ORAL DAILY
Qty: 90 TABLET | Refills: 3
Start: 2019-09-05 | End: 2019-10-03 | Stop reason: SDUPTHER

## 2019-09-05 RX ORDER — AMLODIPINE BESYLATE 5 MG/1
5 TABLET ORAL DAILY
Qty: 90 TABLET | Refills: 3 | Status: ON HOLD | OUTPATIENT
Start: 2019-09-05 | End: 2019-12-18 | Stop reason: HOSPADM

## 2019-09-05 RX ORDER — CHLORTHALIDONE 25 MG/1
25 TABLET ORAL DAILY
Qty: 90 TABLET | Refills: 3 | Status: ON HOLD | OUTPATIENT
Start: 2019-09-05 | End: 2019-12-18 | Stop reason: HOSPADM

## 2019-09-05 RX ORDER — METOPROLOL SUCCINATE 100 MG/1
100 TABLET, EXTENDED RELEASE ORAL 2 TIMES DAILY
Qty: 180 TABLET | Refills: 3 | Status: SHIPPED | OUTPATIENT
Start: 2019-09-05 | End: 2020-09-24

## 2019-09-05 RX ORDER — LOSARTAN POTASSIUM 50 MG/1
50 TABLET ORAL 2 TIMES DAILY
Qty: 180 TABLET | Refills: 3 | Status: ON HOLD | OUTPATIENT
Start: 2019-09-05 | End: 2019-12-18 | Stop reason: HOSPADM

## 2019-09-05 NOTE — PROGRESS NOTES
Subjective:    Patient ID:  Cy Rutherford is a 73 y.o. male who presents for follow-up of Coronary Artery Disease      HPI     74 y/o male former pt of Dr Fish. 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   Had CVA last year with residual weakness. Holter ordered without significant arrhythmias (did show PAT).   Had been having uncontrolled HTN and chlorthalidone added to regimen along with doxazosin (also on losartan, metoprolol and amlodipine). -130's with increase in doxazosin and decrease in amlodipine (leg swelling). He denies regular CP, orthopnea, PND, palps, syncope. Continues to have bilateral LE edema, improved with intermittent lasix. Mild NASSAR. He is compliant with his meds. Remote smoking hx. No non healing ulceration or evidence of limb ischemia. Currently on DAPT and statin for secondary stroke prevention.     Review of Systems   Constitution: Negative for malaise/fatigue.   HENT: Negative for congestion.    Eyes: Negative for blurred vision.   Cardiovascular: Positive for dyspnea on exertion and leg swelling. Negative for chest pain, claudication, cyanosis, irregular heartbeat, near-syncope, orthopnea, palpitations, paroxysmal nocturnal dyspnea and syncope.   Respiratory: Negative for shortness of breath.    Endocrine: Negative for polyuria.   Hematologic/Lymphatic: Negative for bleeding problem.   Skin: Negative for itching and rash.   Musculoskeletal: Positive for back pain and muscle weakness. Negative for joint swelling and muscle cramps.   Gastrointestinal: Negative for abdominal pain, hematemesis,  hematochezia, melena, nausea and vomiting.   Genitourinary: Negative for dysuria and hematuria.   Neurological: Positive for focal weakness. Negative for dizziness, headaches, light-headedness, loss of balance and weakness.   Psychiatric/Behavioral: Negative for depression. The patient is not nervous/anxious.         Objective:    Physical Exam   Constitutional: He is oriented to person, place, and time. He appears well-developed and well-nourished.   HENT:   Head: Normocephalic and atraumatic.   Neck: Neck supple. No JVD present.   Cardiovascular: Normal rate, regular rhythm and normal heart sounds.   Pulses:       Carotid pulses are 2+ on the right side, and 2+ on the left side.       Radial pulses are 2+ on the right side, and 2+ on the left side.        Femoral pulses are 2+ on the right side, and 2+ on the left side.       Dorsalis pedis pulses are 2+ on the right side, and 2+ on the left side.        Posterior tibial pulses are 2+ on the right side, and 2+ on the left side.   Pulmonary/Chest: Effort normal and breath sounds normal.   Abdominal: Soft. Bowel sounds are normal.   Musculoskeletal: He exhibits edema.   Neurological: He is alert and oriented to person, place, and time.   Skin: Skin is warm and dry.   Psychiatric: He has a normal mood and affect. His behavior is normal. Thought content normal.         Assessment:       1. Coronary artery disease involving native coronary artery of native heart without angina pectoris    2. Status post coronary artery stent placement    3. PAD (peripheral artery disease)    4. Acute coronary syndrome    5. Acute MI anterior wall first episode care    6. Essential hypertension    7. Hyperlipidemia, unspecified hyperlipidemia type    8. Intracranial atherosclerosis    9. 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 Ataxic hemiparesis    11. History of colon cancer    12. Type 2 diabetes mellitus with chronic kidney disease, without  long-term current use of insulin, unspecified CKD stage    13. Right sided weakness      74 y/o pt with hx and presentation as above. Doing well from a cardiac perspective and compensated from a HF perspective. Will increase lasix to daily for a couple of weeks and assess response. Discussed the etiology, evaluation, and management of CAD. Discussed the importance of med compliance, heart healthy diet, and regular exercise.      Plan:       -Increase lasix to daily for 2 weeks  -f/u in 3 months

## 2019-09-10 ENCOUNTER — OFFICE VISIT (OUTPATIENT)
Dept: NEUROLOGY | Facility: CLINIC | Age: 74
End: 2019-09-10
Payer: MEDICARE

## 2019-09-10 VITALS
BODY MASS INDEX: 32.89 KG/M2 | DIASTOLIC BLOOD PRESSURE: 61 MMHG | WEIGHT: 217 LBS | SYSTOLIC BLOOD PRESSURE: 154 MMHG | HEIGHT: 68 IN | HEART RATE: 59 BPM

## 2019-09-10 DIAGNOSIS — I69.398 ABNORMALITY OF GAIT AS LATE EFFECT OF CEREBROVASCULAR ACCIDENT (CVA): ICD-10-CM

## 2019-09-10 DIAGNOSIS — I67.2 INTRACRANIAL ATHEROSCLEROSIS: ICD-10-CM

## 2019-09-10 DIAGNOSIS — G46.7 LACUNAR ATAXIC HEMIPARESIS: ICD-10-CM

## 2019-09-10 DIAGNOSIS — R53.1 RIGHT SIDED WEAKNESS: ICD-10-CM

## 2019-09-10 DIAGNOSIS — R26.9 ABNORMALITY OF GAIT AS LATE EFFECT OF CEREBROVASCULAR ACCIDENT (CVA): ICD-10-CM

## 2019-09-10 DIAGNOSIS — E78.5 HYPERLIPIDEMIA, UNSPECIFIED HYPERLIPIDEMIA TYPE: Primary | ICD-10-CM

## 2019-09-10 DIAGNOSIS — Z00.6 RESEARCH SUBJECT: ICD-10-CM

## 2019-09-10 DIAGNOSIS — Z86.73 HISTORY OF ISCHEMIC VERTEBROBASILAR ARTERY BRAINSTEM STROKE: ICD-10-CM

## 2019-09-10 DIAGNOSIS — I10 ESSENTIAL HYPERTENSION: ICD-10-CM

## 2019-09-10 DIAGNOSIS — R29.898 WEAKNESS OF RIGHT LOWER EXTREMITY: ICD-10-CM

## 2019-09-10 DIAGNOSIS — I67.9 LACUNAR ATAXIC HEMIPARESIS: ICD-10-CM

## 2019-09-10 DIAGNOSIS — I73.9 PVD (PERIPHERAL VASCULAR DISEASE) WITH CLAUDICATION: ICD-10-CM

## 2019-09-10 PROCEDURE — 3077F SYST BP >= 140 MM HG: CPT | Mod: CPTII,S$GLB,, | Performed by: PSYCHIATRY & NEUROLOGY

## 2019-09-10 PROCEDURE — 99214 PR OFFICE/OUTPT VISIT, EST, LEVL IV, 30-39 MIN: ICD-10-PCS | Mod: S$GLB,,, | Performed by: PSYCHIATRY & NEUROLOGY

## 2019-09-10 PROCEDURE — 1101F PR PT FALLS ASSESS DOC 0-1 FALLS W/OUT INJ PAST YR: ICD-10-PCS | Mod: CPTII,S$GLB,, | Performed by: PSYCHIATRY & NEUROLOGY

## 2019-09-10 PROCEDURE — 3077F PR MOST RECENT SYSTOLIC BLOOD PRESSURE >= 140 MM HG: ICD-10-PCS | Mod: CPTII,S$GLB,, | Performed by: PSYCHIATRY & NEUROLOGY

## 2019-09-10 PROCEDURE — 3078F PR MOST RECENT DIASTOLIC BLOOD PRESSURE < 80 MM HG: ICD-10-PCS | Mod: CPTII,S$GLB,, | Performed by: PSYCHIATRY & NEUROLOGY

## 2019-09-10 PROCEDURE — 99999 PR PBB SHADOW E&M-EST. PATIENT-LVL IV: ICD-10-PCS | Mod: PBBFAC,,, | Performed by: PSYCHIATRY & NEUROLOGY

## 2019-09-10 PROCEDURE — 99999 PR PBB SHADOW E&M-EST. PATIENT-LVL IV: CPT | Mod: PBBFAC,,, | Performed by: PSYCHIATRY & NEUROLOGY

## 2019-09-10 PROCEDURE — 3078F DIAST BP <80 MM HG: CPT | Mod: CPTII,S$GLB,, | Performed by: PSYCHIATRY & NEUROLOGY

## 2019-09-10 PROCEDURE — 99214 OFFICE O/P EST MOD 30 MIN: CPT | Mod: S$GLB,,, | Performed by: PSYCHIATRY & NEUROLOGY

## 2019-09-10 PROCEDURE — 1101F PT FALLS ASSESS-DOCD LE1/YR: CPT | Mod: CPTII,S$GLB,, | Performed by: PSYCHIATRY & NEUROLOGY

## 2019-09-10 RX ORDER — CITALOPRAM 20 MG/1
20 TABLET, FILM COATED ORAL
COMMUNITY
Start: 2018-05-30 | End: 2019-10-03

## 2019-09-10 RX ORDER — INSULIN ASPART 100 [IU]/ML
INJECTION, SOLUTION INTRAVENOUS; SUBCUTANEOUS
Refills: 4 | Status: ON HOLD | COMMUNITY
Start: 2019-08-12 | End: 2019-12-18 | Stop reason: HOSPADM

## 2019-09-10 NOTE — PROGRESS NOTES
Neurology Clinic Visit (Stroke AF Study Participant)    Impression:  1. Stroke AF Study participant; randomized to usual care  2. 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 Mild basilar stenosis  4. HTN: in Digital HTN program  5. PVD with recent claudication    Stroke risk factors: prior stroke, lg vessel cerebrovascular athero, HTN, DM ,CAD, PAD    Plan:  1. Continue ASA 81mg/d and clopidogrel 75mg/d (stroke occurred shortly after stopping clopidogrel)  2. Continue rosuvastatin but will repeat FLP; target LDL <70mg/dL  3. F/U with Dr. Porter for primary issues  4. LE arterial studies ([place]) f/u with Dr. Sanchez to consider LE arterial studies  5. Continue Stroke AF participation; next study visit will be telephonic next month    Problem List Items Addressed This Visit        1 - High    Research subject    History of ischemic vertebrobasilar artery brainstem stroke    Overview     L pontine atherothromboembolic from L VA (V4) occlusion (also mild basilar athero) 5/6/18            2     Hyperlipidemia - Primary    Essential hypertension    Ataxic hemiparesis    Overview     right             Patient comes to clinic for f/u of stroke; he is a Stroke AF Study participant.  Last Study Visit was in May via telephone. No recurrent stroke symptoms. No palpitations/sudden LH etc.  Tolerating medications (taking ASA 81mg/d + clopidogrel 75mg/d as well as Crestor).      He has a hx of PVD and reports B calf tightness with ambulation over the last several weeks/months.  He recently saw Dr. Sanchez but didn't mention this to him.     He is in the Digital Htn program with home SBPs in the 130s.    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 90 tablet 3    aspirin (ECOTRIN) 81 MG EC tablet Take 81 mg by mouth once daily.      chlorthalidone (HYGROTEN) 25 MG Tab Take 1 tablet (25 mg total) by mouth once daily. 90 tablet 3    citalopram (CELEXA) 20 MG tablet Take 20 mg by mouth.      clopidogrel (PLAVIX) 75 mg tablet Take 1 tablet (75 mg total) by mouth once daily. 90 tablet 3    doxazosin (CARDURA) 8 MG Tab Take 1 tablet (8 mg total) by mouth every evening. 90 tablet 3    fenofibrate micronized (LOFIBRA) 134 MG Cap Take 1 capsule (134 mg total) by mouth nightly. 90 capsule 3    fish oil-omega-3 fatty acids 300-1,000 mg capsule Take by mouth once daily.      furosemide (LASIX) 20 MG tablet Take 1 tablet (20 mg total) by mouth once daily. 30 tablet 11    insulin glargine (LANTUS) 100 unit/mL injection Inject 60 Units into the skin every morning.      insulin lispro (HUMALOG) 100 unit/mL injection Inject into the skin 3 (three) times daily before meals.      losartan (COZAAR) 50 MG tablet Take 1 tablet (50 mg total) by mouth 2 (two) times daily. 180 tablet 3    metFORMIN (GLUCOPHAGE) 500 MG tablet Take 500 mg by mouth 2 (two) times daily with meals.      metoprolol succinate (TOPROL-XL) 100 MG 24 hr tablet Take 1 tablet (100 mg total) by mouth 2 (two) times daily. 180 tablet 3    multivitamin with minerals (ONE-A-DAY MAXIMUM FORMULA ORAL) Take 1 tablet by mouth once daily.      NOVOLOG FLEXPEN U-100 INSULIN 100 unit/mL (3 mL) InPn pen INJECT 25 UNITS SUBCUTANEOUSLY WITH MEALS  4    rosuvastatin (CRESTOR) 20 MG tablet Take 1 tablet (20 mg total) by mouth once daily. 90 tablet 3     No current facility-administered medications on file prior to visit.      BP (!) 154/61   Pulse (!) 59   Ht 5' 8" (1.727 m) "   Wt 98.4 kg (217 lb)   BMI 32.99 kg/m²    Repeat 126/50  Well-developed, well nourished.  Awake, alert and oriented.  Language is normal.  Mild R hemiparesis (without drift) and mild RUE dysmetria.  LUE tone is nl.  Extremities: BLE 2+ edema; I cannot feel DP or PT pulse on the left.    mRS = 2  NIHSS = 1    Lab Results   Component Value Date    LDLCALC 80.6 05/08/2018     Andi Nino MD

## 2019-09-13 ENCOUNTER — HOSPITAL ENCOUNTER (OUTPATIENT)
Dept: RADIOLOGY | Facility: HOSPITAL | Age: 74
Discharge: HOME | End: 2019-09-13
Attending: PSYCHIATRY & NEUROLOGY
Payer: MEDICARE

## 2019-09-13 DIAGNOSIS — I73.9 PVD (PERIPHERAL VASCULAR DISEASE) WITH CLAUDICATION: ICD-10-CM

## 2019-09-13 PROCEDURE — 93925 LOWER EXTREMITY STUDY: CPT | Mod: TC,PO

## 2019-09-16 ENCOUNTER — PATIENT MESSAGE (OUTPATIENT)
Dept: NEUROLOGY | Facility: CLINIC | Age: 74
End: 2019-09-16

## 2019-09-17 ENCOUNTER — TELEPHONE (OUTPATIENT)
Dept: CARDIOLOGY | Facility: CLINIC | Age: 74
End: 2019-09-17

## 2019-09-17 NOTE — TELEPHONE ENCOUNTER
----- Message from Erika Brush sent at 9/17/2019 10:55 AM CDT -----  Contact: Patient/ [phone]  Patient is requesting a callback in regards wanting the results from the test results that was previously completed.    Please call.

## 2019-09-18 NOTE — TELEPHONE ENCOUNTER
Pt advised about US results, stated he has no wounds    Pt scheduled for sooner clinical [place] with Dr. Sanchez

## 2019-10-03 ENCOUNTER — TELEPHONE (OUTPATIENT)
Dept: CARDIOLOGY | Facility: CLINIC | Age: 74
End: 2019-10-03

## 2019-10-03 ENCOUNTER — OFFICE VISIT (OUTPATIENT)
Dept: CARDIOLOGY | Facility: CLINIC | Age: 74
End: 2019-10-03
Payer: MEDICARE

## 2019-10-03 VITALS
DIASTOLIC BLOOD PRESSURE: 58 MMHG | SYSTOLIC BLOOD PRESSURE: 132 MMHG | HEART RATE: 48 BPM | WEIGHT: 223.13 LBS | BODY MASS INDEX: 33.82 KG/M2 | HEIGHT: 68 IN | OXYGEN SATURATION: 98 %

## 2019-10-03 DIAGNOSIS — I73.9 CLAUDICATION: ICD-10-CM

## 2019-10-03 DIAGNOSIS — I67.9 LACUNAR ATAXIC HEMIPARESIS: ICD-10-CM

## 2019-10-03 DIAGNOSIS — Z95.5 STATUS POST CORONARY ARTERY STENT PLACEMENT: ICD-10-CM

## 2019-10-03 DIAGNOSIS — Z86.73 HISTORY OF ISCHEMIC VERTEBROBASILAR ARTERY BRAINSTEM STROKE: ICD-10-CM

## 2019-10-03 DIAGNOSIS — Z85.038 HISTORY OF COLON CANCER: ICD-10-CM

## 2019-10-03 DIAGNOSIS — R53.1 RIGHT SIDED WEAKNESS: ICD-10-CM

## 2019-10-03 DIAGNOSIS — I69.398 ABNORMALITY OF GAIT AS LATE EFFECT OF CEREBROVASCULAR ACCIDENT (CVA): ICD-10-CM

## 2019-10-03 DIAGNOSIS — E11.22 TYPE 2 DIABETES MELLITUS WITH CHRONIC KIDNEY DISEASE, WITHOUT LONG-TERM CURRENT USE OF INSULIN, UNSPECIFIED CKD STAGE: ICD-10-CM

## 2019-10-03 DIAGNOSIS — I69.398 IMPAIRED BALANCE AS LATE EFFECT OF CEREBROVASCULAR ACCIDENT: ICD-10-CM

## 2019-10-03 DIAGNOSIS — R26.9 ABNORMALITY OF GAIT AS LATE EFFECT OF CEREBROVASCULAR ACCIDENT (CVA): ICD-10-CM

## 2019-10-03 DIAGNOSIS — I67.2 INTRACRANIAL ATHEROSCLEROSIS: ICD-10-CM

## 2019-10-03 DIAGNOSIS — E78.5 HYPERLIPIDEMIA, UNSPECIFIED HYPERLIPIDEMIA TYPE: ICD-10-CM

## 2019-10-03 DIAGNOSIS — G46.7 LACUNAR ATAXIC HEMIPARESIS: ICD-10-CM

## 2019-10-03 DIAGNOSIS — I25.10 CORONARY ARTERY DISEASE INVOLVING NATIVE CORONARY ARTERY OF NATIVE HEART WITHOUT ANGINA PECTORIS: ICD-10-CM

## 2019-10-03 DIAGNOSIS — I21.09: ICD-10-CM

## 2019-10-03 DIAGNOSIS — I10 ESSENTIAL HYPERTENSION: ICD-10-CM

## 2019-10-03 DIAGNOSIS — I73.9 PAD (PERIPHERAL ARTERY DISEASE): Primary | ICD-10-CM

## 2019-10-03 DIAGNOSIS — R26.89 IMPAIRED BALANCE AS LATE EFFECT OF CEREBROVASCULAR ACCIDENT: ICD-10-CM

## 2019-10-03 PROCEDURE — 3078F DIAST BP <80 MM HG: CPT | Mod: CPTII,S$GLB,, | Performed by: INTERNAL MEDICINE

## 2019-10-03 PROCEDURE — 1101F PR PT FALLS ASSESS DOC 0-1 FALLS W/OUT INJ PAST YR: ICD-10-PCS | Mod: CPTII,S$GLB,, | Performed by: INTERNAL MEDICINE

## 2019-10-03 PROCEDURE — 3075F SYST BP GE 130 - 139MM HG: CPT | Mod: CPTII,S$GLB,, | Performed by: INTERNAL MEDICINE

## 2019-10-03 PROCEDURE — 99214 PR OFFICE/OUTPT VISIT, EST, LEVL IV, 30-39 MIN: ICD-10-PCS | Mod: S$GLB,,, | Performed by: INTERNAL MEDICINE

## 2019-10-03 PROCEDURE — 99999 PR PBB SHADOW E&M-EST. PATIENT-LVL III: ICD-10-PCS | Mod: PBBFAC,,, | Performed by: INTERNAL MEDICINE

## 2019-10-03 PROCEDURE — 99999 PR PBB SHADOW E&M-EST. PATIENT-LVL III: CPT | Mod: PBBFAC,,, | Performed by: INTERNAL MEDICINE

## 2019-10-03 PROCEDURE — 3075F PR MOST RECENT SYSTOLIC BLOOD PRESS GE 130-139MM HG: ICD-10-PCS | Mod: CPTII,S$GLB,, | Performed by: INTERNAL MEDICINE

## 2019-10-03 PROCEDURE — 3078F PR MOST RECENT DIASTOLIC BLOOD PRESSURE < 80 MM HG: ICD-10-PCS | Mod: CPTII,S$GLB,, | Performed by: INTERNAL MEDICINE

## 2019-10-03 PROCEDURE — 99214 OFFICE O/P EST MOD 30 MIN: CPT | Mod: S$GLB,,, | Performed by: INTERNAL MEDICINE

## 2019-10-03 PROCEDURE — 1101F PT FALLS ASSESS-DOCD LE1/YR: CPT | Mod: CPTII,S$GLB,, | Performed by: INTERNAL MEDICINE

## 2019-10-03 RX ORDER — SODIUM CHLORIDE 9 MG/ML
3 INJECTION, SOLUTION INTRAVENOUS CONTINUOUS
Status: CANCELLED | OUTPATIENT
Start: 2019-10-03 | End: 2019-10-03

## 2019-10-03 RX ORDER — DIPHENHYDRAMINE HCL 25 MG
50 CAPSULE ORAL ONCE
Status: CANCELLED | OUTPATIENT
Start: 2019-10-03 | End: 2019-10-03

## 2019-10-03 RX ORDER — CILOSTAZOL 50 MG/1
50 TABLET ORAL 2 TIMES DAILY
Qty: 60 TABLET | Refills: 0 | Status: SHIPPED | OUTPATIENT
Start: 2019-10-03 | End: 2019-11-14 | Stop reason: SDUPTHER

## 2019-10-03 RX ORDER — CLOPIDOGREL BISULFATE 75 MG/1
75 TABLET ORAL DAILY
Qty: 90 TABLET | Refills: 3
Start: 2019-10-03 | End: 2019-11-14 | Stop reason: SDUPTHER

## 2019-10-03 NOTE — PROGRESS NOTES
Subjective:    Patient ID:  Cy Rutherford is a 73 y.o. male who presents for follow-up of Peripheral Arterial Disease      HPI    72 y/o male former pt of Dr Fish. 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   Had CVA last year with residual weakness. Holter ordered without significant arrhythmias (did show PAT).   Had been having uncontrolled HTN and chlorthalidone added to regimen along with doxazosin (also on losartan, metoprolol and amlodipine). -130's with increase in doxazosin and decrease in amlodipine (leg swelling).   Recently has developed RLE ambulatory pain localized to thigh and calf < 1 block and improves with rest. Arterial doppler ordered with possible stenosis at level of RCFA and Lprofunda.  He denies regular CP, orthopnea, PND, palps, syncope. Continues to have bilateral LE edema, improved with intermittent lasix. Mild NASSAR. He is compliant with his meds. Remote smoking hx. No non healing ulceration or evidence of limb ischemia. Currently on DAPT and statin for secondary stroke prevention.     Review of Systems   Constitution: Negative for malaise/fatigue.   HENT: Negative for congestion.    Eyes: Negative for blurred vision.   Cardiovascular: Positive for claudication and dyspnea on exertion. Negative for chest pain, cyanosis, irregular heartbeat, leg swelling, near-syncope, orthopnea, palpitations, paroxysmal nocturnal dyspnea and syncope.   Respiratory: Negative for shortness of breath.    Endocrine: Negative for polyuria.   Hematologic/Lymphatic: Negative for bleeding problem.   Skin: Negative for itching  and rash.   Musculoskeletal: Positive for back pain and muscle weakness. Negative for joint swelling and muscle cramps.   Gastrointestinal: Negative for abdominal pain, hematemesis, hematochezia, melena, nausea and vomiting.   Genitourinary: Negative for dysuria and hematuria.   Neurological: Positive for focal weakness. Negative for dizziness, headaches, light-headedness, loss of balance and weakness.   Psychiatric/Behavioral: Negative for depression. The patient is not nervous/anxious.         Objective:    Physical Exam   Constitutional: He is oriented to person, place, and time. He appears well-developed and well-nourished.   HENT:   Head: Normocephalic and atraumatic.   Neck: Neck supple. No JVD present.   Cardiovascular: Normal rate, regular rhythm and normal heart sounds.   Pulses:       Carotid pulses are 2+ on the right side, and 2+ on the left side.       Radial pulses are 2+ on the right side, and 2+ on the left side.        Femoral pulses are 2+ on the right side, and 2+ on the left side.       Posterior tibial pulses are 1+ on the right side, and 1+ on the left side.   Pulmonary/Chest: Effort normal and breath sounds normal.   Abdominal: Soft. Bowel sounds are normal.   Musculoskeletal: He exhibits edema.   Neurological: He is alert and oriented to person, place, and time.   Skin: Skin is warm and dry.   Psychiatric: He has a normal mood and affect. His behavior is normal. Thought content normal.         Assessment:       1. PAD (peripheral artery disease)    2. Claudication    3. Coronary artery disease involving native coronary artery of native heart without angina pectoris    4. Status post coronary artery stent placement    5. Essential hypertension    6. Hyperlipidemia, unspecified hyperlipidemia type    7. Acute MI anterior wall first episode care    8. History of ischemic vertebrobasilar artery brainstem stroke    9. 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 Intracranial  atherosclerosis    11. Ataxic hemiparesis    12. 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 History of colon cancer    14. Type 2 diabetes mellitus with chronic kidney disease, without long-term current use of insulin, unspecified CKD stage    15. Right sided weakness      72 y/o pt with hx and presentation as above. Doing well from a cardiac perspective and compensated from a HF perspective. Regarding PAD, will start cilostazol and observe for response. Will plan for diagnostic angiogram. Walking program. Discussed the etiology, evaluation, and management of CAD, PAD, HTN, HLD, DM. Discussed the importance of med compliance, heart healthy diet, and regular exercise.      Plan:       -Start cilostazol 50 mg BID  -peripheral angiogram (diagnostic only)  -Right radial access with 4/5 slender, MP, pigtail  -The procedure was discussed with the pt along with the risks/benefits and the pt voiced understanding. All questions were answered and written consents obtained.   -F/u in 1 month

## 2019-10-03 NOTE — TELEPHONE ENCOUNTER
Reached out to Chet garibay/ [facility] and confirmed that pt should be on plavix and pletal per Dr. Sanchez

## 2019-10-03 NOTE — H&P (VIEW-ONLY)
Subjective:    Patient ID:  Cy Rutherford is a 73 y.o. male who presents for follow-up of Peripheral Arterial Disease      HPI    72 y/o male former pt of Dr Fish. 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   Had CVA last year with residual weakness. Holter ordered without significant arrhythmias (did show PAT).   Had been having uncontrolled HTN and chlorthalidone added to regimen along with doxazosin (also on losartan, metoprolol and amlodipine). -130's with increase in doxazosin and decrease in amlodipine (leg swelling).   Recently has developed RLE ambulatory pain localized to thigh and calf < 1 block and improves with rest. Arterial doppler ordered with possible stenosis at level of RCFA and Lprofunda.  He denies regular CP, orthopnea, PND, palps, syncope. Continues to have bilateral LE edema, improved with intermittent lasix. Mild NASSAR. He is compliant with his meds. Remote smoking hx. No non healing ulceration or evidence of limb ischemia. Currently on DAPT and statin for secondary stroke prevention.     Review of Systems   Constitution: Negative for malaise/fatigue.   HENT: Negative for congestion.    Eyes: Negative for blurred vision.   Cardiovascular: Positive for claudication and dyspnea on exertion. Negative for chest pain, cyanosis, irregular heartbeat, leg swelling, near-syncope, orthopnea, palpitations, paroxysmal nocturnal dyspnea and syncope.   Respiratory: Negative for shortness of breath.    Endocrine: Negative for polyuria.   Hematologic/Lymphatic: Negative for bleeding problem.   Skin: Negative for itching  and rash.   Musculoskeletal: Positive for back pain and muscle weakness. Negative for joint swelling and muscle cramps.   Gastrointestinal: Negative for abdominal pain, hematemesis, hematochezia, melena, nausea and vomiting.   Genitourinary: Negative for dysuria and hematuria.   Neurological: Positive for focal weakness. Negative for dizziness, headaches, light-headedness, loss of balance and weakness.   Psychiatric/Behavioral: Negative for depression. The patient is not nervous/anxious.         Objective:    Physical Exam   Constitutional: He is oriented to person, place, and time. He appears well-developed and well-nourished.   HENT:   Head: Normocephalic and atraumatic.   Neck: Neck supple. No JVD present.   Cardiovascular: Normal rate, regular rhythm and normal heart sounds.   Pulses:       Carotid pulses are 2+ on the right side, and 2+ on the left side.       Radial pulses are 2+ on the right side, and 2+ on the left side.        Femoral pulses are 2+ on the right side, and 2+ on the left side.       Posterior tibial pulses are 1+ on the right side, and 1+ on the left side.   Pulmonary/Chest: Effort normal and breath sounds normal.   Abdominal: Soft. Bowel sounds are normal.   Musculoskeletal: He exhibits edema.   Neurological: He is alert and oriented to person, place, and time.   Skin: Skin is warm and dry.   Psychiatric: He has a normal mood and affect. His behavior is normal. Thought content normal.         Assessment:       1. PAD (peripheral artery disease)    2. Claudication    3. Coronary artery disease involving native coronary artery of native heart without angina pectoris    4. Status post coronary artery stent placement    5. Essential hypertension    6. Hyperlipidemia, unspecified hyperlipidemia type    7. Acute MI anterior wall first episode care    8. History of ischemic vertebrobasilar artery brainstem stroke    9. 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 Intracranial  atherosclerosis    11. Ataxic hemiparesis    12. 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 History of colon cancer    14. Type 2 diabetes mellitus with chronic kidney disease, without long-term current use of insulin, unspecified CKD stage    15. Right sided weakness      74 y/o pt with hx and presentation as above. Doing well from a cardiac perspective and compensated from a HF perspective. Regarding PAD, will start cilostazol and observe for response. Will plan for diagnostic angiogram. Walking program. Discussed the etiology, evaluation, and management of CAD, PAD, HTN, HLD, DM. Discussed the importance of med compliance, heart healthy diet, and regular exercise.      Plan:       -Start cilostazol 50 mg BID  -peripheral angiogram (diagnostic only)  -Right radial access with 4/5 slender, MP, pigtail  -The procedure was discussed with the pt along with the risks/benefits and the pt voiced understanding. All questions were answered and written consents obtained.   -F/u in 1 month

## 2019-10-03 NOTE — TELEPHONE ENCOUNTER
----- Message from Marii Gardner sent at 10/3/2019 12:43 PM CDT -----  Contact: F[facility], [facility], [place]  Mr. Guerrier is calling to speak with Staff regarding a drug interaction on the pt.  Apparently, he has another medication prescribed by another doctor that caused the interaction.  He is requesting a returned call to [phone].    Thank you.

## 2019-10-16 ENCOUNTER — HOSPITAL ENCOUNTER (OUTPATIENT)
Facility: HOSPITAL | Age: 74
Discharge: HOME | End: 2019-10-16
Attending: INTERNAL MEDICINE | Admitting: INTERNAL MEDICINE
Payer: MEDICARE

## 2019-10-16 VITALS
TEMPERATURE: 98 F | HEIGHT: 68 IN | OXYGEN SATURATION: 98 % | SYSTOLIC BLOOD PRESSURE: 147 MMHG | DIASTOLIC BLOOD PRESSURE: 57 MMHG | BODY MASS INDEX: 33.8 KG/M2 | HEART RATE: 53 BPM | RESPIRATION RATE: 15 BRPM | WEIGHT: 223 LBS

## 2019-10-16 DIAGNOSIS — I73.9 CLAUDICATION: ICD-10-CM

## 2019-10-16 DIAGNOSIS — Z01.810 PREOP CARDIOVASCULAR EXAM: ICD-10-CM

## 2019-10-16 DIAGNOSIS — I73.9 PAD (PERIPHERAL ARTERY DISEASE): ICD-10-CM

## 2019-10-16 LAB
ABO + RH BLD: NORMAL
ANION GAP SERPL CALC-SCNC: 7 MMOL/L (ref 8–16)
ANION GAP SERPL CALC-SCNC: 8 MMOL/L (ref 8–16)
BASOPHILS # BLD AUTO: 0.02 K/UL (ref 0–0.2)
BASOPHILS NFR BLD: 0.3 % (ref 0–1.9)
BLD GP AB SCN CELLS X3 SERPL QL: NORMAL
BUN SERPL-MCNC: 15 MG/DL (ref 8–23)
BUN SERPL-MCNC: 17 MG/DL (ref 8–23)
CALCIUM SERPL-MCNC: 8.6 MG/DL (ref 8.7–10.5)
CALCIUM SERPL-MCNC: 9.4 MG/DL (ref 8.7–10.5)
CHLORIDE SERPL-SCNC: 104 MMOL/L (ref 95–110)
CHLORIDE SERPL-SCNC: 108 MMOL/L (ref 95–110)
CO2 SERPL-SCNC: 26 MMOL/L (ref 23–29)
CO2 SERPL-SCNC: 29 MMOL/L (ref 23–29)
CREAT SERPL-MCNC: 1 MG/DL (ref 0.5–1.4)
CREAT SERPL-MCNC: 1.1 MG/DL (ref 0.5–1.4)
DIFFERENTIAL METHOD: ABNORMAL
EOSINOPHIL # BLD AUTO: 0.3 K/UL (ref 0–0.5)
EOSINOPHIL NFR BLD: 4.3 % (ref 0–8)
ERYTHROCYTE [DISTWIDTH] IN BLOOD BY AUTOMATED COUNT: 14.6 % (ref 11.5–14.5)
EST. GFR  (AFRICAN AMERICAN): >60 ML/MIN/1.73 M^2
EST. GFR  (AFRICAN AMERICAN): >60 ML/MIN/1.73 M^2
EST. GFR  (NON AFRICAN AMERICAN): >60 ML/MIN/1.73 M^2
EST. GFR  (NON AFRICAN AMERICAN): >60 ML/MIN/1.73 M^2
GLUCOSE SERPL-MCNC: 198 MG/DL (ref 70–110)
GLUCOSE SERPL-MCNC: 206 MG/DL (ref 70–110)
HCT VFR BLD AUTO: 35.1 % (ref 40–54)
HGB BLD-MCNC: 11.4 G/DL (ref 14–18)
LYMPHOCYTES # BLD AUTO: 1 K/UL (ref 1–4.8)
LYMPHOCYTES NFR BLD: 16.5 % (ref 18–48)
MCH RBC QN AUTO: 28.9 PG (ref 27–31)
MCHC RBC AUTO-ENTMCNC: 32.5 G/DL (ref 32–36)
MCV RBC AUTO: 89 FL (ref 82–98)
MONOCYTES # BLD AUTO: 0.6 K/UL (ref 0.3–1)
MONOCYTES NFR BLD: 9.5 % (ref 4–15)
NEUTROPHILS # BLD AUTO: 4.2 K/UL (ref 1.8–7.7)
NEUTROPHILS NFR BLD: 69.4 % (ref 38–73)
PLATELET # BLD AUTO: 218 K/UL (ref 150–350)
PMV BLD AUTO: 10.6 FL (ref 9.2–12.9)
POC ACTIVATED CLOTTING TIME K: 180 SEC (ref 74–137)
POC ACTIVATED CLOTTING TIME K: 224 SEC (ref 74–137)
POC ACTIVATED CLOTTING TIME K: 224 SEC (ref 74–137)
POC ACTIVATED CLOTTING TIME K: 235 SEC (ref 74–137)
POC ACTIVATED CLOTTING TIME K: 241 SEC (ref 74–137)
POTASSIUM SERPL-SCNC: 3.9 MMOL/L (ref 3.5–5.1)
POTASSIUM SERPL-SCNC: 4.2 MMOL/L (ref 3.5–5.1)
RBC # BLD AUTO: 3.95 M/UL (ref 4.6–6.2)
SAMPLE: ABNORMAL
SODIUM SERPL-SCNC: 141 MMOL/L (ref 136–145)
SODIUM SERPL-SCNC: 141 MMOL/L (ref 136–145)
WBC # BLD AUTO: 6 K/UL (ref 3.9–12.7)

## 2019-10-16 PROCEDURE — 36415 COLL VENOUS BLD VENIPUNCTURE: CPT

## 2019-10-16 PROCEDURE — 99152 MOD SED SAME PHYS/QHP 5/>YRS: CPT | Mod: ,,, | Performed by: INTERNAL MEDICINE

## 2019-10-16 PROCEDURE — 37252 INTRVASC US NONCORONARY 1ST: CPT | Mod: RT | Performed by: INTERNAL MEDICINE

## 2019-10-16 PROCEDURE — C1769 GUIDE WIRE: HCPCS | Performed by: INTERNAL MEDICINE

## 2019-10-16 PROCEDURE — 93010 ELECTROCARDIOGRAM REPORT: CPT | Mod: ,,, | Performed by: INTERNAL MEDICINE

## 2019-10-16 PROCEDURE — 86901 BLOOD TYPING SEROLOGIC RH(D): CPT

## 2019-10-16 PROCEDURE — C1887 CATHETER, GUIDING: HCPCS | Performed by: INTERNAL MEDICINE

## 2019-10-16 PROCEDURE — 85347 COAGULATION TIME ACTIVATED: CPT | Mod: 91 | Performed by: INTERNAL MEDICINE

## 2019-10-16 PROCEDURE — 93005 ELECTROCARDIOGRAM TRACING: CPT | Mod: 59

## 2019-10-16 PROCEDURE — 37225 PR FEM/POPL REVAS W/ATHERECTOMY: CPT | Mod: RT,,, | Performed by: INTERNAL MEDICINE

## 2019-10-16 PROCEDURE — 99152 PR MOD CONSCIOUS SEDATION, SAME PHYS, 5+ YRS, FIRST 15 MIN: ICD-10-PCS | Mod: ,,, | Performed by: INTERNAL MEDICINE

## 2019-10-16 PROCEDURE — 99153 MOD SED SAME PHYS/QHP EA: CPT | Performed by: INTERNAL MEDICINE

## 2019-10-16 PROCEDURE — 75716 ARTERY X-RAYS ARMS/LEGS: CPT | Mod: 26,59,, | Performed by: INTERNAL MEDICINE

## 2019-10-16 PROCEDURE — 25000003 PHARM REV CODE 250: Performed by: INTERNAL MEDICINE

## 2019-10-16 PROCEDURE — 27201423 OPTIME MED/SURG SUP & DEVICES STERILE SUPPLY: Performed by: INTERNAL MEDICINE

## 2019-10-16 PROCEDURE — 99152 MOD SED SAME PHYS/QHP 5/>YRS: CPT | Performed by: INTERNAL MEDICINE

## 2019-10-16 PROCEDURE — 25500020 PHARM REV CODE 255: Performed by: INTERNAL MEDICINE

## 2019-10-16 PROCEDURE — 37252 INTRVASC US NONCORONARY 1ST: CPT | Mod: RT,,, | Performed by: INTERNAL MEDICINE

## 2019-10-16 PROCEDURE — C1894 INTRO/SHEATH, NON-LASER: HCPCS | Performed by: INTERNAL MEDICINE

## 2019-10-16 PROCEDURE — C1760 CLOSURE DEV, VASC: HCPCS | Performed by: INTERNAL MEDICINE

## 2019-10-16 PROCEDURE — 75716 ARTERY X-RAYS ARMS/LEGS: CPT | Mod: 59 | Performed by: INTERNAL MEDICINE

## 2019-10-16 PROCEDURE — 37252 PR IVUS, NON-CORONARY, 1ST VESSEL: ICD-10-PCS | Mod: RT,,, | Performed by: INTERNAL MEDICINE

## 2019-10-16 PROCEDURE — 75716 PR  ANGIO EXTERMITY BILAT: ICD-10-PCS | Mod: 26,59,, | Performed by: INTERNAL MEDICINE

## 2019-10-16 PROCEDURE — 85025 COMPLETE CBC W/AUTO DIFF WBC: CPT

## 2019-10-16 PROCEDURE — 80048 BASIC METABOLIC PNL TOTAL CA: CPT

## 2019-10-16 PROCEDURE — C2623 CATH, TRANSLUMIN, DRUG-COAT: HCPCS | Performed by: INTERNAL MEDICINE

## 2019-10-16 PROCEDURE — 37225 PR FEM/POPL REVAS W/ATHERECTOMY: ICD-10-PCS | Mod: RT,,, | Performed by: INTERNAL MEDICINE

## 2019-10-16 PROCEDURE — 63600175 PHARM REV CODE 636 W HCPCS: Performed by: INTERNAL MEDICINE

## 2019-10-16 PROCEDURE — 93010 EKG 12-LEAD: ICD-10-PCS | Mod: ,,, | Performed by: INTERNAL MEDICINE

## 2019-10-16 PROCEDURE — C1884 EMBOLIZATION PROTECT SYST: HCPCS | Performed by: INTERNAL MEDICINE

## 2019-10-16 PROCEDURE — C1753 CATH, INTRAVAS ULTRASOUND: HCPCS | Performed by: INTERNAL MEDICINE

## 2019-10-16 PROCEDURE — C1725 CATH, TRANSLUMIN NON-LASER: HCPCS | Performed by: INTERNAL MEDICINE

## 2019-10-16 PROCEDURE — 37225 HC FEM/POPL REVAS W/ATHER: CPT | Mod: RT | Performed by: INTERNAL MEDICINE

## 2019-10-16 RX ORDER — DIPHENHYDRAMINE HCL 25 MG
50 CAPSULE ORAL ONCE
Status: DISCONTINUED | OUTPATIENT
Start: 2019-10-16 | End: 2019-10-16 | Stop reason: HOSPADM

## 2019-10-16 RX ORDER — HEPARIN SODIUM 200 [USP'U]/100ML
INJECTION, SOLUTION INTRAVENOUS
Status: DISCONTINUED | OUTPATIENT
Start: 2019-10-16 | End: 2019-10-16 | Stop reason: HOSPADM

## 2019-10-16 RX ORDER — FENTANYL CITRATE 50 UG/ML
INJECTION, SOLUTION INTRAMUSCULAR; INTRAVENOUS
Status: DISCONTINUED | OUTPATIENT
Start: 2019-10-16 | End: 2019-10-16 | Stop reason: HOSPADM

## 2019-10-16 RX ORDER — OXYCODONE HYDROCHLORIDE 5 MG/1
5 TABLET ORAL EVERY 4 HOURS PRN
Status: DISCONTINUED | OUTPATIENT
Start: 2019-10-16 | End: 2019-10-16 | Stop reason: HOSPADM

## 2019-10-16 RX ORDER — CEFAZOLIN SODIUM 1 G/3ML
INJECTION, POWDER, FOR SOLUTION INTRAMUSCULAR; INTRAVENOUS
Status: DISCONTINUED | OUTPATIENT
Start: 2019-10-16 | End: 2019-10-16 | Stop reason: HOSPADM

## 2019-10-16 RX ORDER — SODIUM CHLORIDE 9 MG/ML
3 INJECTION, SOLUTION INTRAVENOUS CONTINUOUS
Status: ACTIVE | OUTPATIENT
Start: 2019-10-16 | End: 2019-10-16

## 2019-10-16 RX ORDER — MIDAZOLAM HYDROCHLORIDE 1 MG/ML
INJECTION, SOLUTION INTRAMUSCULAR; INTRAVENOUS
Status: DISCONTINUED | OUTPATIENT
Start: 2019-10-16 | End: 2019-10-16 | Stop reason: HOSPADM

## 2019-10-16 RX ORDER — HEPARIN SODIUM 1000 [USP'U]/ML
INJECTION, SOLUTION INTRAVENOUS; SUBCUTANEOUS
Status: DISCONTINUED | OUTPATIENT
Start: 2019-10-16 | End: 2019-10-16 | Stop reason: HOSPADM

## 2019-10-16 RX ORDER — DIPHENHYDRAMINE HYDROCHLORIDE 50 MG/ML
INJECTION INTRAMUSCULAR; INTRAVENOUS
Status: DISCONTINUED | OUTPATIENT
Start: 2019-10-16 | End: 2019-10-16 | Stop reason: HOSPADM

## 2019-10-16 RX ORDER — ONDANSETRON 2 MG/ML
4 INJECTION INTRAMUSCULAR; INTRAVENOUS EVERY 12 HOURS PRN
Status: DISCONTINUED | OUTPATIENT
Start: 2019-10-16 | End: 2019-10-16 | Stop reason: HOSPADM

## 2019-10-16 RX ORDER — LIDOCAINE HYDROCHLORIDE 10 MG/ML
INJECTION, SOLUTION EPIDURAL; INFILTRATION; INTRACAUDAL; PERINEURAL
Status: DISCONTINUED | OUTPATIENT
Start: 2019-10-16 | End: 2019-10-16 | Stop reason: HOSPADM

## 2019-10-16 RX ORDER — VERAPAMIL HYDROCHLORIDE 2.5 MG/ML
INJECTION, SOLUTION INTRAVENOUS
Status: DISCONTINUED | OUTPATIENT
Start: 2019-10-16 | End: 2019-10-16 | Stop reason: HOSPADM

## 2019-10-16 RX ORDER — ACETAMINOPHEN 325 MG/1
650 TABLET ORAL EVERY 4 HOURS PRN
Status: DISCONTINUED | OUTPATIENT
Start: 2019-10-16 | End: 2019-10-16 | Stop reason: HOSPADM

## 2019-10-16 RX ORDER — IODIXANOL 320 MG/ML
INJECTION, SOLUTION INTRAVASCULAR
Status: DISCONTINUED | OUTPATIENT
Start: 2019-10-16 | End: 2019-10-16 | Stop reason: HOSPADM

## 2019-10-16 RX ADMIN — SODIUM CHLORIDE 500 ML: 0.9 INJECTION, SOLUTION INTRAVENOUS at 01:10

## 2019-10-16 RX ADMIN — SODIUM CHLORIDE 3 ML/KG/HR: 0.9 INJECTION, SOLUTION INTRAVENOUS at 08:10

## 2019-10-16 NOTE — NURSING
Remaining air removed from right radial vasc band. Gauze and tegaderm dressing applied c.d.i. No drainage or shadowing noted. No bleeding or hematoma noted around site. +2 frandy radial pulses palpated. Skin normal in color, warm to touch, < 3 sec cap refill. Pt tolerated well.  Will continue to monitor pt.

## 2019-10-16 NOTE — NURSING
Shahnaz, cath  pulled left femoral sheath and holding manual pressure and bleeding controlled; pt has no complaints

## 2019-10-16 NOTE — PROCEDURES
": James Sanchez MD  Pre-procedure diagnosis: PAD  Post-procedure diagnosis: PAD    Post Procedure Note: Peripheral atherectomy and PTA    The pt was brought to the cath lab and under sterile technique, LCFA access was obtained without difficulty. Images were obtained in multiple views and severe and heavily calcified RCFA and RSFA noted. Successful atherectomy followed by PTA with DCB to distal RSFA with 5.0 x 220, prox RSFA 6.0 x 220 and RCFA with 7.0 x 40 with good results. Please see full report for details. The pt tolerated the procedure well without complications. Manual pressure held with successful hemostasis.     Vitals:    10/16/19 0753   BP: (!) 147/65   BP Location: Left arm   Patient Position: Lying   Pulse: (!) 54   Resp: 16   Temp: 98 °F (36.7 °C)   TempSrc: Oral   SpO2: 98%   Weight: 101.2 kg (223 lb)   Height: 5' 8" (1.727 m)         Gen: NAD  Ext: 2+ fem pulse, no evidence of hematoma  Estimated blood loss: < 50 cc    Plan:  -Post cath care per protocol  -ASA/plavix/statin  -Walking program      "

## 2019-10-16 NOTE — DISCHARGE INSTRUCTIONS
Understanding Transradial Cardiac Catheterization    Cardiac catheterization (cardiac cath) is a common, non-surgical procedure. During the procedure, your doctor will insert a long, thin tube (catheter) into an artery and move it up into your heart. Transradial means the catheter is inserted into an artery in the wrist (the radial artery). This procedure can be used to diagnose and treat certain heart problems.  Why do I need a transradial cardiac cath?  You may need a cardiac cath if signs indicate a problem with your heart. These may include:  · Symptoms of chest pain, tightness, or heaviness (known as angina). This is a common symptom of blocked heart arteries, known as coronary artery disease.  · Symptoms of weakness, dizziness, trouble breathing, or swollen legs or feet. These may be symptoms of a problem with a heart valve or the heart muscle.  · Other test results show heart problems. Tests may include stress tests, heart scans, and echocardiography.  During a cardiac cath, your doctor can see the condition of the coronary arteries and heart valves. He or she can also check how well the heart pumps and the flow of blood through the heart. Your doctor can also measure pressures and take blood samples. And, if needed, he or she can open blocked arteries. This can help reduce symptoms of angina.  Cardiac cath is often done using a catheter inserted into an artery in the groin. During transradial cardiac cath, the catheter is inserted into an artery in the wrist. This can mean less bleeding and a faster recovery. Some people may have blockages in the groin arteries as well as in the heart arteries, making it difficult to reach the heart. The transradial approach can be used to get around this problem.   What happens during a transradial cardiac cath?  The procedure is done in the hospital or a surgery center. First, an IV line is put in your arm or hand to deliver fluids and medicines. You will likely be given  medicine to relax you and make you drowsy. When the procedure begins:  · You lie on an X-ray table.  · The skin over the insertion site in your wrist is numbed.  · The doctor makes a tiny puncture or incision into the artery in the wrist. He or she then inserts a catheter and threads it through the blood vessel into your heart.    · The doctor may inject a contrast fluid through the catheter into the arteries. This fluid makes the arteries show up better on X-rays.  · Tests may be done to check the condition of your heart and arteries. If needed, the doctor can clear blockages in the arteries or do other repairs.  · When the doctor is finished, he or she will remove the catheter and put direct pressure on the site to prevent bleeding.  · You will stay for a time to recover, and then go home.  What are the risks of transradial cardiac cath?  These include:  · Bleeding, bruising, infection, or blood clots  · Damage to the radial artery that may cause injury to the hand  · Allergic reaction to the contrast fluid  · Abnormal heartbeat (arrhythmia)  · Damage to blood vessels or tissues  · Kidney damage or failure  · The need for emergency heart surgery  · Heart attack, stroke, or death  Date Last Reviewed: 5/1/2016  © 8074-0372 [employer]. [address] [place] [address], [place] [address]. All rights reserved. This information is not intended as a substitute for professional medical care. Always follow your healthcare professional's instructions.        Peripheral Angiography  Peripheral angiography is an outpatient procedure that makes a map of the vessels (arteries) in your lower body, legs, and arms, using X-ray and dye.This map can show where blood flow may be blocked.  Talk with your healthcare provider about the risks and complications of angiography.   Before the procedure  Prepare for the peripheral angiography as follows:   · Tell your healthcare provider about all medicines you take and any allergies  you may have.  · Follow any directions youre given for not eating or drinking before the procedure. If your provider says to take your normal medicines, swallow them with only small sips of water.  · Arrange for a family member or friend to drive you home.  During the procedure  Here is what to expect:  ·   You may get medicine through an IV (intravenous) line to relax you. Youre given an injection to numb the insertion site. Then, a tiny skin cut (incision) is made near an artery in your groin.  · Your provider inserts a thin tube (catheter) through the incision. He or she then threads the catheter into an artery while looking at a video monitor.  · Contrast dye is injected into the catheter to confirm position. You may feel warmth or pressure in your legs and back. You lie still as X-rays are taken. The catheter is then taken out.  After the procedure  Youll be taken to a recovery area. A healthcare provider will apply pressure to the site for about 10 minutes. Your healthcare provider will tell you how long to lie down and keep the insertion site still. Your healthcare provider will discuss the results with you soon after the procedure.  Back at home  On the day you get home, dont drive, dont exercise, avoid walking and taking stairs, and avoid bending and lifting. Your healthcare provider may give you other care instructions.  Call your healthcare provider  Call your healthcare provider right away if:  · You notice a lump or bleeding at the insertion site  · You feel pain at the insertion site  · You become lightheaded or dizzy  · You have leg pain or numbness  · You do not urinate in 8 hours    Date Last Reviewed: 5/1/2016  © 4968-1435 [employer]. [address] [place] [address], [place] [address]. All rights reserved. This information is not intended as a substitute for professional medical care. Always follow your healthcare professional's instructions.        Recovery After Procedural Sedation  (Adult)  You have been given medicine by vein to make you sleep during your surgery. This may have included both a pain medicine and sleeping medicine. Most of the effects have worn off. But you may still have some drowsiness for the next 6 to 8 hours.  Home care  Follow these guidelines when you get home:  · For the next 8 hours, you should be watched by a responsible adult. This person should make sure your condition is not getting worse.  · Don't drink any alcohol for the next 24 hours.  · Don't drive, operate dangerous machinery, or make important business or personal decisions during the next 24 hours.  Note: Your healthcare provider may tell you not to take any medicine by mouth for pain or sleep in the next 4 hours. These medicines may react with the medicines you were given in the hospital. This could cause a much stronger response than usual.  Follow-up care  Follow up with your healthcare provider if you are not alert and back to your usual level of activity within 12 hours.  When to seek medical advice  Call your healthcare provider right away if any of these occur:  · Drowsiness gets worse  · Weakness or dizziness gets worse  · Repeated vomiting  · You can't be awakened   Date Last Reviewed: 10/18/2016  © 7827-6628 [employer]. [address] [place] [address], [place] [address]. All rights reserved. This information is not intended as a substitute for professional medical care. Always follow your healthcare professional's instructions.  Discharge Instructions: After Your Surgery  Youve just had surgery. During surgery, you were given medicine called anesthesia to keep you relaxed and free of pain. After surgery, you may have some pain or nausea. This is common. Here are some tips for feeling better and getting well after surgery.     Stay on schedule with your medicine.   Going home  Your healthcare provider will show you how to take care of yourself when you go home. He or she will also answer  your questions. Have an adult family member or friend drive you home. For the first 24 hours after your surgery:  · Do not drive or use heavy equipment.  · Do not make important decisions or sign legal papers.  · Do not drink alcohol.  · Have someone stay with you, if needed. He or she can watch for problems and help keep you safe.  Be sure to go to all follow-up visits with your healthcare provider. And rest after your surgery for as long as your healthcare provider tells you to.  Coping with pain  If you have pain after surgery, pain medicine will help you feel better. Take it as told, before pain becomes severe. Also, ask your healthcare provider or pharmacist about other ways to control pain. This might be with heat, ice, or relaxation. And follow any other instructions your surgeon or nurse gives you.  Tips for taking pain medicine  To get the best relief possible, remember these points:  · Pain medicines can upset your stomach. Taking them with a little food may help.  · Most pain relievers taken by mouth need at least 20 to 30 minutes to start to work.  · Taking medicine on a schedule can help you remember to take it. Try to time your medicine so that you can take it before starting an activity. This might be before you get dressed, go for a walk, or sit down for dinner.  · Constipation is a common side effect of pain medicines. Call your healthcare provider before taking any medicines such as laxatives or stool softeners to help ease constipation. Also ask if you should skip any foods. Drinking lots of fluids and eating foods such as fruits and vegetables that are high in fiber can also help. Remember, do not take laxatives unless your surgeon has prescribed them.  · Drinking alcohol and taking pain medicine can cause dizziness and slow your breathing. It can even be deadly. Do not drink alcohol while taking pain medicine.  · Pain medicine can make you react more slowly to things. Do not drive or run  machinery while taking pain medicine.  Your healthcare provider may tell you to take acetaminophen to help ease your pain. Ask him or her how much you are supposed to take each day. Acetaminophen or other pain relievers may interact with your prescription medicines or other over-the-counter (OTC) medicines. Some prescription medicines have acetaminophen and other ingredients. Using both prescription and OTC acetaminophen for pain can cause you to overdose. Read the labels on your OTC medicines with care. This will help you to clearly know the list of ingredients, how much to take, and any warnings. It may also help you not take too much acetaminophen. If you have questions or do not understand the information, ask your pharmacist or healthcare provider to explain it to you before you take the OTC medicine.  Managing nausea  Some people have an upset stomach after surgery. This is often because of anesthesia, pain, or pain medicine, or the stress of surgery. These tips will help you handle nausea and eat healthy foods as you get better. If you were on a special food plan before surgery, ask your healthcare provider if you should follow it while you get better. These tips may help:  · Do not push yourself to eat. Your body will tell you when to eat and how much.  · Start off with clear liquids and soup. They are easier to digest.  · Next try semi-solid foods, such as mashed potatoes, applesauce, and gelatin, as you feel ready.  · Slowly move to solid foods. Dont eat fatty, rich, or spicy foods at first.  · Do not force yourself to have 3 large meals a day. Instead eat smaller amounts more often.  · Take pain medicines with a small amount of solid food, such as crackers or toast, to avoid nausea.     Call your surgeon if  · You still have pain an hour after taking medicine. The medicine may not be strong enough.  · You feel too sleepy, dizzy, or groggy. The medicine may be too strong.  · You have side effects like  nausea, vomiting, or skin changes, such as rash, itching, or hives.       If you have obstructive sleep apnea  You were given anesthesia medicine during surgery to keep you comfortable and free of pain. After surgery, you may have more apnea spells because of this medicine and other medicines you were given. The spells may last longer than usual.   At home:  · Keep using the continuous positive airway pressure (CPAP) device when you sleep. Unless your healthcare provider tells you not to, use it when you sleep, day or night. CPAP is a common device used to treat obstructive sleep apnea.  · Talk with your provider before taking any pain medicine, muscle relaxants, or sedatives. Your provider will tell you about the possible dangers of taking these medicines.  Date Last Reviewed: 12/1/2016  © 6306-8579 [employer]. [address] [place] [address], [place] [address]. All rights reserved. This information is not intended as a substitute for professional medical care. Always follow your healthcare professional's instructions.

## 2019-10-16 NOTE — NURSING
Son present and reinforced instructions at discharge; pt discharge to home per pov with son via whch with nurse escort; copy of instructions given to pt and son is aware; assisted to truck without incident

## 2019-10-16 NOTE — NURSING
md at bedside to reassess patient; pt is aao; pt has no complaints; ice pk to left femoral site and drsg cdi and area soft and no blding or hematoma; right radial vasc band in place and air being removed per protocol; sinus sarah on cm; radial pulses palpable; dp and pt pulses audible w/ doppler bilaterally

## 2019-10-16 NOTE — NURSING
Discharge instructions reviewed with patient and included son with discharge instructions and home care when spoke to on the phone and will given written instructions at discharge; pt and pts son verbalized understanding and questions answered

## 2019-10-16 NOTE — NURSING
Pt able to urinate and tolerating po fluids; Cath lab tech Chastity remains at bedside holding manual pressure after sheath removed left fem artery

## 2019-10-16 NOTE — Clinical Note
Angiography performed of the proximal right common femoral artery. Angiography performed via hand injection with 15 mL of contrast. Unilateral runoff

## 2019-10-16 NOTE — NURSING
ACT in progress; pt resting quietly in bed w/o complaints; belongings labeled at bedside; pt wearing glasses

## 2019-10-16 NOTE — PLAN OF CARE
Pt received in cath lab recovery per stretcher with nurse mcclain and bedside report received; told it will be a 4 hour recovery after the sheath in left fem artery is pulled and connected to a pressure bag and drsg cdi; right radial vasc band cdi and brisk cap refill and palpable radial pulse and no blding swelling nor hematoma; iv access patent with NS; skin wm dry color pink; pt denies any pain; pt is awake and oriented and in no distress; sinus sarah on cm

## 2019-10-16 NOTE — NURSING
Hemostasis achieved and no active bleeding left femoral access site; soft to touch; pressure dressing applied to left fem site after manual pressure per chastity and ice pack applied as ordered to left fem access site

## 2019-10-16 NOTE — Clinical Note
Angiography performed of the proximal right common femoral artery. Angiography performed via hand injection with 20 mL of contrast. Unilateral runoff R leg

## 2019-10-16 NOTE — INTERVAL H&P NOTE
The patient has been examined and the H&P has been reviewed:    I concur with the findings and no changes have occurred since H&P was written.    Anesthesia/Surgery risks, benefits and alternative options discussed and understood by patient/family.          Active Hospital Problems    Diagnosis  POA    PAD (peripheral artery disease) [I73.9]  Yes     Priority: Low      Resolved Hospital Problems   No resolved problems to display.

## 2019-10-16 NOTE — NURSING
ACT------180 and cath lab called to notify, will send a tech to pull left fem sheath; will continue to monitor patient

## 2019-10-16 NOTE — NURSING
Patient assisted out of bed and ambulated to bathroom to urinate w/o complaints; left groin access site w/ drsg cdi and no blding swelling nor hematoma noted after walking; right radial access site drsg cdi and no blding swelling nor hematoma and 2+ palpable pulse w/ brisk cap refill; reinforced home care and follow up care instructions and verbalized understanding and no questions; belongings returned to pt and has brown duffle cane clothing and glasses and assisted to dress

## 2019-10-17 NOTE — DISCHARGE SUMMARY
[facility]  Discharge Summary      Admit Date: 10/16/2019    Discharge Date and Time: 10/16/2019    Attending Physician: James Sanchez MD     Reason for Admission: Peripheral PTA    Procedures Performed: Procedure(s) (LRB):  AORTOGRAM, WITH SERIALOGRAPHY (N/A)  ULTRASOUND, INTRAVASCULAR (N/A)  Atherectomy, Peripheral Blood Vessel (N/A)  PTA, Superficial Femoral Artery  PTA, Femoral Artery    Hospital Course (synopsis of major diagnoses, care, treatment, and services provided during the course of the hospital stay): The pt was brought to the cath lab and peripheral angiogram revealed severe RCFA and SFA disease. Successful atherectomy followed by IVUS guided PTA with scoring balloon and DCB with excellent results. No immediate post procedure complications.    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       Discharged Condition: good    Disposition: Home or Self Care    Follow Up/Patient Instructions:   As previously scheduled    Medications:  Reconciled Home Medications:      Medication List      CONTINUE taking these medications    amLODIPine 5 MG tablet  Commonly known  as:  NORVASC  Take 1 tablet (5 mg total) by mouth once daily.     aspirin 81 MG EC tablet  Commonly known as:  ECOTRIN  Take 81 mg by mouth once daily.     chlorthalidone 25 MG Tab  Commonly known as:  HYGROTEN  Take 1 tablet (25 mg total) by mouth once daily.     cilostazol 50 MG Tab  Commonly known as:  PLETAL  Take 1 tablet (50 mg total) by mouth 2 (two) times daily.     clopidogrel 75 mg tablet  Commonly known as:  PLAVIX  Take 1 tablet (75 mg total) by mouth once daily.     doxazosin 8 MG Tab  Commonly known as:  CARDURA  Take 1 tablet (8 mg total) by mouth every evening.     fenofibrate micronized 134 MG Cap  Commonly known as:  LOFIBRA  Take 1 capsule (134 mg total) by mouth nightly.     fish oil-omega-3 fatty acids 300-1,000 mg capsule  Take by mouth once daily.     furosemide 20 MG tablet  Commonly known as:  LASIX  Take 1 tablet (20 mg total) by mouth once daily.     insulin glargine 100 unit/mL injection  Commonly known as:  LANTUS  Inject 60 Units into the skin every morning.     insulin lispro 100 unit/mL injection  Inject into the skin 3 (three) times daily before meals.     losartan 50 MG tablet  Commonly known as:  COZAAR  Take 1 tablet (50 mg total) by mouth 2 (two) times daily.     metFORMIN 500 MG tablet  Commonly known as:  GLUCOPHAGE  Take 500 mg by mouth 2 (two) times daily with meals.     metoprolol succinate 100 MG 24 hr tablet  Commonly known as:  TOPROL-XL  Take 1 tablet (100 mg total) by mouth 2 (two) times daily.     [employer] Flexpen U-100 Insulin 100 unit/mL (3 mL) Inpn pen  Generic drug:  insulin aspart U-100  INJECT 25 UNITS SUBCUTANEOUSLY WITH MEALS     ONE-A-DAY MAXIMUM FORMULA ORAL  Take 1 tablet by mouth once daily.     rosuvastatin 20 MG tablet  Commonly known as:  CRESTOR  Take 1 tablet (20 mg total) by mouth once daily.          Discharge Procedure Orders   Diet Cardiac     Activity as tolerated     Follow-up Information     Schedule an appointment as soon as possible for a visit  to follow up.           Schedule an appointment as soon as possible for a visit to follow up.           James Sanchez MD In 3 weeks.    Specialties:  INTERVENTIONAL CARDIOLOGY, Cardiology  Contact information:  [address] [place], [place] [address]  [address] [place] [address] [phone]

## 2019-10-21 LAB
LEFT EYE DM RETINOPATHY: NEGATIVE
RIGHT EYE DM RETINOPATHY: POSITIVE

## 2019-10-23 ENCOUNTER — PATIENT OUTREACH (OUTPATIENT)
Dept: OTHER | Facility: OTHER | Age: 74
End: 2019-10-23

## 2019-10-23 ENCOUNTER — PATIENT MESSAGE (OUTPATIENT)
Dept: ADMINISTRATIVE | Facility: OTHER | Age: 74
End: 2019-10-23

## 2019-10-23 NOTE — PROGRESS NOTES
"Digital Medicine: Health  Follow-Up    Patient reports he is well, home recovering from angiogram.     Discussed low diastolic readings. Patient reports feeling "tired", sometimes. Reports feeling "shaky" yesterday but stated this was likely because he had not eaten "in a while". Will inform DM clinician.     The history is provided by the patient.     Follow Up  Follow-up reason(s): reading review and routine education      Readings are trending down           Diet:   Patient reports eating or drinking the following: soda, water and lemondae, waterHe has the following dietary restrictions: low sodium dietHe cooks for self.    Patient does the shopping for groceries.  He gets groceries from the grocery store.      Reports his water intake has increased "some", but does not specifically track. Reports drinking 1 soda per day.      Reports continued efforts to limit sodium intake, reports limiting meals to "5-600 mg" sodium. Reports he reads food labels.     Reports eating pre-prepared meals from the grocery store.    Physical Activity:   When asked if exercising, patient responded: no    Reports he has not "gotten back to exercise" yet. Rode his bicycle for a few minutes yesterday, working to increase. Will discuss again once patient has recovered from angiogram.          Medication Adherence:   He misses doses: never        SDOH    INTERVENTION(S)  encouragement/support and goal setting    PLAN  patient verbalizes understanding and patient amenable to changes    Encouraged patient to maintain healthy eating habits.    Declined additional health coaching/ resources at this time.              Topic    Hemoglobin A1C     Eye Exam        Last 5 Patient Entered Readings                                      Current 30 Day Average: 127/53     Recent Readings 10/23/2019 10/22/2019 10/18/2019 10/17/2019 10/14/2019    SBP (mmHg) 119 123 121 119 119    DBP (mmHg) 47 44 48 50 47    Pulse 55 64 66 66 54                "

## 2019-10-24 ENCOUNTER — PATIENT MESSAGE (OUTPATIENT)
Dept: ADMINISTRATIVE | Facility: OTHER | Age: 74
End: 2019-10-24

## 2019-11-05 ENCOUNTER — PATIENT OUTREACH (OUTPATIENT)
Dept: OTHER | Facility: OTHER | Age: 74
End: 2019-11-05

## 2019-11-05 NOTE — PROGRESS NOTES
Digital Medicine: Clinician Follow-Up    Called patient for digital medicine for follow-up. Overall, he is doing well however, reports some fatigue and weakness when standing. Symptoms started 1 1/2 months ago and have been ongoing. DBP has steadily trended down since patient increased furosemide to daily use in conjunction with chlorthalidone. Swelling has improved and is much better.     The history is provided by the patient. No  was used.     Follow Up  Follow-up reason(s): reading review      Readings are trending down       INTERVENTION(S)  recommended med change    Current 30-day average is at goal of <130/80 mmHg however, patient DBP readings are consistently <60 mmHg.  Hold furosemide for now and continue monitoring. May use on an as needed basis if swelling resumes.   Follow-up in 2-3 weeks.           Topic    Hemoglobin A1C     Eye Exam        Last 5 Patient Entered Readings                                      Current 30 Day Average: 127/50     Recent Readings 11/2/2019 11/1/2019 10/31/2019 10/30/2019 10/29/2019    SBP (mmHg) 135 99 111 122 121    DBP (mmHg) 55 50 50 49 47    Pulse 46 61 47 59 64             Hypertension Medications             amLODIPine (NORVASC) 5 MG tablet Take 1 tablet (5 mg total) by mouth once daily.    chlorthalidone (HYGROTEN) 25 MG Tab Take 1 tablet (25 mg total) by mouth once daily.    doxazosin (CARDURA) 8 MG Tab Take 1 tablet (8 mg total) by mouth every evening.    furosemide (LASIX) 20 MG tablet Take 1 tablet (20 mg total) by mouth once daily.    losartan (COZAAR) 50 MG tablet Take 1 tablet (50 mg total) by mouth 2 (two) times daily.    metoprolol succinate (TOPROL-XL) 100 MG 24 hr tablet Take 1 tablet (100 mg total) by mouth 2 (two) times daily.                 Screenings

## 2019-11-14 ENCOUNTER — OFFICE VISIT (OUTPATIENT)
Dept: CARDIOLOGY | Facility: CLINIC | Age: 74
End: 2019-11-14
Payer: MEDICARE

## 2019-11-14 VITALS
SYSTOLIC BLOOD PRESSURE: 144 MMHG | WEIGHT: 228.38 LBS | BODY MASS INDEX: 34.73 KG/M2 | DIASTOLIC BLOOD PRESSURE: 58 MMHG | HEART RATE: 52 BPM | OXYGEN SATURATION: 96 %

## 2019-11-14 DIAGNOSIS — I69.398 ABNORMALITY OF GAIT AS LATE EFFECT OF CEREBROVASCULAR ACCIDENT (CVA): ICD-10-CM

## 2019-11-14 DIAGNOSIS — I69.398 IMPAIRED BALANCE AS LATE EFFECT OF CEREBROVASCULAR ACCIDENT: ICD-10-CM

## 2019-11-14 DIAGNOSIS — I73.9 CLAUDICATION: ICD-10-CM

## 2019-11-14 DIAGNOSIS — R26.89 IMPAIRED BALANCE AS LATE EFFECT OF CEREBROVASCULAR ACCIDENT: ICD-10-CM

## 2019-11-14 DIAGNOSIS — I24.9 ACUTE CORONARY SYNDROME: ICD-10-CM

## 2019-11-14 DIAGNOSIS — Z86.73 HISTORY OF ISCHEMIC VERTEBROBASILAR ARTERY BRAINSTEM STROKE: ICD-10-CM

## 2019-11-14 DIAGNOSIS — I67.2 INTRACRANIAL ATHEROSCLEROSIS: ICD-10-CM

## 2019-11-14 DIAGNOSIS — R26.9 ABNORMALITY OF GAIT AS LATE EFFECT OF CEREBROVASCULAR ACCIDENT (CVA): ICD-10-CM

## 2019-11-14 DIAGNOSIS — Z85.038 HISTORY OF COLON CANCER: ICD-10-CM

## 2019-11-14 DIAGNOSIS — I67.9 LACUNAR ATAXIC HEMIPARESIS: ICD-10-CM

## 2019-11-14 DIAGNOSIS — E78.5 HYPERLIPIDEMIA, UNSPECIFIED HYPERLIPIDEMIA TYPE: ICD-10-CM

## 2019-11-14 DIAGNOSIS — E11.22 TYPE 2 DIABETES MELLITUS WITH CHRONIC KIDNEY DISEASE, WITHOUT LONG-TERM CURRENT USE OF INSULIN, UNSPECIFIED CKD STAGE: ICD-10-CM

## 2019-11-14 DIAGNOSIS — I10 ESSENTIAL HYPERTENSION: ICD-10-CM

## 2019-11-14 DIAGNOSIS — I25.10 CORONARY ARTERY DISEASE INVOLVING NATIVE CORONARY ARTERY OF NATIVE HEART WITHOUT ANGINA PECTORIS: ICD-10-CM

## 2019-11-14 DIAGNOSIS — G46.7 LACUNAR ATAXIC HEMIPARESIS: ICD-10-CM

## 2019-11-14 DIAGNOSIS — Z95.5 STATUS POST CORONARY ARTERY STENT PLACEMENT: ICD-10-CM

## 2019-11-14 DIAGNOSIS — I73.9 PAD (PERIPHERAL ARTERY DISEASE): Primary | ICD-10-CM

## 2019-11-14 PROCEDURE — 3077F SYST BP >= 140 MM HG: CPT | Mod: CPTII,S$GLB,, | Performed by: INTERNAL MEDICINE

## 2019-11-14 PROCEDURE — 3077F PR MOST RECENT SYSTOLIC BLOOD PRESSURE >= 140 MM HG: ICD-10-PCS | Mod: CPTII,S$GLB,, | Performed by: INTERNAL MEDICINE

## 2019-11-14 PROCEDURE — 99214 OFFICE O/P EST MOD 30 MIN: CPT | Mod: S$GLB,,, | Performed by: INTERNAL MEDICINE

## 2019-11-14 PROCEDURE — 3078F PR MOST RECENT DIASTOLIC BLOOD PRESSURE < 80 MM HG: ICD-10-PCS | Mod: CPTII,S$GLB,, | Performed by: INTERNAL MEDICINE

## 2019-11-14 PROCEDURE — 99214 PR OFFICE/OUTPT VISIT, EST, LEVL IV, 30-39 MIN: ICD-10-PCS | Mod: S$GLB,,, | Performed by: INTERNAL MEDICINE

## 2019-11-14 PROCEDURE — 3078F DIAST BP <80 MM HG: CPT | Mod: CPTII,S$GLB,, | Performed by: INTERNAL MEDICINE

## 2019-11-14 PROCEDURE — 1101F PT FALLS ASSESS-DOCD LE1/YR: CPT | Mod: CPTII,S$GLB,, | Performed by: INTERNAL MEDICINE

## 2019-11-14 PROCEDURE — 99999 PR PBB SHADOW E&M-EST. PATIENT-LVL III: ICD-10-PCS | Mod: PBBFAC,,, | Performed by: INTERNAL MEDICINE

## 2019-11-14 PROCEDURE — 99999 PR PBB SHADOW E&M-EST. PATIENT-LVL III: CPT | Mod: PBBFAC,,, | Performed by: INTERNAL MEDICINE

## 2019-11-14 PROCEDURE — 1101F PR PT FALLS ASSESS DOC 0-1 FALLS W/OUT INJ PAST YR: ICD-10-PCS | Mod: CPTII,S$GLB,, | Performed by: INTERNAL MEDICINE

## 2019-11-14 RX ORDER — DIPHENHYDRAMINE HCL 25 MG
50 CAPSULE ORAL ONCE
Status: CANCELLED | OUTPATIENT
Start: 2019-11-14 | End: 2019-11-14

## 2019-11-14 RX ORDER — SODIUM CHLORIDE 9 MG/ML
3 INJECTION, SOLUTION INTRAVENOUS CONTINUOUS
Status: CANCELLED | OUTPATIENT
Start: 2019-11-14 | End: 2019-11-14

## 2019-11-14 RX ORDER — CILOSTAZOL 50 MG/1
50 TABLET ORAL 2 TIMES DAILY
Qty: 180 TABLET | Refills: 3 | Status: ON HOLD | OUTPATIENT
Start: 2019-11-14 | End: 2019-12-18 | Stop reason: HOSPADM

## 2019-11-14 RX ORDER — OMEPRAZOLE 20 MG/1
CAPSULE, DELAYED RELEASE ORAL
Status: ON HOLD | COMMUNITY
Start: 2019-09-12 | End: 2019-12-18 | Stop reason: HOSPADM

## 2019-11-14 RX ORDER — CLOPIDOGREL BISULFATE 75 MG/1
75 TABLET ORAL DAILY
Qty: 90 TABLET | Refills: 3
Start: 2019-11-14 | End: 2020-09-24

## 2019-11-14 NOTE — H&P (VIEW-ONLY)
Subjective:    Patient ID:  Cy Rutherford is a 73 y.o. male who presents for follow-up of Peripheral Arterial Disease and Coronary Artery Disease      HPI    74 y/o male former pt of Dr Fish. 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   Had CVA last year with residual weakness. Holter ordered without significant arrhythmias (did show PAT).   Had been having uncontrolled HTN and chlorthalidone added to regimen along with doxazosin (also on losartan, metoprolol and amlodipine). -130's with increase in doxazosin and decrease in amlodipine (leg swelling).   Recently has developed RLE ambulatory pain localized to thigh and calf < 1 block and improves with rest. Arterial doppler ordered with possible stenosis at level of RCFA and Lprofunda. Had intervention to RCFA and RSFA, however, vessels very heavily calcified and may require further intervention with debulking. Has continued to have significant claudication, mildly improved.   He denies regular CP, orthopnea, PND, palps, syncope. Continues to have bilateral LE edema, improved with intermittent lasix. Mild NASSAR. He is compliant with his meds. Remote smoking hx. No non healing ulceration or evidence of limb ischemia. Currently on DAPT and statin for secondary stroke prevention.     Review of Systems   Constitution: Negative for malaise/fatigue.   HENT: Negative for congestion.    Eyes: Negative for blurred vision.   Cardiovascular: Positive for claudication and dyspnea on exertion. Negative for chest pain, cyanosis, irregular heartbeat, leg swelling, near-syncope, orthopnea,  palpitations, paroxysmal nocturnal dyspnea and syncope.   Respiratory: Negative for shortness of breath.    Endocrine: Negative for polyuria.   Hematologic/Lymphatic: Negative for bleeding problem.   Skin: Negative for itching and rash.   Musculoskeletal: Positive for muscle weakness. Negative for joint swelling and muscle cramps.   Gastrointestinal: Negative for abdominal pain, hematemesis, hematochezia, melena, nausea and vomiting.   Genitourinary: Negative for dysuria and hematuria.   Neurological: Positive for disturbances in coordination and focal weakness. Negative for dizziness, headaches, light-headedness, loss of balance and weakness.   Psychiatric/Behavioral: Negative for depression. The patient is not nervous/anxious.         Objective:    Physical Exam   Constitutional: He is oriented to person, place, and time. He appears well-developed and well-nourished.   HENT:   Head: Normocephalic and atraumatic.   Neck: Neck supple. No JVD present.   Cardiovascular: Normal rate, regular rhythm and normal heart sounds.   Pulses:       Carotid pulses are 2+ on the right side, and 2+ on the left side.       Radial pulses are 2+ on the right side, and 2+ on the left side.        Femoral pulses are 2+ on the right side, and 2+ on the left side.       Posterior tibial pulses are 1+ on the right side, and 1+ on the left side.   Pulmonary/Chest: Effort normal and breath sounds normal.   Abdominal: Soft. Bowel sounds are normal.   Musculoskeletal: He exhibits no edema.   Neurological: He is alert and oriented to person, place, and time.   Skin: Skin is warm and dry.   Psychiatric: He has a normal mood and affect. His behavior is normal. Thought content normal.         Assessment:       1. PAD (peripheral artery disease)    2. Acute coronary syndrome    3. Coronary artery disease involving native coronary artery of native heart without angina pectoris    4. Claudication    5. Essential hypertension    6. Hyperlipidemia,  unspecified hyperlipidemia type    7. Status post coronary artery stent placement    8. 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 Ataxic hemiparesis    10. History of ischemic vertebrobasilar artery brainstem stroke    11. Impaired balance as late effect of cerebrovascular accident    12. Intracranial atherosclerosis    13. History of colon cancer    14. Type 2 diabetes mellitus with chronic kidney disease, without long-term current use of insulin, unspecified CKD stage      74 y/o pt with hx and presentation as above. Doing well from a cardiac perspective and compensated from a HF perspective. Regarding PAD, continues to have lifestyle limiting Rodolfo IIb claudication and would benefit from re-intervention of RLE. I suspect symptoms have persisted due to undilatable lesions from heavily calcified lesions and will likely attempt to de-bulk with directional atherectomy. Cont walking program and med compliance for now. Discussed the etiology, evaluation, and management of PAD, claudication, CAD, CVA, DM, HTN, HLD. Discussed the importance of med compliance, heart healthy diet, and regular exercise.        Plan:       -Peripheral angiogram with intervention to RCFA/RSFA  -LCFA access with 6 x 45 sheath   -Likely will use directional atherectomy  -Consents at time of cath  -f/u in 1 month

## 2019-11-14 NOTE — PROGRESS NOTES
Subjective:    Patient ID:  Cy Rutherford is a 73 y.o. male who presents for follow-up of Peripheral Arterial Disease and Coronary Artery Disease      HPI    72 y/o male former pt of Dr Fish. 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   Had CVA last year with residual weakness. Holter ordered without significant arrhythmias (did show PAT).   Had been having uncontrolled HTN and chlorthalidone added to regimen along with doxazosin (also on losartan, metoprolol and amlodipine). -130's with increase in doxazosin and decrease in amlodipine (leg swelling).   Recently has developed RLE ambulatory pain localized to thigh and calf < 1 block and improves with rest. Arterial doppler ordered with possible stenosis at level of RCFA and Lprofunda. Had intervention to RCFA and RSFA, however, vessels very heavily calcified and may require further intervention with debulking. Has continued to have significant claudication, mildly improved.   He denies regular CP, orthopnea, PND, palps, syncope. Continues to have bilateral LE edema, improved with intermittent lasix. Mild NASSAR. He is compliant with his meds. Remote smoking hx. No non healing ulceration or evidence of limb ischemia. Currently on DAPT and statin for secondary stroke prevention.     Review of Systems   Constitution: Negative for malaise/fatigue.   HENT: Negative for congestion.    Eyes: Negative for blurred vision.   Cardiovascular: Positive for claudication and dyspnea on exertion. Negative for chest pain, cyanosis, irregular heartbeat, leg swelling, near-syncope, orthopnea,  palpitations, paroxysmal nocturnal dyspnea and syncope.   Respiratory: Negative for shortness of breath.    Endocrine: Negative for polyuria.   Hematologic/Lymphatic: Negative for bleeding problem.   Skin: Negative for itching and rash.   Musculoskeletal: Positive for muscle weakness. Negative for joint swelling and muscle cramps.   Gastrointestinal: Negative for abdominal pain, hematemesis, hematochezia, melena, nausea and vomiting.   Genitourinary: Negative for dysuria and hematuria.   Neurological: Positive for disturbances in coordination and focal weakness. Negative for dizziness, headaches, light-headedness, loss of balance and weakness.   Psychiatric/Behavioral: Negative for depression. The patient is not nervous/anxious.         Objective:    Physical Exam   Constitutional: He is oriented to person, place, and time. He appears well-developed and well-nourished.   HENT:   Head: Normocephalic and atraumatic.   Neck: Neck supple. No JVD present.   Cardiovascular: Normal rate, regular rhythm and normal heart sounds.   Pulses:       Carotid pulses are 2+ on the right side, and 2+ on the left side.       Radial pulses are 2+ on the right side, and 2+ on the left side.        Femoral pulses are 2+ on the right side, and 2+ on the left side.       Posterior tibial pulses are 1+ on the right side, and 1+ on the left side.   Pulmonary/Chest: Effort normal and breath sounds normal.   Abdominal: Soft. Bowel sounds are normal.   Musculoskeletal: He exhibits no edema.   Neurological: He is alert and oriented to person, place, and time.   Skin: Skin is warm and dry.   Psychiatric: He has a normal mood and affect. His behavior is normal. Thought content normal.         Assessment:       1. PAD (peripheral artery disease)    2. Acute coronary syndrome    3. Coronary artery disease involving native coronary artery of native heart without angina pectoris    4. Claudication    5. Essential hypertension    6. Hyperlipidemia,  unspecified hyperlipidemia type    7. Status post coronary artery stent placement    8. 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 Ataxic hemiparesis    10. History of ischemic vertebrobasilar artery brainstem stroke    11. Impaired balance as late effect of cerebrovascular accident    12. Intracranial atherosclerosis    13. History of colon cancer    14. Type 2 diabetes mellitus with chronic kidney disease, without long-term current use of insulin, unspecified CKD stage      74 y/o pt with hx and presentation as above. Doing well from a cardiac perspective and compensated from a HF perspective. Regarding PAD, continues to have lifestyle limiting Rodolfo IIb claudication and would benefit from re-intervention of RLE. I suspect symptoms have persisted due to undilatable lesions from heavily calcified lesions and will likely attempt to de-bulk with directional atherectomy. Cont walking program and med compliance for now. Discussed the etiology, evaluation, and management of PAD, claudication, CAD, CVA, DM, HTN, HLD. Discussed the importance of med compliance, heart healthy diet, and regular exercise.        Plan:       -Peripheral angiogram with intervention to RCFA/RSFA  -LCFA access with 6 x 45 sheath   -Likely will use directional atherectomy  -Consents at time of cath  -f/u in 1 month

## 2019-11-18 ENCOUNTER — TELEPHONE (OUTPATIENT)
Dept: CARDIOLOGY | Facility: CLINIC | Age: 74
End: 2019-11-18

## 2019-11-18 NOTE — TELEPHONE ENCOUNTER
----- Message from Deepika Palma sent at 11/18/2019 11:55 AM CST -----  Contact: self, [phone]  Patient states [employer] insurance needs your approval for his Clopidogrel prescription. Patient requests a call back with status please.

## 2019-11-18 NOTE — TELEPHONE ENCOUNTER
----- Message from Letty Garcia sent at 11/18/2019 10:42 AM CST -----  Contact: [phone]/self  Patient requesting to speak with you regarding his medication clopidogrel (PLAVIX) 75 mg tablet. Please advise.

## 2019-11-18 NOTE — TELEPHONE ENCOUNTER
Reached out to pt and advised that verbal confirmation was made with his [facility] in regards to his plavix

## 2019-11-21 ENCOUNTER — PATIENT OUTREACH (OUTPATIENT)
Dept: OTHER | Facility: OTHER | Age: 74
End: 2019-11-21

## 2019-11-21 NOTE — PROGRESS NOTES
Digital Medicine: Clinician Follow-Up    Called patient for digital medicine follow-up. He's doing well since changing frequency of furosemide back to an as needed basis. His blood pressure has trended up slightly however, his overall average remains at goal. Patient inquiring about prior authorization for cilostazol 50 mg.     The history is provided by the patient. No  was used.     Follow Up  Follow-up reason(s): medication change follow-up      Medication Change: dose increase    Patient started new medication.    Is patient tolerating med change?:  Yes          PLAN  continue monitoring    Current 30-day average of 129/53 mmHg is at goal of <130/80 mmHg.  Continue current medication regimen.  Message sent to Dr. Sanchez regarding prior authorization for cilostazol.           Topic    Hemoglobin A1C     Eye Exam        Last 5 Patient Entered Readings                                      Current 30 Day Average: 129/53     Recent Readings 11/19/2019 11/18/2019 11/16/2019 11/13/2019 11/12/2019    SBP (mmHg) 137 135 144 127 120    DBP (mmHg) 59 57 57 54 47    Pulse 48 49 50 45 49             Hypertension Medications             amLODIPine (NORVASC) 5 MG tablet Take 1 tablet (5 mg total) by mouth once daily.    chlorthalidone (HYGROTEN) 25 MG Tab Take 1 tablet (25 mg total) by mouth once daily.    doxazosin (CARDURA) 8 MG Tab Take 1 tablet (8 mg total) by mouth every evening.    furosemide (LASIX) 20 MG tablet Take 1 tablet (20 mg total) by mouth once daily.    losartan (COZAAR) 50 MG tablet Take 1 tablet (50 mg total) by mouth 2 (two) times daily.    metoprolol succinate (TOPROL-XL) 100 MG 24 hr tablet Take 1 tablet (100 mg total) by mouth 2 (two) times daily.                 Screenings

## 2019-11-26 ENCOUNTER — PATIENT OUTREACH (OUTPATIENT)
Dept: OTHER | Facility: OTHER | Age: 74
End: 2019-11-26

## 2019-11-26 NOTE — PROGRESS NOTES
"Digital Medicine: Health  Follow-Up    Patient reports he is well, no complaints.     The history is provided by the patient.     Follow Up  Follow-up reason(s): reading review and routine education      Readings are missing.   patient reminder needed and patient put BP device away while he had company, verbalized intent to resume monitoring tomorrow..      INTERVENTION(S)  recommended diet modifications, recommend physical activity, encouragement/support, denied further coaching, denied resources and denied questions    PLAN  patient verbalizes understanding, demonstrates understanding via teach back, patient amenable to changes and continue monitoring          Topic    Hemoglobin A1C     Eye Exam        Last 5 Patient Entered Readings                                      Current 30 Day Average: 128/54     Recent Readings 11/19/2019 11/18/2019 11/16/2019 11/13/2019 11/12/2019    SBP (mmHg) 137 135 144 127 120    DBP (mmHg) 59 57 57 54 47    Pulse 48 49 50 45 49                      Diet Screening   No change to diet.  Patient reports eating or drinking the following: waterHe has the following dietary restrictions: low sodium diet    Reports water intake "comes and goes". Reports he typically only drinks one diet coke per day.     Physical Activity Screening   When asked if exercising, patient responded: no  Patient has the following chronic pain: joint pain and hip pain     He identified the following barriers to physical activity: pain/injury/recent surgery    Reports he is waiting for procedure on Dec 2nd.     Medication Adherence Screening   He misses doses: never        SDOH  "

## 2019-12-02 ENCOUNTER — ANESTHESIA (OUTPATIENT)
Dept: INTENSIVE CARE | Facility: HOSPITAL | Age: 74
DRG: 270 | End: 2019-12-02
Payer: MEDICARE

## 2019-12-02 ENCOUNTER — HOSPITAL ENCOUNTER (INPATIENT)
Facility: HOSPITAL | Age: 74
LOS: 16 days | Discharge: NURSING, REHAB OR LONG-TERM CARE | DRG: 270 | End: 2019-12-19
Attending: INTERNAL MEDICINE | Admitting: INTERNAL MEDICINE
Payer: MEDICARE

## 2019-12-02 ENCOUNTER — ANESTHESIA EVENT (OUTPATIENT)
Dept: INTENSIVE CARE | Facility: HOSPITAL | Age: 74
DRG: 270 | End: 2019-12-02
Payer: MEDICARE

## 2019-12-02 DIAGNOSIS — Z85.038 HISTORY OF COLON CANCER: Primary | ICD-10-CM

## 2019-12-02 DIAGNOSIS — N17.9 AKI (ACUTE KIDNEY INJURY): ICD-10-CM

## 2019-12-02 DIAGNOSIS — Z79.4 TYPE 2 DIABETES MELLITUS WITH CHRONIC KIDNEY DISEASE, WITH LONG-TERM CURRENT USE OF INSULIN, UNSPECIFIED CKD STAGE: ICD-10-CM

## 2019-12-02 DIAGNOSIS — E11.22 TYPE 2 DIABETES MELLITUS WITH CHRONIC KIDNEY DISEASE, WITH LONG-TERM CURRENT USE OF INSULIN, UNSPECIFIED CKD STAGE: ICD-10-CM

## 2019-12-02 DIAGNOSIS — Z01.818 PREOP TESTING: ICD-10-CM

## 2019-12-02 DIAGNOSIS — E87.5 HYPERKALEMIA: ICD-10-CM

## 2019-12-02 DIAGNOSIS — R00.1 JUNCTIONAL BRADYCARDIA: ICD-10-CM

## 2019-12-02 DIAGNOSIS — D64.9 ANEMIA, UNSPECIFIED TYPE: ICD-10-CM

## 2019-12-02 DIAGNOSIS — R26.9 ABNORMALITY OF GAIT AS LATE EFFECT OF CEREBROVASCULAR ACCIDENT (CVA): ICD-10-CM

## 2019-12-02 DIAGNOSIS — Z95.5 STATUS POST CORONARY ARTERY STENT PLACEMENT: ICD-10-CM

## 2019-12-02 DIAGNOSIS — Z86.73 HISTORY OF ISCHEMIC VERTEBROBASILAR ARTERY BRAINSTEM STROKE: ICD-10-CM

## 2019-12-02 DIAGNOSIS — E11.22 TYPE 2 DIABETES MELLITUS WITH CHRONIC KIDNEY DISEASE, WITHOUT LONG-TERM CURRENT USE OF INSULIN, UNSPECIFIED CKD STAGE: ICD-10-CM

## 2019-12-02 DIAGNOSIS — I73.9 CLAUDICATION: ICD-10-CM

## 2019-12-02 DIAGNOSIS — I25.10 CAD (CORONARY ARTERY DISEASE): ICD-10-CM

## 2019-12-02 DIAGNOSIS — I73.9 PAD (PERIPHERAL ARTERY DISEASE): ICD-10-CM

## 2019-12-02 DIAGNOSIS — K22.10 ULCER OF ESOPHAGUS WITHOUT BLEEDING: ICD-10-CM

## 2019-12-02 DIAGNOSIS — I10 ESSENTIAL HYPERTENSION: ICD-10-CM

## 2019-12-02 DIAGNOSIS — I50.33 ACUTE ON CHRONIC DIASTOLIC HEART FAILURE: ICD-10-CM

## 2019-12-02 DIAGNOSIS — I24.9 ACUTE CORONARY SYNDROME: ICD-10-CM

## 2019-12-02 DIAGNOSIS — D50.8 OTHER IRON DEFICIENCY ANEMIA: ICD-10-CM

## 2019-12-02 DIAGNOSIS — D50.9 IRON DEFICIENCY ANEMIA, UNSPECIFIED IRON DEFICIENCY ANEMIA TYPE: ICD-10-CM

## 2019-12-02 DIAGNOSIS — I95.9 HYPOTENSION: ICD-10-CM

## 2019-12-02 DIAGNOSIS — K92.2 GASTROINTESTINAL HEMORRHAGE, UNSPECIFIED GASTROINTESTINAL HEMORRHAGE TYPE: ICD-10-CM

## 2019-12-02 DIAGNOSIS — I69.398 ABNORMALITY OF GAIT AS LATE EFFECT OF CEREBROVASCULAR ACCIDENT (CVA): ICD-10-CM

## 2019-12-02 LAB
ABO + RH BLD: NORMAL
ALBUMIN SERPL BCP-MCNC: 2.5 G/DL (ref 3.5–5.2)
ALP SERPL-CCNC: 22 U/L (ref 55–135)
ALT SERPL W/O P-5'-P-CCNC: 15 U/L (ref 10–44)
ANION GAP SERPL CALC-SCNC: 10 MMOL/L (ref 8–16)
ANION GAP SERPL CALC-SCNC: 5 MMOL/L (ref 8–16)
ANION GAP SERPL CALC-SCNC: 5 MMOL/L (ref 8–16)
AST SERPL-CCNC: 17 U/L (ref 10–40)
BASOPHILS # BLD AUTO: 0.03 K/UL (ref 0–0.2)
BASOPHILS # BLD AUTO: 0.06 K/UL (ref 0–0.2)
BASOPHILS NFR BLD: 0.3 % (ref 0–1.9)
BASOPHILS NFR BLD: 0.7 % (ref 0–1.9)
BILIRUB SERPL-MCNC: 0.3 MG/DL (ref 0.1–1)
BLD GP AB SCN CELLS X3 SERPL QL: NORMAL
BLD PROD TYP BPU: NORMAL
BLD PROD TYP BPU: NORMAL
BLOOD UNIT EXPIRATION DATE: NORMAL
BLOOD UNIT EXPIRATION DATE: NORMAL
BLOOD UNIT TYPE CODE: 6200
BLOOD UNIT TYPE CODE: 6200
BLOOD UNIT TYPE: NORMAL
BLOOD UNIT TYPE: NORMAL
BUN SERPL-MCNC: 16 MG/DL (ref 8–23)
BUN SERPL-MCNC: 17 MG/DL (ref 8–23)
BUN SERPL-MCNC: 17 MG/DL (ref 8–23)
CALCIUM SERPL-MCNC: 7.5 MG/DL (ref 8.7–10.5)
CALCIUM SERPL-MCNC: 7.5 MG/DL (ref 8.7–10.5)
CALCIUM SERPL-MCNC: 9.5 MG/DL (ref 8.7–10.5)
CHLORIDE SERPL-SCNC: 102 MMOL/L (ref 95–110)
CHLORIDE SERPL-SCNC: 109 MMOL/L (ref 95–110)
CHLORIDE SERPL-SCNC: 109 MMOL/L (ref 95–110)
CO2 SERPL-SCNC: 25 MMOL/L (ref 23–29)
CO2 SERPL-SCNC: 25 MMOL/L (ref 23–29)
CO2 SERPL-SCNC: 27 MMOL/L (ref 23–29)
CODING SYSTEM: NORMAL
CODING SYSTEM: NORMAL
CREAT SERPL-MCNC: 1 MG/DL (ref 0.5–1.4)
CREAT SERPL-MCNC: 1.2 MG/DL (ref 0.5–1.4)
CREAT SERPL-MCNC: 1.2 MG/DL (ref 0.5–1.4)
DIFFERENTIAL METHOD: ABNORMAL
DIFFERENTIAL METHOD: ABNORMAL
DISPENSE STATUS: NORMAL
DISPENSE STATUS: NORMAL
EOSINOPHIL # BLD AUTO: 0.3 K/UL (ref 0–0.5)
EOSINOPHIL # BLD AUTO: 0.7 K/UL (ref 0–0.5)
EOSINOPHIL NFR BLD: 3.2 % (ref 0–8)
EOSINOPHIL NFR BLD: 8.4 % (ref 0–8)
ERYTHROCYTE [DISTWIDTH] IN BLOOD BY AUTOMATED COUNT: 14.2 % (ref 11.5–14.5)
ERYTHROCYTE [DISTWIDTH] IN BLOOD BY AUTOMATED COUNT: 14.5 % (ref 11.5–14.5)
EST. GFR  (AFRICAN AMERICAN): >60 ML/MIN/1.73 M^2
EST. GFR  (NON AFRICAN AMERICAN): 58 ML/MIN/1.73 M^2
EST. GFR  (NON AFRICAN AMERICAN): 58 ML/MIN/1.73 M^2
EST. GFR  (NON AFRICAN AMERICAN): >60 ML/MIN/1.73 M^2
ESTIMATED AVG GLUCOSE: 114 MG/DL (ref 68–131)
GLUCOSE SERPL-MCNC: 234 MG/DL (ref 70–110)
GLUCOSE SERPL-MCNC: 430 MG/DL (ref 70–110)
GLUCOSE SERPL-MCNC: 430 MG/DL (ref 70–110)
HBA1C MFR BLD HPLC: 5.6 % (ref 4–5.6)
HCT VFR BLD AUTO: 24.1 % (ref 40–54)
HCT VFR BLD AUTO: 36.1 % (ref 40–54)
HGB BLD-MCNC: 11.6 G/DL (ref 14–18)
HGB BLD-MCNC: 7.4 G/DL (ref 14–18)
LYMPHOCYTES # BLD AUTO: 1.3 K/UL (ref 1–4.8)
LYMPHOCYTES # BLD AUTO: 1.5 K/UL (ref 1–4.8)
LYMPHOCYTES NFR BLD: 14.6 % (ref 18–48)
LYMPHOCYTES NFR BLD: 18.6 % (ref 18–48)
MCH RBC QN AUTO: 27.7 PG (ref 27–31)
MCH RBC QN AUTO: 28.5 PG (ref 27–31)
MCHC RBC AUTO-ENTMCNC: 30.7 G/DL (ref 32–36)
MCHC RBC AUTO-ENTMCNC: 32.1 G/DL (ref 32–36)
MCV RBC AUTO: 89 FL (ref 82–98)
MCV RBC AUTO: 90 FL (ref 82–98)
MONOCYTES # BLD AUTO: 0.5 K/UL (ref 0.3–1)
MONOCYTES # BLD AUTO: 0.7 K/UL (ref 0.3–1)
MONOCYTES NFR BLD: 5.7 % (ref 4–15)
MONOCYTES NFR BLD: 8.6 % (ref 4–15)
NEUTROPHILS # BLD AUTO: 5.1 K/UL (ref 1.8–7.7)
NEUTROPHILS # BLD AUTO: 6.8 K/UL (ref 1.8–7.7)
NEUTROPHILS NFR BLD: 63.7 % (ref 38–73)
NEUTROPHILS NFR BLD: 76.2 % (ref 38–73)
PLATELET # BLD AUTO: 242 K/UL (ref 150–350)
PLATELET # BLD AUTO: 302 K/UL (ref 150–350)
PMV BLD AUTO: 10.9 FL (ref 9.2–12.9)
PMV BLD AUTO: 11.2 FL (ref 9.2–12.9)
POC ACTIVATED CLOTTING TIME K: 186 SEC (ref 74–137)
POC ACTIVATED CLOTTING TIME K: 241 SEC (ref 74–137)
POC ACTIVATED CLOTTING TIME K: 241 SEC (ref 74–137)
POCT GLUCOSE: 346 MG/DL (ref 70–110)
POCT GLUCOSE: 415 MG/DL (ref 70–110)
POCT GLUCOSE: 423 MG/DL (ref 70–110)
POTASSIUM SERPL-SCNC: 4 MMOL/L (ref 3.5–5.1)
POTASSIUM SERPL-SCNC: 4.5 MMOL/L (ref 3.5–5.1)
POTASSIUM SERPL-SCNC: 4.5 MMOL/L (ref 3.5–5.1)
PROT SERPL-MCNC: 4.3 G/DL (ref 6–8.4)
RBC # BLD AUTO: 2.67 M/UL (ref 4.6–6.2)
RBC # BLD AUTO: 4.07 M/UL (ref 4.6–6.2)
SAMPLE: ABNORMAL
SODIUM SERPL-SCNC: 139 MMOL/L (ref 136–145)
TRANS ERYTHROCYTES VOL PATIENT: NORMAL ML
TRANS ERYTHROCYTES VOL PATIENT: NORMAL ML
WBC # BLD AUTO: 8.06 K/UL (ref 3.9–12.7)
WBC # BLD AUTO: 8.96 K/UL (ref 3.9–12.7)

## 2019-12-02 PROCEDURE — 37228 PR TIB/PER REVASC W/TLA: ICD-10-PCS | Mod: 51,RT,, | Performed by: INTERNAL MEDICINE

## 2019-12-02 PROCEDURE — 85025 COMPLETE CBC W/AUTO DIFF WBC: CPT | Mod: 91

## 2019-12-02 PROCEDURE — 99152 MOD SED SAME PHYS/QHP 5/>YRS: CPT | Performed by: INTERNAL MEDICINE

## 2019-12-02 PROCEDURE — 37225 PR FEM/POPL REVAS W/ATHERECTOMY: ICD-10-PCS | Mod: RT,,, | Performed by: INTERNAL MEDICINE

## 2019-12-02 PROCEDURE — 63600175 PHARM REV CODE 636 W HCPCS: Performed by: INTERNAL MEDICINE

## 2019-12-02 PROCEDURE — 27000221 HC OXYGEN, UP TO 24 HOURS

## 2019-12-02 PROCEDURE — C1887 CATHETER, GUIDING: HCPCS | Performed by: INTERNAL MEDICINE

## 2019-12-02 PROCEDURE — 86901 BLOOD TYPING SEROLOGIC RH(D): CPT

## 2019-12-02 PROCEDURE — 93005 ELECTROCARDIOGRAM TRACING: CPT

## 2019-12-02 PROCEDURE — 36140 PR PLACE CATH EXTREM ARTERY: ICD-10-PCS | Mod: 59,LT,, | Performed by: INTERNAL MEDICINE

## 2019-12-02 PROCEDURE — 36415 COLL VENOUS BLD VENIPUNCTURE: CPT

## 2019-12-02 PROCEDURE — 36430 TRANSFUSION BLD/BLD COMPNT: CPT

## 2019-12-02 PROCEDURE — 37228 HC TIB/PER REVASC W/TLA: CPT | Mod: RT | Performed by: INTERNAL MEDICINE

## 2019-12-02 PROCEDURE — 37225 HC FEM/POPL REVAS W/ATHER: CPT | Mod: RT | Performed by: INTERNAL MEDICINE

## 2019-12-02 PROCEDURE — 80053 COMPREHEN METABOLIC PANEL: CPT

## 2019-12-02 PROCEDURE — 25000003 PHARM REV CODE 250: Performed by: INTERNAL MEDICINE

## 2019-12-02 PROCEDURE — C2623 CATH, TRANSLUMIN, DRUG-COAT: HCPCS | Performed by: INTERNAL MEDICINE

## 2019-12-02 PROCEDURE — 37228 PR TIB/PER REVASC W/TLA: CPT | Mod: 51,RT,, | Performed by: INTERNAL MEDICINE

## 2019-12-02 PROCEDURE — C1760 CLOSURE DEV, VASC: HCPCS | Performed by: INTERNAL MEDICINE

## 2019-12-02 PROCEDURE — C1884 EMBOLIZATION PROTECT SYST: HCPCS | Performed by: INTERNAL MEDICINE

## 2019-12-02 PROCEDURE — 75710 ARTERY X-RAYS ARM/LEG: CPT | Mod: 26,59,, | Performed by: INTERNAL MEDICINE

## 2019-12-02 PROCEDURE — 75716 ARTERY X-RAYS ARMS/LEGS: CPT | Mod: 59 | Performed by: INTERNAL MEDICINE

## 2019-12-02 PROCEDURE — 94761 N-INVAS EAR/PLS OXIMETRY MLT: CPT

## 2019-12-02 PROCEDURE — 99152 PR MOD CONSCIOUS SEDATION, SAME PHYS, 5+ YRS, FIRST 15 MIN: ICD-10-PCS | Mod: ,,, | Performed by: INTERNAL MEDICINE

## 2019-12-02 PROCEDURE — 99152 MOD SED SAME PHYS/QHP 5/>YRS: CPT | Mod: ,,, | Performed by: INTERNAL MEDICINE

## 2019-12-02 PROCEDURE — 85347 COAGULATION TIME ACTIVATED: CPT | Performed by: INTERNAL MEDICINE

## 2019-12-02 PROCEDURE — 27201423 OPTIME MED/SURG SUP & DEVICES STERILE SUPPLY: Performed by: INTERNAL MEDICINE

## 2019-12-02 PROCEDURE — 99153 MOD SED SAME PHYS/QHP EA: CPT | Performed by: INTERNAL MEDICINE

## 2019-12-02 PROCEDURE — 75710 PR  ANGIO EXTREMITY UNILAT: ICD-10-PCS | Mod: 26,59,, | Performed by: INTERNAL MEDICINE

## 2019-12-02 PROCEDURE — 80048 BASIC METABOLIC PNL TOTAL CA: CPT

## 2019-12-02 PROCEDURE — P9021 RED BLOOD CELLS UNIT: HCPCS

## 2019-12-02 PROCEDURE — 37225 PR FEM/POPL REVAS W/ATHERECTOMY: CPT | Mod: RT,,, | Performed by: INTERNAL MEDICINE

## 2019-12-02 PROCEDURE — C1894 INTRO/SHEATH, NON-LASER: HCPCS | Performed by: INTERNAL MEDICINE

## 2019-12-02 PROCEDURE — C1725 CATH, TRANSLUMIN NON-LASER: HCPCS | Performed by: INTERNAL MEDICINE

## 2019-12-02 PROCEDURE — 75716 ARTERY X-RAYS ARMS/LEGS: CPT | Mod: 26,59,51, | Performed by: INTERNAL MEDICINE

## 2019-12-02 PROCEDURE — 25500020 PHARM REV CODE 255: Performed by: INTERNAL MEDICINE

## 2019-12-02 PROCEDURE — G0378 HOSPITAL OBSERVATION PER HR: HCPCS

## 2019-12-02 PROCEDURE — 36140 INTRO NDL ICATH UPR/LXTR ART: CPT | Mod: LT | Performed by: INTERNAL MEDICINE

## 2019-12-02 PROCEDURE — 75716 PR  ANGIO EXTERMITY BILAT: ICD-10-PCS | Mod: 26,59,51, | Performed by: INTERNAL MEDICINE

## 2019-12-02 PROCEDURE — 83036 HEMOGLOBIN GLYCOSYLATED A1C: CPT

## 2019-12-02 PROCEDURE — C1724 CATH, TRANS ATHEREC,ROTATION: HCPCS | Performed by: INTERNAL MEDICINE

## 2019-12-02 PROCEDURE — C1769 GUIDE WIRE: HCPCS | Performed by: INTERNAL MEDICINE

## 2019-12-02 PROCEDURE — 75710 ARTERY X-RAYS ARM/LEG: CPT | Mod: 59 | Performed by: INTERNAL MEDICINE

## 2019-12-02 PROCEDURE — 86920 COMPATIBILITY TEST SPIN: CPT

## 2019-12-02 PROCEDURE — 36140 INTRO NDL ICATH UPR/LXTR ART: CPT | Mod: 59,LT,, | Performed by: INTERNAL MEDICINE

## 2019-12-02 RX ORDER — PANTOPRAZOLE SODIUM 40 MG/1
40 TABLET, DELAYED RELEASE ORAL DAILY
Status: DISCONTINUED | OUTPATIENT
Start: 2019-12-03 | End: 2019-12-03

## 2019-12-02 RX ORDER — GLUCAGON 1 MG
1 KIT INJECTION
Status: DISCONTINUED | OUTPATIENT
Start: 2019-12-02 | End: 2019-12-03

## 2019-12-02 RX ORDER — IBUPROFEN 200 MG
16 TABLET ORAL
Status: DISCONTINUED | OUTPATIENT
Start: 2019-12-02 | End: 2019-12-03

## 2019-12-02 RX ORDER — HEPARIN SODIUM 1000 [USP'U]/ML
INJECTION, SOLUTION INTRAVENOUS; SUBCUTANEOUS
Status: DISCONTINUED | OUTPATIENT
Start: 2019-12-02 | End: 2019-12-02 | Stop reason: HOSPADM

## 2019-12-02 RX ORDER — FUROSEMIDE 10 MG/ML
40 INJECTION INTRAMUSCULAR; INTRAVENOUS ONCE
Status: COMPLETED | OUTPATIENT
Start: 2019-12-02 | End: 2019-12-02

## 2019-12-02 RX ORDER — VERAPAMIL HYDROCHLORIDE 2.5 MG/ML
INJECTION, SOLUTION INTRAVENOUS
Status: DISCONTINUED | OUTPATIENT
Start: 2019-12-02 | End: 2019-12-02 | Stop reason: HOSPADM

## 2019-12-02 RX ORDER — FENTANYL CITRATE 50 UG/ML
INJECTION, SOLUTION INTRAMUSCULAR; INTRAVENOUS
Status: DISCONTINUED | OUTPATIENT
Start: 2019-12-02 | End: 2019-12-02 | Stop reason: HOSPADM

## 2019-12-02 RX ORDER — INSULIN ASPART 100 [IU]/ML
0-5 INJECTION, SOLUTION INTRAVENOUS; SUBCUTANEOUS
Status: DISCONTINUED | OUTPATIENT
Start: 2019-12-02 | End: 2019-12-03

## 2019-12-02 RX ORDER — DIPHENHYDRAMINE HCL 25 MG
50 CAPSULE ORAL ONCE
Status: DISCONTINUED | OUTPATIENT
Start: 2019-12-02 | End: 2019-12-02 | Stop reason: HOSPADM

## 2019-12-02 RX ORDER — CEFAZOLIN SODIUM 2 G/50ML
SOLUTION INTRAVENOUS
Status: DISCONTINUED | OUTPATIENT
Start: 2019-12-02 | End: 2019-12-06

## 2019-12-02 RX ORDER — MORPHINE SULFATE 10 MG/ML
2 INJECTION, SOLUTION INTRAMUSCULAR; INTRAVENOUS
Status: DISCONTINUED | OUTPATIENT
Start: 2019-12-02 | End: 2019-12-19 | Stop reason: HOSPADM

## 2019-12-02 RX ORDER — ONDANSETRON 8 MG/1
8 TABLET, ORALLY DISINTEGRATING ORAL EVERY 8 HOURS PRN
Status: DISCONTINUED | OUTPATIENT
Start: 2019-12-02 | End: 2019-12-19 | Stop reason: HOSPADM

## 2019-12-02 RX ORDER — ASPIRIN 81 MG/1
81 TABLET ORAL DAILY
Status: DISCONTINUED | OUTPATIENT
Start: 2019-12-03 | End: 2019-12-04

## 2019-12-02 RX ORDER — NOREPINEPHRINE BITARTRATE/D5W 16MG/250ML
0.02 PLASTIC BAG, INJECTION (ML) INTRAVENOUS CONTINUOUS
Status: DISCONTINUED | OUTPATIENT
Start: 2019-12-02 | End: 2019-12-02

## 2019-12-02 RX ORDER — LIDOCAINE HYDROCHLORIDE 10 MG/ML
INJECTION INFILTRATION; PERINEURAL
Status: DISCONTINUED | OUTPATIENT
Start: 2019-12-02 | End: 2019-12-02 | Stop reason: HOSPADM

## 2019-12-02 RX ORDER — ONDANSETRON 2 MG/ML
4 INJECTION INTRAMUSCULAR; INTRAVENOUS EVERY 12 HOURS PRN
Status: DISCONTINUED | OUTPATIENT
Start: 2019-12-02 | End: 2019-12-19 | Stop reason: HOSPADM

## 2019-12-02 RX ORDER — POLYETHYLENE GLYCOL 3350 17 G/17G
17 POWDER, FOR SOLUTION ORAL 2 TIMES DAILY PRN
Status: DISCONTINUED | OUTPATIENT
Start: 2019-12-02 | End: 2019-12-18

## 2019-12-02 RX ORDER — ACETAMINOPHEN 325 MG/1
650 TABLET ORAL EVERY 4 HOURS PRN
Status: DISCONTINUED | OUTPATIENT
Start: 2019-12-02 | End: 2019-12-19 | Stop reason: HOSPADM

## 2019-12-02 RX ORDER — DIPHENHYDRAMINE HYDROCHLORIDE 50 MG/ML
INJECTION INTRAMUSCULAR; INTRAVENOUS
Status: DISCONTINUED | OUTPATIENT
Start: 2019-12-02 | End: 2019-12-02 | Stop reason: HOSPADM

## 2019-12-02 RX ORDER — HYDROCODONE BITARTRATE AND ACETAMINOPHEN 500; 5 MG/1; MG/1
TABLET ORAL
Status: DISCONTINUED | OUTPATIENT
Start: 2019-12-02 | End: 2019-12-14

## 2019-12-02 RX ORDER — ONDANSETRON 2 MG/ML
INJECTION INTRAMUSCULAR; INTRAVENOUS
Status: DISCONTINUED | OUTPATIENT
Start: 2019-12-02 | End: 2019-12-02 | Stop reason: HOSPADM

## 2019-12-02 RX ORDER — IBUPROFEN 200 MG
24 TABLET ORAL
Status: DISCONTINUED | OUTPATIENT
Start: 2019-12-02 | End: 2019-12-03

## 2019-12-02 RX ORDER — PHENYLEPHRINE HCL IN 0.9% NACL 1 MG/10 ML
100 SYRINGE (ML) INTRAVENOUS ONCE
Status: COMPLETED | OUTPATIENT
Start: 2019-12-02 | End: 2019-12-02

## 2019-12-02 RX ORDER — SODIUM CHLORIDE 0.9 % (FLUSH) 0.9 %
10 SYRINGE (ML) INJECTION
Status: DISCONTINUED | OUTPATIENT
Start: 2019-12-02 | End: 2019-12-19 | Stop reason: HOSPADM

## 2019-12-02 RX ORDER — CLOPIDOGREL BISULFATE 75 MG/1
75 TABLET ORAL DAILY
Status: DISCONTINUED | OUTPATIENT
Start: 2019-12-03 | End: 2019-12-04

## 2019-12-02 RX ORDER — ROSUVASTATIN CALCIUM 10 MG/1
20 TABLET, COATED ORAL DAILY
Status: DISCONTINUED | OUTPATIENT
Start: 2019-12-03 | End: 2019-12-19 | Stop reason: HOSPADM

## 2019-12-02 RX ORDER — SODIUM CHLORIDE 9 MG/ML
INJECTION, SOLUTION INTRAVENOUS
Status: DISCONTINUED | OUTPATIENT
Start: 2019-12-02 | End: 2019-12-10

## 2019-12-02 RX ORDER — IODIXANOL 320 MG/ML
INJECTION, SOLUTION INTRAVASCULAR
Status: DISCONTINUED | OUTPATIENT
Start: 2019-12-02 | End: 2019-12-02 | Stop reason: HOSPADM

## 2019-12-02 RX ORDER — HEPARIN SODIUM 200 [USP'U]/100ML
INJECTION, SOLUTION INTRAVENOUS
Status: DISCONTINUED | OUTPATIENT
Start: 2019-12-02 | End: 2019-12-10

## 2019-12-02 RX ORDER — PHENYLEPHRINE HYDROCHLORIDE 10 MG/ML
INJECTION INTRAVENOUS
Status: DISCONTINUED | OUTPATIENT
Start: 2019-12-02 | End: 2019-12-02 | Stop reason: HOSPADM

## 2019-12-02 RX ORDER — MIDAZOLAM HYDROCHLORIDE 1 MG/ML
INJECTION, SOLUTION INTRAMUSCULAR; INTRAVENOUS
Status: DISCONTINUED | OUTPATIENT
Start: 2019-12-02 | End: 2019-12-02 | Stop reason: HOSPADM

## 2019-12-02 RX ORDER — SODIUM CHLORIDE 9 MG/ML
3 INJECTION, SOLUTION INTRAVENOUS CONTINUOUS
Status: ACTIVE | OUTPATIENT
Start: 2019-12-02 | End: 2019-12-02

## 2019-12-02 RX ADMIN — ONDANSETRON 4 MG: 2 INJECTION INTRAMUSCULAR; INTRAVENOUS at 12:12

## 2019-12-02 RX ADMIN — FUROSEMIDE 40 MG: 10 INJECTION, SOLUTION INTRAVENOUS at 08:12

## 2019-12-02 RX ADMIN — DEXTROSE 0.02 MCG/KG/MIN: 5 SOLUTION INTRAVENOUS at 12:12

## 2019-12-02 RX ADMIN — DEXTROSE 0.78 MCG/KG/MIN: 5 SOLUTION INTRAVENOUS at 05:12

## 2019-12-02 RX ADMIN — Medication 100 MCG: at 12:12

## 2019-12-02 RX ADMIN — SODIUM CHLORIDE 3 ML/KG/HR: 0.9 INJECTION, SOLUTION INTRAVENOUS at 06:12

## 2019-12-02 RX ADMIN — INSULIN ASPART 5 UNITS: 100 INJECTION, SOLUTION INTRAVENOUS; SUBCUTANEOUS at 07:12

## 2019-12-02 RX ADMIN — DEXTROSE 1.36 MCG/KG/MIN: 5 SOLUTION INTRAVENOUS at 07:12

## 2019-12-02 NOTE — Clinical Note
Angiography performed of the proximal left common femoral artery. via hand injection with 10 mL of contrast.

## 2019-12-02 NOTE — PROCEDURES
": James Sanchez MD  Pre-procedure diagnosis: PAD  Post-procedure diagnosis: PAD    Post Procedure Note: Directional atherectomy and PTA with DCB    The pt was brought to the cath lab and under sterile technique, LCFA access was obtained without difficulty. Images were obtained in multiple views and successful atherectomy (Hawk LX) with distal filter protection, followed by PTA with scoring balloon (5.0 x 150 dSFA, 6.0 x 150 pSFA/CFA) followed by PTA with DCB to dSFA (5.0 x 150), pSFA (5.0 x 100) and CFA (7 x 40) with good results. Successful PTA to TPT with 3.0 x 40 balloon. Please see full report for details. The pt tolerated the procedure well without complications. Perclose closure device used with successful hemostasis.     Vitals:    12/02/19 0630 12/02/19 0637 12/02/19 0700   BP: (!) 172/75 (!) 172/75 (!) 146/66   BP Location: Left arm Left arm Left arm   Patient Position:  Lying Lying   Pulse:  (!) 52 (!) 51   Resp: 20 (!) 21 17   Temp:  98 °F (36.7 °C)    TempSrc:  Oral    SpO2:  97% 97%   Weight:  103.4 kg (228 lb)    Height:  5' 9.5" (1.765 m)          Gen: NAD  Ext: 2+ fem pulses, no evidence of hematoma  Estimated blood loss: < 50 cc    Plan:  -Post cath care per protocol  -ASA/plavix/statin  -Walking program  -Serial arterial doppler at 1, 3, 6, 12 months    "

## 2019-12-02 NOTE — NURSING
Pt to cath lab recovery per stretcher with nurse mcclain and bedside report rec'd; rails up; pt awake and alert and oriented and c/o pain in lower back--- aching constant in report on arrival; doppler pulses audible bilaterally dp and pt and some skin mottling noted RLE and md is aware and reportedly has improved per nurse mcclain---skin to feet warm to touch; sarah on monitor; iv patent; slight shadowing to left groin drsg and marked; no hematoma or pain reported; radial pulses palpable;pt connected to monitor and iv access patent lt wrist w/ NS; will continue to monitor

## 2019-12-02 NOTE — ANESTHESIA PROCEDURE NOTES
Arterial    Diagnosis: Hypotension    Patient location during procedure: ICU  Procedure start time: 12/2/2019 3:20 PM  Timeout: 12/2/2019 3:15 PM  Procedure end time: 12/2/2019 3:30 PM    Staffing  Authorizing Provider: Jovany Melendez MD  Performing Provider: Jovany Melendez MD    Anesthesiologist was present at the time of the procedure.  Arterial  Skin Prep: chlorhexidine gluconate  Local Infiltration: none  Orientation: left  Location: radial  Catheter Size: 20 G  Catheter placement by Ultrasound guidance. Heme positive aspiration all ports.  Vessel Caliber: small, patent, compressibility normal  Needle advanced into vessel with real time Ultrasound guidance.Insertion Attempts: 3  Assessment  Dressing: tegaderm and steri-strips  Patient: Tolerated well

## 2019-12-02 NOTE — Clinical Note
Angiography performed of the proximal right common femoral artery. via hand injection with 10 mL of contrast.

## 2019-12-02 NOTE — Clinical Note
Angiography of the infrarenal and abdominal aorta performed with the catheter   and hand injected with 0 mL of contrast. CO 2 INJECTIONS BILATERALLY

## 2019-12-02 NOTE — Clinical Note
125 ml injected throughout the case. 25 mL total wasted during the case. 150 mL total used in the case.

## 2019-12-02 NOTE — PROGRESS NOTES
@ 1227 pt became diaphoretic, sbp 80's-90's, pt c/o of N and pain to lower back. Pressure held to Left arterial access site, pt placed in trendelenburg, MD Sanchez notified.    @ 1230 MD Sanchez at bedside to assess pt. See chart for orders.    @ 1317 Pt back to cath lab table.

## 2019-12-02 NOTE — Clinical Note
Angiography of the right lower extremity selectively performed with the sheath   and hand injected with 20 mL of contrast.

## 2019-12-02 NOTE — PROCEDURES
: James Sanchez MD  Pre-procedure diagnosis: PAD  Post-procedure diagnosis: PAD    Post Procedure Note: peripheral re-look    The pt was brought to the cath lab and under sterile technique, right radial access was obtained without difficulty. Images were obtained in multiple views and no active bleed noted. Cuff pressure did not match with aortic pressure which was significantly higher. Please see full report for details. The pt tolerated the procedure well without complications. Radial band device used with successful hemostasis.     Vitals:    12/02/19 1255 12/02/19 1300 12/02/19 1305 12/02/19 1315   BP: (!) 89/44 (!) 92/48 (!) 93/52 (!) 102/53   BP Location:       Patient Position:       Pulse: (!) 46 (!) 49 (!) 48 (!) 50   Resp: (!) 25 (!) 27 (!) 26 (!) 30   Temp:       TempSrc:       SpO2: 100% 100% 100% 100%   Weight:       Height:             Gen: NAD  Ext: 2+ radial pulse, no evidence of hematoma  Estimated blood loss: < 50 cc    Plan:  -Post cath care per protocol  -monitor in ICU  -Arterial line  -serial H/H

## 2019-12-02 NOTE — Clinical Note
Angiography of the infrarenal and abdominal aorta performed with the catheter   power injection 40 mL contrast at 20 mL/s.

## 2019-12-02 NOTE — PROGRESS NOTES
Called to patient's bedside due to hypotension, back pain, and diaphoresis  No hematoma noted, however, concern for bleeding  Will take back to cath lab for re-look  IVF's   Pressure support as needed

## 2019-12-02 NOTE — PLAN OF CARE
Pt resting comfortably in bed; labs pending; iv intact left wrist; pt has no complaints; sinus sarah on monitor; npo maintained; pt has hx cva with right arm and leg weakness and sues a cane and at bedside; pts son was present on pt arrival and will update as needed---he left for work; pt belongings labeled at bedside; ekg requested

## 2019-12-03 ENCOUNTER — RESEARCH ENCOUNTER (OUTPATIENT)
Dept: NEUROLOGY | Facility: CLINIC | Age: 74
End: 2019-12-03

## 2019-12-03 PROBLEM — R60.9 EDEMA: Status: ACTIVE | Noted: 2019-12-03

## 2019-12-03 PROBLEM — D64.9 ANEMIA: Status: ACTIVE | Noted: 2019-12-03

## 2019-12-03 PROBLEM — I95.9 HYPOTENSION: Status: ACTIVE | Noted: 2019-12-03

## 2019-12-03 PROBLEM — R06.02 SHORTNESS OF BREATH: Status: ACTIVE | Noted: 2019-12-03

## 2019-12-03 PROBLEM — D72.829 LEUKOCYTOSIS: Status: ACTIVE | Noted: 2019-12-03

## 2019-12-03 PROBLEM — N17.9 AKI (ACUTE KIDNEY INJURY): Status: ACTIVE | Noted: 2019-12-03

## 2019-12-03 PROBLEM — E87.5 HYPERKALEMIA: Status: ACTIVE | Noted: 2019-12-03

## 2019-12-03 LAB
ALLENS TEST: ABNORMAL
ALLENS TEST: ABNORMAL
ANION GAP SERPL CALC-SCNC: 12 MMOL/L (ref 8–16)
ANION GAP SERPL CALC-SCNC: 13 MMOL/L (ref 8–16)
ANION GAP SERPL CALC-SCNC: 14 MMOL/L (ref 8–16)
ANISOCYTOSIS BLD QL SMEAR: SLIGHT
ANISOCYTOSIS BLD QL SMEAR: SLIGHT
AORTIC ROOT ANNULUS: 3.4 CM
APTT BLDCRRT: 24.2 SEC (ref 21–32)
AV INDEX (PROSTH): 1.06
AV MEAN GRADIENT: 6 MMHG
AV PEAK GRADIENT: 9 MMHG
AV VALVE AREA: 4.31 CM2
AV VELOCITY RATIO: 0.78
B-OH-BUTYR BLD STRIP-SCNC: 0.1 MMOL/L (ref 0–0.5)
BASOPHILS # BLD AUTO: 0.01 K/UL (ref 0–0.2)
BASOPHILS # BLD AUTO: 0.02 K/UL (ref 0–0.2)
BASOPHILS # BLD AUTO: 0.02 K/UL (ref 0–0.2)
BASOPHILS # BLD AUTO: 0.03 K/UL (ref 0–0.2)
BASOPHILS NFR BLD: 0 % (ref 0–1.9)
BASOPHILS NFR BLD: 0.1 % (ref 0–1.9)
BNP SERPL-MCNC: 94 PG/ML (ref 0–99)
BSA FOR ECHO PROCEDURE: 2.3 M2
BUN SERPL-MCNC: 41 MG/DL (ref 8–23)
BUN SERPL-MCNC: 44 MG/DL (ref 8–23)
BUN SERPL-MCNC: 46 MG/DL (ref 8–23)
BURR CELLS BLD QL SMEAR: ABNORMAL
CALCIUM SERPL-MCNC: 7.5 MG/DL (ref 8.7–10.5)
CALCIUM SERPL-MCNC: 7.6 MG/DL (ref 8.7–10.5)
CALCIUM SERPL-MCNC: 7.7 MG/DL (ref 8.7–10.5)
CHLORIDE SERPL-SCNC: 102 MMOL/L (ref 95–110)
CO2 SERPL-SCNC: 16 MMOL/L (ref 23–29)
CO2 SERPL-SCNC: 17 MMOL/L (ref 23–29)
CO2 SERPL-SCNC: 17 MMOL/L (ref 23–29)
CREAT SERPL-MCNC: 3.7 MG/DL (ref 0.5–1.4)
CREAT SERPL-MCNC: 3.9 MG/DL (ref 0.5–1.4)
CREAT SERPL-MCNC: 4.2 MG/DL (ref 0.5–1.4)
CV ECHO LV RWT: 0.46 CM
DELSYS: ABNORMAL
DELSYS: ABNORMAL
DIFFERENTIAL METHOD: ABNORMAL
DOP CALC AO PEAK VEL: 1.48 M/S
DOP CALC AO VTI: 24.66 CM
DOP CALC LVOT AREA: 4 CM2
DOP CALC LVOT DIAMETER: 2.27 CM
DOP CALC LVOT PEAK VEL: 1.16 M/S
DOP CALC LVOT STROKE VOLUME: 106.18 CM3
DOP CALCLVOT PEAK VEL VTI: 26.25 CM
E WAVE DECELERATION TIME: 338.8 MSEC
E/A RATIO: 0.62
E/E' RATIO: 9.69 M/S
ECHO LV POSTERIOR WALL: 1.1 CM (ref 0.6–1.1)
EOSINOPHIL # BLD AUTO: 0 K/UL (ref 0–0.5)
EOSINOPHIL NFR BLD: 0 % (ref 0–8)
EOSINOPHIL NFR BLD: 0.1 % (ref 0–8)
ERYTHROCYTE [DISTWIDTH] IN BLOOD BY AUTOMATED COUNT: 13.8 % (ref 11.5–14.5)
ERYTHROCYTE [DISTWIDTH] IN BLOOD BY AUTOMATED COUNT: 14 % (ref 11.5–14.5)
ERYTHROCYTE [DISTWIDTH] IN BLOOD BY AUTOMATED COUNT: 14 % (ref 11.5–14.5)
ERYTHROCYTE [DISTWIDTH] IN BLOOD BY AUTOMATED COUNT: 14.1 % (ref 11.5–14.5)
ERYTHROCYTE [DISTWIDTH] IN BLOOD BY AUTOMATED COUNT: 14.1 % (ref 11.5–14.5)
EST. GFR  (AFRICAN AMERICAN): 15 ML/MIN/1.73 M^2
EST. GFR  (AFRICAN AMERICAN): 16 ML/MIN/1.73 M^2
EST. GFR  (AFRICAN AMERICAN): 17 ML/MIN/1.73 M^2
EST. GFR  (NON AFRICAN AMERICAN): 13 ML/MIN/1.73 M^2
EST. GFR  (NON AFRICAN AMERICAN): 14 ML/MIN/1.73 M^2
EST. GFR  (NON AFRICAN AMERICAN): 15 ML/MIN/1.73 M^2
FIBRINOGEN PPP-MCNC: 449 MG/DL (ref 182–366)
FRACTIONAL SHORTENING: 47 % (ref 28–44)
GIANT PLATELETS BLD QL SMEAR: PRESENT
GLUCOSE SERPL-MCNC: 486 MG/DL (ref 70–110)
GLUCOSE SERPL-MCNC: 579 MG/DL (ref 70–110)
GLUCOSE SERPL-MCNC: 619 MG/DL (ref 70–110)
HCO3 UR-SCNC: 16.7 MMOL/L (ref 24–28)
HCO3 UR-SCNC: 18.4 MMOL/L (ref 24–28)
HCT VFR BLD AUTO: 22.8 % (ref 40–54)
HCT VFR BLD AUTO: 25.1 % (ref 40–54)
HCT VFR BLD AUTO: 27.5 % (ref 40–54)
HCT VFR BLD AUTO: 30.2 % (ref 40–54)
HCT VFR BLD AUTO: 32 % (ref 40–54)
HGB BLD-MCNC: 10 G/DL (ref 14–18)
HGB BLD-MCNC: 10.4 G/DL (ref 14–18)
HGB BLD-MCNC: 7.6 G/DL (ref 14–18)
HGB BLD-MCNC: 8.3 G/DL (ref 14–18)
HGB BLD-MCNC: 9.1 G/DL (ref 14–18)
HYPOCHROMIA BLD QL SMEAR: ABNORMAL
INR PPP: 1.1 (ref 0.8–1.2)
INTERVENTRICULAR SEPTUM: 1.1 CM (ref 0.6–1.1)
LACTATE SERPL-SCNC: 3.6 MMOL/L (ref 0.5–2.2)
LEFT ATRIUM SIZE: 3.68 CM
LEFT INTERNAL DIMENSION IN SYSTOLE: 2.56 CM (ref 2.1–4)
LEFT VENTRICLE DIASTOLIC VOLUME INDEX: 30.84 ML/M2
LEFT VENTRICLE DIASTOLIC VOLUME: 68.84 ML
LEFT VENTRICLE MASS INDEX: 87 G/M2
LEFT VENTRICLE SYSTOLIC VOLUME INDEX: 10.6 ML/M2
LEFT VENTRICLE SYSTOLIC VOLUME: 23.57 ML
LEFT VENTRICULAR INTERNAL DIMENSION IN DIASTOLE: 4.8 CM (ref 3.5–6)
LEFT VENTRICULAR MASS: 193.96 G
LV LATERAL E/E' RATIO: 9 M/S
LV SEPTAL E/E' RATIO: 10.5 M/S
LYMPHOCYTES # BLD AUTO: 0.8 K/UL (ref 1–4.8)
LYMPHOCYTES # BLD AUTO: 1.1 K/UL (ref 1–4.8)
LYMPHOCYTES # BLD AUTO: 2 K/UL (ref 1–4.8)
LYMPHOCYTES # BLD AUTO: 2.6 K/UL (ref 1–4.8)
LYMPHOCYTES NFR BLD: 3 % (ref 18–48)
LYMPHOCYTES NFR BLD: 5 % (ref 18–48)
LYMPHOCYTES NFR BLD: 7 % (ref 18–48)
LYMPHOCYTES NFR BLD: 8.7 % (ref 18–48)
LYMPHOCYTES NFR BLD: 9.2 % (ref 18–48)
MAGNESIUM SERPL-MCNC: 1.6 MG/DL (ref 1.6–2.6)
MCH RBC QN AUTO: 28.9 PG (ref 27–31)
MCH RBC QN AUTO: 29 PG (ref 27–31)
MCH RBC QN AUTO: 29 PG (ref 27–31)
MCH RBC QN AUTO: 29.1 PG (ref 27–31)
MCH RBC QN AUTO: 29.2 PG (ref 27–31)
MCHC RBC AUTO-ENTMCNC: 32.5 G/DL (ref 32–36)
MCHC RBC AUTO-ENTMCNC: 33.1 G/DL (ref 32–36)
MCHC RBC AUTO-ENTMCNC: 33.3 G/DL (ref 32–36)
MCV RBC AUTO: 87 FL (ref 82–98)
MCV RBC AUTO: 88 FL (ref 82–98)
MCV RBC AUTO: 90 FL (ref 82–98)
MONOCYTES # BLD AUTO: 0.9 K/UL (ref 0.3–1)
MONOCYTES # BLD AUTO: 0.9 K/UL (ref 0.3–1)
MONOCYTES # BLD AUTO: 1 K/UL (ref 0.3–1)
MONOCYTES # BLD AUTO: 1.6 K/UL (ref 0.3–1)
MONOCYTES NFR BLD: 3.6 % (ref 4–15)
MONOCYTES NFR BLD: 3.8 % (ref 4–15)
MONOCYTES NFR BLD: 5.4 % (ref 4–15)
MONOCYTES NFR BLD: 6 % (ref 4–15)
MONOCYTES NFR BLD: 9.7 % (ref 4–15)
MV PEAK A VEL: 1.02 M/S
MV PEAK E VEL: 0.63 M/S
NEUTROPHILS # BLD AUTO: 13.6 K/UL (ref 1.8–7.7)
NEUTROPHILS # BLD AUTO: 13.8 K/UL (ref 1.8–7.7)
NEUTROPHILS # BLD AUTO: 19.8 K/UL (ref 1.8–7.7)
NEUTROPHILS # BLD AUTO: 24.4 K/UL (ref 1.8–7.7)
NEUTROPHILS NFR BLD: 85.2 % (ref 38–73)
NEUTROPHILS NFR BLD: 87 % (ref 38–73)
NEUTROPHILS NFR BLD: 87.4 % (ref 38–73)
NEUTROPHILS NFR BLD: 87.5 % (ref 38–73)
NEUTROPHILS NFR BLD: 90 % (ref 38–73)
NEUTS BAND NFR BLD MANUAL: 1 %
PCO2 BLDA: 32.4 MMHG (ref 35–45)
PCO2 BLDA: 33.2 MMHG (ref 35–45)
PH SMN: 7.32 [PH] (ref 7.35–7.45)
PH SMN: 7.35 [PH] (ref 7.35–7.45)
PHOSPHATE SERPL-MCNC: 5.7 MG/DL (ref 2.7–4.5)
PLATELET # BLD AUTO: 221 K/UL (ref 150–350)
PLATELET # BLD AUTO: 234 K/UL (ref 150–350)
PLATELET # BLD AUTO: 263 K/UL (ref 150–350)
PLATELET # BLD AUTO: 306 K/UL (ref 150–350)
PLATELET # BLD AUTO: 309 K/UL (ref 150–350)
PLATELET BLD QL SMEAR: ABNORMAL
PMV BLD AUTO: 11 FL (ref 9.2–12.9)
PMV BLD AUTO: 11.1 FL (ref 9.2–12.9)
PMV BLD AUTO: 11.1 FL (ref 9.2–12.9)
PMV BLD AUTO: 11.4 FL (ref 9.2–12.9)
PMV BLD AUTO: 11.4 FL (ref 9.2–12.9)
PO2 BLDA: 72 MMHG (ref 80–100)
PO2 BLDA: 86 MMHG (ref 80–100)
POC BE: -7 MMOL/L
POC BE: -9 MMOL/L
POC SATURATED O2: 94 % (ref 95–100)
POC SATURATED O2: 96 % (ref 95–100)
POC TCO2: 18 MMOL/L (ref 23–27)
POC TCO2: 19 MMOL/L (ref 23–27)
POCT GLUCOSE: 308 MG/DL (ref 70–110)
POCT GLUCOSE: 428 MG/DL (ref 70–110)
POCT GLUCOSE: 441 MG/DL (ref 70–110)
POCT GLUCOSE: 444 MG/DL (ref 70–110)
POCT GLUCOSE: 471 MG/DL (ref 70–110)
POIKILOCYTOSIS BLD QL SMEAR: SLIGHT
POTASSIUM SERPL-SCNC: 4.9 MMOL/L (ref 3.5–5.1)
POTASSIUM SERPL-SCNC: 5 MMOL/L (ref 3.5–5.1)
POTASSIUM SERPL-SCNC: 6.1 MMOL/L (ref 3.5–5.1)
PROTHROMBIN TIME: 11.4 SEC (ref 9–12.5)
PV PEAK VELOCITY: 1.81 CM/S
RA PRESSURE: 3 MMHG
RBC # BLD AUTO: 2.63 M/UL (ref 4.6–6.2)
RBC # BLD AUTO: 2.86 M/UL (ref 4.6–6.2)
RBC # BLD AUTO: 3.14 M/UL (ref 4.6–6.2)
RBC # BLD AUTO: 3.42 M/UL (ref 4.6–6.2)
RBC # BLD AUTO: 3.57 M/UL (ref 4.6–6.2)
SAMPLE: ABNORMAL
SAMPLE: ABNORMAL
SITE: ABNORMAL
SITE: ABNORMAL
SODIUM SERPL-SCNC: 131 MMOL/L (ref 136–145)
SODIUM SERPL-SCNC: 132 MMOL/L (ref 136–145)
SODIUM SERPL-SCNC: 132 MMOL/L (ref 136–145)
TDI LATERAL: 0.07 M/S
TDI SEPTAL: 0.06 M/S
TDI: 0.07 M/S
TRICUSPID ANNULAR PLANE SYSTOLIC EXCURSION: 1.44 CM
WBC # BLD AUTO: 15.82 K/UL (ref 3.9–12.7)
WBC # BLD AUTO: 16.04 K/UL (ref 3.9–12.7)
WBC # BLD AUTO: 17.59 K/UL (ref 3.9–12.7)
WBC # BLD AUTO: 22.83 K/UL (ref 3.9–12.7)
WBC # BLD AUTO: 28.36 K/UL (ref 3.9–12.7)

## 2019-12-03 PROCEDURE — 85007 BL SMEAR W/DIFF WBC COUNT: CPT

## 2019-12-03 PROCEDURE — 63600175 PHARM REV CODE 636 W HCPCS: Performed by: INTERNAL MEDICINE

## 2019-12-03 PROCEDURE — 82010 KETONE BODYS QUAN: CPT

## 2019-12-03 PROCEDURE — 99291 CRITICAL CARE FIRST HOUR: CPT | Mod: ,,, | Performed by: INTERNAL MEDICINE

## 2019-12-03 PROCEDURE — 85730 THROMBOPLASTIN TIME PARTIAL: CPT

## 2019-12-03 PROCEDURE — 82803 BLOOD GASES ANY COMBINATION: CPT

## 2019-12-03 PROCEDURE — 36556 INSERT NON-TUNNEL CV CATH: CPT | Mod: RT,,, | Performed by: STUDENT IN AN ORGANIZED HEALTH CARE EDUCATION/TRAINING PROGRAM

## 2019-12-03 PROCEDURE — 63600175 PHARM REV CODE 636 W HCPCS: Performed by: NURSE PRACTITIONER

## 2019-12-03 PROCEDURE — 99900035 HC TECH TIME PER 15 MIN (STAT)

## 2019-12-03 PROCEDURE — 80048 BASIC METABOLIC PNL TOTAL CA: CPT | Mod: 91

## 2019-12-03 PROCEDURE — 37799 UNLISTED PX VASCULAR SURGERY: CPT

## 2019-12-03 PROCEDURE — 83605 ASSAY OF LACTIC ACID: CPT

## 2019-12-03 PROCEDURE — 99222 PR INITIAL HOSPITAL CARE,LEVL II: ICD-10-PCS | Mod: 25,,, | Performed by: STUDENT IN AN ORGANIZED HEALTH CARE EDUCATION/TRAINING PROGRAM

## 2019-12-03 PROCEDURE — 85610 PROTHROMBIN TIME: CPT

## 2019-12-03 PROCEDURE — 99291 PR CRITICAL CARE, E/M 30-74 MINUTES: ICD-10-PCS | Mod: ,,, | Performed by: INTERNAL MEDICINE

## 2019-12-03 PROCEDURE — 80048 BASIC METABOLIC PNL TOTAL CA: CPT

## 2019-12-03 PROCEDURE — 94640 AIRWAY INHALATION TREATMENT: CPT

## 2019-12-03 PROCEDURE — 85384 FIBRINOGEN ACTIVITY: CPT

## 2019-12-03 PROCEDURE — 94761 N-INVAS EAR/PLS OXIMETRY MLT: CPT

## 2019-12-03 PROCEDURE — 84100 ASSAY OF PHOSPHORUS: CPT

## 2019-12-03 PROCEDURE — 25000003 PHARM REV CODE 250: Performed by: NURSE PRACTITIONER

## 2019-12-03 PROCEDURE — C9399 UNCLASSIFIED DRUGS OR BIOLOG: HCPCS | Performed by: HOSPITALIST

## 2019-12-03 PROCEDURE — 25000003 PHARM REV CODE 250: Performed by: INTERNAL MEDICINE

## 2019-12-03 PROCEDURE — 20000000 HC ICU ROOM

## 2019-12-03 PROCEDURE — 83880 ASSAY OF NATRIURETIC PEPTIDE: CPT

## 2019-12-03 PROCEDURE — 63600175 PHARM REV CODE 636 W HCPCS: Performed by: HOSPITALIST

## 2019-12-03 PROCEDURE — P9021 RED BLOOD CELLS UNIT: HCPCS

## 2019-12-03 PROCEDURE — 36415 COLL VENOUS BLD VENIPUNCTURE: CPT

## 2019-12-03 PROCEDURE — 25000242 PHARM REV CODE 250 ALT 637 W/ HCPCS: Performed by: INTERNAL MEDICINE

## 2019-12-03 PROCEDURE — 99222 1ST HOSP IP/OBS MODERATE 55: CPT | Mod: 25,,, | Performed by: STUDENT IN AN ORGANIZED HEALTH CARE EDUCATION/TRAINING PROGRAM

## 2019-12-03 PROCEDURE — 36556 PR INSERT NON-TUNNEL CV CATH 5+ YRS OLD: ICD-10-PCS | Mod: RT,,, | Performed by: STUDENT IN AN ORGANIZED HEALTH CARE EDUCATION/TRAINING PROGRAM

## 2019-12-03 PROCEDURE — 85027 COMPLETE CBC AUTOMATED: CPT

## 2019-12-03 PROCEDURE — 83735 ASSAY OF MAGNESIUM: CPT

## 2019-12-03 PROCEDURE — 85025 COMPLETE CBC W/AUTO DIFF WBC: CPT | Mod: 91

## 2019-12-03 RX ORDER — INSULIN ASPART 100 [IU]/ML
15 INJECTION, SOLUTION INTRAVENOUS; SUBCUTANEOUS
Status: DISCONTINUED | OUTPATIENT
Start: 2019-12-03 | End: 2019-12-03

## 2019-12-03 RX ORDER — SODIUM CHLORIDE 9 MG/ML
INJECTION, SOLUTION INTRAVENOUS CONTINUOUS
Status: ACTIVE | OUTPATIENT
Start: 2019-12-03 | End: 2019-12-04

## 2019-12-03 RX ORDER — MAGNESIUM SULFATE 1 G/100ML
1 INJECTION INTRAVENOUS ONCE
Status: COMPLETED | OUTPATIENT
Start: 2019-12-03 | End: 2019-12-03

## 2019-12-03 RX ORDER — DEXTROSE 50 % IN WATER (D50W) INTRAVENOUS SYRINGE
12.5
Status: DISCONTINUED | OUTPATIENT
Start: 2019-12-03 | End: 2019-12-03

## 2019-12-03 RX ORDER — DEXTROSE 50 % IN WATER (D50W) INTRAVENOUS SYRINGE
25
Status: DISCONTINUED | OUTPATIENT
Start: 2019-12-03 | End: 2019-12-03

## 2019-12-03 RX ORDER — ALBUTEROL SULFATE 2.5 MG/.5ML
10 SOLUTION RESPIRATORY (INHALATION) ONCE
Status: COMPLETED | OUTPATIENT
Start: 2019-12-03 | End: 2019-12-03

## 2019-12-03 RX ORDER — GLUCAGON 1 MG
1 KIT INJECTION
Status: DISCONTINUED | OUTPATIENT
Start: 2019-12-03 | End: 2019-12-04

## 2019-12-03 RX ORDER — FUROSEMIDE 10 MG/ML
40 INJECTION INTRAMUSCULAR; INTRAVENOUS ONCE
Status: COMPLETED | OUTPATIENT
Start: 2019-12-03 | End: 2019-12-03

## 2019-12-03 RX ORDER — IBUPROFEN 200 MG
16 TABLET ORAL
Status: DISCONTINUED | OUTPATIENT
Start: 2019-12-03 | End: 2019-12-04

## 2019-12-03 RX ORDER — INSULIN ASPART 100 [IU]/ML
15 INJECTION, SOLUTION INTRAVENOUS; SUBCUTANEOUS
Status: DISCONTINUED | OUTPATIENT
Start: 2019-12-04 | End: 2019-12-04

## 2019-12-03 RX ORDER — IBUPROFEN 200 MG
24 TABLET ORAL
Status: DISCONTINUED | OUTPATIENT
Start: 2019-12-03 | End: 2019-12-04

## 2019-12-03 RX ORDER — INSULIN ASPART 100 [IU]/ML
1-10 INJECTION, SOLUTION INTRAVENOUS; SUBCUTANEOUS
Status: DISCONTINUED | OUTPATIENT
Start: 2019-12-03 | End: 2019-12-03

## 2019-12-03 RX ORDER — ALBUTEROL SULFATE 2.5 MG/.5ML
5 SOLUTION RESPIRATORY (INHALATION) ONCE
Status: DISCONTINUED | OUTPATIENT
Start: 2019-12-03 | End: 2019-12-03

## 2019-12-03 RX ORDER — MAGNESIUM SULFATE HEPTAHYDRATE 40 MG/ML
2 INJECTION, SOLUTION INTRAVENOUS
Status: DISCONTINUED | OUTPATIENT
Start: 2019-12-03 | End: 2019-12-06

## 2019-12-03 RX ORDER — INSULIN ASPART 100 [IU]/ML
0-5 INJECTION, SOLUTION INTRAVENOUS; SUBCUTANEOUS
Status: DISCONTINUED | OUTPATIENT
Start: 2019-12-03 | End: 2019-12-04

## 2019-12-03 RX ADMIN — INSULIN ASPART 15 UNITS: 100 INJECTION, SOLUTION INTRAVENOUS; SUBCUTANEOUS at 11:12

## 2019-12-03 RX ADMIN — INSULIN HUMAN 14 UNITS: 100 INJECTION, SOLUTION PARENTERAL at 02:12

## 2019-12-03 RX ADMIN — MAGNESIUM SULFATE 1 G: 1 INJECTION INTRAVENOUS at 06:12

## 2019-12-03 RX ADMIN — ALBUTEROL SULFATE 10 MG: 2.5 SOLUTION RESPIRATORY (INHALATION) at 01:12

## 2019-12-03 RX ADMIN — INSULIN ASPART 10 UNITS: 100 INJECTION, SOLUTION INTRAVENOUS; SUBCUTANEOUS at 09:12

## 2019-12-03 RX ADMIN — CALCIUM GLUCONATE 1000 MG: 98 INJECTION, SOLUTION INTRAVENOUS at 07:12

## 2019-12-03 RX ADMIN — ASPIRIN 81 MG: 81 TABLET, COATED ORAL at 09:12

## 2019-12-03 RX ADMIN — INSULIN ASPART 4 UNITS: 100 INJECTION, SOLUTION INTRAVENOUS; SUBCUTANEOUS at 04:12

## 2019-12-03 RX ADMIN — NOREPINEPHRINE BITARTRATE 0.7 MCG/KG/MIN: 1 INJECTION, SOLUTION, CONCENTRATE INTRAVENOUS at 06:12

## 2019-12-03 RX ADMIN — ROSUVASTATIN CALCIUM 20 MG: 10 TABLET, FILM COATED ORAL at 09:12

## 2019-12-03 RX ADMIN — INSULIN DETEMIR 40 UNITS: 100 INJECTION, SOLUTION SUBCUTANEOUS at 05:12

## 2019-12-03 RX ADMIN — INSULIN ASPART 10 UNITS: 100 INJECTION, SOLUTION INTRAVENOUS; SUBCUTANEOUS at 11:12

## 2019-12-03 RX ADMIN — INSULIN HUMAN 12 UNITS: 100 INJECTION, SOLUTION PARENTERAL at 02:12

## 2019-12-03 RX ADMIN — CALCIUM GLUCONATE 1000 MG: 98 INJECTION, SOLUTION INTRAVENOUS at 02:12

## 2019-12-03 RX ADMIN — HUMAN ALBUMIN MICROSPHERES AND PERFLUTREN 0.11 MG: 10; .22 INJECTION, SOLUTION INTRAVENOUS at 04:12

## 2019-12-03 RX ADMIN — FUROSEMIDE 40 MG: 10 INJECTION, SOLUTION INTRAVENOUS at 09:12

## 2019-12-03 RX ADMIN — INSULIN ASPART 5 UNITS: 100 INJECTION, SOLUTION INTRAVENOUS; SUBCUTANEOUS at 12:12

## 2019-12-03 RX ADMIN — CLOPIDOGREL BISULFATE 75 MG: 75 TABLET ORAL at 09:12

## 2019-12-03 RX ADMIN — PANTOPRAZOLE SODIUM 40 MG: 40 TABLET, DELAYED RELEASE ORAL at 09:12

## 2019-12-03 RX ADMIN — INSULIN ASPART 5 UNITS: 100 INJECTION, SOLUTION INTRAVENOUS; SUBCUTANEOUS at 05:12

## 2019-12-03 RX ADMIN — NOREPINEPHRINE BITARTRATE 1.06 MCG/KG/MIN: 1 INJECTION, SOLUTION, CONCENTRATE INTRAVENOUS at 12:12

## 2019-12-03 NOTE — NURSING
Report received at bedside from NICOLE Umanzor. Patient is drowsy and appears very pale and sweaty. 1st unit of PRBCs infusing at this time. R radial site is clean, dry and intact with no redness, swelling, or hematoma noted. L groin site dressing is clean/dry/intact with no swelling or hematoma at site. Bruising and swelling is noted to LLQ abd, tender to touch. Abd is very distended and firm with more swelling to L abd.than R. No active swelling or hematoma visible or palpable.     Patient drifts off to sleep but quickly wakes to verbal stimuli. Oriented x4. Moves all extremities and follows commands with severe weakness. See flowsheets for details. All lines and drains are intact and fluids are infusing without difficulty. See MAR for gtts. Bed in lowest position. Urinal in reach. Siderails x3. Will continue to monitor

## 2019-12-03 NOTE — CONSULTS
Patient ID: Cy Rutherford is a 74 y.o. male.    Chief Complaint: No chief complaint on file.      HPI:  74M with CAD, PAD admitted for extremity stent placement began having SOB, found to be fluid overloaded. Unresponisve to lasix. Basically no urine output all day. nephro consulted surgery for placement of dialysis line for CRT.       Review of Systems   Constitutional: Negative for chills, diaphoresis and fever.   HENT: Negative for trouble swallowing.    Respiratory: Positive for shortness of breath. Negative for cough, wheezing and stridor.    Cardiovascular: Positive for leg swelling. Negative for chest pain and palpitations.   Gastrointestinal: Negative for abdominal distention, abdominal pain, blood in stool, diarrhea, nausea and vomiting.   Genitourinary: Positive for decreased urine volume.   Musculoskeletal: Negative for back pain.   Skin: Negative for rash.   Allergic/Immunologic: Positive for immunocompromised state.   Neurological: Negative for dizziness and syncope.   Hematological: Negative for adenopathy.   Psychiatric/Behavioral: Negative for agitation.       Current Facility-Administered Medications   Medication Dose Route Frequency Provider Last Rate Last Dose    0.9%  NaCl infusion (for blood administration)   Intravenous Q24H PRDANIELE Sanchez MD        0.9%  NaCl infusion (for blood administration)   Intravenous Q24H PRDANIELE Sanchez MD        0.9%  NaCl infusion (for blood administration)   Intravenous Q24H PRDANIELE Sanchez MD        0.9%  NaCl infusion    Continuous PRN James Sanchez .2 mL/hr at 12/02/19 1348 3 mL/kg/hr at 12/02/19 1348    acetaminophen tablet 650 mg  650 mg Oral Q4H PRDANIELE Sanchez MD        aspirin EC tablet 81 mg  81 mg Oral Daily KATHLEEN Kwan ANP   81 mg at 12/03/19 0922    calcium gluconate 1g in dextrose 5% 100mL (ready to mix system)  1,000 mg Intravenous Once KATHLEEN Kwan ANP        cefazolin (ANCEF) 2 gram  in dextrose 5% 50 mL IVPB (premix)    Continuous PRN James Sanchez MD   2 g at 12/02/19 1102    clopidogrel tablet 75 mg  75 mg Oral Daily KATHLEEN Kwan ANP   75 mg at 12/03/19 0922    dextrose 10% (D10W) Bolus  12.5 g Intravenous PRN James Sanchez MD        dextrose 10% (D10W) Bolus  25 g Intravenous PRN James Sanchez MD        dextrose 50 % in water (D50W) injection 12.5 g  12.5 g Intravenous PRN KATHLEEN Kwan ANP        dextrose 50 % in water (D50W) injection 25 g  25 g Intravenous PRN KATHLEEN Kwan ANP        heparin infusion 1,000 units/500 ml in 0.9% NaCl (pressure line flush)    Continuous PRN James Sanchez MD   1,000 mL at 12/02/19 1343    insulin aspart U-100 pen 1-10 Units  1-10 Units Subcutaneous QID (AC + HS) PRN Santos Fabian MD   10 Units at 12/03/19 1145    insulin regular (Humulin R) 100 Units in sodium chloride 0.9% 100 mL infusion  4 Units/hr Intravenous Continuous KATHLEEN Kwan ANP        magnesium sulfate in dextrose IVPB (premix) 1 g  1 g Intravenous Once KATHLEEN Kwan ANP        morphine injection 2 mg  2 mg Intravenous Q1H PRN James Sanchez MD        norepinephrine 32 mg in dextrose 5 % 250 mL infusion  0.02 mcg/kg/min Intravenous Continuous Santos Fabian MD 12.6 mL/hr at 12/03/19 1501 0.26 mcg/kg/min at 12/03/19 1501    ondansetron disintegrating tablet 8 mg  8 mg Oral Q8H PRN KATHLEEN Kwan ANP        ondansetron injection 4 mg  4 mg Intravenous Q12H PRN James Sanchez MD   4 mg at 12/02/19 1244    pantoprazole EC tablet 40 mg  40 mg Oral Daily KATHLEEN Kwan ANP   40 mg at 12/03/19 0923    polyethylene glycol packet 17 g  17 g Oral BID PRN KATHLEEN Kwan ANP        rosuvastatin tablet 20 mg  20 mg Oral Daily KATHLEEN Kwan ANP   20 mg at 12/03/19 0922    sodium chloride 0.9% bolus 310.2 mL  3 mL/kg Intravenous Once James Sanchez  mL/hr at 12/02/19 1231       sodium chloride 0.9% flush 10 mL  10 mL Intravenous PRN KATHLEEN Kwan, ANP           Review of patient's allergies indicates:  No Known Allergies    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    Type 2 diabetes mellitus with kidney complication, without long-term current use of insulin 11/15/2012       Past Surgical History:   Procedure Laterality Date    AORTOGRAPHY WITH SERIALOGRAPHY N/A 10/16/2019    Procedure: AORTOGRAM, WITH SERIALOGRAPHY;  Surgeon: James Sanchez MD;  Location: [facility] CATH LAB/EP;  Service: Cardiology;  Laterality: N/A;    COLONOSCOPY      CORONARY ANGIOPLASTY      MARIAN to LAD and LCX       Family History   Problem Relation Age of Onset    No Known Problems Mother     No Known Problems Father     Colon polyps Sister        Social History     Socioeconomic History    Marital status:      Spouse name: Not on file    Number of children: Not on file    Years of education: Not on file    Highest education level: Not on file   Occupational History    Not on file   Social Needs    Financial resource strain: Not on file    Food insecurity:     Worry: Not on file     Inability: Not on file    Transportation needs:     Medical: Not on file     Non-medical: Not on file   Tobacco Use    Smoking status: Never Smoker    Smokeless tobacco: Never Used   Substance and Sexual Activity    Alcohol use: No    Drug use: No    Sexual activity: Not Currently   Lifestyle    Physical activity:     Days per week: Not on file     Minutes per session: Not on file    Stress: Not on file   Relationships    Social connections:     Talks on phone: Not on file     Gets together: Not on file     Attends [employer] service: Not on file     Active member of club or  organization: Not on file     Attends meetings of clubs or organizations: Not on file     Relationship status: Not on file   Other Topics Concern    Not on file   Social History Narrative    Not on file       Vitals:    12/03/19 1645   BP: (!) 115/55   Pulse: 82   Resp: (!) 26   Temp:        Physical Exam   Constitutional: He is oriented to person, place, and time. He appears well-nourished. No distress.   HENT:   Head: Normocephalic and atraumatic.   Eyes: No scleral icterus.   Cardiovascular: Normal rate.   Pulmonary/Chest: No stridor. He is in respiratory distress.   Abdominal: Soft. There is no tenderness.   Musculoskeletal: He exhibits edema.   Lymphadenopathy:     He has no cervical adenopathy.   Neurological: He is alert and oriented to person, place, and time.   Skin: Skin is warm. No erythema.   Psychiatric: He has a normal mood and affect. His behavior is normal.     GFR 16 from 60 yesterday  Cr 3.9 from 1.2  Glucose 579      Assessment & Plan:   74M with JOSE  Place right IJ temporary HDC at bedside

## 2019-12-03 NOTE — PLAN OF CARE
Pt had outpatient procedure however had to be transferred to ICU and placed on Levophed drip and oxygen. Tn informed [facility] Castell[facility].    Pt informed Tn he lives alone and is independent with all adls. Pt stated his son can provide help at home as needed and will provide transportation home when d/c.  Tn gave d/c brochure and card and encouraged to call for any needs/concerns.     12/03/19 1003   Discharge Assessment   Assessment Type Discharge Planning Assessment   Confirmed/corrected address and phone number on facesheet? Yes   Assessment information obtained from? Patient   Expected Length of Stay (days) 1   Communicated expected length of stay with patient/caregiver yes   Prior to hospitilization cognitive status: Alert/Oriented   Prior to hospitalization functional status: Assistive Equipment;Independent   Current cognitive status: Alert/Oriented   Current Functional Status: Needs Assistance   Lives With alone   Able to Return to Prior Arrangements yes   Is patient able to care for self after discharge? Yes   Who are your caregiver(s) and their phone number(s)? Yahir (son) [phone]   Patient's perception of discharge disposition home or selfcare   Readmission Within the Last 30 Days no previous admission in last 30 days   Patient currently being followed by outpatient case management? No   Patient currently receives any other outside agency services? No   Equipment Currently Used at Home cane, straight;walker, rolling   Do you have any problems affording any of your prescribed medications? No   Is the patient taking medications as prescribed? yes   Does the patient have transportation home? Yes   Transportation Anticipated family or friend will provide   Does the patient receive services at the [facility]? No   Discharge Plan A Home   Discharge Plan B Home Health   DME Needed Upon Discharge  none   Patient/Family in Agreement with Plan yes

## 2019-12-03 NOTE — PROCEDURES
"Cy Rutherford is a 74 y.o. male patient.    Temp: 97.6 °F (36.4 °C) (12/03/19 1130)  Pulse: 82 (12/03/19 1645)  Resp: (!) 26 (12/03/19 1645)  BP: (!) 115/55 (12/03/19 1645)  SpO2: 98 % (12/03/19 1645)  Weight: 107.5 kg (236 lb 15.9 oz) (12/03/19 0600)  Height: 5' 9.5" (176.5 cm) (12/02/19 1900)  Mallampati Scale: Class III  ASA Classification: Class 3    Central Line  Date/Time: 12/3/2019 5:12 PM  Performed by: Gilmer Coronado MD  Pre-operative Diagnosis: brenda  Post-operative diagnosis: brenda  Consent Done: Yes  Time out: Immediately prior to procedure a "time out" was called to verify the correct patient, procedure, equipment, support staff and site/side marked as required.  Indications: hemodialysis  Anesthesia: local infiltration    Anesthesia:  Local anesthesia used: yes  Local Anesthetic: lidocaine 1% without epinephrine  Preparation: skin prepped with ChloraPrep  Skin prep agent dried: skin prep agent completely dried prior to procedure  Sterile barriers: all five maximum sterile barriers used - cap, mask, sterile gown, sterile gloves, and large sterile sheet  Hand hygiene: hand hygiene performed prior to central venous catheter insertion  Location details: right internal jugular  Catheter type: triple lumen  Catheter size: 12 Fr  Catheter Length: 15cm    Ultrasound guidance: yes  Vessel Caliber: medium, patent, compressibility normal  Needle advanced into vessel with real time Ultrasound guidance.  Guidewire confirmed in vessel.  Sterile sheath used.  Number of attempts: 1  Assessment: placement verified by x-ray  Complications: none  Post-procedure: chlorhexidine patch,  line sutured,  sterile dressing applied and blood return through all ports  Complications: No          Gilmer Coronado  12/3/2019  "

## 2019-12-03 NOTE — NURSING
Spoke with MD Fabian and updated on urine output of 0 mL and bladder scan of 465 mL. Order for in and out catheter

## 2019-12-03 NOTE — NURSING
MD Fabian repaged at this time to report 0 mL on bladder scan and no UOP since in and out catheter at 2105

## 2019-12-03 NOTE — NURSING
No UOP since cath procedure. Bladder scan performed with 465 mL in bladder. Assisted patient with urinal while supine with no success. MD on call for cardiology paged at this time. Waiting for return call

## 2019-12-03 NOTE — NURSING
. Patient currently on low correction dose insulin. MD Fabian called and new order received for moderate correction dose SSI

## 2019-12-03 NOTE — NURSING
MD Fabian on phone. Updated on intake volume (see flowsheets) as well as UOP of 0 mL. Bladder scan volume 0 mL. Order to place indwelling catheter

## 2019-12-03 NOTE — HPI
Admitted for elective LE angiogram for evaluation of RLE claudication. Taken to the cath lab for the procedure via LCFA access with following results:     successful atherectomy (Hawk LX) with distal filter protection, followed by PTA with scoring balloon (5.0 x 150 dSFA, 6.0 x 150 pSFA/CFA) followed by PTA with DCB to dSFA (5.0 x 150), pSFA (5.0 x 100) and CFA (7 x 40) with good results. 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 He has elevated BG, no anion gap.  We will place on insulin sq and monito closely , if dka develop, we will place him on insulin drip

## 2019-12-03 NOTE — CONSULTS
[facility] Hospital Medicine  Consult Note    Patient Name: Cy Rutherford  MRN: [id number]  Admission Date: 12/2/2019  Hospital Length of Stay: 0 days  Attending Physician: Nehemias Gu DO  Primary Care Provider: Heide Porter MD           Patient information was obtained from patient and ER records.     Inpatient consult to [facility]  Consult performed by: Nehemias Gu DO  Consult ordered by: KATHLEEN Kwan, ANP        Subjective:     Principal Problem: <principal problem not specified>    Chief Complaint: No chief complaint on file.       HPI:   Admitted for elective LE angiogram for evaluation of RLE claudication. Taken to the cath lab for the procedure via LCFA access with following results:     successful atherectomy (Hawk LX) with distal filter protection, followed by PTA with scoring balloon (5.0 x 150 dSFA, 6.0 x 150 pSFA/CFA) followed by PTA with DCB to dSFA (5.0 x 150), pSFA (5.0 x 100) and CFA (7 x 40) with good results. 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 He has elevated BG, no anion gap.  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      Current Outpatient Medications on File Prior to Encounter   Medication Sig    amLODIPine (NORVASC) 5 MG tablet Take 1 tablet (5 mg total) by mouth once daily.    aspirin (ECOTRIN) 81 MG EC tablet Take 81 mg by mouth once daily.    chlorthalidone (HYGROTEN) 25 MG Tab Take 1 tablet (25 mg total) by mouth once daily.    cilostazol (PLETAL) 50 MG Tab Take 1 tablet (50 mg total) by mouth 2 (two) times daily.    clopidogrel (PLAVIX) 75 mg tablet Take 1 tablet (75 mg total) by mouth once daily.    doxazosin (CARDURA) 8 MG Tab Take 1 tablet (8 mg total) by mouth every evening.    fenofibrate micronized (LOFIBRA) 134 MG Cap Take 1 capsule (134 mg total) by mouth nightly.    furosemide (LASIX) 20 MG tablet Take 1 tablet (20 mg total) by mouth once daily.    insulin glargine (LANTUS) 100 unit/mL injection Inject 60 Units into the skin every morning.    losartan (COZAAR) 50 MG tablet Take 1 tablet (50 mg total) by mouth 2 (two) times daily.    metoprolol succinate (TOPROL-XL) 100 MG 24 hr tablet Take 1 tablet (100 mg total) by mouth 2 (two) times daily.    multivitamin with minerals (ONE-A-DAY MAXIMUM FORMULA ORAL) Take 1 tablet by mouth once daily.    NOVOLOG FLEXPEN U-100 INSULIN 100 unit/mL (3 mL) InPn pen INJECT 25 UNITS SUBCUTANEOUSLY WITH MEALS    omeprazole (PRILOSEC) 20 MG capsule     rosuvastatin (CRESTOR) 20 MG tablet Take 1 tablet (20 mg total) by mouth once daily.     fish oil-omega-3 fatty acids 300-1,000 mg capsule Take by mouth once daily.    insulin lispro (HUMALOG) 100 unit/mL injection Inject into the skin 3 (three) times daily before meals.    metFORMIN (GLUCOPHAGE) 500 MG tablet Take 500 mg by mouth 2 (two) times daily with meals.     Family History     Problem Relation (Age of Onset)    Colon polyps Sister    No Known Problems Mother, Father        Tobacco Use    Smoking status: Never Smoker    Smokeless tobacco: Never Used   Substance and Sexual Activity    Alcohol use: No    Drug use: No    Sexual activity: Not Currently     Review of Systems   Constitutional: Negative for activity change, chills, diaphoresis and fatigue.   HENT: Negative for congestion and nosebleeds.    Genitourinary: Positive for difficulty urinating.   Neurological: Negative for dizziness, seizures, light-headedness, numbness and headaches.     Objective:     Vital Signs (Most Recent):  Temp: 97.6 °F (36.4 °C) (12/03/19 1130)  Pulse: 82 (12/03/19 1645)  Resp: (!) 26 (12/03/19 1645)  BP: (!) 115/55 (12/03/19 1645)  SpO2: 98 % (12/03/19 1645) Vital Signs (24h Range):  Temp:  [96.4 °F (35.8 °C)-97.9 °F (36.6 °C)] 97.6 °F (36.4 °C)  Pulse:  [50-90] 82  Resp:  [19-49] 26  SpO2:  [95 %-100 %] 98 %  BP: ()/() 115/55  Arterial Line BP: ()/(32-86) 102/43     Weight: 107.5 kg (236 lb 15.9 oz)  Body mass index is 34.5 kg/m².    Physical Exam   Constitutional: He appears well-developed and well-nourished.   HENT:   Head: Normocephalic and atraumatic.   Eyes: EOM are normal.   Neck: Normal range of motion. Neck supple.   Cardiovascular: Normal rate.   Pulmonary/Chest: Effort normal.   Abdominal: Soft.   Musculoskeletal: Normal range of motion. He exhibits no deformity.   Skin: Skin is warm and dry.   Psychiatric: He has a normal mood and affect. His behavior is normal. Judgment and thought content normal.       Significant Labs:   Recent Labs   Lab 12/03/19  0831 12/03/19  1145  12/03/19  1629   WBC 17.59* 15.82* 16.04*   HGB 9.1* 8.3* 7.6*   HCT 27.5* 25.1* 22.8*    234 221     Recent Labs   Lab 12/02/19  1303 12/03/19  1222 12/03/19  1444     139 131* 132*   K 4.5  4.5 6.1* 5.0     109 102 102   CO2 25  25 17* 16*   BUN 17  17 41* 44*   CREATININE 1.2  1.2 3.7* 3.9*   *  430* 619* 579*   CALCIUM 7.5*  7.5* 7.5* 7.6*   MG  --   --  1.6   PHOS  --   --  5.7*     Recent Labs   Lab 12/02/19  1303   ALKPHOS 22*   ALT 15   AST 17   ALBUMIN 2.5*   PROT 4.3*   BILITOT 0.3      No results for input(s): CPK, CPKMB, MB, TROPONINI in the last 72 hours.  Recent Labs   Lab 12/02/19  1212 12/02/19  1435 12/02/19  1615 12/03/19  0019 12/03/19  0410 12/03/19  1146   POCTGLUCOSE 346* 423* 415* 471* 308* 444*     Hemoglobin A1C   Date Value Ref Range Status   12/02/2019 5.6 4.0 - 5.6 % Final     Comment:     ADA Screening Guidelines:  5.7-6.4%  Consistent with prediabetes  >or=6.5%  Consistent with diabetes  High levels of fetal hemoglobin interfere with the HbA1C  assay. Heterozygous hemoglobin variants (HbS, HgC, etc)do  not significantly interfere with this assay.   However, presence of multiple variants may affect accuracy.     05/08/2018 7.7 (H) 4.0 - 5.6 % Final     Comment:     According to ADA guidelines, hemoglobin A1c <7.0% represents  optimal control in non-pregnant diabetic patients. Different  metrics may apply to specific patient populations.   Standards of Medical Care in Diabetes-2016.  For the purpose of screening for the presence of diabetes:  <5.7%     Consistent with the absence of diabetes  5.7-6.4%  Consistent with increasing risk for diabetes   (prediabetes)  >or=6.5%  Consistent with diabetes  Currently, no consensus exists for use of hemoglobin A1c  for diagnosis of diabetes for children.  This Hemoglobin A1c assay has significant interference with fetal   hemoglobin   (HbF). The results are invalid for patients with abnormal amounts of   HbF,    including those with known Hereditary Persistence   of Fetal Hemoglobin. Heterozygous hemoglobin variants (HbAS, HbAC,   HbAD, HbAE, HbA2) do not significantly interfere with this assay;   however, presence of multiple variants in a sample may impact the %   interference.     06/09/2011 7.9 (H) 4.0 - 6.2 % Final     Scheduled Meds:   aspirin  81 mg Oral Daily    calcium gluconate IVPB  1,000 mg Intravenous Once    clopidogrel  75 mg Oral Daily    magnesium sulfate IVPB  1 g Intravenous Once    pantoprazole  40 mg Oral Daily    rosuvastatin  20 mg Oral Daily    sodium chloride 0.9%  3 mL/kg Intravenous Once     Continuous Infusions:   sodium chloride 0.9% 3 mL/kg/hr (12/02/19 1348)    ceFAZolin 2 g/50mL Dextrose IVPB      heparin (porcine)      insulin (HUMAN R) infusion (adults)      norepinephrine bitartrate-D5W 0.26 mcg/kg/min (12/03/19 1501)     As Needed: sodium chloride, sodium chloride, sodium chloride, sodium chloride 0.9%, acetaminophen, ceFAZolin 2 g/50mL Dextrose IVPB, Dextrose 10% Bolus, Dextrose 10% Bolus, dextrose 50 % in water (D50W), dextrose 50 % in water (D50W), heparin (porcine), insulin aspart U-100, morphine, ondansetron, ondansetron, polyethylene glycol, sodium chloride 0.9%    Significant Imaging: will review      Assessment/Plan:     Type 2 diabetes mellitus with kidney complication, without long-term current use of insulin  No AG   will try to treat with sq for now        VTE Risk Mitigation (From admission, onward)         Ordered     heparin infusion 1,000 units/500 ml in 0.9% NaCl (pressure line flush)  Intra-op continuous PRN      12/02/19 0932                Critical care time spent on the evaluation and treatment of severe organ dysfunction, review of pertinent labs and imaging studies, discussions with consulting providers and discussions with patient/family: 23 minutes.    Thank you for your consult. I will follow-up with patient. Please contact us if you have any  additional questions.    Nehemias Gu DO  Department of Hospital Medicine   [facility]

## 2019-12-03 NOTE — SUBJECTIVE & OBJECTIVE
Review of Systems   Constitution: Negative for chills, decreased appetite, diaphoresis and fever.   Cardiovascular: Positive for dyspnea on exertion. Negative for chest pain, claudication, cyanosis, irregular heartbeat, leg swelling, near-syncope, orthopnea, palpitations, paroxysmal nocturnal dyspnea and syncope.   Respiratory: Negative for cough, hemoptysis, shortness of breath and wheezing.    Gastrointestinal: Negative for bloating, abdominal pain, constipation, diarrhea, melena, nausea and vomiting.   Neurological: Negative for dizziness and weakness.     Objective:     Vital Signs (Most Recent):  Temp: 97.6 °F (36.4 °C) (12/03/19 0745)  Pulse: 79 (12/03/19 0809)  Resp: (!) 31 (12/03/19 0809)  BP: (!) 161/70 (12/03/19 0745)  SpO2: 96 % (12/03/19 0809) Vital Signs (24h Range):  Temp:  [96.4 °F (35.8 °C)-97.9 °F (36.6 °C)] 97.6 °F (36.4 °C)  Pulse:  [46-79] 79  Resp:  [10-49] 31  SpO2:  [90 %-100 %] 96 %  BP: ()/(29-75) 161/70  Arterial Line BP: ()/(32-86) 69/47     Weight: 107.5 kg (236 lb 15.9 oz)  Body mass index is 34.5 kg/m².     SpO2: 96 %  O2 Device (Oxygen Therapy): room air      Intake/Output Summary (Last 24 hours) at 12/3/2019 1102  Last data filed at 12/3/2019 0600  Gross per 24 hour   Intake 2570.28 ml   Output 500 ml   Net 2070.28 ml       Lines/Drains/Airways     Drain                 Urethral Catheter 12/03/19 0445 16 Fr. less than 1 day          Arterial Line                 Arterial Line 12/02/19 1520 Left Radial less than 1 day          Peripheral Intravenous Line                 Peripheral IV - Single Lumen 12/02/19 0620 20 G Left Wrist 1 day         Peripheral IV - Single Lumen 12/02/19 1241 20 G Right Hand less than 1 day                Physical Exam   Constitutional: He is oriented to person, place, and time. He appears well-developed and well-nourished. No distress.   Cardiovascular: Normal rate and regular rhythm. Exam reveals no gallop.   No murmur heard.  Pulmonary/Chest:  Effort normal. Tachypnea noted. No respiratory distress. He has decreased breath sounds. He has no wheezes.   Abdominal: Soft. Bowel sounds are normal. He exhibits no distension. There is no tenderness.   Musculoskeletal: He exhibits edema.   Neurological: He is alert and oriented to person, place, and time.   Skin: Skin is warm and dry.       Significant Labs:     Recent Labs   Lab 12/03/19  0831   WBC 17.59*   RBC 3.14*   HGB 9.1*   HCT 27.5*      MCV 88   MCH 29.0   MCHC 33.1     Recent Labs   Lab 12/02/19  1303     139   K 4.5  4.5     109   CO2 25  25   BUN 17  17   CREATININE 1.2  1.2         Significant Imaging: Echocardiogram:   2D echo with color flow doppler:   Results for orders placed or performed during the hospital encounter of 05/08/18   2D echo with color flow doppler   Result Value Ref Range    QEF 65 55 - 65    Diastolic Dysfunction No     Est. PA Systolic Pressure 18.63     Narrative    Date of Procedure: 05/09/2018        TEST DESCRIPTION   Technical Quality: This is a portable study performed at the patient's bedside. This is a technically challenging study. There is poor endocardial definition. This study was performed in conjunction with a 3ml intravenous injection of Optison contrast   agent.     Aorta: The aortic root is normal in size, measuring 2.9 cm at sinotubular junction and 3.4 cm at Sinuses of Valsalva. The proximal ascending aorta is normal in size, measuring 3.3 cm across.     Left Atrium: The left atrial volume index is normal, measuring 19.39 cc/m2.     Left Ventricle: The left ventricle is normal in size, with an end-diastolic diameter of 4.4 cm, and an end-systolic diameter of 2.4 cm. LV wall thickness is normal, with the septum and the posterior wall each measuring 1.0 cm across. Relative wall   thickness was increased at 0.45, and the LV mass index was 84.4 g/m2 consistent with concentric remodeling. There are no regional wall motion abnormalities.  Left ventricular systolic function appears normal. Visually estimated ejection fraction is   60-65%. The LV Doppler derived stroke volume equals 101.0 ccs.     Diastolic indices: E wave velocity 0.8 m/s, E/A ratio 0.8,  msec., E/e' ratio(avg) 9. Diastolic function is normal.     Right Atrium: The right atrium is normal in size, measuring 4.5 cm in length and 3.3 cm in width in the apical view.     Right Ventricle: The right ventricle is normal in size measuring 4.0 cm at the base in the apical right ventricle-focused view. Global right ventricular systolic function appears normal. Tricuspid annular plane systolic excursion (TAPSE) is 2.3 cm.   Tissue Doppler-derived tricuspid annular peak systolic velocity (S prime) is 14.0 cm/s. The estimated PA systolic pressure is 19 mmHg.     Aortic Valve:  Aortic valve is normal in structure with normal leaflet mobility.     Mitral Valve:  Mitral valve is normal in structure with normal leaflet mobility.     Tricuspid Valve:  Tricuspid valve is normal in structure with normal leaflet mobility.     Pulmonary Valve:  The pulmonic valve is not well seen.     IVC: IVC is enlarged but collapses > 50% with a sniff, suggesting intermediate right atrial pressure of 8 mmHg.     Intracavitary: There is no evidence of pericardial effusion, intracavity mass, thrombi, or vegetation.         CONCLUSIONS     1 - Normal left ventricular systolic function (EF 60-65%).     2 - Normal right ventricular systolic function .     3 - Normal left ventricular diastolic function.     4 - The estimated PA systolic pressure is 19 mmHg.             This document has been electronically    SIGNED BY: Dionicio Garces MD On: 05/09/2018 15:21    This document was originally electronically signed on: 05/09/2018 15:21

## 2019-12-03 NOTE — PROGRESS NOTES
[facility]  Cardiology  Progress Note    Patient Name: Cy Rutherford  MRN: [id number]  Admission Date: 12/2/2019  Hospital Length of Stay: 0 days  Code Status: Full Code   Attending Physician: James Sanchez MD   Primary Care Physician: Heide Porter MD  Expected Discharge Date:   Principal Problem:<principal problem not specified>    Subjective:     Hospital Course:   12/2/2019 Admitted for elective LE angiogram for evaluation of RLE claudication. Taken to the cath lab for the procedure via LCFA access with following results:    successful atherectomy (Hawk LX) with distal filter protection, followed by PTA with scoring balloon (5.0 x 150 dSFA, 6.0 x 150 pSFA/CFA) followed by PTA with DCB to dSFA (5.0 x 150), pSFA (5.0 x 100) and CFA (7 x 40) with good results. Successful PTA to TPT with 3.0 x 40 balloon- see cath report for full report     Tolerated procedure well and recovered in pre post cath area. Hypotension noted along with back pain and diaphoresis. No hematoma noted and manual pressure applied out of concern for bleeding. STAT H&H down to 7.4&24.1 from 11.6&36.1. Given profound symptoms, major concern for bleeding prompting re evaluation with recurrent angiogram. Placed on IVFs along with IV Levophed. Repeat procedure via right radial access with images  in multiple views with no active bleed noted. Cuff pressure with significant difference in comparison to  aortic pressure which was significantly higher.  Admitted to ICU for close monitoring with kanchan placed. T&S with PRBC transfusion initiated with IV Lasix given in between   12/3/2019 Remain on IV Levophed drip at .46mcg/kg/min with BP 110s-130s/40s-50s HR 60s. Serial H&Hs Q4hrs overnight with  H&H this AM 10.0&30.2 with slight trend down to 9.1&27.5 on repeat check. Complains of SOB with increased RR. Only 500cc out overnight. Will repeat IV Lasix 40mg this AM. Echocardiogram and CXR as well. Will attempt to wean IV Levophed  drip as BP tolerates         Review of Systems   Constitution: Negative for chills, decreased appetite, diaphoresis and fever.   Cardiovascular: Positive for dyspnea on exertion. Negative for chest pain, claudication, cyanosis, irregular heartbeat, leg swelling, near-syncope, orthopnea, palpitations, paroxysmal nocturnal dyspnea and syncope.   Respiratory: Negative for cough, hemoptysis, shortness of breath and wheezing.    Gastrointestinal: Negative for bloating, abdominal pain, constipation, diarrhea, melena, nausea and vomiting.   Neurological: Negative for dizziness and weakness.     Objective:     Vital Signs (Most Recent):  Temp: 97.6 °F (36.4 °C) (12/03/19 0745)  Pulse: 79 (12/03/19 0809)  Resp: (!) 31 (12/03/19 0809)  BP: (!) 161/70 (12/03/19 0745)  SpO2: 96 % (12/03/19 0809) Vital Signs (24h Range):  Temp:  [96.4 °F (35.8 °C)-97.9 °F (36.6 °C)] 97.6 °F (36.4 °C)  Pulse:  [46-79] 79  Resp:  [10-49] 31  SpO2:  [90 %-100 %] 96 %  BP: ()/(29-75) 161/70  Arterial Line BP: ()/(32-86) 69/47     Weight: 107.5 kg (236 lb 15.9 oz)  Body mass index is 34.5 kg/m².     SpO2: 96 %  O2 Device (Oxygen Therapy): room air      Intake/Output Summary (Last 24 hours) at 12/3/2019 1102  Last data filed at 12/3/2019 0600  Gross per 24 hour   Intake 2570.28 ml   Output 500 ml   Net 2070.28 ml       Lines/Drains/Airways     Drain                 Urethral Catheter 12/03/19 0445 16 Fr. less than 1 day          Arterial Line                 Arterial Line 12/02/19 1520 Left Radial less than 1 day          Peripheral Intravenous Line                 Peripheral IV - Single Lumen 12/02/19 0620 20 G Left Wrist 1 day         Peripheral IV - Single Lumen 12/02/19 1241 20 G Right Hand less than 1 day                Physical Exam   Constitutional: He is oriented to person, place, and time. He appears well-developed and well-nourished. No distress.   Cardiovascular: Normal rate and regular rhythm. Exam reveals no gallop.   No murmur  heard.  Pulmonary/Chest: Effort normal. Tachypnea noted. No respiratory distress. He has decreased breath sounds. He has no wheezes.   Abdominal: Soft. Bowel sounds are normal. He exhibits no distension. There is no tenderness.   Musculoskeletal: He exhibits edema.   Neurological: He is alert and oriented to person, place, and time.   Skin: Skin is warm and dry.       Significant Labs:     Recent Labs   Lab 12/03/19  0831   WBC 17.59*   RBC 3.14*   HGB 9.1*   HCT 27.5*      MCV 88   MCH 29.0   MCHC 33.1     Recent Labs   Lab 12/02/19  1303     139   K 4.5  4.5     109   CO2 25  25   BUN 17  17   CREATININE 1.2  1.2         Significant Imaging: Echocardiogram:   2D echo with color flow doppler:   Results for orders placed or performed during the hospital encounter of 05/08/18   2D echo with color flow doppler   Result Value Ref Range    QEF 65 55 - 65    Diastolic Dysfunction No     Est. PA Systolic Pressure 18.63     Narrative    Date of Procedure: 05/09/2018        TEST DESCRIPTION   Technical Quality: This is a portable study performed at the patient's bedside. This is a technically challenging study. There is poor endocardial definition. This study was performed in conjunction with a 3ml intravenous injection of Optison contrast   agent.     Aorta: The aortic root is normal in size, measuring 2.9 cm at sinotubular junction and 3.4 cm at Sinuses of Valsalva. The proximal ascending aorta is normal in size, measuring 3.3 cm across.     Left Atrium: The left atrial volume index is normal, measuring 19.39 cc/m2.     Left Ventricle: The left ventricle is normal in size, with an end-diastolic diameter of 4.4 cm, and an end-systolic diameter of 2.4 cm. LV wall thickness is normal, with the septum and the posterior wall each measuring 1.0 cm across. Relative wall   thickness was increased at 0.45, and the LV mass index was 84.4 g/m2 consistent with concentric remodeling. There are no regional wall  motion abnormalities. Left ventricular systolic function appears normal. Visually estimated ejection fraction is   60-65%. The LV Doppler derived stroke volume equals 101.0 ccs.     Diastolic indices: E wave velocity 0.8 m/s, E/A ratio 0.8,  msec., E/e' ratio(avg) 9. Diastolic function is normal.     Right Atrium: The right atrium is normal in size, measuring 4.5 cm in length and 3.3 cm in width in the apical view.     Right Ventricle: The right ventricle is normal in size measuring 4.0 cm at the base in the apical right ventricle-focused view. Global right ventricular systolic function appears normal. Tricuspid annular plane systolic excursion (TAPSE) is 2.3 cm.   Tissue Doppler-derived tricuspid annular peak systolic velocity (S prime) is 14.0 cm/s. The estimated PA systolic pressure is 19 mmHg.     Aortic Valve:  Aortic valve is normal in structure with normal leaflet mobility.     Mitral Valve:  Mitral valve is normal in structure with normal leaflet mobility.     Tricuspid Valve:  Tricuspid valve is normal in structure with normal leaflet mobility.     Pulmonary Valve:  The pulmonic valve is not well seen.     IVC: IVC is enlarged but collapses > 50% with a sniff, suggesting intermediate right atrial pressure of 8 mmHg.     Intracavitary: There is no evidence of pericardial effusion, intracavity mass, thrombi, or vegetation.         CONCLUSIONS     1 - Normal left ventricular systolic function (EF 60-65%).     2 - Normal right ventricular systolic function .     3 - Normal left ventricular diastolic function.     4 - The estimated PA systolic pressure is 19 mmHg.             This document has been electronically    SIGNED BY: Dionicio Garces MD On: 05/09/2018 15:21    This document was originally electronically signed on: 05/09/2018 15:21     Assessment and Plan:     Brief HPI: Seen this morning on AM NP rounds while resting in bed. Complains of SOB and reports SOB and LE edema has been present prior to  admission. Discussed cardiac POC as detailed below-verbalized understanding and agrees with POC     Leukocytosis  -WBCs 28K post procedure with trend down to 22K this AM  -afebrile  -likely reactive rather than infectious     Shortness of breath  -concerning for volume overload in setting of IVF use and PRBC transfusion  -given IV Lasix in between  PRBC units yesterday evening; only 500cc out overnight; continued SOB this AM with repeat dose of IV Lasix ordered  -will do CXR as well as echocardiogram     Edema  -concerning for combination of volume overload and sedentary lifestyle  -will give IV Lasix and monitor closely     Anemia  -initial H&H 11.1&36.1 with trend down to 7.4&24.1  -improved to 10.0&30.2 with slight trend down to 9.1&27.5    -remains on IV Levophed; will continue with serial H&Hs   -monitor closely and recurrent sharp drop will need repeat transfusion     Hypotension  -significant difference between cuff and AO pressures  -Naomi placed with better correlation; BP 110s-130s overnight; IV Levophed wean in process  -will continue to hold oral antihypertensives     PAD (peripheral artery disease)  -admitted for elective LE angiogram for further evaluation of LE claudication  -successful atherectomy and PTA with scoring balloon and  DCB to dSFA,  pSFA CFA and TPT   -tolerated procedure well with hypotension and diaphoresis post procedure requiring repeat procedure with no active bleeding  -continue ASA, statin and Plavix  -will need serial LE arterial ultrasounds as an outpatient       CAD (coronary artery disease)  -s/p LAD and LCX MARIAN in 2011  -continue ASA, statin, Plavix; no BB or ACEI/ARB due to hypotension and pressor use  -complaints of SOB currently likely due to volume overload in setting IVF and PRBC transfusion  -will do echocardiogram today     Hyperlipidemia  -continue statin therapy  -FLP with LDL 80 in 9/2019    Essential hypertension  -on Norvasc,. Chlorthalidone, Metoprolol and  Losartan at home  -will continue to hold meds for now and monitor BP  -slow resumption once weaned off IV Levophed     Type 2 diabetes mellitus with kidney complication, without long-term current use of insulin  --471  -on Lantus and mealtime insulin at home  -will resume home dose of insulin   -recent HgbA1c 5.6 9/2019        VTE Risk Mitigation (From admission, onward)         Ordered     heparin infusion 1,000 units/500 ml in 0.9% NaCl (pressure line flush)  Intra-op continuous PRN      12/02/19 0932              > 40 minutes was spent in the care of this patient for evaluation, critical thinking and decision making. Also discussion with patient as to present condition. Not all time was spent in direct face to face with patient as time was spent reviewing ECGs, CXR, labs, medications and past studies.    Marzena Valdez APRN, ANP  Cardiology  [facility]

## 2019-12-03 NOTE — CARE UPDATE
Called by nurse with complaints of SOB by patient. Patient seen and reports SOB while moving in bed to sit up to eat lunch. HR stable. BP stable on Levophed with wean in process. Reports feeling better at rest currently. Nurse reports IV Lasix given this AM with no urine output since administration with negative bladder scan and rubio irrigation with no urine returned       Repeat CBC with H&H down to 8.3&25.1 from 9.1&27.5 and 10.0&30.2 earlier today.     ABG pH 7.32 pCO2 32 pO2 86 HCO 16.7 on 3 LPM    BMP with Na 131 K+ 6.1 BUN 41 creatinine 3.7 glucose 619    Assessment:    1. JOSE  2. Anemia  3. Hypotension  4. Hyponatremia  5. Hyperkalemia      Plan:    Continue IV Levophed wean as BP tolerates  Continue with serial H&H may need additional PRBC  Consult Nephrology for assistance  Will hold on further Lasix administration and await Nephrology recs  CXR and echocardiogram  Dr. Sanchez updated

## 2019-12-03 NOTE — HOSPITAL COURSE
12/2/2019 Admitted for elective LE angiogram for evaluation of RLE claudication. Taken to the cath lab for the procedure via LCFA access with following results:    successful atherectomy (Hawk LX) with distal filter protection, followed by PTA with scoring balloon (5.0 x 150 dSFA, 6.0 x 150 pSFA/CFA) followed by PTA with DCB to dSFA (5.0 x 150), pSFA (5.0 x 100) and CFA (7 x 40) with good results. Successful PTA to TPT with 3.0 x 40 balloon- see cath report for full report     Tolerated procedure well and recovered in pre post cath area. Hypotension noted along with back pain and diaphoresis. No hematoma noted and manual pressure applied out of concern for bleeding. STAT H&H down to 7.4&24.1 from 11.6&36.1. Given profound symptoms, major concern for bleeding prompting re evaluation with recurrent angiogram. Placed on IVFs along with IV Levophed. Repeat procedure via right radial access with images  in multiple views with no active bleed noted. Cuff pressure with significant difference in comparison to  aortic pressure which was significantly higher. Admitted to ICU for close monitoring with kanchan placed. T&S with PRBC transfusion initiated with IV Lasix given in between   12/3/2019 Remain on IV Levophed drip at .46mcg/kg/min with BP 110s-130s/40s-50s HR 60s. Serial H&Hs Q4hrs overnight with  H&H this AM 10.0&30.2 with slight trend down to 9.1&27.5 on repeat check. Complains of SOB with increased RR. Only 500cc out overnight. Will repeat IV Lasix 40mg this AM. Echocardiogram and CXR as well. Will attempt to wean IV Levophed drip as BP tolerates   12/4/2019 Creatinine trended up to 3.7 overnight with K+ 6.1. Continued with no urine output. Nephrology consulted yesterday with trialysis catheter placed yesterday with initiation of CRRT overnight. CRRT x 2-3 hours prior to clotting off. H&H trended down further to 7.3&21.8 with repeat PRBC transfusion. H&H trended up to 8.4&24.9 with continued serial monitoring with  recurrent drop to 7.0&21.2. Concerned about recurrent bleeding with plans for repeat angiogram for re evaluation of acute bleed. Remains on IV Levophed with stable BP and wean in process. Complains of mild SOB with stable sats on Venti mask.   12/5/2019 Repeat angiogram yesterday using CO2 with no active bleed noted after extensive angiogram. H&H down 6.6&18.9 yesterday evening with repeat PRBC. CT abdomen/pelvis with evidence of large left renal subcapsular hematoma with surrounding retroperitoneal hemorrhage and no definite contrast extravasation seen to suggest active bleeding on delayed images. H&H up to 7.4&22.0-8.5&25.3. Weaned off IV Levophed with stable BP. Resumed CRRT yesterday evening with 695cc removed and 30cc urine output noted +2.7 liters. Continued mild SOB with stable sats on venti mask. BMP with K+ 5.0 BUN 62 creatinine 5.0. Will continue with serial H&Hs for now. Will place TEDs and consult PT   12/6/2019 Attempted CRRT and HD yesterday with clotted line and approximately 400cc blood loss due to inability to rinse back. Placed on IV Lasix drip at 20mg/hr by Nephrology with 675cc urine output overnight positive 3 liters since admission. K+ 5.0 with BUN 69 creatinine 5.4 this AM. H&H 74&22.4 this AM unchanged from overnight-will continue with serial H&Hs for now. Off IV pressors for over 48 hours with BP 130s-170s/70s overnight. Mild SOB remains per patient with slight improvement noted on PE. Updated son at bedside this morning   12/7/2019 HR and BP stable. H&H 7.7&23.2 unchanged from yesterday. Remain on IV Lasix with adequate urine output. BUN 89 creatinine 6.0. Hopeful to transfer to floor tomorrow.   12/8/2019 H&H down to 6.7 & 20.5 this AM with repeat PRBC transfusion. Plan for HD today for clearance. Remains on IV Lasix drip with adequate urine output. Resumed antihypertensives with stabel BP. PT and OT on board  12/9/2019 H&H 8.8&26.6 this AM after PRBC transfusion yesterday. Approximately  one hour of HD yesterday with cessation due to line malfunction. HR and BP remains stable. 2.7 liters out overnight negative 4.4 liters since admission.  creatinine 5.3. Will reconsult General Surgery today for new trialysis line placement for ongoing intermittent HD/CRRT.   12/10/2019 H&H 9.1&27.5 this AM. BUN 82 creatinine 4.0 after HD run yesterday afternoon. IV Lasix drip at 5mg/hr with 3.0 liters out overnight negative 5.6 liters since admission. HR and BP stable. Plan to transfer out of ICU today   12/11/2019 Transferred out of ICU yesterday. BMP with K+ 3.7 BUN 66 creatinine 3.3. Remains on IV Lasix drip with 1.8 liters urine output with 1.4 liters removed with HD negative 7.4 liters since admission. HR and BP stable. Working with PT currently. Mild abdominal distension with Simethicone and Miralax ordered yesterday with plans for Lactulose today   12/12/2019 Slight increase in abdominal pain with nausea followed by vomiting yesterday. Appearance of blood in emesis yesterday. Serial H&Hs overnight with no sharp drop-H&H this AM 8.4&27.2. Remains on IV Protonix drip with GI consulted with plans for EGD tomorrow. On IV Lasix drip as well with total of 2.6 liters out overnight (1.5 urine output 1.1 HD) negative 9.1 liters since admission. BUN 58 creatinine 3.1. KUB reviewed with improvement in air and successful BM-abdominal distension improved. Nephrology on board as well as PT  12/13/2019 H&H stable with no significant drop-will change CBCs to daily. BMP with BUN 68 creatinine 3.4. Transitioned from IV Lasix drip to IV Lasix BID with 3.1 liters out overnight negative 11.9 liters since admission. EGD today with esophageal ulcer with recs for Protonix BID. HR and BP stable. PT on board.  12/14/2019 Stable overnight. No acute issues overnight. Sitting in chair   12/15/2019 Doing well. No acute issues. Sitting in chair  12/16/2019 HR and BP stable overnight. BUN 86 creatinine 3.2 this AM. Remains on IV  Lasix BID with 2.1 liters out overnight negative 14.6 liters since admission. CBC with Hgb 8.4 Hct 26.1. PT on board and OOB to chair over the weekend. Awaiting further recs in regards to IV diuresis from Nephrology   12/17/2019 H&H 8.5&27.5 this AM. BUN 79 creatinine 2.8 and remains on IV Lasix BID with 2.2 liters out overnight negative 18.3 liters since admission. No HD since 12/11/2019 with plans for removal of Trialysis today per Nephrology via case management-will confirm. Will discuss timing of transition to oral Lasix with Nephrology as well. HR and BP stable. PT and OT on board. Case management consulted with placement with SNF vs rehab in process  12/19/2019 Patient discharged to SNF in satisfactory condition. Hemodynamically stable. Follow up in clinic in 2-3 weeks

## 2019-12-03 NOTE — NURSING
Chemistry results reported to Dr. Watts with LUCERO Iavn at bedside. Case discussed- insulin gtt, Mg, Ca riders, and c/s to surgery for trialysis placement to be ordered. Patient's RN, Erna updated.  Raisa Queen RN

## 2019-12-03 NOTE — CONSULTS
[facility] Hospital Medicine  Consult Note    Patient Name: Cy Rutherford  MRN: [id number]  Admission Date: 12/2/2019  Hospital Length of Stay: 0 days  Attending Physician: Nehemias Gu DO  Primary Care Provider: Heide Porter MD           Patient information was obtained from patient and ER records.     Consults  Subjective:     Principal Problem: <principal problem not specified>    Chief Complaint: No chief complaint on file.       HPI:   Admitted for elective LE angiogram for evaluation of RLE claudication. Taken to the cath lab for the procedure via LCFA access with following results:     successful atherectomy (Hawk LX) with distal filter protection, followed by PTA with scoring balloon (5.0 x 150 dSFA, 6.0 x 150 pSFA/CFA) followed by PTA with DCB to dSFA (5.0 x 150), pSFA (5.0 x 100) and CFA (7 x 40) with good results. 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 He has elevated BG, no anion gap.  We will place on insulin sq and monito closely , if dka develop, we will place him on insulin drip    No new subjective & objective note has been filed under this hospital service since the last note was generated.    Assessment/Plan:     Type 2 diabetes mellitus with kidney complication, without long-term current use of insulin  No AG   will try to treat with sq for now        VTE Risk Mitigation (From admission, onward)         Ordered     heparin infusion 1,000 units/500 ml in 0.9% NaCl (pressure line flush)  Intra-op  continuous PRN      12/02/19 0932                Critical care time spent on the evaluation and treatment of severe organ dysfunction, review of pertinent labs and imaging studies, discussions with consulting providers and discussions with patient/family: 32 minutes.    Thank you for your consult. I will follow-up with patient. Please contact us if you have any additional questions.    Nehemias Gu DO  Department of Hospital Medicine   [facility]

## 2019-12-03 NOTE — CONSULTS
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    Type 2 diabetes mellitus with kidney complication, without long-term current use of insulin 11/15/2012      Past Surgical History:   Procedure Laterality Date    AORTOGRAPHY WITH SERIALOGRAPHY N/A 10/16/2019    Procedure: AORTOGRAM, WITH SERIALOGRAPHY;  Surgeon: James Sanchez MD;  Location: [facility] CATH LAB/EP;  Service: Cardiology;  Laterality: N/A;    COLONOSCOPY      CORONARY ANGIOPLASTY      MARIAN to LAD and LCX      Review of patient's allergies indicates:  No Known Allergies   Medications Prior to Admission   Medication Sig Dispense Refill Last Dose    amLODIPine (NORVASC) 5 MG tablet Take 1 tablet (5 mg total) by mouth once daily. 90 tablet 3 12/2/2019 at Unknown time    aspirin (ECOTRIN) 81 MG EC tablet Take 81 mg by mouth once daily.   12/2/2019 at Unknown time    chlorthalidone (HYGROTEN) 25 MG Tab Take 1 tablet (25 mg total) by mouth once daily. 90 tablet 3 12/2/2019 at Unknown time    cilostazol (PLETAL) 50 MG Tab Take 1 tablet (50 mg total) by mouth 2 (two) times daily. 180 tablet 3 12/2/2019 at Unknown time    clopidogrel (PLAVIX) 75 mg tablet Take 1 tablet (75 mg total) by mouth once daily. 90 tablet 3 12/2/2019 at Unknown time    doxazosin (CARDURA) 8 MG Tab Take 1 tablet (8 mg total) by mouth every evening. 90 tablet 3 12/2/2019 at Unknown time    fenofibrate micronized (LOFIBRA) 134 MG Cap Take 1 capsule (134 mg total) by mouth nightly. 90 capsule 3 12/2/2019 at Unknown time    furosemide (LASIX) 20 MG tablet Take 1 tablet (20 mg total) by mouth once daily. 30 tablet 11 12/1/2019 at Unknown time    insulin glargine (LANTUS) 100 unit/mL injection Inject 60 Units into the  skin every morning.   12/1/2019 at Unknown time    losartan (COZAAR) 50 MG tablet Take 1 tablet (50 mg total) by mouth 2 (two) times daily. 180 tablet 3 12/2/2019 at Unknown time    metoprolol succinate (TOPROL-XL) 100 MG 24 hr tablet Take 1 tablet (100 mg total) by mouth 2 (two) times daily. 180 tablet 3 12/2/2019 at Unknown time    multivitamin with minerals (ONE-A-DAY MAXIMUM FORMULA ORAL) Take 1 tablet by mouth once daily.   12/1/2019 at Unknown time    NOVOLOG FLEXPEN U-100 INSULIN 100 unit/mL (3 mL) InPn pen INJECT 25 UNITS SUBCUTANEOUSLY WITH MEALS  4 12/1/2019 at Unknown time    omeprazole (PRILOSEC) 20 MG capsule    12/2/2019 at Unknown time    rosuvastatin (CRESTOR) 20 MG tablet Take 1 tablet (20 mg total) by mouth once daily. 90 tablet 3 12/1/2019 at Unknown time    fish oil-omega-3 fatty acids 300-1,000 mg capsule Take by mouth once daily.   Taking    insulin lispro (HUMALOG) 100 unit/mL injection Inject into the skin 3 (three) times daily before meals.   Past Week at Unknown time    metFORMIN (GLUCOPHAGE) 500 MG tablet Take 500 mg by mouth 2 (two) times daily with meals.   11/29/2019       Social History     Socioeconomic History    Marital status:      Spouse name: Not on file    Number of children: Not on file    Years of education: Not on file    Highest education level: Not on file   Occupational History    Not on file   Social Needs    Financial resource strain: Not on file    Food insecurity:     Worry: Not on file     Inability: Not on file    Transportation needs:     Medical: Not on file     Non-medical: Not on file   Tobacco Use    Smoking status: Never Smoker    Smokeless tobacco: Never Used   Substance and Sexual Activity    Alcohol use: No    Drug use: No    Sexual activity: Not Currently   Lifestyle    Physical activity:     Days per week: Not on file     Minutes per session: Not on file    Stress: Not on file   Relationships    Social connections:     Talks  on phone: Not on file     Gets together: Not on file     Attends [employer] service: Not on file     Active member of club or organization: Not on file     Attends meetings of clubs or organizations: Not on file     Relationship status: Not on file   Other Topics Concern    Not on file   Social History Narrative    Not on file        MEDS   albuterol sulfate  5 mg Nebulization Once    aspirin  81 mg Oral Daily    clopidogrel  75 mg Oral Daily    insulin aspart U-100  15 Units Subcutaneous TIDWM    [START ON 12/4/2019] insulin detemir U-100  50 Units Subcutaneous Daily    pantoprazole  40 mg Oral Daily    rosuvastatin  20 mg Oral Daily    sodium chloride 0.9%  3 mL/kg Intravenous Once                  CONTINOUS INFUSIONS:      Intake/Output Summary (Last 24 hours) at 12/3/2019 1337  Last data filed at 12/3/2019 0600  Gross per 24 hour   Intake 2570.28 ml   Output 500 ml   Net 2070.28 ml        HEMODYNAMICS:    Temp:  [96.4 °F (35.8 °C)-97.9 °F (36.6 °C)] 97.6 °F (36.4 °C)  Pulse:  [47-79] 64  Resp:  [10-49] 28  SpO2:  [95 %-100 %] 97 %  BP: ()/() 144/65  Arterial Line BP: ()/(32-86) 147/49   SOB  No CP  No cough  No nausea  No vomiting  No diarrhea  No fever  No chills  No abdominal pain  No back pain  Cards:pulse 60  Pul:diminished breath sounds  Abdomen soft   Ext: edema   Skin: pale, dry   LABS   Lab Results   Component Value Date    WBC 15.82 (H) 12/03/2019    HGB 8.3 (L) 12/03/2019    HCT 25.1 (L) 12/03/2019    MCV 88 12/03/2019     12/03/2019        Recent Labs   Lab 12/03/19  1222   *   CALCIUM 7.5*   *   K 6.1*   CO2 17*      BUN 41*   CREATININE 3.7*      Lab Results   Component Value Date    CALCIUM 7.5 (L) 12/03/2019    PHOS 3.2 05/12/2018      No results found for: IRON, TIBC, FERRITIN     ABG  Recent Labs   Lab 12/03/19  1259   PH 7.321*   PO2 86   PCO2 32.4*   HCO3 16.7*   BE -9         IMAGING:  CXR    ASSESSMENT / PLAN  Diabetes Mellitus. CAD.  Hypertension.  PAD.  S/p LE angiogram.  JOSE/CKD 3.   ATN.  No urine output since 6 am.   cc last night.  Corrected sodium approximately 141.  Blood sugar 619 mg/dl .  Hyperkalemia K 6.1.  Acidemia.  Gap metabolic acidosis.  Corrected  Ca 8.7.  Poor nutrition.  Albumin 2.5.  Anemia Hb 8.3.  Blood pressure 144/65.  On pressor.  Echo-    1 - Normal left ventricular systolic function (EF 60-65%).     2 - Normal right ventricular systolic function .     3 - Normal left ventricular diastolic function.     4 - The estimated PA systolic pressure is 19 mmHg.   CXR-  Cardiomediastinal silhouette is within normal limits for age allowing for AP portable technique and magnification by chest wall.  Lungs are well expanded without focal consolidation, pleural effusion or pneumothorax definitively seen.    Weight daily.  I and O.  Avoid nephrotoxic agents, hypotension, hypovolemia  Treat hyperkalemia with medications.  Acucheck + insulin.  Will follow up today.  May need dialysis.  Discussed about dialysis with patient.

## 2019-12-03 NOTE — PLAN OF CARE
Marzena, notified of new complaint of shortness of breath while eating, pt's oxygen increased to 3 L via nasal canula; routine chest x-ray made a stat., new orders given for ABG's. 1252 Marzena at the bedside.

## 2019-12-03 NOTE — NURSING
6 hours post in and out catheter and patient not able to void after multiple attempts. Bladder scan volume is 0 mL in bladder    Since last in and out catheter at 2105 last night, patient received 40 mg of lasix, total of 2 uPRBCs, 250 mL of NS, and almost a liter of volume from Levophed with now no UOP    Call placed to MD Fabian to update. Waiting for return call.

## 2019-12-03 NOTE — SUBJECTIVE & OBJECTIVE
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    Type 2 diabetes mellitus with kidney complication, without long-term current use of insulin 11/15/2012       Past Surgical History:   Procedure Laterality Date    AORTOGRAPHY WITH SERIALOGRAPHY N/A 10/16/2019    Procedure: AORTOGRAM, WITH SERIALOGRAPHY;  Surgeon: James Sanchez MD;  Location: [facility] CATH LAB/EP;  Service: Cardiology;  Laterality: N/A;    COLONOSCOPY      CORONARY ANGIOPLASTY      MARIAN to LAD and LCX       Review of patient's allergies indicates:  No Known Allergies    No current facility-administered medications on file prior to encounter.      Current Outpatient Medications on File Prior to Encounter   Medication Sig    amLODIPine (NORVASC) 5 MG tablet Take 1 tablet (5 mg total) by mouth once daily.    aspirin (ECOTRIN) 81 MG EC tablet Take 81 mg by mouth once daily.    chlorthalidone (HYGROTEN) 25 MG Tab Take 1 tablet (25 mg total) by mouth once daily.    cilostazol (PLETAL) 50 MG Tab Take 1 tablet (50 mg total) by mouth 2 (two) times daily.    clopidogrel (PLAVIX) 75 mg tablet Take 1 tablet (75 mg total) by mouth once daily.    doxazosin (CARDURA) 8 MG Tab Take 1 tablet (8 mg total) by mouth every evening.    fenofibrate micronized (LOFIBRA) 134 MG Cap Take 1 capsule (134 mg total) by mouth nightly.    furosemide (LASIX) 20 MG tablet Take 1 tablet (20 mg total) by mouth once daily.    insulin glargine (LANTUS) 100 unit/mL injection Inject 60 Units into the skin every morning.    losartan (COZAAR) 50 MG tablet Take 1 tablet (50 mg total) by mouth 2 (two) times daily.    metoprolol succinate (TOPROL-XL) 100 MG 24 hr tablet Take 1 tablet (100 mg total) by mouth 2 (two) times daily.    multivitamin with minerals (ONE-A-DAY  MAXIMUM FORMULA ORAL) Take 1 tablet by mouth once daily.    NOVOLOG FLEXPEN U-100 INSULIN 100 unit/mL (3 mL) InPn pen INJECT 25 UNITS SUBCUTANEOUSLY WITH MEALS    omeprazole (PRILOSEC) 20 MG capsule     rosuvastatin (CRESTOR) 20 MG tablet Take 1 tablet (20 mg total) by mouth once daily.    fish oil-omega-3 fatty acids 300-1,000 mg capsule Take by mouth once daily.    insulin lispro (HUMALOG) 100 unit/mL injection Inject into the skin 3 (three) times daily before meals.    metFORMIN (GLUCOPHAGE) 500 MG tablet Take 500 mg by mouth 2 (two) times daily with meals.     Family History     Problem Relation (Age of Onset)    Colon polyps Sister    No Known Problems Mother, Father        Tobacco Use    Smoking status: Never Smoker    Smokeless tobacco: Never Used   Substance and Sexual Activity    Alcohol use: No    Drug use: No    Sexual activity: Not Currently     Review of Systems   Constitutional: Negative for activity change, chills, diaphoresis and fatigue.   HENT: Negative for congestion and nosebleeds.    Genitourinary: Positive for difficulty urinating.   Neurological: Negative for dizziness, seizures, light-headedness, numbness and headaches.     Objective:     Vital Signs (Most Recent):  Temp: 97.6 °F (36.4 °C) (12/03/19 1130)  Pulse: 82 (12/03/19 1645)  Resp: (!) 26 (12/03/19 1645)  BP: (!) 115/55 (12/03/19 1645)  SpO2: 98 % (12/03/19 1645) Vital Signs (24h Range):  Temp:  [96.4 °F (35.8 °C)-97.9 °F (36.6 °C)] 97.6 °F (36.4 °C)  Pulse:  [50-90] 82  Resp:  [19-49] 26  SpO2:  [95 %-100 %] 98 %  BP: ()/() 115/55  Arterial Line BP: ()/(32-86) 102/43     Weight: 107.5 kg (236 lb 15.9 oz)  Body mass index is 34.5 kg/m².    Physical Exam   Constitutional: He appears well-developed and well-nourished.   HENT:   Head: Normocephalic and atraumatic.   Eyes: EOM are normal.   Neck: Normal range of motion. Neck supple.   Cardiovascular: Normal rate.   Pulmonary/Chest: Effort normal.   Abdominal:  Soft.   Musculoskeletal: Normal range of motion. He exhibits no deformity.   Skin: Skin is warm and dry.   Psychiatric: He has a normal mood and affect. His behavior is normal. Judgment and thought content normal.       Significant Labs:   Recent Labs   Lab 12/03/19  0831 12/03/19  1143 12/03/19  1629   WBC 17.59* 15.82* 16.04*   HGB 9.1* 8.3* 7.6*   HCT 27.5* 25.1* 22.8*    234 221     Recent Labs   Lab 12/02/19  1303 12/03/19  1222 12/03/19  1444     139 131* 132*   K 4.5  4.5 6.1* 5.0     109 102 102   CO2 25  25 17* 16*   BUN 17  17 41* 44*   CREATININE 1.2  1.2 3.7* 3.9*   *  430* 619* 579*   CALCIUM 7.5*  7.5* 7.5* 7.6*   MG  --   --  1.6   PHOS  --   --  5.7*     Recent Labs   Lab 12/02/19  1303   ALKPHOS 22*   ALT 15   AST 17   ALBUMIN 2.5*   PROT 4.3*   BILITOT 0.3      No results for input(s): CPK, CPKMB, MB, TROPONINI in the last 72 hours.  Recent Labs   Lab 12/02/19  1212 12/02/19  1435 12/02/19  1615 12/03/19  0019 12/03/19  0410 12/03/19  1146   POCTGLUCOSE 346* 423* 415* 471* 308* 444*     Hemoglobin A1C   Date Value Ref Range Status   12/02/2019 5.6 4.0 - 5.6 % Final     Comment:     ADA Screening Guidelines:  5.7-6.4%  Consistent with prediabetes  >or=6.5%  Consistent with diabetes  High levels of fetal hemoglobin interfere with the HbA1C  assay. Heterozygous hemoglobin variants (HbS, HgC, etc)do  not significantly interfere with this assay.   However, presence of multiple variants may affect accuracy.     05/08/2018 7.7 (H) 4.0 - 5.6 % Final     Comment:     According to ADA guidelines, hemoglobin A1c <7.0% represents  optimal control in non-pregnant diabetic patients. Different  metrics may apply to specific patient populations.   Standards of Medical Care in Diabetes-2016.  For the purpose of screening for the presence of diabetes:  <5.7%     Consistent with the absence of diabetes  5.7-6.4%  Consistent with increasing risk for diabetes   (prediabetes)  >or=6.5%   Consistent with diabetes  Currently, no consensus exists for use of hemoglobin A1c  for diagnosis of diabetes for children.  This Hemoglobin A1c assay has significant interference with fetal   hemoglobin   (HbF). The results are invalid for patients with abnormal amounts of   HbF,   including those with known Hereditary Persistence   of Fetal Hemoglobin. Heterozygous hemoglobin variants (HbAS, HbAC,   HbAD, HbAE, HbA2) do not significantly interfere with this assay;   however, presence of multiple variants in a sample may impact the %   interference.     06/09/2011 7.9 (H) 4.0 - 6.2 % Final     Scheduled Meds:   aspirin  81 mg Oral Daily    calcium gluconate IVPB  1,000 mg Intravenous Once    clopidogrel  75 mg Oral Daily    magnesium sulfate IVPB  1 g Intravenous Once    pantoprazole  40 mg Oral Daily    rosuvastatin  20 mg Oral Daily    sodium chloride 0.9%  3 mL/kg Intravenous Once     Continuous Infusions:   sodium chloride 0.9% 3 mL/kg/hr (12/02/19 1348)    ceFAZolin 2 g/50mL Dextrose IVPB      heparin (porcine)      insulin (HUMAN R) infusion (adults)      norepinephrine bitartrate-D5W 0.26 mcg/kg/min (12/03/19 1501)     As Needed: sodium chloride, sodium chloride, sodium chloride, sodium chloride 0.9%, acetaminophen, ceFAZolin 2 g/50mL Dextrose IVPB, Dextrose 10% Bolus, Dextrose 10% Bolus, dextrose 50 % in water (D50W), dextrose 50 % in water (D50W), heparin (porcine), insulin aspart U-100, morphine, ondansetron, ondansetron, polyethylene glycol, sodium chloride 0.9%    Significant Imaging: will review

## 2019-12-03 NOTE — PLAN OF CARE
Patient is resting in bed. Awakens to verbal stimuli. Oriented x4. Moves all extremities and follows commands. See flowsheets for details. All lines and drains are intact and fluids are infusing without difficulty. See MAR for gtts. Bed in lowest position. Siderails x3. Will continue to monitor

## 2019-12-03 NOTE — PLAN OF CARE
Spoke to Marzena, regarding no urine output after 40 mg of Lasix; bladder scan done 0 urine present; catheter irrigated with 20 mL of sterile water, return 15 mL of light yellow hue of liquid. Will continue to monitor output.

## 2019-12-04 LAB
ALBUMIN SERPL BCP-MCNC: 2.2 G/DL (ref 3.5–5.2)
ALBUMIN SERPL BCP-MCNC: 2.4 G/DL (ref 3.5–5.2)
ALBUMIN SERPL BCP-MCNC: 2.5 G/DL (ref 3.5–5.2)
ALBUMIN SERPL BCP-MCNC: 2.6 G/DL (ref 3.5–5.2)
ALLENS TEST: ABNORMAL
ANION GAP SERPL CALC-SCNC: 10 MMOL/L (ref 8–16)
ANION GAP SERPL CALC-SCNC: 11 MMOL/L (ref 8–16)
ANION GAP SERPL CALC-SCNC: 13 MMOL/L (ref 8–16)
BACTERIA #/AREA URNS HPF: ABNORMAL /HPF
BASOPHILS # BLD AUTO: 0 K/UL (ref 0–0.2)
BASOPHILS # BLD AUTO: 0.01 K/UL (ref 0–0.2)
BASOPHILS # BLD AUTO: 0.02 K/UL (ref 0–0.2)
BASOPHILS # BLD AUTO: 0.02 K/UL (ref 0–0.2)
BASOPHILS NFR BLD: 0 % (ref 0–1.9)
BASOPHILS NFR BLD: 0.1 % (ref 0–1.9)
BILIRUB UR QL STRIP: ABNORMAL
BLD PROD TYP BPU: NORMAL
BLOOD UNIT EXPIRATION DATE: NORMAL
BLOOD UNIT TYPE CODE: 6200
BLOOD UNIT TYPE: NORMAL
BUN SERPL-MCNC: 49 MG/DL (ref 8–23)
BUN SERPL-MCNC: 51 MG/DL (ref 8–23)
BUN SERPL-MCNC: 60 MG/DL (ref 8–23)
BUN SERPL-MCNC: 63 MG/DL (ref 8–23)
BUN SERPL-MCNC: 64 MG/DL (ref 8–23)
CALCIUM SERPL-MCNC: 7.7 MG/DL (ref 8.7–10.5)
CALCIUM SERPL-MCNC: 7.8 MG/DL (ref 8.7–10.5)
CALCIUM SERPL-MCNC: 8 MG/DL (ref 8.7–10.5)
CALCIUM SERPL-MCNC: 8 MG/DL (ref 8.7–10.5)
CALCIUM SERPL-MCNC: 8.2 MG/DL (ref 8.7–10.5)
CHLORIDE SERPL-SCNC: 100 MMOL/L (ref 95–110)
CHLORIDE SERPL-SCNC: 101 MMOL/L (ref 95–110)
CHLORIDE SERPL-SCNC: 101 MMOL/L (ref 95–110)
CHLORIDE SERPL-SCNC: 102 MMOL/L (ref 95–110)
CHLORIDE SERPL-SCNC: 99 MMOL/L (ref 95–110)
CLARITY UR: ABNORMAL
CO2 SERPL-SCNC: 17 MMOL/L (ref 23–29)
CO2 SERPL-SCNC: 19 MMOL/L (ref 23–29)
CO2 SERPL-SCNC: 19 MMOL/L (ref 23–29)
CO2 SERPL-SCNC: 20 MMOL/L (ref 23–29)
CO2 SERPL-SCNC: 20 MMOL/L (ref 23–29)
CODING SYSTEM: NORMAL
COLOR UR: YELLOW
CREAT SERPL-MCNC: 4.5 MG/DL (ref 0.5–1.4)
CREAT SERPL-MCNC: 4.5 MG/DL (ref 0.5–1.4)
CREAT SERPL-MCNC: 5.3 MG/DL (ref 0.5–1.4)
CREAT SERPL-MCNC: 5.4 MG/DL (ref 0.5–1.4)
CREAT SERPL-MCNC: 5.5 MG/DL (ref 0.5–1.4)
DELSYS: ABNORMAL
DIFFERENTIAL METHOD: ABNORMAL
DISPENSE STATUS: NORMAL
EOSINOPHIL # BLD AUTO: 0 K/UL (ref 0–0.5)
EOSINOPHIL NFR BLD: 0 % (ref 0–8)
EOSINOPHIL NFR BLD: 0.1 % (ref 0–8)
ERYTHROCYTE [DISTWIDTH] IN BLOOD BY AUTOMATED COUNT: 14.1 % (ref 11.5–14.5)
ERYTHROCYTE [DISTWIDTH] IN BLOOD BY AUTOMATED COUNT: 14.2 % (ref 11.5–14.5)
ERYTHROCYTE [DISTWIDTH] IN BLOOD BY AUTOMATED COUNT: 14.2 % (ref 11.5–14.5)
ERYTHROCYTE [DISTWIDTH] IN BLOOD BY AUTOMATED COUNT: 14.3 % (ref 11.5–14.5)
ERYTHROCYTE [DISTWIDTH] IN BLOOD BY AUTOMATED COUNT: 14.4 % (ref 11.5–14.5)
ERYTHROCYTE [DISTWIDTH] IN BLOOD BY AUTOMATED COUNT: 14.4 % (ref 11.5–14.5)
ERYTHROCYTE [DISTWIDTH] IN BLOOD BY AUTOMATED COUNT: 14.6 % (ref 11.5–14.5)
EST. GFR  (AFRICAN AMERICAN): 11 ML/MIN/1.73 M^2
EST. GFR  (AFRICAN AMERICAN): 14 ML/MIN/1.73 M^2
EST. GFR  (AFRICAN AMERICAN): 14 ML/MIN/1.73 M^2
EST. GFR  (NON AFRICAN AMERICAN): 10 ML/MIN/1.73 M^2
EST. GFR  (NON AFRICAN AMERICAN): 12 ML/MIN/1.73 M^2
EST. GFR  (NON AFRICAN AMERICAN): 12 ML/MIN/1.73 M^2
EST. GFR  (NON AFRICAN AMERICAN): 9 ML/MIN/1.73 M^2
EST. GFR  (NON AFRICAN AMERICAN): 9 ML/MIN/1.73 M^2
GLUCOSE SERPL-MCNC: 432 MG/DL (ref 70–110)
GLUCOSE SERPL-MCNC: 457 MG/DL (ref 70–110)
GLUCOSE SERPL-MCNC: 475 MG/DL (ref 70–110)
GLUCOSE SERPL-MCNC: 486 MG/DL (ref 70–110)
GLUCOSE SERPL-MCNC: 526 MG/DL (ref 70–110)
GLUCOSE UR QL STRIP: ABNORMAL
HCO3 UR-SCNC: 19.3 MMOL/L (ref 24–28)
HCT VFR BLD AUTO: 19.9 % (ref 40–54)
HCT VFR BLD AUTO: 21 % (ref 40–54)
HCT VFR BLD AUTO: 21.2 % (ref 40–54)
HCT VFR BLD AUTO: 21.8 % (ref 40–54)
HCT VFR BLD AUTO: 21.8 % (ref 40–54)
HCT VFR BLD AUTO: 22 % (ref 40–54)
HCT VFR BLD AUTO: 22 % (ref 40–54)
HCT VFR BLD AUTO: 23.2 % (ref 40–54)
HCT VFR BLD AUTO: 24.8 % (ref 40–54)
HCT VFR BLD AUTO: 24.9 % (ref 40–54)
HGB BLD-MCNC: 6.6 G/DL (ref 14–18)
HGB BLD-MCNC: 6.9 G/DL (ref 14–18)
HGB BLD-MCNC: 7 G/DL (ref 14–18)
HGB BLD-MCNC: 7.3 G/DL (ref 14–18)
HGB BLD-MCNC: 7.3 G/DL (ref 14–18)
HGB BLD-MCNC: 7.4 G/DL (ref 14–18)
HGB BLD-MCNC: 7.4 G/DL (ref 14–18)
HGB BLD-MCNC: 7.8 G/DL (ref 14–18)
HGB BLD-MCNC: 8.4 G/DL (ref 14–18)
HGB BLD-MCNC: 8.4 G/DL (ref 14–18)
HGB UR QL STRIP: ABNORMAL
HYALINE CASTS #/AREA URNS LPF: 0 /LPF
KETONES UR QL STRIP: NEGATIVE
LACTATE SERPL-SCNC: 1.8 MMOL/L (ref 0.5–2.2)
LEUKOCYTE ESTERASE UR QL STRIP: ABNORMAL
LYMPHOCYTES # BLD AUTO: 0.3 K/UL (ref 1–4.8)
LYMPHOCYTES # BLD AUTO: 0.4 K/UL (ref 1–4.8)
LYMPHOCYTES # BLD AUTO: 0.6 K/UL (ref 1–4.8)
LYMPHOCYTES # BLD AUTO: 0.8 K/UL (ref 1–4.8)
LYMPHOCYTES NFR BLD: 2 % (ref 18–48)
LYMPHOCYTES NFR BLD: 2.3 % (ref 18–48)
LYMPHOCYTES NFR BLD: 2.4 % (ref 18–48)
LYMPHOCYTES NFR BLD: 2.9 % (ref 18–48)
LYMPHOCYTES NFR BLD: 3.4 % (ref 18–48)
LYMPHOCYTES NFR BLD: 4.3 % (ref 18–48)
LYMPHOCYTES NFR BLD: 4.6 % (ref 18–48)
LYMPHOCYTES NFR BLD: 4.6 % (ref 18–48)
MAGNESIUM SERPL-MCNC: 1.8 MG/DL (ref 1.6–2.6)
MAGNESIUM SERPL-MCNC: 2 MG/DL (ref 1.6–2.6)
MAGNESIUM SERPL-MCNC: 2 MG/DL (ref 1.6–2.6)
MCH RBC QN AUTO: 28.8 PG (ref 27–31)
MCH RBC QN AUTO: 28.9 PG (ref 27–31)
MCH RBC QN AUTO: 28.9 PG (ref 27–31)
MCH RBC QN AUTO: 29 PG (ref 27–31)
MCH RBC QN AUTO: 29.1 PG (ref 27–31)
MCH RBC QN AUTO: 29.2 PG (ref 27–31)
MCHC RBC AUTO-ENTMCNC: 32.9 G/DL (ref 32–36)
MCHC RBC AUTO-ENTMCNC: 33 G/DL (ref 32–36)
MCHC RBC AUTO-ENTMCNC: 33.2 G/DL (ref 32–36)
MCHC RBC AUTO-ENTMCNC: 33.5 G/DL (ref 32–36)
MCHC RBC AUTO-ENTMCNC: 33.5 G/DL (ref 32–36)
MCHC RBC AUTO-ENTMCNC: 33.6 G/DL (ref 32–36)
MCHC RBC AUTO-ENTMCNC: 33.7 G/DL (ref 32–36)
MCHC RBC AUTO-ENTMCNC: 33.9 G/DL (ref 32–36)
MCV RBC AUTO: 86 FL (ref 82–98)
MCV RBC AUTO: 87 FL (ref 82–98)
MCV RBC AUTO: 88 FL (ref 82–98)
MICROSCOPIC COMMENT: ABNORMAL
MONOCYTES # BLD AUTO: 0.8 K/UL (ref 0.3–1)
MONOCYTES # BLD AUTO: 0.9 K/UL (ref 0.3–1)
MONOCYTES # BLD AUTO: 1.1 K/UL (ref 0.3–1)
MONOCYTES # BLD AUTO: 1.2 K/UL (ref 0.3–1)
MONOCYTES # BLD AUTO: 1.3 K/UL (ref 0.3–1)
MONOCYTES # BLD AUTO: 1.3 K/UL (ref 0.3–1)
MONOCYTES NFR BLD: 5.7 % (ref 4–15)
MONOCYTES NFR BLD: 5.7 % (ref 4–15)
MONOCYTES NFR BLD: 5.9 % (ref 4–15)
MONOCYTES NFR BLD: 6 % (ref 4–15)
MONOCYTES NFR BLD: 6.4 % (ref 4–15)
MONOCYTES NFR BLD: 6.4 % (ref 4–15)
MONOCYTES NFR BLD: 7 % (ref 4–15)
MONOCYTES NFR BLD: 7.3 % (ref 4–15)
MONOCYTES NFR BLD: 7.8 % (ref 4–15)
MONOCYTES NFR BLD: 7.8 % (ref 4–15)
NEUTROPHILS # BLD AUTO: 11.7 K/UL (ref 1.8–7.7)
NEUTROPHILS # BLD AUTO: 12.9 K/UL (ref 1.8–7.7)
NEUTROPHILS # BLD AUTO: 14 K/UL (ref 1.8–7.7)
NEUTROPHILS # BLD AUTO: 14 K/UL (ref 1.8–7.7)
NEUTROPHILS # BLD AUTO: 14.1 K/UL (ref 1.8–7.7)
NEUTROPHILS # BLD AUTO: 14.1 K/UL (ref 1.8–7.7)
NEUTROPHILS # BLD AUTO: 14.2 K/UL (ref 1.8–7.7)
NEUTROPHILS # BLD AUTO: 15.5 K/UL (ref 1.8–7.7)
NEUTROPHILS # BLD AUTO: 16.5 K/UL (ref 1.8–7.7)
NEUTROPHILS # BLD AUTO: 17 K/UL (ref 1.8–7.7)
NEUTROPHILS NFR BLD: 87.5 % (ref 38–73)
NEUTROPHILS NFR BLD: 87.5 % (ref 38–73)
NEUTROPHILS NFR BLD: 89.5 % (ref 38–73)
NEUTROPHILS NFR BLD: 89.6 % (ref 38–73)
NEUTROPHILS NFR BLD: 90.4 % (ref 38–73)
NEUTROPHILS NFR BLD: 91 % (ref 38–73)
NEUTROPHILS NFR BLD: 91.2 % (ref 38–73)
NEUTROPHILS NFR BLD: 91.5 % (ref 38–73)
NEUTROPHILS NFR BLD: 91.9 % (ref 38–73)
NEUTROPHILS NFR BLD: 91.9 % (ref 38–73)
NITRITE UR QL STRIP: POSITIVE
PCO2 BLDA: 42.2 MMHG (ref 35–45)
PH SMN: 7.27 [PH] (ref 7.35–7.45)
PH UR STRIP: 7 [PH] (ref 5–8)
PHOSPHATE SERPL-MCNC: 6.1 MG/DL (ref 2.7–4.5)
PHOSPHATE SERPL-MCNC: 6.5 MG/DL (ref 2.7–4.5)
PHOSPHATE SERPL-MCNC: 6.8 MG/DL (ref 2.7–4.5)
PHOSPHATE SERPL-MCNC: 6.9 MG/DL (ref 2.7–4.5)
PLATELET # BLD AUTO: 102 K/UL (ref 150–350)
PLATELET # BLD AUTO: 115 K/UL (ref 150–350)
PLATELET # BLD AUTO: 118 K/UL (ref 150–350)
PLATELET # BLD AUTO: 137 K/UL (ref 150–350)
PLATELET # BLD AUTO: 137 K/UL (ref 150–350)
PLATELET # BLD AUTO: 183 K/UL (ref 150–350)
PLATELET # BLD AUTO: 207 K/UL (ref 150–350)
PLATELET # BLD AUTO: 207 K/UL (ref 150–350)
PLATELET # BLD AUTO: 95 K/UL (ref 150–350)
PLATELET # BLD AUTO: 95 K/UL (ref 150–350)
PLATELET BLD QL SMEAR: ABNORMAL
PLATELET BLD QL SMEAR: ABNORMAL
PMV BLD AUTO: 10.5 FL (ref 9.2–12.9)
PMV BLD AUTO: 10.5 FL (ref 9.2–12.9)
PMV BLD AUTO: 10.7 FL (ref 9.2–12.9)
PMV BLD AUTO: 10.8 FL (ref 9.2–12.9)
PMV BLD AUTO: 10.9 FL (ref 9.2–12.9)
PMV BLD AUTO: 11 FL (ref 9.2–12.9)
PMV BLD AUTO: 11.1 FL (ref 9.2–12.9)
PMV BLD AUTO: 11.2 FL (ref 9.2–12.9)
PO2 BLDA: 71 MMHG (ref 80–100)
POC BE: -8 MMOL/L
POC SATURATED O2: 91 % (ref 95–100)
POC TCO2: 21 MMOL/L (ref 23–27)
POCT GLUCOSE: 353 MG/DL (ref 70–110)
POCT GLUCOSE: 381 MG/DL (ref 70–110)
POCT GLUCOSE: 392 MG/DL (ref 70–110)
POCT GLUCOSE: 400 MG/DL (ref 70–110)
POCT GLUCOSE: 417 MG/DL (ref 70–110)
POCT GLUCOSE: 476 MG/DL (ref 70–110)
POTASSIUM SERPL-SCNC: 4.8 MMOL/L (ref 3.5–5.1)
POTASSIUM SERPL-SCNC: 4.9 MMOL/L (ref 3.5–5.1)
POTASSIUM SERPL-SCNC: 5.3 MMOL/L (ref 3.5–5.1)
POTASSIUM SERPL-SCNC: 5.4 MMOL/L (ref 3.5–5.1)
POTASSIUM SERPL-SCNC: 5.8 MMOL/L (ref 3.5–5.1)
PROT UR QL STRIP: ABNORMAL
RBC # BLD AUTO: 2.28 M/UL (ref 4.6–6.2)
RBC # BLD AUTO: 2.38 M/UL (ref 4.6–6.2)
RBC # BLD AUTO: 2.43 M/UL (ref 4.6–6.2)
RBC # BLD AUTO: 2.51 M/UL (ref 4.6–6.2)
RBC # BLD AUTO: 2.51 M/UL (ref 4.6–6.2)
RBC # BLD AUTO: 2.53 M/UL (ref 4.6–6.2)
RBC # BLD AUTO: 2.53 M/UL (ref 4.6–6.2)
RBC # BLD AUTO: 2.7 M/UL (ref 4.6–6.2)
RBC # BLD AUTO: 2.88 M/UL (ref 4.6–6.2)
RBC # BLD AUTO: 2.89 M/UL (ref 4.6–6.2)
RBC #/AREA URNS HPF: 40 /HPF (ref 0–4)
SAMPLE: ABNORMAL
SITE: ABNORMAL
SODIUM SERPL-SCNC: 130 MMOL/L (ref 136–145)
SODIUM SERPL-SCNC: 130 MMOL/L (ref 136–145)
SODIUM SERPL-SCNC: 131 MMOL/L (ref 136–145)
SODIUM SERPL-SCNC: 131 MMOL/L (ref 136–145)
SODIUM SERPL-SCNC: 132 MMOL/L (ref 136–145)
SODIUM UR-SCNC: 88 MMOL/L (ref 20–250)
SP GR UR STRIP: 1.01 (ref 1–1.03)
TRANS ERYTHROCYTES VOL PATIENT: NORMAL ML
URN SPEC COLLECT METH UR: ABNORMAL
UROBILINOGEN UR STRIP-ACNC: 1 EU/DL
WBC # BLD AUTO: 12.87 K/UL (ref 3.9–12.7)
WBC # BLD AUTO: 14.26 K/UL (ref 3.9–12.7)
WBC # BLD AUTO: 15.3 K/UL (ref 3.9–12.7)
WBC # BLD AUTO: 15.3 K/UL (ref 3.9–12.7)
WBC # BLD AUTO: 15.74 K/UL (ref 3.9–12.7)
WBC # BLD AUTO: 16.21 K/UL (ref 3.9–12.7)
WBC # BLD AUTO: 16.21 K/UL (ref 3.9–12.7)
WBC # BLD AUTO: 17.3 K/UL (ref 3.9–12.7)
WBC # BLD AUTO: 18.04 K/UL (ref 3.9–12.7)
WBC # BLD AUTO: 19.01 K/UL (ref 3.9–12.7)
WBC #/AREA URNS HPF: 30 /HPF (ref 0–5)
WBC CLUMPS URNS QL MICRO: ABNORMAL

## 2019-12-04 PROCEDURE — 27000221 HC OXYGEN, UP TO 24 HOURS

## 2019-12-04 PROCEDURE — 75710 ARTERY X-RAYS ARM/LEG: CPT | Performed by: INTERNAL MEDICINE

## 2019-12-04 PROCEDURE — 36430 TRANSFUSION BLD/BLD COMPNT: CPT

## 2019-12-04 PROCEDURE — 80100008 HC CRRT DAILY MAINTENANCE

## 2019-12-04 PROCEDURE — 99900035 HC TECH TIME PER 15 MIN (STAT)

## 2019-12-04 PROCEDURE — 27202415 HC CARTRIDGE, CRRT

## 2019-12-04 PROCEDURE — 36200 PR PLACE CATH AORTA: ICD-10-PCS | Mod: ,,, | Performed by: INTERNAL MEDICINE

## 2019-12-04 PROCEDURE — 63600175 PHARM REV CODE 636 W HCPCS: Performed by: INTERNAL MEDICINE

## 2019-12-04 PROCEDURE — 99233 PR SUBSEQUENT HOSPITAL CARE,LEVL III: ICD-10-PCS | Mod: ,,, | Performed by: INTERNAL MEDICINE

## 2019-12-04 PROCEDURE — 99152 MOD SED SAME PHYS/QHP 5/>YRS: CPT | Performed by: INTERNAL MEDICINE

## 2019-12-04 PROCEDURE — 99152 PR MOD CONSCIOUS SEDATION, SAME PHYS, 5+ YRS, FIRST 15 MIN: ICD-10-PCS | Mod: ,,, | Performed by: INTERNAL MEDICINE

## 2019-12-04 PROCEDURE — 25000003 PHARM REV CODE 250: Performed by: NURSE PRACTITIONER

## 2019-12-04 PROCEDURE — 25500020 PHARM REV CODE 255: Performed by: INTERNAL MEDICINE

## 2019-12-04 PROCEDURE — 99233 SBSQ HOSP IP/OBS HIGH 50: CPT | Mod: ,,, | Performed by: INTERNAL MEDICINE

## 2019-12-04 PROCEDURE — 80069 RENAL FUNCTION PANEL: CPT | Mod: 91

## 2019-12-04 PROCEDURE — 80048 BASIC METABOLIC PNL TOTAL CA: CPT

## 2019-12-04 PROCEDURE — 27000190 HC CPAP FULL FACE MASK W/VALVE

## 2019-12-04 PROCEDURE — 25000003 PHARM REV CODE 250: Performed by: INTERNAL MEDICINE

## 2019-12-04 PROCEDURE — 81000 URINALYSIS NONAUTO W/SCOPE: CPT

## 2019-12-04 PROCEDURE — 25000242 PHARM REV CODE 250 ALT 637 W/ HCPCS: Performed by: INTERNAL MEDICINE

## 2019-12-04 PROCEDURE — 37799 UNLISTED PX VASCULAR SURGERY: CPT

## 2019-12-04 PROCEDURE — 83735 ASSAY OF MAGNESIUM: CPT

## 2019-12-04 PROCEDURE — 99153 MOD SED SAME PHYS/QHP EA: CPT | Performed by: INTERNAL MEDICINE

## 2019-12-04 PROCEDURE — 36200 PLACE CATHETER IN AORTA: CPT | Mod: ,,, | Performed by: INTERNAL MEDICINE

## 2019-12-04 PROCEDURE — 36200 PLACE CATHETER IN AORTA: CPT | Performed by: INTERNAL MEDICINE

## 2019-12-04 PROCEDURE — 83605 ASSAY OF LACTIC ACID: CPT

## 2019-12-04 PROCEDURE — 82803 BLOOD GASES ANY COMBINATION: CPT

## 2019-12-04 PROCEDURE — C1769 GUIDE WIRE: HCPCS | Performed by: INTERNAL MEDICINE

## 2019-12-04 PROCEDURE — 90945 DIALYSIS ONE EVALUATION: CPT

## 2019-12-04 PROCEDURE — 94640 AIRWAY INHALATION TREATMENT: CPT

## 2019-12-04 PROCEDURE — 83735 ASSAY OF MAGNESIUM: CPT | Mod: 91

## 2019-12-04 PROCEDURE — 84300 ASSAY OF URINE SODIUM: CPT

## 2019-12-04 PROCEDURE — 94660 CPAP INITIATION&MGMT: CPT

## 2019-12-04 PROCEDURE — C1894 INTRO/SHEATH, NON-LASER: HCPCS | Performed by: INTERNAL MEDICINE

## 2019-12-04 PROCEDURE — 75710 ARTERY X-RAYS ARM/LEG: CPT | Mod: 26,RT,, | Performed by: INTERNAL MEDICINE

## 2019-12-04 PROCEDURE — 94761 N-INVAS EAR/PLS OXIMETRY MLT: CPT

## 2019-12-04 PROCEDURE — 99152 MOD SED SAME PHYS/QHP 5/>YRS: CPT | Mod: ,,, | Performed by: INTERNAL MEDICINE

## 2019-12-04 PROCEDURE — 20000000 HC ICU ROOM

## 2019-12-04 PROCEDURE — 85025 COMPLETE CBC W/AUTO DIFF WBC: CPT | Mod: 91

## 2019-12-04 PROCEDURE — P9021 RED BLOOD CELLS UNIT: HCPCS

## 2019-12-04 PROCEDURE — 87086 URINE CULTURE/COLONY COUNT: CPT

## 2019-12-04 PROCEDURE — 75710 PR  ANGIO EXTREMITY UNILAT: ICD-10-PCS | Mod: 26,RT,, | Performed by: INTERNAL MEDICINE

## 2019-12-04 RX ORDER — IBUPROFEN 200 MG
24 TABLET ORAL
Status: DISCONTINUED | OUTPATIENT
Start: 2019-12-04 | End: 2019-12-19 | Stop reason: HOSPADM

## 2019-12-04 RX ORDER — INSULIN ASPART 100 [IU]/ML
15 INJECTION, SOLUTION INTRAVENOUS; SUBCUTANEOUS
Status: DISCONTINUED | OUTPATIENT
Start: 2019-12-04 | End: 2019-12-08

## 2019-12-04 RX ORDER — MIDAZOLAM HYDROCHLORIDE 1 MG/ML
INJECTION, SOLUTION INTRAMUSCULAR; INTRAVENOUS
Status: DISCONTINUED | OUTPATIENT
Start: 2019-12-04 | End: 2019-12-04 | Stop reason: HOSPADM

## 2019-12-04 RX ORDER — GLUCAGON 1 MG
1 KIT INJECTION
Status: DISCONTINUED | OUTPATIENT
Start: 2019-12-04 | End: 2019-12-19 | Stop reason: HOSPADM

## 2019-12-04 RX ORDER — IPRATROPIUM BROMIDE AND ALBUTEROL SULFATE 2.5; .5 MG/3ML; MG/3ML
3 SOLUTION RESPIRATORY (INHALATION) EVERY 4 HOURS PRN
Status: DISCONTINUED | OUTPATIENT
Start: 2019-12-04 | End: 2019-12-19 | Stop reason: HOSPADM

## 2019-12-04 RX ORDER — HEPARIN SODIUM 1000 [USP'U]/ML
INJECTION, SOLUTION INTRAVENOUS; SUBCUTANEOUS
Status: DISCONTINUED | OUTPATIENT
Start: 2019-12-04 | End: 2019-12-04 | Stop reason: HOSPADM

## 2019-12-04 RX ORDER — ALBUTEROL SULFATE 2.5 MG/.5ML
10 SOLUTION RESPIRATORY (INHALATION) ONCE
Status: COMPLETED | OUTPATIENT
Start: 2019-12-04 | End: 2019-12-04

## 2019-12-04 RX ORDER — IBUPROFEN 200 MG
16 TABLET ORAL
Status: DISCONTINUED | OUTPATIENT
Start: 2019-12-04 | End: 2019-12-19 | Stop reason: HOSPADM

## 2019-12-04 RX ORDER — HEPARIN SODIUM 1000 [USP'U]/ML
2300 INJECTION, SOLUTION INTRAVENOUS; SUBCUTANEOUS
Status: DISCONTINUED | OUTPATIENT
Start: 2019-12-04 | End: 2019-12-19 | Stop reason: HOSPADM

## 2019-12-04 RX ORDER — FUROSEMIDE 10 MG/ML
80 INJECTION INTRAMUSCULAR; INTRAVENOUS ONCE
Status: COMPLETED | OUTPATIENT
Start: 2019-12-04 | End: 2019-12-04

## 2019-12-04 RX ORDER — INSULIN ASPART 100 [IU]/ML
0-5 INJECTION, SOLUTION INTRAVENOUS; SUBCUTANEOUS
Status: DISCONTINUED | OUTPATIENT
Start: 2019-12-04 | End: 2019-12-14

## 2019-12-04 RX ORDER — INSULIN ASPART 100 [IU]/ML
5 INJECTION, SOLUTION INTRAVENOUS; SUBCUTANEOUS ONCE
Status: COMPLETED | OUTPATIENT
Start: 2019-12-05 | End: 2019-12-04

## 2019-12-04 RX ORDER — LIDOCAINE HYDROCHLORIDE 10 MG/ML
INJECTION, SOLUTION EPIDURAL; INFILTRATION; INTRACAUDAL; PERINEURAL
Status: DISCONTINUED | OUTPATIENT
Start: 2019-12-04 | End: 2019-12-04 | Stop reason: HOSPADM

## 2019-12-04 RX ORDER — VERAPAMIL HYDROCHLORIDE 2.5 MG/ML
INJECTION, SOLUTION INTRAVENOUS
Status: DISCONTINUED | OUTPATIENT
Start: 2019-12-04 | End: 2019-12-04 | Stop reason: HOSPADM

## 2019-12-04 RX ORDER — FENTANYL CITRATE 50 UG/ML
INJECTION, SOLUTION INTRAMUSCULAR; INTRAVENOUS
Status: DISCONTINUED | OUTPATIENT
Start: 2019-12-04 | End: 2019-12-04 | Stop reason: HOSPADM

## 2019-12-04 RX ADMIN — FUROSEMIDE 80 MG: 10 INJECTION, SOLUTION INTRAVENOUS at 12:12

## 2019-12-04 RX ADMIN — INSULIN ASPART 12 UNITS: 100 INJECTION, SOLUTION INTRAVENOUS; SUBCUTANEOUS at 07:12

## 2019-12-04 RX ADMIN — CALCIUM GLUCONATE 1000 MG: 98 INJECTION, SOLUTION INTRAVENOUS at 12:12

## 2019-12-04 RX ADMIN — INSULIN ASPART 10 UNITS: 100 INJECTION, SOLUTION INTRAVENOUS; SUBCUTANEOUS at 12:12

## 2019-12-04 RX ADMIN — CLOPIDOGREL BISULFATE 75 MG: 75 TABLET ORAL at 08:12

## 2019-12-04 RX ADMIN — INSULIN DETEMIR 40 UNITS: 100 INJECTION, SOLUTION SUBCUTANEOUS at 08:12

## 2019-12-04 RX ADMIN — ASPIRIN 81 MG: 81 TABLET, COATED ORAL at 08:12

## 2019-12-04 RX ADMIN — INSULIN ASPART 3 UNITS: 100 INJECTION, SOLUTION INTRAVENOUS; SUBCUTANEOUS at 02:12

## 2019-12-04 RX ADMIN — IPRATROPIUM BROMIDE AND ALBUTEROL SULFATE 3 ML: .5; 3 SOLUTION RESPIRATORY (INHALATION) at 03:12

## 2019-12-04 RX ADMIN — HEPARIN SODIUM 2300 UNITS: 1000 INJECTION, SOLUTION INTRAVENOUS; SUBCUTANEOUS at 11:12

## 2019-12-04 RX ADMIN — ROSUVASTATIN CALCIUM 20 MG: 10 TABLET, FILM COATED ORAL at 08:12

## 2019-12-04 RX ADMIN — IOHEXOL 100 ML: 350 INJECTION, SOLUTION INTRAVENOUS at 06:12

## 2019-12-04 RX ADMIN — INSULIN ASPART 5 UNITS: 100 INJECTION, SOLUTION INTRAVENOUS; SUBCUTANEOUS at 11:12

## 2019-12-04 RX ADMIN — INSULIN ASPART 5 UNITS: 100 INJECTION, SOLUTION INTRAVENOUS; SUBCUTANEOUS at 08:12

## 2019-12-04 RX ADMIN — HEPARIN SODIUM 2300 UNITS: 1000 INJECTION, SOLUTION INTRAVENOUS; SUBCUTANEOUS at 08:12

## 2019-12-04 RX ADMIN — FUROSEMIDE 80 MG: 10 INJECTION, SOLUTION INTRAVENOUS at 10:12

## 2019-12-04 RX ADMIN — INSULIN ASPART 3 UNITS: 100 INJECTION, SOLUTION INTRAVENOUS; SUBCUTANEOUS at 10:12

## 2019-12-04 RX ADMIN — NOREPINEPHRINE BITARTRATE 0.12 MCG/KG/MIN: 1 INJECTION, SOLUTION, CONCENTRATE INTRAVENOUS at 06:12

## 2019-12-04 RX ADMIN — ALBUTEROL SULFATE 10 MG: 2.5 SOLUTION RESPIRATORY (INHALATION) at 12:12

## 2019-12-04 NOTE — PLAN OF CARE
"Clot formed in venous line of CRRT and trialysis line, unable to rinse back. Unable to aspirate from venous line. Dr. Watts notified, states to hold until morning as pt is relatively stable. Pt noted to have increased work of breathing and stating he "needs more oxygen." ABG results were given, pt noted to be in uncompensated metabolic acidosis. Telephone orders for BiPAP given. BiPAP placed, pt tolerating very well and is verbalizing ease of breathing. Will continue to monitor.  "

## 2019-12-04 NOTE — PROGRESS NOTES
Labs and vitals   Reviewed.  Acidemia.    Anemia.  Hb 7.6.  US - retroperitoneal hematoma.  Discussed with cardiology.  Start transfusion PRBC.  No urine output.    Will initiate CRRT.  Will follow up.

## 2019-12-04 NOTE — PROCEDURES
: James Sanchez MD  Pre-procedure diagnosis: PAD  Post-procedure diagnosis: PAD     Post Procedure Note: aortogram and peripheral angiogram    The pt was brought to the cath lab and under sterile technique, right radial access was obtained without difficulty. Images were obtained in multiple views and CO2 used to evaluate for acttive bleed. No active bleed noted after extensive angiogram. Please see full report for details. The pt tolerated the procedure well without complications. Radial band device used with successful hemostasis.     Vitals:    12/04/19 1430 12/04/19 1445 12/04/19 1500 12/04/19 1615   BP: 136/63  (!) 140/66    Pulse: 81 80 82    Resp: (!) 21 (!) 21 (!) 22    Temp:    97.8 °F (36.6 °C)   TempSrc:    Oral   SpO2: 98% 98% 97%    Weight:       Height:             Gen: NAD  Ext: 2+ radial pulse, no evidence of hematoma  Estimated blood loss: < 50 cc    Plan:  -Post cath care per protocol  -Continue serial H/H  -OK for CRRT  -Will likely need further PRBC with HD  -Continue weaning pressor support as tolerated

## 2019-12-04 NOTE — PROGRESS NOTES
Progress Note  Nephrology      Consult Requested By: James Sanchez MD      SUBJECTIVE:     Overnight events  Patient is a 74 y.o. male     Patient Active Problem List   Diagnosis    Type 2 diabetes mellitus with kidney complication, without long-term current use of insulin    Essential hypertension    Hyperlipidemia    CAD (coronary artery disease)    Status post coronary artery stent placement    Acute MI anterior wall first episode care    Acute coronary syndrome    PAD (peripheral artery disease)    History of colon cancer    Intracranial atherosclerosis    History of ischemic vertebrobasilar artery brainstem stroke    Ataxic hemiparesis    Research subject    Right sided weakness    Impaired balance as late effect of cerebrovascular accident    Abnormality of gait as late effect of cerebrovascular accident (CVA)    Tightness of right gastrocnemius muscle    Claudication    Hypotension    Anemia    Edema    Shortness of breath    Leukocytosis    JOSE (acute kidney injury)    Hyperkalemia     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    Type 2 diabetes mellitus with kidney complication, without long-term current use of insulin 11/15/2012              OBJECTIVE:     Vitals:    12/04/19 0845 12/04/19 0900 12/04/19 0915 12/04/19 0930   BP: 129/62 131/67  (!) 131/59   Pulse: 78 72 77 74   Resp: (!) 25 (!) 25 (!) 25 (!) 22   Temp:       TempSrc:       SpO2: 100% 100% 100% 97%   Weight:       Height:           Temp: 96.2 °F (35.7 °C) (12/04/19 0830)  Pulse: 74 (12/04/19 0930)  Resp: (!) 22 (12/04/19 0930)  BP: (!) 131/59 (12/04/19 0930)  SpO2: 97 % (12/04/19 0930)              Medications:   aspirin  81 mg Oral Daily    clopidogrel  75 mg Oral Daily    insulin aspart  U-100  15 Units Subcutaneous TIDWM    insulin detemir U-100  40 Units Subcutaneous QHS    rosuvastatin  20 mg Oral Daily    sodium chloride 0.9%  3 mL/kg Intravenous Once      sodium chloride 0.9% 3 mL/kg/hr (12/02/19 1348)    sodium chloride 0.9%      ceFAZolin 2 g/50mL Dextrose IVPB      heparin (porcine)      norepinephrine bitartrate-D5W 0.04 mcg/kg/min (12/04/19 1133)               Physical Exam:  General appearance:Weak  SOB  Lungs: diminished breath sounds  Heart: Pulse 74  Abdomen: soft  Extremities: edema  Skin: dry  Laboratory:  ABG  Labs reviewed  Recent Results (from the past 336 hour(s))   Basic metabolic panel    Collection Time: 12/03/19  6:17 PM   Result Value Ref Range    Sodium 132 (L) 136 - 145 mmol/L    Potassium 4.9 3.5 - 5.1 mmol/L    Chloride 102 95 - 110 mmol/L    CO2 17 (L) 23 - 29 mmol/L    BUN, Bld 46 (H) 8 - 23 mg/dL    Creatinine 4.2 (H) 0.5 - 1.4 mg/dL    Calcium 7.7 (L) 8.7 - 10.5 mg/dL    Anion Gap 13 8 - 16 mmol/L   Basic metabolic panel    Collection Time: 12/03/19  2:44 PM   Result Value Ref Range    Sodium 132 (L) 136 - 145 mmol/L    Potassium 5.0 3.5 - 5.1 mmol/L    Chloride 102 95 - 110 mmol/L    CO2 16 (L) 23 - 29 mmol/L    BUN, Bld 44 (H) 8 - 23 mg/dL    Creatinine 3.9 (H) 0.5 - 1.4 mg/dL    Calcium 7.6 (L) 8.7 - 10.5 mg/dL    Anion Gap 14 8 - 16 mmol/L   Basic metabolic panel    Collection Time: 12/03/19 12:22 PM   Result Value Ref Range    Sodium 131 (L) 136 - 145 mmol/L    Potassium 6.1 (H) 3.5 - 5.1 mmol/L    Chloride 102 95 - 110 mmol/L    CO2 17 (L) 23 - 29 mmol/L    BUN, Bld 41 (H) 8 - 23 mg/dL    Creatinine 3.7 (H) 0.5 - 1.4 mg/dL    Calcium 7.5 (L) 8.7 - 10.5 mg/dL    Anion Gap 12 8 - 16 mmol/L     Recent Results (from the past 336 hour(s))   CBC auto differential    Collection Time: 12/04/19  7:39 AM   Result Value Ref Range    WBC 18.04 (H) 3.90 - 12.70 K/uL    Hemoglobin 7.8 (L) 14.0 - 18.0 g/dL    Hematocrit 23.2 (L) 40.0 - 54.0 %    Platelets 137 (L) 150  - 350 K/uL   CBC auto differential    Collection Time: 12/04/19  4:03 AM   Result Value Ref Range    WBC 19.01 (H) 3.90 - 12.70 K/uL    Hemoglobin 8.4 (L) 14.0 - 18.0 g/dL    Hematocrit 24.8 (L) 40.0 - 54.0 %    Platelets 137 (L) 150 - 350 K/uL   CBC auto differential    Collection Time: 12/04/19 12:16 AM   Result Value Ref Range    WBC 17.30 (H) 3.90 - 12.70 K/uL    Hemoglobin 8.4 (L) 14.0 - 18.0 g/dL    Hematocrit 24.9 (L) 40.0 - 54.0 %    Platelets 183 150 - 350 K/uL     Urinalysis  No results for input(s): COLORU, CLARITYU, SPECGRAV, PHUR, PROTEINUA, GLUCOSEU, BILIRUBINCON, BLOODU, WBCU, RBCU, BACTERIA, MUCUS, NITRITE, LEUKOCYTESUR, UROBILINOGEN, HYALINECASTS in the last 24 hours.    Diagnostic Results:  X-Ray: Reviewed  US: Reviewed  Echo: Reviewed  ACCESS    ASSESSMENT/PLAN:   Diabetes Mellitus. CAD. Hypertension.  PAD.  S/p LE angiogram.  JOSE/CKD 3.   ATN.  No urine output.  Corrected sodium approximately 135.  Blood sugar 432 mg/dl .   K 4.8.  Acidemia.  Gap metabolic acidosis.  Respiratory acidosis  Corrected  Ca approximately 9.2.  Poor nutrition.  Albumin 2.6.  Anemia Hb 7.8  Blood pressure 131/59.  On pressor.  Echo-    1 - Normal left ventricular systolic function (EF 60-65%).     2 - Normal right ventricular systolic function .     3 - Normal left ventricular diastolic function.     4 - The estimated PA systolic pressure is 19 mmHg.   Weight daily.  I and O.  Avoid nephrotoxic agents, hypotension, hypovolemia  CRRT  over night.

## 2019-12-04 NOTE — SUBJECTIVE & OBJECTIVE
Interval History: Clot noted to have formed in venous line of HDC after placement overnight; however nursing was able to flush back and remove. CRRT currently on hold secondary to issue. Patient placed on biPAP early this am secondary to increased work of breathing; metabolic acidosis on ABG. Felt improvement after placement. Now weaned to 5L NC. Other VSS, AF overnight.    Medications:  Continuous Infusions:   sodium chloride 0.9% 3 mL/kg/hr (12/02/19 1348)    sodium chloride 0.9%      ceFAZolin 2 g/50mL Dextrose IVPB      heparin (porcine)      norepinephrine bitartrate-D5W Stopped (12/04/19 1015)     Scheduled Meds:   aspirin  81 mg Oral Daily    clopidogrel  75 mg Oral Daily    insulin aspart U-100  15 Units Subcutaneous TIDWM    insulin detemir U-100  40 Units Subcutaneous QHS    rosuvastatin  20 mg Oral Daily    sodium chloride 0.9%  3 mL/kg Intravenous Once     PRN Meds:sodium chloride, sodium chloride, sodium chloride, sodium chloride 0.9%, acetaminophen, albuterol-ipratropium, alteplase, ceFAZolin 2 g/50mL Dextrose IVPB, Dextrose 10% Bolus, Dextrose 10% Bolus, Dextrose 10% Bolus, Dextrose 10% Bolus, glucagon (human recombinant), glucose, glucose, heparin (porcine), heparin (porcine), insulin aspart U-100, magnesium sulfate IVPB, morphine, ondansetron, ondansetron, polyethylene glycol, sodium chloride 0.9%, sodium phosphate IVPB, sodium phosphate IVPB, sodium phosphate IVPB     Review of patient's allergies indicates:  No Known Allergies  Objective:     Vital Signs (Most Recent):  Temp: 97.1 °F (36.2 °C) (12/04/19 1101)  Pulse: 72 (12/04/19 1145)  Resp: (!) 21 (12/04/19 1145)  BP: (!) 144/64 (12/04/19 1130)  SpO2: 97 % (12/04/19 1145) Vital Signs (24h Range):  Temp:  [96.2 °F (35.7 °C)-98.4 °F (36.9 °C)] 97.1 °F (36.2 °C)  Pulse:  [60-97] 72  Resp:  [20-56] 21  SpO2:  [85 %-100 %] 97 %  BP: (100-174)/(51-74) 144/64  Arterial Line BP: ()/(36-59) 155/45     Weight: 109.7 kg (241 lb 13.5  oz)  Body mass index is 35.2 kg/m².    Intake/Output - Last 3 Shifts       12/02 0700 - 12/03 0659 12/03 0700 - 12/04 0659 12/04 0700 - 12/05 0659    P.O. 500      I.V. (mL/kg) 1492.8 (13.9) 400.1 (3.6)     Blood 577.5 331.3     Other 0      IV Piggyback 0 100     Total Intake(mL/kg) 2570.3 (23.9) 831.3 (7.6)     Urine (mL/kg/hr) 500 (0.2) 0 (0)     Emesis/NG output 0      Other 0      Stool 0 0     Total Output 500 0     Net +2070.3 +831.3            Urine Occurrence 0 x      Stool Occurrence 0 x 0 x     Emesis Occurrence 0 x            Physical Exam   Constitutional: He is oriented to person, place, and time. He appears well-nourished. No distress.   HENT: Right Internal Jugular HDC in place.  Head: Normocephalic and atraumatic.   Eyes: No scleral icterus.   Cardiovascular: Normal rate.   Pulmonary/Chest: No stridor. He is in respiratory distress.   Abdominal: Soft. There is no tenderness.   Musculoskeletal: He exhibits edema.   Lymphadenopathy:     He has no cervical adenopathy.   Neurological: He is alert and oriented to person, place, and time.   Skin: Skin is warm. No erythema.   Psychiatric: He has a normal mood and affect. His behavior is normal.     Significant Labs:  CBC:   Recent Labs   Lab 12/04/19  1102   WBC 14.26*   RBC 2.43*   HGB 7.0*   HCT 21.2*   *   MCV 87   MCH 28.8   MCHC 33.0     CMP:   Recent Labs   Lab 12/02/19  1303  12/04/19  1102   *  430*   < > 526*   CALCIUM 7.5*  7.5*   < > 8.0*   ALBUMIN 2.5*   < > 2.4*   PROT 4.3*  --   --      139   < > 130*   K 4.5  4.5   < > 5.8*   CO2 25  25   < > 20*     109   < > 99   BUN 17  17   < > 60*   CREATININE 1.2  1.2   < > 5.3*   ALKPHOS 22*  --   --    ALT 15  --   --    AST 17  --   --    BILITOT 0.3  --   --     < > = values in this interval not displayed.     Coagulation:   Recent Labs   Lab 12/03/19  1631   LABPROT 11.4   INR 1.1   APTT 24.2       Significant Diagnostics:  I have reviewed all pertinent imaging  results/findings within the past 24 hours.

## 2019-12-04 NOTE — NURSING
Pt began to become SOB and CRRT began to clot off. Stopped and riseback. Ndandu informed of pt presentation orders for stat abg placed and awaiting results of AM labs.

## 2019-12-04 NOTE — PROGRESS NOTES
Tn rounded with Cardiology and pt will return to cath lab again today. Family at bedside. Tn to continue to follow.

## 2019-12-04 NOTE — PROGRESS NOTES
ABG Results for WIL STAUFFER (MRN [id number]) as of 12/3/2019 20:08   Ref. Range 12/3/2019 19:55   POC PH Latest Ref Range: 7.35 - 7.45  7.352   POC PCO2 Latest Ref Range: 35 - 45 mmHg 33.2 (L)   POC PO2 Latest Ref Range: 80 - 100 mmHg 72 (L)   POC BE Latest Ref Range: -2 to 2 mmol/L -7   POC HCO3 Latest Ref Range: 24 - 28 mmol/L 18.4 (L)   POC SATURATED O2 Latest Ref Range: 95 - 100 % 94 (L)   POC TCO2 Latest Ref Range: 23 - 27 mmol/L 19 (L)   Sample Unknown ARTERIAL   DelSys Unknown Nasal Can   Allens Test Unknown N/A   Site Unknown [facility]

## 2019-12-04 NOTE — PROGRESS NOTES
ABg drawn and reported as noted in the flowsheet. MD directive is to place patient on Bipap. Patient on 10/5 50%. Patient parameters documented in the flowsheet. Alarms are on and functioning. Will continue to monitor.

## 2019-12-04 NOTE — NURSING TRANSFER
Nursing Transfer Note      12/4/2019     Transfer To: Cath lab    Transfer via bed    Transfer with cardiac monitoring    Transported by Cath lab team    Chart send with patient: Yes    Notified: daughter

## 2019-12-04 NOTE — PROGRESS NOTES
[facility]  General Surgery  Progress Note    Subjective:     History of Present Illness:  74M with CAD, PAD admitted for extremity stent placement began having SOB, found to be fluid overloaded. Unresponisve to lasix. Basically no urine output all day. nephro consulted surgery for placement of dialysis line for CRT.     Post-Op Info:  Procedure(s) (LRB):  Re-look-peripheral (N/A)   2 Days Post-Op     Interval History: Clot noted to have formed in venous line of HDC after placement overnight; however nursing was able to flush back and remove. CRRT currently on hold secondary to issue. Patient placed on biPAP early this am secondary to increased work of breathing; metabolic acidosis on ABG. Felt improvement after placement. Now weaned to 5L NC. Other VSS, AF overnight.    Medications:  Continuous Infusions:   sodium chloride 0.9% 3 mL/kg/hr (12/02/19 1348)    sodium chloride 0.9%      ceFAZolin 2 g/50mL Dextrose IVPB      heparin (porcine)      norepinephrine bitartrate-D5W Stopped (12/04/19 1015)     Scheduled Meds:   aspirin  81 mg Oral Daily    clopidogrel  75 mg Oral Daily    insulin aspart U-100  15 Units Subcutaneous TIDWM    insulin detemir U-100  40 Units Subcutaneous QHS    rosuvastatin  20 mg Oral Daily    sodium chloride 0.9%  3 mL/kg Intravenous Once     PRN Meds:sodium chloride, sodium chloride, sodium chloride, sodium chloride 0.9%, acetaminophen, albuterol-ipratropium, alteplase, ceFAZolin 2 g/50mL Dextrose IVPB, Dextrose 10% Bolus, Dextrose 10% Bolus, Dextrose 10% Bolus, Dextrose 10% Bolus, glucagon (human recombinant), glucose, glucose, heparin (porcine), heparin (porcine), insulin aspart U-100, magnesium sulfate IVPB, morphine, ondansetron, ondansetron, polyethylene glycol, sodium chloride 0.9%, sodium phosphate IVPB, sodium phosphate IVPB, sodium phosphate IVPB     Review of patient's allergies indicates:  No Known Allergies  Objective:     Vital Signs (Most  Recent):  Temp: 97.1 °F (36.2 °C) (12/04/19 1101)  Pulse: 72 (12/04/19 1145)  Resp: (!) 21 (12/04/19 1145)  BP: (!) 144/64 (12/04/19 1130)  SpO2: 97 % (12/04/19 1145) Vital Signs (24h Range):  Temp:  [96.2 °F (35.7 °C)-98.4 °F (36.9 °C)] 97.1 °F (36.2 °C)  Pulse:  [60-97] 72  Resp:  [20-56] 21  SpO2:  [85 %-100 %] 97 %  BP: (100-174)/(51-74) 144/64  Arterial Line BP: ()/(36-59) 155/45     Weight: 109.7 kg (241 lb 13.5 oz)  Body mass index is 35.2 kg/m².    Intake/Output - Last 3 Shifts       12/02 0700 - 12/03 0659 12/03 0700 - 12/04 0659 12/04 0700 - 12/05 0659    P.O. 500      I.V. (mL/kg) 1492.8 (13.9) 400.1 (3.6)     Blood 577.5 331.3     Other 0      IV Piggyback 0 100     Total Intake(mL/kg) 2570.3 (23.9) 831.3 (7.6)     Urine (mL/kg/hr) 500 (0.2) 0 (0)     Emesis/NG output 0      Other 0      Stool 0 0     Total Output 500 0     Net +2070.3 +831.3            Urine Occurrence 0 x      Stool Occurrence 0 x 0 x     Emesis Occurrence 0 x            Physical Exam   Constitutional: He is oriented to person, place, and time. He appears well-nourished. No distress.   HENT: Right Internal Jugular HDC in place.  Head: Normocephalic and atraumatic.   Eyes: No scleral icterus.   Cardiovascular: Normal rate.   Pulmonary/Chest: No stridor. He is in respiratory distress.   Abdominal: Soft. There is no tenderness.   Musculoskeletal: He exhibits edema.   Lymphadenopathy:     He has no cervical adenopathy.   Neurological: He is alert and oriented to person, place, and time.   Skin: Skin is warm. No erythema.   Psychiatric: He has a normal mood and affect. His behavior is normal.     Significant Labs:  CBC:   Recent Labs   Lab 12/04/19  1102   WBC 14.26*   RBC 2.43*   HGB 7.0*   HCT 21.2*   *   MCV 87   MCH 28.8   MCHC 33.0     CMP:   Recent Labs   Lab 12/02/19  1303  12/04/19  1102   *  430*   < > 526*   CALCIUM 7.5*  7.5*   < > 8.0*   ALBUMIN 2.5*   < > 2.4*   PROT 4.3*  --   --      139   < >  130*   K 4.5  4.5   < > 5.8*   CO2 25  25   < > 20*     109   < > 99   BUN 17  17   < > 60*   CREATININE 1.2  1.2   < > 5.3*   ALKPHOS 22*  --   --    ALT 15  --   --    AST 17  --   --    BILITOT 0.3  --   --     < > = values in this interval not displayed.     Coagulation:   Recent Labs   Lab 12/03/19  1631   LABPROT 11.4   INR 1.1   APTT 24.2       Significant Diagnostics:  I have reviewed all pertinent imaging results/findings within the past 24 hours.    Assessment/Plan:     Type 2 diabetes mellitus with kidney complication, without long-term current use of insulin  Mr. Cy Rutherford is a 75 yo male presenting with JOSE; right IJ HDC placed at bedside yesterday.     Plan:   -Use HDC as needed. Please contact surgery with any questions.  -Rest of care per primary team.         Noemi Yousif MD  General Surgery  [facility]

## 2019-12-04 NOTE — PROGRESS NOTES
Patient states that he is SOB. Patient sat up in the bed. Expiratory wheezes are auscultated posteriorly. Wheezing is also noted in the upper airway. Patient is tachypneic and using accessory muscles. Breathing treatment is administered. Patient placed on 50% venti mask following treatment. He is breathless when asked how he is feeling. RN at the bedside. MD notified of patient's shortness of breath.

## 2019-12-04 NOTE — INTERVAL H&P NOTE
The patient has been examined and the H&P has been reviewed:    I concur with the findings and changes have been noted since the H&P was written: Continued decrease in H/H and will take back for re-look     Anesthesia/Surgery risks, benefits and alternative options discussed and understood by patient/family.          Active Hospital Problems    Diagnosis  POA    Type 2 diabetes mellitus with kidney complication, without long-term current use of insulin [E11.29]  Yes     Priority: Low      Resolved Hospital Problems   No resolved problems to display.

## 2019-12-04 NOTE — NURSING
One unit Blood transfusion complete. Starting CRRT, will wait until AM for cath lab per Dr. Barrios.

## 2019-12-04 NOTE — NURSING TRANSFER
Nursing Transfer Note      12/4/2019     Transfer To: 256    Transfer via bed    Transfer with  O2, cardiac monitoring    Transported by cath lab team    Chart send with patient: Yes    Patient reassessed at: 16:15    Upon arrival to floor: cardiac monitor applied, patient oriented to room, call bell in reach and bed in lowest position

## 2019-12-05 LAB
ABO + RH BLD: NORMAL
ALBUMIN SERPL BCP-MCNC: 2.2 G/DL (ref 3.5–5.2)
ALBUMIN SERPL BCP-MCNC: 2.3 G/DL (ref 3.5–5.2)
ALBUMIN SERPL BCP-MCNC: 2.4 G/DL (ref 3.5–5.2)
ALLENS TEST: ABNORMAL
ANION GAP SERPL CALC-SCNC: 10 MMOL/L (ref 8–16)
ANION GAP SERPL CALC-SCNC: 11 MMOL/L (ref 8–16)
ANION GAP SERPL CALC-SCNC: 9 MMOL/L (ref 8–16)
ANION GAP SERPL CALC-SCNC: 9 MMOL/L (ref 8–16)
BASOPHILS # BLD AUTO: 0 K/UL (ref 0–0.2)
BASOPHILS # BLD AUTO: 0.01 K/UL (ref 0–0.2)
BASOPHILS NFR BLD: 0 % (ref 0–1.9)
BASOPHILS NFR BLD: 0.1 % (ref 0–1.9)
BLD GP AB SCN CELLS X3 SERPL QL: NORMAL
BLD PROD TYP BPU: NORMAL
BLOOD UNIT EXPIRATION DATE: NORMAL
BLOOD UNIT TYPE CODE: 6200
BLOOD UNIT TYPE: NORMAL
BUN SERPL-MCNC: 60 MG/DL (ref 8–23)
BUN SERPL-MCNC: 62 MG/DL (ref 8–23)
BUN SERPL-MCNC: 62 MG/DL (ref 8–23)
BUN SERPL-MCNC: 63 MG/DL (ref 8–23)
CALCIUM SERPL-MCNC: 7.9 MG/DL (ref 8.7–10.5)
CALCIUM SERPL-MCNC: 8.1 MG/DL (ref 8.7–10.5)
CALCIUM SERPL-MCNC: 8.2 MG/DL (ref 8.7–10.5)
CALCIUM SERPL-MCNC: 8.4 MG/DL (ref 8.7–10.5)
CHLORIDE SERPL-SCNC: 101 MMOL/L (ref 95–110)
CHLORIDE SERPL-SCNC: 102 MMOL/L (ref 95–110)
CHLORIDE SERPL-SCNC: 102 MMOL/L (ref 95–110)
CHLORIDE SERPL-SCNC: 103 MMOL/L (ref 95–110)
CO2 SERPL-SCNC: 20 MMOL/L (ref 23–29)
CO2 SERPL-SCNC: 21 MMOL/L (ref 23–29)
CO2 SERPL-SCNC: 23 MMOL/L (ref 23–29)
CO2 SERPL-SCNC: 25 MMOL/L (ref 23–29)
CODING SYSTEM: NORMAL
CREAT SERPL-MCNC: 4.8 MG/DL (ref 0.5–1.4)
CREAT SERPL-MCNC: 5 MG/DL (ref 0.5–1.4)
CREAT SERPL-MCNC: 5 MG/DL (ref 0.5–1.4)
CREAT SERPL-MCNC: 5.1 MG/DL (ref 0.5–1.4)
DIFFERENTIAL METHOD: ABNORMAL
DISPENSE STATUS: NORMAL
EOSINOPHIL # BLD AUTO: 0 K/UL (ref 0–0.5)
EOSINOPHIL NFR BLD: 0 % (ref 0–8)
EOSINOPHIL NFR BLD: 0.1 % (ref 0–8)
EOSINOPHIL NFR BLD: 0.2 % (ref 0–8)
ERYTHROCYTE [DISTWIDTH] IN BLOOD BY AUTOMATED COUNT: 13.9 % (ref 11.5–14.5)
ERYTHROCYTE [DISTWIDTH] IN BLOOD BY AUTOMATED COUNT: 14.1 % (ref 11.5–14.5)
ERYTHROCYTE [DISTWIDTH] IN BLOOD BY AUTOMATED COUNT: 14.1 % (ref 11.5–14.5)
ERYTHROCYTE [DISTWIDTH] IN BLOOD BY AUTOMATED COUNT: 14.2 % (ref 11.5–14.5)
ERYTHROCYTE [DISTWIDTH] IN BLOOD BY AUTOMATED COUNT: 14.2 % (ref 11.5–14.5)
EST. GFR  (AFRICAN AMERICAN): 12 ML/MIN/1.73 M^2
EST. GFR  (AFRICAN AMERICAN): 13 ML/MIN/1.73 M^2
EST. GFR  (NON AFRICAN AMERICAN): 10 ML/MIN/1.73 M^2
EST. GFR  (NON AFRICAN AMERICAN): 11 ML/MIN/1.73 M^2
GLUCOSE SERPL-MCNC: 233 MG/DL (ref 70–110)
GLUCOSE SERPL-MCNC: 308 MG/DL (ref 70–110)
GLUCOSE SERPL-MCNC: 324 MG/DL (ref 70–110)
GLUCOSE SERPL-MCNC: 326 MG/DL (ref 70–110)
HCO3 UR-SCNC: 23.7 MMOL/L (ref 24–28)
HCT VFR BLD AUTO: 22.3 % (ref 40–54)
HCT VFR BLD AUTO: 22.6 % (ref 40–54)
HCT VFR BLD AUTO: 23.3 % (ref 40–54)
HCT VFR BLD AUTO: 25.3 % (ref 40–54)
HCT VFR BLD AUTO: 25.3 % (ref 40–54)
HGB BLD-MCNC: 7.5 G/DL (ref 14–18)
HGB BLD-MCNC: 7.6 G/DL (ref 14–18)
HGB BLD-MCNC: 7.8 G/DL (ref 14–18)
HGB BLD-MCNC: 8.5 G/DL (ref 14–18)
HGB BLD-MCNC: 8.5 G/DL (ref 14–18)
LYMPHOCYTES # BLD AUTO: 0.3 K/UL (ref 1–4.8)
LYMPHOCYTES # BLD AUTO: 0.4 K/UL (ref 1–4.8)
LYMPHOCYTES # BLD AUTO: 0.5 K/UL (ref 1–4.8)
LYMPHOCYTES # BLD AUTO: 0.5 K/UL (ref 1–4.8)
LYMPHOCYTES # BLD AUTO: 0.6 K/UL (ref 1–4.8)
LYMPHOCYTES NFR BLD: 2.8 % (ref 18–48)
LYMPHOCYTES NFR BLD: 3.2 % (ref 18–48)
LYMPHOCYTES NFR BLD: 3.5 % (ref 18–48)
LYMPHOCYTES NFR BLD: 3.7 % (ref 18–48)
LYMPHOCYTES NFR BLD: 4.5 % (ref 18–48)
MAGNESIUM SERPL-MCNC: 2.2 MG/DL (ref 1.6–2.6)
MCH RBC QN AUTO: 29.3 PG (ref 27–31)
MCH RBC QN AUTO: 29.3 PG (ref 27–31)
MCH RBC QN AUTO: 29.4 PG (ref 27–31)
MCH RBC QN AUTO: 29.6 PG (ref 27–31)
MCH RBC QN AUTO: 29.9 PG (ref 27–31)
MCHC RBC AUTO-ENTMCNC: 33.5 G/DL (ref 32–36)
MCHC RBC AUTO-ENTMCNC: 33.6 G/DL (ref 32–36)
MCV RBC AUTO: 87 FL (ref 82–98)
MCV RBC AUTO: 88 FL (ref 82–98)
MCV RBC AUTO: 89 FL (ref 82–98)
MONOCYTES # BLD AUTO: 0.8 K/UL (ref 0.3–1)
MONOCYTES # BLD AUTO: 0.9 K/UL (ref 0.3–1)
MONOCYTES NFR BLD: 6.3 % (ref 4–15)
MONOCYTES NFR BLD: 6.6 % (ref 4–15)
MONOCYTES NFR BLD: 6.7 % (ref 4–15)
MONOCYTES NFR BLD: 7 % (ref 4–15)
MONOCYTES NFR BLD: 8.4 % (ref 4–15)
NEUTROPHILS # BLD AUTO: 10.7 K/UL (ref 1.8–7.7)
NEUTROPHILS # BLD AUTO: 11.5 K/UL (ref 1.8–7.7)
NEUTROPHILS # BLD AUTO: 11.9 K/UL (ref 1.8–7.7)
NEUTROPHILS # BLD AUTO: 12.8 K/UL (ref 1.8–7.7)
NEUTROPHILS # BLD AUTO: 9.4 K/UL (ref 1.8–7.7)
NEUTROPHILS NFR BLD: 87.7 % (ref 38–73)
NEUTROPHILS NFR BLD: 88.4 % (ref 38–73)
NEUTROPHILS NFR BLD: 89.6 % (ref 38–73)
NEUTROPHILS NFR BLD: 90.4 % (ref 38–73)
NEUTROPHILS NFR BLD: 90.5 % (ref 38–73)
PCO2 BLDA: 43.1 MMHG (ref 35–45)
PH SMN: 7.35 [PH] (ref 7.35–7.45)
PHOSPHATE SERPL-MCNC: 4.8 MG/DL (ref 2.7–4.5)
PHOSPHATE SERPL-MCNC: 5.3 MG/DL (ref 2.7–4.5)
PHOSPHATE SERPL-MCNC: 5.4 MG/DL (ref 2.7–4.5)
PLATELET # BLD AUTO: 75 K/UL (ref 150–350)
PLATELET # BLD AUTO: 79 K/UL (ref 150–350)
PLATELET # BLD AUTO: 80 K/UL (ref 150–350)
PLATELET # BLD AUTO: 83 K/UL (ref 150–350)
PLATELET # BLD AUTO: 84 K/UL (ref 150–350)
PLATELET BLD QL SMEAR: ABNORMAL
PMV BLD AUTO: 10.4 FL (ref 9.2–12.9)
PMV BLD AUTO: 10.6 FL (ref 9.2–12.9)
PMV BLD AUTO: 10.7 FL (ref 9.2–12.9)
PMV BLD AUTO: 10.7 FL (ref 9.2–12.9)
PMV BLD AUTO: 11.2 FL (ref 9.2–12.9)
PO2 BLDA: 32 MMHG (ref 40–60)
POC BE: -2 MMOL/L
POC SATURATED O2: 59 % (ref 95–100)
POC TCO2: 25 MMOL/L (ref 24–29)
POCT GLUCOSE: 249 MG/DL (ref 70–110)
POCT GLUCOSE: 256 MG/DL (ref 70–110)
POCT GLUCOSE: 261 MG/DL (ref 70–110)
POCT GLUCOSE: 290 MG/DL (ref 70–110)
POCT GLUCOSE: 294 MG/DL (ref 70–110)
POCT GLUCOSE: 310 MG/DL (ref 70–110)
POTASSIUM SERPL-SCNC: 4.7 MMOL/L (ref 3.5–5.1)
POTASSIUM SERPL-SCNC: 4.8 MMOL/L (ref 3.5–5.1)
POTASSIUM SERPL-SCNC: 4.9 MMOL/L (ref 3.5–5.1)
POTASSIUM SERPL-SCNC: 5 MMOL/L (ref 3.5–5.1)
RBC # BLD AUTO: 2.53 M/UL (ref 4.6–6.2)
RBC # BLD AUTO: 2.54 M/UL (ref 4.6–6.2)
RBC # BLD AUTO: 2.66 M/UL (ref 4.6–6.2)
RBC # BLD AUTO: 2.89 M/UL (ref 4.6–6.2)
RBC # BLD AUTO: 2.9 M/UL (ref 4.6–6.2)
SAMPLE: ABNORMAL
SITE: ABNORMAL
SODIUM SERPL-SCNC: 133 MMOL/L (ref 136–145)
SODIUM SERPL-SCNC: 134 MMOL/L (ref 136–145)
SODIUM SERPL-SCNC: 134 MMOL/L (ref 136–145)
SODIUM SERPL-SCNC: 135 MMOL/L (ref 136–145)
TRANS ERYTHROCYTES VOL PATIENT: NORMAL ML
WBC # BLD AUTO: 10.76 K/UL (ref 3.9–12.7)
WBC # BLD AUTO: 11.83 K/UL (ref 3.9–12.7)
WBC # BLD AUTO: 13.1 K/UL (ref 3.9–12.7)
WBC # BLD AUTO: 13.27 K/UL (ref 3.9–12.7)
WBC # BLD AUTO: 14.25 K/UL (ref 3.9–12.7)

## 2019-12-05 PROCEDURE — 86704 HEP B CORE ANTIBODY TOTAL: CPT

## 2019-12-05 PROCEDURE — 63600175 PHARM REV CODE 636 W HCPCS: Performed by: INTERNAL MEDICINE

## 2019-12-05 PROCEDURE — 63600175 PHARM REV CODE 636 W HCPCS: Performed by: NURSE PRACTITIONER

## 2019-12-05 PROCEDURE — 82803 BLOOD GASES ANY COMBINATION: CPT

## 2019-12-05 PROCEDURE — 27202415 HC CARTRIDGE, CRRT

## 2019-12-05 PROCEDURE — 86850 RBC ANTIBODY SCREEN: CPT

## 2019-12-05 PROCEDURE — P9021 RED BLOOD CELLS UNIT: HCPCS

## 2019-12-05 PROCEDURE — 86920 COMPATIBILITY TEST SPIN: CPT

## 2019-12-05 PROCEDURE — 94640 AIRWAY INHALATION TREATMENT: CPT

## 2019-12-05 PROCEDURE — 25000242 PHARM REV CODE 250 ALT 637 W/ HCPCS: Performed by: INTERNAL MEDICINE

## 2019-12-05 PROCEDURE — 25000003 PHARM REV CODE 250: Performed by: INTERNAL MEDICINE

## 2019-12-05 PROCEDURE — 99291 CRITICAL CARE FIRST HOUR: CPT | Mod: ,,, | Performed by: INTERNAL MEDICINE

## 2019-12-05 PROCEDURE — 27000221 HC OXYGEN, UP TO 24 HOURS

## 2019-12-05 PROCEDURE — 25000003 PHARM REV CODE 250: Performed by: NURSE PRACTITIONER

## 2019-12-05 PROCEDURE — 97166 OT EVAL MOD COMPLEX 45 MIN: CPT

## 2019-12-05 PROCEDURE — 80069 RENAL FUNCTION PANEL: CPT | Mod: 91

## 2019-12-05 PROCEDURE — 63600175 PHARM REV CODE 636 W HCPCS: Mod: JG | Performed by: STUDENT IN AN ORGANIZED HEALTH CARE EDUCATION/TRAINING PROGRAM

## 2019-12-05 PROCEDURE — 20000000 HC ICU ROOM

## 2019-12-05 PROCEDURE — 83735 ASSAY OF MAGNESIUM: CPT | Mod: 91

## 2019-12-05 PROCEDURE — 36430 TRANSFUSION BLD/BLD COMPNT: CPT

## 2019-12-05 PROCEDURE — 80048 BASIC METABOLIC PNL TOTAL CA: CPT

## 2019-12-05 PROCEDURE — 99900035 HC TECH TIME PER 15 MIN (STAT)

## 2019-12-05 PROCEDURE — 94660 CPAP INITIATION&MGMT: CPT

## 2019-12-05 PROCEDURE — 87340 HEPATITIS B SURFACE AG IA: CPT

## 2019-12-05 PROCEDURE — 99291 PR CRITICAL CARE, E/M 30-74 MINUTES: ICD-10-PCS | Mod: ,,, | Performed by: INTERNAL MEDICINE

## 2019-12-05 PROCEDURE — 90945 DIALYSIS ONE EVALUATION: CPT

## 2019-12-05 PROCEDURE — 97535 SELF CARE MNGMENT TRAINING: CPT

## 2019-12-05 PROCEDURE — 80100014 HC HEMODIALYSIS 1:1

## 2019-12-05 PROCEDURE — 86706 HEP B SURFACE ANTIBODY: CPT

## 2019-12-05 PROCEDURE — 85025 COMPLETE CBC W/AUTO DIFF WBC: CPT | Mod: 91

## 2019-12-05 PROCEDURE — 94761 N-INVAS EAR/PLS OXIMETRY MLT: CPT

## 2019-12-05 RX ORDER — FUROSEMIDE 10 MG/ML
80 INJECTION INTRAMUSCULAR; INTRAVENOUS ONCE
Status: COMPLETED | OUTPATIENT
Start: 2019-12-05 | End: 2019-12-05

## 2019-12-05 RX ORDER — SODIUM CHLORIDE 9 MG/ML
INJECTION, SOLUTION INTRAVENOUS
Status: DISCONTINUED | OUTPATIENT
Start: 2019-12-05 | End: 2019-12-14

## 2019-12-05 RX ORDER — MUPIROCIN 20 MG/G
OINTMENT TOPICAL 2 TIMES DAILY
Status: DISPENSED | OUTPATIENT
Start: 2019-12-05 | End: 2019-12-10

## 2019-12-05 RX ORDER — SODIUM CHLORIDE 9 MG/ML
INJECTION, SOLUTION INTRAVENOUS ONCE
Status: COMPLETED | OUTPATIENT
Start: 2019-12-05 | End: 2019-12-09

## 2019-12-05 RX ADMIN — HEPARIN SODIUM 2300 UNITS: 1000 INJECTION, SOLUTION INTRAVENOUS; SUBCUTANEOUS at 12:12

## 2019-12-05 RX ADMIN — INSULIN ASPART 3 UNITS: 100 INJECTION, SOLUTION INTRAVENOUS; SUBCUTANEOUS at 08:12

## 2019-12-05 RX ADMIN — IPRATROPIUM BROMIDE AND ALBUTEROL SULFATE 3 ML: .5; 3 SOLUTION RESPIRATORY (INHALATION) at 12:12

## 2019-12-05 RX ADMIN — ROSUVASTATIN CALCIUM 20 MG: 10 TABLET, FILM COATED ORAL at 09:12

## 2019-12-05 RX ADMIN — FUROSEMIDE 80 MG: 10 INJECTION, SOLUTION INTRAVENOUS at 08:12

## 2019-12-05 RX ADMIN — HEPARIN SODIUM 2300 UNITS: 1000 INJECTION, SOLUTION INTRAVENOUS; SUBCUTANEOUS at 01:12

## 2019-12-05 RX ADMIN — INSULIN ASPART 3 UNITS: 100 INJECTION, SOLUTION INTRAVENOUS; SUBCUTANEOUS at 12:12

## 2019-12-05 RX ADMIN — MUPIROCIN: 20 OINTMENT TOPICAL at 09:12

## 2019-12-05 RX ADMIN — INSULIN ASPART 15 UNITS: 100 INJECTION, SOLUTION INTRAVENOUS; SUBCUTANEOUS at 12:12

## 2019-12-05 RX ADMIN — POLYETHYLENE GLYCOL 3350 17 G: 17 POWDER, FOR SOLUTION ORAL at 07:12

## 2019-12-05 RX ADMIN — FUROSEMIDE 10 MG/HR: 10 INJECTION, SOLUTION INTRAMUSCULAR; INTRAVENOUS at 11:12

## 2019-12-05 RX ADMIN — FUROSEMIDE 20 MG/HR: 10 INJECTION, SOLUTION INTRAMUSCULAR; INTRAVENOUS at 09:12

## 2019-12-05 RX ADMIN — MUPIROCIN: 20 OINTMENT TOPICAL at 12:12

## 2019-12-05 RX ADMIN — INSULIN ASPART 15 UNITS: 100 INJECTION, SOLUTION INTRAVENOUS; SUBCUTANEOUS at 08:12

## 2019-12-05 RX ADMIN — ONDANSETRON 4 MG: 2 INJECTION INTRAMUSCULAR; INTRAVENOUS at 12:12

## 2019-12-05 RX ADMIN — ALTEPLASE 1.2 MG: 2.2 INJECTION, POWDER, LYOPHILIZED, FOR SOLUTION INTRAVENOUS at 09:12

## 2019-12-05 RX ADMIN — INSULIN ASPART 3 UNITS: 100 INJECTION, SOLUTION INTRAVENOUS; SUBCUTANEOUS at 04:12

## 2019-12-05 RX ADMIN — INSULIN DETEMIR 40 UNITS: 100 INJECTION, SOLUTION SUBCUTANEOUS at 09:12

## 2019-12-05 RX ADMIN — INSULIN ASPART 15 UNITS: 100 INJECTION, SOLUTION INTRAVENOUS; SUBCUTANEOUS at 04:12

## 2019-12-05 RX ADMIN — IPRATROPIUM BROMIDE AND ALBUTEROL SULFATE 3 ML: .5; 3 SOLUTION RESPIRATORY (INHALATION) at 07:12

## 2019-12-05 NOTE — PROGRESS NOTES
Progress Note  Nephrology      Consult Requested By: James Sanchez MD      SUBJECTIVE:     Overnight events  Patient is a 74 y.o. male     Patient Active Problem List   Diagnosis    Type 2 diabetes mellitus with kidney complication, without long-term current use of insulin    Essential hypertension    Hyperlipidemia    CAD (coronary artery disease)    Status post coronary artery stent placement    Acute MI anterior wall first episode care    Acute coronary syndrome    PAD (peripheral artery disease)    History of colon cancer    Intracranial atherosclerosis    History of ischemic vertebrobasilar artery brainstem stroke    Ataxic hemiparesis    Research subject    Right sided weakness    Impaired balance as late effect of cerebrovascular accident    Abnormality of gait as late effect of cerebrovascular accident (CVA)    Tightness of right gastrocnemius muscle    Claudication    Hypotension    Anemia    Edema    Shortness of breath    Leukocytosis    JOSE (acute kidney injury)    Hyperkalemia     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    Type 2 diabetes mellitus with kidney complication, without long-term current use of insulin 11/15/2012              OBJECTIVE:     Vitals:    12/05/19 0900 12/05/19 0915 12/05/19 0930 12/05/19 0945   BP: 133/60  (!) 129/58    BP Location:       Patient Position:       Pulse: 96 93 92 91   Resp: (!) 24 (!) 23 (!) 23 (!) 23   Temp:       TempSrc:       SpO2: (!) 94% 99% 100% 100%   Weight:       Height:           Temp: 98.1 °F (36.7 °C) (12/05/19 0730)  Pulse: 91 (12/05/19 0945)  Resp: (!) 23 (12/05/19 0945)  BP: (!) 129/58 (12/05/19 0930)  SpO2: 100 % (12/05/19 0945)    Date 12/05/19 0700 - 12/06/19 0659   Shift [id number] 6032-3788  4188-1977 24 Hour Total   INTAKE   P.O. 400   400   Shift Total(mL/kg) 400(3.6)   400(3.6)   OUTPUT   Urine(mL/kg/hr) 43   43   Other 379   379   Shift Total(mL/kg) 422(3.8)   422(3.8)   Weight (kg) 109.8 109.8 109.8 109.8             Medications:   insulin aspart U-100  15 Units Subcutaneous TIDWM    insulin detemir U-100  40 Units Subcutaneous QHS    rosuvastatin  20 mg Oral Daily    sodium chloride 0.9%  3 mL/kg Intravenous Once      sodium chloride 0.9% 3 mL/kg/hr (12/02/19 1348)    ceFAZolin 2 g/50mL Dextrose IVPB      furosemide (LASIX) 2 mg/mL infusion (non-titrating)      heparin (porcine)      norepinephrine bitartrate-D5W Stopped (12/04/19 2200)               Physical Exam:  General appearance:NAD  No CP  No cough  SOB   Lungs: diminished breathsounds  Heart: pulse 91  Abdomen: soft  Extremities: edema      Laboratory:  ABG  Labs reviewed  Recent Results (from the past 336 hour(s))   Basic metabolic panel    Collection Time: 12/05/19  8:44 AM   Result Value Ref Range    Sodium 134 (L) 136 - 145 mmol/L    Potassium 4.7 3.5 - 5.1 mmol/L    Chloride 102 95 - 110 mmol/L    CO2 21 (L) 23 - 29 mmol/L    BUN, Bld 60 (H) 8 - 23 mg/dL    Creatinine 4.8 (H) 0.5 - 1.4 mg/dL    Calcium 8.4 (L) 8.7 - 10.5 mg/dL    Anion Gap 11 8 - 16 mmol/L   Basic metabolic panel    Collection Time: 12/04/19  5:41 PM   Result Value Ref Range    Sodium 131 (L) 136 - 145 mmol/L    Potassium 5.3 (H) 3.5 - 5.1 mmol/L    Chloride 100 95 - 110 mmol/L    CO2 20 (L) 23 - 29 mmol/L    BUN, Bld 63 (H) 8 - 23 mg/dL    Creatinine 5.4 (H) 0.5 - 1.4 mg/dL    Calcium 8.2 (L) 8.7 - 10.5 mg/dL    Anion Gap 11 8 - 16 mmol/L   Basic metabolic panel    Collection Time: 12/03/19  6:17 PM   Result Value Ref Range    Sodium 132 (L) 136 - 145 mmol/L    Potassium 4.9 3.5 - 5.1 mmol/L    Chloride 102 95 - 110 mmol/L    CO2 17 (L) 23 - 29 mmol/L    BUN, Bld 46 (H) 8 - 23 mg/dL    Creatinine 4.2 (H) 0.5 - 1.4 mg/dL    Calcium 7.7 (L) 8.7 - 10.5 mg/dL     Anion Gap 13 8 - 16 mmol/L     Recent Results (from the past 336 hour(s))   CBC auto differential    Collection Time: 12/05/19  7:40 AM   Result Value Ref Range    WBC 13.10 (H) 3.90 - 12.70 K/uL    Hemoglobin 8.5 (L) 14.0 - 18.0 g/dL    Hematocrit 25.3 (L) 40.0 - 54.0 %    Platelets 75 (L) 150 - 350 K/uL   CBC auto differential    Collection Time: 12/05/19  4:15 AM   Result Value Ref Range    WBC 14.25 (H) 3.90 - 12.70 K/uL    Hemoglobin 8.5 (L) 14.0 - 18.0 g/dL    Hematocrit 25.3 (L) 40.0 - 54.0 %    Platelets 80 (L) 150 - 350 K/uL   CBC auto differential    Collection Time: 12/04/19 11:29 PM   Result Value Ref Range    WBC 15.30 (H) 3.90 - 12.70 K/uL    Hemoglobin 7.4 (L) 14.0 - 18.0 g/dL    Hematocrit 22.0 (L) 40.0 - 54.0 %    Platelets 95 (L) 150 - 350 K/uL     Urinalysis  Recent Labs   Lab 12/04/19  1108   COLORU Yellow   SPECGRAV 1.015   PHUR 7.0   PROTEINUA 3+*   BACTERIA Few*   NITRITE Positive*   LEUKOCYTESUR 1+*   UROBILINOGEN 1.0   HYALINECASTS 0       Diagnostic Results:  X-Ray: Reviewed  US: Reviewed  Echo: Reviewed  ACCESS    ASSESSMENT/PLAN:   CT-         1. Large left renal subcapsular hematoma with surrounding retroperitoneal hemorrhage as discussed above.  No definite contrast extravasation seen to suggest active bleeding on delayed images.  2. Bilateral persistent renal nephrograms which may indicate acute kidney injury or ATN with multifocal areas of decreased parenchymal enhancement involving the left kidney.  3. Diminished flow within the left common femoral artery stent.       JOSE/CKD 3.   ATN.  Urine output 43 cc.  Corrected sodium approximately 137.  Blood sugar 308 mg/dl .   K 4.7.  Gap metabolic acidosis.  Corrected  Ca approximately 9.6.  Poor nutrition.  Albumin 2.4.  Anemia Hb 8.5  Blood pressure 129/58.  Echo-    1 - Normal left ventricular systolic function (EF 60-65%).     2 - Normal right ventricular systolic function .     3 - Normal left ventricular diastolic function.     4 -  The estimated PA systolic pressure is 19 mmHg.   Weight daily.  I and O.  Avoid nephrotoxic agents, hypotension, hypovolemia  CRRT  over night.  Lasix drip.  HD today.

## 2019-12-05 NOTE — NURSING
While in chair sleeping, patient had 17 irregular beats. Notified Dr. Fabian who is coming to bedside. Patient asymptomatic and back in NSR 90s.

## 2019-12-05 NOTE — SUBJECTIVE & OBJECTIVE
Review of Systems   Constitution: Negative for chills, decreased appetite, diaphoresis and fever.   Cardiovascular: Positive for dyspnea on exertion. Negative for chest pain, claudication, cyanosis, irregular heartbeat, leg swelling, near-syncope, orthopnea, palpitations, paroxysmal nocturnal dyspnea and syncope.   Respiratory: Negative for cough, hemoptysis, shortness of breath and wheezing.    Gastrointestinal: Positive for abdominal pain. Negative for bloating (left sided ), constipation, diarrhea, melena, nausea and vomiting.   Neurological: Negative for dizziness and weakness.     Objective:     Vital Signs (Most Recent):  Temp: 98.1 °F (36.7 °C) (12/05/19 0730)  Pulse: 91 (12/05/19 0945)  Resp: (!) 23 (12/05/19 0945)  BP: (!) 129/58 (12/05/19 0930)  SpO2: 100 % (12/05/19 0945) Vital Signs (24h Range):  Temp:  [96 °F (35.6 °C)-98.4 °F (36.9 °C)] 98.1 °F (36.7 °C)  Pulse:  [71-96] 91  Resp:  [0-33] 23  SpO2:  [81 %-100 %] 100 %  BP: (101-165)/(48-80) 129/58  Arterial Line BP: (124-184)/(38-60) 176/46     Weight: 109.8 kg (242 lb 1 oz)  Body mass index is 35.23 kg/m².     SpO2: 100 %  O2 Device (Oxygen Therapy): nasal cannula w/ humidification      Intake/Output Summary (Last 24 hours) at 12/5/2019 1005  Last data filed at 12/5/2019 0900  Gross per 24 hour   Intake 988.88 ml   Output 1147 ml   Net -158.12 ml       Lines/Drains/Airways     Central Venous Catheter Line                 Trialysis (Dialysis) Catheter 12/03/19 1659 right internal jugular 1 day          Drain                 Urethral Catheter 12/03/19 0445 16 Fr. 2 days                Physical Exam   Constitutional: He is oriented to person, place, and time. He appears well-developed and well-nourished. No distress.   Cardiovascular: Normal rate and regular rhythm. Exam reveals no gallop.   No murmur heard.  Pulmonary/Chest: Effort normal and breath sounds normal. Tachypnea noted. No respiratory distress. He has no wheezes.   Abdominal: Soft. Bowel  sounds are normal. He exhibits no distension. There is tenderness.   Neurological: He is alert and oriented to person, place, and time. Abnormal reflex: left side; no rebound tenderness    Skin: Skin is warm and dry.       Significant Labs:     Recent Labs   Lab 12/05/19  0740   WBC 13.10*   RBC 2.89*   HGB 8.5*   HCT 25.3*   PLT 75*   MCV 88   MCH 29.4   MCHC 33.6     Recent Labs   Lab 12/05/19  0415 12/05/19  0844   * 134*   K 5.0 4.7    102   CO2 20* 21*   BUN 62* 60*   CREATININE 5.0* 4.8*   MG 2.2  --        Significant Imaging: Echocardiogram:   Transthoracic echo (TTE) complete (Cupid Only):   Results for orders placed or performed during the hospital encounter of 12/02/19   Echo Color Flow Doppler? Yes   Result Value Ref Range    BSA 2.3 m2    TDI SEPTAL 0.06 m/s    LV LATERAL E/E' RATIO 9.00 m/s    LV SEPTAL E/E' RATIO 10.50 m/s    TDI LATERAL 0.07 m/s    PV PEAK VELOCITY 1.81 cm/s    LVIDD 4.80 3.5 - 6.0 cm    IVS 1.10 (A) 0.6 - 1.1 cm    PW 1.10 (A) 0.6 - 1.1 cm    Ao root annulus 3.40 cm    LVIDS 2.56 2.1 - 4.0 cm    FS 47 28 - 44 %    LV mass 193.96 g    LA size 3.68 cm    TAPSE 1.44 cm    Left Ventricle Relative Wall Thickness 0.46 cm    AV mean gradient 6 mmHg    AV valve area 4.31 cm2    AV Velocity Ratio 0.78     AV index (prosthetic) 1.06     E/A ratio 0.62     Mean e' 0.07 m/s    E wave decelartion time 338.80 msec    LVOT diameter 2.27 cm    LVOT area 4.0 cm2    LVOT peak simone 1.16 m/s    LVOT peak VTI 26.25 cm    Ao peak simone 1.48 m/s    Ao VTI 24.66 cm    LVOT stroke volume 106.18 cm3    AV peak gradient 9 mmHg    E/E' ratio 9.69 m/s    MV Peak E Simone 0.63 m/s    MV Peak A Simone 1.02 m/s    LV Systolic Volume 23.57 mL    LV Systolic Volume Index 10.6 mL/m2    LV Diastolic Volume 68.84 mL    LV Diastolic Volume Index 30.84 mL/m2    LV Mass Index 87 g/m2    Right Atrial Pressure (from IVC) 3 mmHg    Narrative    · Normal left ventricular systolic function. The estimated ejection   fraction  is 55%  · Concentric left ventricular remodeling.  · No wall motion abnormalities.  · Normal right ventricular systolic function.  · Normal central venous pressure (3 mm Hg).

## 2019-12-05 NOTE — SUBJECTIVE & OBJECTIVE
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      Current Outpatient Medications on File Prior to Encounter   Medication Sig    amLODIPine (NORVASC) 5 MG tablet Take 1 tablet (5 mg total) by mouth once daily.    aspirin (ECOTRIN) 81 MG EC tablet Take 81 mg by mouth once daily.    chlorthalidone (HYGROTEN) 25 MG Tab Take 1 tablet (25 mg total) by mouth once daily.    cilostazol (PLETAL) 50 MG Tab Take 1 tablet (50 mg total) by mouth 2 (two) times daily.    clopidogrel (PLAVIX) 75 mg tablet Take 1 tablet (75 mg total) by mouth once daily.    doxazosin (CARDURA) 8 MG Tab Take 1 tablet (8 mg total) by mouth every evening.    fenofibrate micronized (LOFIBRA) 134 MG Cap Take 1 capsule (134 mg total) by mouth nightly.    furosemide (LASIX) 20 MG tablet Take 1 tablet (20 mg total) by mouth once daily.    insulin glargine (LANTUS) 100 unit/mL injection Inject 60 Units into the skin every morning.    losartan (COZAAR) 50 MG tablet Take 1 tablet (50 mg total) by mouth 2 (two) times daily.    metoprolol succinate (TOPROL-XL) 100 MG 24 hr tablet Take 1 tablet (100 mg total) by mouth 2 (two) times daily.    multivitamin with minerals (ONE-A-DAY  MAXIMUM FORMULA ORAL) Take 1 tablet by mouth once daily.    NOVOLOG FLEXPEN U-100 INSULIN 100 unit/mL (3 mL) InPn pen INJECT 25 UNITS SUBCUTANEOUSLY WITH MEALS    omeprazole (PRILOSEC) 20 MG capsule     rosuvastatin (CRESTOR) 20 MG tablet Take 1 tablet (20 mg total) by mouth once daily.    fish oil-omega-3 fatty acids 300-1,000 mg capsule Take by mouth once daily.    insulin lispro (HUMALOG) 100 unit/mL injection Inject into the skin 3 (three) times daily before meals.    metFORMIN (GLUCOPHAGE) 500 MG tablet Take 500 mg by mouth 2 (two) times daily with meals.     Family History     Problem Relation (Age of Onset)    Colon polyps Sister    No Known Problems Mother, Father        Tobacco Use    Smoking status: Never Smoker    Smokeless tobacco: Never Used   Substance and Sexual Activity    Alcohol use: No    Drug use: No    Sexual activity: Not Currently     Review of Systems   Constitutional: Negative for activity change, chills, diaphoresis and fatigue.   HENT: Negative for congestion and nosebleeds.    Genitourinary: Negative for difficulty urinating.   Neurological: Negative for dizziness, seizures, light-headedness, numbness and headaches.     Objective:     Vital Signs (Most Recent):  Temp: 97.8 °F (36.6 °C) (12/04/19 1827)  Pulse: 83 (12/04/19 1827)  Resp: (!) 21 (12/04/19 1827)  BP: (!) 164/55 (12/04/19 1827)  SpO2: 98 % (12/04/19 1827) Vital Signs (24h Range):  Temp:  [96.2 °F (35.7 °C)-98.4 °F (36.9 °C)] 97.8 °F (36.6 °C)  Pulse:  [68-97] 83  Resp:  [18-35] 21  SpO2:  [85 %-100 %] 98 %  BP: (114-174)/(55-74) 164/55  Arterial Line BP: (120-178)/(40-54) 168/54     Weight: 109.7 kg (241 lb 13.5 oz)  Body mass index is 35.2 kg/m².    Physical Exam   Constitutional: He is oriented to person, place, and time. He appears well-developed and well-nourished.   HENT:   Head: Normocephalic and atraumatic.   Eyes: EOM are normal.   Neck: Normal range of motion. Neck supple.   Cardiovascular: Normal rate.    Pulmonary/Chest: Effort normal.   Abdominal: Soft.   Musculoskeletal: Normal range of motion. He exhibits no deformity.   Neurological: He is alert and oriented to person, place, and time.   Skin: Skin is warm and dry.   Psychiatric: He has a normal mood and affect. His behavior is normal. Judgment and thought content normal.       Significant Labs:   Recent Labs   Lab 12/04/19  0739 12/04/19  1102 12/04/19  1605   WBC 18.04* 14.26* 12.87*   HGB 7.8* 7.0* 6.6*   HCT 23.2* 21.2* 19.9*   * 118* 115*     Recent Labs   Lab 12/03/19  1444  12/04/19  0016 12/04/19  0403 12/04/19  1102   *   < > 131* 132* 130*   K 5.0   < > 4.9 4.8 5.8*      < > 101 102 99   CO2 16*   < > 17* 19* 20*   BUN 44*   < > 51* 49* 60*   CREATININE 3.9*   < > 4.5* 4.5* 5.3*   *   < > 475* 432* 526*   CALCIUM 7.6*   < > 7.7* 8.0* 8.0*   MG 1.6  --  1.8  --  2.0   PHOS 5.7*  --  6.1* 6.9* 6.8*    < > = values in this interval not displayed.     Recent Labs   Lab 12/02/19  1303 12/03/19  1631 12/04/19  0016 12/04/19  0403 12/04/19  1102   ALKPHOS 22*  --   --   --   --    ALT 15  --   --   --   --    AST 17  --   --   --   --    ALBUMIN 2.5*  --  2.5* 2.6* 2.4*   PROT 4.3*  --   --   --   --    BILITOT 0.3  --   --   --   --    INR  --  1.1  --   --   --       No results for input(s): CPK, CPKMB, MB, TROPONINI in the last 72 hours.  Recent Labs   Lab 12/03/19  1146 12/03/19  1815 12/03/19  1958 12/04/19  0219 12/04/19  0748 12/04/19  1748   POCTGLUCOSE 444* 428* 441* 381* 353* 476*     Hemoglobin A1C   Date Value Ref Range Status   12/02/2019 5.6 4.0 - 5.6 % Final     Comment:     ADA Screening Guidelines:  5.7-6.4%  Consistent with prediabetes  >or=6.5%  Consistent with diabetes  High levels of fetal hemoglobin interfere with the HbA1C  assay. Heterozygous hemoglobin variants (HbS, HgC, etc)do  not significantly interfere with this assay.   However, presence of multiple variants may affect accuracy.     05/08/2018 7.7 (H)  4.0 - 5.6 % Final     Comment:     According to ADA guidelines, hemoglobin A1c <7.0% represents  optimal control in non-pregnant diabetic patients. Different  metrics may apply to specific patient populations.   Standards of Medical Care in Diabetes-2016.  For the purpose of screening for the presence of diabetes:  <5.7%     Consistent with the absence of diabetes  5.7-6.4%  Consistent with increasing risk for diabetes   (prediabetes)  >or=6.5%  Consistent with diabetes  Currently, no consensus exists for use of hemoglobin A1c  for diagnosis of diabetes for children.  This Hemoglobin A1c assay has significant interference with fetal   hemoglobin   (HbF). The results are invalid for patients with abnormal amounts of   HbF,   including those with known Hereditary Persistence   of Fetal Hemoglobin. Heterozygous hemoglobin variants (HbAS, HbAC,   HbAD, HbAE, HbA2) do not significantly interfere with this assay;   however, presence of multiple variants in a sample may impact the %   interference.     06/09/2011 7.9 (H) 4.0 - 6.2 % Final     Scheduled Meds:   aspirin  81 mg Oral Daily    clopidogrel  75 mg Oral Daily    insulin aspart U-100  15 Units Subcutaneous TIDWM    insulin detemir U-100  40 Units Subcutaneous QHS    rosuvastatin  20 mg Oral Daily    sodium chloride 0.9%  3 mL/kg Intravenous Once     Continuous Infusions:   sodium chloride 0.9% 3 mL/kg/hr (12/02/19 1348)    sodium chloride 0.9%      ceFAZolin 2 g/50mL Dextrose IVPB      heparin (porcine)      norepinephrine bitartrate-D5W 0.04 mcg/kg/min (12/04/19 1800)     As Needed: sodium chloride, sodium chloride, sodium chloride, sodium chloride 0.9%, acetaminophen, albuterol-ipratropium, alteplase, ceFAZolin 2 g/50mL Dextrose IVPB, Dextrose 10% Bolus, Dextrose 10% Bolus, Dextrose 10% Bolus, Dextrose 10% Bolus, glucagon (human recombinant), glucose, glucose, heparin (porcine), heparin (porcine), insulin aspart U-100, magnesium sulfate IVPB, morphine,  ondansetron, ondansetron, polyethylene glycol, sodium chloride 0.9%, sodium phosphate IVPB, sodium phosphate IVPB, sodium phosphate IVPB    Significant Imaging: will review

## 2019-12-05 NOTE — PLAN OF CARE
Patient afebrile throughout shift. AAO x 4. NSR 90s except for one run of ectopy. BP stable. Currently on 2L nc. Output minimal through rubio. Accucheks performed as ordered with SSI given as ordered. Patient was up in chair from 3 PM-4:15 PM.

## 2019-12-05 NOTE — PT/OT/SLP PROGRESS
Physical Therapy missed Visit Note      Patient Name:  Cy Rutherford   MRN:  [id number]    Patient not seen today secondary to HD. Will follow-up as available.    Portia Flores PT, DPT  12/5/2019

## 2019-12-05 NOTE — NURSING
Pt only received 1 hour of HD. C/o SOB and nausea, Dr. Watts notified and arrived bedside. CVC positional and only works while patient lying supine with HOB slightly elevated 15-30 degrees. Unable to return pt's blood 2/2 clot in venous line.

## 2019-12-05 NOTE — CONSULTS
[facility] Hospital Medicine  Consult Note    Patient Name: Cy Rutherford  MRN: [id number]  Admission Date: 12/2/2019  Hospital Length of Stay: 1 days  Attending Physician: Nehemias Gu DO  Primary Care Provider: Heide Porter MD           Patient information was obtained from patient and ER records.     Consults  Subjective:     Principal Problem: <principal problem not specified>    Chief Complaint: No chief complaint on file.       HPI:   Admitted for elective LE angiogram for evaluation of RLE claudication. Taken to the cath lab for the procedure via LCFA access with following results:     successful atherectomy (Hawk LX) with distal filter protection, followed by PTA with scoring balloon (5.0 x 150 dSFA, 6.0 x 150 pSFA/CFA) followed by PTA with DCB to dSFA (5.0 x 150), pSFA (5.0 x 100) and CFA (7 x 40) with good results. 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 He has elevated BG, no anion gap.  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      Current Outpatient Medications on File Prior to Encounter   Medication Sig    amLODIPine (NORVASC) 5 MG tablet Take 1 tablet (5 mg total) by mouth once daily.    aspirin (ECOTRIN) 81 MG EC tablet Take 81 mg by mouth once daily.    chlorthalidone (HYGROTEN) 25 MG Tab Take 1 tablet (25 mg total) by mouth once daily.    cilostazol (PLETAL) 50 MG Tab Take 1 tablet (50 mg total) by mouth 2 (two) times daily.    clopidogrel (PLAVIX) 75 mg tablet Take 1 tablet (75 mg total) by mouth once daily.    doxazosin (CARDURA) 8 MG Tab Take 1 tablet (8 mg total) by mouth every evening.    fenofibrate micronized (LOFIBRA) 134 MG Cap Take 1 capsule (134 mg total) by mouth nightly.    furosemide (LASIX) 20 MG tablet Take 1 tablet (20 mg total) by mouth once daily.    insulin glargine (LANTUS) 100 unit/mL injection Inject 60 Units into the skin every morning.    losartan (COZAAR) 50 MG tablet Take 1 tablet (50 mg total) by mouth 2 (two) times daily.    metoprolol succinate (TOPROL-XL) 100 MG 24 hr tablet Take 1 tablet (100 mg total) by mouth 2 (two) times daily.    multivitamin with minerals (ONE-A-DAY MAXIMUM FORMULA ORAL) Take 1 tablet by mouth once daily.    NOVOLOG FLEXPEN U-100 INSULIN 100 unit/mL (3 mL) InPn pen INJECT 25 UNITS SUBCUTANEOUSLY WITH MEALS    omeprazole (PRILOSEC) 20 MG capsule     rosuvastatin (CRESTOR) 20 MG tablet Take 1 tablet (20 mg total) by mouth once daily.    fish oil-omega-3 fatty acids 300-1,000 mg capsule Take by mouth once daily.    insulin lispro (HUMALOG) 100 unit/mL injection  Inject into the skin 3 (three) times daily before meals.    metFORMIN (GLUCOPHAGE) 500 MG tablet Take 500 mg by mouth 2 (two) times daily with meals.     Family History     Problem Relation (Age of Onset)    Colon polyps Sister    No Known Problems Mother, Father        Tobacco Use    Smoking status: Never Smoker    Smokeless tobacco: Never Used   Substance and Sexual Activity    Alcohol use: No    Drug use: No    Sexual activity: Not Currently     Review of Systems   Constitutional: Negative for activity change, chills, diaphoresis and fatigue.   HENT: Negative for congestion and nosebleeds.    Genitourinary: Negative for difficulty urinating.   Neurological: Negative for dizziness, seizures, light-headedness, numbness and headaches.     Objective:     Vital Signs (Most Recent):  Temp: 97.8 °F (36.6 °C) (12/04/19 1827)  Pulse: 83 (12/04/19 1827)  Resp: (!) 21 (12/04/19 1827)  BP: (!) 164/55 (12/04/19 1827)  SpO2: 98 % (12/04/19 1827) Vital Signs (24h Range):  Temp:  [96.2 °F (35.7 °C)-98.4 °F (36.9 °C)] 97.8 °F (36.6 °C)  Pulse:  [68-97] 83  Resp:  [18-35] 21  SpO2:  [85 %-100 %] 98 %  BP: (114-174)/(55-74) 164/55  Arterial Line BP: (120-178)/(40-54) 168/54     Weight: 109.7 kg (241 lb 13.5 oz)  Body mass index is 35.2 kg/m².    Physical Exam   Constitutional: He is oriented to person, place, and time. He appears well-developed and well-nourished.   HENT:   Head: Normocephalic and atraumatic.   Eyes: EOM are normal.   Neck: Normal range of motion. Neck supple.   Cardiovascular: Normal rate.   Pulmonary/Chest: Effort normal.   Abdominal: Soft.   Musculoskeletal: Normal range of motion. He exhibits no deformity.   Neurological: He is alert and oriented to person, place, and time.   Skin: Skin is warm and dry.   Psychiatric: He has a normal mood and affect. His behavior is normal. Judgment and thought content normal.       Significant Labs:   Recent Labs   Lab 12/04/19  0739 12/04/19  1102 12/04/19  1605   WBC  18.04* 14.26* 12.87*   HGB 7.8* 7.0* 6.6*   HCT 23.2* 21.2* 19.9*   * 118* 115*     Recent Labs   Lab 12/03/19  1444  12/04/19  0016 12/04/19  0403 12/04/19  1102   *   < > 131* 132* 130*   K 5.0   < > 4.9 4.8 5.8*      < > 101 102 99   CO2 16*   < > 17* 19* 20*   BUN 44*   < > 51* 49* 60*   CREATININE 3.9*   < > 4.5* 4.5* 5.3*   *   < > 475* 432* 526*   CALCIUM 7.6*   < > 7.7* 8.0* 8.0*   MG 1.6  --  1.8  --  2.0   PHOS 5.7*  --  6.1* 6.9* 6.8*    < > = values in this interval not displayed.     Recent Labs   Lab 12/02/19  1303 12/03/19  1631 12/04/19  0016 12/04/19  0403 12/04/19  1102   ALKPHOS 22*  --   --   --   --    ALT 15  --   --   --   --    AST 17  --   --   --   --    ALBUMIN 2.5*  --  2.5* 2.6* 2.4*   PROT 4.3*  --   --   --   --    BILITOT 0.3  --   --   --   --    INR  --  1.1  --   --   --       No results for input(s): CPK, CPKMB, MB, TROPONINI in the last 72 hours.  Recent Labs   Lab 12/03/19  1146 12/03/19  1815 12/03/19  1958 12/04/19  0219 12/04/19  0748 12/04/19  1748   POCTGLUCOSE 444* 428* 441* 381* 353* 476*     Hemoglobin A1C   Date Value Ref Range Status   12/02/2019 5.6 4.0 - 5.6 % Final     Comment:     ADA Screening Guidelines:  5.7-6.4%  Consistent with prediabetes  >or=6.5%  Consistent with diabetes  High levels of fetal hemoglobin interfere with the HbA1C  assay. Heterozygous hemoglobin variants (HbS, HgC, etc)do  not significantly interfere with this assay.   However, presence of multiple variants may affect accuracy.     05/08/2018 7.7 (H) 4.0 - 5.6 % Final     Comment:     According to ADA guidelines, hemoglobin A1c <7.0% represents  optimal control in non-pregnant diabetic patients. Different  metrics may apply to specific patient populations.   Standards of Medical Care in Diabetes-2016.  For the purpose of screening for the presence of diabetes:  <5.7%     Consistent with the absence of diabetes  5.7-6.4%  Consistent with increasing risk for diabetes    (prediabetes)  >or=6.5%  Consistent with diabetes  Currently, no consensus exists for use of hemoglobin A1c  for diagnosis of diabetes for children.  This Hemoglobin A1c assay has significant interference with fetal   hemoglobin   (HbF). The results are invalid for patients with abnormal amounts of   HbF,   including those with known Hereditary Persistence   of Fetal Hemoglobin. Heterozygous hemoglobin variants (HbAS, HbAC,   HbAD, HbAE, HbA2) do not significantly interfere with this assay;   however, presence of multiple variants in a sample may impact the %   interference.     06/09/2011 7.9 (H) 4.0 - 6.2 % Final     Scheduled Meds:   aspirin  81 mg Oral Daily    clopidogrel  75 mg Oral Daily    insulin aspart U-100  15 Units Subcutaneous TIDWM    insulin detemir U-100  40 Units Subcutaneous QHS    rosuvastatin  20 mg Oral Daily    sodium chloride 0.9%  3 mL/kg Intravenous Once     Continuous Infusions:   sodium chloride 0.9% 3 mL/kg/hr (12/02/19 1348)    sodium chloride 0.9%      ceFAZolin 2 g/50mL Dextrose IVPB      heparin (porcine)      norepinephrine bitartrate-D5W 0.04 mcg/kg/min (12/04/19 1800)     As Needed: sodium chloride, sodium chloride, sodium chloride, sodium chloride 0.9%, acetaminophen, albuterol-ipratropium, alteplase, ceFAZolin 2 g/50mL Dextrose IVPB, Dextrose 10% Bolus, Dextrose 10% Bolus, Dextrose 10% Bolus, Dextrose 10% Bolus, glucagon (human recombinant), glucose, glucose, heparin (porcine), heparin (porcine), insulin aspart U-100, magnesium sulfate IVPB, morphine, ondansetron, ondansetron, polyethylene glycol, sodium chloride 0.9%, sodium phosphate IVPB, sodium phosphate IVPB, sodium phosphate IVPB    Significant Imaging: will review      Assessment/Plan:     Type 2 diabetes mellitus with kidney complication, without long-term current use of insulin  No AG   will try to treat with sq for now      levemir 50 units sq BID  aspart 15 units WM  accu check q 2 hours      VTE Risk  Mitigation (From admission, onward)         Ordered     heparin (porcine) injection 2,300 Units  As needed (PRN)      12/04/19 1137     heparin infusion 1,000 units/500 ml in 0.9% NaCl (pressure line flush)  Intra-op continuous PRN      12/02/19 0932                Critical care time spent on the evaluation and treatment of severe organ dysfunction, review of pertinent labs and imaging studies, discussions with consulting providers and discussions with patient/family: 43 minutes.    Thank you for your consult. I will follow-up with patient. Please contact us if you have any additional questions.    Nehemias Gu DO  Department of Hospital Medicine   [facility]

## 2019-12-05 NOTE — NURSING
Having alarm access problems with CRRT - unable to return blood to patient. Therapy stopped at 0020. Notified on call nephrology. Pt has been off of Levo since beginning of shift. SBP has been in 140-160's, DBP 50-40. x1 unit of pRBC given and currently hanging the second unit. Discussion made whether pt can tolerate HD since having problems with CRRT and access pressures. Decision made to restart CRRT; if still now working - notified MD, Dr. Martin. Will continue to monitor.

## 2019-12-05 NOTE — PHYSICIAN QUERY
PT Name: Cy Rutherford  MR #: [id number]    Physician Query Form - Perfusion Diagnosis Clarification     CDS/: Lesvia Mcconnell RN, CCDS             Contact information: [email or web address]  This form is a permanent document in the medical record.     Query Date: December 5, 2019    By submitting this query, we are merely seeking further clarification of documentation. Please utilize your independent clinical judgment when addressing the question(s) below.    The medical record contains the following:    Indicators   Supporting Clinical Findings   Location in Medical Record   X Acute Illness (e.g. AMI, Sepsis, etc.) US - retroperitoneal hematoma.    Bleeding due to subscapular renal hematoma.     ATN 12/3 nephro    12/5 prog note      12/3 nephro   X Acidosis documented Gap metabolic acidosis.     ABGs / Labs      Vital Signs     X Hypotension or Low Blood Pressure documented Called to patient's bedside due to hypotension, back pain, and diaphoresis. No hematoma noted, however, concern for bleeding  12/2 MD note    Altered Mental Status or Confusion     X Diaphoresis, Cold Extremities or Cyanosis @ 1227 pt became diaphoretic, sbp 80's-90's 12/2 nurse note   X Oliguria UOP of 0 mL. 12/2 nurse note   X Medication/Treatment:  -Vasopressors  -Inotropic Drugs  -IV Fluids  -Cardiac Assist Devices  -Hemodynamic Monitoring  -Blood/Blood Products total of 2 uPRBCs, 250 mL of NS, and almost a liter of volume from Levophed with now no UOP    Serial H/H  Aortogram today to identify bleeding source    Continue IV Levophed wean as BP tolerates    -additional PRBCs given with trend up of H&H to 7.4&22.0 and 8.5&25.3 this AM    BP stable overnight with successful wean of Levophed overnight 12/3 nurse note           12/3 NP note        12/3 NP note      12/5 prog note    Other:       Provider, please specify diagnosis or diagnoses associated with above clinical findings.    [ x  ] Hemorrhagic Shock   [   ] Hypovolemic Shock   [    ] Other Shock (please specify):   [   ] Shock Unspecified   [   ] Other Condition (please specify):   [  ] Clinically Undetermined         Please document in your progress notes daily for the duration of treatment until resolved and include in your discharge summary.

## 2019-12-05 NOTE — PROGRESS NOTES
[facility]  Cardiology  Progress Note    Patient Name: Cy Rutherford  MRN: [id number]  Admission Date: 12/2/2019  Hospital Length of Stay: 2 days  Code Status: Full Code   Attending Physician: James Sanchez MD   Primary Care Physician: Heide Porter MD  Expected Discharge Date:   Principal Problem:<principal problem not specified>    Subjective:     Hospital Course:   12/2/2019 Admitted for elective LE angiogram for evaluation of RLE claudication. Taken to the cath lab for the procedure via LCFA access with following results:    successful atherectomy (Hawk LX) with distal filter protection, followed by PTA with scoring balloon (5.0 x 150 dSFA, 6.0 x 150 pSFA/CFA) followed by PTA with DCB to dSFA (5.0 x 150), pSFA (5.0 x 100) and CFA (7 x 40) with good results. Successful PTA to TPT with 3.0 x 40 balloon- see cath report for full report     Tolerated procedure well and recovered in pre post cath area. Hypotension noted along with back pain and diaphoresis. No hematoma noted and manual pressure applied out of concern for bleeding. STAT H&H down to 7.4&24.1 from 11.6&36.1. Given profound symptoms, major concern for bleeding prompting re evaluation with recurrent angiogram. Placed on IVFs along with IV Levophed. Repeat procedure via right radial access with images  in multiple views with no active bleed noted. Cuff pressure with significant difference in comparison to  aortic pressure which was significantly higher.  Admitted to ICU for close monitoring with kanchan placed. T&S with PRBC transfusion initiated with IV Lasix given in between   12/3/2019 Remain on IV Levophed drip at .46mcg/kg/min with BP 110s-130s/40s-50s HR 60s. Serial H&Hs Q4hrs overnight with  H&H this AM 10.0&30.2 with slight trend down to 9.1&27.5 on repeat check. Complains of SOB with increased RR. Only 500cc out overnight. Will repeat IV Lasix 40mg this AM. Echocardiogram and CXR as well. Will attempt to wean IV Levophed  drip as BP tolerates   12/4/2019 Creatinine trended up to 3.7 overnight with K+ 6.1. Continued with no urine output. Nephrology consulted yesterday with trialysis catheter placed yesterday with initiation of CRRT overnight. CRRT x 2-3 hours prior to clotting off. H&H trended down further to 7.3&21.8 with repeat PRBC transfusion. H&H trended up to 8.4&24.9 with continued serial monitoring with recurrent drop to 7.0&21.2. Concerned about recurrent bleeding with plans for repeat angiogram for re evaluation of acute bleed. Remains on IV Levophed with stable BP and wean in process. Complains of mild SOB with stable sats on Venti mask.   12/5/2019 Repeat angiogram yesterday using CO2 with no active bleed noted after extensive angiogram. H&H down 6.6&18.9 yesterday evening with repeat PRBC. CT abdomen/pelvis with evidence of large left renal subcapsular hematoma with surrounding retroperitoneal hemorrhage and no definite contrast extravasation seen to suggest active bleeding on delayed images. H&H up to 7.4&22.0-8.5&25.3. Weaned off IV Levophed with stable BP. Resumed CRRT yesterday evening with 695cc removed and 30cc urine output noted +2.7 liters. Continued mild SOB with stable sats on venti mask. BMP with K+ 5.0 BUN 62 creatinine 5.0. Will continue with serial H&Hs for now. Will place TEDs and consult PT         Review of Systems   Constitution: Negative for chills, decreased appetite, diaphoresis and fever.   Cardiovascular: Positive for dyspnea on exertion. Negative for chest pain, claudication, cyanosis, irregular heartbeat, leg swelling, near-syncope, orthopnea, palpitations, paroxysmal nocturnal dyspnea and syncope.   Respiratory: Negative for cough, hemoptysis, shortness of breath and wheezing.    Gastrointestinal: Positive for abdominal pain. Negative for bloating (left sided ), constipation, diarrhea, melena, nausea and vomiting.   Neurological: Negative for dizziness and weakness.     Objective:     Vital Signs  (Most Recent):  Temp: 98.1 °F (36.7 °C) (12/05/19 0730)  Pulse: 91 (12/05/19 0945)  Resp: (!) 23 (12/05/19 0945)  BP: (!) 129/58 (12/05/19 0930)  SpO2: 100 % (12/05/19 0945) Vital Signs (24h Range):  Temp:  [96 °F (35.6 °C)-98.4 °F (36.9 °C)] 98.1 °F (36.7 °C)  Pulse:  [71-96] 91  Resp:  [0-33] 23  SpO2:  [81 %-100 %] 100 %  BP: (101-165)/(48-80) 129/58  Arterial Line BP: (124-184)/(38-60) 176/46     Weight: 109.8 kg (242 lb 1 oz)  Body mass index is 35.23 kg/m².     SpO2: 100 %  O2 Device (Oxygen Therapy): nasal cannula w/ humidification      Intake/Output Summary (Last 24 hours) at 12/5/2019 1005  Last data filed at 12/5/2019 0900  Gross per 24 hour   Intake 988.88 ml   Output 1147 ml   Net -158.12 ml       Lines/Drains/Airways     Central Venous Catheter Line                 Trialysis (Dialysis) Catheter 12/03/19 1659 right internal jugular 1 day          Drain                 Urethral Catheter 12/03/19 0445 16 Fr. 2 days                Physical Exam   Constitutional: He is oriented to person, place, and time. He appears well-developed and well-nourished. No distress.   Cardiovascular: Normal rate and regular rhythm. Exam reveals no gallop.   No murmur heard.  Pulmonary/Chest: Effort normal and breath sounds normal. Tachypnea noted. No respiratory distress. He has no wheezes.   Abdominal: Soft. Bowel sounds are normal. He exhibits no distension. There is tenderness.   Neurological: He is alert and oriented to person, place, and time. Abnormal reflex: left side; no rebound tenderness    Skin: Skin is warm and dry.       Significant Labs:     Recent Labs   Lab 12/05/19  0740   WBC 13.10*   RBC 2.89*   HGB 8.5*   HCT 25.3*   PLT 75*   MCV 88   MCH 29.4   MCHC 33.6     Recent Labs   Lab 12/05/19  0415 12/05/19  0844   * 134*   K 5.0 4.7    102   CO2 20* 21*   BUN 62* 60*   CREATININE 5.0* 4.8*   MG 2.2  --        Significant Imaging: Echocardiogram:   Transthoracic echo (TTE) complete (Cupid Only):    Results for orders placed or performed during the hospital encounter of 12/02/19   Echo Color Flow Doppler? Yes   Result Value Ref Range    BSA 2.3 m2    TDI SEPTAL 0.06 m/s    LV LATERAL E/E' RATIO 9.00 m/s    LV SEPTAL E/E' RATIO 10.50 m/s    TDI LATERAL 0.07 m/s    PV PEAK VELOCITY 1.81 cm/s    LVIDD 4.80 3.5 - 6.0 cm    IVS 1.10 (A) 0.6 - 1.1 cm    PW 1.10 (A) 0.6 - 1.1 cm    Ao root annulus 3.40 cm    LVIDS 2.56 2.1 - 4.0 cm    FS 47 28 - 44 %    LV mass 193.96 g    LA size 3.68 cm    TAPSE 1.44 cm    Left Ventricle Relative Wall Thickness 0.46 cm    AV mean gradient 6 mmHg    AV valve area 4.31 cm2    AV Velocity Ratio 0.78     AV index (prosthetic) 1.06     E/A ratio 0.62     Mean e' 0.07 m/s    E wave decelartion time 338.80 msec    LVOT diameter 2.27 cm    LVOT area 4.0 cm2    LVOT peak simone 1.16 m/s    LVOT peak VTI 26.25 cm    Ao peak simone 1.48 m/s    Ao VTI 24.66 cm    LVOT stroke volume 106.18 cm3    AV peak gradient 9 mmHg    E/E' ratio 9.69 m/s    MV Peak E Simone 0.63 m/s    MV Peak A Simone 1.02 m/s    LV Systolic Volume 23.57 mL    LV Systolic Volume Index 10.6 mL/m2    LV Diastolic Volume 68.84 mL    LV Diastolic Volume Index 30.84 mL/m2    LV Mass Index 87 g/m2    Right Atrial Pressure (from IVC) 3 mmHg    Narrative    · Normal left ventricular systolic function. The estimated ejection   fraction is 55%  · Concentric left ventricular remodeling.  · No wall motion abnormalities.  · Normal right ventricular systolic function.  · Normal central venous pressure (3 mm Hg).        Assessment and Plan:     Brief HPI: Seen on AM rounds with Dr. Fabian while patient resting in bed. Complains of mild SOB unchanged from yesterday as well as mild left sided abdominal pain. Reviewed findings of angiogram and CT scan with patient. Discussed POC as detailed below-verbalized understanding and agrees with POC     Hyperkalemia  -K+ 5.0 this AM  -related to JOSE/ATN  -Nephrology on board     JOSE (acute kidney  injury)  -multifactoral  -concerned about ATN given bleed and hypotension  -creatinine 1.2 upon admission with trend up to 3.7-4.5-5.3-5.4-5.5; down to 5.0 this AM  -Nephrology on board with CRRT currently  -will continue to avoid nephrotoxic agents and hypotension  -continue to follow Nephrology recs     Leukocytosis  -WBCs 28K post procedure with trend down to 22K nad 13K this AM  -afebrile  -likely reactive rather than infectious     Shortness of breath  -concerning for volume overload in setting of IVF use and PRBC transfusion  -IV Lasix with no response; JOSE with CRRT in process currently  -CXR reviewed and mild SOB noted on PE; pulse ox WNL; Nephrology on board and anticipate mild volume removal with CRRT   -echocardiogram with normal LVEF     Edema  -concerning for combination of volume overload and sedentary lifestyle  -unchanged  -will continue to monitor     Anemia  -initial H&H 11.1&36.1 with trend down to 7.4&24.1 post procedure  -repeat angiogram on 12/2 with no active bleeding; PRBC transfusion with trend up of H&H with recurrent drop down to 6.6&19.4 with repeat angiogram  12/4 with no active bleeding; CTA abdomen with renal subcapsular hematoma with surrounding retroperitoneal hemorrhage and no definite contrast extravasation seen to suggest active bleeding on delayed images  -additional PRBCs given with trend up of H&H to 7.4&22.0 and 8.5&25.3 this AM  -will continue with serial H&Hs and transfuse as needed     Hypotension  -BP stable overnight with successful wean of Levophed overnight  -will remove kanchan and will continue to hold antihypertensives for now  -if significant trend up BP then will slowly resume medication     PAD (peripheral artery disease)  -admitted for elective LE angiogram for further evaluation of LE claudication  -successful atherectomy and PTA with scoring balloon and  DCB to dSFA,  pSFA CFA and TPT   -foot warm and pulses present   -continue statin; no ASA and Plavix due to  bleeding issue   -will need serial LE arterial ultrasounds as an outpatient       CAD (coronary artery disease)  -s/p LAD and LCX MARIAN in 2011  -was on  ASA, statin, Plavix with holding of  BB or ACEI/ARB due to hypotension and pressor use  -will hold DAPT given concern for acute bleeding   -echocardiogram with normal LV function with EF 55%    Hyperlipidemia  -continue statin therapy  -FLP with LDL 80 in 9/2019    Essential hypertension  -on Norvasc, Chlorthalidone, Metoprolol and Losartan at home  -will continue to hold meds for now and monitor BP  -weaned off IV Levophed overnight     Type 2 diabetes mellitus with kidney complication, without long-term current use of insulin  [place] [phone]  -on Lantus and mealtime insulin at home  -will resume home dose of insulin   -recent HgbA1c 5.6 9/2019        VTE Risk Mitigation (From admission, onward)         Ordered     Place DARRION hose  Until discontinued      12/05/19 1005     heparin (porcine) injection 2,300 Units  As needed (PRN)      12/04/19 1137     heparin infusion 1,000 units/500 ml in 0.9% NaCl (pressure line flush)  Intra-op continuous PRN      12/02/19 0932                Marzena Valdez, APRN, ANP  Cardiology  [facility]

## 2019-12-05 NOTE — PROGRESS NOTES
[facility]  General Surgery  Progress Note    Subjective:     Interval History:   Some difficulty with RIJ line. Positional.  Making some urine now but very little. On lasix    Post-Op Info:  Procedure(s) (LRB):  Angiogram, Lower Arterial, Unilateral (N/A)   1 Day Post-Op      Medications:  Continuous Infusions:   sodium chloride 0.9% 3 mL/kg/hr (12/02/19 1348)    ceFAZolin 2 g/50mL Dextrose IVPB      furosemide (LASIX) 2 mg/mL infusion (non-titrating) 10 mg/hr (12/05/19 1336)    heparin (porcine)      norepinephrine bitartrate-D5W Stopped (12/04/19 2200)     Scheduled Meds:   sodium chloride 0.9%   Intravenous Once    insulin aspart U-100  15 Units Subcutaneous TIDWM    insulin detemir U-100  40 Units Subcutaneous QHS    mupirocin   Nasal BID    rosuvastatin  20 mg Oral Daily    sodium chloride 0.9%  3 mL/kg Intravenous Once     PRN Meds:sodium chloride, sodium chloride, sodium chloride, sodium chloride 0.9%, sodium chloride 0.9%, acetaminophen, albuterol-ipratropium, alteplase, ceFAZolin 2 g/50mL Dextrose IVPB, Dextrose 10% Bolus, Dextrose 10% Bolus, Dextrose 10% Bolus, Dextrose 10% Bolus, glucagon (human recombinant), glucose, glucose, heparin (porcine), heparin (porcine), insulin aspart U-100, magnesium sulfate IVPB, morphine, ondansetron, ondansetron, polyethylene glycol, sodium chloride 0.9%, sodium phosphate IVPB, sodium phosphate IVPB, sodium phosphate IVPB     Objective:     Vital Signs (Most Recent):  Temp: 98.8 °F (37.1 °C) (12/05/19 1130)  Pulse: 99 (12/05/19 1430)  Resp: (!) 25 (12/05/19 1430)  BP: 133/63 (12/05/19 1415)  SpO2: 99 % (12/05/19 1430) Vital Signs (24h Range):  Temp:  [96 °F (35.6 °C)-98.8 °F (37.1 °C)] 98.8 °F (37.1 °C)  Pulse:  [] 99  Resp:  [0-29] 25  SpO2:  [81 %-100 %] 99 %  BP: (101-165)/(48-80) 133/63  Arterial Line BP: (124-184)/(38-60) 176/46       Intake/Output Summary (Last 24 hours) at 12/5/2019 1442  Last data filed at 12/5/2019 1400  Gross  per 24 hour   Intake 1488.88 ml   Output 1534 ml   Net -45.12 ml       Physical Exam   Cardiovascular: Normal rate.   Pulmonary/Chest: Effort normal.   Abdominal: Soft.   Skin: Skin is warm.       Significant Labs:  CBC:   Recent Labs   Lab 12/05/19  1130   WBC 11.83   RBC 2.66*   HGB 7.8*   HCT 23.3*   PLT 79*   MCV 88   MCH 29.3   MCHC 33.5     CMP:   Recent Labs   Lab 12/05/19  1130   *   CALCIUM 8.2*   ALBUMIN 2.3*   *   K 4.9   CO2 23      BUN 62*   CREATININE 5.1*       Significant Diagnostics:  I have reviewed all pertinent imaging results/findings within the past 24 hours.    Assessment/Plan:     Active Diagnoses:    Diagnosis Date Noted POA    JOSE (acute kidney injury) [N17.9] 12/03/2019 Yes    Anemia [D64.9] 12/03/2019 Yes    Edema [R60.9] 12/03/2019 Yes    Hyperkalemia [E87.5] 12/03/2019 Yes    Leukocytosis [D72.829] 12/03/2019 Yes    Shortness of breath [R06.02] 12/03/2019 Yes    Hypotension [I95.9] 12/03/2019 Yes    PAD (peripheral artery disease) [I73.9] 12/29/2014 Yes    Type 2 diabetes mellitus with kidney complication, without long-term current use of insulin [E11.29] 11/15/2012 Yes    CAD (coronary artery disease) [I25.10] 11/15/2012 Yes    Essential hypertension [I10] 11/15/2012 Yes    Hyperlipidemia [E78.5] 11/15/2012 Yes      Problems Resolved During this Admission:     74M with JOSE  RIJ flushes and draws easily from both ports. Continue to try to use even if positional  No plan to replace it as of now. Monitoring kidney function        Gilmer Coronado MD  General Surgery  [facility]

## 2019-12-05 NOTE — PT/OT/SLP EVAL
Occupational Therapy   Evaluation    Name: Cy Rutherford  MRN: [id number]  Admitting Diagnosis:  <principal problem not specified> 1 Day Post-Op    Recommendations:     Discharge Recommendations: IPR   Discharge Equipment Recommendations:  (TBD possible SC, BSC )  Barriers to discharge:  Decreased caregiver support(Pt requires increased level of assistance at this time)    Assessment:     Cy Rutherford is a 74 y.o. male with a medical diagnosis of <principal problem not specified>.  He presents with deconditioning, residual R sided weakness form CVA 2018 (per chart review). Performance deficits affecting function: weakness, impaired endurance, impaired self care skills, impaired functional mobilty, gait instability, impaired balance, decreased coordination, decreased upper extremity function, decreased lower extremity function, decreased ROM, impaired fine motor, impaired skin, edema, impaired cardiopulmonary response to activity.    Despite patient's co-morbidities, patient was living in community setting functioning at Independent level for ADLs and functional mobility prior to this event.  There is an expectation of returning to prior level of function to maintain independence thus avoiding readmission.  Patient's clinical condition meets full Inpatient Rehab (IPR) criteria; including the ability to actively participate in 3 hours of therapy.  A lower level of care (SNF) cannot provide the interdisciplinary treatment approach needed.       Rehab Prognosis: Good; patient would benefit from acute skilled OT services to address these deficits and reach maximum level of function.       Plan:     Patient to be seen 5 x/week to address the above listed problems via self-care/home management, therapeutic exercises, neuromuscular re-education  · Plan of Care Expires: 01/05/20  · Plan of Care Reviewed with: patient, grandchild(dimple)    Subjective     Chief Complaint: Pt states that he feels better, but increased weakness  upon attempts for sit>stand  Patient/Family Comments/goals: To return to PLOF    Occupational Profile:  Living Environment: Pt lives alone, SSH, 0STE, WIS (has SC but takes standing showers)  Previous level of function: Indep to Mod I for ADLs and functional mobility   Roles and Routines: Caretaker to self and home, cooks/cleans/drives, enjoys watching TV and runs errands   Equipment Used at Home:  walker, rolling, cane, straight  Assistance upon Discharge: Limited, family may be available    Pain/Comfort:  · Pain Rating 1: 0/10    Patients cultural, spiritual, Denominational conflicts given the current situation:      Objective:     Communicated with: omega prior to session.  Patient found up in chair with (ICO MOnitoring) upon OT entry to room.    General Precautions: Standard, fall   Orthopedic Precautions:N/A   Braces: N/A     Occupational Performance:    Bed Mobility:    · Patient completed Scooting/Bridging with maximal assistance x1 person with use of Trendelenburg and drawsheet  · Patient completed Sit to Supine with moderate assistance    Functional Mobility/Transfers:  · Patient completed Sit <> Stand Transfer with maximal assistance and increased attempts required  with  rolling walker   · Patient completed Bed <> Chair Transfer using Step Transfer technique with minimum assistance and moderate assistance with rolling walker  Functional Mobility: Pt with fair to fair- dynamic seated and standing balance. Pt able to take 4-5 small shuffled steps to pivot to bed with use of RW    Activities of Daily Living:  · Grooming: set up to min A to brush teeth with HOB elevated; able to bursh teeth once toothpaste opened and applied to brush  · Upper Body Dressing: maximal assistance to doff gown as robe    Cognitive/Visual Perceptual:  Cognitive/Psychosocial Skills:     -       Oriented to: Person, Place, Time and Situation   -       Follows Commands/attention:Follows multistep  commands  -       Communication:  clear/fluent  -       Memory: No Deficits noted  -       Safety awareness/insight to disability: intact   -       Mood/Affect/Coping skills/emotional control: Appropriate to situation    Physical Exam:  Postural examination/scapula alignment:    -       Rounded shoulders  Skin integrity: Blister noted R forearm at site of bandage/adhesive. Nsg notified and removed bandage, pt reports no pain  Sensation:    -       Intact  Motor Planning:    -       WFL  Dominant hand:    -       R handed  Upper Extremity Range of Motion: LUE WFL  , RUE full composite grasp, limited AROM ~1/4 available range at elbow and minimal at shoulder, PROM ~0-90* R shoulder flexion  Upper Extremity Strength:  LUE 4-/5, RUE grossly 2/5   Strength:  B hands WFL  Fine Motor Coordination:    -       Impaired in functional task practice for bimanual tasks, L hand WFL  Gross motor coordination:   R hemiparesis      AMPAC 6 Click ADL:  AMPAC Total Score: 15    Treatment & Education:  Pt educated on role of OT and POC.   Pt performing skills as listed above.  Pt became diaphoretic upon sit>stand and required increased time to recover. RN reported multiple beats Vtach just prior to OT arrival; none noted during OT session.  Education:    Patient left HOB elevated with all lines intact, call button in reach, nsg notified and nsg present    GOALS:   Multidisciplinary Problems     Occupational Therapy Goals        Problem: Occupational Therapy Goal    Goal Priority Disciplines Outcome Interventions   Occupational Therapy Goal     OT, PT/OT Ongoing, Progressing    Description:  Goals to be met by: 1/5/2020    Patient will increase functional independence with ADLs by performing:    UE Dressing with Modified [place].  LE Dressing with Set-up Assistance.  Grooming while standing with Supervision.  Toileting from bedside commode with Supervision for hygiene and clothing management.   Step transfer with Supervision  Toilet transfer to bedside commode  with Supervision.  Increased functional strength to WFL for self care.  Upper extremity exercise program x10 reps per handout, with assistance as needed.                      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    Type 2 diabetes mellitus with kidney complication, without long-term current use of insulin 11/15/2012       Past Surgical History:   Procedure Laterality Date    AORTOGRAPHY WITH SERIALOGRAPHY N/A 10/16/2019    Procedure: AORTOGRAM, WITH SERIALOGRAPHY;  Surgeon: James Sanchez MD;  Location: [facility] CATH LAB/EP;  Service: Cardiology;  Laterality: N/A;    COLONOSCOPY      CORONARY ANGIOPLASTY      MARIAN to LAD and LCX       Time Tracking:     OT Date of Treatment: 12/05/19  OT Start Time: 1547  OT Stop Time: 1619  OT Total Time (min): 32 min    Billable Minutes:Evaluation 13  Self Care/Home Management 19    Kanwal Cool OT  12/5/2019

## 2019-12-05 NOTE — NURSING
2245: Changed out CRRT machine. Primed machine and connected to patient. Unable to increase blood flow rate to 300 due to arterial access elevated pressures. Currently blood flow rate is 250, patient tolerating well.

## 2019-12-05 NOTE — PLAN OF CARE
Problem: Occupational Therapy Goal  Goal: Occupational Therapy Goal  Description  Goals to be met by: 1/5/2020    Patient will increase functional independence with ADLs by performing:    UE Dressing with Modified [place].  LE Dressing with Set-up Assistance.  Grooming while standing with Supervision.  Toileting from bedside commode with Supervision for hygiene and clothing management.   Step transfer with Supervision  Toilet transfer to bedside commode with Supervision.  Increased functional strength to WFL for self care.  Upper extremity exercise program x10 reps per handout, with assistance as needed.     Outcome: Ongoing, Progressing   Pt would benefit from continued OT to address deficits in self care and functional mobility. Recommendations TBD pending progress; DME needs TBD, likely none

## 2019-12-05 NOTE — PROGRESS NOTES
Update    -Patient again had drop in H/H and hypotensive requiring pressor support. High suspicion of acute blood loss from possible bleed, despite no obvious source noted on relook angiogram with CO2  -Discussed case with IR and will likely need CTA to evaluate for source of bleed  -Will plan for stat CTA  -Transfuse 2 units PRBC in the interim and continued pressor support as needed

## 2019-12-05 NOTE — PLAN OF CARE
Pt AAO x4, opens eyes spontaneously, and able to follow commands. RR in 20's breaths per minute. Pulse ox measuring > 95% on  35 % BIPAP. Oral suctioning provided PRN. PT on CRRT on and off due to trouble shooting machine error. Pt repositioned Q2H with use of pillows. Heels floated off mattress at all times.Tubing and other devices remain free from under the patient. Foam mepilex placed over sacrum. Squires care provided. Frequent rounds made to assess for safety and discomfort, fall precautions maintained. Call bell within reach. Will continue to monitor

## 2019-12-05 NOTE — NURSING
Pt afebrile throughout shift. AAO x 4. NSR 70s-80s. See MAR for levo titration. Used 5L nc for most of day. Urine output minimal through rubio. Dr. Watts aware. Accucheks performed as ordered. Dr. Gu aware of results and instructed on insulin dosage since pt NPO. Patient went back to cath lab and no bleed found. 2 units prbcs ordered. Patient went to CTA.

## 2019-12-05 NOTE — HOSPITAL COURSE
Had hypotension after the procedure and then had JOSE and had to be started on H.D(last H.D on 12/11). Nephrology following. Patient is also on lasix and diuresing well for past 2 days. Currently on I.V lasix pushes. Creatinine stable for past 2 days also. Blood sugar levels are controlled with Levemir, prandial insulin and ISS. Also found to having large left renal subcapsular hematoma on CT abd done on 12/4. ASA, Plavix and pletal are therefore held. On 12/11/19, patient had an episode of hematemesis. Initially had drop in H/H, but stable since then. G.I consulted and had EGD on 12/13. This showed Non-bleeding esophageal ulcer, which was biopsied and erythematous mucosa in the stomach. Advised to start on Protonix 40 mg BID for 12 weeks. Repeat UGD in 8 weeks to establish healing.                 PT/OT eval done and Rehab placement advised. Social work consult placed.   12/15 pt seen doing ok, had not have HD since wed per him.  BG atable around 135, 136, K is 3.2 will replace orally  12/17 adjust insulin regimen

## 2019-12-06 LAB
ALBUMIN SERPL BCP-MCNC: 2.2 G/DL (ref 3.5–5.2)
ALBUMIN SERPL BCP-MCNC: 2.3 G/DL (ref 3.5–5.2)
ANION GAP SERPL CALC-SCNC: 11 MMOL/L (ref 8–16)
ANION GAP SERPL CALC-SCNC: 12 MMOL/L (ref 8–16)
BACTERIA UR CULT: NO GROWTH
BASOPHILS # BLD AUTO: 0 K/UL (ref 0–0.2)
BASOPHILS # BLD AUTO: 0.01 K/UL (ref 0–0.2)
BASOPHILS NFR BLD: 0 % (ref 0–1.9)
BASOPHILS NFR BLD: 0.1 % (ref 0–1.9)
BUN SERPL-MCNC: 69 MG/DL (ref 8–23)
BUN SERPL-MCNC: 79 MG/DL (ref 8–23)
CALCIUM SERPL-MCNC: 8 MG/DL (ref 8.7–10.5)
CALCIUM SERPL-MCNC: 8 MG/DL (ref 8.7–10.5)
CHLORIDE SERPL-SCNC: 101 MMOL/L (ref 95–110)
CHLORIDE SERPL-SCNC: 102 MMOL/L (ref 95–110)
CO2 SERPL-SCNC: 21 MMOL/L (ref 23–29)
CO2 SERPL-SCNC: 23 MMOL/L (ref 23–29)
CREAT SERPL-MCNC: 5.4 MG/DL (ref 0.5–1.4)
CREAT SERPL-MCNC: 5.7 MG/DL (ref 0.5–1.4)
DIFFERENTIAL METHOD: ABNORMAL
EOSINOPHIL # BLD AUTO: 0 K/UL (ref 0–0.5)
EOSINOPHIL # BLD AUTO: 0.1 K/UL (ref 0–0.5)
EOSINOPHIL # BLD AUTO: 0.1 K/UL (ref 0–0.5)
EOSINOPHIL NFR BLD: 0.3 % (ref 0–8)
EOSINOPHIL NFR BLD: 0.4 % (ref 0–8)
EOSINOPHIL NFR BLD: 0.4 % (ref 0–8)
EOSINOPHIL NFR BLD: 0.5 % (ref 0–8)
EOSINOPHIL NFR BLD: 1.3 % (ref 0–8)
ERYTHROCYTE [DISTWIDTH] IN BLOOD BY AUTOMATED COUNT: 14.3 % (ref 11.5–14.5)
ERYTHROCYTE [DISTWIDTH] IN BLOOD BY AUTOMATED COUNT: 14.3 % (ref 11.5–14.5)
ERYTHROCYTE [DISTWIDTH] IN BLOOD BY AUTOMATED COUNT: 14.4 % (ref 11.5–14.5)
EST. GFR  (AFRICAN AMERICAN): 10 ML/MIN/1.73 M^2
EST. GFR  (AFRICAN AMERICAN): 11 ML/MIN/1.73 M^2
EST. GFR  (NON AFRICAN AMERICAN): 9 ML/MIN/1.73 M^2
EST. GFR  (NON AFRICAN AMERICAN): 9 ML/MIN/1.73 M^2
GLUCOSE SERPL-MCNC: 231 MG/DL (ref 70–110)
GLUCOSE SERPL-MCNC: 262 MG/DL (ref 70–110)
HBV CORE AB SERPL QL IA: NEGATIVE
HBV SURFACE AG SERPL QL IA: NEGATIVE
HCT VFR BLD AUTO: 22.1 % (ref 40–54)
HCT VFR BLD AUTO: 22.1 % (ref 40–54)
HCT VFR BLD AUTO: 22.4 % (ref 40–54)
HCT VFR BLD AUTO: 22.4 % (ref 40–54)
HCT VFR BLD AUTO: 22.7 % (ref 40–54)
HGB BLD-MCNC: 7.2 G/DL (ref 14–18)
HGB BLD-MCNC: 7.4 G/DL (ref 14–18)
HGB BLD-MCNC: 7.5 G/DL (ref 14–18)
LYMPHOCYTES # BLD AUTO: 0.4 K/UL (ref 1–4.8)
LYMPHOCYTES NFR BLD: 3.6 % (ref 18–48)
LYMPHOCYTES NFR BLD: 3.8 % (ref 18–48)
LYMPHOCYTES NFR BLD: 3.9 % (ref 18–48)
LYMPHOCYTES NFR BLD: 4 % (ref 18–48)
LYMPHOCYTES NFR BLD: 4.4 % (ref 18–48)
MAGNESIUM SERPL-MCNC: 2.3 MG/DL (ref 1.6–2.6)
MAGNESIUM SERPL-MCNC: 2.4 MG/DL (ref 1.6–2.6)
MCH RBC QN AUTO: 29.1 PG (ref 27–31)
MCH RBC QN AUTO: 29.6 PG (ref 27–31)
MCH RBC QN AUTO: 29.8 PG (ref 27–31)
MCHC RBC AUTO-ENTMCNC: 32.6 G/DL (ref 32–36)
MCHC RBC AUTO-ENTMCNC: 32.6 G/DL (ref 32–36)
MCHC RBC AUTO-ENTMCNC: 33 G/DL (ref 32–36)
MCHC RBC AUTO-ENTMCNC: 33.5 G/DL (ref 32–36)
MCHC RBC AUTO-ENTMCNC: 33.5 G/DL (ref 32–36)
MCV RBC AUTO: 88 FL (ref 82–98)
MCV RBC AUTO: 88 FL (ref 82–98)
MCV RBC AUTO: 89 FL (ref 82–98)
MCV RBC AUTO: 89 FL (ref 82–98)
MCV RBC AUTO: 90 FL (ref 82–98)
MONOCYTES # BLD AUTO: 0.9 K/UL (ref 0.3–1)
MONOCYTES # BLD AUTO: 1 K/UL (ref 0.3–1)
MONOCYTES # BLD AUTO: 1.1 K/UL (ref 0.3–1)
MONOCYTES NFR BLD: 10.2 % (ref 4–15)
MONOCYTES NFR BLD: 10.6 % (ref 4–15)
MONOCYTES NFR BLD: 9.5 % (ref 4–15)
MONOCYTES NFR BLD: 9.6 % (ref 4–15)
MONOCYTES NFR BLD: 9.7 % (ref 4–15)
NEUTROPHILS # BLD AUTO: 8.4 K/UL (ref 1.8–7.7)
NEUTROPHILS # BLD AUTO: 8.7 K/UL (ref 1.8–7.7)
NEUTROPHILS # BLD AUTO: 8.9 K/UL (ref 1.8–7.7)
NEUTROPHILS # BLD AUTO: 9.2 K/UL (ref 1.8–7.7)
NEUTROPHILS # BLD AUTO: 9.3 K/UL (ref 1.8–7.7)
NEUTROPHILS NFR BLD: 84.1 % (ref 38–73)
NEUTROPHILS NFR BLD: 85.6 % (ref 38–73)
NEUTROPHILS NFR BLD: 85.7 % (ref 38–73)
NEUTROPHILS NFR BLD: 85.8 % (ref 38–73)
NEUTROPHILS NFR BLD: 86.2 % (ref 38–73)
PHOSPHATE SERPL-MCNC: 5.9 MG/DL (ref 2.7–4.5)
PHOSPHATE SERPL-MCNC: 6.2 MG/DL (ref 2.7–4.5)
PLATELET # BLD AUTO: 101 K/UL (ref 150–350)
PLATELET # BLD AUTO: 104 K/UL (ref 150–350)
PLATELET # BLD AUTO: 114 K/UL (ref 150–350)
PLATELET # BLD AUTO: 89 K/UL (ref 150–350)
PLATELET # BLD AUTO: 89 K/UL (ref 150–350)
PMV BLD AUTO: 10.1 FL (ref 9.2–12.9)
PMV BLD AUTO: 10.2 FL (ref 9.2–12.9)
PMV BLD AUTO: 10.5 FL (ref 9.2–12.9)
PMV BLD AUTO: 10.8 FL (ref 9.2–12.9)
PMV BLD AUTO: 11 FL (ref 9.2–12.9)
POCT GLUCOSE: 181 MG/DL (ref 70–110)
POCT GLUCOSE: 224 MG/DL (ref 70–110)
POCT GLUCOSE: 225 MG/DL (ref 70–110)
POCT GLUCOSE: 250 MG/DL (ref 70–110)
POTASSIUM SERPL-SCNC: 5 MMOL/L (ref 3.5–5.1)
POTASSIUM SERPL-SCNC: 5.1 MMOL/L (ref 3.5–5.1)
RBC # BLD AUTO: 2.47 M/UL (ref 4.6–6.2)
RBC # BLD AUTO: 2.5 M/UL (ref 4.6–6.2)
RBC # BLD AUTO: 2.52 M/UL (ref 4.6–6.2)
RBC # BLD AUTO: 2.54 M/UL (ref 4.6–6.2)
RBC # BLD AUTO: 2.54 M/UL (ref 4.6–6.2)
SODIUM SERPL-SCNC: 135 MMOL/L (ref 136–145)
SODIUM SERPL-SCNC: 135 MMOL/L (ref 136–145)
WBC # BLD AUTO: 10.37 K/UL (ref 3.9–12.7)
WBC # BLD AUTO: 10.37 K/UL (ref 3.9–12.7)
WBC # BLD AUTO: 10.8 K/UL (ref 3.9–12.7)
WBC # BLD AUTO: 10.85 K/UL (ref 3.9–12.7)
WBC # BLD AUTO: 9.91 K/UL (ref 3.9–12.7)

## 2019-12-06 PROCEDURE — 27000221 HC OXYGEN, UP TO 24 HOURS

## 2019-12-06 PROCEDURE — 20000000 HC ICU ROOM

## 2019-12-06 PROCEDURE — 85025 COMPLETE CBC W/AUTO DIFF WBC: CPT | Mod: 91

## 2019-12-06 PROCEDURE — 63600175 PHARM REV CODE 636 W HCPCS: Performed by: INTERNAL MEDICINE

## 2019-12-06 PROCEDURE — 25000003 PHARM REV CODE 250: Performed by: INTERNAL MEDICINE

## 2019-12-06 PROCEDURE — 99900035 HC TECH TIME PER 15 MIN (STAT)

## 2019-12-06 PROCEDURE — 99291 CRITICAL CARE FIRST HOUR: CPT | Mod: ,,, | Performed by: INTERNAL MEDICINE

## 2019-12-06 PROCEDURE — 94640 AIRWAY INHALATION TREATMENT: CPT

## 2019-12-06 PROCEDURE — 99291 PR CRITICAL CARE, E/M 30-74 MINUTES: ICD-10-PCS | Mod: ,,, | Performed by: INTERNAL MEDICINE

## 2019-12-06 PROCEDURE — 25000003 PHARM REV CODE 250: Performed by: NURSE PRACTITIONER

## 2019-12-06 PROCEDURE — 80069 RENAL FUNCTION PANEL: CPT | Mod: 91

## 2019-12-06 PROCEDURE — 94761 N-INVAS EAR/PLS OXIMETRY MLT: CPT

## 2019-12-06 PROCEDURE — 97163 PT EVAL HIGH COMPLEX 45 MIN: CPT

## 2019-12-06 PROCEDURE — 94660 CPAP INITIATION&MGMT: CPT

## 2019-12-06 PROCEDURE — 25000242 PHARM REV CODE 250 ALT 637 W/ HCPCS: Performed by: INTERNAL MEDICINE

## 2019-12-06 PROCEDURE — 97530 THERAPEUTIC ACTIVITIES: CPT

## 2019-12-06 PROCEDURE — 83735 ASSAY OF MAGNESIUM: CPT | Mod: 91

## 2019-12-06 RX ORDER — METOLAZONE 5 MG/1
5 TABLET ORAL ONCE
Status: COMPLETED | OUTPATIENT
Start: 2019-12-06 | End: 2019-12-06

## 2019-12-06 RX ADMIN — EPOETIN ALFA-EPBX 20000 UNITS: 10000 INJECTION, SOLUTION INTRAVENOUS; SUBCUTANEOUS at 03:12

## 2019-12-06 RX ADMIN — INSULIN ASPART 15 UNITS: 100 INJECTION, SOLUTION INTRAVENOUS; SUBCUTANEOUS at 12:12

## 2019-12-06 RX ADMIN — INSULIN ASPART 2 UNITS: 100 INJECTION, SOLUTION INTRAVENOUS; SUBCUTANEOUS at 04:12

## 2019-12-06 RX ADMIN — ROSUVASTATIN CALCIUM 20 MG: 10 TABLET, FILM COATED ORAL at 08:12

## 2019-12-06 RX ADMIN — INSULIN ASPART 2 UNITS: 100 INJECTION, SOLUTION INTRAVENOUS; SUBCUTANEOUS at 08:12

## 2019-12-06 RX ADMIN — METOLAZONE 5 MG: 5 TABLET ORAL at 03:12

## 2019-12-06 RX ADMIN — NEPHROCAP 1 CAPSULE: 1 CAP ORAL at 03:12

## 2019-12-06 RX ADMIN — INSULIN ASPART 15 UNITS: 100 INJECTION, SOLUTION INTRAVENOUS; SUBCUTANEOUS at 04:12

## 2019-12-06 RX ADMIN — INSULIN ASPART 2 UNITS: 100 INJECTION, SOLUTION INTRAVENOUS; SUBCUTANEOUS at 12:12

## 2019-12-06 RX ADMIN — MUPIROCIN: 20 OINTMENT TOPICAL at 09:12

## 2019-12-06 RX ADMIN — MUPIROCIN: 20 OINTMENT TOPICAL at 08:12

## 2019-12-06 RX ADMIN — IPRATROPIUM BROMIDE AND ALBUTEROL SULFATE 3 ML: .5; 3 SOLUTION RESPIRATORY (INHALATION) at 11:12

## 2019-12-06 RX ADMIN — INSULIN ASPART 15 UNITS: 100 INJECTION, SOLUTION INTRAVENOUS; SUBCUTANEOUS at 08:12

## 2019-12-06 RX ADMIN — INSULIN DETEMIR 60 UNITS: 100 INJECTION, SOLUTION SUBCUTANEOUS at 09:12

## 2019-12-06 RX ADMIN — FUROSEMIDE 20 MG/HR: 10 INJECTION, SOLUTION INTRAMUSCULAR; INTRAVENOUS at 06:12

## 2019-12-06 NOTE — PROGRESS NOTES
Progress Note  Nephrology      Consult Requested By: James Sanchez MD      SUBJECTIVE:     Overnight events  Patient is a 74 y.o. male     Patient Active Problem List   Diagnosis    Type 2 diabetes mellitus with kidney complication, without long-term current use of insulin    Essential hypertension    Hyperlipidemia    CAD (coronary artery disease)    Status post coronary artery stent placement    Acute MI anterior wall first episode care    Acute coronary syndrome    PAD (peripheral artery disease)    History of colon cancer    Intracranial atherosclerosis    History of ischemic vertebrobasilar artery brainstem stroke    Ataxic hemiparesis    Research subject    Right sided weakness    Impaired balance as late effect of cerebrovascular accident    Abnormality of gait as late effect of cerebrovascular accident (CVA)    Tightness of right gastrocnemius muscle    Claudication    Hypotension    Anemia    Edema    Shortness of breath    Leukocytosis    JOSE (acute kidney injury)    Hyperkalemia     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    Type 2 diabetes mellitus with kidney complication, without long-term current use of insulin 11/15/2012              OBJECTIVE:     Vitals:    12/06/19 1145 12/06/19 1200 12/06/19 1215 12/06/19 1230   BP:  (!) 177/74  (!) 146/62   BP Location:       Patient Position:       Pulse: 75 76 87 82   Resp: 18 18 19 19   Temp:       TempSrc:       SpO2: 99% 99% 100% 100%   Weight:       Height:           Temp: 98.1 °F (36.7 °C) (12/06/19 1123)  Pulse: 82 (12/06/19 1230)  Resp: 19 (12/06/19 1230)  BP: (!) 146/62 (12/06/19 1230)  SpO2: 100 % (12/06/19 1230)    Date 12/06/19 0700 - 12/07/19 0659   Shift 1840-9448 8930-1911 2077-9250 24 Hour Total    INTAKE   Shift Total(mL/kg)       OUTPUT   Urine(mL/kg/hr) 780   780   Shift Total(mL/kg) 780(7.1)   780(7.1)   Weight (kg) 110.3 110.3 110.3 110.3             Medications:   sodium chloride 0.9%   Intravenous Once    insulin aspart U-100  15 Units Subcutaneous TIDWM    insulin detemir U-100  40 Units Subcutaneous QHS    mupirocin   Nasal BID    rosuvastatin  20 mg Oral Daily    sodium chloride 0.9%  3 mL/kg Intravenous Once      sodium chloride 0.9% 3 mL/kg/hr (12/02/19 1348)    furosemide (LASIX) 2 mg/mL infusion (non-titrating) 20 mg/hr (12/05/19 2132)    heparin (porcine)      norepinephrine bitartrate-D5W Stopped (12/04/19 2200)               Physical Exam:  General appearance  SOB  Cough  Weak  Lungs: diminished breath sounds  Heart: Pulse 82  Abdomen: soft  Extremities: edema  Skin: dry  Laboratory:  ABG  Labs reviewed  Recent Results (from the past 336 hour(s))   Basic metabolic panel    Collection Time: 12/05/19  8:44 AM   Result Value Ref Range    Sodium 134 (L) 136 - 145 mmol/L    Potassium 4.7 3.5 - 5.1 mmol/L    Chloride 102 95 - 110 mmol/L    CO2 21 (L) 23 - 29 mmol/L    BUN, Bld 60 (H) 8 - 23 mg/dL    Creatinine 4.8 (H) 0.5 - 1.4 mg/dL    Calcium 8.4 (L) 8.7 - 10.5 mg/dL    Anion Gap 11 8 - 16 mmol/L   Basic metabolic panel    Collection Time: 12/04/19  5:41 PM   Result Value Ref Range    Sodium 131 (L) 136 - 145 mmol/L    Potassium 5.3 (H) 3.5 - 5.1 mmol/L    Chloride 100 95 - 110 mmol/L    CO2 20 (L) 23 - 29 mmol/L    BUN, Bld 63 (H) 8 - 23 mg/dL    Creatinine 5.4 (H) 0.5 - 1.4 mg/dL    Calcium 8.2 (L) 8.7 - 10.5 mg/dL    Anion Gap 11 8 - 16 mmol/L   Basic metabolic panel    Collection Time: 12/03/19  6:17 PM   Result Value Ref Range    Sodium 132 (L) 136 - 145 mmol/L    Potassium 4.9 3.5 - 5.1 mmol/L    Chloride 102 95 - 110 mmol/L    CO2 17 (L) 23 - 29 mmol/L    BUN, Bld 46 (H) 8 - 23 mg/dL    Creatinine 4.2 (H) 0.5 - 1.4 mg/dL    Calcium 7.7 (L) 8.7 - 10.5 mg/dL    Anion Gap 13 8 - 16  mmol/L     Recent Results (from the past 336 hour(s))   CBC auto differential    Collection Time: 12/06/19  8:44 AM   Result Value Ref Range    WBC 10.80 3.90 - 12.70 K/uL    Hemoglobin 7.5 (L) 14.0 - 18.0 g/dL    Hematocrit 22.4 (L) 40.0 - 54.0 %    Platelets 101 (L) 150 - 350 K/uL   CBC auto differential    Collection Time: 12/06/19  4:16 AM   Result Value Ref Range    WBC 10.37 3.90 - 12.70 K/uL    Hemoglobin 7.4 (L) 14.0 - 18.0 g/dL    Hematocrit 22.4 (L) 40.0 - 54.0 %    Platelets 89 (L) 150 - 350 K/uL   CBC auto differential    Collection Time: 12/06/19 12:47 AM   Result Value Ref Range    WBC 10.85 3.90 - 12.70 K/uL    Hemoglobin 7.4 (L) 14.0 - 18.0 g/dL    Hematocrit 22.1 (L) 40.0 - 54.0 %    Platelets 89 (L) 150 - 350 K/uL     Urinalysis  No results for input(s): COLORU, CLARITYU, SPECGRAV, PHUR, PROTEINUA, GLUCOSEU, BILIRUBINCON, BLOODU, WBCU, RBCU, BACTERIA, MUCUS, NITRITE, LEUKOCYTESUR, UROBILINOGEN, HYALINECASTS in the last 24 hours.    Diagnostic Results:  X-Ray: Reviewed  US: Reviewed  Echo: Reviewed  ACCESS    ASSESSMENT/PLAN:   JOSE/CKD 3.   ATN.  Urine output 655 cc today.  Corrected sodium approximately 137.  Blood sugar 231 mg/dl .   K 5.  Metabolic bone disease.  Corrected  Ca approximately 9.6.  Poor nutrition.  Albumin 2.2.  Anemia Hb 7.5  Epogen.  Blood pressure 146/62.   Normal left ventricular systolic function (EF 60-65%).   Weight daily.  I and O.  Avoid nephrotoxic agents, hypotension, hypovolemia  Lasix drip.

## 2019-12-06 NOTE — PLAN OF CARE
Problem: Occupational Therapy Goal  Goal: Occupational Therapy Goal  Description  Goals to be met by: 1/5/2020    Patient will increase functional independence with ADLs by performing:    UE Dressing with Modified [place].  LE Dressing with Set-up Assistance.  Grooming while standing with Supervision.  Toileting from bedside commode with Supervision for hygiene and clothing management.   Step transfer with Supervision  Toilet transfer to bedside commode with Supervision.  Increased functional strength to WFL for self care.  Upper extremity exercise program x10 reps per handout, with assistance as needed.     Outcome: Ongoing, Progressing    Cy Rutherford is a 74 y.o. male with a medical diagnosis of <principal problem not specified>.  Performance deficits affecting function are weakness, impaired endurance, impaired self care skills, impaired functional mobilty, gait instability, impaired balance, decreased upper extremity function, decreased lower extremity function, decreased ROM, impaired coordination, impaired fine motor, impaired skin, edema, impaired cardiopulmonary response to activity. Pt found in bed, agreeable to therapy, RN oked.  Pt with poor tolerance of therapy secondary to SOB when seated EOB.  Pt seemed to desat however unable to get an accurate reading.  Pt also noted to be wheezing heavily.  Difficulty performing PLB technique efficiently.  Returned pt to supine and notified RN. Continue OT services to address functional goals, progressing as able.      CECILLE Umaña

## 2019-12-06 NOTE — SUBJECTIVE & OBJECTIVE
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      Current Outpatient Medications on File Prior to Encounter   Medication Sig    amLODIPine (NORVASC) 5 MG tablet Take 1 tablet (5 mg total) by mouth once daily.    aspirin (ECOTRIN) 81 MG EC tablet Take 81 mg by mouth once daily.    chlorthalidone (HYGROTEN) 25 MG Tab Take 1 tablet (25 mg total) by mouth once daily.    cilostazol (PLETAL) 50 MG Tab Take 1 tablet (50 mg total) by mouth 2 (two) times daily.    clopidogrel (PLAVIX) 75 mg tablet Take 1 tablet (75 mg total) by mouth once daily.    doxazosin (CARDURA) 8 MG Tab Take 1 tablet (8 mg total) by mouth every evening.    fenofibrate micronized (LOFIBRA) 134 MG Cap Take 1 capsule (134 mg total) by mouth nightly.    furosemide (LASIX) 20 MG tablet Take 1 tablet (20 mg total) by mouth once daily.    insulin glargine (LANTUS) 100 unit/mL injection Inject 60 Units into the skin every morning.    losartan (COZAAR) 50 MG tablet Take 1 tablet (50 mg total) by mouth 2 (two) times daily.    metoprolol succinate (TOPROL-XL) 100 MG 24 hr tablet Take 1 tablet (100 mg total) by mouth 2 (two) times daily.    multivitamin with minerals (ONE-A-DAY  MAXIMUM FORMULA ORAL) Take 1 tablet by mouth once daily.    NOVOLOG FLEXPEN U-100 INSULIN 100 unit/mL (3 mL) InPn pen INJECT 25 UNITS SUBCUTANEOUSLY WITH MEALS    omeprazole (PRILOSEC) 20 MG capsule     rosuvastatin (CRESTOR) 20 MG tablet Take 1 tablet (20 mg total) by mouth once daily.    fish oil-omega-3 fatty acids 300-1,000 mg capsule Take by mouth once daily.    insulin lispro (HUMALOG) 100 unit/mL injection Inject into the skin 3 (three) times daily before meals.    metFORMIN (GLUCOPHAGE) 500 MG tablet Take 500 mg by mouth 2 (two) times daily with meals.     Family History     Problem Relation (Age of Onset)    Colon polyps Sister    No Known Problems Mother, Father        Tobacco Use    Smoking status: Never Smoker    Smokeless tobacco: Never Used   Substance and Sexual Activity    Alcohol use: No    Drug use: No    Sexual activity: Not Currently     Review of Systems   Constitutional: Positive for fatigue. Negative for activity change and chills.   Cardiovascular: Negative for chest pain and palpitations.   Gastrointestinal: Negative for abdominal distention.   Psychiatric/Behavioral: Negative for agitation. The patient is not nervous/anxious.      Objective:     Vital Signs (Most Recent):  Temp: 98.3 °F (36.8 °C) (12/06/19 1500)  Pulse: 83 (12/06/19 1600)  Resp: 16 (12/06/19 1600)  BP: (!) 167/71 (12/06/19 1600)  SpO2: 100 % (12/06/19 1600) Vital Signs (24h Range):  Temp:  [98.1 °F (36.7 °C)-99 °F (37.2 °C)] 98.3 °F (36.8 °C)  Pulse:  [70-98] 83  Resp:  [16-37] 16  SpO2:  [77 %-100 %] 100 %  BP: (133-185)/(62-77) 167/71     Weight: 110.3 kg (243 lb 2.7 oz)  Body mass index is 35.39 kg/m².    Physical Exam   Constitutional: He appears well-developed and well-nourished.   HENT:   Head: Atraumatic.   Eyes: EOM are normal.   Neck: Neck supple.   Pulmonary/Chest: Effort normal. No respiratory distress.   Musculoskeletal: Normal range of motion.   Neurological: He is alert.   Psychiatric: He has a  normal mood and affect. His behavior is normal.   Vitals reviewed.      Significant Labs:   Recent Labs   Lab 12/06/19  0416 12/06/19  0844 12/06/19  1234   WBC 10.37 10.80 9.91   HGB 7.4* 7.5* 7.4*   HCT 22.4* 22.4* 22.7*   PLT 89* 101* 104*     Recent Labs   Lab 12/05/19  1949 12/06/19  0416 12/06/19  1234   * 135* 135*   K 4.8 5.0 5.1    101 102   CO2 25 23 21*   BUN 63* 69* 79*   CREATININE 5.0* 5.4* 5.7*   * 231* 262*   CALCIUM 7.9* 8.0* 8.0*   MG 2.2 2.3 2.4   PHOS 4.8* 5.9* 6.2*     Recent Labs   Lab 12/02/19  1303 12/03/19  1631  12/05/19  1949 12/06/19  0416 12/06/19  1234   ALKPHOS 22*  --   --   --   --   --    ALT 15  --   --   --   --   --    AST 17  --   --   --   --   --    ALBUMIN 2.5*  --    < > 2.2* 2.2* 2.3*   PROT 4.3*  --   --   --   --   --    BILITOT 0.3  --   --   --   --   --    INR  --  1.1  --   --   --   --     < > = values in this interval not displayed.      No results for input(s): CPK, CPKMB, MB, TROPONINI in the last 72 hours.  Recent Labs   Lab 12/05/19  1643 12/05/19  1749 12/05/19  2104 12/06/19  0804 12/06/19  1155 12/06/19  1649   POCTGLUCOSE 256* 261* 249* 224* 250* 225*     Hemoglobin A1C   Date Value Ref Range Status   12/02/2019 5.6 4.0 - 5.6 % Final     Comment:     ADA Screening Guidelines:  5.7-6.4%  Consistent with prediabetes  >or=6.5%  Consistent with diabetes  High levels of fetal hemoglobin interfere with the HbA1C  assay. Heterozygous hemoglobin variants (HbS, HgC, etc)do  not significantly interfere with this assay.   However, presence of multiple variants may affect accuracy.     05/08/2018 7.7 (H) 4.0 - 5.6 % Final     Comment:     According to ADA guidelines, hemoglobin A1c <7.0% represents  optimal control in non-pregnant diabetic patients. Different  metrics may apply to specific patient populations.   Standards of Medical Care in Diabetes-2016.  For the purpose of screening for the presence of diabetes:  <5.7%     Consistent with the  absence of diabetes  5.7-6.4%  Consistent with increasing risk for diabetes   (prediabetes)  >or=6.5%  Consistent with diabetes  Currently, no consensus exists for use of hemoglobin A1c  for diagnosis of diabetes for children.  This Hemoglobin A1c assay has significant interference with fetal   hemoglobin   (HbF). The results are invalid for patients with abnormal amounts of   HbF,   including those with known Hereditary Persistence   of Fetal Hemoglobin. Heterozygous hemoglobin variants (HbAS, HbAC,   HbAD, HbAE, HbA2) do not significantly interfere with this assay;   however, presence of multiple variants in a sample may impact the %   interference.     06/09/2011 7.9 (H) 4.0 - 6.2 % Final     Scheduled Meds:   sodium chloride 0.9%   Intravenous Once    insulin aspart U-100  15 Units Subcutaneous TIDWM    insulin detemir U-100  40 Units Subcutaneous QHS    mupirocin   Nasal BID    rosuvastatin  20 mg Oral Daily    sodium chloride 0.9%  3 mL/kg Intravenous Once    vitamin renal formula (B-complex-vitamin c-folic acid)  1 capsule Oral Daily     Continuous Infusions:   sodium chloride 0.9% 3 mL/kg/hr (12/02/19 1348)    furosemide (LASIX) 2 mg/mL infusion (non-titrating) 20 mg/hr (12/05/19 2132)    heparin (porcine)      norepinephrine bitartrate-D5W Stopped (12/04/19 2200)     As Needed: sodium chloride, sodium chloride, sodium chloride, sodium chloride 0.9%, sodium chloride 0.9%, acetaminophen, albuterol-ipratropium, alteplase, Dextrose 10% Bolus, Dextrose 10% Bolus, Dextrose 10% Bolus, Dextrose 10% Bolus, glucagon (human recombinant), glucose, glucose, heparin (porcine), heparin (porcine), insulin aspart U-100, morphine, ondansetron, ondansetron, polyethylene glycol, sodium chloride 0.9%

## 2019-12-06 NOTE — PLAN OF CARE
Pt AAOx4 opens eyes spontaneously, and able to follow commands. . Pulse ox measuring > 95% on 2L NC and Bipap. Oral suctioning provided PRN. Pt able to cough up secretions.  Pt repositioned Q2H with use of pillows. Heels floated off mattress at all times.Tubing and other devices remain free from under the patient.  Squires care provided. Pt on Lasix drip. Frequent rounds made to assess for safety and discomfort, fall precautions maintained. Call bell within reach. Will continue to monitor

## 2019-12-06 NOTE — NURSING
0210: pt BP elevated to 182/74.  Called and spoke to Dr. Fabian about elevated BP. No orders given at this time. Will continue to monitor.

## 2019-12-06 NOTE — CONSULTS
[facility] Hospital Medicine  Consult Note    Patient Name: Cy Rutherford  MRN: [id number]  Admission Date: 12/2/2019  Hospital Length of Stay: 3 days  Attending Physician: Nehemias Gu DO  Primary Care Provider: Heide Porter MD           Patient information was obtained from patient and ER records.     Consults  Subjective:     Principal Problem: <principal problem not specified>    Chief Complaint: No chief complaint on file.       HPI:   Admitted for elective LE angiogram for evaluation of RLE claudication. Taken to the cath lab for the procedure via LCFA access with following results:     successful atherectomy (Hawk LX) with distal filter protection, followed by PTA with scoring balloon (5.0 x 150 dSFA, 6.0 x 150 pSFA/CFA) followed by PTA with DCB to dSFA (5.0 x 150), pSFA (5.0 x 100) and CFA (7 x 40) with good results. 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 He has elevated BG, no anion gap.  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      Current Outpatient Medications on File Prior to Encounter   Medication Sig    amLODIPine (NORVASC) 5 MG tablet Take 1 tablet (5 mg total) by mouth once daily.    aspirin (ECOTRIN) 81 MG EC tablet Take 81 mg by mouth once daily.    chlorthalidone (HYGROTEN) 25 MG Tab Take 1 tablet (25 mg total) by mouth once daily.    cilostazol (PLETAL) 50 MG Tab Take 1 tablet (50 mg total) by mouth 2 (two) times daily.    clopidogrel (PLAVIX) 75 mg tablet Take 1 tablet (75 mg total) by mouth once daily.    doxazosin (CARDURA) 8 MG Tab Take 1 tablet (8 mg total) by mouth every evening.    fenofibrate micronized (LOFIBRA) 134 MG Cap Take 1 capsule (134 mg total) by mouth nightly.    furosemide (LASIX) 20 MG tablet Take 1 tablet (20 mg total) by mouth once daily.    insulin glargine (LANTUS) 100 unit/mL injection Inject 60 Units into the skin every morning.    losartan (COZAAR) 50 MG tablet Take 1 tablet (50 mg total) by mouth 2 (two) times daily.    metoprolol succinate (TOPROL-XL) 100 MG 24 hr tablet Take 1 tablet (100 mg total) by mouth 2 (two) times daily.    multivitamin with minerals (ONE-A-DAY MAXIMUM FORMULA ORAL) Take 1 tablet by mouth once daily.    NOVOLOG FLEXPEN U-100 INSULIN 100 unit/mL (3 mL) InPn pen INJECT 25 UNITS SUBCUTANEOUSLY WITH MEALS    omeprazole (PRILOSEC) 20 MG capsule     rosuvastatin (CRESTOR) 20 MG tablet Take 1 tablet (20 mg total) by mouth once daily.    fish oil-omega-3 fatty acids 300-1,000 mg capsule Take by mouth once daily.    insulin lispro (HUMALOG) 100 unit/mL injection  Inject into the skin 3 (three) times daily before meals.    metFORMIN (GLUCOPHAGE) 500 MG tablet Take 500 mg by mouth 2 (two) times daily with meals.     Family History     Problem Relation (Age of Onset)    Colon polyps Sister    No Known Problems Mother, Father        Tobacco Use    Smoking status: Never Smoker    Smokeless tobacco: Never Used   Substance and Sexual Activity    Alcohol use: No    Drug use: No    Sexual activity: Not Currently     Review of Systems   Constitutional: Positive for fatigue. Negative for activity change and chills.   Cardiovascular: Negative for chest pain and palpitations.   Gastrointestinal: Negative for abdominal distention.   Psychiatric/Behavioral: Negative for agitation. The patient is not nervous/anxious.      Objective:     Vital Signs (Most Recent):  Temp: 98.3 °F (36.8 °C) (12/06/19 1500)  Pulse: 83 (12/06/19 1600)  Resp: 16 (12/06/19 1600)  BP: (!) 167/71 (12/06/19 1600)  SpO2: 100 % (12/06/19 1600) Vital Signs (24h Range):  Temp:  [98.1 °F (36.7 °C)-99 °F (37.2 °C)] 98.3 °F (36.8 °C)  Pulse:  [70-98] 83  Resp:  [16-37] 16  SpO2:  [77 %-100 %] 100 %  BP: (133-185)/(62-77) 167/71     Weight: 110.3 kg (243 lb 2.7 oz)  Body mass index is 35.39 kg/m².    Physical Exam   Constitutional: He appears well-developed and well-nourished.   HENT:   Head: Atraumatic.   Eyes: EOM are normal.   Neck: Neck supple.   Pulmonary/Chest: Effort normal. No respiratory distress.   Musculoskeletal: Normal range of motion.   Neurological: He is alert.   Psychiatric: He has a normal mood and affect. His behavior is normal.   Vitals reviewed.      Significant Labs:   Recent Labs   Lab 12/06/19 0416 12/06/19  0844 12/06/19  1234   WBC 10.37 10.80 9.91   HGB 7.4* 7.5* 7.4*   HCT 22.4* 22.4* 22.7*   PLT 89* 101* 104*     Recent Labs   Lab 12/05/19  1949 12/06/19  0416 12/06/19  1234   * 135* 135*   K 4.8 5.0 5.1    101 102   CO2 25 23 21*   BUN 63* 69* 79*   CREATININE 5.0* 5.4* 5.7*    * 231* 262*   CALCIUM 7.9* 8.0* 8.0*   MG 2.2 2.3 2.4   PHOS 4.8* 5.9* 6.2*     Recent Labs   Lab 12/02/19  1303 12/03/19  1631  12/05/19  1949 12/06/19  0416 12/06/19  1234   ALKPHOS 22*  --   --   --   --   --    ALT 15  --   --   --   --   --    AST 17  --   --   --   --   --    ALBUMIN 2.5*  --    < > 2.2* 2.2* 2.3*   PROT 4.3*  --   --   --   --   --    BILITOT 0.3  --   --   --   --   --    INR  --  1.1  --   --   --   --     < > = values in this interval not displayed.      No results for input(s): CPK, CPKMB, MB, TROPONINI in the last 72 hours.  Recent Labs   Lab 12/05/19  1643 12/05/19  1749 12/05/19  2104 12/06/19  0804 12/06/19  1155 12/06/19  1649   POCTGLUCOSE 256* 261* 249* 224* 250* 225*     Hemoglobin A1C   Date Value Ref Range Status   12/02/2019 5.6 4.0 - 5.6 % Final     Comment:     ADA Screening Guidelines:  5.7-6.4%  Consistent with prediabetes  >or=6.5%  Consistent with diabetes  High levels of fetal hemoglobin interfere with the HbA1C  assay. Heterozygous hemoglobin variants (HbS, HgC, etc)do  not significantly interfere with this assay.   However, presence of multiple variants may affect accuracy.     05/08/2018 7.7 (H) 4.0 - 5.6 % Final     Comment:     According to ADA guidelines, hemoglobin A1c <7.0% represents  optimal control in non-pregnant diabetic patients. Different  metrics may apply to specific patient populations.   Standards of Medical Care in Diabetes-2016.  For the purpose of screening for the presence of diabetes:  <5.7%     Consistent with the absence of diabetes  5.7-6.4%  Consistent with increasing risk for diabetes   (prediabetes)  >or=6.5%  Consistent with diabetes  Currently, no consensus exists for use of hemoglobin A1c  for diagnosis of diabetes for children.  This Hemoglobin A1c assay has significant interference with fetal   hemoglobin   (HbF). The results are invalid for patients with abnormal amounts of   HbF,   including those with known Hereditary  Persistence   of Fetal Hemoglobin. Heterozygous hemoglobin variants (HbAS, HbAC,   HbAD, HbAE, HbA2) do not significantly interfere with this assay;   however, presence of multiple variants in a sample may impact the %   interference.     06/09/2011 7.9 (H) 4.0 - 6.2 % Final     Scheduled Meds:   sodium chloride 0.9%   Intravenous Once    insulin aspart U-100  15 Units Subcutaneous TIDWM    insulin detemir U-100  40 Units Subcutaneous QHS    mupirocin   Nasal BID    rosuvastatin  20 mg Oral Daily    sodium chloride 0.9%  3 mL/kg Intravenous Once    vitamin renal formula (B-complex-vitamin c-folic acid)  1 capsule Oral Daily     Continuous Infusions:   sodium chloride 0.9% 3 mL/kg/hr (12/02/19 1348)    furosemide (LASIX) 2 mg/mL infusion (non-titrating) 20 mg/hr (12/05/19 2132)    heparin (porcine)      norepinephrine bitartrate-D5W Stopped (12/04/19 2200)     As Needed: sodium chloride, sodium chloride, sodium chloride, sodium chloride 0.9%, sodium chloride 0.9%, acetaminophen, albuterol-ipratropium, alteplase, Dextrose 10% Bolus, Dextrose 10% Bolus, Dextrose 10% Bolus, Dextrose 10% Bolus, glucagon (human recombinant), glucose, glucose, heparin (porcine), heparin (porcine), insulin aspart U-100, morphine, ondansetron, ondansetron, polyethylene glycol, sodium chloride 0.9%        Assessment/Plan:     Type 2 diabetes mellitus with kidney complication, without long-term current use of insulin    Increase levemir to 60units      VTE Risk Mitigation (From admission, onward)         Ordered     Place DARRION hose  Until discontinued      12/05/19 1005     heparin (porcine) injection 2,300 Units  As needed (PRN)      12/04/19 1137     heparin infusion 1,000 units/500 ml in 0.9% NaCl (pressure line flush)  Intra-op continuous PRN      12/02/19 0932                Critical care time spent on the evaluation and treatment of severe organ dysfunction, review of pertinent labs and imaging studies, discussions with  consulting providers and discussions with patient/family: 22 minutes.    Thank you for your consult. I will follow-up with patient. Please contact us if you have any additional questions.    Nehemias Gu DO  Department of Hospital Medicine   [facility]

## 2019-12-06 NOTE — PT/OT/SLP PROGRESS
Occupational Therapy   Treatment    Name: Cy Rutherford  MRN: [id number]  Admitting Diagnosis:  <principal problem not specified>  2 Days Post-Op    Recommendations:     Discharge Recommendations: other (see comments)(pending progress, possible IPR)  Discharge Equipment Recommendations:  other (see comments)(TBD)  Barriers to discharge:  Decreased caregiver support, Other (Comment)(increased level of assistance)    Assessment:     Cy Rutherford is a 74 y.o. male with a medical diagnosis of <principal problem not specified>.  Performance deficits affecting function are weakness, impaired endurance, impaired self care skills, impaired functional mobilty, gait instability, impaired balance, decreased upper extremity function, decreased lower extremity function, decreased ROM, impaired coordination, impaired fine motor, impaired skin, edema, impaired cardiopulmonary response to activity. Pt found in bed, agreeable to therapy, RN oked.  Pt with poor tolerance of therapy secondary to SOB when seated EOB.  Pt seemed to desat however unable to get an accurate reading.  Pt also noted to be wheezing heavily.  Difficulty performing PLB technique efficiently.  Returned pt to supine and notified RN. Continue OT services to address functional goals, progressing as able.      Rehab Prognosis:  Good; patient would benefit from acute skilled OT services to address these deficits and reach maximum level of function.       Plan:     Patient to be seen 5 x/week to address the above listed problems via self-care/home management, therapeutic activities, therapeutic exercises, neuromuscular re-education  · Plan of Care Expires: 01/05/20  · Plan of Care Reviewed with: patient    Subjective     Pain/Comfort:  · Pain Rating 1: 0/10  · Pain Rating Post-Intervention 1: 0/10    Objective:     Communicated with: RN prior to session.  Patient found HOB elevated with oxygen, peripheral IV(multiple ICU lines and monitoring) upon OT entry to  room.    General Precautions: Standard, fall   Orthopedic Precautions:N/A   Braces: N/A     Occupational Performance:     Bed Mobility:    · Patient completed Rolling/Turning to Right with maximal assistance and with side rail  · Patient completed Scooting/Bridging with maximal assistance and 2 persons  · Patient completed Supine to Sit with maximal assistance, 2 persons and HOB elevated, Increased time and effort, vc's for effective technique.  · Patient completed Sit to Supine with maximal assistance and 2 persons     Functional Mobility/Transfers:  · Unable 2/2 SOB    Activities of Daily Living:  · Lower Body Dressing: total assistance laya socks      [facility] 6 Click ADL: 15    Treatment & Education:  Pt found with blisters on R lower forearm with increased swelling throughout forearm and hand. Pt instructed to perform fist and wrist pumps throughout day and to keep UE elevated on pillow in attempts to decrease swelling.  Pt verbalizes understanding.    Pt instructed on PLB technique requiring max vc's to perform efficiently.      Patient left with bed in chair position with all lines intact, call button in reach, bed alarm on and RN notifiedEducation:      GOALS:   Multidisciplinary Problems     Occupational Therapy Goals        Problem: Occupational Therapy Goal    Goal Priority Disciplines Outcome Interventions   Occupational Therapy Goal     OT, PT/OT Ongoing, Progressing    Description:  Goals to be met by: 1/5/2020    Patient will increase functional independence with ADLs by performing:    UE Dressing with Modified [place].  LE Dressing with Set-up Assistance.  Grooming while standing with Supervision.  Toileting from bedside commode with Supervision for hygiene and clothing management.   Step transfer with Supervision  Toilet transfer to bedside commode with Supervision.  Increased functional strength to WFL for self care.  Upper extremity exercise program x10 reps per handout, with assistance as  needed.                      Time Tracking:     OT Date of Treatment: 12/06/19  OT Start Time: 1040  OT Stop Time: 1103  OT Total Time (min): 23 min    Billable Minutes:Therapeutic Activity 10   *cotx with PT    CECILLE Umaña  12/6/2019

## 2019-12-06 NOTE — CONSULTS
[facility] Hospital Medicine  Consult Note    Patient Name: Cy Rutherford  MRN: [id number]  Admission Date: 12/2/2019  Hospital Length of Stay: 2 days  Attending Physician: Nehemias Gu DO  Primary Care Provider: Heide Porter MD           Patient information was obtained from patient and ER records.     Consults  Subjective:     Principal Problem: <principal problem not specified>    Chief Complaint: No chief complaint on file.       HPI:   Admitted for elective LE angiogram for evaluation of RLE claudication. Taken to the cath lab for the procedure via LCFA access with following results:     successful atherectomy (Hawk LX) with distal filter protection, followed by PTA with scoring balloon (5.0 x 150 dSFA, 6.0 x 150 pSFA/CFA) followed by PTA with DCB to dSFA (5.0 x 150), pSFA (5.0 x 100) and CFA (7 x 40) with good results. 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 He has elevated BG, no anion gap.  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      Current Outpatient Medications on File Prior to Encounter   Medication Sig    amLODIPine (NORVASC) 5 MG tablet Take 1 tablet (5 mg total) by mouth once daily.    aspirin (ECOTRIN) 81 MG EC tablet Take 81 mg by mouth once daily.    chlorthalidone (HYGROTEN) 25 MG Tab Take 1 tablet (25 mg total) by mouth once daily.    cilostazol (PLETAL) 50 MG Tab Take 1 tablet (50 mg total) by mouth 2 (two) times daily.    clopidogrel (PLAVIX) 75 mg tablet Take 1 tablet (75 mg total) by mouth once daily.    doxazosin (CARDURA) 8 MG Tab Take 1 tablet (8 mg total) by mouth every evening.    fenofibrate micronized (LOFIBRA) 134 MG Cap Take 1 capsule (134 mg total) by mouth nightly.    furosemide (LASIX) 20 MG tablet Take 1 tablet (20 mg total) by mouth once daily.    insulin glargine (LANTUS) 100 unit/mL injection Inject 60 Units into the skin every morning.    losartan (COZAAR) 50 MG tablet Take 1 tablet (50 mg total) by mouth 2 (two) times daily.    metoprolol succinate (TOPROL-XL) 100 MG 24 hr tablet Take 1 tablet (100 mg total) by mouth 2 (two) times daily.    multivitamin with minerals (ONE-A-DAY MAXIMUM FORMULA ORAL) Take 1 tablet by mouth once daily.    NOVOLOG FLEXPEN U-100 INSULIN 100 unit/mL (3 mL) InPn pen INJECT 25 UNITS SUBCUTANEOUSLY WITH MEALS    omeprazole (PRILOSEC) 20 MG capsule     rosuvastatin (CRESTOR) 20 MG tablet Take 1 tablet (20 mg total) by mouth once daily.    fish oil-omega-3 fatty acids 300-1,000 mg capsule Take by mouth once daily.    insulin lispro (HUMALOG) 100 unit/mL injection  Inject into the skin 3 (three) times daily before meals.    metFORMIN (GLUCOPHAGE) 500 MG tablet Take 500 mg by mouth 2 (two) times daily with meals.     Family History     Problem Relation (Age of Onset)    Colon polyps Sister    No Known Problems Mother, Father        Tobacco Use    Smoking status: Never Smoker    Smokeless tobacco: Never Used   Substance and Sexual Activity    Alcohol use: No    Drug use: No    Sexual activity: Not Currently     Review of Systems   Constitutional: Positive for fatigue. Negative for activity change.   Genitourinary: Negative for difficulty urinating.   Neurological: Negative for dizziness, seizures, light-headedness, numbness and headaches.   Psychiatric/Behavioral: Negative for agitation. The patient is not nervous/anxious.      Objective:     Vital Signs (Most Recent):  Temp: 98.7 °F (37.1 °C) (12/05/19 1515)  Pulse: 88 (12/05/19 1830)  Resp: (!) 22 (12/05/19 1830)  BP: (!) 149/65 (12/05/19 1800)  SpO2: (!) 91 % (12/05/19 1830) Vital Signs (24h Range):  Temp:  [96 °F (35.6 °C)-98.8 °F (37.1 °C)] 98.7 °F (37.1 °C)  Pulse:  [] 88  Resp:  [0-29] 22  SpO2:  [81 %-100 %] 91 %  BP: (103-165)/(48-83) 149/65  Arterial Line BP: (124-184)/(38-60) 176/46     Weight: 109.8 kg (242 lb 1 oz)  Body mass index is 35.23 kg/m².    Physical Exam   Constitutional: He is oriented to person, place, and time. He appears well-developed and well-nourished.   HENT:   Head: Normocephalic and atraumatic.   Eyes: EOM are normal.   Neck: Normal range of motion. Neck supple.   Pulmonary/Chest: Effort normal. No respiratory distress.   Musculoskeletal: Normal range of motion. He exhibits no deformity.   Neurological: He is alert and oriented to person, place, and time.   Psychiatric: He has a normal mood and affect. His behavior is normal. Judgment and thought content normal.       Significant Labs:   Recent Labs   Lab 12/05/19  0740 12/05/19  1130 12/05/19  1508   WBC 13.10* 11.83 13.27*   HGB  8.5* 7.8* 7.6*   HCT 25.3* 23.3* 22.6*   PLT 75* 79* 84*     Recent Labs   Lab 12/04/19 2032 12/05/19 0415 12/05/19  0844 12/05/19  1130   * 133* 134* 134*   K 5.4* 5.0 4.7 4.9    103 102 102   CO2 19* 20* 21* 23   BUN 64* 62* 60* 62*   CREATININE 5.5* 5.0* 4.8* 5.1*   * 326* 308* 324*   CALCIUM 7.8* 8.1* 8.4* 8.2*   MG 2.0 2.2  --  2.2   PHOS 6.5* 5.3*  --  5.4*     Recent Labs   Lab 12/02/19  1303 12/03/19  1631  12/04/19 2032 12/05/19 0415 12/05/19  1130   ALKPHOS 22*  --   --   --   --   --    ALT 15  --   --   --   --   --    AST 17  --   --   --   --   --    ALBUMIN 2.5*  --    < > 2.2* 2.4* 2.3*   PROT 4.3*  --   --   --   --   --    BILITOT 0.3  --   --   --   --   --    INR  --  1.1  --   --   --   --     < > = values in this interval not displayed.      No results for input(s): CPK, CPKMB, MB, TROPONINI in the last 72 hours.  Recent Labs   Lab 12/04/19  2311 12/05/19  0429 12/05/19  0735 12/05/19  1157 12/05/19  1643 12/05/19  1749   POCTGLUCOSE 392* 310* 290* 294* 256* 261*     Hemoglobin A1C   Date Value Ref Range Status   12/02/2019 5.6 4.0 - 5.6 % Final     Comment:     ADA Screening Guidelines:  5.7-6.4%  Consistent with prediabetes  >or=6.5%  Consistent with diabetes  High levels of fetal hemoglobin interfere with the HbA1C  assay. Heterozygous hemoglobin variants (HbS, HgC, etc)do  not significantly interfere with this assay.   However, presence of multiple variants may affect accuracy.     05/08/2018 7.7 (H) 4.0 - 5.6 % Final     Comment:     According to ADA guidelines, hemoglobin A1c <7.0% represents  optimal control in non-pregnant diabetic patients. Different  metrics may apply to specific patient populations.   Standards of Medical Care in Diabetes-2016.  For the purpose of screening for the presence of diabetes:  <5.7%     Consistent with the absence of diabetes  5.7-6.4%  Consistent with increasing risk for diabetes   (prediabetes)  >or=6.5%  Consistent with  diabetes  Currently, no consensus exists for use of hemoglobin A1c  for diagnosis of diabetes for children.  This Hemoglobin A1c assay has significant interference with fetal   hemoglobin   (HbF). The results are invalid for patients with abnormal amounts of   HbF,   including those with known Hereditary Persistence   of Fetal Hemoglobin. Heterozygous hemoglobin variants (HbAS, HbAC,   HbAD, HbAE, HbA2) do not significantly interfere with this assay;   however, presence of multiple variants in a sample may impact the %   interference.     06/09/2011 7.9 (H) 4.0 - 6.2 % Final     Scheduled Meds:   sodium chloride 0.9%   Intravenous Once    insulin aspart U-100  15 Units Subcutaneous TIDWM    insulin detemir U-100  40 Units Subcutaneous QHS    mupirocin   Nasal BID    rosuvastatin  20 mg Oral Daily    sodium chloride 0.9%  3 mL/kg Intravenous Once     Continuous Infusions:   sodium chloride 0.9% 3 mL/kg/hr (12/02/19 1348)    ceFAZolin 2 g/50mL Dextrose IVPB      furosemide (LASIX) 2 mg/mL infusion (non-titrating) 20 mg/hr (12/05/19 1452)    heparin (porcine)      norepinephrine bitartrate-D5W Stopped (12/04/19 2200)     As Needed: sodium chloride, sodium chloride, sodium chloride, sodium chloride 0.9%, sodium chloride 0.9%, acetaminophen, albuterol-ipratropium, alteplase, ceFAZolin 2 g/50mL Dextrose IVPB, Dextrose 10% Bolus, Dextrose 10% Bolus, Dextrose 10% Bolus, Dextrose 10% Bolus, glucagon (human recombinant), glucose, glucose, heparin (porcine), heparin (porcine), insulin aspart U-100, magnesium sulfate IVPB, morphine, ondansetron, ondansetron, polyethylene glycol, sodium chloride 0.9%, sodium phosphate IVPB, sodium phosphate IVPB, sodium phosphate IVPB    Significant Imaging: NA        Assessment/Plan:     Type 2 diabetes mellitus with kidney complication, without long-term current use of insulin  No AG   will try to treat with sq for now      levemir 50 units sq BID  aspart 15 units WM  accu check q 2  hours    12/5 continue current regimen        VTE Risk Mitigation (From admission, onward)         Ordered     Place DARRION hose  Until discontinued      12/05/19 1005     heparin (porcine) injection 2,300 Units  As needed (PRN)      12/04/19 1137     heparin infusion 1,000 units/500 ml in 0.9% NaCl (pressure line flush)  Intra-op continuous PRN      12/02/19 0932                Critical care time spent on the evaluation and treatment of severe organ dysfunction, review of pertinent labs and imaging studies, discussions with consulting providers and discussions with patient/family: 13 minutes.    Thank you for your consult. I will follow-up with patient. Please contact us if you have any additional questions.    Nehemias Gu,   Department of Hospital Medicine   [facility]

## 2019-12-06 NOTE — NURSING
0830: paged Dr. Fabian about decrease in pt H/H    0835: spoke to Dr. Fabian about pt H/H decreasing to 7.5 and 22.3, no new orders at this time.

## 2019-12-06 NOTE — PT/OT/SLP EVAL
Physical Therapy Evaluation    Patient Name:  Cy Rutherford   MRN:  [id number]    Recommendations:     Discharge Recommendations:  rehabilitation facility(pending Progress)   Discharge Equipment Recommendations: (TBD)   Barriers to discharge: Decreased caregiver support    Assessment:     Cy Rutherford is a 74 y.o. male admitted with a medical diagnosis of <principal problem not specified>.  He presents with the following impairments/functional limitations:  weakness, gait instability, decreased upper extremity function, decreased ROM, impaired cardiopulmonary response to activity, impaired endurance, impaired balance, decreased lower extremity function, impaired coordination, decreased safety awareness, impaired self care skills, impaired functional mobilty, decreased coordination, edema. PT initial evaluation completed. Plan of care and goals established and discussed with patient. Pt c/ increased work of breathing and wheezing on sitting EOB 2-3 mins. Pt required max x2 for supine <> sit. Unable to tolerate sitting in chair today.    Rehab Prognosis: Good; patient would benefit from acute skilled PT services to address these deficits and reach maximum level of function.    Recent Surgery: Procedure(s) (LRB):  Angiogram, Lower Arterial, Unilateral (N/A) 2 Days Post-Op    Plan:     During this hospitalization, patient to be seen 6 x/week to address the identified rehab impairments via gait training, therapeutic activities, therapeutic exercises, neuromuscular re-education and progress toward the following goals:    · Plan of Care Expires:  01/06/20    Subjective     Chief Complaint: feels SOB at rest 4/10  Patient/Family Comments/goals: Pt agreed to PT POC  Pain/Comfort:  Pain Rating 1: 0/10  Pain Rating Post-Intervention 1: 0/10    Patients cultural, spiritual, Voodoo conflicts given the current situation: no    Living Environment:  Pt lives alone in 1SH, 0STE  Prior to admission, patients level of function  was Mod I C/ SPC c/ functional gait.  Equipment used at home: walker, rolling, cane, straight.  DME owned (not currently used): none.  Upon discharge, patient will have assistance from no one; family is lives at a distance.    Objective:     Communicated with [facility]NE[place]na prior to session.  Patient found HOB elevated with bed alarm, peripheral IV, oxygen, telemetry, SCD, blood pressure cuff  upon PT entry to room.    General Precautions: Standard, fall   Orthopedic Precautions:N/A   Braces: N/A     Exams:  · Cognitive Exam:  Patient is oriented to Person, Place, Time and Situation  · Gross Motor Coordination:  impaired c/ bed mobility  · Postural Exam:  Patient presented with the following abnormalities:    · -       habitus  · RLE ROM: WFL  · RLE Strength: 3+/5 grossly  · LLE ROM: WFL  · LLE Strength: 3+/5 grossly    Functional Mobility:  · Bed Mobility:     · Rolling Right: moderate assistance and of 2 persons  · Scooting: moderate assistance and of 2 persons  · Supine to Sit: maximal assistance and of 2 persons  · Sit to Supine: maximal assistance and of 2 persons  · Transfers:  NA pt not appropriate at this time   · Gait: NA  · Balance: sitting EOB -poot c/ max A to maintain trunk postural control      Therapeutic Activities and Exercises:  PT eval completed c/ progressive mobility as detailed above.   Pt educated on PT role POC.   PT demo increased SOB c/ sitting EOB1-2 min; 02 at 94%; 02 increased to 4L c/ no relief. Pt taken to supine, reported relieved in sx c/ SOB back to base ine at 4/10. RN notified for possible respiratory therapy tx. 02 back to 2L in supine.   Plan: to continue progressive mobility assessment/taining as tolerated    AM-PAC 6 CLICK MOBILITY  Total Score:8     Patient left HOB elevated with all lines intact, call button in reach, bed alarm on and RN notified.    GOALS:   Multidisciplinary Problems     Physical Therapy Goals        Problem: Physical Therapy Goal    Goal Priority Disciplines  Outcome Goal Variances Interventions   Physical Therapy Goal     PT, PT/OT Ongoing, Progressing     Description:  Goals to be met by: 2020     Patient will increase functional independence with mobility by performin. Supine to sit with Set-up [place]  2. Sit to stand transfer with Supervision  3. Bed to chair transfer with Supervision using Rolling Walker  4. Gait  x 120 feet with Supervision using Rolling Walker.   5. Lower extremity exercise program x15 reps per handout, with supervision                      History:     Past Medical History:   Diagnosis Date    Acute coronary syndrome      ASMI    Coronary artery disease     Essential hypertension 11/15/2012    Hypertension     Intracranial atherosclerosis 2018    Thrombotic stroke involving basilar artery 2018    Type 2 diabetes mellitus with kidney complication, without long-term current use of insulin 11/15/2012       Past Surgical History:   Procedure Laterality Date    AORTOGRAPHY WITH SERIALOGRAPHY N/A 10/16/2019    Procedure: AORTOGRAM, WITH SERIALOGRAPHY;  Surgeon: James Sanchez MD;  Location: [facility] CATH LAB/EP;  Service: Cardiology;  Laterality: N/A;    COLONOSCOPY      CORONARY ANGIOPLASTY      MARIAN to LAD and LCX       Time Tracking:     PT Received On: 19  PT Start Time: 1040     PT Stop Time: 1105  PT Total Time (min): 25 min co-tx c/ OT    Billable Minutes: Evaluation 10      Portia Flores PT, DPT  2019

## 2019-12-06 NOTE — SUBJECTIVE & OBJECTIVE
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      Current Outpatient Medications on File Prior to Encounter   Medication Sig    amLODIPine (NORVASC) 5 MG tablet Take 1 tablet (5 mg total) by mouth once daily.    aspirin (ECOTRIN) 81 MG EC tablet Take 81 mg by mouth once daily.    chlorthalidone (HYGROTEN) 25 MG Tab Take 1 tablet (25 mg total) by mouth once daily.    cilostazol (PLETAL) 50 MG Tab Take 1 tablet (50 mg total) by mouth 2 (two) times daily.    clopidogrel (PLAVIX) 75 mg tablet Take 1 tablet (75 mg total) by mouth once daily.    doxazosin (CARDURA) 8 MG Tab Take 1 tablet (8 mg total) by mouth every evening.    fenofibrate micronized (LOFIBRA) 134 MG Cap Take 1 capsule (134 mg total) by mouth nightly.    furosemide (LASIX) 20 MG tablet Take 1 tablet (20 mg total) by mouth once daily.    insulin glargine (LANTUS) 100 unit/mL injection Inject 60 Units into the skin every morning.    losartan (COZAAR) 50 MG tablet Take 1 tablet (50 mg total) by mouth 2 (two) times daily.    metoprolol succinate (TOPROL-XL) 100 MG 24 hr tablet Take 1 tablet (100 mg total) by mouth 2 (two) times daily.    multivitamin with minerals (ONE-A-DAY  MAXIMUM FORMULA ORAL) Take 1 tablet by mouth once daily.    NOVOLOG FLEXPEN U-100 INSULIN 100 unit/mL (3 mL) InPn pen INJECT 25 UNITS SUBCUTANEOUSLY WITH MEALS    omeprazole (PRILOSEC) 20 MG capsule     rosuvastatin (CRESTOR) 20 MG tablet Take 1 tablet (20 mg total) by mouth once daily.    fish oil-omega-3 fatty acids 300-1,000 mg capsule Take by mouth once daily.    insulin lispro (HUMALOG) 100 unit/mL injection Inject into the skin 3 (three) times daily before meals.    metFORMIN (GLUCOPHAGE) 500 MG tablet Take 500 mg by mouth 2 (two) times daily with meals.     Family History     Problem Relation (Age of Onset)    Colon polyps Sister    No Known Problems Mother, Father        Tobacco Use    Smoking status: Never Smoker    Smokeless tobacco: Never Used   Substance and Sexual Activity    Alcohol use: No    Drug use: No    Sexual activity: Not Currently     Review of Systems   Constitutional: Positive for fatigue. Negative for activity change.   Genitourinary: Negative for difficulty urinating.   Neurological: Negative for dizziness, seizures, light-headedness, numbness and headaches.   Psychiatric/Behavioral: Negative for agitation. The patient is not nervous/anxious.      Objective:     Vital Signs (Most Recent):  Temp: 98.7 °F (37.1 °C) (12/05/19 1515)  Pulse: 88 (12/05/19 1830)  Resp: (!) 22 (12/05/19 1830)  BP: (!) 149/65 (12/05/19 1800)  SpO2: (!) 91 % (12/05/19 1830) Vital Signs (24h Range):  Temp:  [96 °F (35.6 °C)-98.8 °F (37.1 °C)] 98.7 °F (37.1 °C)  Pulse:  [] 88  Resp:  [0-29] 22  SpO2:  [81 %-100 %] 91 %  BP: (103-165)/(48-83) 149/65  Arterial Line BP: (124-184)/(38-60) 176/46     Weight: 109.8 kg (242 lb 1 oz)  Body mass index is 35.23 kg/m².    Physical Exam   Constitutional: He is oriented to person, place, and time. He appears well-developed and well-nourished.   HENT:   Head: Normocephalic and atraumatic.   Eyes: EOM are normal.   Neck: Normal range of motion. Neck supple.    Pulmonary/Chest: Effort normal. No respiratory distress.   Musculoskeletal: Normal range of motion. He exhibits no deformity.   Neurological: He is alert and oriented to person, place, and time.   Psychiatric: He has a normal mood and affect. His behavior is normal. Judgment and thought content normal.       Significant Labs:   Recent Labs   Lab 12/05/19  0740 12/05/19  1130 12/05/19  1508   WBC 13.10* 11.83 13.27*   HGB 8.5* 7.8* 7.6*   HCT 25.3* 23.3* 22.6*   PLT 75* 79* 84*     Recent Labs   Lab 12/04/19 2032 12/05/19  0415 12/05/19  0844 12/05/19  1130   * 133* 134* 134*   K 5.4* 5.0 4.7 4.9    103 102 102   CO2 19* 20* 21* 23   BUN 64* 62* 60* 62*   CREATININE 5.5* 5.0* 4.8* 5.1*   * 326* 308* 324*   CALCIUM 7.8* 8.1* 8.4* 8.2*   MG 2.0 2.2  --  2.2   PHOS 6.5* 5.3*  --  5.4*     Recent Labs   Lab 12/02/19  1303 12/03/19  1631  12/04/19 2032 12/05/19 0415 12/05/19  1130   ALKPHOS 22*  --   --   --   --   --    ALT 15  --   --   --   --   --    AST 17  --   --   --   --   --    ALBUMIN 2.5*  --    < > 2.2* 2.4* 2.3*   PROT 4.3*  --   --   --   --   --    BILITOT 0.3  --   --   --   --   --    INR  --  1.1  --   --   --   --     < > = values in this interval not displayed.      No results for input(s): CPK, CPKMB, MB, TROPONINI in the last 72 hours.  Recent Labs   Lab 12/04/19  2311 12/05/19  0429 12/05/19  0735 12/05/19  1157 12/05/19  1643 12/05/19  1749   POCTGLUCOSE 392* 310* 290* 294* 256* 261*     Hemoglobin A1C   Date Value Ref Range Status   12/02/2019 5.6 4.0 - 5.6 % Final     Comment:     ADA Screening Guidelines:  5.7-6.4%  Consistent with prediabetes  >or=6.5%  Consistent with diabetes  High levels of fetal hemoglobin interfere with the HbA1C  assay. Heterozygous hemoglobin variants (HbS, HgC, etc)do  not significantly interfere with this assay.   However, presence of multiple variants may affect accuracy.     05/08/2018 7.7 (H) 4.0 - 5.6 % Final     Comment:     According to  ADA guidelines, hemoglobin A1c <7.0% represents  optimal control in non-pregnant diabetic patients. Different  metrics may apply to specific patient populations.   Standards of Medical Care in Diabetes-2016.  For the purpose of screening for the presence of diabetes:  <5.7%     Consistent with the absence of diabetes  5.7-6.4%  Consistent with increasing risk for diabetes   (prediabetes)  >or=6.5%  Consistent with diabetes  Currently, no consensus exists for use of hemoglobin A1c  for diagnosis of diabetes for children.  This Hemoglobin A1c assay has significant interference with fetal   hemoglobin   (HbF). The results are invalid for patients with abnormal amounts of   HbF,   including those with known Hereditary Persistence   of Fetal Hemoglobin. Heterozygous hemoglobin variants (HbAS, HbAC,   HbAD, HbAE, HbA2) do not significantly interfere with this assay;   however, presence of multiple variants in a sample may impact the %   interference.     06/09/2011 7.9 (H) 4.0 - 6.2 % Final     Scheduled Meds:   sodium chloride 0.9%   Intravenous Once    insulin aspart U-100  15 Units Subcutaneous TIDWM    insulin detemir U-100  40 Units Subcutaneous QHS    mupirocin   Nasal BID    rosuvastatin  20 mg Oral Daily    sodium chloride 0.9%  3 mL/kg Intravenous Once     Continuous Infusions:   sodium chloride 0.9% 3 mL/kg/hr (12/02/19 1348)    ceFAZolin 2 g/50mL Dextrose IVPB      furosemide (LASIX) 2 mg/mL infusion (non-titrating) 20 mg/hr (12/05/19 1452)    heparin (porcine)      norepinephrine bitartrate-D5W Stopped (12/04/19 2200)     As Needed: sodium chloride, sodium chloride, sodium chloride, sodium chloride 0.9%, sodium chloride 0.9%, acetaminophen, albuterol-ipratropium, alteplase, ceFAZolin 2 g/50mL Dextrose IVPB, Dextrose 10% Bolus, Dextrose 10% Bolus, Dextrose 10% Bolus, Dextrose 10% Bolus, glucagon (human recombinant), glucose, glucose, heparin (porcine), heparin (porcine), insulin aspart U-100,  magnesium sulfate IVPB, morphine, ondansetron, ondansetron, polyethylene glycol, sodium chloride 0.9%, sodium phosphate IVPB, sodium phosphate IVPB, sodium phosphate IVPB    Significant Imaging: NA

## 2019-12-06 NOTE — PLAN OF CARE
Problem: Physical Therapy Goal  Goal: Physical Therapy Goal  Description  Goals to be met by: 2020     Patient will increase functional independence with mobility by performin. Supine to sit with Set-up [place]  2. Sit to stand transfer with Supervision  3. Bed to chair transfer with Supervision using Rolling Walker  4. Gait  x 120 feet with Supervision using Rolling Walker.   5. Lower extremity exercise program x15 reps per handout, with supervision     Outcome: Ongoing, Progressing       PT initial evaluation completed. Plan of care and goals established and discussed with patient. Pt c/ increased work of breathing and wheezing on sitting EOB 2-3 mins. Pt required max x2 for supine <> sit. Unable to tolerate sitting in chair today.    Discharge Recommendation: Inpatient Rehab facility  DME Recommendation: Defer

## 2019-12-06 NOTE — PROGRESS NOTES
[facility]  Cardiology  Progress Note    Patient Name: Cy Rutherford  MRN: [id number]  Admission Date: 12/2/2019  Hospital Length of Stay: 3 days  Code Status: Full Code   Attending Physician: James Sanchez MD   Primary Care Physician: Heide Porter MD  Expected Discharge Date:   Principal Problem:<principal problem not specified>    Subjective:     Hospital Course:   12/2/2019 Admitted for elective LE angiogram for evaluation of RLE claudication. Taken to the cath lab for the procedure via LCFA access with following results:    successful atherectomy (Hawk LX) with distal filter protection, followed by PTA with scoring balloon (5.0 x 150 dSFA, 6.0 x 150 pSFA/CFA) followed by PTA with DCB to dSFA (5.0 x 150), pSFA (5.0 x 100) and CFA (7 x 40) with good results. Successful PTA to TPT with 3.0 x 40 balloon- see cath report for full report     Tolerated procedure well and recovered in pre post cath area. Hypotension noted along with back pain and diaphoresis. No hematoma noted and manual pressure applied out of concern for bleeding. STAT H&H down to 7.4&24.1 from 11.6&36.1. Given profound symptoms, major concern for bleeding prompting re evaluation with recurrent angiogram. Placed on IVFs along with IV Levophed. Repeat procedure via right radial access with images  in multiple views with no active bleed noted. Cuff pressure with significant difference in comparison to  aortic pressure which was significantly higher.  Admitted to ICU for close monitoring with kanchan placed. T&S with PRBC transfusion initiated with IV Lasix given in between   12/3/2019 Remain on IV Levophed drip at .46mcg/kg/min with BP 110s-130s/40s-50s HR 60s. Serial H&Hs Q4hrs overnight with  H&H this AM 10.0&30.2 with slight trend down to 9.1&27.5 on repeat check. Complains of SOB with increased RR. Only 500cc out overnight. Will repeat IV Lasix 40mg this AM. Echocardiogram and CXR as well. Will attempt to wean IV Levophed  drip as BP tolerates   12/4/2019 Creatinine trended up to 3.7 overnight with K+ 6.1. Continued with no urine output. Nephrology consulted yesterday with trialysis catheter placed yesterday with initiation of CRRT overnight. CRRT x 2-3 hours prior to clotting off. H&H trended down further to 7.3&21.8 with repeat PRBC transfusion. H&H trended up to 8.4&24.9 with continued serial monitoring with recurrent drop to 7.0&21.2. Concerned about recurrent bleeding with plans for repeat angiogram for re evaluation of acute bleed. Remains on IV Levophed with stable BP and wean in process. Complains of mild SOB with stable sats on Venti mask.   12/5/2019 Repeat angiogram yesterday using CO2 with no active bleed noted after extensive angiogram. H&H down 6.6&18.9 yesterday evening with repeat PRBC. CT abdomen/pelvis with evidence of large left renal subcapsular hematoma with surrounding retroperitoneal hemorrhage and no definite contrast extravasation seen to suggest active bleeding on delayed images. H&H up to 7.4&22.0-8.5&25.3. Weaned off IV Levophed with stable BP. Resumed CRRT yesterday evening with 695cc removed and 30cc urine output noted +2.7 liters. Continued mild SOB with stable sats on venti mask. BMP with K+ 5.0 BUN 62 creatinine 5.0. Will continue with serial H&Hs for now. Will place TEDs and consult PT   12/6/2019 Attempted CRRT and HD yesterday with clotted line and approximately 400cc blood loss due to inability to rinse back. Placed on IV Lasix drip at 20mg/hr by Nephrology with 675cc urine output overnight positive 3 liters since admission. K+ 5.0 with BUN 69 creatinine 5.4 this AM. H&H 74&22.4 this AM unchanged from overnight-will continue with serial H&Hs for now. Off IV pressors for over 48 hours with BP 130s-170s/70s overnight. Mild SOB remains per patient with slight improvement noted on PE. Updated son at bedside this morning         Review of Systems   Constitution: Negative for chills, decreased appetite,  diaphoresis and fever.   Cardiovascular: Positive for dyspnea on exertion. Negative for chest pain, claudication, cyanosis, irregular heartbeat, leg swelling, near-syncope, orthopnea, palpitations, paroxysmal nocturnal dyspnea and syncope.   Respiratory: Negative for cough, hemoptysis, shortness of breath and wheezing.    Gastrointestinal: Negative for bloating, abdominal pain, constipation, diarrhea, melena, nausea and vomiting.   Neurological: Negative for dizziness and weakness.     Objective:     Vital Signs (Most Recent):  Temp: 98.2 °F (36.8 °C) (12/06/19 0730)  Pulse: 79 (12/06/19 1015)  Resp: (!) 22 (12/06/19 1015)  BP: (!) 160/71 (12/06/19 1000)  SpO2: 96 % (12/06/19 1015) Vital Signs (24h Range):  Temp:  [98.2 °F (36.8 °C)-99 °F (37.2 °C)] 98.2 °F (36.8 °C)  Pulse:  [] 79  Resp:  [17-37] 22  SpO2:  [90 %-100 %] 96 %  BP: (103-185)/(51-83) 160/71     Weight: 110.3 kg (243 lb 2.7 oz)  Body mass index is 35.39 kg/m².     SpO2: 96 %  O2 Device (Oxygen Therapy): BiPAP      Intake/Output Summary (Last 24 hours) at 12/6/2019 1056  Last data filed at 12/6/2019 0600  Gross per 24 hour   Intake 1259.58 ml   Output 972 ml   Net 287.58 ml       Lines/Drains/Airways     Central Venous Catheter Line                 Trialysis (Dialysis) Catheter 12/03/19 1659 right internal jugular 2 days          Drain                 Urethral Catheter 12/03/19 0445 16 Fr. 3 days                Physical Exam   Constitutional: He is oriented to person, place, and time. He appears well-developed and well-nourished. No distress.   Cardiovascular: Normal rate and regular rhythm. Exam reveals no gallop.   No murmur heard.  Pulmonary/Chest: Effort normal and breath sounds normal. Tachypnea noted. No respiratory distress. He has no wheezes.   Abdominal: Soft. Bowel sounds are normal. He exhibits no distension. There is no tenderness.   Neurological: He is alert and oriented to person, place, and time.   Skin: Skin is warm and dry.        Significant Labs:     Recent Labs   Lab 12/06/19  0844   WBC 10.80   RBC 2.52*   HGB 7.5*   HCT 22.4*   *   MCV 89   MCH 29.8   MCHC 33.5     Recent Labs   Lab 12/06/19  0416   *   K 5.0      CO2 23   BUN 69*   CREATININE 5.4*   MG 2.3       Significant Imaging: Echocardiogram:   Transthoracic echo (TTE) complete (Cupid Only):   Results for orders placed or performed during the hospital encounter of 12/02/19   Echo Color Flow Doppler? Yes   Result Value Ref Range    BSA 2.3 m2    TDI SEPTAL 0.06 m/s    LV LATERAL E/E' RATIO 9.00 m/s    LV SEPTAL E/E' RATIO 10.50 m/s    TDI LATERAL 0.07 m/s    PV PEAK VELOCITY 1.81 cm/s    LVIDD 4.80 3.5 - 6.0 cm    IVS 1.10 (A) 0.6 - 1.1 cm    PW 1.10 (A) 0.6 - 1.1 cm    Ao root annulus 3.40 cm    LVIDS 2.56 2.1 - 4.0 cm    FS 47 28 - 44 %    LV mass 193.96 g    LA size 3.68 cm    TAPSE 1.44 cm    Left Ventricle Relative Wall Thickness 0.46 cm    AV mean gradient 6 mmHg    AV valve area 4.31 cm2    AV Velocity Ratio 0.78     AV index (prosthetic) 1.06     E/A ratio 0.62     Mean e' 0.07 m/s    E wave decelartion time 338.80 msec    LVOT diameter 2.27 cm    LVOT area 4.0 cm2    LVOT peak simone 1.16 m/s    LVOT peak VTI 26.25 cm    Ao peak simone 1.48 m/s    Ao VTI 24.66 cm    LVOT stroke volume 106.18 cm3    AV peak gradient 9 mmHg    E/E' ratio 9.69 m/s    MV Peak E Simone 0.63 m/s    MV Peak A Simone 1.02 m/s    LV Systolic Volume 23.57 mL    LV Systolic Volume Index 10.6 mL/m2    LV Diastolic Volume 68.84 mL    LV Diastolic Volume Index 30.84 mL/m2    LV Mass Index 87 g/m2    Right Atrial Pressure (from IVC) 3 mmHg    Narrative    · Normal left ventricular systolic function. The estimated ejection   fraction is 55%  · Concentric left ventricular remodeling.  · No wall motion abnormalities.  · Normal right ventricular systolic function.  · Normal central venous pressure (3 mm Hg).        Assessment and Plan:     Brief HPI: Seen on AM rounds with Dr. Fabian while  resting in bed. Continues to complain of mild SOB with no worsening over past 24 hours. Discussed POC as detailed below-verbalized understanding and agrees with POC. Son at bedside later in the morning and updated on current plan of care-all questions answered satisfactorily     Hyperkalemia  -K+ 5.0 this AM  -related to JOSE/ATN  -Nephrology on board     JOSE (acute kidney injury)  -multifactoral  -concerned about ATN given bleed and hypotension  -creatinine 1.2 upon admission with trend up to 3.7-4.5-5.3-5.4-5.5-5.0; creatinine 5.4 this AM  -Nephrology on board; attempted CRRT and HD yesterday with clotted line; on IV Lasix drip currently; await further recs from Nephrology   -will continue to avoid nephrotoxic agents and hypotension      Leukocytosis  -WBCs 28K post procedure with trend down to 22K-13K; 10K  this AM  -afebrile  -likely reactive rather than infectious     Shortness of breath  -concerning for volume overload in setting of IVF use and PRBC transfusion  -IV Lasix with no response; JOSE with CRRT in process currently  -CXR reviewed and mild SOB noted on PE; pulse ox WNL; Nephrology on board and anticipate mild volume removal with CRRT   -echocardiogram with normal LVEF     Edema  -concerning for combination of volume overload and sedentary lifestyle  -unchanged  -will continue to monitor     Anemia  -initial H&H 11.1&36.1 with trend down to 7.4&24.1 post procedure  -repeat angiogram on 12/2 with no active bleeding; PRBC transfusion with trend up of H&H with recurrent drop down to 6.6&19.4 with repeat angiogram  12/4 with no active bleeding; CTA abdomen with renal subcapsular hematoma with surrounding retroperitoneal hemorrhage and no definite contrast extravasation seen to suggest active bleeding on delayed images  -H&H 7.4&22.4 this AM unchanged from yesterday; recurrent drop ?related to blood loss with CRRT/HD  -will continue with serial H&Hs and transfuse as needed     Hypotension  -resolved  -remains  off IV pressors     PAD (peripheral artery disease)  -admitted for elective LE angiogram for further evaluation of LE claudication  -successful atherectomy and PTA with scoring balloon and  DCB to dSFA,  pSFA CFA and TPT   -foot warm and pulses present   -continue statin; no ASA and Plavix due to bleeding issue   -will need serial LE arterial ultrasounds as an outpatient       CAD (coronary artery disease)  -s/p LAD and LCX MARIAN in 2011  -was on  ASA, statin, Plavix with holding of  BB or ACEI/ARB due to hypotension and pressor use  -will hold DAPT given concern for acute bleeding; will continue to hold BB and ACEI/ARB as well   -echocardiogram with normal LV function with EF 55%    Hyperlipidemia  -continue statin therapy  -FLP with LDL 80 in 9/2019    Essential hypertension  -on Norvasc, Chlorthalidone, Metoprolol and Losartan at home  -will continue to hold meds for now and monitor BP  -remains off IV Levophed   -BP 130s-170s/70s overnight     Type 2 diabetes mellitus with kidney complication, without long-term current use of insulin  --256 overnight   -on Lantus and mealtime insulin at home  -diabetes management per Hospital Medicine   -recent HgbA1c 5.6 9/2019        VTE Risk Mitigation (From admission, onward)         Ordered     Place DARRION hose  Until discontinued      12/05/19 1005     heparin (porcine) injection 2,300 Units  As needed (PRN)      12/04/19 1137     heparin infusion 1,000 units/500 ml in 0.9% NaCl (pressure line flush)  Intra-op continuous PRN      12/02/19 0932                Marzena Valdez, APRN, ANP  Cardiology  [facility]

## 2019-12-07 LAB
ANION GAP SERPL CALC-SCNC: 13 MMOL/L (ref 8–16)
BASOPHILS # BLD AUTO: 0.01 K/UL (ref 0–0.2)
BASOPHILS # BLD AUTO: 0.01 K/UL (ref 0–0.2)
BASOPHILS NFR BLD: 0.1 % (ref 0–1.9)
BASOPHILS NFR BLD: 0.1 % (ref 0–1.9)
BUN SERPL-MCNC: 89 MG/DL (ref 8–23)
CALCIUM SERPL-MCNC: 8 MG/DL (ref 8.7–10.5)
CHLORIDE SERPL-SCNC: 101 MMOL/L (ref 95–110)
CO2 SERPL-SCNC: 22 MMOL/L (ref 23–29)
CREAT SERPL-MCNC: 6 MG/DL (ref 0.5–1.4)
DIFFERENTIAL METHOD: ABNORMAL
DIFFERENTIAL METHOD: ABNORMAL
EOSINOPHIL # BLD AUTO: 0.3 K/UL (ref 0–0.5)
EOSINOPHIL # BLD AUTO: 0.4 K/UL (ref 0–0.5)
EOSINOPHIL NFR BLD: 2.8 % (ref 0–8)
EOSINOPHIL NFR BLD: 3.8 % (ref 0–8)
ERYTHROCYTE [DISTWIDTH] IN BLOOD BY AUTOMATED COUNT: 14.3 % (ref 11.5–14.5)
ERYTHROCYTE [DISTWIDTH] IN BLOOD BY AUTOMATED COUNT: 14.4 % (ref 11.5–14.5)
EST. GFR  (AFRICAN AMERICAN): 10 ML/MIN/1.73 M^2
EST. GFR  (NON AFRICAN AMERICAN): 8 ML/MIN/1.73 M^2
GLUCOSE SERPL-MCNC: 156 MG/DL (ref 70–110)
HBV SURFACE AB SER QL IA: NEGATIVE
HBV SURFACE AB SERPL IA-ACNC: <3 MIU/ML
HCT VFR BLD AUTO: 22 % (ref 40–54)
HCT VFR BLD AUTO: 23.2 % (ref 40–54)
HGB BLD-MCNC: 7.3 G/DL (ref 14–18)
HGB BLD-MCNC: 7.7 G/DL (ref 14–18)
IRON SERPL-MCNC: 21 UG/DL (ref 45–160)
LYMPHOCYTES # BLD AUTO: 0.5 K/UL (ref 1–4.8)
LYMPHOCYTES # BLD AUTO: 0.5 K/UL (ref 1–4.8)
LYMPHOCYTES NFR BLD: 4.8 % (ref 18–48)
LYMPHOCYTES NFR BLD: 4.8 % (ref 18–48)
MAGNESIUM SERPL-MCNC: 2.5 MG/DL (ref 1.6–2.6)
MCH RBC QN AUTO: 29.7 PG (ref 27–31)
MCH RBC QN AUTO: 29.9 PG (ref 27–31)
MCHC RBC AUTO-ENTMCNC: 33.2 G/DL (ref 32–36)
MCHC RBC AUTO-ENTMCNC: 33.2 G/DL (ref 32–36)
MCV RBC AUTO: 90 FL (ref 82–98)
MCV RBC AUTO: 90 FL (ref 82–98)
MONOCYTES # BLD AUTO: 1.2 K/UL (ref 0.3–1)
MONOCYTES # BLD AUTO: 1.3 K/UL (ref 0.3–1)
MONOCYTES NFR BLD: 11.9 % (ref 4–15)
MONOCYTES NFR BLD: 12.1 % (ref 4–15)
NEUTROPHILS # BLD AUTO: 7.9 K/UL (ref 1.8–7.7)
NEUTROPHILS # BLD AUTO: 8.1 K/UL (ref 1.8–7.7)
NEUTROPHILS NFR BLD: 79.2 % (ref 38–73)
NEUTROPHILS NFR BLD: 80.4 % (ref 38–73)
PHOSPHATE SERPL-MCNC: 6.6 MG/DL (ref 2.7–4.5)
PLATELET # BLD AUTO: 127 K/UL (ref 150–350)
PLATELET # BLD AUTO: 143 K/UL (ref 150–350)
PMV BLD AUTO: 10.3 FL (ref 9.2–12.9)
PMV BLD AUTO: 10.3 FL (ref 9.2–12.9)
POCT GLUCOSE: 125 MG/DL (ref 70–110)
POCT GLUCOSE: 155 MG/DL (ref 70–110)
POCT GLUCOSE: 169 MG/DL (ref 70–110)
POCT GLUCOSE: 195 MG/DL (ref 70–110)
POCT GLUCOSE: 213 MG/DL (ref 70–110)
POTASSIUM SERPL-SCNC: 4.7 MMOL/L (ref 3.5–5.1)
RBC # BLD AUTO: 2.44 M/UL (ref 4.6–6.2)
RBC # BLD AUTO: 2.59 M/UL (ref 4.6–6.2)
SATURATED IRON: 8 % (ref 20–50)
SODIUM SERPL-SCNC: 136 MMOL/L (ref 136–145)
TOTAL IRON BINDING CAPACITY: 268 UG/DL (ref 250–450)
TRANSFERRIN SERPL-MCNC: 181 MG/DL (ref 200–375)
WBC # BLD AUTO: 10.35 K/UL (ref 3.9–12.7)
WBC # BLD AUTO: 9.86 K/UL (ref 3.9–12.7)

## 2019-12-07 PROCEDURE — 63600175 PHARM REV CODE 636 W HCPCS: Performed by: INTERNAL MEDICINE

## 2019-12-07 PROCEDURE — 99900035 HC TECH TIME PER 15 MIN (STAT)

## 2019-12-07 PROCEDURE — 94660 CPAP INITIATION&MGMT: CPT

## 2019-12-07 PROCEDURE — 84100 ASSAY OF PHOSPHORUS: CPT

## 2019-12-07 PROCEDURE — 99291 CRITICAL CARE FIRST HOUR: CPT | Mod: ,,, | Performed by: INTERNAL MEDICINE

## 2019-12-07 PROCEDURE — 25000003 PHARM REV CODE 250: Performed by: NURSE PRACTITIONER

## 2019-12-07 PROCEDURE — 25000003 PHARM REV CODE 250: Performed by: INTERNAL MEDICINE

## 2019-12-07 PROCEDURE — 99291 PR CRITICAL CARE, E/M 30-74 MINUTES: ICD-10-PCS | Mod: ,,, | Performed by: INTERNAL MEDICINE

## 2019-12-07 PROCEDURE — 83540 ASSAY OF IRON: CPT

## 2019-12-07 PROCEDURE — 27000221 HC OXYGEN, UP TO 24 HOURS

## 2019-12-07 PROCEDURE — 20000000 HC ICU ROOM

## 2019-12-07 PROCEDURE — 80048 BASIC METABOLIC PNL TOTAL CA: CPT

## 2019-12-07 PROCEDURE — 85025 COMPLETE CBC W/AUTO DIFF WBC: CPT

## 2019-12-07 PROCEDURE — 94761 N-INVAS EAR/PLS OXIMETRY MLT: CPT

## 2019-12-07 PROCEDURE — 83735 ASSAY OF MAGNESIUM: CPT

## 2019-12-07 RX ORDER — DOXAZOSIN 2 MG/1
4 TABLET ORAL DAILY
Status: DISCONTINUED | OUTPATIENT
Start: 2019-12-07 | End: 2019-12-08

## 2019-12-07 RX ORDER — AMLODIPINE BESYLATE 5 MG/1
5 TABLET ORAL DAILY
Status: DISCONTINUED | OUTPATIENT
Start: 2019-12-07 | End: 2019-12-08

## 2019-12-07 RX ADMIN — FUROSEMIDE 10 MG/HR: 10 INJECTION, SOLUTION INTRAMUSCULAR; INTRAVENOUS at 09:12

## 2019-12-07 RX ADMIN — AMLODIPINE BESYLATE 5 MG: 5 TABLET ORAL at 03:12

## 2019-12-07 RX ADMIN — MUPIROCIN: 20 OINTMENT TOPICAL at 08:12

## 2019-12-07 RX ADMIN — MUPIROCIN: 20 OINTMENT TOPICAL at 09:12

## 2019-12-07 RX ADMIN — DOXAZOSIN 4 MG: 2 TABLET ORAL at 03:12

## 2019-12-07 RX ADMIN — INSULIN ASPART 15 UNITS: 100 INJECTION, SOLUTION INTRAVENOUS; SUBCUTANEOUS at 08:12

## 2019-12-07 RX ADMIN — IRON SUCROSE 100 MG: 20 INJECTION, SOLUTION INTRAVENOUS at 12:12

## 2019-12-07 RX ADMIN — ROSUVASTATIN CALCIUM 20 MG: 10 TABLET, FILM COATED ORAL at 08:12

## 2019-12-07 RX ADMIN — INSULIN ASPART 2 UNITS: 100 INJECTION, SOLUTION INTRAVENOUS; SUBCUTANEOUS at 05:12

## 2019-12-07 RX ADMIN — INSULIN ASPART 15 UNITS: 100 INJECTION, SOLUTION INTRAVENOUS; SUBCUTANEOUS at 12:12

## 2019-12-07 RX ADMIN — INSULIN DETEMIR 60 UNITS: 100 INJECTION, SOLUTION SUBCUTANEOUS at 09:12

## 2019-12-07 RX ADMIN — NEPHROCAP 1 CAPSULE: 1 CAP ORAL at 08:12

## 2019-12-07 RX ADMIN — INSULIN ASPART 15 UNITS: 100 INJECTION, SOLUTION INTRAVENOUS; SUBCUTANEOUS at 05:12

## 2019-12-07 RX ADMIN — FUROSEMIDE 20 MG/HR: 10 INJECTION, SOLUTION INTRAMUSCULAR; INTRAVENOUS at 05:12

## 2019-12-07 NOTE — SUBJECTIVE & OBJECTIVE
Interval History: Net negative 3.2 L. Patient still quite fluid overloaded with audible wheezing. Fasting BG now under 200    Review of Systems   Constitutional: Positive for fatigue. Negative for activity change and chills.   HENT: Negative for trouble swallowing.    Respiratory: Positive for shortness of breath and wheezing.    Cardiovascular: Positive for leg swelling. Negative for chest pain and palpitations.   Gastrointestinal: Negative for abdominal distention.   Genitourinary: Negative for difficulty urinating.   Neurological: Negative for weakness.   Psychiatric/Behavioral: Negative for agitation. The patient is not nervous/anxious.      Objective:     Vital Signs (Most Recent):  Temp: 98.4 °F (36.9 °C) (12/07/19 1145)  Pulse: 83 (12/07/19 1145)  Resp: (!) 24 (12/07/19 1145)  BP: (!) 162/68 (12/07/19 1030)  SpO2: 99 % (12/07/19 1145) Vital Signs (24h Range):  Temp:  [98 °F (36.7 °C)-99.3 °F (37.4 °C)] 98.4 °F (36.9 °C)  Pulse:  [72-96] 83  Resp:  [16-28] 24  SpO2:  [96 %-100 %] 99 %  BP: (134-174)/(60-74) 162/68     Weight: 110.3 kg (243 lb 2.7 oz)  Body mass index is 35.39 kg/m².    Intake/Output Summary (Last 24 hours) at 12/7/2019 1236  Last data filed at 12/7/2019 1200  Gross per 24 hour   Intake 700 ml   Output 4135 ml   Net -3435 ml      Physical Exam   Constitutional: He is oriented to person, place, and time. He appears well-developed and well-nourished.   HENT:   Head: Atraumatic.   Eyes: EOM are normal.   Neck: Neck supple. JVD present.   Cardiovascular: Normal rate, regular rhythm, normal heart sounds and intact distal pulses.   Pulmonary/Chest: Effort normal. No respiratory distress. He has wheezes. He has rales.   Musculoskeletal: Normal range of motion. He exhibits edema (3+ pitting edema in BL LE).   Neurological: He is alert and oriented to person, place, and time.   Skin: Skin is warm and dry.   Psychiatric: He has a normal mood and affect. His behavior is normal.   Vitals  reviewed.      Significant Labs: All pertinent labs within the past 24 hours have been reviewed.    Significant Imaging: I have reviewed all pertinent imaging results/findings within the past 24 hours.

## 2019-12-07 NOTE — EICU
eICU rounds completed.  Appeared to have ST depression on tele monitor, lead II.  Spoke w/primary RN & compared w/rhythm strip printed earlier this shift, no change.  Patient sleeping, on BiPAP at this time.

## 2019-12-07 NOTE — PLAN OF CARE
Patient on Bipap with documented settings and parameters. Alarms are on and functioning. No resp distress noted. Will continue to monitor.

## 2019-12-07 NOTE — PROGRESS NOTES
[facility] Hospital Medicine  Progress Note    Patient Name: Cy Rutherford  MRN: [id number]  Patient Class: IP- Inpatient   Admission Date: 12/2/2019  Length of Stay: 4 days  Attending Physician: James Sanchez MD  Primary Care Provider: Heide Porter MD        Subjective:     Principal Problem:<principal problem not specified>        HPI:  Admitted for elective LE angiogram for evaluation of RLE claudication. Taken to the cath lab for the procedure via LCFA access with following results:     successful atherectomy (Hawk LX) with distal filter protection, followed by PTA with scoring balloon (5.0 x 150 dSFA, 6.0 x 150 pSFA/CFA) followed by PTA with DCB to dSFA (5.0 x 150), pSFA (5.0 x 100) and CFA (7 x 40) with good results. 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 He has elevated BG, no anion gap.  We will place on insulin sq and monito closely , if dka develop, we will place him on insulin drip    Overview/Hospital Course:  12/4 the pt still have elevated BG and has no AG, we will increase the sq insluin, and more frequent accu check  Pt earlier was a and o x 3  12/5 pt seen, his appetite is better, his BG is improving slowly, we will continue current regimen and follow up.  Pt started on HD by nephrology  12/6 pt's BG is improving but still elevated, discussed with pharmD, will increase his long acting insulin to 60unit from 40units, to match his home dose.  12/7 Blood glucose under 200 today. Will keep insulin  regiment as is. Continue diuresis as patient is still quite fluid overloaded.    Interval History: Net negative 3.2 L. Patient still quite fluid overloaded with audible wheezing. Fasting BG now under 200    Review of Systems   Constitutional: Positive for fatigue. Negative for activity change and chills.   HENT: Negative for trouble swallowing.    Respiratory: Positive for shortness of breath and wheezing.    Cardiovascular: Positive for leg swelling. Negative for chest pain and palpitations.   Gastrointestinal: Negative for abdominal distention.   Genitourinary: Negative for difficulty urinating.   Neurological: Negative for weakness.   Psychiatric/Behavioral: Negative for agitation. The patient is not nervous/anxious.      Objective:     Vital Signs (Most Recent):  Temp: 98.4 °F (36.9 °C) (12/07/19 1145)  Pulse: 83 (12/07/19 1145)  Resp: (!) 24 (12/07/19 1145)  BP: (!) 162/68 (12/07/19 1030)  SpO2: 99 % (12/07/19 1145) Vital Signs (24h Range):  Temp:  [98 °F (36.7 °C)-99.3 °F (37.4 °C)] 98.4 °F (36.9 °C)  Pulse:  [72-96] 83  Resp:  [16-28] 24  SpO2:  [96 %-100 %] 99 %  BP: (134-174)/(60-74) 162/68     Weight: 110.3 kg (243 lb 2.7 oz)  Body mass index is 35.39 kg/m².    Intake/Output Summary (Last 24 hours) at 12/7/2019 1236  Last data filed at 12/7/2019 1200  Gross per 24 hour   Intake 700 ml   Output 4135 ml   Net -3435 ml      Physical Exam   Constitutional: He is oriented to person, place, and time. He appears well-developed and well-nourished.   HENT:   Head: Atraumatic.   Eyes: EOM are normal.   Neck: Neck supple. JVD present.   Cardiovascular: Normal rate, regular rhythm, normal heart sounds and intact distal pulses.   Pulmonary/Chest: Effort normal. No respiratory distress. He has wheezes. He has rales.   Musculoskeletal: Normal range of motion. He exhibits edema (3+ pitting edema in BL LE).   Neurological: He is alert and oriented to person, place, and time.   Skin: Skin is warm and dry.   Psychiatric: He  has a normal mood and affect. His behavior is normal.   Vitals reviewed.      Significant Labs: All pertinent labs within the past 24 hours have been reviewed.    Significant Imaging: I have reviewed all pertinent imaging results/findings within the past 24 hours.      Assessment/Plan:      Type 2 diabetes mellitus with kidney complication, with long-term current use of insulin  - BG now under 200  - Continue levemir 60 units nightly   - Continue aspart 15 units TID with meals  - LDSSI       VTE Risk Mitigation (From admission, onward)         Ordered     Place DARRION hose  Until discontinued      12/05/19 1005     heparin (porcine) injection 2,300 Units  As needed (PRN)      12/04/19 1137     heparin infusion 1,000 units/500 ml in 0.9% NaCl (pressure line flush)  Intra-op continuous PRN      12/02/19 0932                Critical care time spent on the evaluation and treatment of severe organ dysfunction, review of pertinent labs and imaging studies, discussions with consulting providers and discussions with patient/family: 25 minutes.      Navid Rain DO  Department of Hospital Medicine   [facility]

## 2019-12-07 NOTE — PROGRESS NOTES
Progress Note  Nephrology      Consult Requested By: James Sanchez MD      SUBJECTIVE:     Overnight events  Patient is a 74 y.o. male     Patient Active Problem List   Diagnosis    Type 2 diabetes mellitus with kidney complication, without long-term current use of insulin    Essential hypertension    Hyperlipidemia    CAD (coronary artery disease)    Status post coronary artery stent placement    Acute MI anterior wall first episode care    Acute coronary syndrome    PAD (peripheral artery disease)    History of colon cancer    Intracranial atherosclerosis    History of ischemic vertebrobasilar artery brainstem stroke    Ataxic hemiparesis    Research subject    Right sided weakness    Impaired balance as late effect of cerebrovascular accident    Abnormality of gait as late effect of cerebrovascular accident (CVA)    Tightness of right gastrocnemius muscle    Claudication    Hypotension    Anemia    Edema    Shortness of breath    Leukocytosis    JOSE (acute kidney injury)    Hyperkalemia     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    Type 2 diabetes mellitus with kidney complication, without long-term current use of insulin 11/15/2012              OBJECTIVE:     Vitals:    12/07/19 0834 12/07/19 0900 12/07/19 0930 12/07/19 1000   BP: (!) 145/66 (!) 145/66 (!) 154/69 (!) 161/68   Pulse: 77 92 86 81   Resp: (!) 24 20 20 20   Temp:       TempSrc:       SpO2: 99% 96% 98% 97%   Weight:       Height:           Temp: 99.3 °F (37.4 °C) (12/07/19 0745)  Pulse: 81 (12/07/19 1000)  Resp: 20 (12/07/19 1000)  BP: (!) 161/68 (12/07/19 1000)  SpO2: 97 % (12/07/19 1000)    Date 12/07/19 0700 - 12/08/19 0659   Shift 0491-6907 1319-5878 0507-5647 24 Hour Total   INTAKE   Shift  Total(mL/kg)       OUTPUT   Urine(mL/kg/hr) 350   350   Shift Total(mL/kg) 350(3.2)   350(3.2)   Weight (kg) 110.3 110.3 110.3 110.3             Medications:   sodium chloride 0.9%   Intravenous Once    insulin aspart U-100  15 Units Subcutaneous TIDWM    insulin detemir U-100  60 Units Subcutaneous QHS    mupirocin   Nasal BID    rosuvastatin  20 mg Oral Daily    sodium chloride 0.9%  3 mL/kg Intravenous Once    vitamin renal formula (B-complex-vitamin c-folic acid)  1 capsule Oral Daily      sodium chloride 0.9% 3 mL/kg/hr (12/02/19 1348)    furosemide (LASIX) 2 mg/mL infusion (non-titrating) 20 mg/hr (12/07/19 0512)    heparin (porcine)      norepinephrine bitartrate-D5W Stopped (12/04/19 2200)               Physical Exam:  General appearance:NAD  SOB  Cough  Weak  Lungs: diminished breath sounds  Heart: pulse 81  Abdomen: soft  Extremities: edema  Skin: dry  Laboratory:  ABG  Labs reviewed  Recent Results (from the past 336 hour(s))   Basic metabolic panel    Collection Time: 12/07/19  3:56 AM   Result Value Ref Range    Sodium 136 136 - 145 mmol/L    Potassium 4.7 3.5 - 5.1 mmol/L    Chloride 101 95 - 110 mmol/L    CO2 22 (L) 23 - 29 mmol/L    BUN, Bld 89 (H) 8 - 23 mg/dL    Creatinine 6.0 (H) 0.5 - 1.4 mg/dL    Calcium 8.0 (L) 8.7 - 10.5 mg/dL    Anion Gap 13 8 - 16 mmol/L   Basic metabolic panel    Collection Time: 12/05/19  8:44 AM   Result Value Ref Range    Sodium 134 (L) 136 - 145 mmol/L    Potassium 4.7 3.5 - 5.1 mmol/L    Chloride 102 95 - 110 mmol/L    CO2 21 (L) 23 - 29 mmol/L    BUN, Bld 60 (H) 8 - 23 mg/dL    Creatinine 4.8 (H) 0.5 - 1.4 mg/dL    Calcium 8.4 (L) 8.7 - 10.5 mg/dL    Anion Gap 11 8 - 16 mmol/L   Basic metabolic panel    Collection Time: 12/04/19  5:41 PM   Result Value Ref Range    Sodium 131 (L) 136 - 145 mmol/L    Potassium 5.3 (H) 3.5 - 5.1 mmol/L    Chloride 100 95 - 110 mmol/L    CO2 20 (L) 23 - 29 mmol/L    BUN, Bld 63 (H) 8 - 23 mg/dL    Creatinine 5.4 (H) 0.5 - 1.4  mg/dL    Calcium 8.2 (L) 8.7 - 10.5 mg/dL    Anion Gap 11 8 - 16 mmol/L     Recent Results (from the past 336 hour(s))   CBC auto differential    Collection Time: 12/07/19  9:08 AM   Result Value Ref Range    WBC 10.35 3.90 - 12.70 K/uL    Hemoglobin 7.7 (L) 14.0 - 18.0 g/dL    Hematocrit 23.2 (L) 40.0 - 54.0 %    Platelets 143 (L) 150 - 350 K/uL   CBC auto differential    Collection Time: 12/07/19  3:56 AM   Result Value Ref Range    WBC 9.86 3.90 - 12.70 K/uL    Hemoglobin 7.3 (L) 14.0 - 18.0 g/dL    Hematocrit 22.0 (L) 40.0 - 54.0 %    Platelets 127 (L) 150 - 350 K/uL   CBC auto differential    Collection Time: 12/06/19  6:29 PM   Result Value Ref Range    WBC 10.37 3.90 - 12.70 K/uL    Hemoglobin 7.2 (L) 14.0 - 18.0 g/dL    Hematocrit 22.1 (L) 40.0 - 54.0 %    Platelets 114 (L) 150 - 350 K/uL     Urinalysis  No results for input(s): COLORU, CLARITYU, SPECGRAV, PHUR, PROTEINUA, GLUCOSEU, BILIRUBINCON, BLOODU, WBCU, RBCU, BACTERIA, MUCUS, NITRITE, LEUKOCYTESUR, UROBILINOGEN, HYALINECASTS in the last 24 hours.    Diagnostic Results:  X-Ray: Reviewed  US: Reviewed  Echo: Reviewed  ACCESS    ASSESSMENT/PLAN:     JOSE/CKD 3.   ATN.  Urine output 4,040 cc/24 h.  Na 136.   K 4.7.  Metabolic bone disease.  Corrected  Ca approximately 9.4.  Poor nutrition.  Albumin 2.3.  Anemia Hb 7.7  Iron.  Epogen.  Blood pressure 161/68.   Normal left ventricular systolic function (EF 60-65%).   Weight daily.  I and O.  Avoid nephrotoxic agents, hypotension, hypovolemia  Renal, ADA  diet  Lasix drip 10 mg/h.

## 2019-12-08 PROBLEM — I50.33 ACUTE ON CHRONIC DIASTOLIC HEART FAILURE: Status: ACTIVE | Noted: 2019-12-08

## 2019-12-08 LAB
ANION GAP SERPL CALC-SCNC: 14 MMOL/L (ref 8–16)
BASOPHILS # BLD AUTO: 0.01 K/UL (ref 0–0.2)
BASOPHILS # BLD AUTO: 0.02 K/UL (ref 0–0.2)
BASOPHILS NFR BLD: 0.1 % (ref 0–1.9)
BASOPHILS NFR BLD: 0.2 % (ref 0–1.9)
BLD PROD TYP BPU: NORMAL
BLD PROD TYP BPU: NORMAL
BLOOD UNIT EXPIRATION DATE: NORMAL
BLOOD UNIT EXPIRATION DATE: NORMAL
BLOOD UNIT TYPE CODE: 6200
BLOOD UNIT TYPE CODE: 6200
BLOOD UNIT TYPE: NORMAL
BLOOD UNIT TYPE: NORMAL
BUN SERPL-MCNC: 110 MG/DL (ref 8–23)
CALCIUM SERPL-MCNC: 8.2 MG/DL (ref 8.7–10.5)
CHLORIDE SERPL-SCNC: 100 MMOL/L (ref 95–110)
CO2 SERPL-SCNC: 24 MMOL/L (ref 23–29)
CODING SYSTEM: NORMAL
CODING SYSTEM: NORMAL
CREAT SERPL-MCNC: 6.3 MG/DL (ref 0.5–1.4)
DIFFERENTIAL METHOD: ABNORMAL
DIFFERENTIAL METHOD: ABNORMAL
DISPENSE STATUS: NORMAL
DISPENSE STATUS: NORMAL
EOSINOPHIL # BLD AUTO: 0.4 K/UL (ref 0–0.5)
EOSINOPHIL # BLD AUTO: 0.5 K/UL (ref 0–0.5)
EOSINOPHIL NFR BLD: 4.8 % (ref 0–8)
EOSINOPHIL NFR BLD: 5.7 % (ref 0–8)
ERYTHROCYTE [DISTWIDTH] IN BLOOD BY AUTOMATED COUNT: 14.5 % (ref 11.5–14.5)
ERYTHROCYTE [DISTWIDTH] IN BLOOD BY AUTOMATED COUNT: 14.6 % (ref 11.5–14.5)
EST. GFR  (AFRICAN AMERICAN): 9 ML/MIN/1.73 M^2
EST. GFR  (NON AFRICAN AMERICAN): 8 ML/MIN/1.73 M^2
GLUCOSE SERPL-MCNC: 169 MG/DL (ref 70–110)
HCT VFR BLD AUTO: 20.5 % (ref 40–54)
HCT VFR BLD AUTO: 22.3 % (ref 40–54)
HGB BLD-MCNC: 6.7 G/DL (ref 14–18)
HGB BLD-MCNC: 7.3 G/DL (ref 14–18)
LYMPHOCYTES # BLD AUTO: 0.5 K/UL (ref 1–4.8)
LYMPHOCYTES # BLD AUTO: 0.9 K/UL (ref 1–4.8)
LYMPHOCYTES NFR BLD: 10.2 % (ref 18–48)
LYMPHOCYTES NFR BLD: 5.5 % (ref 18–48)
MCH RBC QN AUTO: 29.5 PG (ref 27–31)
MCH RBC QN AUTO: 29.6 PG (ref 27–31)
MCHC RBC AUTO-ENTMCNC: 32.7 G/DL (ref 32–36)
MCHC RBC AUTO-ENTMCNC: 32.7 G/DL (ref 32–36)
MCV RBC AUTO: 90 FL (ref 82–98)
MCV RBC AUTO: 90 FL (ref 82–98)
MONOCYTES # BLD AUTO: 0.5 K/UL (ref 0.3–1)
MONOCYTES # BLD AUTO: 1.2 K/UL (ref 0.3–1)
MONOCYTES NFR BLD: 12.6 % (ref 4–15)
MONOCYTES NFR BLD: 5.8 % (ref 4–15)
NEUTROPHILS # BLD AUTO: 6.8 K/UL (ref 1.8–7.7)
NEUTROPHILS # BLD AUTO: 7.1 K/UL (ref 1.8–7.7)
NEUTROPHILS NFR BLD: 76 % (ref 38–73)
NEUTROPHILS NFR BLD: 79.1 % (ref 38–73)
PHOSPHATE SERPL-MCNC: 7.4 MG/DL (ref 2.7–4.5)
PLATELET # BLD AUTO: 147 K/UL (ref 150–350)
PLATELET # BLD AUTO: 151 K/UL (ref 150–350)
PMV BLD AUTO: 10 FL (ref 9.2–12.9)
PMV BLD AUTO: 10.3 FL (ref 9.2–12.9)
POCT GLUCOSE: 181 MG/DL (ref 70–110)
POCT GLUCOSE: 258 MG/DL (ref 70–110)
POCT GLUCOSE: 268 MG/DL (ref 70–110)
POCT GLUCOSE: 278 MG/DL (ref 70–110)
POTASSIUM SERPL-SCNC: 4.4 MMOL/L (ref 3.5–5.1)
RBC # BLD AUTO: 2.27 M/UL (ref 4.6–6.2)
RBC # BLD AUTO: 2.47 M/UL (ref 4.6–6.2)
SODIUM SERPL-SCNC: 138 MMOL/L (ref 136–145)
TRANS ERYTHROCYTES VOL PATIENT: NORMAL ML
TRANS ERYTHROCYTES VOL PATIENT: NORMAL ML
WBC # BLD AUTO: 8.62 K/UL (ref 3.9–12.7)
WBC # BLD AUTO: 9.5 K/UL (ref 3.9–12.7)

## 2019-12-08 PROCEDURE — 63600175 PHARM REV CODE 636 W HCPCS: Performed by: HOSPITALIST

## 2019-12-08 PROCEDURE — 63600175 PHARM REV CODE 636 W HCPCS: Performed by: INTERNAL MEDICINE

## 2019-12-08 PROCEDURE — 99291 CRITICAL CARE FIRST HOUR: CPT | Mod: ,,, | Performed by: INTERNAL MEDICINE

## 2019-12-08 PROCEDURE — 80100014 HC HEMODIALYSIS 1:1

## 2019-12-08 PROCEDURE — 97530 THERAPEUTIC ACTIVITIES: CPT

## 2019-12-08 PROCEDURE — 94761 N-INVAS EAR/PLS OXIMETRY MLT: CPT

## 2019-12-08 PROCEDURE — 25000003 PHARM REV CODE 250: Performed by: INTERNAL MEDICINE

## 2019-12-08 PROCEDURE — 99291 PR CRITICAL CARE, E/M 30-74 MINUTES: ICD-10-PCS | Mod: ,,, | Performed by: INTERNAL MEDICINE

## 2019-12-08 PROCEDURE — P9021 RED BLOOD CELLS UNIT: HCPCS

## 2019-12-08 PROCEDURE — 94660 CPAP INITIATION&MGMT: CPT

## 2019-12-08 PROCEDURE — 85025 COMPLETE CBC W/AUTO DIFF WBC: CPT | Mod: 91

## 2019-12-08 PROCEDURE — 84100 ASSAY OF PHOSPHORUS: CPT

## 2019-12-08 PROCEDURE — 25000003 PHARM REV CODE 250: Performed by: NURSE PRACTITIONER

## 2019-12-08 PROCEDURE — C9399 UNCLASSIFIED DRUGS OR BIOLOG: HCPCS | Performed by: HOSPITALIST

## 2019-12-08 PROCEDURE — 27000221 HC OXYGEN, UP TO 24 HOURS

## 2019-12-08 PROCEDURE — 80048 BASIC METABOLIC PNL TOTAL CA: CPT

## 2019-12-08 PROCEDURE — 99900035 HC TECH TIME PER 15 MIN (STAT)

## 2019-12-08 PROCEDURE — 20000000 HC ICU ROOM

## 2019-12-08 RX ORDER — INSULIN ASPART 100 [IU]/ML
17 INJECTION, SOLUTION INTRAVENOUS; SUBCUTANEOUS
Status: DISCONTINUED | OUTPATIENT
Start: 2019-12-08 | End: 2019-12-17

## 2019-12-08 RX ORDER — DOXAZOSIN 2 MG/1
8 TABLET ORAL DAILY
Status: DISCONTINUED | OUTPATIENT
Start: 2019-12-09 | End: 2019-12-19 | Stop reason: HOSPADM

## 2019-12-08 RX ORDER — AMLODIPINE BESYLATE 5 MG/1
10 TABLET ORAL DAILY
Status: DISCONTINUED | OUTPATIENT
Start: 2019-12-09 | End: 2019-12-19 | Stop reason: HOSPADM

## 2019-12-08 RX ORDER — INSULIN ASPART 100 [IU]/ML
18 INJECTION, SOLUTION INTRAVENOUS; SUBCUTANEOUS
Status: DISCONTINUED | OUTPATIENT
Start: 2019-12-08 | End: 2019-12-08

## 2019-12-08 RX ORDER — DOXAZOSIN 2 MG/1
4 TABLET ORAL ONCE
Status: COMPLETED | OUTPATIENT
Start: 2019-12-08 | End: 2019-12-08

## 2019-12-08 RX ORDER — AMLODIPINE BESYLATE 5 MG/1
5 TABLET ORAL ONCE
Status: COMPLETED | OUTPATIENT
Start: 2019-12-08 | End: 2019-12-08

## 2019-12-08 RX ORDER — HYDROCODONE BITARTRATE AND ACETAMINOPHEN 500; 5 MG/1; MG/1
TABLET ORAL
Status: DISCONTINUED | OUTPATIENT
Start: 2019-12-08 | End: 2019-12-14

## 2019-12-08 RX ADMIN — HEPARIN SODIUM 1100 UNITS: 1000 INJECTION, SOLUTION INTRAVENOUS; SUBCUTANEOUS at 11:12

## 2019-12-08 RX ADMIN — ROSUVASTATIN CALCIUM 20 MG: 10 TABLET, FILM COATED ORAL at 09:12

## 2019-12-08 RX ADMIN — DOXAZOSIN 4 MG: 2 TABLET ORAL at 12:12

## 2019-12-08 RX ADMIN — INSULIN ASPART 17 UNITS: 100 INJECTION, SOLUTION INTRAVENOUS; SUBCUTANEOUS at 06:12

## 2019-12-08 RX ADMIN — INSULIN ASPART 15 UNITS: 100 INJECTION, SOLUTION INTRAVENOUS; SUBCUTANEOUS at 12:12

## 2019-12-08 RX ADMIN — INSULIN ASPART 1 UNITS: 100 INJECTION, SOLUTION INTRAVENOUS; SUBCUTANEOUS at 09:12

## 2019-12-08 RX ADMIN — ALTEPLASE 2 MG: 2.2 INJECTION, POWDER, LYOPHILIZED, FOR SOLUTION INTRAVENOUS at 01:12

## 2019-12-08 RX ADMIN — INSULIN ASPART 3 UNITS: 100 INJECTION, SOLUTION INTRAVENOUS; SUBCUTANEOUS at 12:12

## 2019-12-08 RX ADMIN — EPOETIN ALFA-EPBX 10000 UNITS: 10000 INJECTION, SOLUTION INTRAVENOUS; SUBCUTANEOUS at 03:12

## 2019-12-08 RX ADMIN — MUPIROCIN: 20 OINTMENT TOPICAL at 09:12

## 2019-12-08 RX ADMIN — DOXAZOSIN 4 MG: 2 TABLET ORAL at 09:12

## 2019-12-08 RX ADMIN — AMLODIPINE BESYLATE 5 MG: 5 TABLET ORAL at 09:12

## 2019-12-08 RX ADMIN — INSULIN DETEMIR 60 UNITS: 100 INJECTION, SOLUTION SUBCUTANEOUS at 09:12

## 2019-12-08 RX ADMIN — NEPHROCAP 1 CAPSULE: 1 CAP ORAL at 09:12

## 2019-12-08 RX ADMIN — INSULIN ASPART 15 UNITS: 100 INJECTION, SOLUTION INTRAVENOUS; SUBCUTANEOUS at 07:12

## 2019-12-08 RX ADMIN — IRON SUCROSE 100 MG: 20 INJECTION, SOLUTION INTRAVENOUS at 03:12

## 2019-12-08 RX ADMIN — INSULIN ASPART 3 UNITS: 100 INJECTION, SOLUTION INTRAVENOUS; SUBCUTANEOUS at 06:12

## 2019-12-08 RX ADMIN — HEPARIN SODIUM 2300 UNITS: 1000 INJECTION, SOLUTION INTRAVENOUS; SUBCUTANEOUS at 03:12

## 2019-12-08 RX ADMIN — AMLODIPINE BESYLATE 5 MG: 5 TABLET ORAL at 12:12

## 2019-12-08 NOTE — PT/OT/SLP PROGRESS
Physical Therapy Treatment    Patient Name:  Cy Rutherford   MRN:  [id number]    Recommendations:     Discharge Recommendations:  rehabilitation facility   Discharge Equipment Recommendations: bedside commode, shower chair   Barriers to discharge: Decreased caregiver support    Assessment:     Cy Rutherford is a 74 y.o. male admitted with a medical diagnosis of Acute on chronic diastolic heart failure.  He presents with the following impairments/functional limitations:  weakness, impaired endurance, impaired sensation, impaired self care skills, impaired functional mobilty, impaired balance, gait instability, decreased lower extremity function, impaired cardiopulmonary response to activity. Patient participated in supine <> sit transfers and ambulation of 3 steps with mod A. Patient limited due to stating that he had a BM when he stood up. Patient transferred back to supine with all lines in reach and nsg notified. Patient asked if he would like to get cleaned up first (patient had his lunch), and patient stated that he was unsure at this time. Nsg aware.     Rehab Prognosis: Fair; patient would benefit from acute skilled PT services to address these deficits and reach maximum level of function.    Recent Surgery: Procedure(s) (LRB):  Angiogram, Lower Arterial, Unilateral (N/A) 4 Days Post-Op    Plan:     During this hospitalization, patient to be seen 6 x/week to address the identified rehab impairments via gait training, therapeutic activities, therapeutic exercises, neuromuscular re-education and progress toward the following goals:    · Plan of Care Expires:  01/06/20    Subjective     Chief Complaint: none stated  Patient/Family Comments/goals: get stronger  Pain/Comfort:  Pain Rating 1: 0/10  Pain Rating Post-Intervention 1: 0/10      Objective:     Communicated with RN prior to session.  Patient found supine with blood pressure cuff, telemetry, pulse ox (continuous), PICC line upon PT entry to room.      General Precautions: Standard, fall   Orthopedic Precautions:N/A   Braces: N/A     Functional Mobility:  · Bed Mobility:  Rolling Left:  moderate assistance  · Rolling Right: moderate assistance  · Supine to Sit: moderate assistance  · Sit to Supine: moderate assistance  · Transfers:     · Sit to Stand:  moderate assistance with hand-held assist  · Gait: Patient ambulated 3 steps with mod A and HHA      AM-PAC 6 CLICK MOBILITY  Turning over in bed (including adjusting bedclothes, sheets and blankets)?: 2  Sitting down on and standing up from a chair with arms (e.g., wheelchair, bedside commode, etc.): 2  Moving from lying on back to sitting on the side of the bed?: 2  Moving to and from a bed to a chair (including a wheelchair)?: 2  Need to walk in hospital room?: 2  Climbing 3-5 steps with a railing?: 1  Basic Mobility Total Score: 11       Therapeutic Activities and Exercises:   Patient participated in transfer training and minimal ambulation due to stating he had a BM during ambulation.     Patient left supine with all lines intact and call button in reach..    GOALS:   Multidisciplinary Problems     Physical Therapy Goals        Problem: Physical Therapy Goal    Goal Priority Disciplines Outcome Goal Variances Interventions   Physical Therapy Goal     PT, PT/OT Ongoing, Progressing     Description:  Goals to be met by: 2020     Patient will increase functional independence with mobility by performin. Supine to sit with Set-up [place]  2. Sit to stand transfer with Supervision  3. Bed to chair transfer with Supervision using Rolling Walker  4. Gait  x 120 feet with Supervision using Rolling Walker.   5. Lower extremity exercise program x15 reps per handout, with supervision                      Time Tracking:     PT Received On: 19  PT Start Time: 1134     PT Stop Time: 1148  PT Total Time (min): 14 min     Billable Minutes: Therapeutic Activity 10    Treatment Type: Treatment  PT/PTA:  PT     PTA Visit Number: 0     Corrie Gale, PT  12/08/2019

## 2019-12-08 NOTE — PROGRESS NOTES
Seen this pm  Doing well   No issues      Increased urine output        Vitals:    12/07/19 1800 12/07/19 1830 12/07/19 1900 12/07/19 1951   BP: (!) 156/69 (!) 175/72 (!) 171/71    Pulse: 92 86 80 87   Resp: (!) 26 (!) 21 19 (!) 25   Temp:       TempSrc:       SpO2: 96% 100% 100% 98%   Weight:       Height:                 Intake/Output Summary (Last 24 hours) at 12/7/2019 2159  Last data filed at 12/7/2019 1900  Gross per 24 hour   Intake 634.25 ml   Output 3310 ml   Net -2675.75 ml         LABS  CBC  Recent Labs   Lab 12/06/19  1829 12/07/19  0356 12/07/19  0908   WBC 10.37 9.86 10.35   RBC 2.47* 2.44* 2.59*   HGB 7.2* 7.3* 7.7*   HCT 22.1* 22.0* 23.2*   * 127* 143*   MCV 90 90 90   MCH 29.1 29.9 29.7   MCHC 32.6 33.2 33.2     BMP  Recent Labs   Lab 12/06/19  0416 12/06/19  1234 12/07/19  0356   * 135* 136   K 5.0 5.1 4.7   CO2 23 21* 22*    102 101   BUN 69* 79* 89*   CREATININE 5.4* 5.7* 6.0*   * 262* 156*       POCT-Glucose  POCT Glucose   Date Value Ref Range Status   12/07/2019 169 (H) 70 - 110 mg/dL Final   12/07/2019 213 (H) 70 - 110 mg/dL Final   12/07/2019 195 (H) 70 - 110 mg/dL Final   12/07/2019 125 (H) 70 - 110 mg/dL Final   12/07/2019 155 (H) 70 - 110 mg/dL Final   12/06/2019 181 (H) 70 - 110 mg/dL Final   12/06/2019 225 (H) 70 - 110 mg/dL Final   12/06/2019 250 (H) 70 - 110 mg/dL Final   12/06/2019 224 (H) 70 - 110 mg/dL Final   12/05/2019 249 (H) 70 - 110 mg/dL Final   12/05/2019 261 (H) 70 - 110 mg/dL Final   12/05/2019 256 (H) 70 - 110 mg/dL Final   12/05/2019 294 (H) 70 - 110 mg/dL Final   12/05/2019 290 (H) 70 - 110 mg/dL Final   12/05/2019 310 (H) 70 - 110 mg/dL Final   12/04/2019 392 (H) 70 - 110 mg/dL Final   12/04/2019 400 (H) 70 - 110 mg/dL Final       Recent Labs   Lab 12/06/19  0416 12/06/19  1234 12/07/19  0356   CALCIUM 8.0* 8.0* 8.0*   MG 2.3 2.4 2.5   PHOS 5.9* 6.2* 6.6*     LFT  Recent Labs   Lab 12/02/19  1303  12/05/19  1949 12/06/19  0416  12/06/19  1234   PROT 4.3*  --   --   --   --    ALBUMIN 2.5*   < > 2.2* 2.2* 2.3*   BILITOT 0.3  --   --   --   --    AST 17  --   --   --   --    ALKPHOS 22*  --   --   --   --    ALT 15  --   --   --   --     < > = values in this interval not displayed.     GFR     COAGS  Recent Labs   Lab 12/03/19  1631   INR 1.1   APTT 24.2     CE  No results for input(s): TROPONINI, CKTOTAL, CKMB in the last 168 hours.  ABGs  No results for input(s): PH, PCO2, PO2, HCO3, POCSATURATED, BE in the last 24 hours.  BNP  Recent Labs   Lab 12/03/19  1444   BNP 94       LAST HbA1c  Lab Results   Component Value Date    HGBA1C 5.6 12/02/2019       Lipid panel  Lab Results   Component Value Date    CHOL 139 09/13/2019    CHOL 165 05/08/2018    CHOL 139 06/09/2011     Lab Results   Component Value Date    HDL 35 (L) 09/13/2019    HDL 38 (L) 05/08/2018    HDL 32 (L) 06/09/2011     Lab Results   Component Value Date    LDLCALC 80.0 09/13/2019    LDLCALC 80.6 05/08/2018    LDLCALC 73.2 06/09/2011     Lab Results   Component Value Date    TRIG 120 09/13/2019    TRIG 232 (H) 05/08/2018    TRIG 169 (H) 06/09/2011     Lab Results   Component Value Date    CHOLHDL 25.2 09/13/2019    CHOLHDL 23.0 05/08/2018    CHOLHDL 23.0 06/09/2011              Imp:        Anemia due to subscapular renal hematoma  ATN improving  PAD  HTN  HLP  Obesity        Plan:      Resume HTN agents  Target BP < 130/80 mmHg  Monitor renal function  Strict I/Os  Statin therapy  Hold DAPT      Transfer to floor in am

## 2019-12-08 NOTE — PROGRESS NOTES
Hospital Medicine Progress Note    We have been following patient with cardiology and assisting with blood glucose management.    Patient's chart reviewed. Fasting BG under 200 though pre-prandial insulin remains in 200s.    Plan   - increase aspart from 15 units to 17 units TID with meals  - resume detemir 60 units nightly   - Low dose SSI     Will continue to follow.    Navid Rain, [facility]  Pager: [phone]

## 2019-12-08 NOTE — PLAN OF CARE
Pt on BiPAP with documented settings. Alarms on and functioning properly. Pt appears to be in no respiratory distress. Will continue to monitor.

## 2019-12-08 NOTE — PLAN OF CARE
Patient remained in room with bed in low postion, wheels locked and call light within reach. Lasix drip rate changed 10 to 5mg per orders.  VS remained WDL. Dialysis came to bedside to denniss patient. Trialysis catheter remained intact. CBC monitored. No complaints of pain for the shift. Will continue to monitor.

## 2019-12-08 NOTE — NURSING
Dr. Watts and Dr. Hauser at nursing station, discussed patient's claim of ABD feeling tighter. Dr. Watts to order US and CBC.

## 2019-12-08 NOTE — PLAN OF CARE
Problem: Physical Therapy Goal  Goal: Physical Therapy Goal  Description  Goals to be met by: 2020     Patient will increase functional independence with mobility by performin. Supine to sit with Set-up [place]  2. Sit to stand transfer with Supervision  3. Bed to chair transfer with Supervision using Rolling Walker  4. Gait  x 120 feet with Supervision using Rolling Walker.   5. Lower extremity exercise program x15 reps per handout, with supervision     Outcome: Ongoing, Progressing     Patient participated in supine <> sit transfers and ambulation of 3 steps with mod A. Patient limited due to stating that he had a BM when he stood up. Patient transferred back to supine with all lines in reach and nsg notified. Patient asked if he would like to get cleaned up first (patient had his lunch), and patient stated that he was unsure at this time. Nsg aware.

## 2019-12-08 NOTE — PROGRESS NOTES
Progress Note  Nephrology      Consult Requested By: James Sanchez MD      SUBJECTIVE:     Overnight events  Patient is a 74 y.o. male     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    Abnormality of gait as late effect of cerebrovascular accident (CVA)    Tightness of right gastrocnemius muscle    Claudication    Hypotension    Anemia    Edema    Shortness of breath    Leukocytosis    JOSE (acute kidney injury)    Hyperkalemia    Acute on chronic diastolic heart failure     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    Type 2 diabetes mellitus with kidney complication, without long-term current use of insulin 11/15/2012              OBJECTIVE:     Vitals:    12/08/19 1100 12/08/19 1130 12/08/19 1200 12/08/19 1230   BP: (!) 143/65 139/63 (!) 157/62 (!) 149/68   Pulse: 82 84 85 92   Resp: (!) 23 20 (!) 24 (!) 22   Temp:  98 °F (36.7 °C)     TempSrc:  Oral     SpO2: 96% 96% (!) 94% 96%   Weight:       Height:           Temp: 98 °F (36.7 °C) (12/08/19 1130)  Pulse: 92 (12/08/19 1230)  Resp: (!) 22 (12/08/19 1230)  BP: (!) 149/68 (12/08/19 1230)  SpO2: 96 % (12/08/19 1230)    Date 12/08/19 0700 - 12/09/19 0659    Shift 9277-7412 7125-5920 8806-8435 24 Hour Total   INTAKE   P.O. 200   200   I.V.(mL/kg) 15(0.1)   15(0.1)   Shift Total(mL/kg) 215(1.9)   215(1.9)   OUTPUT   Urine(mL/kg/hr) 860   860   Shift Total(mL/kg) 860(7.8)   860(7.8)   Weight (kg) 110.3 110.3 110.3 110.3             Medications:   sodium chloride 0.9%   Intravenous Once    alteplase  2 mg Intra-Catheter Once    [START ON 12/9/2019] amLODIPine  10 mg Oral Daily    [START ON 12/9/2019] doxazosin  8 mg Oral Daily    insulin aspart U-100  17 Units Subcutaneous TIDWM    insulin detemir U-100  60 Units Subcutaneous QHS    iron sucrose (VENOFER) IVPB  100 mg Intravenous Once    mupirocin   Nasal BID    rosuvastatin  20 mg Oral Daily    sodium chloride 0.9%  3 mL/kg Intravenous Once    vitamin renal formula (B-complex-vitamin c-folic acid)  1 capsule Oral Daily      sodium chloride 0.9% 3 mL/kg/hr (12/02/19 1348)    furosemide (LASIX) 2 mg/mL infusion (non-titrating) 5 mg/hr (12/08/19 1225)    heparin (porcine)                 Physical Exam:  General appearance:  Weak  Lungs:   No SOB  No cough  Heart: pulse 84  Abdomen: distended  Extremities: edema  Skin: dry  Tremor       Laboratory:  ABG  Labs reviewed  Recent Results (from the past 336 hour(s))   Basic metabolic panel    Collection Time: 12/08/19  6:46 AM   Result Value Ref Range    Sodium 138 136 - 145 mmol/L    Potassium 4.4 3.5 - 5.1 mmol/L    Chloride 100 95 - 110 mmol/L    CO2 24 23 - 29 mmol/L    BUN, Bld 110 (H) 8 - 23 mg/dL    Creatinine 6.3 (H) 0.5 - 1.4 mg/dL    Calcium 8.2 (L) 8.7 - 10.5 mg/dL    Anion Gap 14 8 - 16 mmol/L   Basic metabolic panel    Collection Time: 12/07/19  3:56 AM   Result Value Ref Range    Sodium 136 136 - 145 mmol/L    Potassium 4.7 3.5 - 5.1 mmol/L    Chloride 101 95 - 110 mmol/L    CO2 22 (L) 23 - 29 mmol/L    BUN, Bld 89 (H) 8 - 23 mg/dL    Creatinine 6.0 (H) 0.5 - 1.4 mg/dL    Calcium 8.0 (L) 8.7 - 10.5 mg/dL    Anion Gap 13 8 - 16 mmol/L   Basic metabolic  panel    Collection Time: 12/05/19  8:44 AM   Result Value Ref Range    Sodium 134 (L) 136 - 145 mmol/L    Potassium 4.7 3.5 - 5.1 mmol/L    Chloride 102 95 - 110 mmol/L    CO2 21 (L) 23 - 29 mmol/L    BUN, Bld 60 (H) 8 - 23 mg/dL    Creatinine 4.8 (H) 0.5 - 1.4 mg/dL    Calcium 8.4 (L) 8.7 - 10.5 mg/dL    Anion Gap 11 8 - 16 mmol/L     Recent Results (from the past 336 hour(s))   CBC auto differential    Collection Time: 12/08/19  6:46 AM   Result Value Ref Range    WBC 9.50 3.90 - 12.70 K/uL    Hemoglobin 7.3 (L) 14.0 - 18.0 g/dL    Hematocrit 22.3 (L) 40.0 - 54.0 %    Platelets 147 (L) 150 - 350 K/uL   CBC auto differential    Collection Time: 12/07/19  9:08 AM   Result Value Ref Range    WBC 10.35 3.90 - 12.70 K/uL    Hemoglobin 7.7 (L) 14.0 - 18.0 g/dL    Hematocrit 23.2 (L) 40.0 - 54.0 %    Platelets 143 (L) 150 - 350 K/uL   CBC auto differential    Collection Time: 12/07/19  3:56 AM   Result Value Ref Range    WBC 9.86 3.90 - 12.70 K/uL    Hemoglobin 7.3 (L) 14.0 - 18.0 g/dL    Hematocrit 22.0 (L) 40.0 - 54.0 %    Platelets 127 (L) 150 - 350 K/uL     Urinalysis  No results for input(s): COLORU, CLARITYU, SPECGRAV, PHUR, PROTEINUA, GLUCOSEU, BILIRUBINCON, BLOODU, WBCU, RBCU, BACTERIA, MUCUS, NITRITE, LEUKOCYTESUR, UROBILINOGEN, HYALINECASTS in the last 24 hours.    Diagnostic Results:  X-Ray: Reviewed  US: Reviewed  Echo: Reviewed  ACCESS    ASSESSMENT/PLAN:     JOSE/ CKD 4  Azotemia  Uremia  Metabolic bone disease  Hyperphosphatemia  Anemia  Repeat H/H  Iron   Epogen  US abdomen  - follow up  /68  Dialysis

## 2019-12-09 LAB
ABO + RH BLD: NORMAL
ANION GAP SERPL CALC-SCNC: 14 MMOL/L (ref 8–16)
BASOPHILS # BLD AUTO: 0.02 K/UL (ref 0–0.2)
BASOPHILS # BLD AUTO: 0.02 K/UL (ref 0–0.2)
BASOPHILS NFR BLD: 0.2 % (ref 0–1.9)
BASOPHILS NFR BLD: 0.2 % (ref 0–1.9)
BLD GP AB SCN CELLS X3 SERPL QL: NORMAL
BUN SERPL-MCNC: 100 MG/DL (ref 8–23)
CALCIUM SERPL-MCNC: 8.2 MG/DL (ref 8.7–10.5)
CHLORIDE SERPL-SCNC: 99 MMOL/L (ref 95–110)
CO2 SERPL-SCNC: 23 MMOL/L (ref 23–29)
CREAT SERPL-MCNC: 5.3 MG/DL (ref 0.5–1.4)
DIFFERENTIAL METHOD: ABNORMAL
DIFFERENTIAL METHOD: ABNORMAL
EOSINOPHIL # BLD AUTO: 0.5 K/UL (ref 0–0.5)
EOSINOPHIL # BLD AUTO: 0.5 K/UL (ref 0–0.5)
EOSINOPHIL NFR BLD: 3.4 % (ref 0–8)
EOSINOPHIL NFR BLD: 4.9 % (ref 0–8)
ERYTHROCYTE [DISTWIDTH] IN BLOOD BY AUTOMATED COUNT: 14.1 % (ref 11.5–14.5)
ERYTHROCYTE [DISTWIDTH] IN BLOOD BY AUTOMATED COUNT: 14.2 % (ref 11.5–14.5)
EST. GFR  (AFRICAN AMERICAN): 11 ML/MIN/1.73 M^2
EST. GFR  (NON AFRICAN AMERICAN): 10 ML/MIN/1.73 M^2
GLUCOSE SERPL-MCNC: 146 MG/DL (ref 70–110)
GLUCOSE SERPL-MCNC: 150 MG/DL (ref 70–110)
HCT VFR BLD AUTO: 26.6 % (ref 40–54)
HCT VFR BLD AUTO: 27 % (ref 40–54)
HGB BLD-MCNC: 8.8 G/DL (ref 14–18)
HGB BLD-MCNC: 8.9 G/DL (ref 14–18)
LYMPHOCYTES # BLD AUTO: 0.7 K/UL (ref 1–4.8)
LYMPHOCYTES # BLD AUTO: 1.4 K/UL (ref 1–4.8)
LYMPHOCYTES NFR BLD: 10.4 % (ref 18–48)
LYMPHOCYTES NFR BLD: 6.1 % (ref 18–48)
MCH RBC QN AUTO: 29.4 PG (ref 27–31)
MCH RBC QN AUTO: 29.5 PG (ref 27–31)
MCHC RBC AUTO-ENTMCNC: 33 G/DL (ref 32–36)
MCHC RBC AUTO-ENTMCNC: 33.1 G/DL (ref 32–36)
MCV RBC AUTO: 89 FL (ref 82–98)
MCV RBC AUTO: 89 FL (ref 82–98)
MONOCYTES # BLD AUTO: 0.6 K/UL (ref 0.3–1)
MONOCYTES # BLD AUTO: 1.5 K/UL (ref 0.3–1)
MONOCYTES NFR BLD: 13.4 % (ref 4–15)
MONOCYTES NFR BLD: 4.4 % (ref 4–15)
NEUTROPHILS # BLD AUTO: 10.6 K/UL (ref 1.8–7.7)
NEUTROPHILS # BLD AUTO: 8.1 K/UL (ref 1.8–7.7)
NEUTROPHILS NFR BLD: 75.4 % (ref 38–73)
NEUTROPHILS NFR BLD: 81.6 % (ref 38–73)
PHOSPHATE SERPL-MCNC: 6.8 MG/DL (ref 2.7–4.5)
PLATELET # BLD AUTO: 156 K/UL (ref 150–350)
PLATELET # BLD AUTO: 162 K/UL (ref 150–350)
PMV BLD AUTO: 10 FL (ref 9.2–12.9)
PMV BLD AUTO: 10 FL (ref 9.2–12.9)
POCT GLUCOSE: 110 MG/DL (ref 70–110)
POCT GLUCOSE: 143 MG/DL (ref 70–110)
POCT GLUCOSE: 146 MG/DL (ref 70–110)
POCT GLUCOSE: 184 MG/DL (ref 70–110)
POTASSIUM SERPL-SCNC: 3.7 MMOL/L (ref 3.5–5.1)
RBC # BLD AUTO: 2.99 M/UL (ref 4.6–6.2)
RBC # BLD AUTO: 3.02 M/UL (ref 4.6–6.2)
SODIUM SERPL-SCNC: 136 MMOL/L (ref 136–145)
WBC # BLD AUTO: 11.01 K/UL (ref 3.9–12.7)
WBC # BLD AUTO: 13.33 K/UL (ref 3.9–12.7)

## 2019-12-09 PROCEDURE — 97530 THERAPEUTIC ACTIVITIES: CPT

## 2019-12-09 PROCEDURE — 27000221 HC OXYGEN, UP TO 24 HOURS

## 2019-12-09 PROCEDURE — 25000003 PHARM REV CODE 250: Performed by: INTERNAL MEDICINE

## 2019-12-09 PROCEDURE — 97110 THERAPEUTIC EXERCISES: CPT

## 2019-12-09 PROCEDURE — 63600175 PHARM REV CODE 636 W HCPCS: Performed by: INTERNAL MEDICINE

## 2019-12-09 PROCEDURE — 86901 BLOOD TYPING SEROLOGIC RH(D): CPT

## 2019-12-09 PROCEDURE — 27000190 HC CPAP FULL FACE MASK W/VALVE

## 2019-12-09 PROCEDURE — 94761 N-INVAS EAR/PLS OXIMETRY MLT: CPT

## 2019-12-09 PROCEDURE — 99900035 HC TECH TIME PER 15 MIN (STAT)

## 2019-12-09 PROCEDURE — 80048 BASIC METABOLIC PNL TOTAL CA: CPT

## 2019-12-09 PROCEDURE — 94660 CPAP INITIATION&MGMT: CPT

## 2019-12-09 PROCEDURE — 20000000 HC ICU ROOM

## 2019-12-09 PROCEDURE — 84100 ASSAY OF PHOSPHORUS: CPT

## 2019-12-09 PROCEDURE — 99233 SBSQ HOSP IP/OBS HIGH 50: CPT | Mod: ,,, | Performed by: INTERNAL MEDICINE

## 2019-12-09 PROCEDURE — 93005 ELECTROCARDIOGRAM TRACING: CPT

## 2019-12-09 PROCEDURE — 85025 COMPLETE CBC W/AUTO DIFF WBC: CPT

## 2019-12-09 PROCEDURE — 25000003 PHARM REV CODE 250: Performed by: NURSE PRACTITIONER

## 2019-12-09 PROCEDURE — 99233 PR SUBSEQUENT HOSPITAL CARE,LEVL III: ICD-10-PCS | Mod: ,,, | Performed by: INTERNAL MEDICINE

## 2019-12-09 PROCEDURE — 80100014 HC HEMODIALYSIS 1:1

## 2019-12-09 RX ORDER — SEVELAMER CARBONATE 800 MG/1
800 TABLET, FILM COATED ORAL
Status: DISCONTINUED | OUTPATIENT
Start: 2019-12-09 | End: 2019-12-15

## 2019-12-09 RX ADMIN — FUROSEMIDE 5 MG/HR: 10 INJECTION, SOLUTION INTRAMUSCULAR; INTRAVENOUS at 05:12

## 2019-12-09 RX ADMIN — SEVELAMER CARBONATE 800 MG: 800 TABLET, FILM COATED ORAL at 06:12

## 2019-12-09 RX ADMIN — DOXAZOSIN 8 MG: 2 TABLET ORAL at 08:12

## 2019-12-09 RX ADMIN — IRON SUCROSE 100 MG: 20 INJECTION, SOLUTION INTRAVENOUS at 02:12

## 2019-12-09 RX ADMIN — NEPHROCAP 1 CAPSULE: 1 CAP ORAL at 08:12

## 2019-12-09 RX ADMIN — HEPARIN SODIUM 2300 UNITS: 1000 INJECTION, SOLUTION INTRAVENOUS; SUBCUTANEOUS at 12:12

## 2019-12-09 RX ADMIN — EPOETIN ALFA-EPBX 10000 UNITS: 10000 INJECTION, SOLUTION INTRAVENOUS; SUBCUTANEOUS at 02:12

## 2019-12-09 RX ADMIN — MUPIROCIN: 20 OINTMENT TOPICAL at 09:12

## 2019-12-09 RX ADMIN — AMLODIPINE BESYLATE 10 MG: 5 TABLET ORAL at 08:12

## 2019-12-09 RX ADMIN — INSULIN ASPART 17 UNITS: 100 INJECTION, SOLUTION INTRAVENOUS; SUBCUTANEOUS at 08:12

## 2019-12-09 RX ADMIN — INSULIN ASPART 17 UNITS: 100 INJECTION, SOLUTION INTRAVENOUS; SUBCUTANEOUS at 05:12

## 2019-12-09 RX ADMIN — HEPARIN SODIUM 2300 UNITS: 1000 INJECTION, SOLUTION INTRAVENOUS; SUBCUTANEOUS at 02:12

## 2019-12-09 RX ADMIN — POLYETHYLENE GLYCOL 3350 17 G: 17 POWDER, FOR SOLUTION ORAL at 06:12

## 2019-12-09 RX ADMIN — SODIUM CHLORIDE: 0.9 INJECTION, SOLUTION INTRAVENOUS at 02:12

## 2019-12-09 RX ADMIN — ROSUVASTATIN CALCIUM 20 MG: 10 TABLET, FILM COATED ORAL at 08:12

## 2019-12-09 RX ADMIN — INSULIN ASPART 17 UNITS: 100 INJECTION, SOLUTION INTRAVENOUS; SUBCUTANEOUS at 12:12

## 2019-12-09 RX ADMIN — INSULIN DETEMIR 60 UNITS: 100 INJECTION, SOLUTION SUBCUTANEOUS at 09:12

## 2019-12-09 NOTE — PLAN OF CARE
Problem: Physical Therapy Goal  Goal: Physical Therapy Goal  Description  Goals to be met by: 2020     Patient will increase functional independence with mobility by performin. Supine to sit with Set-up Bliss  2. Sit to stand transfer with Supervision  3. Bed to chair transfer with Supervision using Rolling Walker  4. Gait  x 120 feet with Supervision using Rolling Walker.   5. Lower extremity exercise program x15 reps per handout, with supervision     Outcome: Ongoing, Progressing   Patient feeling fatigued; able to transition to EOB with modA; sit to stand at RW x min/modA x 2 and took steps in place as well as laterally toward HOB; cont with POC.

## 2019-12-09 NOTE — PROGRESS NOTES
[facility]  Cardiology  Progress Note    Patient Name: Cy Rutherford  MRN: [id number]  Admission Date: 12/2/2019  Hospital Length of Stay: 6 days  Code Status: Full Code   Attending Physician: James Sanchez MD   Primary Care Physician: Heide Porter MD  Expected Discharge Date:   Principal Problem:Acute on chronic diastolic heart failure    Subjective:     Hospital Course:   12/2/2019 Admitted for elective LE angiogram for evaluation of RLE claudication. Taken to the cath lab for the procedure via LCFA access with following results:    successful atherectomy (Hawk LX) with distal filter protection, followed by PTA with scoring balloon (5.0 x 150 dSFA, 6.0 x 150 pSFA/CFA) followed by PTA with DCB to dSFA (5.0 x 150), pSFA (5.0 x 100) and CFA (7 x 40) with good results. Successful PTA to TPT with 3.0 x 40 balloon- see cath report for full report     Tolerated procedure well and recovered in pre post cath area. Hypotension noted along with back pain and diaphoresis. No hematoma noted and manual pressure applied out of concern for bleeding. STAT H&H down to 7.4&24.1 from 11.6&36.1. Given profound symptoms, major concern for bleeding prompting re evaluation with recurrent angiogram. Placed on IVFs along with IV Levophed. Repeat procedure via right radial access with images  in multiple views with no active bleed noted. Cuff pressure with significant difference in comparison to  aortic pressure which was significantly higher.  Admitted to ICU for close monitoring with kanchan placed. T&S with PRBC transfusion initiated with IV Lasix given in between   12/3/2019 Remain on IV Levophed drip at .46mcg/kg/min with BP 110s-130s/40s-50s HR 60s. Serial H&Hs Q4hrs overnight with  H&H this AM 10.0&30.2 with slight trend down to 9.1&27.5 on repeat check. Complains of SOB with increased RR. Only 500cc out overnight. Will repeat IV Lasix 40mg this AM. Echocardiogram and CXR as well. Will attempt to wean IV  Levophed drip as BP tolerates   12/4/2019 Creatinine trended up to 3.7 overnight with K+ 6.1. Continued with no urine output. Nephrology consulted yesterday with trialysis catheter placed yesterday with initiation of CRRT overnight. CRRT x 2-3 hours prior to clotting off. H&H trended down further to 7.3&21.8 with repeat PRBC transfusion. H&H trended up to 8.4&24.9 with continued serial monitoring with recurrent drop to 7.0&21.2. Concerned about recurrent bleeding with plans for repeat angiogram for re evaluation of acute bleed. Remains on IV Levophed with stable BP and wean in process. Complains of mild SOB with stable sats on Venti mask.   12/5/2019 Repeat angiogram yesterday using CO2 with no active bleed noted after extensive angiogram. H&H down 6.6&18.9 yesterday evening with repeat PRBC. CT abdomen/pelvis with evidence of large left renal subcapsular hematoma with surrounding retroperitoneal hemorrhage and no definite contrast extravasation seen to suggest active bleeding on delayed images. H&H up to 7.4&22.0-8.5&25.3. Weaned off IV Levophed with stable BP. Resumed CRRT yesterday evening with 695cc removed and 30cc urine output noted +2.7 liters. Continued mild SOB with stable sats on venti mask. BMP with K+ 5.0 BUN 62 creatinine 5.0. Will continue with serial H&Hs for now. Will place TEDs and consult PT   12/6/2019 Attempted CRRT and HD yesterday with clotted line and approximately 400cc blood loss due to inability to rinse back. Placed on IV Lasix drip at 20mg/hr by Nephrology with 675cc urine output overnight positive 3 liters since admission. K+ 5.0 with BUN 69 creatinine 5.4 this AM. H&H 74&22.4 this AM unchanged from overnight-will continue with serial H&Hs for now. Off IV pressors for over 48 hours with BP 130s-170s/70s overnight. Mild SOB remains per patient with slight improvement noted on PE. Updated son at bedside this morning   12/7/2019 HR and BP stable. H&H 7.7&23.2 unchanged from yesterday.  Remain on IV Lasix with adequate urine output. BUN 89 creatinine 6.0. Hopeful to transfer to floor tomorrow.   12/8/2019 H&H down to 6.7 & 20.5 this AM with repeat PRBC transfusion. Plan for HD today for clearance. Remains on IV Lasix drip with adequate urine output. Resumed antihypertensives with stabel BP. PT and OT on board  12/9/2019 H&H 8.8&26.6 this AM after PRBC transfusion yesterday. Approximately one hour of HD yesterday with cessation due to line malfunction. HR and BP remains stable. 2.7 liters out overnight negative 4.4 liters since admission.  creatinine 5.3. Will reconsult General Surgery today for new trialysis line placement for ongoing intermittent HD/CRRT.         Review of Systems   Constitution: Negative for chills, decreased appetite, diaphoresis and fever.   Cardiovascular: Negative for chest pain, claudication, cyanosis, dyspnea on exertion, irregular heartbeat, leg swelling, near-syncope, orthopnea, palpitations, paroxysmal nocturnal dyspnea and syncope.   Respiratory: Negative for cough, hemoptysis, shortness of breath and wheezing.    Gastrointestinal: Negative for bloating, abdominal pain, constipation, diarrhea, melena, nausea and vomiting.   Neurological: Negative for dizziness and weakness.     Objective:     Vital Signs (Most Recent):  Temp: 98.1 °F (36.7 °C) (12/09/19 0725)  Pulse: 80 (12/09/19 1030)  Resp: 16 (12/09/19 1030)  BP: 131/61 (12/09/19 1000)  SpO2: (!) 92 % (12/09/19 1030) Vital Signs (24h Range):  Temp:  [98 °F (36.7 °C)-99.2 °F (37.3 °C)] 98.1 °F (36.7 °C)  Pulse:  [72-92] 80  Resp:  [16-25] 16  SpO2:  [92 %-100 %] 92 %  BP: (105-170)/(37-72) 131/61     Weight: 106.6 kg (235 lb 0.2 oz)  Body mass index is 34.21 kg/m².     SpO2: (!) 92 %  O2 Device (Oxygen Therapy): BiPAP      Intake/Output Summary (Last 24 hours) at 12/9/2019 1042  Last data filed at 12/9/2019 1000  Gross per 24 hour   Intake 1745.62 ml   Output 3445 ml   Net -1699.38 ml       Lines/Drains/Airways      Central Venous Catheter Line                 Trialysis (Dialysis) Catheter 12/03/19 1659 right internal jugular 5 days          Drain                 Urethral Catheter 12/03/19 1825 16 Fr. 6 days                Physical Exam   Constitutional: He is oriented to person, place, and time. He appears well-developed and well-nourished. No distress.   Cardiovascular: Normal rate and regular rhythm. Exam reveals no gallop.   No murmur heard.  Pulmonary/Chest: Effort normal and breath sounds normal. No respiratory distress. He has no wheezes.   Abdominal: Soft. Bowel sounds are normal. He exhibits no distension. There is no tenderness.   Musculoskeletal: He exhibits edema.   Neurological: He is alert and oriented to person, place, and time.   Skin: Skin is warm and dry.       Significant Labs:     Recent Labs   Lab 12/09/19  0539   WBC 11.01   RBC 2.99*   HGB 8.8*   HCT 26.6*      MCV 89   MCH 29.4   MCHC 33.1     Recent Labs   Lab 12/09/19  0539      K 3.7   CL 99   CO2 23   *   CREATININE 5.3*       Significant Imaging: Echocardiogram:   Transthoracic echo (TTE) complete (Cupid Only):   Results for orders placed or performed during the hospital encounter of 12/02/19   Echo Color Flow Doppler? Yes   Result Value Ref Range    BSA 2.3 m2    TDI SEPTAL 0.06 m/s    LV LATERAL E/E' RATIO 9.00 m/s    LV SEPTAL E/E' RATIO 10.50 m/s    TDI LATERAL 0.07 m/s    PV PEAK VELOCITY 1.81 cm/s    LVIDD 4.80 3.5 - 6.0 cm    IVS 1.10 (A) 0.6 - 1.1 cm    PW 1.10 (A) 0.6 - 1.1 cm    Ao root annulus 3.40 cm    LVIDS 2.56 2.1 - 4.0 cm    FS 47 28 - 44 %    LV mass 193.96 g    LA size 3.68 cm    TAPSE 1.44 cm    Left Ventricle Relative Wall Thickness 0.46 cm    AV mean gradient 6 mmHg    AV valve area 4.31 cm2    AV Velocity Ratio 0.78     AV index (prosthetic) 1.06     E/A ratio 0.62     Mean e' 0.07 m/s    E wave decelartion time 338.80 msec    LVOT diameter 2.27 cm    LVOT area 4.0 cm2    LVOT peak tyrell 1.16 m/s    LVOT  peak VTI 26.25 cm    Ao peak simone 1.48 m/s    Ao VTI 24.66 cm    LVOT stroke volume 106.18 cm3    AV peak gradient 9 mmHg    E/E' ratio 9.69 m/s    MV Peak E Simone 0.63 m/s    MV Peak A Simone 1.02 m/s    LV Systolic Volume 23.57 mL    LV Systolic Volume Index 10.6 mL/m2    LV Diastolic Volume 68.84 mL    LV Diastolic Volume Index 30.84 mL/m2    LV Mass Index 87 g/m2    Right Atrial Pressure (from IVC) 3 mmHg    Narrative    · Normal left ventricular systolic function. The estimated ejection   fraction is 55%  · Concentric left ventricular remodeling.  · No wall motion abnormalities.  · Normal right ventricular systolic function.  · Normal central venous pressure (3 mm Hg).        Assessment and Plan:     Brief HPI: Seen on AM NP rounds while resting in bed. Denies any complaints currently. Discussed POC as detailed below-verbalized understanding and agrees with POC     * Acute on chronic diastolic heart failure  -echo with normal LVEF  -SOB last week related to JOSE with volume overload secondary to PRBC transfusion  -on IV Lasix drip since late last week with good response; 2.7 liters out overnight; negative 4.4 liters since admisson  -resumed on CCB with trend down of BPs; SBPs overnight 140s-170s  -SOB improving    Hyperkalemia  -resolved  -K+ 3.7 this AM      JOSE (acute kidney injury)  -multifactoral  -concerned about ATN given bleed and hypotension  -creatinine 1.2 upon admission with trend up to 3.7-4.5-5.3-5.4-5.5-5.0;  creatinine 5.3 this AM  -Nephrology on board; attempted HD yesterday with clotted line; remains on IV Lasix drip at 5mg/hr with 2.7 liters out overnight negative 4.4 liters since admission  -will reconsult General surgery for new line placement in anticipation for ongoing intermittent HD/CRRT    -will continue to avoid nephrotoxic agents and hypotension      Leukocytosis  -resolved  -WBCs 28K post procedure with trend down to 22K-13K-10K   -afebrile  -likely reactive rather than infectious      Shortness of breath  -concerning for volume overload in setting of IVF use and PRBC transfusion post procedure  -SOB improving  -on IV Lasix with good urine output  -continue with IV Lasix drip for now; continue to follow Nephrology recommendation   -echocardiogram with normal LVEF     Edema  -concerning for combination of volume overload and sedentary lifestyle  -unchanged  -will continue to monitor     Anemia  -initial H&H 11.1&36.1 with trend down to 7.4&24.1 post procedure  -repeat angiogram on 12/2 with no active bleeding with recurrent trend down prompting repeat  angiogram  12/4 with no active bleeding; CTA abdomen with renal subcapsular hematoma with surrounding retroperitoneal hemorrhage and no definite contrast extravasation seen to suggest active bleeding on delayed images  -H&H 7s/20s last week and stable until yesterday with drop to 6.7&20.5; repeat PRBC transfusion with H&H 8.8&26.6 this AM  -will continue with daily CBCs    Hypotension  -resolved  -remains off IV pressors   -CCB resumed over the weekend with no recurrent hypotension     PAD (peripheral artery disease)  -admitted for elective LE angiogram for further evaluation of LE claudication  -successful atherectomy and PTA with scoring balloon and  DCB to dSFA,  pSFA CFA and TPT   -foot warm and pulses present   -continue statin; no ASA and Plavix due to bleeding issue   -will need serial LE arterial ultrasounds as an outpatient       CAD (coronary artery disease)  -s/p LAD and LCX MARIAN in 2011  -was on  ASA, statin, Plavix as an outpatient along with CCB  -will continue to hold DAPT given concern for acute bleeding  -CCB resumed over the weekend  -echocardiogram with normal LV function with EF 55%    Hyperlipidemia  -continue statin therapy  -FLP with LDL 80 in 9/2019    Essential hypertension  -on Norvasc, Chlorthalidone, Metoprolol and Losartan at home  -meds held due to hypotension last week  -BP trended back up and remains off pressors;  CCB resumed over the weekend  -continue to monitor BP and adjust meds prn     Type 2 diabetes mellitus with kidney complication, with long-term current use of insulin  --278 overnight   -on Lantus and mealtime insulin at home  -diabetes management per Hospital Medicine   -recent HgbA1c 5.6 9/2019        VTE Risk Mitigation (From admission, onward)         Ordered     Place DARRION hose  Until discontinued      12/05/19 1005     heparin (porcine) injection 2,300 Units  As needed (PRN)      12/04/19 1137     heparin infusion 1,000 units/500 ml in 0.9% NaCl (pressure line flush)  Intra-op continuous PRN      12/02/19 0932                KATHLEEN Cazares, ANP  Cardiology  [facility]

## 2019-12-09 NOTE — PLAN OF CARE
TN met with pt for continued discharge planning. Pt remain in the ICU. Patient getting intermittent HD. Therapy recs are IPR. Given that pt may need intermittent HD, pt more LTAC appropriate at this time. Pt is agreeable to LTAC placement if he would need intermittent HD on discharge. Final D/c needs TBD. CM will continue to follow.       12/09/19 6651   Discharge Reassessment   Assessment Type Discharge Planning Reassessment   Provided patient/caregiver education on the expected discharge date and the discharge plan Yes   Do you have any problems affording any of your prescribed medications? No   Discharge Plan A Long-term acute care facility (LTAC)   Discharge Plan B Rehab   DME Needed Upon Discharge  none   Patient choice form signed by patient/caregiver No   Anticipated Discharge Disposition LTAC   Can the patient answer the patient profile reliably? Yes, cognitively intact

## 2019-12-09 NOTE — PLAN OF CARE
Patient resting comfortably in bed. No complaints of pain this shift. Vitals stable. No acute events overnight. 2 units PRBC given per orders. H&H improved. No new bruising or evidence of bleeding. UO adequate on lasix gtt. Abd US pending. Continuous ICU monitoring maintained. Plan for dialysis line exchange tomorrow.

## 2019-12-09 NOTE — PT/OT/SLP PROGRESS
Occupational Therapy      Patient Name:  Cy Rutherford   MRN:  [id number]    Patient not seen today secondary to  PM 1438 pt in dialysis. Will follow-up tomorrow.    CECILLE Umaña  12/9/2019

## 2019-12-09 NOTE — PLAN OF CARE
Patient remained in room with bed in low postion, wheels locked and call light within reach. Lasix drip maintained at 5mg per orders.  VS remained WDL. Dialysis came to bedside to randy patient, treatment complete today. New Trialysis catheter remained intact .ABD less distended. No complaints of pain for the shift. Will continue to monitor.

## 2019-12-09 NOTE — PROGRESS NOTES
Seen this pm  Denies any problems       H/H lower than yesterday  + tremors concerning for uremia        Vitals:    12/08/19 1915 12/08/19 1923 12/08/19 1930 12/08/19 1945   BP: (!) 148/65  (!) 146/65 (!) 149/67   Pulse: 85 82 83 83   Resp: (!) 22 (!) 22 (!) 22 (!) 22   Temp:    98.2 °F (36.8 °C)   TempSrc:    Oral   SpO2: 100% 100% 100% 100%   Weight:       Height:                 Intake/Output Summary (Last 24 hours) at 12/8/2019 1955  Last data filed at 12/8/2019 1937  Gross per 24 hour   Intake 976.04 ml   Output 3345 ml   Net -2368.96 ml           LABS  CBC  Recent Labs   Lab 12/07/19  0908 12/08/19  0646 12/08/19  1351   WBC 10.35 9.50 8.62   RBC 2.59* 2.47* 2.27*   HGB 7.7* 7.3* 6.7*   HCT 23.2* 22.3* 20.5*   * 147* 151   MCV 90 90 90   MCH 29.7 29.6 29.5   MCHC 33.2 32.7 32.7     BMP  Recent Labs   Lab 12/06/19  1234 12/07/19  0356 12/08/19  0646   * 136 138   K 5.1 4.7 4.4   CO2 21* 22* 24    101 100   BUN 79* 89* 110*   CREATININE 5.7* 6.0* 6.3*   * 156* 169*       POCT-Glucose  POCT Glucose   Date Value Ref Range Status   12/08/2019 278 (H) 70 - 110 mg/dL Final   12/08/2019 258 (H) 70 - 110 mg/dL Final   12/08/2019 181 (H) 70 - 110 mg/dL Final   12/07/2019 169 (H) 70 - 110 mg/dL Final   12/07/2019 213 (H) 70 - 110 mg/dL Final   12/07/2019 195 (H) 70 - 110 mg/dL Final   12/07/2019 125 (H) 70 - 110 mg/dL Final   12/07/2019 155 (H) 70 - 110 mg/dL Final   12/06/2019 181 (H) 70 - 110 mg/dL Final   12/06/2019 225 (H) 70 - 110 mg/dL Final   12/06/2019 250 (H) 70 - 110 mg/dL Final   12/06/2019 224 (H) 70 - 110 mg/dL Final   12/05/2019 249 (H) 70 - 110 mg/dL Final       Recent Labs   Lab 12/06/19  0416 12/06/19  1234 12/07/19  0356 12/08/19  0646   CALCIUM 8.0* 8.0* 8.0* 8.2*   MG 2.3 2.4 2.5  --    PHOS 5.9* 6.2* 6.6* 7.4*     LFT  Recent Labs   Lab 12/02/19  1303  12/05/19  1949 12/06/19  0416 12/06/19  1234   PROT 4.3*  --   --   --   --    ALBUMIN 2.5*   < > 2.2* 2.2* 2.3*    BILITOT 0.3  --   --   --   --    AST 17  --   --   --   --    ALKPHOS 22*  --   --   --   --    ALT 15  --   --   --   --     < > = values in this interval not displayed.     GFR    COAGS  Recent Labs   Lab 12/03/19  1631   INR 1.1   APTT 24.2     CE  No results for input(s): TROPONINI, CKTOTAL, CKMB in the last 168 hours.  ABGs  No results for input(s): PH, PCO2, PO2, HCO3, POCSATURATED, BE in the last 24 hours.  BNP  Recent Labs   Lab 12/03/19  1444   BNP 94       LAST HbA1c  Lab Results   Component Value Date    HGBA1C 5.6 12/02/2019       Lipid panel  Lab Results   Component Value Date    CHOL 139 09/13/2019    CHOL 165 05/08/2018    CHOL 139 06/09/2011     Lab Results   Component Value Date    HDL 35 (L) 09/13/2019    HDL 38 (L) 05/08/2018    HDL 32 (L) 06/09/2011     Lab Results   Component Value Date    LDLCALC 80.0 09/13/2019    LDLCALC 80.6 05/08/2018    LDLCALC 73.2 06/09/2011     Lab Results   Component Value Date    TRIG 120 09/13/2019    TRIG 232 (H) 05/08/2018    TRIG 169 (H) 06/09/2011     Lab Results   Component Value Date    CHOLHDL 25.2 09/13/2019    CHOLHDL 23.0 05/08/2018    CHOLHDL 23.0 06/09/2011              Imp:        S/p left subscapular renal hematoma   Anemia   Required multiple units of pRBCs  PAD s/p R SFA atherectomy + PTA  Obesity  HTN  HLP  DM II  BK  ATN requiring CRRT      Plan:      HD for clearance  His urine output has drastically increased  Transfuse as needed  No anit-platelet or anticoagulants  PT/OT  Statin therapy  BP control with target < 130/80 mmHg          Change Rogelio for adequate HD +/- CRRT

## 2019-12-09 NOTE — PLAN OF CARE
Pt. on BiPAP with documented settings. Alarms on and functioning properly. Pt appears to be in no respiratory distress. Will continue to monitor

## 2019-12-09 NOTE — PT/OT/SLP PROGRESS
Physical Therapy Treatment    Patient Name:  Cy Rutherford   MRN:  [id number]    Recommendations:     Discharge Recommendations:  rehabilitation facility   Discharge Equipment Recommendations: bedside commode, shower chair   Barriers to discharge: Decreased caregiver support    Assessment:     Cy Rutherford is a 74 y.o. male admitted with a medical diagnosis of Acute on chronic diastolic heart failure.  He presents with the following impairments/functional limitations:  weakness, impaired endurance, gait instability, impaired functional mobilty, impaired self care skills, impaired balance, impaired sensation, decreased upper extremity function, decreased lower extremity function, impaired cardiopulmonary response to activity; Progressed to step taking along EOB and in place using RW with CG/Janel; fatigued following session.    Rehab Prognosis: Fair+; patient would benefit from acute skilled PT services to address these deficits and reach maximum level of function.    Recent Surgery: Procedure(s) (LRB):  Angiogram, Lower Arterial, Unilateral (N/A) 5 Days Post-Op    Plan:     During this hospitalization, patient to be seen 6 x/week to address the identified rehab impairments via gait training, therapeutic activities, therapeutic exercises, neuromuscular re-education and progress toward the following goals:    · Plan of Care Expires:  01/06/20    Subjective     Pain/Comfort:  · Pain Rating 1: 2/10  · Location - Side 1: Left  · Location - Orientation 1: lower  · Location 1: abdomen  · Pain Addressed 1: Nurse notified, Distraction, Reposition  · Pain Rating 2: 2/10      Objective:     Communicated with nurse prior to session.  Patient found with central line, rubio catheter, oxygen(ICU monitoring) upon PT entry to room.     General Precautions: Standard, fall   Orthopedic Precautions:N/A   Braces: N/A     Functional Mobility:  · Bed Mobility:     · Rolling Right: moderate assistance and use of rail  · Supine to Sit:  moderate assistance and use of rail  · Sit to Supine: moderate assistance  · Transfers:     · Sit to Stand:  minimum assistance, moderate assistance and with level of bed minimally increased  with rolling walker  · Gait: Patient stepped in place 10 steps and took seated rest break; pt stood again and stepped laterally toward HOB with CG/Janel for RW management; fair posture, short step length, fatigue following    AM-PAC 6 CLICK MOBILITY  Turning over in bed (including adjusting bedclothes, sheets and blankets)?: 2  Sitting down on and standing up from a chair with arms (e.g., wheelchair, bedside commode, etc.): 2  Moving from lying on back to sitting on the side of the bed?: 2  Moving to and from a bed to a chair (including a wheelchair)?: 2  Need to walk in hospital room?: 2  Climbing 3-5 steps with a railing?: 1  Basic Mobility Total Score: 11       Therapeutic Activities and Exercises:   Patient supine in bed with HOB elevated; pt performed BLE AROM ex -20 reps APs, 10 reps heel slides with minimal manual resistance to ext portion, assisted SLR, assisted hip abd/add; pursed lip breathing as needed during exercise; pt transitioned to EOB as above with modA, with increased time and effort; pt sat at EOB x 5 minutes then stood at RW with min/modA from minimally increased level of bed; pt stepped in place x 10 and took seated rest break; stood again at RW and stepped laterally toward HOB as described above; gown doffed/donned then returned to supine; pt assisted with scoot to HOB using drawsheet and maxA x 2; pt positioned for comfort; HD nurse present to start.    Patient left HOB elevated with all lines intact, call button in reach, bed alarm on, nurse notified and HD nurse present..    GOALS:   Multidisciplinary Problems     Physical Therapy Goals        Problem: Physical Therapy Goal    Goal Priority Disciplines Outcome Goal Variances Interventions   Physical Therapy Goal     PT, PT/OT Ongoing, Progressing      Description:  Goals to be met by: 2020     Patient will increase functional independence with mobility by performin. Supine to sit with Set-up [place]  2. Sit to stand transfer with Supervision  3. Bed to chair transfer with Supervision using Rolling Walker  4. Gait  x 120 feet with Supervision using Rolling Walker.   5. Lower extremity exercise program x15 reps per handout, with supervision                      Time Tracking:     PT Received On: 19  PT Start Time: 1325     PT Stop Time: 1408  PT Total Time (min): 43 min     Billable Minutes: Therapeutic Activity 25 minutes and Therapeutic Exercise 18 minutes    Treatment Type: Treatment  PT/PTA: PT     PTA Visit Number: 0     Chelsey Hawthorne, PT  2019

## 2019-12-09 NOTE — SUBJECTIVE & OBJECTIVE
Interval History: Feeling better today. Having some difficulty with HD access and completing HD. Eating well. Swelling is improving.     Review of Systems   Constitutional: Negative for chills and fever.   Respiratory: Positive for shortness of breath. Negative for cough.    Cardiovascular: Positive for leg swelling. Negative for chest pain and palpitations.   Gastrointestinal: Negative for nausea and vomiting.     Objective:     Vital Signs (Most Recent):  Temp: 98 °F (36.7 °C) (12/09/19 1530)  Pulse: 85 (12/09/19 1730)  Resp: (!) 29 (12/09/19 1730)  BP: (!) 144/67 (12/09/19 1730)  SpO2: 99 % (12/09/19 1730) Vital Signs (24h Range):  Temp:  [98 °F (36.7 °C)-99.2 °F (37.3 °C)] 98 °F (36.7 °C)  Pulse:  [72-90] 85  Resp:  [16-34] 29  SpO2:  [81 %-100 %] 99 %  BP: (131-171)/(60-76) 144/67     Weight: 106.6 kg (235 lb 0.2 oz)  Body mass index is 34.21 kg/m².    Intake/Output Summary (Last 24 hours) at 12/9/2019 1758  Last data filed at 12/9/2019 1615  Gross per 24 hour   Intake 2235.62 ml   Output 4445 ml   Net -2209.38 ml      Physical Exam   Constitutional: He is oriented to person, place, and time. He appears well-developed and well-nourished. No distress.   Cardiovascular: Normal rate and regular rhythm.   Murmur heard.  Pulmonary/Chest: Effort normal and breath sounds normal. No respiratory distress.   Abdominal: Soft. Bowel sounds are normal. He exhibits distension. There is no tenderness.   Musculoskeletal: He exhibits edema. He exhibits no tenderness.   Neurological: He is alert and oriented to person, place, and time.   Skin: Skin is warm and dry.   Nursing note and vitals reviewed.      Significant Labs:   POCT Glucose:   Recent Labs   Lab 12/08/19  2125 12/09/19  0742 12/09/19  1119   POCTGLUCOSE 268* 143* 184*       Significant Imaging: I have reviewed all pertinent imaging results/findings within the past 24 hours.

## 2019-12-09 NOTE — SUBJECTIVE & OBJECTIVE
Review of Systems   Constitution: Negative for chills, decreased appetite, diaphoresis and fever.   Cardiovascular: Negative for chest pain, claudication, cyanosis, dyspnea on exertion, irregular heartbeat, leg swelling, near-syncope, orthopnea, palpitations, paroxysmal nocturnal dyspnea and syncope.   Respiratory: Negative for cough, hemoptysis, shortness of breath and wheezing.    Gastrointestinal: Negative for bloating, abdominal pain, constipation, diarrhea, melena, nausea and vomiting.   Neurological: Negative for dizziness and weakness.     Objective:     Vital Signs (Most Recent):  Temp: 98.1 °F (36.7 °C) (12/09/19 0725)  Pulse: 80 (12/09/19 1030)  Resp: 16 (12/09/19 1030)  BP: 131/61 (12/09/19 1000)  SpO2: (!) 92 % (12/09/19 1030) Vital Signs (24h Range):  Temp:  [98 °F (36.7 °C)-99.2 °F (37.3 °C)] 98.1 °F (36.7 °C)  Pulse:  [72-92] 80  Resp:  [16-25] 16  SpO2:  [92 %-100 %] 92 %  BP: (105-170)/(37-72) 131/61     Weight: 106.6 kg (235 lb 0.2 oz)  Body mass index is 34.21 kg/m².     SpO2: (!) 92 %  O2 Device (Oxygen Therapy): BiPAP      Intake/Output Summary (Last 24 hours) at 12/9/2019 1042  Last data filed at 12/9/2019 1000  Gross per 24 hour   Intake 1745.62 ml   Output 3445 ml   Net -1699.38 ml       Lines/Drains/Airways     Central Venous Catheter Line                 Trialysis (Dialysis) Catheter 12/03/19 1659 right internal jugular 5 days          Drain                 Urethral Catheter 12/03/19 0445 16 Fr. 6 days                Physical Exam   Constitutional: He is oriented to person, place, and time. He appears well-developed and well-nourished. No distress.   Cardiovascular: Normal rate and regular rhythm. Exam reveals no gallop.   No murmur heard.  Pulmonary/Chest: Effort normal and breath sounds normal. No respiratory distress. He has no wheezes.   Abdominal: Soft. Bowel sounds are normal. He exhibits no distension. There is no tenderness.   Musculoskeletal: He exhibits edema.   Neurological:  He is alert and oriented to person, place, and time.   Skin: Skin is warm and dry.       Significant Labs:     Recent Labs   Lab 12/09/19  0539   WBC 11.01   RBC 2.99*   HGB 8.8*   HCT 26.6*      MCV 89   MCH 29.4   MCHC 33.1     Recent Labs   Lab 12/09/19  0539      K 3.7   CL 99   CO2 23   *   CREATININE 5.3*       Significant Imaging: Echocardiogram:   Transthoracic echo (TTE) complete (Cupid Only):   Results for orders placed or performed during the hospital encounter of 12/02/19   Echo Color Flow Doppler? Yes   Result Value Ref Range    BSA 2.3 m2    TDI SEPTAL 0.06 m/s    LV LATERAL E/E' RATIO 9.00 m/s    LV SEPTAL E/E' RATIO 10.50 m/s    TDI LATERAL 0.07 m/s    PV PEAK VELOCITY 1.81 cm/s    LVIDD 4.80 3.5 - 6.0 cm    IVS 1.10 (A) 0.6 - 1.1 cm    PW 1.10 (A) 0.6 - 1.1 cm    Ao root annulus 3.40 cm    LVIDS 2.56 2.1 - 4.0 cm    FS 47 28 - 44 %    LV mass 193.96 g    LA size 3.68 cm    TAPSE 1.44 cm    Left Ventricle Relative Wall Thickness 0.46 cm    AV mean gradient 6 mmHg    AV valve area 4.31 cm2    AV Velocity Ratio 0.78     AV index (prosthetic) 1.06     E/A ratio 0.62     Mean e' 0.07 m/s    E wave decelartion time 338.80 msec    LVOT diameter 2.27 cm    LVOT area 4.0 cm2    LVOT peak simone 1.16 m/s    LVOT peak VTI 26.25 cm    Ao peak simone 1.48 m/s    Ao VTI 24.66 cm    LVOT stroke volume 106.18 cm3    AV peak gradient 9 mmHg    E/E' ratio 9.69 m/s    MV Peak E Simone 0.63 m/s    MV Peak A Simone 1.02 m/s    LV Systolic Volume 23.57 mL    LV Systolic Volume Index 10.6 mL/m2    LV Diastolic Volume 68.84 mL    LV Diastolic Volume Index 30.84 mL/m2    LV Mass Index 87 g/m2    Right Atrial Pressure (from IVC) 3 mmHg    Narrative    · Normal left ventricular systolic function. The estimated ejection   fraction is 55%  · Concentric left ventricular remodeling.  · No wall motion abnormalities.  · Normal right ventricular systolic function.  · Normal central venous pressure (3 mm Hg).

## 2019-12-09 NOTE — PROGRESS NOTES
Progress Note  Nephrology      Consult Requested By: James Sanchez MD      SUBJECTIVE:     Overnight events  Patient is a 74 y.o. male     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    Abnormality of gait as late effect of cerebrovascular accident (CVA)    Tightness of right gastrocnemius muscle    Claudication    Hypotension    Anemia    Edema    Shortness of breath    Leukocytosis    JOSE (acute kidney injury)    Hyperkalemia    Acute on chronic diastolic heart failure     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    Type 2 diabetes mellitus with kidney complication, without long-term current use of insulin 11/15/2012              OBJECTIVE:     Vitals:    12/09/19 1030 12/09/19 1100 12/09/19 1107 12/09/19 1115   BP:   (!) 149/67    Pulse: 80 80 82 77   Resp: 16 18 20 19   Temp:    98.2 °F (36.8 °C)   TempSrc:    Oral   SpO2: (!) 92% 96% 97% 95%   Weight:       Height:           Temp: 98.2 °F (36.8 °C) (12/09/19 1115)  Pulse: 77 (12/09/19 1115)  Resp: 19 (12/09/19 1115)  BP: (!) 149/67 (12/09/19 1107)  SpO2: 95 % (12/09/19 1115)    Date 12/09/19 0700 - 12/10/19 0659   Shift 7384-3559 6819-2772 2893-1587 24  Hour Total   INTAKE   P.O. 550   550   I.V.(mL/kg) 12.5(0.1)   12.5(0.1)   Shift Total(mL/kg) 562.5(5.3)   562.5(5.3)   OUTPUT   Urine(mL/kg/hr) 1225   1225   Shift Total(mL/kg) 1225(11.5)   1225(11.5)   Weight (kg) 106.6 106.6 106.6 106.6             Medications:   sodium chloride 0.9%   Intravenous Once    amLODIPine  10 mg Oral Daily    doxazosin  8 mg Oral Daily    insulin aspart U-100  17 Units Subcutaneous TIDWM    insulin detemir U-100  60 Units Subcutaneous QHS    mupirocin   Nasal BID    rosuvastatin  20 mg Oral Daily    sodium chloride 0.9%  3 mL/kg Intravenous Once    vitamin renal formula (B-complex-vitamin c-folic acid)  1 capsule Oral Daily      sodium chloride 0.9% 3 mL/kg/hr (12/02/19 1348)    furosemide (LASIX) 2 mg/mL infusion (non-titrating) 5 mg/hr (12/09/19 0549)    heparin (porcine)                 Physical Exam:  General appearance: NAD  Weak  SOB with exertion  No CP  Lungs: diminished breath sounds  Heart: pulse 77  Abdomen: soft  Extremities: edema  Tremor  Skin: dry  Laboratory:  ABG  Labs reviewed  Recent Results (from the past 336 hour(s))   Basic metabolic panel    Collection Time: 12/09/19  5:39 AM   Result Value Ref Range    Sodium 136 136 - 145 mmol/L    Potassium 3.7 3.5 - 5.1 mmol/L    Chloride 99 95 - 110 mmol/L    CO2 23 23 - 29 mmol/L    BUN, Bld 100 (H) 8 - 23 mg/dL    Creatinine 5.3 (H) 0.5 - 1.4 mg/dL    Calcium 8.2 (L) 8.7 - 10.5 mg/dL    Anion Gap 14 8 - 16 mmol/L   Basic metabolic panel    Collection Time: 12/08/19  6:46 AM   Result Value Ref Range    Sodium 138 136 - 145 mmol/L    Potassium 4.4 3.5 - 5.1 mmol/L    Chloride 100 95 - 110 mmol/L    CO2 24 23 - 29 mmol/L    BUN, Bld 110 (H) 8 - 23 mg/dL    Creatinine 6.3 (H) 0.5 - 1.4 mg/dL    Calcium 8.2 (L) 8.7 - 10.5 mg/dL    Anion Gap 14 8 - 16 mmol/L   Basic metabolic panel    Collection Time: 12/07/19  3:56 AM   Result Value Ref Range    Sodium 136 136 - 145 mmol/L    Potassium 4.7 3.5 - 5.1 mmol/L     Chloride 101 95 - 110 mmol/L    CO2 22 (L) 23 - 29 mmol/L    BUN, Bld 89 (H) 8 - 23 mg/dL    Creatinine 6.0 (H) 0.5 - 1.4 mg/dL    Calcium 8.0 (L) 8.7 - 10.5 mg/dL    Anion Gap 13 8 - 16 mmol/L     Recent Results (from the past 336 hour(s))   CBC auto differential    Collection Time: 12/09/19  5:39 AM   Result Value Ref Range    WBC 11.01 3.90 - 12.70 K/uL    Hemoglobin 8.8 (L) 14.0 - 18.0 g/dL    Hematocrit 26.6 (L) 40.0 - 54.0 %    Platelets 162 150 - 350 K/uL   CBC auto differential    Collection Time: 12/09/19 12:25 AM   Result Value Ref Range    WBC 13.33 (H) 3.90 - 12.70 K/uL    Hemoglobin 8.9 (L) 14.0 - 18.0 g/dL    Hematocrit 27.0 (L) 40.0 - 54.0 %    Platelets 156 150 - 350 K/uL   CBC auto differential    Collection Time: 12/08/19  1:51 PM   Result Value Ref Range    WBC 8.62 3.90 - 12.70 K/uL    Hemoglobin 6.7 (L) 14.0 - 18.0 g/dL    Hematocrit 20.5 (L) 40.0 - 54.0 %    Platelets 151 150 - 350 K/uL     Urinalysis  No results for input(s): COLORU, CLARITYU, SPECGRAV, PHUR, PROTEINUA, GLUCOSEU, BILIRUBINCON, BLOODU, WBCU, RBCU, BACTERIA, MUCUS, NITRITE, LEUKOCYTESUR, UROBILINOGEN, HYALINECASTS in the last 24 hours.    Diagnostic Results:  X-Ray: Reviewed  US: Reviewed  Echo: Reviewed  ACCESS    ASSESSMENT/PLAN:     US  As was noted on the 2 previous exams, with respect to the right kidney, preserved cortical thickness, echogenicity, and corticomedullary differentiation.  No right renal masses, stones, or hydronephrosis.  Right kidney measures 12.4 cm.  Reconfirmed elevated right renal resistive index of 0.91 that suggest chronic right renal medical disease.    In this patient whose earlier ultrasound but better seen on earlier CT documented had left renal subcapsular hematoma with adjacent extrarenal and retroperitoneal hemorrhage, the present ultrasound suggests that the left kidney has measurements of 13 cm with mild heterogeneity to the renal cortex.  No left-sided stones or hydronephrosis.  No left renal  masses.  Adjacent to the inferolateral but more inferior kidney,  persistent inferiorly positioned extrarenal hypoechoic echogenicity felt to correspond to the better demonstrated CT findings of subcapsular hematoma and additional pararenal/retroperitoneal hemorrhage.  If important to document stability and/or improvemet in previously noted left renal and pararenal retroperitoneal abnormalities, could consider repeat CT.  As before, abnormal left renal resistive index of 0.8 that suggest chronic left renal medical disease.  Bladder collapsed about indwelling Squires catheter.    Anemia  Received PRBC 2 units  Iron IV  Epogen  JOSE/CKD 4  UO 2770 cc/24 h  Azotemia  Metabolic bone disease  Hyperphosphatemia  Binders  Poor nutrition  Renal diet  Edema  /67  Weight 106.6  Dialysis  No UF  Lasix drip

## 2019-12-09 NOTE — PROGRESS NOTES
[facility]  General Surgery  Progress Note    Subjective:     Interval History:   Feeling somewhat better  Unable to get full runs of HD. Ran for about an hour then stopped. Cathflo not helping.      Post-Op Info:  Procedure(s) (LRB):  Angiogram, Lower Arterial, Unilateral (N/A)   5 Days Post-Op      Medications:  Continuous Infusions:   sodium chloride 0.9% 3 mL/kg/hr (12/02/19 1348)    furosemide (LASIX) 2 mg/mL infusion (non-titrating) 5 mg/hr (12/09/19 0549)    heparin (porcine)       Scheduled Meds:   sodium chloride 0.9%   Intravenous Once    amLODIPine  10 mg Oral Daily    doxazosin  8 mg Oral Daily    epoetin gege-ebpx (RETACRIT) injection  10,000 Units Intravenous Once    insulin aspart U-100  17 Units Subcutaneous TIDWM    insulin detemir U-100  60 Units Subcutaneous QHS    iron sucrose (VENOFER) IVPB  100 mg Intravenous Once    mupirocin   Nasal BID    rosuvastatin  20 mg Oral Daily    sevelamer carbonate  800 mg Oral TID WM    sodium chloride 0.9%  3 mL/kg Intravenous Once    vitamin renal formula (B-complex-vitamin c-folic acid)  1 capsule Oral Daily     PRN Meds:sodium chloride, sodium chloride, sodium chloride, sodium chloride, sodium chloride 0.9%, sodium chloride 0.9%, acetaminophen, albuterol-ipratropium, alteplase, Dextrose 10% Bolus, Dextrose 10% Bolus, Dextrose 10% Bolus, Dextrose 10% Bolus, glucagon (human recombinant), glucose, glucose, heparin (porcine), heparin (porcine), insulin aspart U-100, morphine, ondansetron, ondansetron, polyethylene glycol, sodium chloride 0.9%     Objective:     Vital Signs (Most Recent):  Temp: 98.2 °F (36.8 °C) (12/09/19 1115)  Pulse: 85 (12/09/19 1315)  Resp: (!) 23 (12/09/19 1315)  BP: 136/76 (12/09/19 1300)  SpO2: 95 % (12/09/19 1315) Vital Signs (24h Range):  Temp:  [98.1 °F (36.7 °C)-99.2 °F (37.3 °C)] 98.2 °F (36.8 °C)  Pulse:  [72-90] 85  Resp:  [16-25] 23  SpO2:  [92 %-100 %] 95 %  BP: (105-170)/(37-76) 136/76        Intake/Output Summary (Last 24 hours) at 12/9/2019 1324  Last data filed at 12/9/2019 1301  Gross per 24 hour   Intake 2403.12 ml   Output 3795 ml   Net -1391.88 ml       Physical Exam   Right IJ in place, bandage CDI    Significant Labs:  CBC:   Recent Labs   Lab 12/09/19  0539   WBC 11.01   RBC 2.99*   HGB 8.8*   HCT 26.6*      MCV 89   MCH 29.4   MCHC 33.1     CMP:   Recent Labs   Lab 12/09/19  0539   *   CALCIUM 8.2*      K 3.7   CO2 23   CL 99   *   CREATININE 5.3*       Significant Diagnostics:  I have reviewed all pertinent imaging results/findings within the past 24 hours.    Assessment/Plan:     Active Diagnoses:    Diagnosis Date Noted POA    PRINCIPAL PROBLEM:  Acute on chronic diastolic heart failure [I50.33] 12/08/2019 Yes    Hypotension [I95.9] 12/03/2019 Yes    Anemia [D64.9] 12/03/2019 Yes    Edema [R60.9] 12/03/2019 Yes    Leukocytosis [D72.829] 12/03/2019 Yes    JOSE (acute kidney injury) [N17.9] 12/03/2019 Yes    Shortness of breath [R06.02] 12/03/2019 Yes    Hyperkalemia [E87.5] 12/03/2019 Yes    PAD (peripheral artery disease) [I73.9] 12/29/2014 Yes    Type 2 diabetes mellitus with kidney complication, with long-term current use of insulin [E11.29, Z79.4] 11/15/2012 Not Applicable    CAD (coronary artery disease) [I25.10] 11/15/2012 Yes    Essential hypertension [I10] 11/15/2012 Yes    Hyperlipidemia [E78.5] 11/15/2012 Yes      Problems Resolved During this Admission:     74M with RIJ temporary dialysis catheter malfunction  Will replace at bedside, leave tip and 14-15cm instead of 16  Try HD again        Gilmer Coronado MD  General Surgery  [facility]

## 2019-12-10 PROBLEM — R14.0 ABDOMINAL DISTENSION: Status: ACTIVE | Noted: 2019-12-10

## 2019-12-10 LAB
ANION GAP SERPL CALC-SCNC: 10 MMOL/L (ref 8–16)
ANISOCYTOSIS BLD QL SMEAR: SLIGHT
BASOPHILS # BLD AUTO: ABNORMAL K/UL (ref 0–0.2)
BASOPHILS NFR BLD: 0 % (ref 0–1.9)
BUN SERPL-MCNC: 82 MG/DL (ref 8–23)
CALCIUM SERPL-MCNC: 8.3 MG/DL (ref 8.7–10.5)
CHLORIDE SERPL-SCNC: 100 MMOL/L (ref 95–110)
CO2 SERPL-SCNC: 25 MMOL/L (ref 23–29)
CREAT SERPL-MCNC: 4 MG/DL (ref 0.5–1.4)
DIFFERENTIAL METHOD: ABNORMAL
EOSINOPHIL # BLD AUTO: ABNORMAL K/UL (ref 0–0.5)
EOSINOPHIL NFR BLD: 12 % (ref 0–8)
ERYTHROCYTE [DISTWIDTH] IN BLOOD BY AUTOMATED COUNT: 14.5 % (ref 11.5–14.5)
EST. GFR  (AFRICAN AMERICAN): 16 ML/MIN/1.73 M^2
EST. GFR  (NON AFRICAN AMERICAN): 14 ML/MIN/1.73 M^2
GLUCOSE SERPL-MCNC: 141 MG/DL (ref 70–110)
HCT VFR BLD AUTO: 27.5 % (ref 40–54)
HGB BLD-MCNC: 9.1 G/DL (ref 14–18)
LYMPHOCYTES # BLD AUTO: ABNORMAL K/UL (ref 1–4.8)
LYMPHOCYTES NFR BLD: 4 % (ref 18–48)
MCH RBC QN AUTO: 29.7 PG (ref 27–31)
MCHC RBC AUTO-ENTMCNC: 33.1 G/DL (ref 32–36)
MCV RBC AUTO: 90 FL (ref 82–98)
MONOCYTES # BLD AUTO: ABNORMAL K/UL (ref 0.3–1)
MONOCYTES NFR BLD: 7 % (ref 4–15)
MYELOCYTES NFR BLD MANUAL: 1 %
NEUTROPHILS NFR BLD: 76 % (ref 38–73)
PHOSPHATE SERPL-MCNC: 5.6 MG/DL (ref 2.7–4.5)
PLATELET # BLD AUTO: 175 K/UL (ref 150–350)
PMV BLD AUTO: 9.7 FL (ref 9.2–12.9)
POCT GLUCOSE: 156 MG/DL (ref 70–110)
POCT GLUCOSE: 167 MG/DL (ref 70–110)
POIKILOCYTOSIS BLD QL SMEAR: SLIGHT
POTASSIUM SERPL-SCNC: 3.3 MMOL/L (ref 3.5–5.1)
RBC # BLD AUTO: 3.06 M/UL (ref 4.6–6.2)
SODIUM SERPL-SCNC: 135 MMOL/L (ref 136–145)
WBC # BLD AUTO: 12.13 K/UL (ref 3.9–12.7)

## 2019-12-10 PROCEDURE — 94660 CPAP INITIATION&MGMT: CPT

## 2019-12-10 PROCEDURE — 27000221 HC OXYGEN, UP TO 24 HOURS

## 2019-12-10 PROCEDURE — 80100016 HC MAINTENANCE HEMODIALYSIS

## 2019-12-10 PROCEDURE — 85007 BL SMEAR W/DIFF WBC COUNT: CPT

## 2019-12-10 PROCEDURE — 25000003 PHARM REV CODE 250: Performed by: INTERNAL MEDICINE

## 2019-12-10 PROCEDURE — 99900035 HC TECH TIME PER 15 MIN (STAT)

## 2019-12-10 PROCEDURE — 25000003 PHARM REV CODE 250: Performed by: NURSE PRACTITIONER

## 2019-12-10 PROCEDURE — 11000001 HC ACUTE MED/SURG PRIVATE ROOM

## 2019-12-10 PROCEDURE — 97530 THERAPEUTIC ACTIVITIES: CPT

## 2019-12-10 PROCEDURE — 99233 PR SUBSEQUENT HOSPITAL CARE,LEVL III: ICD-10-PCS | Mod: ,,, | Performed by: INTERNAL MEDICINE

## 2019-12-10 PROCEDURE — 63600175 PHARM REV CODE 636 W HCPCS: Performed by: INTERNAL MEDICINE

## 2019-12-10 PROCEDURE — 85027 COMPLETE CBC AUTOMATED: CPT

## 2019-12-10 PROCEDURE — 94761 N-INVAS EAR/PLS OXIMETRY MLT: CPT

## 2019-12-10 PROCEDURE — 99233 SBSQ HOSP IP/OBS HIGH 50: CPT | Mod: ,,, | Performed by: INTERNAL MEDICINE

## 2019-12-10 PROCEDURE — 80048 BASIC METABOLIC PNL TOTAL CA: CPT

## 2019-12-10 PROCEDURE — 84100 ASSAY OF PHOSPHORUS: CPT

## 2019-12-10 PROCEDURE — 97110 THERAPEUTIC EXERCISES: CPT

## 2019-12-10 RX ORDER — SIMETHICONE 125 MG
125 TABLET,CHEWABLE ORAL EVERY 6 HOURS
Status: DISCONTINUED | OUTPATIENT
Start: 2019-12-10 | End: 2019-12-19 | Stop reason: HOSPADM

## 2019-12-10 RX ORDER — MUPIROCIN 20 MG/G
OINTMENT TOPICAL 2 TIMES DAILY
Status: DISPENSED | OUTPATIENT
Start: 2019-12-10 | End: 2019-12-15

## 2019-12-10 RX ORDER — SODIUM CHLORIDE 9 MG/ML
INJECTION, SOLUTION INTRAVENOUS ONCE
Status: COMPLETED | OUTPATIENT
Start: 2019-12-10 | End: 2019-12-10

## 2019-12-10 RX ORDER — POTASSIUM CHLORIDE 20 MEQ/1
40 TABLET, EXTENDED RELEASE ORAL ONCE
Status: COMPLETED | OUTPATIENT
Start: 2019-12-10 | End: 2019-12-10

## 2019-12-10 RX ORDER — POLYETHYLENE GLYCOL 3350 17 G/17G
17 POWDER, FOR SOLUTION ORAL 2 TIMES DAILY
Status: COMPLETED | OUTPATIENT
Start: 2019-12-10 | End: 2019-12-10

## 2019-12-10 RX ADMIN — DOXAZOSIN 8 MG: 2 TABLET ORAL at 08:12

## 2019-12-10 RX ADMIN — MUPIROCIN: 20 OINTMENT TOPICAL at 08:12

## 2019-12-10 RX ADMIN — INSULIN ASPART 17 UNITS: 100 INJECTION, SOLUTION INTRAVENOUS; SUBCUTANEOUS at 12:12

## 2019-12-10 RX ADMIN — SEVELAMER CARBONATE 800 MG: 800 TABLET, FILM COATED ORAL at 08:12

## 2019-12-10 RX ADMIN — SIMETHICONE 125 MG: 125 TABLET, CHEWABLE ORAL at 12:12

## 2019-12-10 RX ADMIN — AMLODIPINE BESYLATE 10 MG: 5 TABLET ORAL at 08:12

## 2019-12-10 RX ADMIN — SODIUM CHLORIDE: 0.9 INJECTION, SOLUTION INTRAVENOUS at 07:12

## 2019-12-10 RX ADMIN — IRON SUCROSE 100 MG: 20 INJECTION, SOLUTION INTRAVENOUS at 02:12

## 2019-12-10 RX ADMIN — POLYETHYLENE GLYCOL 3350 17 G: 17 POWDER, FOR SOLUTION ORAL at 12:12

## 2019-12-10 RX ADMIN — POLYETHYLENE GLYCOL 3350 17 G: 17 POWDER, FOR SOLUTION ORAL at 08:12

## 2019-12-10 RX ADMIN — FUROSEMIDE 5 MG/HR: 10 INJECTION, SOLUTION INTRAMUSCULAR; INTRAVENOUS at 05:12

## 2019-12-10 RX ADMIN — POTASSIUM CHLORIDE 40 MEQ: 1500 TABLET, EXTENDED RELEASE ORAL at 08:12

## 2019-12-10 RX ADMIN — POLYETHYLENE GLYCOL 3350 17 G: 17 POWDER, FOR SOLUTION ORAL at 03:12

## 2019-12-10 RX ADMIN — ROSUVASTATIN CALCIUM 20 MG: 10 TABLET, FILM COATED ORAL at 08:12

## 2019-12-10 RX ADMIN — NEPHROCAP 1 CAPSULE: 1 CAP ORAL at 08:12

## 2019-12-10 RX ADMIN — SEVELAMER CARBONATE 800 MG: 800 TABLET, FILM COATED ORAL at 12:12

## 2019-12-10 RX ADMIN — INSULIN ASPART 17 UNITS: 100 INJECTION, SOLUTION INTRAVENOUS; SUBCUTANEOUS at 08:12

## 2019-12-10 RX ADMIN — INSULIN DETEMIR 60 UNITS: 100 INJECTION, SOLUTION SUBCUTANEOUS at 08:12

## 2019-12-10 RX ADMIN — EPOETIN ALFA-EPBX 10000 UNITS: 10000 INJECTION, SOLUTION INTRAVENOUS; SUBCUTANEOUS at 07:12

## 2019-12-10 NOTE — PT/OT/SLP PROGRESS
Physical Therapy Treatment    Patient Name:  Cy Rutherford   MRN:  [id number]    Recommendations:     Discharge Recommendations:  rehabilitation facility   Discharge Equipment Recommendations: bedside commode, shower chair   Barriers to discharge: Decreased caregiver support    Assessment:     Cy Rutherford is a 74 y.o. male admitted with a medical diagnosis of Acute on chronic diastolic heart failure.  He presents with the following impairments/functional limitations:  weakness, impaired endurance, gait instability, impaired functional mobilty, impaired self care skills, impaired balance, decreased upper extremity function, decreased lower extremity function, decreased safety awareness, pain, decreased ROM, impaired coordination, impaired fine motor, impaired skin, edema, impaired cardiopulmonary response to activity, decreased coordination Patient required modA for bed mob and CG/Janel transfers; used RW to take steps to chair for OOB sitting; cont with POC..    Rehab Prognosis: Good; patient would benefit from acute skilled PT services to address these deficits and reach maximum level of function.    Recent Surgery: Procedure(s) (LRB):  Angiogram, Lower Arterial, Unilateral (N/A) 6 Days Post-Op    Plan:     During this hospitalization, patient to be seen 6 x/week to address the identified rehab impairments via gait training, therapeutic activities, therapeutic exercises, neuromuscular re-education and progress toward the following goals:    · Plan of Care Expires:  01/06/20    Subjective     Pain/Comfort:  · Pain Rating 1: 2/10  · Location - Side 1: Left(more than R; pt states he received a stool softener)  · Location - Orientation 1: lower  · Location 1: abdomen  · Pain Addressed 1: Pre-medicate for activity, Distraction, Reposition  · Pain Rating Post-Intervention 1: 0/10      Objective:     Communicated with nurse prior to session.  Patient found with central line, rubio catheter, oxygen(ICU monitoring) upon  PT entry to room.     General Precautions: Standard, fall   Orthopedic Precautions:N/A   Braces: N/A     Functional Mobility:  · Bed Mobility:     · Rolling Right: minimum assistance and use of side rail  · Scooting: contact guard assistance and seated scoot to EOB  · Supine to Sit: moderate assistance and HOB elevated, use of side rail, increased time/effort, VCs for effective technique  · Transfers:     · Sit to Stand:  minimum assistance with rolling walker and VCs for hand placement  · Bed to Chair: contact guard assistance and minimum assistance with  rolling walker  using  Step Transfer  · Gait:  Patient used RW to amb from bed to chair~4ft with upright posture, slow josh with CGA/Janel      AM-PAC 6 CLICK MOBILITY  Turning over in bed (including adjusting bedclothes, sheets and blankets)?: 2  Sitting down on and standing up from a chair with arms (e.g., wheelchair, bedside commode, etc.): 3  Moving from lying on back to sitting on the side of the bed?: 2  Moving to and from a bed to a chair (including a wheelchair)?: 3  Need to walk in hospital room?: 3  Climbing 3-5 steps with a railing?: 1  Basic Mobility Total Score: 14       Therapeutic Activities and Exercises:   Patient supine in bed; performed BLE active ex x 10 reps APs, ankle circles, heel slides, hip abd/add, TKEs, SLR with assist; pt transitioned to EOB with modA as described above; sit to stand with Janel and used RW to amb 4ft to bedside chair with CG/Janel; before sitting, pt marched in place x 10 reps each; pt sat with Janel.    Patient left up in chair with all lines intact, call button in reach and nurse notified..    GOALS:   Multidisciplinary Problems     Physical Therapy Goals        Problem: Physical Therapy Goal    Goal Priority Disciplines Outcome Goal Variances Interventions   Physical Therapy Goal     PT, PT/OT Ongoing, Progressing     Description:  Goals to be met by: 1/6/2020     Patient will increase functional independence with  mobility by performin. Supine to sit with Set-up [place]  2. Sit to stand transfer with Supervision  3. Bed to chair transfer with Supervision using Rolling Walker  4. Gait  x 120 feet with Supervision using Rolling Walker.   5. Lower extremity exercise program x15 reps per handout, with supervision                      Time Tracking:     PT Received On: 12/10/19  PT Start Time: 1352     PT Stop Time: 1418  PT Total Time (min): 26 min with OT    Billable Minutes: Therapeutic Activity 22 minutes    Treatment Type: Treatment  PT/PTA: PT     PTA Visit Number: 0     Chelsey Hawthorne, PT  12/10/2019

## 2019-12-10 NOTE — NURSING
Received face to face report from NICOLE Bustillo, on 4th floor unit after patient transported to dialysis. Patient is very deconditioned physically considering he was ambulatory with a cane prior to admission. Patient is on a Lasix drip, but is currently off due to dialysis Tx at this time. V/S WNL, AAOx3, 1L NC, BLE swollen, scrotal edema. Iwona said she would place patient on tele.

## 2019-12-10 NOTE — NURSING
Pt brought to dialysis, all belongings placed in pt room 427, bag with phone and watch left with pt in dialysis. Report given to NICOLE church on unit. Pt placed on tele box.

## 2019-12-10 NOTE — PROGRESS NOTES
[facility] Hospital Medicine  Progress Note    Patient Name: Cy Rutherford  MRN: [id number]  Patient Class: IP- Inpatient   Admission Date: 12/2/2019  Length of Stay: 6 days  Attending Physician: James Sanchez MD  Primary Care Provider: Heide Porter MD        Subjective:     Principal Problem:Acute on chronic diastolic heart failure        HPI:  Admitted for elective LE angiogram for evaluation of RLE claudication. Taken to the cath lab for the procedure via LCFA access with following results:     successful atherectomy (Hawk LX) with distal filter protection, followed by PTA with scoring balloon (5.0 x 150 dSFA, 6.0 x 150 pSFA/CFA) followed by PTA with DCB to dSFA (5.0 x 150), pSFA (5.0 x 100) and CFA (7 x 40) with good results. 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 He has elevated BG, no anion gap.  We will place on insulin sq and monito closely , if dka develop, we will place him on insulin drip    Overview/Hospital Course:  No notes on file    Interval History: Feeling better today. Having some difficulty with HD access and completing HD. Eating well. Swelling is improving.     Review of Systems   Constitutional: Negative for chills and fever.   Respiratory: Positive for shortness of breath. Negative for cough.    Cardiovascular: Positive for leg swelling. Negative for chest pain and palpitations.   Gastrointestinal: Negative for nausea and vomiting.     Objective:     Vital Signs (Most Recent):  Temp: 98 °F  (36.7 °C) (12/09/19 1530)  Pulse: 85 (12/09/19 1730)  Resp: (!) 29 (12/09/19 1730)  BP: (!) 144/67 (12/09/19 1730)  SpO2: 99 % (12/09/19 1730) Vital Signs (24h Range):  Temp:  [98 °F (36.7 °C)-99.2 °F (37.3 °C)] 98 °F (36.7 °C)  Pulse:  [72-90] 85  Resp:  [16-34] 29  SpO2:  [81 %-100 %] 99 %  BP: (131-171)/(60-76) 144/67     Weight: 106.6 kg (235 lb 0.2 oz)  Body mass index is 34.21 kg/m².    Intake/Output Summary (Last 24 hours) at 12/9/2019 1758  Last data filed at 12/9/2019 1615  Gross per 24 hour   Intake 2235.62 ml   Output 4445 ml   Net -2209.38 ml      Physical Exam   Constitutional: He is oriented to person, place, and time. He appears well-developed and well-nourished. No distress.   Cardiovascular: Normal rate and regular rhythm.   Murmur heard.  Pulmonary/Chest: Effort normal and breath sounds normal. No respiratory distress.   Abdominal: Soft. Bowel sounds are normal. He exhibits distension. There is no tenderness.   Musculoskeletal: He exhibits edema. He exhibits no tenderness.   Neurological: He is alert and oriented to person, place, and time.   Skin: Skin is warm and dry.   Nursing note and vitals reviewed.      Significant Labs:   POCT Glucose:   Recent Labs   Lab 12/08/19  2125 12/09/19  0742 12/09/19  1119   POCTGLUCOSE 268* 143* 184*       Significant Imaging: I have reviewed all pertinent imaging results/findings within the past 24 hours.      Assessment/Plan:      Type 2 diabetes mellitus with kidney complication, with long-term current use of insulin  Lab Results   Component Value Date    HGBA1C 5.6 12/02/2019     - BS ranged 143 to 278.   - Continue levemir 60 units nightly   - Continue aspart 15 units TID with meals  - Accuchecks with prn low dose SSI       VTE Risk Mitigation (From admission, onward)         Ordered     Place DARRION hose  Until discontinued      12/05/19 1005     heparin (porcine) injection 2,300 Units  As needed (PRN)      12/04/19 1137     heparin infusion 1,000 units/500 ml  in 0.9% NaCl (pressure line flush)  Intra-op continuous PRN      12/02/19 0932                Torres Ca MD  Department of Hospital Medicine   [facility]

## 2019-12-10 NOTE — PLAN OF CARE
Pt AAOx4. Able to make needs known. O2 100% on Bipap. BP 160s. NSR. BG WNL. Abd taut with BS x4 quads PRN mirlax given per request. Squires draining  cc/hr. PT/OT working with Pt. Pt 3+ edema, barrier cream to scrotum and presently on lasix gtt at 2.5 ml/hr continuous. VTE DARRION hose.  Plans to go to [facility] for further assist. Safety maintained. SR up x3. Call light in reach. Family not present at bedside.

## 2019-12-10 NOTE — PROGRESS NOTES
"Dr. Markham paged about Na 15 and K 3.3. MD stated, "Call Nephrology, I am just managing his Blood sugar."  "

## 2019-12-10 NOTE — PLAN OF CARE
Problem: Occupational Therapy Goal  Goal: Occupational Therapy Goal  Description  Goals to be met by: 1/5/2020    Patient will increase functional independence with ADLs by performing:    UE Dressing with Modified [place].  LE Dressing with Set-up Assistance.  Grooming while standing with Supervision.  Toileting from bedside commode with Supervision for hygiene and clothing management.   Step transfer with Supervision  Toilet transfer to bedside commode with Supervision.  Increased functional strength to WFL for self care.  Upper extremity exercise program x10 reps per handout, with assistance as needed.     Outcome: Ongoing, Progressing      Cy Rutherford is a 74 y.o. male with a medical diagnosis of Acute on chronic diastolic heart failure.  Performance deficits affecting function are weakness, impaired endurance, impaired self care skills, impaired functional mobilty, gait instability, impaired balance, decreased upper extremity function, decreased lower extremity function, decreased coordination, decreased safety awareness, pain, decreased ROM, impaired coordination, impaired fine motor, impaired skin, edema, impaired cardiopulmonary response to activity. Pt found in bed, agreeable to therapy, RN requested OOB.  Pt required Mod A for bed mobility and CGA-Min A for transfer.  Pt tolerated tx fairly well. Vitals stable throughout. Continue OT services to address functional goals, progressing as able.    CECILLE Umaña

## 2019-12-10 NOTE — PLAN OF CARE
Problem: Physical Therapy Goal  Goal: Physical Therapy Goal  Description  Goals to be met by: 2020     Patient will increase functional independence with mobility by performin. Supine to sit with Set-up [place]  2. Sit to stand transfer with Supervision  3. Bed to chair transfer with Supervision using Rolling Walker  4. Gait  x 120 feet with Supervision using Rolling Walker.   5. Lower extremity exercise program x15 reps per handout, with supervision     Outcome: Ongoing, Progressing   Patient required modA for bed mob and CG/Janel transfers; used RW to take steps to chair for OOB sitting; cont with POC.

## 2019-12-10 NOTE — PT/OT/SLP PROGRESS
Occupational Therapy   Treatment    Name: Cy Rutherford  MRN: [id number]  Admitting Diagnosis:  Acute on chronic diastolic heart failure  6 Days Post-Op    Recommendations:     Discharge Recommendations: rehabilitation facility  Discharge Equipment Recommendations:  bedside commode, shower chair  Barriers to discharge:  Decreased caregiver support    Assessment:     Cy Rutherford is a 74 y.o. male with a medical diagnosis of Acute on chronic diastolic heart failure.  Performance deficits affecting function are weakness, impaired endurance, impaired self care skills, impaired functional mobilty, gait instability, impaired balance, decreased upper extremity function, decreased lower extremity function, decreased coordination, decreased safety awareness, pain, decreased ROM, impaired coordination, impaired fine motor, impaired skin, edema, impaired cardiopulmonary response to activity. Pt found in bed, agreeable to therapy, RN requested OOB.  Pt required Mod A for bed mobility and CGA-Min A for transfer.  Pt tolerated tx fairly well. Vitals stable throughout. Continue OT services to address functional goals, progressing as able.      Rehab Prognosis:  Good; patient would benefit from acute skilled OT services to address these deficits and reach maximum level of function.       Plan:     Patient to be seen 5 x/week to address the above listed problems via self-care/home management, therapeutic activities, therapeutic exercises, neuromuscular re-education  · Plan of Care Expires: 01/05/20  · Plan of Care Reviewed with: patient    Subjective     Pain/Comfort:  · Pain Rating 1: 0/10  · Pain Rating Post-Intervention 1: 0/10    Objective:     Communicated with: RN prior to session.  Patient found HOB elevated with central line, rubio catheter, oxygen(multiple ICU lines and monitors) upon OT entry to room.    General Precautions: Standard, fall   Orthopedic Precautions:N/A   Braces: N/A     Occupational Performance:     Bed  Mobility:    · Patient completed Rolling/Turning to Right with minimum assistance and with side rail  · Patient completed Scooting/Bridging with contact guard assistance and scoot seated to EOB  · Patient completed Supine to Sit with moderate assistance, with side rail and HOB elevated, increased time and effort, vc's for effective technique.     Functional Mobility/Transfers:  · Patient completed Sit <> Stand Transfer with minimum assistance  with  rolling walker and vc's for hand placement   · Patient completed Bed <> Chair Transfer using Stand Pivot technique with contact guard assistance and minimum assistance with rolling walker  · Functional Mobility: Pt took a couple of steps to chair with CGA using RW.    Activities of Daily Living:  · Upper Body Dressing: minimum assistance secondary to lines      AMPAC 6 Click ADL: 15    Treatment & Education:  Pt performed BUE AAROM using cane 2 x 10 reps for shld flex, chest press and AROM 2 x 10 reps for elbow f/e and wrist f/e and finger f/e.  Pt tolerated well with rest breaks between sets.  B hands continue with pitting edema.     Patient left up in chair with all lines intact, call button in reach and RN notifiedEducation:   Vitals stable.     GOALS:   Multidisciplinary Problems     Occupational Therapy Goals        Problem: Occupational Therapy Goal    Goal Priority Disciplines Outcome Interventions   Occupational Therapy Goal     OT, PT/OT Ongoing, Progressing    Description:  Goals to be met by: 1/5/2020    Patient will increase functional independence with ADLs by performing:    UE Dressing with Modified [place].  LE Dressing with Set-up Assistance.  Grooming while standing with Supervision.  Toileting from bedside commode with Supervision for hygiene and clothing management.   Step transfer with Supervision  Toilet transfer to bedside commode with Supervision.  Increased functional strength to WFL for self care.  Upper extremity exercise program x10 reps  per handout, with assistance as needed.                      Time Tracking:     OT Date of Treatment: 12/10/19  OT Start Time: 1405  OT Stop Time: 1428  OT Total Time (min): 23 min    Billable Minutes:Therapeutic Exercise 11   *cotx with PT    CECILLE Umaña  12/10/2019

## 2019-12-10 NOTE — PROGRESS NOTES
Dr. Katerin Watts paged regarding K 3.3 and on lasix gtt still has 3+ edema. Dr. Rivera called with orders for K 40 meq x 1 dose.

## 2019-12-10 NOTE — PROGRESS NOTES
Progress Note  Nephrology      Consult Requested By: James Sanchez MD      SUBJECTIVE:     Overnight events  Patient is a 74 y.o. male     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    Abnormality of gait as late effect of cerebrovascular accident (CVA)    Tightness of right gastrocnemius muscle    Claudication    Hypotension    Anemia    Edema    Shortness of breath    Leukocytosis    JOSE (acute kidney injury)    Hyperkalemia    Acute on chronic diastolic heart failure    Abdominal distension     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    Type 2 diabetes mellitus with kidney complication, without long-term current use of insulin 11/15/2012              OBJECTIVE:     Vitals:    12/10/19 0930 12/10/19 0945 12/10/19 1000 12/10/19 1015   BP: (!) 155/68  (!) 156/69    Pulse: 83 81 83 80   Resp: (!) 23 (!) 21 (!) 23 (!) 23   Temp:       TempSrc:       SpO2: 97% 97% 99% 98%   Weight:       Height:           Temp: 98.9 °F (37.2 °C) (12/10/19 0645)  Pulse: 80 (12/10/19 1015)  Resp: (!) 23 (12/10/19 1015)  BP: (!) 156/69 (12/10/19 1000)  SpO2: 98 % (12/10/19 1015)    Date 12/10/19 0700 - 12/11/19 0659   Shift  8642-4140 6758-8865 8619-7315 24 Hour Total   INTAKE   Shift Total(mL/kg)       OUTPUT   Urine(mL/kg/hr) 480   480   Shift Total(mL/kg) 480(4.5)   480(4.5)   Weight (kg) 106.1 106.1 106.1 106.1             Medications:   amLODIPine  10 mg Oral Daily    doxazosin  8 mg Oral Daily    insulin aspart U-100  17 Units Subcutaneous TIDWM    insulin detemir U-100  60 Units Subcutaneous QHS    polyethylene glycol  17 g Oral BID    rosuvastatin  20 mg Oral Daily    sevelamer carbonate  800 mg Oral TID WM    simethicone  125 mg Oral Q6H    vitamin renal formula (B-complex-vitamin c-folic acid)  1 capsule Oral Daily      furosemide (LASIX) 2 mg/mL infusion (non-titrating) 5 mg/hr (12/10/19 0534)               Physical Exam:  General appearance:NAD  Weak  Diarrhea  No SOB  Lungs: diminished breath sounds  Heart: pulse 80  Abdomen: soft  Extremities: no edema  Skin: dry  Laboratory:  ABG  Labs reviewed  Recent Results (from the past 336 hour(s))   Basic metabolic panel    Collection Time: 12/10/19  5:28 AM   Result Value Ref Range    Sodium 135 (L) 136 - 145 mmol/L    Potassium 3.3 (L) 3.5 - 5.1 mmol/L    Chloride 100 95 - 110 mmol/L    CO2 25 23 - 29 mmol/L    BUN, Bld 82 (H) 8 - 23 mg/dL    Creatinine 4.0 (H) 0.5 - 1.4 mg/dL    Calcium 8.3 (L) 8.7 - 10.5 mg/dL    Anion Gap 10 8 - 16 mmol/L   Basic metabolic panel    Collection Time: 12/09/19  5:39 AM   Result Value Ref Range    Sodium 136 136 - 145 mmol/L    Potassium 3.7 3.5 - 5.1 mmol/L    Chloride 99 95 - 110 mmol/L    CO2 23 23 - 29 mmol/L    BUN, Bld 100 (H) 8 - 23 mg/dL    Creatinine 5.3 (H) 0.5 - 1.4 mg/dL    Calcium 8.2 (L) 8.7 - 10.5 mg/dL    Anion Gap 14 8 - 16 mmol/L   Basic metabolic panel    Collection Time: 12/08/19  6:46 AM   Result Value Ref Range    Sodium 138 136 - 145 mmol/L    Potassium 4.4 3.5 - 5.1 mmol/L    Chloride 100 95 - 110 mmol/L    CO2 24 23 - 29 mmol/L    BUN, Bld 110 (H) 8 - 23 mg/dL    Creatinine 6.3 (H) 0.5 - 1.4 mg/dL    Calcium 8.2  (L) 8.7 - 10.5 mg/dL    Anion Gap 14 8 - 16 mmol/L     Recent Results (from the past 336 hour(s))   CBC auto differential    Collection Time: 12/10/19  5:28 AM   Result Value Ref Range    WBC 12.13 3.90 - 12.70 K/uL    Hemoglobin 9.1 (L) 14.0 - 18.0 g/dL    Hematocrit 27.5 (L) 40.0 - 54.0 %    Platelets 175 150 - 350 K/uL   CBC auto differential    Collection Time: 12/09/19  5:39 AM   Result Value Ref Range    WBC 11.01 3.90 - 12.70 K/uL    Hemoglobin 8.8 (L) 14.0 - 18.0 g/dL    Hematocrit 26.6 (L) 40.0 - 54.0 %    Platelets 162 150 - 350 K/uL   CBC auto differential    Collection Time: 12/09/19 12:25 AM   Result Value Ref Range    WBC 13.33 (H) 3.90 - 12.70 K/uL    Hemoglobin 8.9 (L) 14.0 - 18.0 g/dL    Hematocrit 27.0 (L) 40.0 - 54.0 %    Platelets 156 150 - 350 K/uL     Urinalysis  No results for input(s): COLORU, CLARITYU, SPECGRAV, PHUR, PROTEINUA, GLUCOSEU, BILIRUBINCON, BLOODU, WBCU, RBCU, BACTERIA, MUCUS, NITRITE, LEUKOCYTESUR, UROBILINOGEN, HYALINECASTS in the last 24 hours.    Diagnostic Results:  X-Ray: Reviewed  US: Reviewed  Echo: Reviewed  ACCESS    ASSESSMENT/PLAN:   JOSE/ CKD 4  UO 3,075 cc/24 h  Hypokalemia  Azotemia  Creatinine 4  Metabolic bone disease  Anemia Hb 9.1  /69  Poxy 98 %  Dialysis

## 2019-12-10 NOTE — PROGRESS NOTES
[facility]  Cardiology  Progress Note    Patient Name: Cy Rutherford  MRN: [id number]  Admission Date: 12/2/2019  Hospital Length of Stay: 7 days  Code Status: Full Code   Attending Physician: James Sanchez MD   Primary Care Physician: Heide Porter MD  Expected Discharge Date:   Principal Problem:Acute on chronic diastolic heart failure    Subjective:     Hospital Course:   12/2/2019 Admitted for elective LE angiogram for evaluation of RLE claudication. Taken to the cath lab for the procedure via LCFA access with following results:    successful atherectomy (Hawk LX) with distal filter protection, followed by PTA with scoring balloon (5.0 x 150 dSFA, 6.0 x 150 pSFA/CFA) followed by PTA with DCB to dSFA (5.0 x 150), pSFA (5.0 x 100) and CFA (7 x 40) with good results. Successful PTA to TPT with 3.0 x 40 balloon- see cath report for full report     Tolerated procedure well and recovered in pre post cath area. Hypotension noted along with back pain and diaphoresis. No hematoma noted and manual pressure applied out of concern for bleeding. STAT H&H down to 7.4&24.1 from 11.6&36.1. Given profound symptoms, major concern for bleeding prompting re evaluation with recurrent angiogram. Placed on IVFs along with IV Levophed. Repeat procedure via right radial access with images  in multiple views with no active bleed noted. Cuff pressure with significant difference in comparison to  aortic pressure which was significantly higher.  Admitted to ICU for close monitoring with kanchan placed. T&S with PRBC transfusion initiated with IV Lasix given in between   12/3/2019 Remain on IV Levophed drip at .46mcg/kg/min with BP 110s-130s/40s-50s HR 60s. Serial H&Hs Q4hrs overnight with  H&H this AM 10.0&30.2 with slight trend down to 9.1&27.5 on repeat check. Complains of SOB with increased RR. Only 500cc out overnight. Will repeat IV Lasix 40mg this AM. Echocardiogram and CXR as well. Will attempt to wean IV  Levophed drip as BP tolerates   12/4/2019 Creatinine trended up to 3.7 overnight with K+ 6.1. Continued with no urine output. Nephrology consulted yesterday with trialysis catheter placed yesterday with initiation of CRRT overnight. CRRT x 2-3 hours prior to clotting off. H&H trended down further to 7.3&21.8 with repeat PRBC transfusion. H&H trended up to 8.4&24.9 with continued serial monitoring with recurrent drop to 7.0&21.2. Concerned about recurrent bleeding with plans for repeat angiogram for re evaluation of acute bleed. Remains on IV Levophed with stable BP and wean in process. Complains of mild SOB with stable sats on Venti mask.   12/5/2019 Repeat angiogram yesterday using CO2 with no active bleed noted after extensive angiogram. H&H down 6.6&18.9 yesterday evening with repeat PRBC. CT abdomen/pelvis with evidence of large left renal subcapsular hematoma with surrounding retroperitoneal hemorrhage and no definite contrast extravasation seen to suggest active bleeding on delayed images. H&H up to 7.4&22.0-8.5&25.3. Weaned off IV Levophed with stable BP. Resumed CRRT yesterday evening with 695cc removed and 30cc urine output noted +2.7 liters. Continued mild SOB with stable sats on venti mask. BMP with K+ 5.0 BUN 62 creatinine 5.0. Will continue with serial H&Hs for now. Will place TEDs and consult PT   12/6/2019 Attempted CRRT and HD yesterday with clotted line and approximately 400cc blood loss due to inability to rinse back. Placed on IV Lasix drip at 20mg/hr by Nephrology with 675cc urine output overnight positive 3 liters since admission. K+ 5.0 with BUN 69 creatinine 5.4 this AM. H&H 74&22.4 this AM unchanged from overnight-will continue with serial H&Hs for now. Off IV pressors for over 48 hours with BP 130s-170s/70s overnight. Mild SOB remains per patient with slight improvement noted on PE. Updated son at bedside this morning   12/7/2019 HR and BP stable. H&H 7.7&23.2 unchanged from yesterday.  Remain on IV Lasix with adequate urine output. BUN 89 creatinine 6.0. Hopeful to transfer to floor tomorrow.   12/8/2019 H&H down to 6.7 & 20.5 this AM with repeat PRBC transfusion. Plan for HD today for clearance. Remains on IV Lasix drip with adequate urine output. Resumed antihypertensives with stabel BP. PT and OT on board  12/9/2019 H&H 8.8&26.6 this AM after PRBC transfusion yesterday. Approximately one hour of HD yesterday with cessation due to line malfunction. HR and BP remains stable. 2.7 liters out overnight negative 4.4 liters since admission.  creatinine 5.3. Will reconsult General Surgery today for new trialysis line placement for ongoing intermittent HD/CRRT.   12/10/2019 H&H 9.1&27.5 this AM. BUN 82 creatinine 4.0 after HD run yesterday afternoon. IV Lasix drip at 5mg/hr with 3.0 liters out overnight negative 5.6 liters since admission. HR and BP stable. Plan to transfer out of ICU today         Review of Systems   Constitution: Negative for chills, decreased appetite, diaphoresis and fever.   Cardiovascular: Negative for chest pain, claudication, cyanosis, dyspnea on exertion, irregular heartbeat, leg swelling, near-syncope, orthopnea, palpitations, paroxysmal nocturnal dyspnea and syncope.   Respiratory: Negative for cough, hemoptysis, shortness of breath and wheezing.    Gastrointestinal: Negative for bloating, abdominal pain, constipation, diarrhea, melena, nausea and vomiting.   Neurological: Negative for dizziness and weakness.     Objective:     Vital Signs (Most Recent):  Temp: 98.9 °F (37.2 °C) (12/10/19 0645)  Pulse: 80 (12/10/19 1015)  Resp: (!) 23 (12/10/19 1015)  BP: (!) 156/69 (12/10/19 1000)  SpO2: 98 % (12/10/19 1015) Vital Signs (24h Range):  Temp:  [98 °F (36.7 °C)-99.5 °F (37.5 °C)] 98.9 °F (37.2 °C)  Pulse:  [74-89] 80  Resp:  [17-34] 23  SpO2:  [81 %-100 %] 98 %  BP: (132-178)/() 156/69     Weight: 106.1 kg (233 lb 14.5 oz)  Body mass index is 34.05 kg/m².     SpO2:  98 %  O2 Device (Oxygen Therapy): nasal cannula      Intake/Output Summary (Last 24 hours) at 12/10/2019 1150  Last data filed at 12/10/2019 1000  Gross per 24 hour   Intake 1899.5 ml   Output 2830 ml   Net -930.5 ml       Lines/Drains/Airways     Central Venous Catheter Line                 Trialysis (Dialysis) Catheter 12/09/19 1230 right internal jugular less than 1 day          Drain                 Urethral Catheter 12/03/19 0445 16 Fr. 7 days                Physical Exam   Constitutional: He is oriented to person, place, and time. He appears well-developed and well-nourished. No distress.   Cardiovascular: Normal rate and regular rhythm. Exam reveals no gallop.   No murmur heard.  Pulmonary/Chest: Effort normal. No respiratory distress. He has decreased breath sounds. He has no wheezes.   Abdominal: Soft. Bowel sounds are normal. He exhibits no distension. There is no tenderness.   Musculoskeletal: He exhibits edema.   Neurological: He is alert and oriented to person, place, and time.   Skin: Skin is warm and dry.       Significant Labs:     Recent Labs   Lab 12/10/19  0528   WBC 12.13   RBC 3.06*   HGB 9.1*   HCT 27.5*      MCV 90   MCH 29.7   MCHC 33.1     Recent Labs   Lab 12/10/19  0528   *   K 3.3*      CO2 25   BUN 82*   CREATININE 4.0*       Significant Imaging: Echocardiogram:   Transthoracic echo (TTE) complete (Cupid Only):   Results for orders placed or performed during the hospital encounter of 12/02/19   Echo Color Flow Doppler? Yes   Result Value Ref Range    BSA 2.3 m2    TDI SEPTAL 0.06 m/s    LV LATERAL E/E' RATIO 9.00 m/s    LV SEPTAL E/E' RATIO 10.50 m/s    TDI LATERAL 0.07 m/s    PV PEAK VELOCITY 1.81 cm/s    LVIDD 4.80 3.5 - 6.0 cm    IVS 1.10 (A) 0.6 - 1.1 cm    PW 1.10 (A) 0.6 - 1.1 cm    Ao root annulus 3.40 cm    LVIDS 2.56 2.1 - 4.0 cm    FS 47 28 - 44 %    LV mass 193.96 g    LA size 3.68 cm    TAPSE 1.44 cm    Left Ventricle Relative Wall Thickness 0.46 cm    AV  mean gradient 6 mmHg    AV valve area 4.31 cm2    AV Velocity Ratio 0.78     AV index (prosthetic) 1.06     E/A ratio 0.62     Mean e' 0.07 m/s    E wave decelartion time 338.80 msec    LVOT diameter 2.27 cm    LVOT area 4.0 cm2    LVOT peak simone 1.16 m/s    LVOT peak VTI 26.25 cm    Ao peak simone 1.48 m/s    Ao VTI 24.66 cm    LVOT stroke volume 106.18 cm3    AV peak gradient 9 mmHg    E/E' ratio 9.69 m/s    MV Peak E Simone 0.63 m/s    MV Peak A Simone 1.02 m/s    LV Systolic Volume 23.57 mL    LV Systolic Volume Index 10.6 mL/m2    LV Diastolic Volume 68.84 mL    LV Diastolic Volume Index 30.84 mL/m2    LV Mass Index 87 g/m2    Right Atrial Pressure (from IVC) 3 mmHg    Narrative    · Normal left ventricular systolic function. The estimated ejection   fraction is 55%  · Concentric left ventricular remodeling.  · No wall motion abnormalities.  · Normal right ventricular systolic function.  · Normal central venous pressure (3 mm Hg).        Assessment and Plan:     Brief HPI: Seen on AM NP rounds while resting in bed. Denies any complaints currently. Reports no significant bowel movement for a few days. Discussed POC as detailed below-verbalized understanding and agrees with POC     * Acute on chronic diastolic heart failure  -echo with normal LVEF  -related to  volume overload secondary to PRBC transfusion  -on IV Lasix drip since late last week with good response; 3.0 liters out overnight; negative 5.6 liters since admisson  -resumed on CCB with trend down of BPs; SBPs overnight 140s-160s  -SOB improving  -PT and OT on board     Abdominal distension  -abdominal ultrasound yesterday with no acute findings  -KUB today with air and some retained stool  -will place on Simethicone and Miralax   -no nausea or vomiting present; tolerating diet well           JOSE (acute kidney injury)  -multifactoral  -concerned about ATN given bleed and hypotension  -creatinine 1.2 upon admission with trend up to 3.7-4.5-5.3-5.4-5.5-5.0; BUN 82  creatinine 4.0 this AM  -Nephrology on board; successful HD yesterday; remains on IV Lasix drip at 5mg/hr with 3.0 liters out overnight negative 5.6 liters since admission  -will continue to avoid nephrotoxic agents and hypotension        Anemia  -initial H&H 11.1&36.1 with trend down to 7.4&24.1 post procedure  -repeat angiogram on 12/2 with no active bleeding with recurrent trend down prompting repeat  angiogram  12/4 with no active bleeding; CTA abdomen with renal subcapsular hematoma with surrounding retroperitoneal hemorrhage and no definite contrast extravasation seen to suggest active bleeding on delayed images  -H&H 7s/20s last week and stable until weekend with drop to 6.7&20.5; repeat PRBC transfusion with H&H 8.8&26.6 yesterday and up to 9.1 & 27.5 this AM  -will continue with daily CBCs    Hypotension  -resolved  -remains off IV pressors   -CCB resumed over the weekend with no recurrent hypotension   -continue to monitor BP closely     PAD (peripheral artery disease)  -admitted for elective LE angiogram for further evaluation of LE claudication  -successful atherectomy and PTA with scoring balloon and  DCB to dSFA,  pSFA CFA and TPT   -foot warm and pulses present   -continue statin; no ASA and Plavix due to bleeding issue   -will need serial LE arterial ultrasounds as an outpatient       CAD (coronary artery disease)  -s/p LAD and LCX MARIAN in 2011  -was on  ASA, statin, Plavix as an outpatient along with CCB  -will continue to hold DAPT given concern for acute bleeding  -CCB resumed over the weekend  -echocardiogram with normal LV function with EF 55%    Hyperlipidemia  -continue statin therapy  -FLP with LDL 80 in 9/2019    Essential hypertension  -on Norvasc, Chlorthalidone, Metoprolol and Losartan at home  -meds held due to hypotension last week  -BP trended back up and remains off pressors; CCB resumed over the weekend  -continue to monitor BP and adjust meds prn     Type 2 diabetes mellitus with  kidney complication, with long-term current use of insulin  --184 overnight   -on Lantus and mealtime insulin at home  -diabetes management per Hospital Medicine   -recent HgbA1c 5.6 9/2019        VTE Risk Mitigation (From admission, onward)         Ordered     Place DARRION hose  Until discontinued      12/05/19 1005     heparin (porcine) injection 2,300 Units  As needed (PRN)      12/04/19 1137              > 40 minutes was spent in the care of this patient for evaluation, critical thinking and decision making. Also discussion with patient as to present condition. Not all time was spent in direct face to face with patient as time was spent reviewing ECGs, CXR, labs, medications and past studies.    KATHLEEN Cazares, ANP  Cardiology  [facility]

## 2019-12-11 LAB
ANION GAP SERPL CALC-SCNC: 11 MMOL/L (ref 8–16)
ANISOCYTOSIS BLD QL SMEAR: SLIGHT
BASOPHILS # BLD AUTO: 0.02 K/UL (ref 0–0.2)
BASOPHILS # BLD AUTO: 0.02 K/UL (ref 0–0.2)
BASOPHILS NFR BLD: 0.1 % (ref 0–1.9)
BASOPHILS NFR BLD: 0.2 % (ref 0–1.9)
BUN SERPL-MCNC: 66 MG/DL (ref 8–23)
CALCIUM SERPL-MCNC: 8.2 MG/DL (ref 8.7–10.5)
CHLORIDE SERPL-SCNC: 102 MMOL/L (ref 95–110)
CO2 SERPL-SCNC: 21 MMOL/L (ref 23–29)
CREAT SERPL-MCNC: 3.3 MG/DL (ref 0.5–1.4)
DIFFERENTIAL METHOD: ABNORMAL
DIFFERENTIAL METHOD: ABNORMAL
EOSINOPHIL # BLD AUTO: 0.3 K/UL (ref 0–0.5)
EOSINOPHIL # BLD AUTO: 0.5 K/UL (ref 0–0.5)
EOSINOPHIL NFR BLD: 2.2 % (ref 0–8)
EOSINOPHIL NFR BLD: 3.8 % (ref 0–8)
ERYTHROCYTE [DISTWIDTH] IN BLOOD BY AUTOMATED COUNT: 15 % (ref 11.5–14.5)
ERYTHROCYTE [DISTWIDTH] IN BLOOD BY AUTOMATED COUNT: 15 % (ref 11.5–14.5)
EST. GFR  (AFRICAN AMERICAN): 20 ML/MIN/1.73 M^2
EST. GFR  (NON AFRICAN AMERICAN): 17 ML/MIN/1.73 M^2
GLUCOSE SERPL-MCNC: 134 MG/DL (ref 70–110)
HCT VFR BLD AUTO: 26.6 % (ref 40–54)
HCT VFR BLD AUTO: 27.6 % (ref 40–54)
HCT VFR BLD AUTO: 28.1 % (ref 40–54)
HCT VFR BLD AUTO: 29.6 % (ref 40–54)
HCT VFR BLD AUTO: 29.6 % (ref 40–54)
HGB BLD-MCNC: 8.6 G/DL (ref 14–18)
HGB BLD-MCNC: 9 G/DL (ref 14–18)
HGB BLD-MCNC: 9.1 G/DL (ref 14–18)
HGB BLD-MCNC: 9.6 G/DL (ref 14–18)
HGB BLD-MCNC: 9.6 G/DL (ref 14–18)
HYPOCHROMIA BLD QL SMEAR: ABNORMAL
LYMPHOCYTES # BLD AUTO: 0.8 K/UL (ref 1–4.8)
LYMPHOCYTES # BLD AUTO: 1.6 K/UL (ref 1–4.8)
LYMPHOCYTES NFR BLD: 10.6 % (ref 18–48)
LYMPHOCYTES NFR BLD: 5.7 % (ref 18–48)
MCH RBC QN AUTO: 29.5 PG (ref 27–31)
MCH RBC QN AUTO: 29.5 PG (ref 27–31)
MCHC RBC AUTO-ENTMCNC: 32.4 G/DL (ref 32–36)
MCHC RBC AUTO-ENTMCNC: 32.6 G/DL (ref 32–36)
MCV RBC AUTO: 91 FL (ref 82–98)
MCV RBC AUTO: 91 FL (ref 82–98)
MONOCYTES # BLD AUTO: 1.2 K/UL (ref 0.3–1)
MONOCYTES # BLD AUTO: 1.8 K/UL (ref 0.3–1)
MONOCYTES NFR BLD: 13.2 % (ref 4–15)
MONOCYTES NFR BLD: 7.8 % (ref 4–15)
NEUTROPHILS # BLD AUTO: 11.6 K/UL (ref 1.8–7.7)
NEUTROPHILS # BLD AUTO: 9.7 K/UL (ref 1.8–7.7)
NEUTROPHILS NFR BLD: 77.1 % (ref 38–73)
NEUTROPHILS NFR BLD: 79.3 % (ref 38–73)
OB PNL STL: NEGATIVE
PHOSPHATE SERPL-MCNC: 4.5 MG/DL (ref 2.7–4.5)
PLATELET # BLD AUTO: 184 K/UL (ref 150–350)
PLATELET # BLD AUTO: 203 K/UL (ref 150–350)
PLATELET BLD QL SMEAR: ABNORMAL
PMV BLD AUTO: 9.9 FL (ref 9.2–12.9)
PMV BLD AUTO: 9.9 FL (ref 9.2–12.9)
POCT GLUCOSE: 129 MG/DL (ref 70–110)
POCT GLUCOSE: 153 MG/DL (ref 70–110)
POCT GLUCOSE: 160 MG/DL (ref 70–110)
POLYCHROMASIA BLD QL SMEAR: ABNORMAL
POTASSIUM SERPL-SCNC: 3.7 MMOL/L (ref 3.5–5.1)
RBC # BLD AUTO: 3.05 M/UL (ref 4.6–6.2)
RBC # BLD AUTO: 3.25 M/UL (ref 4.6–6.2)
SODIUM SERPL-SCNC: 134 MMOL/L (ref 136–145)
WBC # BLD AUTO: 13.31 K/UL (ref 3.9–12.7)
WBC # BLD AUTO: 15.44 K/UL (ref 3.9–12.7)

## 2019-12-11 PROCEDURE — 85018 HEMOGLOBIN: CPT | Mod: 91

## 2019-12-11 PROCEDURE — 99233 PR SUBSEQUENT HOSPITAL CARE,LEVL III: ICD-10-PCS | Mod: ,,, | Performed by: INTERNAL MEDICINE

## 2019-12-11 PROCEDURE — 85025 COMPLETE CBC W/AUTO DIFF WBC: CPT | Mod: 91

## 2019-12-11 PROCEDURE — 84100 ASSAY OF PHOSPHORUS: CPT

## 2019-12-11 PROCEDURE — 25000003 PHARM REV CODE 250: Performed by: NURSE PRACTITIONER

## 2019-12-11 PROCEDURE — 82272 OCCULT BLD FECES 1-3 TESTS: CPT

## 2019-12-11 PROCEDURE — C9113 INJ PANTOPRAZOLE SODIUM, VIA: HCPCS | Performed by: NURSE PRACTITIONER

## 2019-12-11 PROCEDURE — 85014 HEMATOCRIT: CPT

## 2019-12-11 PROCEDURE — 99900035 HC TECH TIME PER 15 MIN (STAT)

## 2019-12-11 PROCEDURE — 27000221 HC OXYGEN, UP TO 24 HOURS

## 2019-12-11 PROCEDURE — 80100016 HC MAINTENANCE HEMODIALYSIS

## 2019-12-11 PROCEDURE — 63600175 PHARM REV CODE 636 W HCPCS: Performed by: NURSE PRACTITIONER

## 2019-12-11 PROCEDURE — 99233 SBSQ HOSP IP/OBS HIGH 50: CPT | Mod: ,,, | Performed by: INTERNAL MEDICINE

## 2019-12-11 PROCEDURE — 80048 BASIC METABOLIC PNL TOTAL CA: CPT

## 2019-12-11 PROCEDURE — 63600175 PHARM REV CODE 636 W HCPCS: Performed by: INTERNAL MEDICINE

## 2019-12-11 PROCEDURE — 11000001 HC ACUTE MED/SURG PRIVATE ROOM

## 2019-12-11 RX ORDER — LACTULOSE 10 G/15ML
30 SOLUTION ORAL ONCE
Status: COMPLETED | OUTPATIENT
Start: 2019-12-11 | End: 2019-12-11

## 2019-12-11 RX ORDER — BISACODYL 10 MG
10 SUPPOSITORY, RECTAL RECTAL DAILY PRN
Status: DISCONTINUED | OUTPATIENT
Start: 2019-12-11 | End: 2019-12-19 | Stop reason: HOSPADM

## 2019-12-11 RX ADMIN — DOXAZOSIN 8 MG: 2 TABLET ORAL at 08:12

## 2019-12-11 RX ADMIN — AMLODIPINE BESYLATE 10 MG: 5 TABLET ORAL at 08:12

## 2019-12-11 RX ADMIN — SIMETHICONE 125 MG: 125 TABLET, CHEWABLE ORAL at 05:12

## 2019-12-11 RX ADMIN — NEPHROCAP 1 CAPSULE: 1 CAP ORAL at 08:12

## 2019-12-11 RX ADMIN — SEVELAMER CARBONATE 800 MG: 800 TABLET, FILM COATED ORAL at 05:12

## 2019-12-11 RX ADMIN — PROMETHAZINE HYDROCHLORIDE 12.5 MG: 25 INJECTION INTRAMUSCULAR; INTRAVENOUS at 02:12

## 2019-12-11 RX ADMIN — LACTULOSE 30 G: 20 SOLUTION ORAL at 09:12

## 2019-12-11 RX ADMIN — PANTOPRAZOLE SODIUM 8 MG/HR: 40 INJECTION, POWDER, FOR SOLUTION INTRAVENOUS at 03:12

## 2019-12-11 RX ADMIN — MUPIROCIN: 20 OINTMENT TOPICAL at 08:12

## 2019-12-11 RX ADMIN — MUPIROCIN: 20 OINTMENT TOPICAL at 09:12

## 2019-12-11 RX ADMIN — SEVELAMER CARBONATE 800 MG: 800 TABLET, FILM COATED ORAL at 08:12

## 2019-12-11 RX ADMIN — ROSUVASTATIN CALCIUM 20 MG: 10 TABLET, FILM COATED ORAL at 08:12

## 2019-12-11 RX ADMIN — PANTOPRAZOLE SODIUM 8 MG/HR: 40 INJECTION, POWDER, FOR SOLUTION INTRAVENOUS at 08:12

## 2019-12-11 RX ADMIN — INSULIN ASPART 17 UNITS: 100 INJECTION, SOLUTION INTRAVENOUS; SUBCUTANEOUS at 08:12

## 2019-12-11 RX ADMIN — INSULIN DETEMIR 60 UNITS: 100 INJECTION, SOLUTION SUBCUTANEOUS at 09:12

## 2019-12-11 RX ADMIN — EPOETIN ALFA-EPBX 10000 UNITS: 10000 INJECTION, SOLUTION INTRAVENOUS; SUBCUTANEOUS at 03:12

## 2019-12-11 RX ADMIN — INSULIN ASPART 17 UNITS: 100 INJECTION, SOLUTION INTRAVENOUS; SUBCUTANEOUS at 05:12

## 2019-12-11 RX ADMIN — IRON SUCROSE 100 MG: 20 INJECTION, SOLUTION INTRAVENOUS at 03:12

## 2019-12-11 NOTE — SUBJECTIVE & OBJECTIVE
Review of Systems   Constitution: Negative for chills, decreased appetite, diaphoresis and fever.   Cardiovascular: Negative for chest pain, claudication, cyanosis, dyspnea on exertion, irregular heartbeat, leg swelling, near-syncope, orthopnea, palpitations, paroxysmal nocturnal dyspnea and syncope.   Respiratory: Negative for cough, hemoptysis, shortness of breath and wheezing.    Gastrointestinal: Positive for bloating. Negative for abdominal pain, constipation, diarrhea, melena, nausea and vomiting.   Neurological: Negative for dizziness and weakness.     Objective:     Vital Signs (Most Recent):  Temp: 97 °F (36.1 °C) (12/11/19 0500)  Pulse: 73 (12/11/19 0733)  Resp: 18 (12/11/19 0500)  BP: (!) 152/67 (12/11/19 0500)  SpO2: 100 % (12/11/19 0500) Vital Signs (24h Range):  Temp:  [97 °F (36.1 °C)-98.3 °F (36.8 °C)] 97 °F (36.1 °C)  Pulse:  [71-89] 73  Resp:  [18-28] 18  SpO2:  [94 %-100 %] 100 %  BP: (107-160)/(53-70) 152/67     Weight: 108.2 kg (238 lb 8 oz)  Body mass index is 34.72 kg/m².     SpO2: 100 %  O2 Device (Oxygen Therapy): nasal cannula      Intake/Output Summary (Last 24 hours) at 12/11/2019 0913  Last data filed at 12/11/2019 0624  Gross per 24 hour   Intake 1130 ml   Output 2870 ml   Net -1740 ml       Lines/Drains/Airways     Central Venous Catheter Line                 Trialysis (Dialysis) Catheter 12/09/19 1230 right internal jugular 1 day          Drain                 Urethral Catheter 12/03/19 0445 16 Fr. 8 days                Physical Exam   Constitutional: He is oriented to person, place, and time. He appears well-developed and well-nourished. No distress.   Cardiovascular: Normal rate and regular rhythm. Exam reveals no gallop.   No murmur heard.  Pulmonary/Chest: Effort normal and breath sounds normal. No respiratory distress. He has no wheezes.   Abdominal: Soft. Bowel sounds are normal. He exhibits distension. There is no tenderness.   Neurological: He is alert and oriented to  person, place, and time.   Skin: Skin is warm and dry.       Significant Labs:     Recent Labs   Lab 12/11/19  0540   WBC 13.31*   RBC 3.05*   HGB 9.0*   HCT 27.6*      MCV 91   MCH 29.5   MCHC 32.6     Recent Labs   Lab 12/11/19  0540   *   K 3.7      CO2 21*   BUN 66*   CREATININE 3.3*       Significant Imaging: Echocardiogram:   Transthoracic echo (TTE) complete (Cupid Only):   Results for orders placed or performed during the hospital encounter of 12/02/19   Echo Color Flow Doppler? Yes   Result Value Ref Range    BSA 2.3 m2    TDI SEPTAL 0.06 m/s    LV LATERAL E/E' RATIO 9.00 m/s    LV SEPTAL E/E' RATIO 10.50 m/s    TDI LATERAL 0.07 m/s    PV PEAK VELOCITY 1.81 cm/s    LVIDD 4.80 3.5 - 6.0 cm    IVS 1.10 (A) 0.6 - 1.1 cm    PW 1.10 (A) 0.6 - 1.1 cm    Ao root annulus 3.40 cm    LVIDS 2.56 2.1 - 4.0 cm    FS 47 28 - 44 %    LV mass 193.96 g    LA size 3.68 cm    TAPSE 1.44 cm    Left Ventricle Relative Wall Thickness 0.46 cm    AV mean gradient 6 mmHg    AV valve area 4.31 cm2    AV Velocity Ratio 0.78     AV index (prosthetic) 1.06     E/A ratio 0.62     Mean e' 0.07 m/s    E wave decelartion time 338.80 msec    LVOT diameter 2.27 cm    LVOT area 4.0 cm2    LVOT peak simone 1.16 m/s    LVOT peak VTI 26.25 cm    Ao peak simone 1.48 m/s    Ao VTI 24.66 cm    LVOT stroke volume 106.18 cm3    AV peak gradient 9 mmHg    E/E' ratio 9.69 m/s    MV Peak E Simone 0.63 m/s    MV Peak A Simone 1.02 m/s    LV Systolic Volume 23.57 mL    LV Systolic Volume Index 10.6 mL/m2    LV Diastolic Volume 68.84 mL    LV Diastolic Volume Index 30.84 mL/m2    LV Mass Index 87 g/m2    Right Atrial Pressure (from IVC) 3 mmHg    Narrative    · Normal left ventricular systolic function. The estimated ejection   fraction is 55%  · Concentric left ventricular remodeling.  · No wall motion abnormalities.  · Normal right ventricular systolic function.  · Normal central venous pressure (3 mm Hg).

## 2019-12-11 NOTE — NURSING
Spoke with Samantha in dialysis and was told patient's dialysis line clotted and I will need to administer IV iron sucrose and epo SQ.

## 2019-12-11 NOTE — PROGRESS NOTES
[facility]  Cardiology  Progress Note    Patient Name: Cy Rutherford  MRN: [id number]  Admission Date: 12/2/2019  Hospital Length of Stay: 8 days  Code Status: Full Code   Attending Physician: James Sanchez MD   Primary Care Physician: Heide Porter MD  Expected Discharge Date:   Principal Problem:Acute on chronic diastolic heart failure    Subjective:     Hospital Course:   12/2/2019 Admitted for elective LE angiogram for evaluation of RLE claudication. Taken to the cath lab for the procedure via LCFA access with following results:    successful atherectomy (Hawk LX) with distal filter protection, followed by PTA with scoring balloon (5.0 x 150 dSFA, 6.0 x 150 pSFA/CFA) followed by PTA with DCB to dSFA (5.0 x 150), pSFA (5.0 x 100) and CFA (7 x 40) with good results. Successful PTA to TPT with 3.0 x 40 balloon- see cath report for full report     Tolerated procedure well and recovered in pre post cath area. Hypotension noted along with back pain and diaphoresis. No hematoma noted and manual pressure applied out of concern for bleeding. STAT H&H down to 7.4&24.1 from 11.6&36.1. Given profound symptoms, major concern for bleeding prompting re evaluation with recurrent angiogram. Placed on IVFs along with IV Levophed. Repeat procedure via right radial access with images  in multiple views with no active bleed noted. Cuff pressure with significant difference in comparison to  aortic pressure which was significantly higher.  Admitted to ICU for close monitoring with kanchan placed. T&S with PRBC transfusion initiated with IV Lasix given in between   12/3/2019 Remain on IV Levophed drip at .46mcg/kg/min with BP 110s-130s/40s-50s HR 60s. Serial H&Hs Q4hrs overnight with  H&H this AM 10.0&30.2 with slight trend down to 9.1&27.5 on repeat check. Complains of SOB with increased RR. Only 500cc out overnight. Will repeat IV Lasix 40mg this AM. Echocardiogram and CXR as well. Will attempt to wean IV  Levophed drip as BP tolerates   12/4/2019 Creatinine trended up to 3.7 overnight with K+ 6.1. Continued with no urine output. Nephrology consulted yesterday with trialysis catheter placed yesterday with initiation of CRRT overnight. CRRT x 2-3 hours prior to clotting off. H&H trended down further to 7.3&21.8 with repeat PRBC transfusion. H&H trended up to 8.4&24.9 with continued serial monitoring with recurrent drop to 7.0&21.2. Concerned about recurrent bleeding with plans for repeat angiogram for re evaluation of acute bleed. Remains on IV Levophed with stable BP and wean in process. Complains of mild SOB with stable sats on Venti mask.   12/5/2019 Repeat angiogram yesterday using CO2 with no active bleed noted after extensive angiogram. H&H down 6.6&18.9 yesterday evening with repeat PRBC. CT abdomen/pelvis with evidence of large left renal subcapsular hematoma with surrounding retroperitoneal hemorrhage and no definite contrast extravasation seen to suggest active bleeding on delayed images. H&H up to 7.4&22.0-8.5&25.3. Weaned off IV Levophed with stable BP. Resumed CRRT yesterday evening with 695cc removed and 30cc urine output noted +2.7 liters. Continued mild SOB with stable sats on venti mask. BMP with K+ 5.0 BUN 62 creatinine 5.0. Will continue with serial H&Hs for now. Will place TEDs and consult PT   12/6/2019 Attempted CRRT and HD yesterday with clotted line and approximately 400cc blood loss due to inability to rinse back. Placed on IV Lasix drip at 20mg/hr by Nephrology with 675cc urine output overnight positive 3 liters since admission. K+ 5.0 with BUN 69 creatinine 5.4 this AM. H&H 74&22.4 this AM unchanged from overnight-will continue with serial H&Hs for now. Off IV pressors for over 48 hours with BP 130s-170s/70s overnight. Mild SOB remains per patient with slight improvement noted on PE. Updated son at bedside this morning   12/7/2019 HR and BP stable. H&H 7.7&23.2 unchanged from yesterday.  Remain on IV Lasix with adequate urine output. BUN 89 creatinine 6.0. Hopeful to transfer to floor tomorrow.   12/8/2019 H&H down to 6.7 & 20.5 this AM with repeat PRBC transfusion. Plan for HD today for clearance. Remains on IV Lasix drip with adequate urine output. Resumed antihypertensives with stabel BP. PT and OT on board  12/9/2019 H&H 8.8&26.6 this AM after PRBC transfusion yesterday. Approximately one hour of HD yesterday with cessation due to line malfunction. HR and BP remains stable. 2.7 liters out overnight negative 4.4 liters since admission.  creatinine 5.3. Will reconsult General Surgery today for new trialysis line placement for ongoing intermittent HD/CRRT.   12/10/2019 H&H 9.1&27.5 this AM. BUN 82 creatinine 4.0 after HD run yesterday afternoon. IV Lasix drip at 5mg/hr with 3.0 liters out overnight negative 5.6 liters since admission. HR and BP stable. Plan to transfer out of ICU today   12/11/2019 Transferred out of ICU yesterday. BMP with K+ 3.7 BUN 66 creatinine 3.3. Remains on IV Lasix drip with 1.8 liters urine output with 1.4 liters removed with HD negative 7.4 liters since admission. HR and BP stable. Working with PT currently. Mild abdominal distension with Simethicone and Miralax ordered yesterday with plans for Lactulose today         Review of Systems   Constitution: Negative for chills, decreased appetite, diaphoresis and fever.   Cardiovascular: Negative for chest pain, claudication, cyanosis, dyspnea on exertion, irregular heartbeat, leg swelling, near-syncope, orthopnea, palpitations, paroxysmal nocturnal dyspnea and syncope.   Respiratory: Negative for cough, hemoptysis, shortness of breath and wheezing.    Gastrointestinal: Positive for bloating. Negative for abdominal pain, constipation, diarrhea, melena, nausea and vomiting.   Neurological: Negative for dizziness and weakness.     Objective:     Vital Signs (Most Recent):  Temp: 97 °F (36.1 °C) (12/11/19 0500)  Pulse: 73  (12/11/19 0733)  Resp: 18 (12/11/19 0500)  BP: (!) 152/67 (12/11/19 0500)  SpO2: 100 % (12/11/19 0500) Vital Signs (24h Range):  Temp:  [97 °F (36.1 °C)-98.3 °F (36.8 °C)] 97 °F (36.1 °C)  Pulse:  [71-89] 73  Resp:  [18-28] 18  SpO2:  [94 %-100 %] 100 %  BP: (107-160)/(53-70) 152/67     Weight: 108.2 kg (238 lb 8 oz)  Body mass index is 34.72 kg/m².     SpO2: 100 %  O2 Device (Oxygen Therapy): nasal cannula      Intake/Output Summary (Last 24 hours) at 12/11/2019 0913  Last data filed at 12/11/2019 0624  Gross per 24 hour   Intake 1130 ml   Output 2870 ml   Net -1740 ml       Lines/Drains/Airways     Central Venous Catheter Line                 Trialysis (Dialysis) Catheter 12/09/19 1230 right internal jugular 1 day          Drain                 Urethral Catheter 12/03/19 0445 16 Fr. 8 days                Physical Exam   Constitutional: He is oriented to person, place, and time. He appears well-developed and well-nourished. No distress.   Cardiovascular: Normal rate and regular rhythm. Exam reveals no gallop.   No murmur heard.  Pulmonary/Chest: Effort normal and breath sounds normal. No respiratory distress. He has no wheezes.   Abdominal: Soft. Bowel sounds are normal. He exhibits distension. There is no tenderness.   Neurological: He is alert and oriented to person, place, and time.   Skin: Skin is warm and dry.       Significant Labs:     Recent Labs   Lab 12/11/19  0540   WBC 13.31*   RBC 3.05*   HGB 9.0*   HCT 27.6*      MCV 91   MCH 29.5   MCHC 32.6     Recent Labs   Lab 12/11/19  0540   *   K 3.7      CO2 21*   BUN 66*   CREATININE 3.3*       Significant Imaging: Echocardiogram:   Transthoracic echo (TTE) complete (Cupid Only):   Results for orders placed or performed during the hospital encounter of 12/02/19   Echo Color Flow Doppler? Yes   Result Value Ref Range    BSA 2.3 m2    TDI SEPTAL 0.06 m/s    LV LATERAL E/E' RATIO 9.00 m/s    LV SEPTAL E/E' RATIO 10.50 m/s    TDI LATERAL 0.07  m/s    PV PEAK VELOCITY 1.81 cm/s    LVIDD 4.80 3.5 - 6.0 cm    IVS 1.10 (A) 0.6 - 1.1 cm    PW 1.10 (A) 0.6 - 1.1 cm    Ao root annulus 3.40 cm    LVIDS 2.56 2.1 - 4.0 cm    FS 47 28 - 44 %    LV mass 193.96 g    LA size 3.68 cm    TAPSE 1.44 cm    Left Ventricle Relative Wall Thickness 0.46 cm    AV mean gradient 6 mmHg    AV valve area 4.31 cm2    AV Velocity Ratio 0.78     AV index (prosthetic) 1.06     E/A ratio 0.62     Mean e' 0.07 m/s    E wave decelartion time 338.80 msec    LVOT diameter 2.27 cm    LVOT area 4.0 cm2    LVOT peak simone 1.16 m/s    LVOT peak VTI 26.25 cm    Ao peak simone 1.48 m/s    Ao VTI 24.66 cm    LVOT stroke volume 106.18 cm3    AV peak gradient 9 mmHg    E/E' ratio 9.69 m/s    MV Peak E Simone 0.63 m/s    MV Peak A Simone 1.02 m/s    LV Systolic Volume 23.57 mL    LV Systolic Volume Index 10.6 mL/m2    LV Diastolic Volume 68.84 mL    LV Diastolic Volume Index 30.84 mL/m2    LV Mass Index 87 g/m2    Right Atrial Pressure (from IVC) 3 mmHg    Narrative    · Normal left ventricular systolic function. The estimated ejection   fraction is 55%  · Concentric left ventricular remodeling.  · No wall motion abnormalities.  · Normal right ventricular systolic function.  · Normal central venous pressure (3 mm Hg).        Assessment and Plan:     Brief HPI: Seen this morning on AM rounds with Dr. Sanchez. Resting in bed and feeling well. Working with PT. Continues to complain of abdominal distension. Discussed POC as detailed below-verbalized understanding and agrees with POC     * Acute on chronic diastolic heart failure  -echo with normal LVEF  -related to  volume overload secondary to PRBC transfusion  -remains on IV Lasix drip with good response; 1.8 liters urine output overnight; 1.4 liters via HD yesterday; negative 7.4 liters since admisson  -resumed on CCB with trend down of BPs; SBPs overnight 110s-140s  -SOB improving  -PT and OT on board     Abdominal distension  -abdominal ultrasound  with no  acute findings  -KUB yesterday with air and some retained stool; started Simethicone and Miralax yesterday with minimal relief  -will continue Simethicone; will give dose of Lactulose today and add Dulcolax suppository prn   -no nausea or vomiting present; tolerating diet well     Hyperkalemia  -resolved  -K+ 3.7 this AM      JOSE (acute kidney injury)  -multifactoral  -concerned about ATN given bleed and hypotension  -creatinine 1.2 upon admission with trend up to 3.7-4.5-5.3-5.4-5.5-5.0  -BUN 66 creatinine 3.3 this AM  -Nephrology on board; successful HD yesterday with 1.4 liters removed; remains on IV Lasix drip-will follow Nephrology recs  -will continue to avoid nephrotoxic agents and hypotension      Leukocytosis  -resolved  -WBCs 28K post procedure with trend down to 22K-13K-10K   -afebrile  -likely reactive rather than infectious     Shortness of breath  -concerning for volume overload in setting of IVF use and PRBC transfusion post procedure  -SOB improving  -on IV Lasix with good urine output  -continue with IV Lasix drip for now; continue to follow Nephrology recommendation   -echocardiogram with normal LVEF     Edema  -concerning for combination of volume overload and sedentary lifestyle  -unchanged  -will continue to monitor     Anemia  -initial H&H 11.1&36.1 with trend down to 7.4&24.1 post procedure  -repeat angiogram on 12/2 with no active bleeding with recurrent trend down prompting repeat  angiogram  12/4 with no active bleeding; CTA abdomen with renal subcapsular hematoma with surrounding retroperitoneal hemorrhage and no definite contrast extravasation seen to suggest active bleeding on delayed images  -H&H 6.7&20.5 12/8 with repeat PRBC transfusion with trend up of H&H to 8.8&26.6-9.1 & 27.5   -H&H 9.0&27.6 this AM   -will continue with daily CBCs for now     Hypotension  -resolved  -remains off IV pressors   -CCB resumed over the weekend with no recurrent hypotension   -continue to monitor BP  closely     PAD (peripheral artery disease)  -admitted for elective LE angiogram for further evaluation of LE claudication  -successful atherectomy and PTA with scoring balloon and  DCB to dSFA,  pSFA CFA and TPT   -foot warm and pulses present   -continue statin; no ASA and Plavix due to bleeding issue   -will need serial LE arterial ultrasounds as an outpatient       CAD (coronary artery disease)  -s/p LAD and LCX MARIAN in 2011  -was on  ASA, statin, Plavix as an outpatient along with CCB  -will continue to hold DAPT given concern for acute bleeding  -resumed CCB  -echocardiogram with normal LV function with EF 55%    Hyperlipidemia  -continue statin therapy  -FLP with LDL 80 in 9/2019    Essential hypertension  -on Norvasc, Chlorthalidone, Metoprolol and Losartan at home  -meds held due to hypotension last week  -BP trended back up; CCB resumed with stable BP  -continue to monitor BP and adjust meds prn     Type 2 diabetes mellitus with kidney complication, with long-term current use of insulin  --184 overnight   -on Lantus and mealtime insulin at home  -diabetes management per Hospital Medicine   -recent HgbA1c 5.6 9/2019        VTE Risk Mitigation (From admission, onward)         Ordered     Place DARRION hose  Until discontinued      12/05/19 1005     heparin (porcine) injection 2,300 Units  As needed (PRN)      12/04/19 1137                KATHLEEN Cazares, ANP  Cardiology  [facility]

## 2019-12-11 NOTE — CARE UPDATE
Patient seen following HD with an episode of hematemesis, abdominal pain. HH stable.  Placed on Protonix gtt  GI consulted  Serial HH ordered

## 2019-12-11 NOTE — NURSING
I have alerted Bassam in dialysis that Dr. Watts just wrote orders to administer IV Sucrose at this time.

## 2019-12-11 NOTE — NURSING
Dialysis called and asked if ok to get patient for Tx. I asked if he was going again b/c he went yesterday and it is confirmed he will have another run today. I have informed patient of this plan for today.

## 2019-12-11 NOTE — NURSING
Approx 1445: Patient returned from dialysis covered in liquid stool, 2 syringes still connected to dialysis access on trialysis line, chart not returned with patient. I called dialysis and spoke with Mynor who came to unit and returned chart and hep locked dialysis access on trialysis. A stool sample was obtained and sent to lab for occult blood.

## 2019-12-11 NOTE — NURSING
I have secure chatted Dr. Sanchez informing him patient's BIPAP orders have dropped off and requested new orders placed should patient continue to need BIPAP at night. Waiting on confirmation on read message and/or new orders.

## 2019-12-11 NOTE — PT/OT/SLP PROGRESS
Occupational Therapy  Visit Attempt    Patient Name:  Cy Rutherford   MRN:  [id number]    Patient not seen today secondary to ARIN in HD. Will follow-up as time permits.    CECILLE Devlin  12/11/2019

## 2019-12-11 NOTE — NURSING
CHESTER. Courtney, NP, alerted no more BIPAP orders on patient and BIPAP will not be placed on patient at night without orders. LANA Valdez confirmed patient does not need BIPAP at night at this time.

## 2019-12-11 NOTE — PLAN OF CARE
Plan of care reviewed with patient. Normal sinus rhythm on continuous heart monitor, no true red alarms. Safety maintained at this time. Bed in lowest position, side rails up x 2. Call light in reach.  Encouraged patient to use call light for assistance .Verbalized understanding. Patient receiving 1L O2 NC.Patient continent .Urinal in reach. Lasix gtt infusing at 5mg/h. Tolerating well. Blood glucose maintained per MD orders.  Safety maintained. Will continue to monitor patient.

## 2019-12-11 NOTE — NURSING
[facility] RN clinician attempted to visit patient to provide heart failure education, patient off the floor at this time.  Will attempt to visit at a later time.

## 2019-12-11 NOTE — PROGRESS NOTES
Progress Note  Nephrology      Consult Requested By: James Sanchez MD      SUBJECTIVE:     Overnight events  Patient is a 74 y.o. male     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    Abnormality of gait as late effect of cerebrovascular accident (CVA)    Tightness of right gastrocnemius muscle    Claudication    Hypotension    Anemia    Edema    Shortness of breath    Leukocytosis    JOSE (acute kidney injury)    Hyperkalemia    Acute on chronic diastolic heart failure    Abdominal distension     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    Type 2 diabetes mellitus with kidney complication, without long-term current use of insulin 11/15/2012              OBJECTIVE:     Vitals:    12/11/19 0738 12/11/19 0800 12/11/19 1035 12/11/19 1200   BP:  129/60 135/60    BP Location:  Left arm Left arm    Patient Position:  Lying Lying    Pulse:  75 82 80   Resp:  18 18    Temp:  97.4 °F (36.3 °C)     TempSrc:  Oral     SpO2:  98%     Weight: 108.2 kg (238 lb 8 oz)      Height:           Temp: 97.4 °F (36.3 °C) (12/11/19 0800)  Pulse: 80 (12/11/19 1200)  Resp: 18 (12/11/19 1035)  BP: 135/60 (12/11/19 1035)  SpO2: (pt  in dialysis at this time.) (12/11/19 1123)    Date 12/11/19 0700 - 12/12/19 0659   Shift 8573-2385 9910-0530 5141-3931 24 Hour Total   INTAKE   P.O. 125   125   Shift Total(mL/kg) 125(1.2)   125(1.2)   OUTPUT   Shift Total(mL/kg)       Weight (kg) 108.2 108.2 108.2 108.2             Medications:   amLODIPine  10 mg Oral Daily    doxazosin  8 mg Oral Daily    insulin aspart U-100  17 Units Subcutaneous TIDWM    insulin detemir U-100  60 Units Subcutaneous QHS    mupirocin   Nasal BID    rosuvastatin  20 mg Oral Daily    sevelamer carbonate  800 mg Oral TID WM    simethicone  125 mg Oral Q6H    vitamin renal formula (B-complex-vitamin c-folic acid)  1 capsule Oral Daily      furosemide (LASIX) 2 mg/mL infusion (non-titrating) 5 mg/hr (12/10/19 2044)    pantoprazole 40 mg in dextrose 5 % 100 mL infusion (ready to mix system) 8 mg/hr (12/11/19 1550)               Physical Exam:  General appearance:  Nausea, vomiting, diarrhea, abdominal discomfort  Lungs: diminished breath sounds  Heart: pulse 80  Abdomen: distended  Extremities: edema  Skin: dry  Laboratory:  ABG  Labs reviewed  Recent Results (from the past 336 hour(s))   Basic metabolic panel    Collection Time: 12/11/19  5:40 AM   Result Value Ref Range    Sodium 134 (L) 136 - 145 mmol/L    Potassium 3.7 3.5 - 5.1 mmol/L    Chloride 102 95 - 110 mmol/L    CO2 21 (L) 23 - 29 mmol/L    BUN, Bld 66 (H) 8 - 23 mg/dL    Creatinine 3.3 (H) 0.5 - 1.4 mg/dL    Calcium 8.2 (L) 8.7 - 10.5 mg/dL    Anion Gap 11 8 - 16 mmol/L   Basic metabolic panel    Collection Time: 12/10/19  5:28 AM   Result Value Ref Range    Sodium 135 (L) 136 - 145 mmol/L    Potassium 3.3 (L) 3.5 - 5.1 mmol/L    Chloride 100 95 - 110 mmol/L    CO2 25 23 - 29 mmol/L    BUN, Bld 82 (H) 8 - 23 mg/dL    Creatinine 4.0 (H) 0.5 - 1.4 mg/dL    Calcium 8.3 (L) 8.7 - 10.5 mg/dL    Anion Gap 10 8 - 16 mmol/L   Basic metabolic panel    Collection Time: 12/09/19  5:39 AM   Result Value Ref Range    Sodium  136 136 - 145 mmol/L    Potassium 3.7 3.5 - 5.1 mmol/L    Chloride 99 95 - 110 mmol/L    CO2 23 23 - 29 mmol/L    BUN, Bld 100 (H) 8 - 23 mg/dL    Creatinine 5.3 (H) 0.5 - 1.4 mg/dL    Calcium 8.2 (L) 8.7 - 10.5 mg/dL    Anion Gap 14 8 - 16 mmol/L     Recent Results (from the past 336 hour(s))   CBC auto differential    Collection Time: 12/11/19  1:50 PM   Result Value Ref Range    WBC 15.44 (H) 3.90 - 12.70 K/uL    Hemoglobin 9.6 (L) 14.0 - 18.0 g/dL    Hematocrit 29.6 (L) 40.0 - 54.0 %    Platelets 184 150 - 350 K/uL   CBC auto differential    Collection Time: 12/11/19  5:40 AM   Result Value Ref Range    WBC 13.31 (H) 3.90 - 12.70 K/uL    Hemoglobin 9.0 (L) 14.0 - 18.0 g/dL    Hematocrit 27.6 (L) 40.0 - 54.0 %    Platelets 203 150 - 350 K/uL   CBC auto differential    Collection Time: 12/10/19  5:28 AM   Result Value Ref Range    WBC 12.13 3.90 - 12.70 K/uL    Hemoglobin 9.1 (L) 14.0 - 18.0 g/dL    Hematocrit 27.5 (L) 40.0 - 54.0 %    Platelets 175 150 - 350 K/uL     Urinalysis  No results for input(s): COLORU, CLARITYU, SPECGRAV, PHUR, PROTEINUA, GLUCOSEU, BILIRUBINCON, BLOODU, WBCU, RBCU, BACTERIA, MUCUS, NITRITE, LEUKOCYTESUR, UROBILINOGEN, HYALINECASTS in the last 24 hours.    Diagnostic Results:  X-Ray: Reviewed  US: Reviewed  Echo: Reviewed  ACCESS    ASSESSMENT/PLAN:   JOSE/ CKD 4  Metabolic alkalosis  Anemia  Hb 9.1  Nausea, vomiting, diarrhea  GI consulted  Protonix  KUB  Type and screen  /60  Follow up on H/H

## 2019-12-11 NOTE — NURSING
Patient arrived to floor from dialysis. Oriented to room. Safety maintained. Will continue to monitor patient.

## 2019-12-11 NOTE — NURSING
Bed weight obtained since I do not see  One charted this morning. 238.5 lbs. I am not sure if this bed was zeroed out prior to patient's arrival last night. Will zero out if PT able to get patient OOB today.

## 2019-12-11 NOTE — PT/OT/SLP PROGRESS
Physical Therapy      Patient Name:  Cy Rutherford   MRN:  [id number]    Patient not seen today secondary to (pt in dialysis). Will follow-up tomorrow.    Navin Ortiz, PTA

## 2019-12-11 NOTE — PROGRESS NOTES
MANTOUX TUBERCULIN (a.k.a. TB) SKIN TEST      What is Tuberculosis/TB?  Tuberculosis/TB is an infectious disease, which is spread through the air by tiny germs when people cough, sneeze, speak or sing.  TB germs are very small and can remain in the air for a long period of time. TB germs most often settle in your lungs, but may also settle in other organs of your body.  TB germs can be present in your body without making you ill.  This is called TB INFECTION.  TB infection cannot be spread to other people.  When your body’s defenses become weak and can no longer control the TB germs they multiply, this is called TB DISEASE.  It can take month(s) to year(s) for TB infection to become TB disease.  The TB SKIN TEST can show if a person has been “infected” by TB germs.    How is the Mantoux Tuberculin/TB skin test given?  A very small amount of a product called Purified Protein Derivative (PPD) is injected just under the top layer of the skin on the forearm.  Persons who have been infected by the TB germs usually react to the test by developing swelling at the injection site.  The skin test results must be read 48 to 72 hours after given.  Failure to return within this time frame means the test will need to be repeated.    Side effects…  Side effects from a skin test are rare and may include itching and discomfort at the injection site and very rarely the possibility of a blister, ulceration or necrosis (dead tissue) at the site if the injection.    Return to:  Any [facility] site on September 6th, after  1038  and before 1038  on September 7th to have your test read.   [facility] Hospital Medicine  Progress Note    Patient Name: Cy Rutherford  MRN: [id number]  Patient Class: IP- Inpatient   Admission Date: 12/2/2019  Length of Stay: 7 days  Attending Physician: James Sanchez MD  Primary Care Provider: Heide Porter MD        Subjective:     Principal Problem:Acute on chronic diastolic heart failure        HPI:  Admitted for elective LE angiogram for evaluation of RLE claudication. Taken to the cath lab for the procedure via LCFA access with following results:     successful atherectomy (Hawk LX) with distal filter protection, followed by PTA with scoring balloon (5.0 x 150 dSFA, 6.0 x 150 pSFA/CFA) followed by PTA with DCB to dSFA (5.0 x 150), pSFA (5.0 x 100) and CFA (7 x 40) with good results. 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 He has elevated BG, no anion gap.  We will place on insulin sq and monito closely , if dka develop, we will place him on insulin drip    Overview/Hospital Course:  No notes on file    Interval History: Had full session of HD last PM. Doing okay today. Tolerating diet well.     Review of Systems   Constitutional: Negative for chills and fever.   Respiratory: Negative for cough and shortness of breath.    Cardiovascular: Negative for chest pain and palpitations.   Gastrointestinal: Negative for nausea and vomiting.     Objective:     Vital Signs (Most Recent):  Temp: 98.3 °F (36.8 °C) (12/10/19 1500)  Pulse: 80 (12/10/19 1911)  Resp: 20 (12/10/19  1810)  BP: 127/64 (12/10/19 1911)  SpO2: 100 % (12/10/19 1530) Vital Signs (24h Range):  Temp:  [98 °F (36.7 °C)-99.5 °F (37.5 °C)] 98.3 °F (36.8 °C)  Pulse:  [71-88] 80  Resp:  [17-31] 20  SpO2:  [96 %-100 %] 100 %  BP: (107-178)/() 127/64     Weight: 106.1 kg (233 lb 14.5 oz)  Body mass index is 34.05 kg/m².    Intake/Output Summary (Last 24 hours) at 12/10/2019 2002  Last data filed at 12/10/2019 1911  Gross per 24 hour   Intake 1967 ml   Output 3620 ml   Net -1653 ml      Physical Exam   Constitutional: He is oriented to person, place, and time. He appears well-developed and well-nourished. No distress.   Cardiovascular: Normal rate and regular rhythm.   Murmur heard.  Pulmonary/Chest: Effort normal and breath sounds normal. No respiratory distress.   Abdominal: Soft. Bowel sounds are normal. He exhibits distension. There is no tenderness.   Musculoskeletal: He exhibits edema. He exhibits no tenderness.   Neurological: He is alert and oriented to person, place, and time.   Skin: Skin is warm and dry.   Nursing note and vitals reviewed.      Significant Labs:   POCT Glucose:   Recent Labs   Lab 12/09/19  1657 12/09/19  2127 12/10/19  1133   POCTGLUCOSE 146* 110 167*       Significant Imaging: I have reviewed all pertinent imaging results/findings within the past 24 hours.      Assessment/Plan:      Type 2 diabetes mellitus with kidney complication, with long-term current use of insulin  Lab Results   Component Value Date    HGBA1C 5.6 12/02/2019     - BS ranged 110 to 184.   - Continue levemir 60 units nightly   - Continue aspart 15 units TID with meals  - Accuchecks with prn low dose SSI       VTE Risk Mitigation (From admission, onward)         Ordered     Place DARRION hose  Until discontinued      12/05/19 1005     heparin (porcine) injection 2,300 Units  As needed (PRN)      12/04/19 1137                      Torres Ca MD  Department of Hospital Medicine   [facility]

## 2019-12-11 NOTE — SUBJECTIVE & OBJECTIVE
Interval History: Had full session of HD last PM. Doing okay today. Tolerating diet well.     Review of Systems   Constitutional: Negative for chills and fever.   Respiratory: Negative for cough and shortness of breath.    Cardiovascular: Negative for chest pain and palpitations.   Gastrointestinal: Negative for nausea and vomiting.     Objective:     Vital Signs (Most Recent):  Temp: 98.3 °F (36.8 °C) (12/10/19 1500)  Pulse: 80 (12/10/19 1911)  Resp: 20 (12/10/19 1810)  BP: 127/64 (12/10/19 1911)  SpO2: 100 % (12/10/19 1530) Vital Signs (24h Range):  Temp:  [98 °F (36.7 °C)-99.5 °F (37.5 °C)] 98.3 °F (36.8 °C)  Pulse:  [71-88] 80  Resp:  [17-31] 20  SpO2:  [96 %-100 %] 100 %  BP: (107-178)/() 127/64     Weight: 106.1 kg (233 lb 14.5 oz)  Body mass index is 34.05 kg/m².    Intake/Output Summary (Last 24 hours) at 12/10/2019 2002  Last data filed at 12/10/2019 1911  Gross per 24 hour   Intake 1967 ml   Output 3620 ml   Net -1653 ml      Physical Exam   Constitutional: He is oriented to person, place, and time. He appears well-developed and well-nourished. No distress.   Cardiovascular: Normal rate and regular rhythm.   Murmur heard.  Pulmonary/Chest: Effort normal and breath sounds normal. No respiratory distress.   Abdominal: Soft. Bowel sounds are normal. He exhibits distension. There is no tenderness.   Musculoskeletal: He exhibits edema. He exhibits no tenderness.   Neurological: He is alert and oriented to person, place, and time.   Skin: Skin is warm and dry.   Nursing note and vitals reviewed.      Significant Labs:   POCT Glucose:   Recent Labs   Lab 12/09/19  1657 12/09/19 2127 12/10/19  1133   POCTGLUCOSE 146* 110 167*       Significant Imaging: I have reviewed all pertinent imaging results/findings within the past 24 hours.

## 2019-12-12 ENCOUNTER — ANESTHESIA (OUTPATIENT)
Dept: ENDOSCOPY | Facility: HOSPITAL | Age: 74
DRG: 270 | End: 2019-12-12
Payer: MEDICARE

## 2019-12-12 ENCOUNTER — ANESTHESIA EVENT (OUTPATIENT)
Dept: ENDOSCOPY | Facility: HOSPITAL | Age: 74
DRG: 270 | End: 2019-12-12
Payer: MEDICARE

## 2019-12-12 PROBLEM — I95.9 HYPOTENSION: Status: RESOLVED | Noted: 2019-12-03 | Resolved: 2019-12-12

## 2019-12-12 PROBLEM — K92.0 HEMATEMESIS: Status: ACTIVE | Noted: 2019-12-12

## 2019-12-12 PROBLEM — K59.00 CONSTIPATION: Status: ACTIVE | Noted: 2019-12-12

## 2019-12-12 PROBLEM — E87.5 HYPERKALEMIA: Status: RESOLVED | Noted: 2019-12-03 | Resolved: 2019-12-12

## 2019-12-12 LAB
ANION GAP SERPL CALC-SCNC: 11 MMOL/L (ref 8–16)
BASOPHILS # BLD AUTO: 0.02 K/UL (ref 0–0.2)
BASOPHILS NFR BLD: 0.2 % (ref 0–1.9)
BUN SERPL-MCNC: 58 MG/DL (ref 8–23)
CALCIUM SERPL-MCNC: 8.2 MG/DL (ref 8.7–10.5)
CHLORIDE SERPL-SCNC: 101 MMOL/L (ref 95–110)
CO2 SERPL-SCNC: 23 MMOL/L (ref 23–29)
CREAT SERPL-MCNC: 3.1 MG/DL (ref 0.5–1.4)
DIFFERENTIAL METHOD: ABNORMAL
EOSINOPHIL # BLD AUTO: 0.3 K/UL (ref 0–0.5)
EOSINOPHIL NFR BLD: 2.7 % (ref 0–8)
ERYTHROCYTE [DISTWIDTH] IN BLOOD BY AUTOMATED COUNT: 15.9 % (ref 11.5–14.5)
EST. GFR  (AFRICAN AMERICAN): 21 ML/MIN/1.73 M^2
EST. GFR  (NON AFRICAN AMERICAN): 19 ML/MIN/1.73 M^2
GLUCOSE SERPL-MCNC: 186 MG/DL (ref 70–110)
HCT VFR BLD AUTO: 25.7 % (ref 40–54)
HCT VFR BLD AUTO: 26.4 % (ref 40–54)
HCT VFR BLD AUTO: 26.9 % (ref 40–54)
HCT VFR BLD AUTO: 27 % (ref 40–54)
HCT VFR BLD AUTO: 27.2 % (ref 40–54)
HCT VFR BLD AUTO: 27.3 % (ref 40–54)
HGB BLD-MCNC: 8.1 G/DL (ref 14–18)
HGB BLD-MCNC: 8.4 G/DL (ref 14–18)
HGB BLD-MCNC: 8.4 G/DL (ref 14–18)
HGB BLD-MCNC: 8.5 G/DL (ref 14–18)
HGB BLD-MCNC: 8.7 G/DL (ref 14–18)
HGB BLD-MCNC: 8.7 G/DL (ref 14–18)
LYMPHOCYTES # BLD AUTO: 1.5 K/UL (ref 1–4.8)
LYMPHOCYTES NFR BLD: 13.4 % (ref 18–48)
MCH RBC QN AUTO: 28.9 PG (ref 27–31)
MCHC RBC AUTO-ENTMCNC: 30.9 G/DL (ref 32–36)
MCV RBC AUTO: 94 FL (ref 82–98)
MONOCYTES # BLD AUTO: 0.4 K/UL (ref 0.3–1)
MONOCYTES NFR BLD: 3.8 % (ref 4–15)
NEUTROPHILS # BLD AUTO: 8.8 K/UL (ref 1.8–7.7)
NEUTROPHILS NFR BLD: 79.9 % (ref 38–73)
PHOSPHATE SERPL-MCNC: 4.4 MG/DL (ref 2.7–4.5)
PLATELET # BLD AUTO: 191 K/UL (ref 150–350)
PMV BLD AUTO: 9.7 FL (ref 9.2–12.9)
POCT GLUCOSE: 147 MG/DL (ref 70–110)
POCT GLUCOSE: 160 MG/DL (ref 70–110)
POCT GLUCOSE: 177 MG/DL (ref 70–110)
POCT GLUCOSE: 230 MG/DL (ref 70–110)
POTASSIUM SERPL-SCNC: 3.4 MMOL/L (ref 3.5–5.1)
RBC # BLD AUTO: 2.91 M/UL (ref 4.6–6.2)
SODIUM SERPL-SCNC: 135 MMOL/L (ref 136–145)
WBC # BLD AUTO: 11.27 K/UL (ref 3.9–12.7)

## 2019-12-12 PROCEDURE — 99222 PR INITIAL HOSPITAL CARE,LEVL II: ICD-10-PCS | Mod: ,,, | Performed by: INTERNAL MEDICINE

## 2019-12-12 PROCEDURE — 25000003 PHARM REV CODE 250: Performed by: NURSE PRACTITIONER

## 2019-12-12 PROCEDURE — C9113 INJ PANTOPRAZOLE SODIUM, VIA: HCPCS | Performed by: NURSE PRACTITIONER

## 2019-12-12 PROCEDURE — 99222 1ST HOSP IP/OBS MODERATE 55: CPT | Mod: ,,, | Performed by: INTERNAL MEDICINE

## 2019-12-12 PROCEDURE — 80048 BASIC METABOLIC PNL TOTAL CA: CPT

## 2019-12-12 PROCEDURE — 85014 HEMATOCRIT: CPT | Mod: 91

## 2019-12-12 PROCEDURE — 99233 PR SUBSEQUENT HOSPITAL CARE,LEVL III: ICD-10-PCS | Mod: ,,, | Performed by: INTERNAL MEDICINE

## 2019-12-12 PROCEDURE — 27000221 HC OXYGEN, UP TO 24 HOURS

## 2019-12-12 PROCEDURE — 97110 THERAPEUTIC EXERCISES: CPT

## 2019-12-12 PROCEDURE — 63600175 PHARM REV CODE 636 W HCPCS: Performed by: NURSE PRACTITIONER

## 2019-12-12 PROCEDURE — 85025 COMPLETE CBC W/AUTO DIFF WBC: CPT

## 2019-12-12 PROCEDURE — 84100 ASSAY OF PHOSPHORUS: CPT

## 2019-12-12 PROCEDURE — 97530 THERAPEUTIC ACTIVITIES: CPT

## 2019-12-12 PROCEDURE — 85018 HEMOGLOBIN: CPT

## 2019-12-12 PROCEDURE — 85014 HEMATOCRIT: CPT

## 2019-12-12 PROCEDURE — 85018 HEMOGLOBIN: CPT | Mod: 91

## 2019-12-12 PROCEDURE — 25000003 PHARM REV CODE 250: Performed by: INTERNAL MEDICINE

## 2019-12-12 PROCEDURE — 11000001 HC ACUTE MED/SURG PRIVATE ROOM

## 2019-12-12 PROCEDURE — 94761 N-INVAS EAR/PLS OXIMETRY MLT: CPT

## 2019-12-12 PROCEDURE — 99233 SBSQ HOSP IP/OBS HIGH 50: CPT | Mod: ,,, | Performed by: INTERNAL MEDICINE

## 2019-12-12 RX ORDER — METOLAZONE 2.5 MG/1
5 TABLET ORAL ONCE
Status: COMPLETED | OUTPATIENT
Start: 2019-12-12 | End: 2019-12-12

## 2019-12-12 RX ORDER — POTASSIUM CHLORIDE 20 MEQ/1
20 TABLET, EXTENDED RELEASE ORAL ONCE
Status: COMPLETED | OUTPATIENT
Start: 2019-12-12 | End: 2019-12-12

## 2019-12-12 RX ORDER — FUROSEMIDE 10 MG/ML
40 INJECTION INTRAMUSCULAR; INTRAVENOUS 2 TIMES DAILY
Status: DISCONTINUED | OUTPATIENT
Start: 2019-12-12 | End: 2019-12-18

## 2019-12-12 RX ADMIN — SIMETHICONE 125 MG: 125 TABLET, CHEWABLE ORAL at 12:12

## 2019-12-12 RX ADMIN — SIMETHICONE 125 MG: 125 TABLET, CHEWABLE ORAL at 05:12

## 2019-12-12 RX ADMIN — DOXAZOSIN 8 MG: 2 TABLET ORAL at 08:12

## 2019-12-12 RX ADMIN — INSULIN ASPART 17 UNITS: 100 INJECTION, SOLUTION INTRAVENOUS; SUBCUTANEOUS at 05:12

## 2019-12-12 RX ADMIN — SIMETHICONE 125 MG: 125 TABLET, CHEWABLE ORAL at 11:12

## 2019-12-12 RX ADMIN — FUROSEMIDE 5 MG/HR: 10 INJECTION, SOLUTION INTRAMUSCULAR; INTRAVENOUS at 12:12

## 2019-12-12 RX ADMIN — ROSUVASTATIN CALCIUM 20 MG: 10 TABLET, FILM COATED ORAL at 08:12

## 2019-12-12 RX ADMIN — PANTOPRAZOLE SODIUM 8 MG/HR: 40 INJECTION, POWDER, FOR SOLUTION INTRAVENOUS at 05:12

## 2019-12-12 RX ADMIN — NEPHROCAP 1 CAPSULE: 1 CAP ORAL at 08:12

## 2019-12-12 RX ADMIN — PANTOPRAZOLE SODIUM 8 MG/HR: 40 INJECTION, POWDER, FOR SOLUTION INTRAVENOUS at 11:12

## 2019-12-12 RX ADMIN — INSULIN DETEMIR 60 UNITS: 100 INJECTION, SOLUTION SUBCUTANEOUS at 09:12

## 2019-12-12 RX ADMIN — PANTOPRAZOLE SODIUM 8 MG/HR: 40 INJECTION, POWDER, FOR SOLUTION INTRAVENOUS at 04:12

## 2019-12-12 RX ADMIN — MUPIROCIN 1 G: 20 OINTMENT TOPICAL at 09:12

## 2019-12-12 RX ADMIN — SEVELAMER CARBONATE 800 MG: 800 TABLET, FILM COATED ORAL at 05:12

## 2019-12-12 RX ADMIN — MUPIROCIN: 20 OINTMENT TOPICAL at 08:12

## 2019-12-12 RX ADMIN — POTASSIUM CHLORIDE 20 MEQ: 1500 TABLET, EXTENDED RELEASE ORAL at 04:12

## 2019-12-12 RX ADMIN — METOLAZONE 5 MG: 2.5 TABLET ORAL at 04:12

## 2019-12-12 RX ADMIN — INSULIN ASPART 17 UNITS: 100 INJECTION, SOLUTION INTRAVENOUS; SUBCUTANEOUS at 12:12

## 2019-12-12 RX ADMIN — SEVELAMER CARBONATE 800 MG: 800 TABLET, FILM COATED ORAL at 12:12

## 2019-12-12 RX ADMIN — AMLODIPINE BESYLATE 10 MG: 5 TABLET ORAL at 08:12

## 2019-12-12 RX ADMIN — PANTOPRAZOLE SODIUM 8 MG/HR: 40 INJECTION, POWDER, FOR SOLUTION INTRAVENOUS at 12:12

## 2019-12-12 NOTE — PLAN OF CARE
Left message in dialysis to have dr partida call VN regarding removal of rubio catheter. Waiting on call back .

## 2019-12-12 NOTE — PT/OT/SLP PROGRESS
Physical Therapy Treatment    Patient Name:  Cy Rutherford   MRN:  [id number]    Recommendations:     Discharge Recommendations:  rehabilitation facility   Discharge Equipment Recommendations: bedside commode, shower chair   Barriers to discharge: Decreased caregiver support    Assessment:     Cy Rutherford is a 74 y.o. male admitted with a medical diagnosis of Acute on chronic diastolic heart failure.  He presents with the following impairments/functional limitations:  weakness, impaired endurance, impaired sensation, impaired self care skills, impaired balance, gait instability, impaired functional mobilty, decreased lower extremity function, decreased upper extremity function, impaired cardiopulmonary response to activity.    Rehab Prognosis: Good; patient would benefit from acute skilled PT services to address these deficits and reach maximum level of function.    Recent Surgery: Procedure(s) (LRB):  Angiogram, Lower Arterial, Unilateral (N/A) 8 Days Post-Op    Plan:     During this hospitalization, patient to be seen 6 x/week to address the identified rehab impairments via gait training, therapeutic activities, therapeutic exercises and progress toward the following goals:    · Plan of Care Expires:  01/06/20    Subjective     Chief Complaint: None stated.   Patient/Family Comments/goals: None stated.   Pain/Comfort:  · Pain Rating 1: 0/10  · Pain Rating Post-Intervention 1: 0/10      Objective:     Communicated with RN prior to session.  Patient found HOB elevated with central line, oxygen upon PT entry to room.     General Precautions: Standard, fall   Orthopedic Precautions:N/A   Braces: N/A     Functional Mobility:  · Bed Mobility:     · Supine to Sit: moderate assistance  · Transfers:     · Sit to Stand: Attempted X1 with RN and no AD, pt unable to clear bed.   · Pt scooted towards HOB with poor clearance of the bed.   · PT and RN pulled pt towards HOB.       AM-PAC 6 CLICK MOBILITY  Turning over in bed  (including adjusting bedclothes, sheets and blankets)?: 2  Sitting down on and standing up from a chair with arms (e.g., wheelchair, bedside commode, etc.): 3  Moving to and from a bed to a chair (including a wheelchair)?: 3  Need to walk in hospital room?: 3  Climbing 3-5 steps with a railing?: 1       Therapeutic Activities and Exercises:  Bed mobility as stated above.    Pt sat on EOB for 15 minutes. Upon pt sitting EOB PT noticed pt's central line dressing and stitching was loose. PT called for assistance via pt's call light. Pt's RN entered the room and addressed the central line. PT was terminated at this time 2* to safety concerns regarding the central line.     Patient left HOB elevated with all lines intact, bed alarm on and RN present..    GOALS:   Multidisciplinary Problems     Physical Therapy Goals        Problem: Physical Therapy Goal    Goal Priority Disciplines Outcome Goal Variances Interventions   Physical Therapy Goal     PT, PT/OT Ongoing, Progressing     Description:  Goals to be met by: 2020     Patient will increase functional independence with mobility by performin. Supine to sit with Set-up [place]  2. Sit to stand transfer with Supervision  3. Bed to chair transfer with Supervision using Rolling Walker  4. Gait  x 120 feet with Supervision using Rolling Walker.   5. Lower extremity exercise program x15 reps per handout, with supervision                      Time Tracking:     PT Received On: 19  PT Start Time: 1455     PT Stop Time: 1520  PT Total Time (min): 25 min     Billable Minutes: Therapeutic Activity 25 Minutes    Treatment Type: Treatment  PT/PTA: PT     PTA Visit Number: 0     Noel Brewer PT, DPT  2019

## 2019-12-12 NOTE — PLAN OF CARE
Received patient upon rounds at 1940H, patient seen in bed on semi-dangelo's position. Conscious , coherent to time, date, place, person and situation. GCS 15. No subjective complaint of any pain. With right IJ trialysis ,with ongoing lasix drip 2.5 mg/hr, side drip of pantoprazole drip at 8 mg/hr infusing well. With indwelling rubio's catheter Tamazight 16 draining clear urine output to urometer. Right forearm edematous, with limb alert.Right side weakness.  Advised patient to call for any assistance. Call bell within reach. Bed alarm ON. Safety fall precaution maintained. Blood sugar monitored, insulin dose administered.Will continue to monitor patient.  2000H- Hgb 8.6 and Hct 26.6, will continue to monitor patient.  0000H- Dr. Sanchez updated regarding H/H of the patient.  Will repeat H/H every 4 hours.  0627H- Patient stable VS over the night, afebrile. No episodes of nausea and vomiting.No signs of active bleeding noted. H/H monitored.Next H/H at 0800H.    Telemetry no ectopy noted over the night. Free from fall. trialysis line patent.Lasix drip and pantoprazole drip ongoing.Will endorse patient to day shift Nurse.

## 2019-12-12 NOTE — SUBJECTIVE & OBJECTIVE
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    Type 2 diabetes mellitus with kidney complication, without long-term current use of insulin 11/15/2012       Past Surgical History:   Procedure Laterality Date    AORTOGRAPHY WITH SERIALOGRAPHY N/A 10/16/2019    Procedure: AORTOGRAM, WITH SERIALOGRAPHY;  Surgeon: James Sanchez MD;  [facility]: [facility] CATH LAB/EP;  Service: Cardiology;  Laterality: N/A;    COLONOSCOPY      CORONARY ANGIOPLASTY      MARIAN to LAD and LCX       Review of patient's allergies indicates:  No Known Allergies  Family History     Problem Relation (Age of Onset)    Colon polyps Sister    No Known Problems Mother, Father        Tobacco Use    Smoking status: Never Smoker    Smokeless tobacco: Never Used   Substance and Sexual Activity    Alcohol use: No    Drug use: No    Sexual activity: Not Currently     Review of Systems   Constitutional: Negative for chills and fever.   HENT: Negative for postnasal drip and trouble swallowing.    Eyes: Negative for pain and visual disturbance.   Respiratory: Positive for shortness of breath. Negative for choking.    Cardiovascular: Negative for chest pain and palpitations.   Gastrointestinal: Positive for nausea. Negative for abdominal pain, blood in stool, diarrhea, rectal pain and vomiting.   Endocrine: Negative for cold intolerance and heat intolerance.   Genitourinary: Negative for difficulty urinating and hematuria.   Neurological: Negative for dizziness and numbness.   Hematological: Negative for adenopathy. Does not bruise/bleed easily.   Psychiatric/Behavioral: Negative for agitation and confusion.     Objective:     Vital Signs (Most Recent):  Temp: 99 °F (37.2 °C) (12/12/19 1118)  Pulse: 89 (12/12/19 1200)  Resp: 16 (12/12/19  1118)  BP: (!) 142/62 (12/12/19 1118)  SpO2: 98 % (12/12/19 1118) Vital Signs (24h Range):  Temp:  [97 °F (36.1 °C)-99.6 °F (37.6 °C)] 99 °F (37.2 °C)  Pulse:  [77-92] 89  Resp:  [16-18] 16  SpO2:  [95 %-99 %] 98 %  BP: (131-156)/(59-68) 142/62     Weight: 105.2 kg (231 lb 14.8 oz) (12/12/19 0600)  Body mass index is 33.76 kg/m².      Intake/Output Summary (Last 24 hours) at 12/12/2019 1302  Last data filed at 12/12/2019 0600  Gross per 24 hour   Intake 603.33 ml   Output 1550 ml   Net -946.67 ml       Lines/Drains/Airways     Central Venous Catheter Line                 Trialysis (Dialysis) Catheter 12/09/19 1230 right internal jugular 3 days          Drain                 Urethral Catheter 12/03/19 0445 16 Fr. 9 days                Physical Exam   Constitutional: He is oriented to person, place, and time. He appears well-developed and well-nourished. No distress.   HENT:   Head: Normocephalic and atraumatic.   Eyes: Conjunctivae are normal. No scleral icterus.   Neck: No tracheal deviation present. No thyromegaly present.   Cardiovascular: Normal rate and normal heart sounds. Exam reveals no gallop and no friction rub.   Pulmonary/Chest: Effort normal and breath sounds normal. He has no wheezes. He has no rales.   Abdominal: Soft. Bowel sounds are normal. He exhibits no distension. There is no tenderness. There is no rebound and no guarding.   Musculoskeletal: Normal range of motion. He exhibits no tenderness.   Neurological: He is alert and oriented to person, place, and time. Coordination abnormal.   Skin: He is not diaphoretic.   Psychiatric: He has a normal mood and affect. His behavior is normal.   Nursing note and vitals reviewed.      Significant Labs:  CBC:   Recent Labs   Lab 12/11/19  0540 12/11/19  1350  12/12/19  0003 12/12/19  0420 12/12/19  0815   WBC 13.31* 15.44*  --   --  11.27  --    HGB 9.0* 9.6*  9.6*   < > 8.5* 8.4* 8.7*   HCT 27.6* 29.6*  29.6*   < > 26.4* 27.2* 27.0*    184  --   --   191  --     < > = values in this interval not displayed.       Significant Imaging:  Imaging results within the past 24 hours have been reviewed.

## 2019-12-12 NOTE — PROGRESS NOTES
[facility] Hospital Medicine  Progress Note    Patient Name: Cy Rutherford  MRN: [id number]  Patient Class: IP- Inpatient   Admission Date: 12/2/2019  Length of Stay: 9 days  Attending Physician: James Sanchez MD  Primary Care Provider: Heide Porter MD        Subjective:     Principal Problem:Acute on chronic diastolic heart failure        HPI:  Admitted for elective LE angiogram for evaluation of RLE claudication. Taken to the cath lab for the procedure via LCFA access with following results:     successful atherectomy (Hawk LX) with distal filter protection, followed by PTA with scoring balloon (5.0 x 150 dSFA, 6.0 x 150 pSFA/CFA) followed by PTA with DCB to dSFA (5.0 x 150), pSFA (5.0 x 100) and CFA (7 x 40) with good results. 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 He has elevated BG, no anion gap.  We will place on insulin sq and monito closely , if dka develop, we will place him on insulin drip    Overview/Hospital Course:  Had hypotension after the procedure and then had JOSE and had to be started on H.D. Nephrology following. Patient is also on lasix and diuresing well, but not as much as expected. So being continued on HD. Blood sugar levels are controlled with Levemir, prandial insulin and ISS. Also found to having large left renal subcapsular hematoma on CT abd done on 12/4. ASA, Plavix and pletal are therefore held. On 12/11/19, patient had an episode of hematemesis. Initially had drop in H/H, but stable  since this tabitha SUEI consulted and planned for EGD on 12/13.    Interval History: no more episodes of hematemesis since last evening. No complaints now as per patient.     Review of Systems   Constitutional: Negative.    HENT: Negative.    Eyes: Negative.    Respiratory: Positive for shortness of breath.    Cardiovascular: Negative.    Gastrointestinal: Negative.    Endocrine: Negative.    Genitourinary: Negative.    Musculoskeletal: Negative.    Skin: Negative.    Neurological: Negative.    Hematological: Negative.    Psychiatric/Behavioral: Negative.      Objective:     Vital Signs (Most Recent):  Temp: 98.5 °F (36.9 °C) (12/12/19 1335)  Pulse: 83 (12/12/19 1335)  Resp: 16 (12/12/19 1335)  BP: (!) 154/67 (12/12/19 1335)  SpO2: 99 % (12/12/19 1335) Vital Signs (24h Range):  Temp:  [97 °F (36.1 °C)-99.6 °F (37.6 °C)] 98.5 °F (36.9 °C)  Pulse:  [77-92] 83  Resp:  [16-18] 16  SpO2:  [95 %-99 %] 99 %  BP: (131-156)/(59-68) 154/67     Weight: 105.2 kg (231 lb 14.8 oz)  Body mass index is 33.76 kg/m².    Intake/Output Summary (Last 24 hours) at 12/12/2019 1421  Last data filed at 12/12/2019 0600  Gross per 24 hour   Intake 603.33 ml   Output 1550 ml   Net -946.67 ml      Physical Exam  General Appearance:  Comfortable and in no acute distress.     HEENT: Normal HEENT exam.   Neck : Supple, ROM normal.   Pupils:  Pupils are equal, round, and reactive to light.    Lungs:  Normal respiratory rate and normal effort.  Breath sounds clear to auscultation.    Heart: Normal rate.Regular rhythm.  S1 normal and S2 normal.  No murmur heard.  Abdomen: Abdomen is soft, non tender and B.S+VE in all quadrants.  Extremities: Normal range of motion. Trace edema of all extremities.  Neurological: Patient is alert and oriented to person, place and time. Motor strength 4+/5 in all extremities.  Skin:  Warm.  no rash or erythema.  Psychiatry : mood, memory and judgement normal.  Pulses : distal pulses intact.  Nursing notes and Vitals  reviewed.    Significant Labs:   Recent Lab Results       12/12/19  1322   12/12/19  1112   12/12/19  0815   12/12/19  0546   12/12/19  0420        Anion Gap         11     Baso #         0.02     Basophil%         0.2     BUN, Bld         58     Calcium         8.2     Chloride         101     CO2         23     Creatinine         3.1     Differential Method         Automated     eGFR if          21     eGFR if non          19  Comment:  Calculation used to obtain the estimated glomerular filtration  rate (eGFR) is the CKD-EPI equation.        Eos #         0.3     Eosinophil%         2.7     Glucose         186     Gran # (ANC)         8.8     Gran%         79.9     Hematocrit 26.9   27.0   27.2     Hemoglobin 8.4   8.7   8.4     Lymph #         1.5     Lymph%         13.4     MCH         28.9     MCHC         30.9     MCV         94     Mono #         0.4     Mono%         3.8     MPV         9.7     Occult Blood               Phosphorus         4.4     Platelets         191     POCT Glucose   147   160       Potassium         3.4     RBC         2.91     RDW         15.9     Sodium         135     WBC         11.27                      12/12/19  0003   12/11/19  2046   12/11/19  2000   12/11/19  1705   12/11/19  1522        Anion Gap               Baso #               Basophil%               BUN, Bld               Calcium               Chloride               CO2               Creatinine               Differential Method               eGFR if                eGFR if non                Eos #               Eosinophil%               Glucose               Gran # (ANC)               Gran%               Hematocrit 26.4   26.6   28.1     Hemoglobin 8.5   8.6   9.1     Lymph #               Lymph%               MCH               MCHC               MCV               Mono #               Mono%               MPV               Occult Blood                Phosphorus               Platelets               POCT Glucose   153   160       Potassium               RBC               RDW               Sodium               WBC                                12/11/19  1458        Anion Gap       Baso #       Basophil%       BUN, Bld       Calcium       Chloride       CO2       Creatinine       Differential Method       eGFR if        eGFR if non        Eos #       Eosinophil%       Glucose       Gran # (ANC)       Gran%       Hematocrit       Hemoglobin       Lymph #       Lymph%       MCH       MCHC       MCV       Mono #       Mono%       MPV       Occult Blood Negative     Phosphorus       Platelets       POCT Glucose       Potassium       RBC       RDW       Sodium       WBC             Significant Imaging: I have reviewed all pertinent imaging results/findings within the past 24 hours.      Assessment/Plan:      * Acute on chronic diastolic heart failure  On Lasix drip and H.D now  D/c lasix drip after current bag and patient will be placed on I.V lasix pushes as per Nephrology.   Echo showed normal LVEF  Lopressor being held for acute on chronic diastolic HF and ARB for JOSE        Constipation  Improved with lactulose.      Hematemesis  G.I input appreciated  For EGD in a.m  Cont PPI drip      JOSE (acute kidney injury)  Sec to ATN sec to hypotension and bleeding mostly  Nephrology on board and patient being continued on H.D  Cont management as per them.      Leukocytosis  Sec to acute G.I bleed yesterday.  Resolved on labs today.      Anemia  Sec to hematemesis mostly  Will cont to f/u and transfuse prn  H/H stable since last night.       PAD (peripheral artery disease)  -successful atherectomy and PTA with scoring balloon and  DCB to dSFA,  pSFA CFA and TPT   -As above, ASA, Plavix and pletal held for recent bleeding episodes.  -Cont statin.       CAD (coronary artery disease)  Cont statin  ASA and Plavix held for recent bleeding  episodes  Hold Toprol for acute on chronic diastolic HF and ARB for JOSE.      Hyperlipidemia  Home dose statin      Essential hypertension  Cont home dose Norvasc and Doxazosin.  Better controlled now  Hold Toprol and Cozaar as above.      Type 2 diabetes mellitus with kidney complication, with long-term current use of insulin  Lab Results   Component Value Date    HGBA1C 5.6 12/02/2019     - Blood sugar levels better controlled now.  - Continue levemir 60 units nightly   - Continue aspart 15 units TID with meals  - Accuchecks with prn low dose SSI       VTE Risk Mitigation (From admission, onward)         Ordered     Place DARRION hose  Until discontinued      12/05/19 1005     heparin (porcine) injection 2,300 Units  As needed (PRN)      12/04/19 1137                      Pallavi Sunkara, MD  Department of Hospital Medicine   [facility]

## 2019-12-12 NOTE — SUBJECTIVE & OBJECTIVE
Interval History: Tolerating HD currently. Still with mild SOB, but much improved. Swelling improving as well. Eating well.     Review of Systems   Constitutional: Negative for chills and fever.   Respiratory: Positive for shortness of breath. Negative for cough.    Cardiovascular: Negative for chest pain and palpitations.   Gastrointestinal: Negative for nausea and vomiting.     Objective:     Vital Signs (Most Recent):  Temp: 98.4 °F (36.9 °C) (12/11/19 2047)  Pulse: 80 (12/11/19 2047)  Resp: 18 (12/11/19 2047)  BP: (!) 156/68 (12/11/19 2047)  SpO2: 97 % (12/11/19 2047) Vital Signs (24h Range):  Temp:  [97 °F (36.1 °C)-98.7 °F (37.1 °C)] 98.4 °F (36.9 °C)  Pulse:  [73-87] 80  Resp:  [18] 18  SpO2:  [95 %-100 %] 97 %  BP: (126-156)/(56-68) 156/68     Weight: 108.2 kg (238 lb 8 oz)  Body mass index is 34.72 kg/m².    Intake/Output Summary (Last 24 hours) at 12/11/2019 2206  Last data filed at 12/11/2019 1300  Gross per 24 hour   Intake 375 ml   Output 1875 ml   Net -1500 ml      Physical Exam   Constitutional: He is oriented to person, place, and time. He appears well-developed and well-nourished. No distress.   Cardiovascular: Normal rate and regular rhythm.   Murmur heard.  Pulmonary/Chest: Effort normal and breath sounds normal. No respiratory distress.   Abdominal: Soft. Bowel sounds are normal. He exhibits distension. There is no tenderness.   Musculoskeletal: He exhibits edema. He exhibits no tenderness.   Neurological: He is alert and oriented to person, place, and time.   Skin: Skin is warm and dry.   Nursing note and vitals reviewed.      Significant Labs:   POCT Glucose:   Recent Labs   Lab 12/11/19  0500 12/11/19  1705 12/11/19 2046   POCTGLUCOSE 129* 160* 153*       Significant Imaging: I have reviewed all pertinent imaging results/findings within the past 24 hours.

## 2019-12-12 NOTE — PROGRESS NOTES
VN cued into the patient's room with permission. The patient was awake and alert in bed. He denies any pain at this time. Discussed upcoming EGD with the patient and NPO status. Time was allotted for questions. Will continue to monitor.     12/12/19 4116   Type of Frequent Check   Type Patient Rounds   Safety/Activity   Patient Rounds visualized patient;ID band on;bed wheels locked   Safety Promotion/Fall Prevention side rails raised x 2   Activity Management activity adjusted per tolerance   Positioning   Body Position supine   Head of Bed (HOB) HOB at 30-45 degrees   Positioning/Transfer Devices pillows;in use   Pain/Comfort/Sleep   Preferred Pain Scale number (Numeric Rating Pain Scale)   Comfort/Acceptable Pain Level 4   Pain Rating (0-10): Rest 0   Pain Rating (0-10): Activity 0

## 2019-12-12 NOTE — PROGRESS NOTES
[facility] Hospital Medicine  Progress Note    Patient Name: Cy Rutherford  MRN: [id number]  Patient Class: IP- Inpatient   Admission Date: 12/2/2019  Length of Stay: 8 days  Attending Physician: James Sanchez MD  Primary Care Provider: Heide Porter MD        Subjective:     Principal Problem:Acute on chronic diastolic heart failure        HPI:  Admitted for elective LE angiogram for evaluation of RLE claudication. Taken to the cath lab for the procedure via LCFA access with following results:     successful atherectomy (Hawk LX) with distal filter protection, followed by PTA with scoring balloon (5.0 x 150 dSFA, 6.0 x 150 pSFA/CFA) followed by PTA with DCB to dSFA (5.0 x 150), pSFA (5.0 x 100) and CFA (7 x 40) with good results. 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 He has elevated BG, no anion gap.  We will place on insulin sq and monito closely , if dka develop, we will place him on insulin drip    Overview/Hospital Course:  No notes on file    Interval History: Tolerating HD currently. Still with mild SOB, but much improved. Swelling improving as well. Eating well.     Review of Systems   Constitutional: Negative for chills and fever.   Respiratory: Positive for shortness of breath. Negative for cough.    Cardiovascular: Negative for chest pain and palpitations.   Gastrointestinal: Negative for nausea and vomiting.     Objective:     Vital Signs (Most Recent):  Temp: 98.4 °F (36.9 °C) (12/11/19 2047)  Pulse:  80 (12/11/19 2047)  Resp: 18 (12/11/19 2047)  BP: (!) 156/68 (12/11/19 2047)  SpO2: 97 % (12/11/19 2047) Vital Signs (24h Range):  Temp:  [97 °F (36.1 °C)-98.7 °F (37.1 °C)] 98.4 °F (36.9 °C)  Pulse:  [73-87] 80  Resp:  [18] 18  SpO2:  [95 %-100 %] 97 %  BP: (126-156)/(56-68) 156/68     Weight: 108.2 kg (238 lb 8 oz)  Body mass index is 34.72 kg/m².    Intake/Output Summary (Last 24 hours) at 12/11/2019 2206  Last data filed at 12/11/2019 1300  Gross per 24 hour   Intake 375 ml   Output 1875 ml   Net -1500 ml      Physical Exam   Constitutional: He is oriented to person, place, and time. He appears well-developed and well-nourished. No distress.   Cardiovascular: Normal rate and regular rhythm.   Murmur heard.  Pulmonary/Chest: Effort normal and breath sounds normal. No respiratory distress.   Abdominal: Soft. Bowel sounds are normal. He exhibits distension. There is no tenderness.   Musculoskeletal: He exhibits edema. He exhibits no tenderness.   Neurological: He is alert and oriented to person, place, and time.   Skin: Skin is warm and dry.   Nursing note and vitals reviewed.      Significant Labs:   POCT Glucose:   Recent Labs   Lab 12/11/19  0500 12/11/19  1705 12/11/19 2046   POCTGLUCOSE 129* 160* 153*       Significant Imaging: I have reviewed all pertinent imaging results/findings within the past 24 hours.      Assessment/Plan:      Type 2 diabetes mellitus with kidney complication, with long-term current use of insulin  Lab Results   Component Value Date    HGBA1C 5.6 12/02/2019     - BS ranged 129 to 167.   - Continue levemir 60 units nightly   - Continue aspart 15 units TID with meals  - Accuchecks with prn low dose SSI       VTE Risk Mitigation (From admission, onward)         Ordered     Place DARRION hose  Until discontinued      12/05/19 1005     heparin (porcine) injection 2,300 Units  As needed (PRN)      12/04/19 1137                      Torres Ca MD  Department of [place] [facility]   [facility]

## 2019-12-12 NOTE — ANESTHESIA PREPROCEDURE EVALUATION
12/13/2019  Cy Rutherford is a 74 y.o., male admitted for peripheral vascular procedure 12/2/19. Needs EGD under MAC    PMH of DM II, HTN, HLD, Hx of colon cancer, MI s/p stent placement, hx of CVA, PVD, Obesity,  JOSE, and Anemia scheduled for EGD under MAC      Pt has a Triple lumen in the Right IJ    EKG 12/9/2019:     Normal sinus rhythm  ST and T wave abnormality, consider lateral ischemia  Prolonged QT  Abnormal ECG  When compared with ECG of 02-DEC-2019 16:55,  Vent. rate has increased BY  32 BPM  Questionable change in QRS duration    ECHO 12/3/2019:    · Normal left ventricular systolic function. The estimated ejection fraction is 55%  · Concentric left ventricular remodeling.  · No wall motion abnormalities.  · Normal right ventricular systolic function.  · Normal central venous pressure (3 mm Hg).    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    Abnormality of gait as late effect of cerebrovascular accident (CVA)    Tightness of right gastrocnemius muscle    Claudication    Anemia    Edema    Shortness of breath    Leukocytosis    JOSE (acute kidney injury)    Acute on chronic diastolic heart failure    Abdominal distension    Hematemesis    Constipation       Past Medical History:   Diagnosis Date    Acute  coronary syndrome     2011 ASMI    Coronary artery disease     Essential hypertension 11/15/2012    Hypertension     Intracranial atherosclerosis 5/8/2018    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    Type 2 diabetes mellitus with kidney complication, without long-term current use of insulin 11/15/2012       Past Surgical History:   Procedure Laterality Date    AORTOGRAPHY WITH SERIALOGRAPHY N/A 10/16/2019    Procedure: AORTOGRAM, WITH SERIALOGRAPHY;  Surgeon: James Sanchez MD;  [facility] CATH LAB[facility]EP;  Service: Cardiology;  Laterality: N/A;    COLONOSCOPY      CORONARY ANGIOPLASTY      MARIAN to LAD and LCX       Review of patient's allergies indicates:  No Known Allergies    Lab Results   Component Value Date    WBC 9.96 12/13/2019    HGB 8.4 (L) 12/13/2019    HGB 8.4 (L) 12/13/2019    HCT 26.1 (L) 12/13/2019    HCT 26.1 (L) 12/13/2019     12/13/2019    CHOL 139 09/13/2019    TRIG 120 09/13/2019    HDL 35 (L) 09/13/2019    ALT 15 12/02/2019    AST 17 12/02/2019     12/13/2019    K 3.1 (L) 12/13/2019    CL 99 12/13/2019    CREATININE 3.4 (H) 12/13/2019    BUN 68 (H) 12/13/2019    CO2 26 12/13/2019    TSH 4.970 (H) 05/07/2018    INR 1.1 12/03/2019    HGBA1C 5.6 12/02/2019         Anesthesia Evaluation    I have reviewed the Patient Summary Reports.    I have reviewed the Nursing Notes.   I have reviewed the Medications.     Review of Systems  Anesthesia Hx:  No problems with previous Anesthesia  History of prior surgery of interest to airway management or planning:  Denies Personal Hx of Anesthesia complications.   Social:  Former Smoker    Hematology/Oncology:         -- Cancer in past history:   Cardiovascular:   Exercise tolerance: poor Hypertension Past MI CAD  CABG/stent  CHF hyperlipidemia NASSAR ECG has been reviewed. Hx  2011 s/p PCI with MARIAN to LAD and LCX, PAD s/p PTA with MARIAN (SES) to LSFA and PTA to LTPT    Pulmonary:   Shortness of breath    Renal/:   Chronic Renal  Disease    Hepatic/GI:   GIB   Neurological:   CVA    Endocrine:   Diabetes        Physical Exam  General:  Obesity    Airway/Jaw/Neck:  Airway Findings: Mouth Opening: Small, but > 3cm Tongue: Large  General Airway Assessment: Adult  Mallampati: IV  TM Distance: Normal, at least 6 cm      Dental:  Dental Findings:    Chest/Lungs:  Chest/Lungs Findings: Normal Respiratory Rate, Clear to auscultation     Heart/Vascular:  Heart Findings: Rate: Normal  Rhythm: Regular Rhythm        Mental Status:  Mental Status Findings:  Cooperative, Alert and Oriented         Anesthesia Plan  Type of Anesthesia, risks & benefits discussed:  Anesthesia Type:  MAC, general  Patient's Preference:   Intra-op Monitoring Plan: standard ASA monitors  Intra-op Monitoring Plan Comments:   Post Op Pain Control Plan: multimodal analgesia  Post Op Pain Control Plan Comments:   Induction:    Beta Blocker:         Informed Consent: Patient understands risks and agrees with Anesthesia plan.  Questions answered. Anesthesia consent signed with patient.  ASA Score: 4     Day of Surgery Review of History & Physical:            Ready For Surgery From Anesthesia Perspective.

## 2019-12-12 NOTE — SUBJECTIVE & OBJECTIVE
Interval History: no more episodes of hematemesis since last evening. No complaints now as per patient.     Review of Systems   Constitutional: Negative.    HENT: Negative.    Eyes: Negative.    Respiratory: Positive for shortness of breath.    Cardiovascular: Negative.    Gastrointestinal: Negative.    Endocrine: Negative.    Genitourinary: Negative.    Musculoskeletal: Negative.    Skin: Negative.    Neurological: Negative.    Hematological: Negative.    Psychiatric/Behavioral: Negative.      Objective:     Vital Signs (Most Recent):  Temp: 98.5 °F (36.9 °C) (12/12/19 1335)  Pulse: 83 (12/12/19 1335)  Resp: 16 (12/12/19 1335)  BP: (!) 154/67 (12/12/19 1335)  SpO2: 99 % (12/12/19 1335) Vital Signs (24h Range):  Temp:  [97 °F (36.1 °C)-99.6 °F (37.6 °C)] 98.5 °F (36.9 °C)  Pulse:  [77-92] 83  Resp:  [16-18] 16  SpO2:  [95 %-99 %] 99 %  BP: (131-156)/(59-68) 154/67     Weight: 105.2 kg (231 lb 14.8 oz)  Body mass index is 33.76 kg/m².    Intake/Output Summary (Last 24 hours) at 12/12/2019 1421  Last data filed at 12/12/2019 0600  Gross per 24 hour   Intake 603.33 ml   Output 1550 ml   Net -946.67 ml      Physical Exam  General Appearance:  Comfortable and in no acute distress.     HEENT: Normal HEENT exam.   Neck : Supple, ROM normal.   Pupils:  Pupils are equal, round, and reactive to light.    Lungs:  Normal respiratory rate and normal effort.  Breath sounds clear to auscultation.    Heart: Normal rate.Regular rhythm.  S1 normal and S2 normal.  No murmur heard.  Abdomen: Abdomen is soft, non tender and B.S+VE in all quadrants.  Extremities: Normal range of motion. Trace edema of all extremities.  Neurological: Patient is alert and oriented to person, place and time. Motor strength 4+/5 in all extremities.  Skin:  Warm.  no rash or erythema.  Psychiatry : mood, memory and judgement normal.  Pulses : distal pulses intact.  Nursing notes and Vitals reviewed.    Significant Labs:   Recent Lab Results        12/12/19  1322   12/12/19  1112   12/12/19  0815   12/12/19  0546   12/12/19  0420        Anion Gap         11     Baso #         0.02     Basophil%         0.2     BUN, Bld         58     Calcium         8.2     Chloride         101     CO2         23     Creatinine         3.1     Differential Method         Automated     eGFR if          21     eGFR if non          19  Comment:  Calculation used to obtain the estimated glomerular filtration  rate (eGFR) is the CKD-EPI equation.        Eos #         0.3     Eosinophil%         2.7     Glucose         186     Gran # (ANC)         8.8     Gran%         79.9     Hematocrit 26.9   27.0   27.2     Hemoglobin 8.4   8.7   8.4     Lymph #         1.5     Lymph%         13.4     MCH         28.9     MCHC         30.9     MCV         94     Mono #         0.4     Mono%         3.8     MPV         9.7     Occult Blood               Phosphorus         4.4     Platelets         191     POCT Glucose   147   160       Potassium         3.4     RBC         2.91     RDW         15.9     Sodium         135     WBC         11.27                      12/12/19  0003   12/11/19  2046   12/11/19  2000   12/11/19  1705   12/11/19  1522        Anion Gap               Baso #               Basophil%               BUN, Bld               Calcium               Chloride               CO2               Creatinine               Differential Method               eGFR if                eGFR if non                Eos #               Eosinophil%               Glucose               Gran # (ANC)               Gran%               Hematocrit 26.4   26.6   28.1     Hemoglobin 8.5   8.6   9.1     Lymph #               Lymph%               MCH               MCHC               MCV               Mono #               Mono%               MPV               Occult Blood               Phosphorus               Platelets               POCT Glucose   153    160       Potassium               RBC               RDW               Sodium               WBC                                12/11/19  1458        Anion Gap       Baso #       Basophil%       BUN, Bld       Calcium       Chloride       CO2       Creatinine       Differential Method       eGFR if        eGFR if non        Eos #       Eosinophil%       Glucose       Gran # (ANC)       Gran%       Hematocrit       Hemoglobin       Lymph #       Lymph%       MCH       MCHC       MCV       Mono #       Mono%       MPV       Occult Blood Negative     Phosphorus       Platelets       POCT Glucose       Potassium       RBC       RDW       Sodium       WBC             Significant Imaging: I have reviewed all pertinent imaging results/findings within the past 24 hours.

## 2019-12-12 NOTE — CONSULTS
[facility]  Gastroenterology  Consult Note    Patient Name: Cy Rutherford  MRN: [id number]  Admission Date: 12/2/2019  Hospital Length of Stay: 9 days  Code Status: Full Code   Attending Provider: James Sanchez MD   Consulting Provider: Jaime Elias MD  Primary Care Physician: Heide Porter MD  Principal Problem:Acute on chronic diastolic heart failure    Inpatient consult to [facility]  Consult performed by: Jaime Elias MD  Consult ordered by: Shant Ramires NP  Reason for consult: GI bleed        Subjective:     HPI:  This is a 74-year-old male with a past medical history of hypertension, coronary artery disease, CVA, diabetes here with heart failure noted to have an episode of hematemesis.  He began feeling somewhat nauseous had a episode of hematemesis described as dark red blood, mild-to-moderate amount.  He denied any chest pain a petition is, shortness of breath.  No melena.  He denies any similar symptoms in the past and does not believe he has ever had an upper endoscopy.  No other exacerbating or relieving factors or other associated symptoms.  Hematocrit is stable.    The following portions of the patient's history were reviewed and updated as appropriate: allergies, current medications, past family history, past medical history, past social history, past surgical history and problem list.      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    Type 2 diabetes mellitus with kidney complication, without long-term current use of insulin 11/15/2012       Past Surgical History:   Procedure Laterality Date    AORTOGRAPHY WITH SERIALOGRAPHY N/A 10/16/2019    Procedure: AORTOGRAM, WITH SERIALOGRAPHY;  Surgeon: James Sanchez MD;  Location:  [facility] CATH LAB/EP;  Service: Cardiology;  Laterality: N/A;    COLONOSCOPY      CORONARY ANGIOPLASTY      MARIAN to LAD and LCX       Review of patient's allergies indicates:  No Known Allergies  Family History     Problem Relation (Age of Onset)    Colon polyps Sister    No Known Problems Mother, Father        Tobacco Use    Smoking status: Never Smoker    Smokeless tobacco: Never Used   Substance and Sexual Activity    Alcohol use: No    Drug use: No    Sexual activity: Not Currently     Review of Systems   Constitutional: Negative for chills and fever.   HENT: Negative for postnasal drip and trouble swallowing.    Eyes: Negative for pain and visual disturbance.   Respiratory: Positive for shortness of breath. Negative for choking.    Cardiovascular: Negative for chest pain and palpitations.   Gastrointestinal: Positive for nausea. Negative for abdominal pain, blood in stool, diarrhea, rectal pain and vomiting.   Endocrine: Negative for cold intolerance and heat intolerance.   Genitourinary: Negative for difficulty urinating and hematuria.   Neurological: Negative for dizziness and numbness.   Hematological: Negative for adenopathy. Does not bruise/bleed easily.   Psychiatric/Behavioral: Negative for agitation and confusion.     Objective:     Vital Signs (Most Recent):  Temp: 99 °F (37.2 °C) (12/12/19 1118)  Pulse: 89 (12/12/19 1200)  Resp: 16 (12/12/19 1118)  BP: (!) 142/62 (12/12/19 1118)  SpO2: 98 % (12/12/19 1118) Vital Signs (24h Range):  Temp:  [97 °F (36.1 °C)-99.6 °F (37.6 °C)] 99 °F (37.2 °C)  Pulse:  [77-92] 89  Resp:  [16-18] 16  SpO2:  [95 %-99 %] 98 %  BP: (131-156)/(59-68) 142/62     Weight: 105.2 kg (231 lb 14.8 oz) (12/12/19 0600)  Body mass index is 33.76 kg/m².      Intake/Output Summary (Last 24 hours) at 12/12/2019 1302  Last data filed at 12/12/2019 0600  Gross per 24 hour   Intake 603.33 ml   Output 1550 ml   Net -946.67 ml       Lines/Drains/Airways     Central Venous Catheter Line                  Trialysis (Dialysis) Catheter 12/09/19 1230 right internal jugular 3 days          Drain                 Urethral Catheter 12/03/19 0445 16 Fr. 9 days                Physical Exam   Constitutional: He is oriented to person, place, and time. He appears well-developed and well-nourished. No distress.   HENT:   Head: Normocephalic and atraumatic.   Eyes: Conjunctivae are normal. No scleral icterus.   Neck: No tracheal deviation present. No thyromegaly present.   Cardiovascular: Normal rate and normal heart sounds. Exam reveals no gallop and no friction rub.   Pulmonary/Chest: Effort normal and breath sounds normal. He has no wheezes. He has no rales.   Abdominal: Soft. Bowel sounds are normal. He exhibits no distension. There is no tenderness. There is no rebound and no guarding.   Musculoskeletal: Normal range of motion. He exhibits no tenderness.   Neurological: He is alert and oriented to person, place, and time. Coordination abnormal.   Skin: He is not diaphoretic.   Psychiatric: He has a normal mood and affect. His behavior is normal.   Nursing note and vitals reviewed.      Significant Labs:  CBC:   Recent Labs   Lab 12/11/19  0540 12/11/19  1350  12/12/19  0003 12/12/19  0420 12/12/19  0815   WBC 13.31* 15.44*  --   --  11.27  --    HGB 9.0* 9.6*  9.6*   < > 8.5* 8.4* 8.7*   HCT 27.6* 29.6*  29.6*   < > 26.4* 27.2* 27.0*    184  --   --  191  --     < > = values in this interval not displayed.       Significant Imaging:  Imaging results within the past 24 hours have been reviewed.    Assessment/Plan:     Anemia  - PPI  - npo p mn  - monitor hct, stable  - EGD in am        Thank you for your consult. I will follow-up with patient. Please contact us if you have any additional questions.    Jaime Elias MD  Gastroenterology  [facility]

## 2019-12-12 NOTE — HPI
This is a 74-year-old male with a past medical history of hypertension, coronary artery disease, CVA, diabetes here with heart failure noted to have an episode of hematemesis.  He began feeling somewhat nauseous had a episode of hematemesis described as dark red blood, mild-to-moderate amount.  He denied any chest pain a petition is, shortness of breath.  No melena.  He denies any similar symptoms in the past and does not believe he has ever had an upper endoscopy.  No other exacerbating or relieving factors or other associated symptoms.  Hematocrit is stable.    The following portions of the patient's history were reviewed and updated as appropriate: allergies, current medications, past family history, past medical history, past social history, past surgical history and problem list.

## 2019-12-12 NOTE — PROGRESS NOTES
Progress Note  Nephrology      Consult Requested By: James Sanchez MD      SUBJECTIVE:     Overnight events  Patient is a 74 y.o. male     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    Abnormality of gait as late effect of cerebrovascular accident (CVA)    Tightness of right gastrocnemius muscle    Claudication    Hypotension    Anemia    Edema    Shortness of breath    Leukocytosis    JOSE (acute kidney injury)    Hyperkalemia    Acute on chronic diastolic heart failure    Abdominal distension     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    Type 2 diabetes mellitus with kidney complication, without long-term current use of insulin 11/15/2012              OBJECTIVE:     Vitals:    12/12/19 0753 12/12/19 0754 12/12/19 0755 12/12/19 1118   BP: (!) 146/67   (!) 142/62   BP Location: Left arm   Left arm   Patient Position: Lying   Lying   Pulse: 79 79  78   Resp: 16   16   Temp: 97.2 °F (36.2 °C)   99 °F (37.2 °C)   TempSrc: Oral   Oral   SpO2: 97%  97% 98%   Weight:       Height:           Temp: 99 °F (37.2 °C) (12/12/19 1118)  Pulse: 78 (12/12/19 1118)  Resp: 16 (12/12/19 1118)  BP: (!) 142/62 (12/12/19  1118)  SpO2: 98 % (12/12/19 1118)              Medications:   amLODIPine  10 mg Oral Daily    doxazosin  8 mg Oral Daily    insulin aspart U-100  17 Units Subcutaneous TIDWM    insulin detemir U-100  60 Units Subcutaneous QHS    mupirocin   Nasal BID    rosuvastatin  20 mg Oral Daily    sevelamer carbonate  800 mg Oral TID WM    simethicone  125 mg Oral Q6H    vitamin renal formula (B-complex-vitamin c-folic acid)  1 capsule Oral Daily      furosemide (LASIX) 2 mg/mL infusion (non-titrating) 5 mg/hr (12/12/19 0011)    pantoprazole 40 mg in dextrose 5 % 100 mL infusion (ready to mix system) 8 mg/hr (12/12/19 1110)               Physical Exam:  General appearance: NAD  No vomiting  No diarrhea  Lungs: diminished breath sounds  Heart: pulse 89  Abdomen: soft   Extremities: edema  Skin: dry  Laboratory:  ABG  Labs reviewed  Recent Results (from the past 336 hour(s))   Basic metabolic panel    Collection Time: 12/12/19  4:20 AM   Result Value Ref Range    Sodium 135 (L) 136 - 145 mmol/L    Potassium 3.4 (L) 3.5 - 5.1 mmol/L    Chloride 101 95 - 110 mmol/L    CO2 23 23 - 29 mmol/L    BUN, Bld 58 (H) 8 - 23 mg/dL    Creatinine 3.1 (H) 0.5 - 1.4 mg/dL    Calcium 8.2 (L) 8.7 - 10.5 mg/dL    Anion Gap 11 8 - 16 mmol/L   Basic metabolic panel    Collection Time: 12/11/19  5:40 AM   Result Value Ref Range    Sodium 134 (L) 136 - 145 mmol/L    Potassium 3.7 3.5 - 5.1 mmol/L    Chloride 102 95 - 110 mmol/L    CO2 21 (L) 23 - 29 mmol/L    BUN, Bld 66 (H) 8 - 23 mg/dL    Creatinine 3.3 (H) 0.5 - 1.4 mg/dL    Calcium 8.2 (L) 8.7 - 10.5 mg/dL    Anion Gap 11 8 - 16 mmol/L   Basic metabolic panel    Collection Time: 12/10/19  5:28 AM   Result Value Ref Range    Sodium 135 (L) 136 - 145 mmol/L    Potassium 3.3 (L) 3.5 - 5.1 mmol/L    Chloride 100 95 - 110 mmol/L    CO2 25 23 - 29 mmol/L    BUN, Bld 82 (H) 8 - 23 mg/dL    Creatinine 4.0 (H) 0.5 - 1.4 mg/dL    Calcium 8.3 (L) 8.7 - 10.5 mg/dL    Anion Gap 10 8 - 16 mmol/L      Recent Results (from the past 336 hour(s))   CBC auto differential    Collection Time: 12/12/19  4:20 AM   Result Value Ref Range    WBC 11.27 3.90 - 12.70 K/uL    Hemoglobin 8.4 (L) 14.0 - 18.0 g/dL    Hematocrit 27.2 (L) 40.0 - 54.0 %    Platelets 191 150 - 350 K/uL   CBC auto differential    Collection Time: 12/11/19  1:50 PM   Result Value Ref Range    WBC 15.44 (H) 3.90 - 12.70 K/uL    Hemoglobin 9.6 (L) 14.0 - 18.0 g/dL    Hematocrit 29.6 (L) 40.0 - 54.0 %    Platelets 184 150 - 350 K/uL   CBC auto differential    Collection Time: 12/11/19  5:40 AM   Result Value Ref Range    WBC 13.31 (H) 3.90 - 12.70 K/uL    Hemoglobin 9.0 (L) 14.0 - 18.0 g/dL    Hematocrit 27.6 (L) 40.0 - 54.0 %    Platelets 203 150 - 350 K/uL     Urinalysis  No results for input(s): COLORU, CLARITYU, SPECGRAV, PHUR, PROTEINUA, GLUCOSEU, BILIRUBINCON, BLOODU, WBCU, RBCU, BACTERIA, MUCUS, NITRITE, LEUKOCYTESUR, UROBILINOGEN, HYALINECASTS in the last 24 hours.    Diagnostic Results:  X-Ray: Reviewed  US: Reviewed  Echo: Reviewed  ACCESS    ASSESSMENT/PLAN:   JOSE/CKD 4  UO 1550 cc/24 h  Hypokalemia  Replace  Metabolic bone disease  Anemia Hb 8.7  Iron   Epogen  Follow up on H/H  Weight daily  I and O  D/c lasix drip after current bag complete  Start lasix 40 mg bid IV

## 2019-12-12 NOTE — PROGRESS NOTES
[facility]  Cardiology  Progress Note    Patient Name: Cy Rutherford  MRN: [id number]  Admission Date: 12/2/2019  Hospital Length of Stay: 9 days  Code Status: Full Code   Attending Physician: James Sanchez MD   Primary Care Physician: Heide Porter MD  Expected Discharge Date:   Principal Problem:Acute on chronic diastolic heart failure    Subjective:     Hospital Course:   12/2/2019 Admitted for elective LE angiogram for evaluation of RLE claudication. Taken to the cath lab for the procedure via LCFA access with following results:    successful atherectomy (Hawk LX) with distal filter protection, followed by PTA with scoring balloon (5.0 x 150 dSFA, 6.0 x 150 pSFA/CFA) followed by PTA with DCB to dSFA (5.0 x 150), pSFA (5.0 x 100) and CFA (7 x 40) with good results. Successful PTA to TPT with 3.0 x 40 balloon- see cath report for full report     Tolerated procedure well and recovered in pre post cath area. Hypotension noted along with back pain and diaphoresis. No hematoma noted and manual pressure applied out of concern for bleeding. STAT H&H down to 7.4&24.1 from 11.6&36.1. Given profound symptoms, major concern for bleeding prompting re evaluation with recurrent angiogram. Placed on IVFs along with IV Levophed. Repeat procedure via right radial access with images  in multiple views with no active bleed noted. Cuff pressure with significant difference in comparison to  aortic pressure which was significantly higher.  Admitted to ICU for close monitoring with kanchan placed. T&S with PRBC transfusion initiated with IV Lasix given in between   12/3/2019 Remain on IV Levophed drip at .46mcg/kg/min with BP 110s-130s/40s-50s HR 60s. Serial H&Hs Q4hrs overnight with  H&H this AM 10.0&30.2 with slight trend down to 9.1&27.5 on repeat check. Complains of SOB with increased RR. Only 500cc out overnight. Will repeat IV Lasix 40mg this AM. Echocardiogram and CXR as well. Will attempt to wean IV  Levophed drip as BP tolerates   12/4/2019 Creatinine trended up to 3.7 overnight with K+ 6.1. Continued with no urine output. Nephrology consulted yesterday with trialysis catheter placed yesterday with initiation of CRRT overnight. CRRT x 2-3 hours prior to clotting off. H&H trended down further to 7.3&21.8 with repeat PRBC transfusion. H&H trended up to 8.4&24.9 with continued serial monitoring with recurrent drop to 7.0&21.2. Concerned about recurrent bleeding with plans for repeat angiogram for re evaluation of acute bleed. Remains on IV Levophed with stable BP and wean in process. Complains of mild SOB with stable sats on Venti mask.   12/5/2019 Repeat angiogram yesterday using CO2 with no active bleed noted after extensive angiogram. H&H down 6.6&18.9 yesterday evening with repeat PRBC. CT abdomen/pelvis with evidence of large left renal subcapsular hematoma with surrounding retroperitoneal hemorrhage and no definite contrast extravasation seen to suggest active bleeding on delayed images. H&H up to 7.4&22.0-8.5&25.3. Weaned off IV Levophed with stable BP. Resumed CRRT yesterday evening with 695cc removed and 30cc urine output noted +2.7 liters. Continued mild SOB with stable sats on venti mask. BMP with K+ 5.0 BUN 62 creatinine 5.0. Will continue with serial H&Hs for now. Will place TEDs and consult PT   12/6/2019 Attempted CRRT and HD yesterday with clotted line and approximately 400cc blood loss due to inability to rinse back. Placed on IV Lasix drip at 20mg/hr by Nephrology with 675cc urine output overnight positive 3 liters since admission. K+ 5.0 with BUN 69 creatinine 5.4 this AM. H&H 74&22.4 this AM unchanged from overnight-will continue with serial H&Hs for now. Off IV pressors for over 48 hours with BP 130s-170s/70s overnight. Mild SOB remains per patient with slight improvement noted on PE. Updated son at bedside this morning   12/7/2019 HR and BP stable. H&H 7.7&23.2 unchanged from yesterday.  Remain on IV Lasix with adequate urine output. BUN 89 creatinine 6.0. Hopeful to transfer to floor tomorrow.   12/8/2019 H&H down to 6.7 & 20.5 this AM with repeat PRBC transfusion. Plan for HD today for clearance. Remains on IV Lasix drip with adequate urine output. Resumed antihypertensives with stabel BP. PT and OT on board  12/9/2019 H&H 8.8&26.6 this AM after PRBC transfusion yesterday. Approximately one hour of HD yesterday with cessation due to line malfunction. HR and BP remains stable. 2.7 liters out overnight negative 4.4 liters since admission.  creatinine 5.3. Will reconsult General Surgery today for new trialysis line placement for ongoing intermittent HD/CRRT.   12/10/2019 H&H 9.1&27.5 this AM. BUN 82 creatinine 4.0 after HD run yesterday afternoon. IV Lasix drip at 5mg/hr with 3.0 liters out overnight negative 5.6 liters since admission. HR and BP stable. Plan to transfer out of ICU today   12/11/2019 Transferred out of ICU yesterday. BMP with K+ 3.7 BUN 66 creatinine 3.3. Remains on IV Lasix drip with 1.8 liters urine output with 1.4 liters removed with HD negative 7.4 liters since admission. HR and BP stable. Working with PT currently. Mild abdominal distension with Simethicone and Miralax ordered yesterday with plans for Lactulose today   12/12/2019 Slight increase in abdominal pain with nausea followed by vomiting yesterday. Appearance of blood in emesis yesterday. Serial H&Hs overnight with no sharp drop-H&H this AM 8.4&27.2. Remains on IV Protonix drip with GI consulted with plans for EGD tomorrow. On IV Lasix drip as well with total of 2.6 liters out overnight (1.5 urine output 1.1 HD) negative 9.1 liters since admission. BUN 58 creatinine 3.1. KUB reviewed with improvement in air and successful BM-abdominal distension improved. Nephrology on board as well as PT        Review of Systems   Constitution: Negative for chills, decreased appetite, diaphoresis and fever.   Cardiovascular:  Negative for chest pain, claudication, cyanosis, dyspnea on exertion, irregular heartbeat, leg swelling, near-syncope, orthopnea, palpitations, paroxysmal nocturnal dyspnea and syncope.   Respiratory: Negative for cough, hemoptysis, shortness of breath and wheezing.    Gastrointestinal: Negative for bloating, abdominal pain, constipation, diarrhea, melena, nausea and vomiting.   Neurological: Negative for dizziness and weakness.     Objective:     Vital Signs (Most Recent):  Temp: 98.5 °F (36.9 °C) (12/12/19 1335)  Pulse: 83 (12/12/19 1335)  Resp: 16 (12/12/19 1335)  BP: (!) 154/67 (12/12/19 1335)  SpO2: 99 % (12/12/19 1335) Vital Signs (24h Range):  Temp:  [97 °F (36.1 °C)-99.6 °F (37.6 °C)] 98.5 °F (36.9 °C)  Pulse:  [77-92] 83  Resp:  [16-18] 16  SpO2:  [95 %-99 %] 99 %  BP: (131-156)/(59-68) 154/67     Weight: 105.2 kg (231 lb 14.8 oz)  Body mass index is 33.76 kg/m².     SpO2: 99 %  O2 Device (Oxygen Therapy): nasal cannula      Intake/Output Summary (Last 24 hours) at 12/12/2019 1425  Last data filed at 12/12/2019 0600  Gross per 24 hour   Intake 603.33 ml   Output 1550 ml   Net -946.67 ml       Lines/Drains/Airways     Central Venous Catheter Line                 Trialysis (Dialysis) Catheter 12/09/19 1230 right internal jugular 3 days          Drain                 Urethral Catheter 12/03/19 0445 16 Fr. 9 days                Physical Exam   Constitutional: He is oriented to person, place, and time. He appears well-developed and well-nourished. No distress.   Cardiovascular: Normal rate and regular rhythm. Exam reveals no gallop.   No murmur heard.  Pulmonary/Chest: Effort normal and breath sounds normal. No respiratory distress. He has no wheezes.   Abdominal: Soft. Bowel sounds are normal. He exhibits no distension. There is no tenderness.   Neurological: He is alert and oriented to person, place, and time.   Skin: Skin is warm and dry.       Significant Labs:     Recent Labs   Lab 12/12/19  8509   12/12/19  1322   WBC 11.27  --   --    RBC 2.91*  --   --    HGB 8.4*   < > 8.4*   HCT 27.2*   < > 26.9*     --   --    MCV 94  --   --    MCH 28.9  --   --    MCHC 30.9*  --   --     < > = values in this interval not displayed.     Recent Labs   Lab 12/12/19  0420   *   K 3.4*      CO2 23   BUN 58*   CREATININE 3.1*       Significant Imaging: Echocardiogram:   Transthoracic echo (TTE) complete (Cupid Only):   Results for orders placed or performed during the hospital encounter of 12/02/19   Echo Color Flow Doppler? Yes   Result Value Ref Range    BSA 2.3 m2    TDI SEPTAL 0.06 m/s    LV LATERAL E/E' RATIO 9.00 m/s    LV SEPTAL E/E' RATIO 10.50 m/s    TDI LATERAL 0.07 m/s    PV PEAK VELOCITY 1.81 cm/s    LVIDD 4.80 3.5 - 6.0 cm    IVS 1.10 (A) 0.6 - 1.1 cm    PW 1.10 (A) 0.6 - 1.1 cm    Ao root annulus 3.40 cm    LVIDS 2.56 2.1 - 4.0 cm    FS 47 28 - 44 %    LV mass 193.96 g    LA size 3.68 cm    TAPSE 1.44 cm    Left Ventricle Relative Wall Thickness 0.46 cm    AV mean gradient 6 mmHg    AV valve area 4.31 cm2    AV Velocity Ratio 0.78     AV index (prosthetic) 1.06     E/A ratio 0.62     Mean e' 0.07 m/s    E wave decelartion time 338.80 msec    LVOT diameter 2.27 cm    LVOT area 4.0 cm2    LVOT peak simone 1.16 m/s    LVOT peak VTI 26.25 cm    Ao peak simone 1.48 m/s    Ao VTI 24.66 cm    LVOT stroke volume 106.18 cm3    AV peak gradient 9 mmHg    E/E' ratio 9.69 m/s    MV Peak E Simone 0.63 m/s    MV Peak A Simone 1.02 m/s    LV Systolic Volume 23.57 mL    LV Systolic Volume Index 10.6 mL/m2    LV Diastolic Volume 68.84 mL    LV Diastolic Volume Index 30.84 mL/m2    LV Mass Index 87 g/m2    Right Atrial Pressure (from IVC) 3 mmHg    Narrative    · Normal left ventricular systolic function. The estimated ejection   fraction is 55%  · Concentric left ventricular remodeling.  · No wall motion abnormalities.  · Normal right ventricular systolic function.  · Normal central venous pressure (3 mm Hg).         Assessment and Plan:     Brief HPI: Seen this morning on AM NP rounds and again on rounds this afternoon with Dr. Sanchez. Reports feeling better today in comparison to yesterday.Discussed POC as detailed below-verbalized understanding and agrees with POC     * Acute on chronic diastolic heart failure  -echo with normal LVEF  -related to  volume overload secondary to PRBC transfusion  -remains on IV Lasix drip with good response; 1.5 liters urine output overnight; 1.1 liters via HD yesterday; negative 9.1 liters since admisson  -on CCB with trend down of BPs; SBPs overnight 110s-140s  -SOB improving  -PT and OT on board     Constipation  -related to immobility  -given Lactulose yesterday with results  -will place on daily stool softener    Hematemesis  -nausea with vomiting yesterday with visible hematemesis  -started on IV Protonix drip  -H&H stable  -stool for OCB negative  -GI consulted; plans for EGD on 12/13    Abdominal distension  -abdominal ultrasound  with no acute findings  -KUB with air and retained stool; started Simethicone and Miralax with mild improvement; repeat KUB yesterday with continued retained stool  -Lactulose yesterday with successful BM; abdominal distension much improved today   -will continue Simethicone; will start on daily stool softener and continue Dulcolax suppository prn   -no recurrent n/v today     JOSE (acute kidney injury)  -multifactoral  -concerned about ATN given bleed and hypotension  -creatinine 1.2 upon admission with trend up to 3.7-4.5-5.3-5.4-5.5-5.0  -BUN 58 creatinine 3.1 this AM  -Nephrology on board; successful HD yesterday with 1.1 liters removed; remains on IV Lasix drip-will follow Nephrology recs  -will continue to avoid nephrotoxic agents and hypotension      Shortness of breath  -concerning for volume overload in setting of IVF use and PRBC transfusion post procedure  -SOB improving  -on IV Lasix with good urine output  -continue with IV Lasix drip for now;  continue to follow Nephrology recommendation   -echocardiogram with normal LVEF         Anemia  -initial H&H 11.1&36.1 with trend down to 7.4&24.1 post procedure  -repeat angiogram on 12/2 with no active bleeding with recurrent trend down prompting repeat  angiogram  12/4 with no active bleeding; CTA abdomen with renal subcapsular hematoma with surrounding retroperitoneal hemorrhage and no definite contrast extravasation seen to suggest active bleeding on delayed images  -H&H 6.7&20.5 12/8 with repeat PRBC transfusion with trend up of H&H to 8.8&26.6-9.1 & 27.5   -hematemesis on 12/11 with no significant drop of H&H; 8.4&27.2 this AM   -will continue with daily CBCs for now     PAD (peripheral artery disease)  -admitted for elective LE angiogram for further evaluation of LE claudication  -successful atherectomy and PTA with scoring balloon and  DCB to dSFA,  pSFA CFA and TPT   -foot warm and pulses present   -continue statin; no ASA and Plavix due to bleeding issue   -will need serial LE arterial ultrasounds as an outpatient       CAD (coronary artery disease)  -s/p LAD and LCX MARIAN in 2011  -was on  ASA, statin, Plavix as an outpatient along with CCB  -will continue to hold DAPT given concern for acute bleeding  -resumed CCB  -echocardiogram with normal LV function with EF 55%    Hyperlipidemia  -continue statin therapy  -FLP with LDL 80 in 9/2019    Essential hypertension  -on Norvasc, Chlorthalidone, Metoprolol and Losartan at home  -meds held due to hypotension last week  -BP trended back up; CCB resumed with stable BP  -continue to monitor BP and adjust meds prn         VTE Risk Mitigation (From admission, onward)         Ordered     Place DARRION hose  Until discontinued      12/05/19 1005     heparin (porcine) injection 2,300 Units  As needed (PRN)      12/04/19 1137                Marzena Valdez, APRN, ANP  Cardiology  [facility]

## 2019-12-13 PROBLEM — E87.6 HYPOKALEMIA: Status: ACTIVE | Noted: 2019-12-13

## 2019-12-13 PROBLEM — I95.9 HYPOTENSION: Status: ACTIVE | Noted: 2019-12-13

## 2019-12-13 PROBLEM — K22.10 ULCER OF ESOPHAGUS WITHOUT BLEEDING: Status: ACTIVE | Noted: 2019-12-12

## 2019-12-13 PROBLEM — R14.0 ABDOMINAL DISTENSION: Status: RESOLVED | Noted: 2019-12-10 | Resolved: 2019-12-13

## 2019-12-13 PROBLEM — D72.829 LEUKOCYTOSIS: Status: RESOLVED | Noted: 2019-12-03 | Resolved: 2019-12-13

## 2019-12-13 LAB
ANION GAP SERPL CALC-SCNC: 11 MMOL/L (ref 8–16)
BASOPHILS # BLD AUTO: 0.01 K/UL (ref 0–0.2)
BASOPHILS NFR BLD: 0.1 % (ref 0–1.9)
BUN SERPL-MCNC: 68 MG/DL (ref 8–23)
CALCIUM SERPL-MCNC: 8.2 MG/DL (ref 8.7–10.5)
CHLORIDE SERPL-SCNC: 99 MMOL/L (ref 95–110)
CO2 SERPL-SCNC: 26 MMOL/L (ref 23–29)
CREAT SERPL-MCNC: 3.4 MG/DL (ref 0.5–1.4)
DIFFERENTIAL METHOD: ABNORMAL
EOSINOPHIL # BLD AUTO: 0.3 K/UL (ref 0–0.5)
EOSINOPHIL NFR BLD: 3.1 % (ref 0–8)
ERYTHROCYTE [DISTWIDTH] IN BLOOD BY AUTOMATED COUNT: 15 % (ref 11.5–14.5)
EST. GFR  (AFRICAN AMERICAN): 19 ML/MIN/1.73 M^2
EST. GFR  (NON AFRICAN AMERICAN): 17 ML/MIN/1.73 M^2
GLUCOSE SERPL-MCNC: 187 MG/DL (ref 70–110)
HCT VFR BLD AUTO: 26.1 % (ref 40–54)
HCT VFR BLD AUTO: 26.1 % (ref 40–54)
HGB BLD-MCNC: 8.4 G/DL (ref 14–18)
HGB BLD-MCNC: 8.4 G/DL (ref 14–18)
LYMPHOCYTES # BLD AUTO: 0.6 K/UL (ref 1–4.8)
LYMPHOCYTES NFR BLD: 6.2 % (ref 18–48)
MCH RBC QN AUTO: 29.4 PG (ref 27–31)
MCHC RBC AUTO-ENTMCNC: 32.2 G/DL (ref 32–36)
MCV RBC AUTO: 91 FL (ref 82–98)
MONOCYTES # BLD AUTO: 1.2 K/UL (ref 0.3–1)
MONOCYTES NFR BLD: 12.1 % (ref 4–15)
NEUTROPHILS # BLD AUTO: 7.7 K/UL (ref 1.8–7.7)
NEUTROPHILS NFR BLD: 78.5 % (ref 38–73)
PHOSPHATE SERPL-MCNC: 4.9 MG/DL (ref 2.7–4.5)
PLATELET # BLD AUTO: 240 K/UL (ref 150–350)
PMV BLD AUTO: 9.4 FL (ref 9.2–12.9)
POCT GLUCOSE: 178 MG/DL (ref 70–110)
POCT GLUCOSE: 179 MG/DL (ref 70–110)
POCT GLUCOSE: 185 MG/DL (ref 70–110)
POCT GLUCOSE: 303 MG/DL (ref 70–110)
POTASSIUM SERPL-SCNC: 3.1 MMOL/L (ref 3.5–5.1)
RBC # BLD AUTO: 2.86 M/UL (ref 4.6–6.2)
SODIUM SERPL-SCNC: 136 MMOL/L (ref 136–145)
WBC # BLD AUTO: 9.96 K/UL (ref 3.9–12.7)

## 2019-12-13 PROCEDURE — 80048 BASIC METABOLIC PNL TOTAL CA: CPT

## 2019-12-13 PROCEDURE — 11000001 HC ACUTE MED/SURG PRIVATE ROOM

## 2019-12-13 PROCEDURE — 97110 THERAPEUTIC EXERCISES: CPT

## 2019-12-13 PROCEDURE — 43239 EGD BIOPSY SINGLE/MULTIPLE: CPT | Mod: ,,, | Performed by: INTERNAL MEDICINE

## 2019-12-13 PROCEDURE — 88305 TISSUE EXAM BY PATHOLOGIST: ICD-10-PCS | Mod: 26,,, | Performed by: PATHOLOGY

## 2019-12-13 PROCEDURE — 25000003 PHARM REV CODE 250: Performed by: NURSE PRACTITIONER

## 2019-12-13 PROCEDURE — 25000003 PHARM REV CODE 250: Performed by: NURSE ANESTHETIST, CERTIFIED REGISTERED

## 2019-12-13 PROCEDURE — 63600175 PHARM REV CODE 636 W HCPCS: Performed by: NURSE PRACTITIONER

## 2019-12-13 PROCEDURE — 43239 EGD BIOPSY SINGLE/MULTIPLE: CPT | Performed by: INTERNAL MEDICINE

## 2019-12-13 PROCEDURE — 97116 GAIT TRAINING THERAPY: CPT

## 2019-12-13 PROCEDURE — 27000221 HC OXYGEN, UP TO 24 HOURS

## 2019-12-13 PROCEDURE — 37000009 HC ANESTHESIA EA ADD 15 MINS: Performed by: INTERNAL MEDICINE

## 2019-12-13 PROCEDURE — 37000008 HC ANESTHESIA 1ST 15 MINUTES: Performed by: INTERNAL MEDICINE

## 2019-12-13 PROCEDURE — 94761 N-INVAS EAR/PLS OXIMETRY MLT: CPT

## 2019-12-13 PROCEDURE — 88342 CHG IMMUNOCYTOCHEMISTRY: ICD-10-PCS | Mod: 26,,, | Performed by: PATHOLOGY

## 2019-12-13 PROCEDURE — 85025 COMPLETE CBC W/AUTO DIFF WBC: CPT

## 2019-12-13 PROCEDURE — 84100 ASSAY OF PHOSPHORUS: CPT

## 2019-12-13 PROCEDURE — 99233 SBSQ HOSP IP/OBS HIGH 50: CPT | Mod: ,,, | Performed by: STUDENT IN AN ORGANIZED HEALTH CARE EDUCATION/TRAINING PROGRAM

## 2019-12-13 PROCEDURE — 25000003 PHARM REV CODE 250: Performed by: INTERNAL MEDICINE

## 2019-12-13 PROCEDURE — 99233 PR SUBSEQUENT HOSPITAL CARE,LEVL III: ICD-10-PCS | Mod: ,,, | Performed by: STUDENT IN AN ORGANIZED HEALTH CARE EDUCATION/TRAINING PROGRAM

## 2019-12-13 PROCEDURE — 63600175 PHARM REV CODE 636 W HCPCS: Performed by: NURSE ANESTHETIST, CERTIFIED REGISTERED

## 2019-12-13 PROCEDURE — 63600175 PHARM REV CODE 636 W HCPCS: Performed by: INTERNAL MEDICINE

## 2019-12-13 PROCEDURE — 43239 PR EGD, FLEX, W/BIOPSY, SGL/MULTI: ICD-10-PCS | Mod: ,,, | Performed by: INTERNAL MEDICINE

## 2019-12-13 PROCEDURE — 88305 TISSUE EXAM BY PATHOLOGIST: CPT | Mod: 26,,, | Performed by: PATHOLOGY

## 2019-12-13 PROCEDURE — 97530 THERAPEUTIC ACTIVITIES: CPT

## 2019-12-13 PROCEDURE — 88342 IMHCHEM/IMCYTCHM 1ST ANTB: CPT | Mod: 26,,, | Performed by: PATHOLOGY

## 2019-12-13 PROCEDURE — 88305 TISSUE EXAM BY PATHOLOGIST: CPT | Performed by: PATHOLOGY

## 2019-12-13 PROCEDURE — 88342 IMHCHEM/IMCYTCHM 1ST ANTB: CPT | Performed by: PATHOLOGY

## 2019-12-13 PROCEDURE — C9113 INJ PANTOPRAZOLE SODIUM, VIA: HCPCS | Performed by: NURSE PRACTITIONER

## 2019-12-13 PROCEDURE — 27201012 HC FORCEPS, HOT/COLD, DISP: Performed by: INTERNAL MEDICINE

## 2019-12-13 PROCEDURE — 97802 MEDICAL NUTRITION INDIV IN: CPT

## 2019-12-13 RX ORDER — PHENYLEPHRINE HYDROCHLORIDE 10 MG/ML
INJECTION INTRAVENOUS
Status: DISCONTINUED | OUTPATIENT
Start: 2019-12-13 | End: 2019-12-13

## 2019-12-13 RX ORDER — PROPOFOL 10 MG/ML
VIAL (ML) INTRAVENOUS CONTINUOUS PRN
Status: DISCONTINUED | OUTPATIENT
Start: 2019-12-13 | End: 2019-12-13

## 2019-12-13 RX ORDER — POTASSIUM CHLORIDE 20 MEQ/1
20 TABLET, EXTENDED RELEASE ORAL ONCE
Status: DISCONTINUED | OUTPATIENT
Start: 2019-12-13 | End: 2019-12-19 | Stop reason: HOSPADM

## 2019-12-13 RX ORDER — PANTOPRAZOLE SODIUM 40 MG/1
40 TABLET, DELAYED RELEASE ORAL
Status: DISCONTINUED | OUTPATIENT
Start: 2019-12-13 | End: 2019-12-19 | Stop reason: HOSPADM

## 2019-12-13 RX ORDER — POTASSIUM CHLORIDE 20 MEQ/1
20 TABLET, EXTENDED RELEASE ORAL ONCE
Status: COMPLETED | OUTPATIENT
Start: 2019-12-13 | End: 2019-12-13

## 2019-12-13 RX ORDER — SODIUM CHLORIDE 9 MG/ML
INJECTION, SOLUTION INTRAVENOUS CONTINUOUS PRN
Status: DISCONTINUED | OUTPATIENT
Start: 2019-12-13 | End: 2019-12-13

## 2019-12-13 RX ORDER — PROPOFOL 10 MG/ML
VIAL (ML) INTRAVENOUS
Status: DISCONTINUED | OUTPATIENT
Start: 2019-12-13 | End: 2019-12-13

## 2019-12-13 RX ADMIN — SIMETHICONE 125 MG: 125 TABLET, CHEWABLE ORAL at 05:12

## 2019-12-13 RX ADMIN — AMLODIPINE BESYLATE 10 MG: 5 TABLET ORAL at 11:12

## 2019-12-13 RX ADMIN — MUPIROCIN 1 G: 20 OINTMENT TOPICAL at 09:12

## 2019-12-13 RX ADMIN — ROSUVASTATIN CALCIUM 20 MG: 10 TABLET, FILM COATED ORAL at 11:12

## 2019-12-13 RX ADMIN — PROPOFOL 100 MCG/KG/MIN: 10 INJECTION, EMULSION INTRAVENOUS at 09:12

## 2019-12-13 RX ADMIN — POLYETHYLENE GLYCOL 3350 17 G: 17 POWDER, FOR SOLUTION ORAL at 09:12

## 2019-12-13 RX ADMIN — PHENYLEPHRINE HYDROCHLORIDE 200 MCG: 10 INJECTION INTRAVENOUS at 09:12

## 2019-12-13 RX ADMIN — IRON SUCROSE 100 MG: 20 INJECTION, SOLUTION INTRAVENOUS at 12:12

## 2019-12-13 RX ADMIN — DOXAZOSIN 8 MG: 2 TABLET ORAL at 11:12

## 2019-12-13 RX ADMIN — FUROSEMIDE 40 MG: 10 INJECTION, SOLUTION INTRAVENOUS at 05:12

## 2019-12-13 RX ADMIN — SIMETHICONE 125 MG: 125 TABLET, CHEWABLE ORAL at 11:12

## 2019-12-13 RX ADMIN — TOPICAL ANESTHETIC 1 EACH: 200 SPRAY DENTAL; PERIODONTAL at 09:12

## 2019-12-13 RX ADMIN — NEPHROCAP 1 CAPSULE: 1 CAP ORAL at 11:12

## 2019-12-13 RX ADMIN — SODIUM CHLORIDE: 9 INJECTION, SOLUTION INTRAVENOUS at 09:12

## 2019-12-13 RX ADMIN — MUPIROCIN: 20 OINTMENT TOPICAL at 08:12

## 2019-12-13 RX ADMIN — PANTOPRAZOLE SODIUM 8 MG/HR: 40 INJECTION, POWDER, FOR SOLUTION INTRAVENOUS at 06:12

## 2019-12-13 RX ADMIN — FUROSEMIDE 40 MG: 10 INJECTION, SOLUTION INTRAVENOUS at 08:12

## 2019-12-13 RX ADMIN — INSULIN DETEMIR 62 UNITS: 100 INJECTION, SOLUTION SUBCUTANEOUS at 09:12

## 2019-12-13 RX ADMIN — PANTOPRAZOLE SODIUM 40 MG: 40 TABLET, DELAYED RELEASE ORAL at 05:12

## 2019-12-13 RX ADMIN — POTASSIUM CHLORIDE 20 MEQ: 1500 TABLET, EXTENDED RELEASE ORAL at 11:12

## 2019-12-13 RX ADMIN — SIMETHICONE 125 MG: 125 TABLET, CHEWABLE ORAL at 06:12

## 2019-12-13 RX ADMIN — PROPOFOL 50 MG: 10 INJECTION, EMULSION INTRAVENOUS at 09:12

## 2019-12-13 RX ADMIN — SEVELAMER CARBONATE 800 MG: 800 TABLET, FILM COATED ORAL at 05:12

## 2019-12-13 NOTE — PROGRESS NOTES
Progress Note  Nephrology      Consult Requested By: James Sanchez MD      SUBJECTIVE:     Overnight events  Patient is a 74 y.o. male     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    Abnormality of gait as late effect of cerebrovascular accident (CVA)    Tightness of right gastrocnemius muscle    Claudication    Anemia    Edema    Shortness of breath    Leukocytosis    JOSE (acute kidney injury)    Acute on chronic diastolic heart failure    Abdominal distension    Hematemesis    Constipation    Hypotension     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    Type 2 diabetes mellitus with kidney complication, without long-term current use of insulin 11/15/2012              OBJECTIVE:     Vitals:    12/13/19 0922 12/13/19 0937 12/13/19 0952 12/13/19 1048   BP: (!) 133/49 (!) 133/49 (!) 149/51 (!) 153/68   BP Location: Left arm Left arm Left arm Left arm   Patient Position: Lying Lying Lying Lying   Pulse: 79 83 81 77   Resp: 18 18 16 20   Temp: 98.8 °F (37.1 °C)   98.2 °F (36.8 °C)   TempSrc: Skin   Oral   SpO2: (!) 94% 95% 96% 95%   Weight:       Height:           Temp: 98.2 °F (36.8 °C) (12/13/19  1048)  Pulse: 77 (12/13/19 1048)  Resp: 20 (12/13/19 1048)  BP: (!) 153/68 (12/13/19 1048)  SpO2: 95 % (12/13/19 1048)    Date 12/13/19 0700 - 12/14/19 0659   Shift 7928-0509 3746-0331 9038-3222 24 Hour Total   INTAKE   I.V.(mL/kg) 50(0.5)   50(0.5)   Shift Total(mL/kg) 50(0.5)   50(0.5)   OUTPUT   Shift Total(mL/kg)       Weight (kg) 105.5 105.5 105.5 105.5             Medications:   amLODIPine  10 mg Oral Daily    doxazosin  8 mg Oral Daily    furosemide  40 mg Intravenous BID    insulin aspart U-100  17 Units Subcutaneous TIDWM    insulin detemir U-100  60 Units Subcutaneous QHS    iron sucrose (VENOFER) IVPB  100 mg Intravenous Once    mupirocin   Nasal BID    pantoprazole  40 mg Oral BID AC    potassium chloride  20 mEq Oral Once    rosuvastatin  20 mg Oral Daily    sevelamer carbonate  800 mg Oral TID WM    simethicone  125 mg Oral Q6H    vitamin renal formula (B-complex-vitamin c-folic acid)  1 capsule Oral Daily                 Physical Exam:  General appearance:NAD  No SOB  No cough  No nausea  No vomiting  Lungs: diminished breath sounds  Heart: pulse 77  Abdomen: soft  Extremities: edema    Laboratory:  ABG  Labs reviewed  Recent Results (from the past 336 hour(s))   Basic metabolic panel    Collection Time: 12/13/19  5:27 AM   Result Value Ref Range    Sodium 136 136 - 145 mmol/L    Potassium 3.1 (L) 3.5 - 5.1 mmol/L    Chloride 99 95 - 110 mmol/L    CO2 26 23 - 29 mmol/L    BUN, Bld 68 (H) 8 - 23 mg/dL    Creatinine 3.4 (H) 0.5 - 1.4 mg/dL    Calcium 8.2 (L) 8.7 - 10.5 mg/dL    Anion Gap 11 8 - 16 mmol/L   Basic metabolic panel    Collection Time: 12/12/19  4:20 AM   Result Value Ref Range    Sodium 135 (L) 136 - 145 mmol/L    Potassium 3.4 (L) 3.5 - 5.1 mmol/L    Chloride 101 95 - 110 mmol/L    CO2 23 23 - 29 mmol/L    BUN, Bld 58 (H) 8 - 23 mg/dL    Creatinine 3.1 (H) 0.5 - 1.4 mg/dL    Calcium 8.2 (L) 8.7 - 10.5 mg/dL    Anion Gap 11 8 - 16 mmol/L   Basic metabolic panel    Collection  Time: 12/11/19  5:40 AM   Result Value Ref Range    Sodium 134 (L) 136 - 145 mmol/L    Potassium 3.7 3.5 - 5.1 mmol/L    Chloride 102 95 - 110 mmol/L    CO2 21 (L) 23 - 29 mmol/L    BUN, Bld 66 (H) 8 - 23 mg/dL    Creatinine 3.3 (H) 0.5 - 1.4 mg/dL    Calcium 8.2 (L) 8.7 - 10.5 mg/dL    Anion Gap 11 8 - 16 mmol/L     Recent Results (from the past 336 hour(s))   CBC auto differential    Collection Time: 12/13/19  5:27 AM   Result Value Ref Range    WBC 9.96 3.90 - 12.70 K/uL    Hemoglobin 8.4 (L) 14.0 - 18.0 g/dL    Hematocrit 26.1 (L) 40.0 - 54.0 %    Platelets 240 150 - 350 K/uL   CBC auto differential    Collection Time: 12/12/19  4:20 AM   Result Value Ref Range    WBC 11.27 3.90 - 12.70 K/uL    Hemoglobin 8.4 (L) 14.0 - 18.0 g/dL    Hematocrit 27.2 (L) 40.0 - 54.0 %    Platelets 191 150 - 350 K/uL   CBC auto differential    Collection Time: 12/11/19  1:50 PM   Result Value Ref Range    WBC 15.44 (H) 3.90 - 12.70 K/uL    Hemoglobin 9.6 (L) 14.0 - 18.0 g/dL    Hematocrit 29.6 (L) 40.0 - 54.0 %    Platelets 184 150 - 350 K/uL     Urinalysis  No results for input(s): COLORU, CLARITYU, SPECGRAV, PHUR, PROTEINUA, GLUCOSEU, BILIRUBINCON, BLOODU, WBCU, RBCU, BACTERIA, MUCUS, NITRITE, LEUKOCYTESUR, UROBILINOGEN, HYALINECASTS in the last 24 hours.    Diagnostic Results:  X-Ray: Reviewed  US: Reviewed  Echo: Reviewed  ACCESS    ASSESSMENT/PLAN:     JOSE/CKD 4  UO 3117 cc/24 h  Hypokalemia  Replace  Metabolic bone disease  Anemia  Iron  Epogen  Weight daily  I and O

## 2019-12-13 NOTE — [employer]OVATION PATIENT INSTRUCTIONS
Discharge Summary/Instructions after an Endoscopic Procedure  Patient Name: Cy Rutherford  Patient MRN: [id number]  Patient YOB: 1945 Friday, December 13, 2019  Harish Rodrigues MD  RESTRICTIONS:  During your procedure today, you received medications for sedation.  These   medications may affect your judgment, balance and coordination.  Therefore,   for 24 hours, you have the following restrictions:   - DO NOT drive a car, operate machinery, make legal/financial decisions,   sign important papers or drink alcohol.    ACTIVITY:  Today: no heavy lifting, straining or running due to procedural   sedation/anesthesia.  The following day: return to full activity including work.  DIET:  Eat and drink normally unless instructed otherwise.     TREATMENT FOR COMMON SIDE EFFECTS:  - Mild abdominal pain, nausea, belching, bloating or excessive gas:  rest,   eat lightly and use a heating pad.  - Sore Throat: treat with throat lozenges and/or gargle with warm salt   water.  - Because air was used during the procedure, expelling large amounts of air   from your rectum or belching is normal.  - If a bowel prep was taken, you may not have a bowel movement for 1-3 days.    This is normal.  SYMPTOMS TO WATCH FOR AND REPORT TO YOUR PHYSICIAN:  1. Abdominal pain or bloating, other than gas cramps.  2. Chest pain.  3. Back pain.  4. Signs of infection such as: chills or fever occurring within 24 hours   after the procedure.  5. Rectal bleeding, which would show as bright red, maroon, or black stools.   (A tablespoon of blood from the rectum is not serious, especially if   hemorrhoids are present.)  6. Vomiting.  7. Weakness or dizziness.  GO DIRECTLY TO THE NEAREST EMERGENCY ROOM IF YOU HAVE ANY OF THE FOLLOWING:      Difficulty breathing              Chills and/or fever over 101 F   Persistent vomiting and/or vomiting blood   Severe abdominal pain   Severe chest pain   Black, tarry stools   Bleeding- more than one  tablespoon   Any other symptom or condition that you feel may need urgent attention  Your doctor recommends these additional instructions:  If any biopsies were taken, your doctors clinic will contact you in 1 to 2   weeks with any results.  - Return patient to hospital [facility] for ongoing care.   - Await pathology results.   - Use Protonix (pantoprazole) 40 mg PO BID for 12 weeks.   - Repeat upper endoscopy in 8 weeks to check healing.  For questions, problems or results please call your physician - Harish Rodrigues MD at Work:  [phone].  EMERGENCY PHONE NUMBER: [phone],  LAB RESULTS: [phone]  IF A COMPLICATION OR EMERGENCY SITUATION ARISES AND YOU ARE UNABLE TO REACH   YOUR PHYSICIAN - GO DIRECTLY TO THE EMERGENCY ROOM.  Harish Rodrigues MD  12/13/2019 9:28:04 AM  This report has been verified and signed electronically.  [employer]

## 2019-12-13 NOTE — PLAN OF CARE
Patient alert x 4, supine in bed with HOB elevated, able to voice concerns, patient verbally understands plan of care, patient shows no signs of acute distress, c/o of 0 pain, patient on 1.5 NC, patient tolerated meds well, rubio catheter removed and is intact, patient does have bilateral lower extremity swelling legs are elevated, patient does have right sided upper weakness extremity is also elevated on a pillow, dressing to tri site change, clean, dry and intact, personal items and call light in reach, bed in lowest postioning with rails up x 2 and alarm on and wheels locked, fall risk band on, will relay to oncoming nurse in charge of condition.

## 2019-12-13 NOTE — ANESTHESIA POSTPROCEDURE EVALUATION
Anesthesia Post Evaluation    Patient: Cy Rutherford    Procedure(s) Performed: Procedure(s) (LRB):  ESOPHAGOGASTRODUODENOSCOPY (EGD) (N/A)    Final Anesthesia Type: MAC    Patient location during evaluation: GI PACU  Patient participation: Yes- Able to Participate  Level of consciousness: awake and alert and oriented  Post-procedure vital signs: reviewed and stable  Pain management: adequate  Airway patency: patent    PONV status at discharge: No PONV  Anesthetic complications: no      Cardiovascular status: blood pressure returned to baseline, hemodynamically stable and stable  Respiratory status: unassisted, spontaneous ventilation and nasal cannula  Hydration status: euvolemic  Follow-up not needed.          Vitals Value Taken Time   /49 12/13/2019  9:22 AM   Temp 37.1 °C (98.8 °F) 12/13/2019  9:22 AM   Pulse 79 12/13/2019  9:22 AM   Resp 18 12/13/2019  9:22 AM   SpO2 94 % 12/13/2019  9:22 AM         No case tracking events are documented in the log.      Pain/Rowena Score: Rowena Score: 9 (12/13/2019  9:22 AM)

## 2019-12-13 NOTE — PROGRESS NOTES
[facility]  Cardiology  Progress Note    Patient Name: Cy Rutherford  MRN: [id number]  Admission Date: 12/2/2019  Hospital Length of Stay: 10 days  Code Status: Full Code   Attending Physician: James Sanchez MD   Primary Care Physician: Heide Porter MD  Expected Discharge Date:   Principal Problem:Acute on chronic diastolic heart failure    Subjective:     Hospital Course:   12/2/2019 Admitted for elective LE angiogram for evaluation of RLE claudication. Taken to the cath lab for the procedure via LCFA access with following results:    successful atherectomy (Hawk LX) with distal filter protection, followed by PTA with scoring balloon (5.0 x 150 dSFA, 6.0 x 150 pSFA/CFA) followed by PTA with DCB to dSFA (5.0 x 150), pSFA (5.0 x 100) and CFA (7 x 40) with good results. Successful PTA to TPT with 3.0 x 40 balloon- see cath report for full report     Tolerated procedure well and recovered in pre post cath area. Hypotension noted along with back pain and diaphoresis. No hematoma noted and manual pressure applied out of concern for bleeding. STAT H&H down to 7.4&24.1 from 11.6&36.1. Given profound symptoms, major concern for bleeding prompting re evaluation with recurrent angiogram. Placed on IVFs along with IV Levophed. Repeat procedure via right radial access with images  in multiple views with no active bleed noted. Cuff pressure with significant difference in comparison to  aortic pressure which was significantly higher.  Admitted to ICU for close monitoring with kanchan placed. T&S with PRBC transfusion initiated with IV Lasix given in between   12/3/2019 Remain on IV Levophed drip at .46mcg/kg/min with BP 110s-130s/40s-50s HR 60s. Serial H&Hs Q4hrs overnight with  H&H this AM 10.0&30.2 with slight trend down to 9.1&27.5 on repeat check. Complains of SOB with increased RR. Only 500cc out overnight. Will repeat IV Lasix 40mg this AM. Echocardiogram and CXR as well. Will attempt to wean IV  Levophed drip as BP tolerates   12/4/2019 Creatinine trended up to 3.7 overnight with K+ 6.1. Continued with no urine output. Nephrology consulted yesterday with trialysis catheter placed yesterday with initiation of CRRT overnight. CRRT x 2-3 hours prior to clotting off. H&H trended down further to 7.3&21.8 with repeat PRBC transfusion. H&H trended up to 8.4&24.9 with continued serial monitoring with recurrent drop to 7.0&21.2. Concerned about recurrent bleeding with plans for repeat angiogram for re evaluation of acute bleed. Remains on IV Levophed with stable BP and wean in process. Complains of mild SOB with stable sats on Venti mask.   12/5/2019 Repeat angiogram yesterday using CO2 with no active bleed noted after extensive angiogram. H&H down 6.6&18.9 yesterday evening with repeat PRBC. CT abdomen/pelvis with evidence of large left renal subcapsular hematoma with surrounding retroperitoneal hemorrhage and no definite contrast extravasation seen to suggest active bleeding on delayed images. H&H up to 7.4&22.0-8.5&25.3. Weaned off IV Levophed with stable BP. Resumed CRRT yesterday evening with 695cc removed and 30cc urine output noted +2.7 liters. Continued mild SOB with stable sats on venti mask. BMP with K+ 5.0 BUN 62 creatinine 5.0. Will continue with serial H&Hs for now. Will place TEDs and consult PT   12/6/2019 Attempted CRRT and HD yesterday with clotted line and approximately 400cc blood loss due to inability to rinse back. Placed on IV Lasix drip at 20mg/hr by Nephrology with 675cc urine output overnight positive 3 liters since admission. K+ 5.0 with BUN 69 creatinine 5.4 this AM. H&H 74&22.4 this AM unchanged from overnight-will continue with serial H&Hs for now. Off IV pressors for over 48 hours with BP 130s-170s/70s overnight. Mild SOB remains per patient with slight improvement noted on PE. Updated son at bedside this morning   12/7/2019 HR and BP stable. H&H 7.7&23.2 unchanged from yesterday.  Remain on IV Lasix with adequate urine output. BUN 89 creatinine 6.0. Hopeful to transfer to floor tomorrow.   12/8/2019 H&H down to 6.7 & 20.5 this AM with repeat PRBC transfusion. Plan for HD today for clearance. Remains on IV Lasix drip with adequate urine output. Resumed antihypertensives with stabel BP. PT and OT on board  12/9/2019 H&H 8.8&26.6 this AM after PRBC transfusion yesterday. Approximately one hour of HD yesterday with cessation due to line malfunction. HR and BP remains stable. 2.7 liters out overnight negative 4.4 liters since admission.  creatinine 5.3. Will reconsult General Surgery today for new trialysis line placement for ongoing intermittent HD/CRRT.   12/10/2019 H&H 9.1&27.5 this AM. BUN 82 creatinine 4.0 after HD run yesterday afternoon. IV Lasix drip at 5mg/hr with 3.0 liters out overnight negative 5.6 liters since admission. HR and BP stable. Plan to transfer out of ICU today   12/11/2019 Transferred out of ICU yesterday. BMP with K+ 3.7 BUN 66 creatinine 3.3. Remains on IV Lasix drip with 1.8 liters urine output with 1.4 liters removed with HD negative 7.4 liters since admission. HR and BP stable. Working with PT currently. Mild abdominal distension with Simethicone and Miralax ordered yesterday with plans for Lactulose today   12/12/2019 Slight increase in abdominal pain with nausea followed by vomiting yesterday. Appearance of blood in emesis yesterday. Serial H&Hs overnight with no sharp drop-H&H this AM 8.4&27.2. Remains on IV Protonix drip with GI consulted with plans for EGD tomorrow. On IV Lasix drip as well with total of 2.6 liters out overnight (1.5 urine output 1.1 HD) negative 9.1 liters since admission. BUN 58 creatinine 3.1. KUB reviewed with improvement in air and successful BM-abdominal distension improved. Nephrology on board as well as PT  12/13/2019 H&H stable with no significant drop-will change CBCs to daily. BMP with BUN 68 creatinine 3.4. Transitioned from IV  Lasix drip to IV Lasix BID with 3.1 liters out overnight negative 11.9 liters since admission. EGD today with esophageal ulcer with recs for Protonix BID. HR and BP stable. PT on board.        Review of Systems   Constitution: Negative for chills, decreased appetite, diaphoresis, fever, malaise/fatigue, weight gain and weight loss.   Cardiovascular: Negative for chest pain, claudication, dyspnea on exertion, irregular heartbeat, leg swelling, near-syncope, orthopnea, palpitations and paroxysmal nocturnal dyspnea.   Respiratory: Negative for cough, shortness of breath, snoring, sputum production and wheezing.    Endocrine: Negative for cold intolerance, heat intolerance, polydipsia, polyphagia and polyuria.   Skin: Negative for color change, dry skin, itching, nail changes and poor wound healing.   Musculoskeletal: Negative for back pain, gout, joint pain and joint swelling.   Gastrointestinal: Negative for bloating, abdominal pain, constipation, diarrhea, hematemesis, hematochezia, melena, nausea and vomiting.   Genitourinary: Negative for dysuria, hematuria and nocturia.   Neurological: Negative for dizziness, headaches, light-headedness, numbness, paresthesias and weakness.   Psychiatric/Behavioral: Negative for altered mental status, depression and memory loss.     Objective:     Vital Signs (Most Recent):  Temp: 97.5 °F (36.4 °C) (12/13/19 1152)  Pulse: 76 (12/13/19 1200)  Resp: 18 (12/13/19 1152)  BP: (!) 140/63 (12/13/19 1152)  SpO2: 98 % (12/13/19 1148) Vital Signs (24h Range):  Temp:  [97.3 °F (36.3 °C)-98.8 °F (37.1 °C)] 97.5 °F (36.4 °C)  Pulse:  [75-83] 76  Resp:  [16-20] 18  SpO2:  [94 %-100 %] 98 %  BP: (133-155)/(49-68) 140/63     Weight: 105.5 kg (232 lb 9.4 oz)  Body mass index is 33.85 kg/m².     SpO2: 98 %  O2 Device (Oxygen Therapy): nasal cannula      Intake/Output Summary (Last 24 hours) at 12/13/2019 1508  Last data filed at 12/13/2019 1000  Gross per 24 hour   Intake 425 ml   Output 1997 ml    Net -1572 ml       Lines/Drains/Airways     Central Venous Catheter Line                 Trialysis (Dialysis) Catheter 12/09/19 1230 right internal jugular 4 days          Airway                 Airway - Non-Surgical 12/13/19 0907 Nasal Cannula less than 1 day                Physical Exam   Constitutional: He is oriented to person, place, and time. He appears well-developed and well-nourished. No distress.   Neck: Normal range of motion. Neck supple. No JVD present.   Cardiovascular: Normal rate and regular rhythm. Exam reveals no gallop.   No murmur heard.  Pulmonary/Chest: Effort normal and breath sounds normal. No respiratory distress. He has no wheezes.   Abdominal: Soft. Bowel sounds are normal. He exhibits no distension. There is no tenderness.   Musculoskeletal: He exhibits no edema.   Neurological: He is alert and oriented to person, place, and time.   Skin: Skin is warm and dry.   Psychiatric: He has a normal mood and affect. His behavior is normal. Judgment and thought content normal.       Significant Labs:     Recent Labs   Lab 12/13/19  0527      K 3.1*   CL 99   CO2 26   BUN 68*   CREATININE 3.4*     Recent Labs   Lab 12/13/19  0527      K 3.1*   CL 99   CO2 26   BUN 68*   CREATININE 3.4*     Recent Labs   Lab 12/13/19  0527   WBC 9.96   RBC 2.86*   HGB 8.4*  8.4*   HCT 26.1*  26.1*      MCV 91   MCH 29.4   MCHC 32.2       Significant Imaging: Echocardiogram:   Transthoracic echo (TTE) complete (Cupid Only):   Results for orders placed or performed during the hospital encounter of 12/02/19   Echo Color Flow Doppler? Yes   Result Value Ref Range    BSA 2.3 m2    TDI SEPTAL 0.06 m/s    LV LATERAL E/E' RATIO 9.00 m/s    LV SEPTAL E/E' RATIO 10.50 m/s    TDI LATERAL 0.07 m/s    PV PEAK VELOCITY 1.81 cm/s    LVIDD 4.80 3.5 - 6.0 cm    IVS 1.10 (A) 0.6 - 1.1 cm    PW 1.10 (A) 0.6 - 1.1 cm    Ao root annulus 3.40 cm    LVIDS 2.56 2.1 - 4.0 cm    FS 47 28 - 44 %    LV mass 193.96 g    LA  size 3.68 cm    TAPSE 1.44 cm    Left Ventricle Relative Wall Thickness 0.46 cm    AV mean gradient 6 mmHg    AV valve area 4.31 cm2    AV Velocity Ratio 0.78     AV index (prosthetic) 1.06     E/A ratio 0.62     Mean e' 0.07 m/s    E wave decelartion time 338.80 msec    LVOT diameter 2.27 cm    LVOT area 4.0 cm2    LVOT peak simone 1.16 m/s    LVOT peak VTI 26.25 cm    Ao peak simone 1.48 m/s    Ao VTI 24.66 cm    LVOT stroke volume 106.18 cm3    AV peak gradient 9 mmHg    E/E' ratio 9.69 m/s    MV Peak E Simone 0.63 m/s    MV Peak A Simone 1.02 m/s    LV Systolic Volume 23.57 mL    LV Systolic Volume Index 10.6 mL/m2    LV Diastolic Volume 68.84 mL    LV Diastolic Volume Index 30.84 mL/m2    LV Mass Index 87 g/m2    Right Atrial Pressure (from IVC) 3 mmHg    Narrative    · Normal left ventricular systolic function. The estimated ejection   fraction is 55%  · Concentric left ventricular remodeling.  · No wall motion abnormalities.  · Normal right ventricular systolic function.  · Normal central venous pressure (3 mm Hg).        Assessment and Plan:     Brief HPI:  Seen on afternoon rounds with Dr. Hdez. He denies any complaints currently. Discussed POC as detailed below-verbalized understanding and agrees with POC   * Acute on chronic diastolic heart failure  -echo with normal LVEF  -related to  volume overload secondary to PRBC transfusion  -transitioned to IV Lasix BID yesterday; 3.1 liters out overnight;  negative 11.9 liters since admisson  -on CCB with trend down of BPs; SBPs overnight 130s-150s  -SOB improving  -PT and OT on board     Constipation  -related to immobility  -given Lactulose yesterday with results  -will place on daily stool softener    Hematemesis  -nausea with vomiting yesterday with visible hematemesis  -started on IV Protonix drip  -H&H stable  -stool for OCB negative  -GI consulted; plans for EGD on 12/13    Abdominal distension  -abdominal ultrasound  with no acute findings  -KUB with air and  retained stool; started Simethicone and Miralax with mild improvement; repeat KUB yesterday with continued retained stool  -improved with Lactulose   -abdominal distension improved  -continue daily stool softener    JOSE (acute kidney injury)  -multifactoral  -concerned about ATN given bleed and hypotension  -creatinine 1.2 upon admission with trend up to 3.7-4.5-5.3-5.4-5.5-5.0  -BUN 68creatinine 3.4 this AM  -Nephrology on board; no HD in past 48 hours; on IV Lasix BID with excellent response  -will continue to avoid nephrotoxic agents and hypotension      Leukocytosis  -resolved  -WBCs 28K post procedure with trend down to 22K-13K-10K   -afebrile  -likely reactive rather than infectious     Shortness of breath  -concerning for volume overload in setting of IVF use and PRBC transfusion post procedure  -SOB improving  -on IV Lasix with good urine output  -continue with IV Lasix drip for now; continue to follow Nephrology recommendation   -echocardiogram with normal LVEF     Edema  -concerning for combination of volume overload and sedentary lifestyle  -unchanged  -will continue to monitor     Anemia  -initial H&H 11.1&36.1 with trend down to 7.4&24.1 post procedure  -repeat angiogram on 12/2 with no active bleeding with recurrent trend down prompting repeat  angiogram  12/4 with no active bleeding; CTA abdomen with renal subcapsular hematoma with surrounding retroperitoneal hemorrhage and no definite contrast extravasation seen to suggest active bleeding on delayed images  -H&H 6.7&20.5 12/8 with repeat PRBC transfusion with trend up of H&H to 8.8&26.6-9.1 & 27.5   -hematemesis on 12/11 with no significant drop of H&H; 8.4&26.1 this AM   -will continue with daily CBCs for now     PAD (peripheral artery disease)  -admitted for elective LE angiogram for further evaluation of LE claudication  -successful atherectomy and PTA with scoring balloon and  DCB to dSFA,  pSFA CFA and TPT   -foot warm and pulses present    -continue statin; no ASA and Plavix due to bleeding issue   -will need serial LE arterial ultrasounds as an outpatient       CAD (coronary artery disease)  -s/p LAD and LCX MARIAN in 2011  -was on  ASA, statin, Plavix as an outpatient along with CCB  -will continue to hold DAPT given concern for acute bleeding  -resumed CCB  -echocardiogram with normal LV function with EF 55%    Hyperlipidemia  -continue statin therapy  -FLP with LDL 80 in 9/2019    Essential hypertension  -on Norvasc, Chlorthalidone, Metoprolol and Losartan at home  -meds held due to hypotension last week  -BP trended back up; CCB resumed with stable BP  -continue to monitor BP and adjust meds prn     Type 2 diabetes mellitus with kidney complication, with long-term current use of insulin  --184 overnight   -on Lantus and mealtime insulin at home  -diabetes management per Hospital Medicine   -recent HgbA1c 5.6 9/2019        VTE Risk Mitigation (From admission, onward)         Ordered     Place DARRION hose  Until discontinued      12/05/19 1005     heparin (porcine) injection 2,300 Units  As needed (PRN)      12/04/19 1137                Marzena Valdez, APRN, ANP  Cardiology  [facility]

## 2019-12-13 NOTE — PT/OT/SLP PROGRESS
Occupational Therapy   Treatment    Name: Cy Rutherford  MRN: [id number]  Admitting Diagnosis:  Acute on chronic diastolic heart failure  8 Days Post-Op    Recommendations:     Discharge Recommendations: rehabilitation facility  Discharge Equipment Recommendations:  bedside commode, shower chair  Barriers to discharge:  Decreased caregiver support    Assessment:     Cy Rutherford is a 74 y.o. male with a medical diagnosis of Acute on chronic diastolic heart failure.  He presents with mild SOB on exertion. Performance deficits affecting function are weakness, impaired functional mobilty, impaired balance, decreased upper extremity function, decreased safety awareness, decreased coordination, decreased lower extremity function, gait instability, impaired self care skills, impaired endurance, edema, decreased ROM, impaired cardiopulmonary response to activity.     Rehab Prognosis:  Good; patient would benefit from acute skilled OT services to address these deficits and reach maximum level of function.       Plan:     Patient to be seen 5 x/week to address the above listed problems via self-care/home management, therapeutic exercises, therapeutic activities  · Plan of Care Expires: 01/05/20  · Plan of Care Reviewed with: patient    Subjective     Pain/Comfort:  · Pain Rating 1: 0/10  · Pain Rating Post-Intervention 1: 0/10    Objective:     Communicated with: nurse prior to session.  Patient found HOB elevated with oxygen, central line upon OT entry to room.    General Precautions: Standard, fall   Orthopedic Precautions:N/A   Braces: N/A     Occupational Performance:     Bed Mobility:    · Patient completed Rolling/Turning to Right with minimum assistance and with side rail  · Patient completed Scooting/Bridging with minimum assistance  · Patient completed Supine to Sit with minimum assistance, with side rail and HOB elevated  · Patient completed Sit to Supine with moderate assistance and for legs, side rail used       Functional Mobility/Transfers:  · Patient completed Sit <> Stand Transfer with minimum assistance  with  rolling walker   · Functional Mobility: ambulated 5 steps forward, backwards and side stepped with min assist using RW, mild SOB.         [facility] 6 Click ADL: 15    Treatment & Education:  -Role odfoT and POC  -Instructed pt in PLB tech for mild SOB, demonstrated indep return.    -Mild dizziness initially when sat EOB, pt did ~20 reps ankle pumps and dizziness resolved.  -Pt. performed 10 reps  AAROM with clasped fingers for ( RUE pepe): chest press, shld flex/ext, HHA for R shld abd/add, AROM x 10 biceps curls, AROM x 10 finger flex/ext. LE AROM ex x 10 reps: hip and knee flex/ext and ankle pumps with CGA for trunk stability.     Patient left with bed in chair position with all lines intact, call button in reach and nurse notifiedEducation:      GOALS:   Multidisciplinary Problems     Occupational Therapy Goals        Problem: Occupational Therapy Goal    Goal Priority Disciplines Outcome Interventions   Occupational Therapy Goal     OT, PT/OT Ongoing, Progressing    Description:  Goals to be met by: 1/5/2020    Patient will increase functional independence with ADLs by performing:    UE Dressing with Modified [place].  LE Dressing with Set-up Assistance.  Grooming while standing with Supervision.  Toileting from bedside commode with Supervision for hygiene and clothing management.   Step transfer with Supervision  Toilet transfer to bedside commode with Supervision.  Increased functional strength to WFL for self care.  Upper extremity exercise program x10 reps per handout, with assistance as needed.                      Time Tracking:     OT Date of Treatment: 12/12/19  OT Start Time: 1741  OT Stop Time: 1804  OT Total Time (min): 23 min    Billable Minutes:Therapeutic Activity 15  Therapeutic Exercise 8  Total Time 23    [place] OT  12/12/2019

## 2019-12-13 NOTE — H&P
History & Physical -   Gastroenterology      SUBJECTIVE:     Procedure: EGD    Chief Complaint/Indication for Procedure: Hematemesis    History of Present Illness:  Patient is a 74 y.o. male presents with episode of hematemesis.     PTA Medications   Medication Sig    amLODIPine (NORVASC) 5 MG tablet Take 1 tablet (5 mg total) by mouth once daily.    aspirin (ECOTRIN) 81 MG EC tablet Take 81 mg by mouth once daily.    chlorthalidone (HYGROTEN) 25 MG Tab Take 1 tablet (25 mg total) by mouth once daily.    cilostazol (PLETAL) 50 MG Tab Take 1 tablet (50 mg total) by mouth 2 (two) times daily.    clopidogrel (PLAVIX) 75 mg tablet Take 1 tablet (75 mg total) by mouth once daily.    doxazosin (CARDURA) 8 MG Tab Take 1 tablet (8 mg total) by mouth every evening.    fenofibrate micronized (LOFIBRA) 134 MG Cap Take 1 capsule (134 mg total) by mouth nightly.    furosemide (LASIX) 20 MG tablet Take 1 tablet (20 mg total) by mouth once daily.    insulin glargine (LANTUS) 100 unit/mL injection Inject 60 Units into the skin every morning.    losartan (COZAAR) 50 MG tablet Take 1 tablet (50 mg total) by mouth 2 (two) times daily.    metoprolol succinate (TOPROL-XL) 100 MG 24 hr tablet Take 1 tablet (100 mg total) by mouth 2 (two) times daily.    multivitamin with minerals (ONE-A-DAY MAXIMUM FORMULA ORAL) Take 1 tablet by mouth once daily.    NOVOLOG FLEXPEN U-100 INSULIN 100 unit/mL (3 mL) InPn pen INJECT 25 UNITS SUBCUTANEOUSLY WITH MEALS    omeprazole (PRILOSEC) 20 MG capsule     rosuvastatin (CRESTOR) 20 MG tablet Take 1 tablet (20 mg total) by mouth once daily.    fish oil-omega-3 fatty acids 300-1,000 mg capsule Take by mouth once daily.    metFORMIN (GLUCOPHAGE) 500 MG tablet Take 500 mg by mouth 2 (two) times daily with meals.    [DISCONTINUED] insulin lispro (HUMALOG) 100 unit/mL injection Inject into the skin 3 (three) times daily before meals.       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    Type 2 diabetes mellitus with kidney complication, without long-term current use of insulin 11/15/2012     Past Surgical History:   Procedure Laterality Date    AORTOGRAPHY WITH SERIALOGRAPHY N/A 10/16/2019    Procedure: AORTOGRAM, WITH SERIALOGRAPHY;  Surgeon: James Sanchez MD;  [facility]: [facility] CATH LAB/EP;  Service: Cardiology;  Laterality: N/A;    COLONOSCOPY      CORONARY ANGIOPLASTY      MARIAN to LAD and LCX     Family History   Problem Relation Age of Onset    No Known Problems Mother     No Known Problems Father     Colon polyps Sister      Social History     Tobacco Use    Smoking status: Never Smoker    Smokeless tobacco: Never Used   Substance Use Topics    Alcohol use: No    Drug use: No       Review of Systems:  Gastrointestinal: negative for melena    OBJECTIVE:     Vital Signs (Most Recent)  Temp: 98.4 °F (36.9 °C) (12/13/19 0859)  Pulse: 83 (12/13/19 0859)  Resp: 20 (12/13/19 0859)  BP: (!) 155/52 (12/13/19 0859)  SpO2: 100 %(2L per nasal cannula) (12/13/19 0859)    Physical Exam:  General: well developed  Lungs:  normal respiratory effort  Heart: regular rate, S1, S2 normal    Laboratory  CBC:   Recent Labs   Lab 12/13/19  0527   WBC 9.96   RBC 2.86*   HGB 8.4*  8.4*   HCT 26.1*  26.1*      MCV 91   MCH 29.4   MCHC 32.2     CMP:   Recent Labs   Lab 12/06/19  1234  12/13/19  0527   *   < > 187*   CALCIUM 8.0*   < > 8.2*   ALBUMIN 2.3*  --   --    *   < > 136   K 5.1   < > 3.1*   CO2 21*   < > 26      < > 99   BUN 79*   < > 68*   CREATININE 5.7*   < > 3.4*    < > = values in this interval not displayed.     Coagulation: No results for input(s): LABPROT, INR, APTT in the last 168  hours.      Diagnostic Results:      ASSESSMENT/PLAN:     Hematemesis    Plan: EGD    Anesthesia Plan: MAC    ASA Grade: ASA 3 - Patient with moderate systemic disease with functional limitations     The impression and plan was discussed in detail with the patient. All questions have been answered and the patient voices understanding of our plan at this point. The risk of the procedure was discussed in detail which includes but not limited to bleeding, infection, perforation in some cases requiring surgery with its spectrum of complications.

## 2019-12-13 NOTE — PROGRESS NOTES
"[facility]  Adult Nutrition  Progress Note    SUMMARY       Recommendations    Recommendation:   1. Continue current diet order.   2. Add renal restriction if warranted.     Goals:   1. Pt PO intake </= 75% EEN/EPN daily.    Nutrition Goal Status: new  Communication of RD Recs: other (comment)(POC)    Reason for Assessment    Reason For Assessment: length of stay(day 10)  Diagnosis: cardiac disease(acute on chronic diastolic heart failure)  Relevant Medical History: HTN, CAD, acute coronary syndrome, thrombotic stroke, intracranial artherosclerosis, HTN, DMT2  Interdisciplinary Rounds: did not attend  General Information Comments: Pt was alert in bed. Reports good appetite, eats 100% of his meals unless he does not like the food. Reprts NV earlier in the week, treated with medications. States he was constipated for a week and has had a bowel movement after recieving medication. NFPE completed today 12/13/19: pt appears nourished at this time.   Nutrition Discharge Planning: d/c pt on diabetic and cardiac diet restrictions     Nutrition Risk Screen    Nutrition Risk Screen: no indicators present    Nutrition/Diet History    Food Preferences: no cultural or spiritual preferences identified at this time  Spiritual, Cultural Beliefs, Evangelical Practices, Values that Affect Care: no  Food Allergies: NKFA  Factors Affecting Nutritional Intake: None identified at this time    Anthropometrics    Temp: 97.5 °F (36.4 °C)  Height Method: Stated  Height: 5' 9.5" (176.5 cm)  Height (inches): 69.5 in  Weight Method: Bed Scale  Weight: 105.5 kg (232 lb 9.4 oz)  Weight (lb): 232.59 lb  Ideal Body Weight (IBW), Male: 163 lb  % Ideal Body Weight, Male (lb): 142.69 lb  BMI (Calculated): 33.9  BMI Grade: 30 - 34.9- obesity - grade I       Lab/Procedures/Meds    Pertinent Labs Reviewed: reviewed  Pertinent Labs Comments: H/H 8.4/26.1, K 3.1, BUN 68, Cr 3.4, Glu 187, eGFR 17, Ca 8.2, Phos 4.9, A1C 5.6  Pertinent " Medications Reviewed: reviewed  Pertinent Medications Comments: amlodipine, doxazosin, furosemide, insulin aspart, insulin detemir, mupirocin, KCl, statin, sevelamer, simethicone, enal vit, pantoprazole    Estimated/Assessed Needs    Weight Used For Calorie Calculations: 105.5 kg (232 lb 9.4 oz)  Energy Calorie Requirements (kcal): 2140(MSJ x 1.2 PAL)  Energy Need Method: [place]-St Jeor(x 1.2 PAL)  Protein Requirements: 85(0.8 gm/kg)  Weight Used For Protein Calculations: 105.5 kg (232 lb 9.4 oz)     Estimated Fluid Requirement Method: RDA Method(or Per MD)  RDA Method (mL): 2140  CHO Requirement: 247.5      Nutrition Prescription Ordered    Current Diet Order: NPO(for procedure)    Evaluation of Received Nutrient/Fluid Intake    I/O: 375/3117  Energy Calories Required: meeting needs  Protein Required: meeting needs  Fluid Required: meeting needs  Comments: LBM 12/11/19  Tolerance: tolerating  % Intake of Estimated Energy Needs: 75 - 100 %  % Meal Intake: 75 - 100 %    Nutrition Risk    Level of Risk/Frequency of Follow-up: (1 x/week)     Assessment and Plan    Type 2 diabetes mellitus with kidney complication, with long-term current use of insulin  Contributing Nutrition Diagnosis  Obesity Grade 1    Related to (etiology):   undesirable food choices    Signs and Symptoms (as evidenced by):   Pt BMI 33.85.    Interventions:  Collaboration with other providers    Nutrition Diagnosis Status:   New           Monitor and Evaluation    Food and Nutrient Adminstration: diet order  Knowledge/Beliefs/Attitudes: food and nutrition knowledge/skill  Physical Activity and Function: nutrition-related ADLs and IADLs  Anthropometric Measurements: height/length, weight, weight change, body mass index  Biochemical Data, Medical Tests and Procedures: electrolyte and renal panel, gastrointestinal profile, glucose/endocrine profile, inflammatory profile, lipid profile  Nutrition-Focused Physical Findings: overall appearance      Malnutrition Assessment        Subcutaneous Fat Loss (Final Summary): well nourished  Muscle Loss Evaluation (Final Summary): well nourished         Nutrition Follow-Up    RD Follow-up?: Yes

## 2019-12-13 NOTE — PLAN OF CARE
Recommendation:   1. Continue current diet order.   2. Add renal restriction if warranted.     Goals:   1. Pt PO intake </= 75% EEN/EPN daily.

## 2019-12-13 NOTE — PT/OT/SLP PROGRESS
Occupational Therapy  Visit Attempt    Patient Name:  Cy Rutherford   MRN:  [id number]    Patient not seen today secondary to ARIN for EGD. When patient returned to floor, reported he was fatigued and needed a break. 3rd attempt, patient soiled and reporting he needs to be bathed. Will follow-up later, as time permits.     CECILLE Devlin  12/13/2019

## 2019-12-13 NOTE — SUBJECTIVE & OBJECTIVE
Review of Systems   Constitution: Negative for chills, decreased appetite, diaphoresis, fever, malaise/fatigue, weight gain and weight loss.   Cardiovascular: Positive for leg swelling. Negative for chest pain, claudication, dyspnea on exertion, irregular heartbeat, near-syncope, orthopnea, palpitations and paroxysmal nocturnal dyspnea.   Respiratory: Negative for cough, shortness of breath, snoring, sputum production and wheezing.    Endocrine: Negative for cold intolerance, heat intolerance, polydipsia, polyphagia and polyuria.   Skin: Negative for color change, dry skin, itching, nail changes and poor wound healing.   Musculoskeletal: Negative for back pain, gout, joint pain and joint swelling.   Gastrointestinal: Negative for bloating, abdominal pain, constipation, diarrhea, hematemesis, hematochezia, melena, nausea and vomiting.   Genitourinary: Negative for dysuria, hematuria and nocturia.   Neurological: Negative for dizziness, headaches, light-headedness, numbness, paresthesias and weakness.   Psychiatric/Behavioral: Negative for altered mental status, depression and memory loss.     Objective:     Vital Signs (Most Recent):  Temp: 97.5 °F (36.4 °C) (12/13/19 1152)  Pulse: 76 (12/13/19 1200)  Resp: 18 (12/13/19 1152)  BP: (!) 140/63 (12/13/19 1152)  SpO2: 98 % (12/13/19 1148) Vital Signs (24h Range):  Temp:  [97.3 °F (36.3 °C)-98.8 °F (37.1 °C)] 97.5 °F (36.4 °C)  Pulse:  [75-83] 76  Resp:  [16-20] 18  SpO2:  [94 %-100 %] 98 %  BP: (133-155)/(49-68) 140/63     Weight: 105.5 kg (232 lb 9.4 oz)  Body mass index is 33.85 kg/m².     SpO2: 98 %  O2 Device (Oxygen Therapy): nasal cannula      Intake/Output Summary (Last 24 hours) at 12/13/2019 1516  Last data filed at 12/13/2019 1000  Gross per 24 hour   Intake 425 ml   Output 1997 ml   Net -1572 ml       Lines/Drains/Airways     Central Venous Catheter Line                 Trialysis (Dialysis) Catheter 12/09/19 1230 right internal jugular 4 days           Airway                 Airway - Non-Surgical 12/13/19 0907 Nasal Cannula less than 1 day                Physical Exam   Constitutional: He is oriented to person, place, and time. He appears well-developed and well-nourished. No distress.   Neck: Normal range of motion. Neck supple. No JVD present.   Cardiovascular: Normal rate and regular rhythm. Exam reveals no gallop.   No murmur heard.  Pulmonary/Chest: Effort normal and breath sounds normal. No respiratory distress. He has no wheezes.   Abdominal: Soft. Bowel sounds are normal. He exhibits no distension. There is no tenderness.   Musculoskeletal: He exhibits edema (1-2+ BLE edema bilaterally ).   Neurological: He is alert and oriented to person, place, and time.   Skin: Skin is warm and dry.   Psychiatric: He has a normal mood and affect. His behavior is normal. Judgment and thought content normal.       Significant Labs:     Recent Labs   Lab 12/16/19  0537   WBC 7.87   RBC 2.85*   HGB 8.4*   HCT 26.1*   *   MCV 92   MCH 29.5   MCHC 32.2     Recent Labs   Lab 12/16/19  0537      K 3.6   CL 99   CO2 26   BUN 86*   CREATININE 3.2*       Significant Imaging: Echocardiogram:   Transthoracic echo (TTE) complete (Cupid Only):   Results for orders placed or performed during the hospital encounter of 12/02/19   Echo Color Flow Doppler? Yes   Result Value Ref Range    BSA 2.3 m2    TDI SEPTAL 0.06 m/s    LV LATERAL E/E' RATIO 9.00 m/s    LV SEPTAL E/E' RATIO 10.50 m/s    TDI LATERAL 0.07 m/s    PV PEAK VELOCITY 1.81 cm/s    LVIDD 4.80 3.5 - 6.0 cm    IVS 1.10 (A) 0.6 - 1.1 cm    PW 1.10 (A) 0.6 - 1.1 cm    Ao root annulus 3.40 cm    LVIDS 2.56 2.1 - 4.0 cm    FS 47 28 - 44 %    LV mass 193.96 g    LA size 3.68 cm    TAPSE 1.44 cm    Left Ventricle Relative Wall Thickness 0.46 cm    AV mean gradient 6 mmHg    AV valve area 4.31 cm2    AV Velocity Ratio 0.78     AV index (prosthetic) 1.06     E/A ratio 0.62     Mean e' 0.07 m/s    E wave decelartion time  338.80 msec    LVOT diameter 2.27 cm    LVOT area 4.0 cm2    LVOT peak simone 1.16 m/s    LVOT peak VTI 26.25 cm    Ao peak simone 1.48 m/s    Ao VTI 24.66 cm    LVOT stroke volume 106.18 cm3    AV peak gradient 9 mmHg    E/E' ratio 9.69 m/s    MV Peak E Simone 0.63 m/s    MV Peak A Simone 1.02 m/s    LV Systolic Volume 23.57 mL    LV Systolic Volume Index 10.6 mL/m2    LV Diastolic Volume 68.84 mL    LV Diastolic Volume Index 30.84 mL/m2    LV Mass Index 87 g/m2    Right Atrial Pressure (from IVC) 3 mmHg    Narrative    · Normal left ventricular systolic function. The estimated ejection   fraction is 55%  · Concentric left ventricular remodeling.  · No wall motion abnormalities.  · Normal right ventricular systolic function.  · Normal central venous pressure (3 mm Hg).

## 2019-12-13 NOTE — SUBJECTIVE & OBJECTIVE
Interval History: S/P EGD as above. No new complaints. C/o weakness and worried as he stays alone.    Review of Systems   Constitutional: Negative.    HENT: Negative.    Eyes: Negative.    Respiratory: Negative.    Cardiovascular: Negative.    Gastrointestinal: Negative.    Musculoskeletal: Negative.    Skin: Negative.    Neurological: Positive for weakness.   Hematological: Negative.    Psychiatric/Behavioral: Negative.      Objective:     Vital Signs (Most Recent):  Temp: 97.6 °F (36.4 °C) (12/13/19 1519)  Pulse: 88 (12/13/19 1600)  Resp: 18 (12/13/19 1519)  BP: (!) 140/65 (12/13/19 1519)  SpO2: 98 % (12/13/19 1148) Vital Signs (24h Range):  Temp:  [97.3 °F (36.3 °C)-98.8 °F (37.1 °C)] 97.6 °F (36.4 °C)  Pulse:  [75-88] 88  Resp:  [16-20] 18  SpO2:  [94 %-100 %] 98 %  BP: (133-155)/(49-68) 140/65     Weight: 105.5 kg (232 lb 9.4 oz)  Body mass index is 33.85 kg/m².    Intake/Output Summary (Last 24 hours) at 12/13/2019 1745  Last data filed at 12/13/2019 1500  Gross per 24 hour   Intake 675 ml   Output 2272 ml   Net -1597 ml      Physical Exam  General Appearance:  Comfortable and in no acute distress.     HEENT: Normal HEENT exam.   Neck : Supple, ROM normal.   Pupils:  Pupils are equal, round, and reactive to light.    Lungs:  Normal respiratory rate and normal effort.  Breath sounds clear to auscultation.    Heart: Normal rate.Regular rhythm.  S1 normal and S2 normal.  No murmur heard.  Abdomen: Abdomen is soft, non tender and B.S+VE in all quadrants.  Extremities: Normal range of motion. Trace edema of all extremities.  Neurological: Patient is alert and oriented to person, place and time. Motor strength 4+/5 in all extremities.  Skin:  Warm.  no rash or erythema.  Psychiatry : mood, memory and judgement normal.  Pulses : distal pulses intact.  Nursing notes and Vitals reviewed.    Significant Labs:   Recent Lab Results       12/13/19  1517   12/13/19  1150   12/13/19  0644   12/13/19  0527   12/12/19  5406         Anion Gap       11       Baso #       0.01       Basophil%       0.1       BUN, Bld       68       Calcium       8.2       Chloride       99       CO2       26       Creatinine       3.4       Differential Method       Automated       eGFR if        19       eGFR if non        17  Comment:  Calculation used to obtain the estimated glomerular filtration  rate (eGFR) is the CKD-EPI equation.          Eos #       0.3       Eosinophil%       3.1       Glucose       187       Gran # (ANC)       7.7       Gran%       78.5       Hematocrit       26.1 25.7            26.1       Hemoglobin       8.4 8.1            8.4       Lymph #       0.6       Lymph%       6.2       MCH       29.4       MCHC       32.2       MCV       91       Mono #       1.2       Mono%       12.1       MPV       9.4       Phosphorus       4.9       Platelets       240       POCT Glucose 185 179 178         Potassium       3.1       RBC       2.86       RDW       15.0       Sodium       136       WBC       9.96                        12/12/19  1943   12/12/19  1827        Anion Gap         Baso #         Basophil%         BUN, Bld         Calcium         Chloride         CO2         Creatinine         Differential Method         eGFR if          eGFR if non          Eos #         Eosinophil%         Glucose         Gran # (ANC)         Gran%         Hematocrit   27.3     Hemoglobin   8.7     Lymph #         Lymph%         MCH         MCHC         MCV         Mono #         Mono%         MPV         Phosphorus         Platelets         POCT Glucose 230       Potassium         RBC         RDW         Sodium         WBC               Significant Imaging: I have reviewed all pertinent imaging results/findings within the past 24 hours.

## 2019-12-13 NOTE — PLAN OF CARE
Patient and VN rounded and discussed various topics including plan of care and medication regimen. Lasix and protonix continuous infusions discussed.  Education given on TEDs as a blood clot preventative. All safety measures maintained with the bed locked and in lowest position with alarm on.  The call light and all personal items is within reach. Education given on fall risk and patient verbalizes understanding of call light use for assistance.

## 2019-12-13 NOTE — PLAN OF CARE
Problem: Occupational Therapy Goal  Goal: Occupational Therapy Goal  Description  Goals to be met by: 1/5/2020    Patient will increase functional independence with ADLs by performing:    UE Dressing with Modified [place].  LE Dressing with Set-up Assistance.  Grooming while standing with Supervision.  Toileting from bedside commode with Supervision for hygiene and clothing management.   Step transfer with Supervision  Toilet transfer to bedside commode with Supervision.  Increased functional strength to WFL for self care.  Upper extremity exercise program x10 reps per handout, with assistance as needed.     Outcome: Ongoing, Progressing    Mild SOB with exertional OOB activity.  Continue with OT POC.

## 2019-12-13 NOTE — PLAN OF CARE
POC reviewed with patient; verbalizes understanding. AAOx4. Compliant with medication administration regimen. Transfers with 1-person assistance and walker. Feeds self autonomously. EGD performed in AM. Safety precautions in place; call light within reach, bed in low position, wheels locked, side rails up x2. Will continue to monitor.

## 2019-12-13 NOTE — TRANSFER OF CARE
"Anesthesia Transfer of Care Note    Patient: Cy Rutherford    Procedure(s) Performed: Procedure(s) (LRB):  ESOPHAGOGASTRODUODENOSCOPY (EGD) (N/A)    Patient location: GI    Anesthesia Type: MAC    Transport from OR: Transported from OR on 2-3 L/min O2 by NC with adequate spontaneous ventilation    Post pain: adequate analgesia    Post assessment: no apparent anesthetic complications and tolerated procedure well    Post vital signs: stable    Level of consciousness: awake and alert    Nausea/Vomiting: no nausea/vomiting    Complications: none    Transfer of care protocol was followed      Last vitals:   Visit Vitals  BP (!) 133/49 (BP Location: Left arm, Patient Position: Lying)   Pulse 79   Temp 37.1 °C (98.8 °F) (Skin)   Resp 18   Ht 5' 9.5" (1.765 m)   Wt 105.5 kg (232 lb 9.4 oz)   SpO2 (!) 94%   BMI 33.85 kg/m²     "

## 2019-12-13 NOTE — SUBJECTIVE & OBJECTIVE
Review of Systems   Constitution: Negative for chills, decreased appetite, diaphoresis, fever, malaise/fatigue, weight gain and weight loss.   Cardiovascular: Negative for chest pain, claudication, dyspnea on exertion, irregular heartbeat, leg swelling, near-syncope, orthopnea, palpitations and paroxysmal nocturnal dyspnea.   Respiratory: Negative for cough, shortness of breath, snoring, sputum production and wheezing.    Endocrine: Negative for cold intolerance, heat intolerance, polydipsia, polyphagia and polyuria.   Skin: Negative for color change, dry skin, itching, nail changes and poor wound healing.   Musculoskeletal: Negative for back pain, gout, joint pain and joint swelling.   Gastrointestinal: Negative for bloating, abdominal pain, constipation, diarrhea, hematemesis, hematochezia, melena, nausea and vomiting.   Genitourinary: Negative for dysuria, hematuria and nocturia.   Neurological: Negative for dizziness, headaches, light-headedness, numbness, paresthesias and weakness.   Psychiatric/Behavioral: Negative for altered mental status, depression and memory loss.     Objective:     Vital Signs (Most Recent):  Temp: 97.5 °F (36.4 °C) (12/13/19 1152)  Pulse: 76 (12/13/19 1200)  Resp: 18 (12/13/19 1152)  BP: (!) 140/63 (12/13/19 1152)  SpO2: 98 % (12/13/19 1148) Vital Signs (24h Range):  Temp:  [97.3 °F (36.3 °C)-98.8 °F (37.1 °C)] 97.5 °F (36.4 °C)  Pulse:  [75-83] 76  Resp:  [16-20] 18  SpO2:  [94 %-100 %] 98 %  BP: (133-155)/(49-68) 140/63     Weight: 105.5 kg (232 lb 9.4 oz)  Body mass index is 33.85 kg/m².     SpO2: 98 %  O2 Device (Oxygen Therapy): nasal cannula      Intake/Output Summary (Last 24 hours) at 12/13/2019 1508  Last data filed at 12/13/2019 1000  Gross per 24 hour   Intake 425 ml   Output 1997 ml   Net -1572 ml       Lines/Drains/Airways     Central Venous Catheter Line                 Trialysis (Dialysis) Catheter 12/09/19 1230 right internal jugular 4 days          Airway                  Airway - Non-Surgical 12/13/19 0907 Nasal Cannula less than 1 day                Physical Exam   Constitutional: He is oriented to person, place, and time. He appears well-developed and well-nourished. No distress.   Neck: Normal range of motion. Neck supple. No JVD present.   Cardiovascular: Normal rate and regular rhythm. Exam reveals no gallop.   No murmur heard.  Pulmonary/Chest: Effort normal and breath sounds normal. No respiratory distress. He has no wheezes.   Abdominal: Soft. Bowel sounds are normal. He exhibits no distension. There is no tenderness.   Musculoskeletal: He exhibits no edema.   Neurological: He is alert and oriented to person, place, and time.   Skin: Skin is warm and dry.   Psychiatric: He has a normal mood and affect. His behavior is normal. Judgment and thought content normal.       Significant Labs:     Recent Labs   Lab 12/13/19 0527      K 3.1*   CL 99   CO2 26   BUN 68*   CREATININE 3.4*     Recent Labs   Lab 12/13/19 0527      K 3.1*   CL 99   CO2 26   BUN 68*   CREATININE 3.4*     Recent Labs   Lab 12/13/19 0527   WBC 9.96   RBC 2.86*   HGB 8.4*  8.4*   HCT 26.1*  26.1*      MCV 91   MCH 29.4   MCHC 32.2       Significant Imaging: Echocardiogram:   Transthoracic echo (TTE) complete (Cupid Only):   Results for orders placed or performed during the hospital encounter of 12/02/19   Echo Color Flow Doppler? Yes   Result Value Ref Range    BSA 2.3 m2    TDI SEPTAL 0.06 m/s    LV LATERAL E/E' RATIO 9.00 m/s    LV SEPTAL E/E' RATIO 10.50 m/s    TDI LATERAL 0.07 m/s    PV PEAK VELOCITY 1.81 cm/s    LVIDD 4.80 3.5 - 6.0 cm    IVS 1.10 (A) 0.6 - 1.1 cm    PW 1.10 (A) 0.6 - 1.1 cm    Ao root annulus 3.40 cm    LVIDS 2.56 2.1 - 4.0 cm    FS 47 28 - 44 %    LV mass 193.96 g    LA size 3.68 cm    TAPSE 1.44 cm    Left Ventricle Relative Wall Thickness 0.46 cm    AV mean gradient 6 mmHg    AV valve area 4.31 cm2    AV Velocity Ratio 0.78     AV index (prosthetic) 1.06      E/A ratio 0.62     Mean e' 0.07 m/s    E wave decelartion time 338.80 msec    LVOT diameter 2.27 cm    LVOT area 4.0 cm2    LVOT peak simone 1.16 m/s    LVOT peak VTI 26.25 cm    Ao peak simone 1.48 m/s    Ao VTI 24.66 cm    LVOT stroke volume 106.18 cm3    AV peak gradient 9 mmHg    E/E' ratio 9.69 m/s    MV Peak E Simone 0.63 m/s    MV Peak A Simone 1.02 m/s    LV Systolic Volume 23.57 mL    LV Systolic Volume Index 10.6 mL/m2    LV Diastolic Volume 68.84 mL    LV Diastolic Volume Index 30.84 mL/m2    LV Mass Index 87 g/m2    Right Atrial Pressure (from IVC) 3 mmHg    Narrative    · Normal left ventricular systolic function. The estimated ejection   fraction is 55%  · Concentric left ventricular remodeling.  · No wall motion abnormalities.  · Normal right ventricular systolic function.  · Normal central venous pressure (3 mm Hg).

## 2019-12-14 PROBLEM — I95.9 HYPOTENSION: Status: RESOLVED | Noted: 2019-12-13 | Resolved: 2019-12-14

## 2019-12-14 LAB
ANION GAP SERPL CALC-SCNC: 9 MMOL/L (ref 8–16)
BASOPHILS # BLD AUTO: 0.01 K/UL (ref 0–0.2)
BASOPHILS NFR BLD: 0.1 % (ref 0–1.9)
BUN SERPL-MCNC: 78 MG/DL (ref 8–23)
CALCIUM SERPL-MCNC: 8.4 MG/DL (ref 8.7–10.5)
CHLORIDE SERPL-SCNC: 97 MMOL/L (ref 95–110)
CO2 SERPL-SCNC: 29 MMOL/L (ref 23–29)
CREAT SERPL-MCNC: 3.5 MG/DL (ref 0.5–1.4)
DIFFERENTIAL METHOD: ABNORMAL
EOSINOPHIL # BLD AUTO: 0.3 K/UL (ref 0–0.5)
EOSINOPHIL NFR BLD: 3.6 % (ref 0–8)
ERYTHROCYTE [DISTWIDTH] IN BLOOD BY AUTOMATED COUNT: 15 % (ref 11.5–14.5)
EST. GFR  (AFRICAN AMERICAN): 19 ML/MIN/1.73 M^2
EST. GFR  (NON AFRICAN AMERICAN): 16 ML/MIN/1.73 M^2
GLUCOSE SERPL-MCNC: 175 MG/DL (ref 70–110)
HCT VFR BLD AUTO: 26 % (ref 40–54)
HGB BLD-MCNC: 8.3 G/DL (ref 14–18)
LYMPHOCYTES # BLD AUTO: 0.6 K/UL (ref 1–4.8)
LYMPHOCYTES NFR BLD: 6.6 % (ref 18–48)
MAGNESIUM SERPL-MCNC: 2.2 MG/DL (ref 1.6–2.6)
MCH RBC QN AUTO: 29.1 PG (ref 27–31)
MCHC RBC AUTO-ENTMCNC: 31.9 G/DL (ref 32–36)
MCV RBC AUTO: 91 FL (ref 82–98)
MONOCYTES # BLD AUTO: 0.8 K/UL (ref 0.3–1)
MONOCYTES NFR BLD: 9.3 % (ref 4–15)
NEUTROPHILS # BLD AUTO: 7.2 K/UL (ref 1.8–7.7)
NEUTROPHILS NFR BLD: 80.4 % (ref 38–73)
PHOSPHATE SERPL-MCNC: 5.7 MG/DL (ref 2.7–4.5)
PLATELET # BLD AUTO: 267 K/UL (ref 150–350)
PMV BLD AUTO: 9.1 FL (ref 9.2–12.9)
POCT GLUCOSE: 132 MG/DL (ref 70–110)
POCT GLUCOSE: 134 MG/DL (ref 70–110)
POCT GLUCOSE: 172 MG/DL (ref 70–110)
POCT GLUCOSE: 183 MG/DL (ref 70–110)
POTASSIUM SERPL-SCNC: 3 MMOL/L (ref 3.5–5.1)
RBC # BLD AUTO: 2.85 M/UL (ref 4.6–6.2)
SODIUM SERPL-SCNC: 135 MMOL/L (ref 136–145)
WBC # BLD AUTO: 9.06 K/UL (ref 3.9–12.7)

## 2019-12-14 PROCEDURE — 85025 COMPLETE CBC W/AUTO DIFF WBC: CPT

## 2019-12-14 PROCEDURE — 80048 BASIC METABOLIC PNL TOTAL CA: CPT

## 2019-12-14 PROCEDURE — 25000003 PHARM REV CODE 250: Performed by: NURSE PRACTITIONER

## 2019-12-14 PROCEDURE — 97530 THERAPEUTIC ACTIVITIES: CPT

## 2019-12-14 PROCEDURE — 99233 PR SUBSEQUENT HOSPITAL CARE,LEVL III: ICD-10-PCS | Mod: ,,, | Performed by: STUDENT IN AN ORGANIZED HEALTH CARE EDUCATION/TRAINING PROGRAM

## 2019-12-14 PROCEDURE — 84100 ASSAY OF PHOSPHORUS: CPT

## 2019-12-14 PROCEDURE — 27000221 HC OXYGEN, UP TO 24 HOURS

## 2019-12-14 PROCEDURE — 25000003 PHARM REV CODE 250: Performed by: INTERNAL MEDICINE

## 2019-12-14 PROCEDURE — 11000001 HC ACUTE MED/SURG PRIVATE ROOM

## 2019-12-14 PROCEDURE — 94761 N-INVAS EAR/PLS OXIMETRY MLT: CPT

## 2019-12-14 PROCEDURE — 63600175 PHARM REV CODE 636 W HCPCS: Performed by: NURSE PRACTITIONER

## 2019-12-14 PROCEDURE — 99233 SBSQ HOSP IP/OBS HIGH 50: CPT | Mod: ,,, | Performed by: STUDENT IN AN ORGANIZED HEALTH CARE EDUCATION/TRAINING PROGRAM

## 2019-12-14 PROCEDURE — 63600175 PHARM REV CODE 636 W HCPCS: Performed by: INTERNAL MEDICINE

## 2019-12-14 PROCEDURE — 97116 GAIT TRAINING THERAPY: CPT

## 2019-12-14 PROCEDURE — 83735 ASSAY OF MAGNESIUM: CPT

## 2019-12-14 RX ORDER — INSULIN ASPART 100 [IU]/ML
1-10 INJECTION, SOLUTION INTRAVENOUS; SUBCUTANEOUS
Status: DISCONTINUED | OUTPATIENT
Start: 2019-12-14 | End: 2019-12-19 | Stop reason: HOSPADM

## 2019-12-14 RX ORDER — POTASSIUM CHLORIDE 20 MEQ/1
40 TABLET, EXTENDED RELEASE ORAL ONCE
Status: COMPLETED | OUTPATIENT
Start: 2019-12-14 | End: 2019-12-14

## 2019-12-14 RX ORDER — POLYETHYLENE GLYCOL 3350 17 G/17G
17 POWDER, FOR SOLUTION ORAL DAILY
Status: DISCONTINUED | OUTPATIENT
Start: 2019-12-14 | End: 2019-12-19 | Stop reason: HOSPADM

## 2019-12-14 RX ORDER — POTASSIUM CHLORIDE 20 MEQ/1
40 TABLET, EXTENDED RELEASE ORAL ONCE
Status: COMPLETED | OUTPATIENT
Start: 2019-12-14 | End: 2019-12-15

## 2019-12-14 RX ADMIN — INSULIN DETEMIR 62 UNITS: 100 INJECTION, SOLUTION SUBCUTANEOUS at 10:12

## 2019-12-14 RX ADMIN — INSULIN ASPART 17 UNITS: 100 INJECTION, SOLUTION INTRAVENOUS; SUBCUTANEOUS at 08:12

## 2019-12-14 RX ADMIN — MUPIROCIN 1 G: 20 OINTMENT TOPICAL at 09:12

## 2019-12-14 RX ADMIN — SIMETHICONE 125 MG: 125 TABLET, CHEWABLE ORAL at 05:12

## 2019-12-14 RX ADMIN — IRON SUCROSE 100 MG: 20 INJECTION, SOLUTION INTRAVENOUS at 02:12

## 2019-12-14 RX ADMIN — SEVELAMER CARBONATE 800 MG: 800 TABLET, FILM COATED ORAL at 05:12

## 2019-12-14 RX ADMIN — NEPHROCAP 1 CAPSULE: 1 CAP ORAL at 08:12

## 2019-12-14 RX ADMIN — BISACODYL 10 MG: 10 SUPPOSITORY RECTAL at 08:12

## 2019-12-14 RX ADMIN — AMLODIPINE BESYLATE 10 MG: 5 TABLET ORAL at 08:12

## 2019-12-14 RX ADMIN — FUROSEMIDE 40 MG: 10 INJECTION, SOLUTION INTRAVENOUS at 11:12

## 2019-12-14 RX ADMIN — SEVELAMER CARBONATE 800 MG: 800 TABLET, FILM COATED ORAL at 08:12

## 2019-12-14 RX ADMIN — DOXAZOSIN 8 MG: 2 TABLET ORAL at 08:12

## 2019-12-14 RX ADMIN — POLYETHYLENE GLYCOL 3350 17 G: 17 POWDER, FOR SOLUTION ORAL at 11:12

## 2019-12-14 RX ADMIN — SEVELAMER CARBONATE 800 MG: 800 TABLET, FILM COATED ORAL at 11:12

## 2019-12-14 RX ADMIN — PANTOPRAZOLE SODIUM 40 MG: 40 TABLET, DELAYED RELEASE ORAL at 07:12

## 2019-12-14 RX ADMIN — ROSUVASTATIN CALCIUM 20 MG: 10 TABLET, FILM COATED ORAL at 08:12

## 2019-12-14 RX ADMIN — INSULIN ASPART 17 UNITS: 100 INJECTION, SOLUTION INTRAVENOUS; SUBCUTANEOUS at 11:12

## 2019-12-14 RX ADMIN — POTASSIUM CHLORIDE 40 MEQ: 1500 TABLET, EXTENDED RELEASE ORAL at 02:12

## 2019-12-14 RX ADMIN — INSULIN ASPART 2 UNITS: 100 INJECTION, SOLUTION INTRAVENOUS; SUBCUTANEOUS at 11:12

## 2019-12-14 RX ADMIN — PANTOPRAZOLE SODIUM 40 MG: 40 TABLET, DELAYED RELEASE ORAL at 05:12

## 2019-12-14 RX ADMIN — INSULIN ASPART 17 UNITS: 100 INJECTION, SOLUTION INTRAVENOUS; SUBCUTANEOUS at 05:12

## 2019-12-14 RX ADMIN — FUROSEMIDE 40 MG: 10 INJECTION, SOLUTION INTRAVENOUS at 05:12

## 2019-12-14 RX ADMIN — SIMETHICONE 125 MG: 125 TABLET, CHEWABLE ORAL at 11:12

## 2019-12-14 NOTE — PLAN OF CARE
Problem: Physical Therapy Goal  Goal: Physical Therapy Goal  Description  Goals to be met by: 2020     Patient will increase functional independence with mobility by performin. Supine to sit with Set-up [place]  2. Sit to stand transfer with Supervision  3. Bed to chair transfer with Supervision using Rolling Walker  4. Gait  x 120 feet with Supervision using Rolling Walker.   5. Lower extremity exercise program x15 reps per handout, with supervision     Outcome: Ongoing, Progressing   Pt continues to work toward established goals.

## 2019-12-14 NOTE — PROGRESS NOTES
[facility] Hospital Medicine  Progress Note    Patient Name: Cy Rutherford  MRN: [id number]  Patient Class: IP- Inpatient   Admission Date: 12/2/2019  Length of Stay: 10 days  Attending Physician: James Sanchez MD  Primary Care Provider: Heide Porter MD        Subjective:     Principal Problem:Acute on chronic diastolic heart failure        HPI:  Admitted for elective LE angiogram for evaluation of RLE claudication. Taken to the cath lab for the procedure via LCFA access with following results:     successful atherectomy (Hawk LX) with distal filter protection, followed by PTA with scoring balloon (5.0 x 150 dSFA, 6.0 x 150 pSFA/CFA) followed by PTA with DCB to dSFA (5.0 x 150), pSFA (5.0 x 100) and CFA (7 x 40) with good results. 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 He has elevated BG, no anion gap.  We will place on insulin sq and monito closely , if dka develop, we will place him on insulin drip    Overview/Hospital Course:  Had hypotension after the procedure and then had JOSE and had to be started on H.D(last H.D on 12/11). Nephrology following. Patient is also on lasix and diuresing well for past 2 days. Currently on I.V lasix pushes. Creatinine stable for past 2 days also. Blood sugar levels are controlled with Levemir, prandial insulin and ISS. Also found to having large left renal subcapsular hematoma on CT abd done on 12/4. ASA, Plavix and pletal are therefore held. On 12/11/19, patient had an episode of  hematemesis. Initially had drop in H/H, but stable since then. RADHAMES consulted and had EGD on 12/13. This showed Non-bleeding esophageal ulcer, which was biopsied and erythematous mucosa in the stomach. Advised to start on Protonix 40 mg BID for 12 weeks. Repeat UGD in 8 weeks to establish healing.                 PT/OT eval done and Rehab placement advised. Social work consult placed.     Interval History: S/P EGD as above. No new complaints. C/o weakness and worried as he stays alone.    Review of Systems   Constitutional: Negative.    HENT: Negative.    Eyes: Negative.    Respiratory: Negative.    Cardiovascular: Negative.    Gastrointestinal: Negative.    Musculoskeletal: Negative.    Skin: Negative.    Neurological: Positive for weakness.   Hematological: Negative.    Psychiatric/Behavioral: Negative.      Objective:     Vital Signs (Most Recent):  Temp: 97.6 °F (36.4 °C) (12/13/19 1519)  Pulse: 88 (12/13/19 1600)  Resp: 18 (12/13/19 1519)  BP: (!) 140/65 (12/13/19 1519)  SpO2: 98 % (12/13/19 1148) Vital Signs (24h Range):  Temp:  [97.3 °F (36.3 °C)-98.8 °F (37.1 °C)] 97.6 °F (36.4 °C)  Pulse:  [75-88] 88  Resp:  [16-20] 18  SpO2:  [94 %-100 %] 98 %  BP: (133-155)/(49-68) 140/65     Weight: 105.5 kg (232 lb 9.4 oz)  Body mass index is 33.85 kg/m².    Intake/Output Summary (Last 24 hours) at 12/13/2019 1985  Last data filed at 12/13/2019 1500  Gross per 24 hour   Intake 675 ml   Output 2272 ml   Net -1597 ml      Physical Exam  General Appearance:  Comfortable and in no acute distress.     HEENT: Normal HEENT exam.   Neck : Supple, ROM normal.   Pupils:  Pupils are equal, round, and reactive to light.    Lungs:  Normal respiratory rate and normal effort.  Breath sounds clear to auscultation.    Heart: Normal rate.Regular rhythm.  S1 normal and S2 normal.  No murmur heard.  Abdomen: Abdomen is soft, non tender and B.S+VE in all quadrants.  Extremities: Normal range of motion. Trace edema of all  extremities.  Neurological: Patient is alert and oriented to person, place and time. Motor strength 4+/5 in all extremities.  Skin:  Warm.  no rash or erythema.  Psychiatry : mood, memory and judgement normal.  Pulses : distal pulses intact.  Nursing notes and Vitals reviewed.    Significant Labs:   Recent Lab Results       12/13/19  1517   12/13/19  1150   12/13/19  0644   12/13/19  0527   12/12/19  2307        Anion Gap       11       Baso #       0.01       Basophil%       0.1       BUN, Bld       68       Calcium       8.2       Chloride       99       CO2       26       Creatinine       3.4       Differential Method       Automated       eGFR if        19       eGFR if non        17  Comment:  Calculation used to obtain the estimated glomerular filtration  rate (eGFR) is the CKD-EPI equation.          Eos #       0.3       Eosinophil%       3.1       Glucose       187       Gran # (ANC)       7.7       Gran%       78.5       Hematocrit       26.1 25.7            26.1       Hemoglobin       8.4 8.1            8.4       Lymph #       0.6       Lymph%       6.2       MCH       29.4       MCHC       32.2       MCV       91       Mono #       1.2       Mono%       12.1       MPV       9.4       Phosphorus       4.9       Platelets       240       POCT Glucose 185 179 178         Potassium       3.1       RBC       2.86       RDW       15.0       Sodium       136       WBC       9.96                        12/12/19  1943   12/12/19  1827        Anion Gap         Baso #         Basophil%         BUN, Bld         Calcium         Chloride         CO2         Creatinine         Differential Method         eGFR if          eGFR if non          Eos #         Eosinophil%         Glucose         Gran # (ANC)         Gran%         Hematocrit   27.3     Hemoglobin   8.7     Lymph #         Lymph%         MCH         MCHC         MCV         Mono #         Mono%          MPV         Phosphorus         Platelets         POCT Glucose 230       Potassium         RBC         RDW         Sodium         WBC               Significant Imaging: I have reviewed all pertinent imaging results/findings within the past 24 hours.      Assessment/Plan:      * Acute on chronic diastolic heart failure  On Lasix drip and H.D now  D/jose ramon lasix drip and currently on I.V lasix pushes as per Nephrology.   Echo showed normal LVEF  Lopressor being held for acute on chronic diastolic HF and ARB for JOSE  Diuresing well and improving.      Hypokalemia  Replace as needed.      Constipation  Improved with lactulose.      Ulcer of esophagus without bleeding  Hematemesis recently sec to same  EGD on 12/13 showed esophageal ulcer with no bleeding now  G.I input appreciated  PPI drip d/jose ramon and on Protonix 40 BID.  Will need this for 12 weeks as per G.I  Will need repeat UGD in 8 weeks to establish healing.       JOSE (acute kidney injury)  Sec to ATN sec to hypotension and bleeding mostly  Nephrology on board and patient is on H.D  Cont management as per them.  Last H.D on 12/11 and creatinine stable since then inspite of receiving no H.D and diuresing well too.    Anemia  Sec to acute blood loss sec to hematemesis mostly  Will cont to f/u and transfuse prn  H/H stable for past 24 hr.       PAD (peripheral artery disease)  -successful atherectomy and PTA with scoring balloon and  DCB to dSFA,  pSFA CFA and TPT   -As above, ASA, Plavix and pletal held for recent bleeding episodes.  -Cont statin.       CAD (coronary artery disease)  Cont statin  ASA and Plavix held for recent bleeding episodes  Hold Toprol for acute on chronic diastolic HF and ARB for JOSE.      Hyperlipidemia  Home dose statin      Essential hypertension  Cont home dose Norvasc and Doxazosin.  Better controlled now  Hold Toprol and Cozaar as above.      Type 2 diabetes mellitus with kidney complication, with long-term current use of insulin  Lab  Results   Component Value Date    HGBA1C 5.6 12/02/2019     - Blood sugar levels better controlled now.  - Continue levemir 62 units nightly   - Continue aspart 17 units TID with meals  - Accuchecks with prn low dose SSI       VTE Risk Mitigation (From admission, onward)         Ordered     Place DARRION hose  Until discontinued      12/05/19 1005     heparin (porcine) injection 2,300 Units  As needed (PRN)      12/04/19 1137                      Pallavi Sunkara, MD  Department of Hospital Medicine   [facility]

## 2019-12-14 NOTE — PT/OT/SLP PROGRESS
Physical Therapy Treatment    Patient Name:  Cy Rutherford   MRN:  [id number]    Recommendations:     Discharge Recommendations:  rehabilitation facility   Discharge Equipment Recommendations: bedside commode, shower chair   Barriers to discharge: Decreased caregiver support; increased level of assistance currently needed    Assessment:     Cy Rutherford is a 74 y.o. male admitted with a medical diagnosis of Acute on chronic diastolic heart failure.  He presents with the following impairments/functional limitations:  weakness, impaired endurance, gait instability, impaired functional mobilty, impaired self care skills, impaired balance, decreased coordination, decreased upper extremity function, decreased lower extremity function, decreased safety awareness, edema, decreased ROM, impaired cardiopulmonary response to activity Patient with minimal improvement in tolerance to activity; still requiring increased level of assistance; despite patient's co-morbidities, patient was living in community setting functioning at independent level for ADLs, and mobility prior to this event.  There is an expectation of returning to prior level of function to maintain independence thus avoiding readmission.  Patient's clinical condition meets full Inpatient Rehab (IPR) criteria; including the ability to actively participate in 3 hours of therapy.  A lower level of care(SNF) cannot provide the interdisciplinary treatment approach needed. .    Rehab Prognosis: Good; patient would benefit from acute skilled PT services to address these deficits and reach maximum level of function.    Recent Surgery: Procedure(s) (LRB):  ESOPHAGOGASTRODUODENOSCOPY (EGD) (N/A) Day of Surgery    Plan:     During this hospitalization, patient to be seen 6 x/week to address the identified rehab impairments via gait training, therapeutic activities, therapeutic exercises and progress toward the following goals:    · Plan of Care Expires:   01/06/20    Subjective     Pain/Comfort:  · Pain Rating 1: 0/10  · Pain Rating Post-Intervention 1: 0/10      Objective:     Communicated with nurse prior to session.  Patient found with oxygen, central line upon PT entry to room.     General Precautions: Standard, fall   Orthopedic Precautions:N/A   Braces: N/A     Functional Mobility:  · Bed Mobility:     · Rolling Right: minimum assistance and use of side rail  · Scooting: minimum assistance  · Supine to Sit: minimum assistance and side rail and HOB elevated  · Transfers:     · Sit to Stand:  minimum assistance with rolling walker and and VCs for effective technique  · Bed to Chair: minimum assistance with  rolling walker  using  Step Transfer  · Toilet Transfer: minimum assistance with  rolling walker  using  Step Transfer  · Gait: Pt amb 10ft forward and 5 ft backward using RW and Janel; short step lenth, increased lateral sway, VCs for erect stance    AM-PAC 6 CLICK MOBILITY  Turning over in bed (including adjusting bedclothes, sheets and blankets)?: 2  Sitting down on and standing up from a chair with arms (e.g., wheelchair, bedside commode, etc.): 3  Moving from lying on back to sitting on the side of the bed?: 2  Moving to and from a bed to a chair (including a wheelchair)?: 3  Need to walk in hospital room?: 3  Climbing 3-5 steps with a railing?: 1  Basic Mobility Total Score: 14       Therapeutic Activities and Exercises:   Patient supine with HOB elevated; pt performed BLE exercises 15 reps each - APs, heel slides, hip abd/add, UE extremity reaches, fist pumps; VCs for rolling and sup to sit with modA for LEs - increased time and effort; pt scooted to EOB with SBA and VCs for technique; sitting FAQ x 15 reps, marches, ham/gastroc stretches x ~1 min each; sit to stand from EOB with Janel with improved technique after 2 initial min/modA; pt performed 10 steps each LE in place and had to sit; pt with mild SOB; encouraged pursed lip breathing; pt stood and  used RW to take steps to BSC; unable to have BM, pt stood again with VCs for hand placement on armrests, assisted with donning diaper and used RW to amb to bedside chair ~3ft, then stood again after scooting to edge of chair with VCs and amb as described above; positioned chair at side of bed and left with all needs in place; issued gt/txf belt to pt and applied; informed nurse to use RW left in room to assist pt back to bed and also pt concerned about having a stool softner; vitals were as follows:  Supine 147/67 HR 84 O2 saats 95% on 1L  Sitting /63 HR 89 O2 sats 95%  After step taking: O2 sats 96% HR 87  After session in sitting in chair: /60 HR 93 O2 sats 95%    Patient left up in chair with all lines intact, call button in reach and nurse notified..    GOALS:   Multidisciplinary Problems     Physical Therapy Goals        Problem: Physical Therapy Goal    Goal Priority Disciplines Outcome Goal Variances Interventions   Physical Therapy Goal     PT, PT/OT Ongoing, Progressing     Description:  Goals to be met by: 2020     Patient will increase functional independence with mobility by performin. Supine to sit with Set-up [place]  2. Sit to stand transfer with Supervision  3. Bed to chair transfer with Supervision using Rolling Walker  4. Gait  x 120 feet with Supervision using Rolling Walker.   5. Lower extremity exercise program x15 reps per handout, with supervision                      Time Tracking:     PT Received On: 19  PT Start Time: 1538     PT Stop Time: 1700  PT Total Time (min): 82 min (-12 minutes for obtaining and rearranging equipment)    Billable Minutes: Gait Training 10 minutes, Therapeutic Activity 40 minutes and Therapeutic Exercise 20 minutes    Treatment Type: Treatment  PT/PTA: PT     PTA Visit Number: 0     Chelsey Hawthorne, PT  2019

## 2019-12-14 NOTE — PROGRESS NOTES
Progress Note  Nephrology      Consult Requested By: James Sanchez MD      SUBJECTIVE:     Overnight events  Patient is a 74 y.o. male     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    Abnormality of gait as late effect of cerebrovascular accident (CVA)    Tightness of right gastrocnemius muscle    Claudication    Anemia    Edema    Shortness of breath    JOSE (acute kidney injury)    Acute on chronic diastolic heart failure    Ulcer of esophagus without bleeding    Constipation    Hypotension    Hypokalemia     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    Type 2 diabetes mellitus with kidney complication, without long-term current use of insulin 11/15/2012              OBJECTIVE:     Vitals:    12/14/19 0815 12/14/19 1153 12/14/19 1213 12/14/19 1214   BP:   (!) 156/68    BP Location:       Patient Position:       Pulse:  75 72    Resp:   17    Temp:   97.3 °F (36.3 °C)    TempSrc:       SpO2: 97%  97% (!) 94%   Weight:       Height:           Temp: 97.3 °F (36.3 °C) (12/14/19 1213)  Pulse: 72 (12/14/19 1213)  Resp: 17 (12/14/19 1213)  BP: (!) 156/68 (12/14/19 1213)  SpO2: (!) 94 % (12/14/19  1214)              Medications:   amLODIPine  10 mg Oral Daily    doxazosin  8 mg Oral Daily    furosemide  40 mg Intravenous BID    insulin aspart U-100  17 Units Subcutaneous TIDWM    insulin detemir U-100  62 Units Subcutaneous QHS    mupirocin   Nasal BID    pantoprazole  40 mg Oral BID AC    polyethylene glycol  17 g Oral Daily    potassium chloride  20 mEq Oral Once    rosuvastatin  20 mg Oral Daily    sevelamer carbonate  800 mg Oral TID WM    simethicone  125 mg Oral Q6H    vitamin renal formula (B-complex-vitamin c-folic acid)  1 capsule Oral Daily                 Physical Exam:  General appearance:NAD  Lungs: diminished breath sounds  Heart: pulse 72  Abdomen: soft  Extremities: edema  Skin: dry  Laboratory:  ABG  Labs reviewed  Recent Results (from the past 336 hour(s))   Basic metabolic panel    Collection Time: 12/14/19  5:48 AM   Result Value Ref Range    Sodium 135 (L) 136 - 145 mmol/L    Potassium 3.0 (L) 3.5 - 5.1 mmol/L    Chloride 97 95 - 110 mmol/L    CO2 29 23 - 29 mmol/L    BUN, Bld 78 (H) 8 - 23 mg/dL    Creatinine 3.5 (H) 0.5 - 1.4 mg/dL    Calcium 8.4 (L) 8.7 - 10.5 mg/dL    Anion Gap 9 8 - 16 mmol/L   Basic metabolic panel    Collection Time: 12/13/19  5:27 AM   Result Value Ref Range    Sodium 136 136 - 145 mmol/L    Potassium 3.1 (L) 3.5 - 5.1 mmol/L    Chloride 99 95 - 110 mmol/L    CO2 26 23 - 29 mmol/L    BUN, Bld 68 (H) 8 - 23 mg/dL    Creatinine 3.4 (H) 0.5 - 1.4 mg/dL    Calcium 8.2 (L) 8.7 - 10.5 mg/dL    Anion Gap 11 8 - 16 mmol/L   Basic metabolic panel    Collection Time: 12/12/19  4:20 AM   Result Value Ref Range    Sodium 135 (L) 136 - 145 mmol/L    Potassium 3.4 (L) 3.5 - 5.1 mmol/L    Chloride 101 95 - 110 mmol/L    CO2 23 23 - 29 mmol/L    BUN, Bld 58 (H) 8 - 23 mg/dL    Creatinine 3.1 (H) 0.5 - 1.4 mg/dL    Calcium 8.2 (L) 8.7 - 10.5 mg/dL    Anion Gap 11 8 - 16 mmol/L     Recent Results (from the past 336 hour(s))   CBC auto differential    Collection Time:  12/14/19  5:48 AM   Result Value Ref Range    WBC 9.06 3.90 - 12.70 K/uL    Hemoglobin 8.3 (L) 14.0 - 18.0 g/dL    Hematocrit 26.0 (L) 40.0 - 54.0 %    Platelets 267 150 - 350 K/uL   CBC auto differential    Collection Time: 12/13/19  5:27 AM   Result Value Ref Range    WBC 9.96 3.90 - 12.70 K/uL    Hemoglobin 8.4 (L) 14.0 - 18.0 g/dL    Hematocrit 26.1 (L) 40.0 - 54.0 %    Platelets 240 150 - 350 K/uL   CBC auto differential    Collection Time: 12/12/19  4:20 AM   Result Value Ref Range    WBC 11.27 3.90 - 12.70 K/uL    Hemoglobin 8.4 (L) 14.0 - 18.0 g/dL    Hematocrit 27.2 (L) 40.0 - 54.0 %    Platelets 191 150 - 350 K/uL     Urinalysis  No results for input(s): COLORU, CLARITYU, SPECGRAV, PHUR, PROTEINUA, GLUCOSEU, BILIRUBINCON, BLOODU, WBCU, RBCU, BACTERIA, MUCUS, NITRITE, LEUKOCYTESUR, UROBILINOGEN, HYALINECASTS in the last 24 hours.    Diagnostic Results:  X-Ray: Reviewed  US: Reviewed  Echo: Reviewed  ACCESS    ASSESSMENT/PLAN:       JOSE/CKD 4  UO 2081 cc/24 h  Hypokalemia  Azotemia  BUN 68  Creatinine 3.4  Metabolic bone disease  Anemia  Hb 8.4  Iron  Epogen  Renal diet  I and O  Weight daily

## 2019-12-14 NOTE — PROGRESS NOTES
[facility] Hospital Medicine  Progress Note    Patient Name: Cy Rutherford  MRN: [id number]  Patient Class: IP- Inpatient   Admission Date: 12/2/2019  Length of Stay: 11 days  Attending Physician: James Sanchez MD  Primary Care Provider: Heide Porter MD        Subjective:     Principal Problem:Acute on chronic diastolic heart failure        HPI:  Admitted for elective LE angiogram for evaluation of RLE claudication. Taken to the cath lab for the procedure via LCFA access with following results:     successful atherectomy (Hawk LX) with distal filter protection, followed by PTA with scoring balloon (5.0 x 150 dSFA, 6.0 x 150 pSFA/CFA) followed by PTA with DCB to dSFA (5.0 x 150), pSFA (5.0 x 100) and CFA (7 x 40) with good results. 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 He has elevated BG, no anion gap.  We will place on insulin sq and monito closely , if dka develop, we will place him on insulin drip    Overview/Hospital Course:  Had hypotension after the procedure and then had JOSE and had to be started on H.D(last H.D on 12/11). Nephrology following. Patient is also on lasix and diuresing well for past 2 days. Currently on I.V lasix pushes. Creatinine stable for past 2 days also. Blood sugar levels are controlled with Levemir, prandial insulin and ISS. Also found to having large left renal subcapsular hematoma on CT abd done on 12/4. ASA, Plavix and pletal are therefore held. On 12/11/19, patient had an episode of  hematemesis. Initially had drop in H/H, but stable since then. RADHAMES consulted and had EGD on 12/13. This showed Non-bleeding esophageal ulcer, which was biopsied and erythematous mucosa in the stomach. Advised to start on Protonix 40 mg BID for 12 weeks. Repeat UGD in 8 weeks to establish healing.                 PT/OT eval done and Rehab placement advised. Social work consult placed.     Interval History:  NAEON. Good Urine Ouput. Off Dialysis    Review of Systems   Constitutional: Negative.    HENT: Negative.    Eyes: Negative.    Respiratory: Negative.    Cardiovascular: Negative.    Gastrointestinal: Negative.    Musculoskeletal: Negative.    Skin: Negative.    Neurological: Positive for weakness (Generalized).   Hematological: Negative.    Psychiatric/Behavioral: Negative.      Objective:     Vital Signs (Most Recent):  Temp: 97.3 °F (36.3 °C) (12/14/19 1213)  Pulse: 72 (12/14/19 1213)  Resp: 17 (12/14/19 1213)  BP: (!) 156/68 (12/14/19 1213)  SpO2: (!) 94 % (12/14/19 1214) Vital Signs (24h Range):  Temp:  [96.8 °F (36 °C)-98.1 °F (36.7 °C)] 97.3 °F (36.3 °C)  Pulse:  [67-88] 72  Resp:  [17-20] 17  SpO2:  [93 %-97 %] 94 %  BP: (129-156)/(59-70) 156/68     Weight: 103 kg (227 lb 1.2 oz)  Body mass index is 33.05 kg/m².    Intake/Output Summary (Last 24 hours) at 12/14/2019 1321  Last data filed at 12/14/2019 0617  Gross per 24 hour   Intake 375 ml   Output 1801 ml   Net -1426 ml      Physical Exam   Constitutional: He is oriented to person, place, and time.   Eyes: Pupils are equal, round, and reactive to light.   Cardiovascular: Normal rate, regular rhythm, normal heart sounds and intact distal pulses.   No murmur heard.  Pulmonary/Chest: Effort normal and breath sounds normal. No respiratory distress. He has no wheezes. He has no rales.   Abdominal: Soft. Bowel sounds are normal. He exhibits no distension. There is no tenderness.   Musculoskeletal: He exhibits no edema.   Neurological: He is alert and oriented  to person, place, and time.   Skin: Skin is warm and dry. Capillary refill takes less than 2 seconds.   Psychiatric: He has a normal mood and affect. His behavior is normal.   Nursing note and vitals reviewed.      Significant Labs:   CBC:   Recent Labs   Lab 12/12/19  2307 12/13/19  0527 12/14/19  0548   WBC  --  9.96 9.06   HGB 8.1* 8.4*  8.4* 8.3*   HCT 25.7* 26.1*  26.1* 26.0*   PLT  --  240 267     CMP:   Recent Labs   Lab 12/13/19  0527 12/14/19  0548    135*   K 3.1* 3.0*   CL 99 97   CO2 26 29   * 175*   BUN 68* 78*   CREATININE 3.4* 3.5*   CALCIUM 8.2* 8.4*   ANIONGAP 11 9   EGFRNONAA 17* 16*     Cardiac Markers: No results for input(s): CKMB, MYOGLOBIN, BNP, TROPISTAT in the last 48 hours.  Coagulation: No results for input(s): PT, INR, APTT in the last 48 hours.  Magnesium:   Recent Labs   Lab 12/14/19  0548   MG 2.2     Troponin: No results for input(s): TROPONINI in the last 48 hours.  All pertinent labs within the past 24 hours have been reviewed.    Significant Imaging: I have reviewed all pertinent imaging results/findings within the past 24 hours. No New      Assessment/Plan:      * Acute on chronic diastolic heart failure  On Lasix BID per Primary and Nephrology with good urine output  Echo showed normal LVEF  Lopressor being held for acute on chronic diastolic HF and ARB for JOSE  Diuresing well and improving.      Hypokalemia  Replace as needed.      Constipation  Glycoloax daily and PRN suppository      Ulcer of esophagus without bleeding  Hematemesis recently sec to same  EGD on 12/13 showed esophageal ulcer with no bleeding now  G.I input appreciated  PPI drip d/jose ramon and on Protonix 40 BID.  Will need this for 12 weeks as per G.I  Will need repeat UGD in 8 weeks to establish healing.       JOSE (acute kidney injury)  Sec to ATN sec to hypotension and bleeding mostly  Nephrology on board and patient is on H.D  Cont management as per them.  Last H.D on 12/11 and creatinine stable  since then inspite of receiving no H.D and diuresing well too.    Anemia  Sec to acute blood loss sec to hematemesis mostly  Will cont to f/u and transfuse prn  H/H stable for past 24 hr.       PAD (peripheral artery disease)  -successful atherectomy and PTA with scoring balloon and  DCB to dSFA,  pSFA CFA and TPT   -As above, ASA, Plavix and pletal held for recent bleeding episodes.  -Cont statin.       CAD (coronary artery disease)  Cont statin  ASA and Plavix held for recent bleeding episodes  Hold Toprol for acute on chronic diastolic HF and ARB for JOSE.      Hyperlipidemia  Home dose statin      Essential hypertension  Cont home dose Norvasc and Doxazosin.  Better controlled now  Hold Toprol and Cozaar as above.      Type 2 diabetes mellitus with kidney complication, with long-term current use of insulin  Lab Results   Component Value Date    HGBA1C 5.6 12/02/2019     - Blood sugar levels better controlled now.  - Continue levemir 62 units nightly   - Continue aspart 17 units TID with meals  - Accuchecks with prn low dose SSI       VTE Risk Mitigation (From admission, onward)         Ordered     Place DARRION hose  Until discontinued      12/05/19 1005     heparin (porcine) injection 2,300 Units  As needed (PRN)      12/04/19 1137                      Alberta Espinoza NP  Department of Hospital Medicine   [facility]

## 2019-12-14 NOTE — PROGRESS NOTES
[facility]  Cardiology  Progress Note    Patient Name: Cy Rutherford  MRN: [id number]  Admission Date: 12/2/2019  Hospital Length of Stay: 11 days  Code Status: Full Code   Attending Physician: James Sanchez MD   Primary Care Physician: Heide Porter MD  Expected Discharge Date:   Principal Problem:Acute on chronic diastolic heart failure    Subjective:     12/2/2019 Admitted for elective LE angiogram for evaluation of RLE claudication. Taken to the cath lab for the procedure via LCFA access with following results:     successful atherectomy (Hawk LX) with distal filter protection, followed by PTA with scoring balloon (5.0 x 150 dSFA, 6.0 x 150 pSFA/CFA) followed by PTA with DCB to dSFA (5.0 x 150), pSFA (5.0 x 100) and CFA (7 x 40) with good results. Successful PTA to TPT with 3.0 x 40 balloon- see cath report for full report      Tolerated procedure well and recovered in pre post cath area. Hypotension noted along with back pain and diaphoresis. No hematoma noted and manual pressure applied out of concern for bleeding. STAT H&H down to 7.4&24.1 from 11.6&36.1. Given profound symptoms, major concern for bleeding prompting re evaluation with recurrent angiogram. Placed on IVFs along with IV Levophed. Repeat procedure via right radial access with images  in multiple views with no active bleed noted. Cuff pressure with significant difference in comparison to  aortic pressure which was significantly higher.  Admitted to ICU for close monitoring with kanchan placed. T&S with PRBC transfusion initiated with IV Lasix given in between   12/3/2019 Remain on IV Levophed drip at .46mcg/kg/min with BP 110s-130s/40s-50s HR 60s. Serial H&Hs Q4hrs overnight with  H&H this AM 10.0&30.2 with slight trend down to 9.1&27.5 on repeat check. Complains of SOB with increased RR. Only 500cc out overnight. Will repeat IV Lasix 40mg this AM. Echocardiogram and CXR as well. Will attempt to wean IV Levophed drip as  BP tolerates   12/4/2019 Creatinine trended up to 3.7 overnight with K+ 6.1. Continued with no urine output. Nephrology consulted yesterday with trialysis catheter placed yesterday with initiation of CRRT overnight. CRRT x 2-3 hours prior to clotting off. H&H trended down further to 7.3&21.8 with repeat PRBC transfusion. H&H trended up to 8.4&24.9 with continued serial monitoring with recurrent drop to 7.0&21.2. Concerned about recurrent bleeding with plans for repeat angiogram for re evaluation of acute bleed. Remains on IV Levophed with stable BP and wean in process. Complains of mild SOB with stable sats on Venti mask.   12/5/2019 Repeat angiogram yesterday using CO2 with no active bleed noted after extensive angiogram. H&H down 6.6&18.9 yesterday evening with repeat PRBC. CT abdomen/pelvis with evidence of large left renal subcapsular hematoma with surrounding retroperitoneal hemorrhage and no definite contrast extravasation seen to suggest active bleeding on delayed images. H&H up to 7.4&22.0-8.5&25.3. Weaned off IV Levophed with stable BP. Resumed CRRT yesterday evening with 695cc removed and 30cc urine output noted +2.7 liters. Continued mild SOB with stable sats on venti mask. BMP with K+ 5.0 BUN 62 creatinine 5.0. Will continue with serial H&Hs for now. Will place TEDs and consult PT   12/6/2019 Attempted CRRT and HD yesterday with clotted line and approximately 400cc blood loss due to inability to rinse back. Placed on IV Lasix drip at 20mg/hr by Nephrology with 675cc urine output overnight positive 3 liters since admission. K+ 5.0 with BUN 69 creatinine 5.4 this AM. H&H 74&22.4 this AM unchanged from overnight-will continue with serial H&Hs for now. Off IV pressors for over 48 hours with BP 130s-170s/70s overnight. Mild SOB remains per patient with slight improvement noted on PE. Updated son at bedside this morning   12/7/2019 HR and BP stable. H&H 7.7&23.2 unchanged from yesterday. Remain on IV Lasix  with adequate urine output. BUN 89 creatinine 6.0. Hopeful to transfer to floor tomorrow.   12/8/2019 H&H down to 6.7 & 20.5 this AM with repeat PRBC transfusion. Plan for HD today for clearance. Remains on IV Lasix drip with adequate urine output. Resumed antihypertensives with stabel BP. PT and OT on board  12/9/2019 H&H 8.8&26.6 this AM after PRBC transfusion yesterday. Approximately one hour of HD yesterday with cessation due to line malfunction. HR and BP remains stable. 2.7 liters out overnight negative 4.4 liters since admission.  creatinine 5.3. Will reconsult General Surgery today for new trialysis line placement for ongoing intermittent HD/CRRT.   12/10/2019 H&H 9.1&27.5 this AM. BUN 82 creatinine 4.0 after HD run yesterday afternoon. IV Lasix drip at 5mg/hr with 3.0 liters out overnight negative 5.6 liters since admission. HR and BP stable. Plan to transfer out of ICU today   12/11/2019 Transferred out of ICU yesterday. BMP with K+ 3.7 BUN 66 creatinine 3.3. Remains on IV Lasix drip with 1.8 liters urine output with 1.4 liters removed with HD negative 7.4 liters since admission. HR and BP stable. Working with PT currently. Mild abdominal distension with Simethicone and Miralax ordered yesterday with plans for Lactulose today   12/12/2019 Slight increase in abdominal pain with nausea followed by vomiting yesterday. Appearance of blood in emesis yesterday. Serial H&Hs overnight with no sharp drop-H&H this AM 8.4&27.2. Remains on IV Protonix drip with GI consulted with plans for EGD tomorrow. On IV Lasix drip as well with total of 2.6 liters out overnight (1.5 urine output 1.1 HD) negative 9.1 liters since admission. BUN 58 creatinine 3.1. KUB reviewed with improvement in air and successful BM-abdominal distension improved. Nephrology on board as well as PT  12/13/2019 H&H stable with no significant drop-will change CBCs to daily. BMP with BUN 68 creatinine 3.4. Transitioned from IV Lasix drip to IV  Lasix BID with 3.1 liters out overnight negative 11.9 liters since admission. EGD today with esophageal ulcer with recs for Protonix BID. HR and BP stable. PT on board.     12/14: Doing well today. No acute issues. Sitting up at bedside.       Review of Systems   Constitution: Negative for chills, decreased appetite, diaphoresis, fever, malaise/fatigue, weight gain and weight loss.   Cardiovascular: Negative for chest pain, claudication, dyspnea on exertion, irregular heartbeat, leg swelling, near-syncope, orthopnea, palpitations and paroxysmal nocturnal dyspnea.   Respiratory: Negative for cough, shortness of breath, snoring, sputum production and wheezing.    Endocrine: Negative for cold intolerance, heat intolerance, polydipsia, polyphagia and polyuria.   Skin: Negative for color change, dry skin, itching, nail changes and poor wound healing.   Musculoskeletal: Negative for back pain, gout, joint pain and joint swelling.   Gastrointestinal: Negative for bloating, abdominal pain, constipation, diarrhea, hematemesis, hematochezia, melena, nausea and vomiting.   Genitourinary: Negative for dysuria, hematuria and nocturia.   Neurological: Negative for dizziness, headaches, light-headedness, numbness, paresthesias and weakness.   Psychiatric/Behavioral: Negative for altered mental status, depression and memory loss.   Objective:     Vital Signs (Most Recent):  Temp: 97.4 °F (36.3 °C) (12/14/19 0801)  Pulse: 82 (12/14/19 0801)  Resp: 19 (12/14/19 0801)  BP: 129/62 (12/14/19 0801)  SpO2: (!) 93 % (12/14/19 0801) Vital Signs (24h Range):  Temp:  [96.8 °F (36 °C)-98.2 °F (36.8 °C)] 97.4 °F (36.3 °C)  Pulse:  [67-88] 82  Resp:  [18-20] 19  SpO2:  [93 %-98 %] 93 %  BP: (129-156)/(59-70) 129/62     Weight: 103 kg (227 lb 1.2 oz)  Body mass index is 33.05 kg/m².    SpO2: (!) 93 %  O2 Device (Oxygen Therapy): nasal cannula      Intake/Output Summary (Last 24 hours) at 12/14/2019 1022  Last data filed at 12/14/2019 0617  Gross  per 24 hour   Intake 375 ml   Output 1801 ml   Net -1426 ml       Lines/Drains/Airways     Central Venous Catheter Line                 Trialysis (Dialysis) Catheter 12/09/19 1230 right internal jugular 4 days          Airway                 Airway - Non-Surgical 12/13/19 0907 Nasal Cannula 1 day                Physical Exam   Constitutional: He is oriented to person, place, and time. He appears well-developed and well-nourished.   HENT:   Head: Normocephalic and atraumatic.   Eyes: Conjunctivae and EOM are normal.   Neck: Normal range of motion. Neck supple. No JVD present.   Cardiovascular: Normal rate, regular rhythm and normal heart sounds.   No murmur heard.  Pulmonary/Chest: Effort normal and breath sounds normal. No respiratory distress. He has no wheezes. He has no rales.   Abdominal: Soft. Bowel sounds are normal. He exhibits no distension.   Musculoskeletal: Normal range of motion. He exhibits no edema.   Neurological: He is alert and oriented to person, place, and time.   Skin: Skin is warm and dry. No erythema.   Psychiatric: He has a normal mood and affect. His behavior is normal. Judgment and thought content normal.   Nursing note and vitals reviewed.      Significant Labs:   BMP:   Recent Labs   Lab 12/13/19  0527 12/14/19  0548   * 175*    135*   K 3.1* 3.0*   CL 99 97   CO2 26 29   BUN 68* 78*   CREATININE 3.4* 3.5*   CALCIUM 8.2* 8.4*   MG  --  2.2   , CMP   Recent Labs   Lab 12/13/19  0527 12/14/19  0548    135*   K 3.1* 3.0*   CL 99 97   CO2 26 29   * 175*   BUN 68* 78*   CREATININE 3.4* 3.5*   CALCIUM 8.2* 8.4*   ANIONGAP 11 9   ESTGFRAFRICA 19* 19*   EGFRNONAA 17* 16*   , Troponin No results for input(s): TROPONINI in the last 48 hours. and All pertinent lab results from the last 24 hours have been reviewed.    Significant Imaging: Echocardiogram:   2D echo with color flow doppler:   Results for orders placed or performed during the hospital encounter of 05/08/18   2D  echo with color flow doppler   Result Value Ref Range    QEF 65 55 - 65    Diastolic Dysfunction No     Est. PA Systolic Pressure 18.63     Narrative    Date of Procedure: 05/09/2018        TEST DESCRIPTION   Technical Quality: This is a portable study performed at the patient's bedside. This is a technically challenging study. There is poor endocardial definition. This study was performed in conjunction with a 3ml intravenous injection of Optison contrast   agent.     Aorta: The aortic root is normal in size, measuring 2.9 cm at sinotubular junction and 3.4 cm at Sinuses of Valsalva. The proximal ascending aorta is normal in size, measuring 3.3 cm across.     Left Atrium: The left atrial volume index is normal, measuring 19.39 cc/m2.     Left Ventricle: The left ventricle is normal in size, with an end-diastolic diameter of 4.4 cm, and an end-systolic diameter of 2.4 cm. LV wall thickness is normal, with the septum and the posterior wall each measuring 1.0 cm across. Relative wall   thickness was increased at 0.45, and the LV mass index was 84.4 g/m2 consistent with concentric remodeling. There are no regional wall motion abnormalities. Left ventricular systolic function appears normal. Visually estimated ejection fraction is   60-65%. The LV Doppler derived stroke volume equals 101.0 ccs.     Diastolic indices: E wave velocity 0.8 m/s, E/A ratio 0.8,  msec., E/e' ratio(avg) 9. Diastolic function is normal.     Right Atrium: The right atrium is normal in size, measuring 4.5 cm in length and 3.3 cm in width in the apical view.     Right Ventricle: The right ventricle is normal in size measuring 4.0 cm at the base in the apical right ventricle-focused view. Global right ventricular systolic function appears normal. Tricuspid annular plane systolic excursion (TAPSE) is 2.3 cm.   Tissue Doppler-derived tricuspid annular peak systolic velocity (S prime) is 14.0 cm/s. The estimated PA systolic pressure is 19 mmHg.      Aortic Valve:  Aortic valve is normal in structure with normal leaflet mobility.     Mitral Valve:  Mitral valve is normal in structure with normal leaflet mobility.     Tricuspid Valve:  Tricuspid valve is normal in structure with normal leaflet mobility.     Pulmonary Valve:  The pulmonic valve is not well seen.     IVC: IVC is enlarged but collapses > 50% with a sniff, suggesting intermediate right atrial pressure of 8 mmHg.     Intracavitary: There is no evidence of pericardial effusion, intracavity mass, thrombi, or vegetation.         CONCLUSIONS     1 - Normal left ventricular systolic function (EF 60-65%).     2 - Normal right ventricular systolic function .     3 - Normal left ventricular diastolic function.     4 - The estimated PA systolic pressure is 19 mmHg.             This document has been electronically    SIGNED BY: Dionicio Garces MD On: 05/09/2018 15:21    This document was originally electronically signed on: 05/09/2018 15:21    and Transthoracic echo (TTE) complete (Cupid Only):   Results for orders placed or performed during the hospital encounter of 12/02/19   Echo Color Flow Doppler? Yes   Result Value Ref Range    BSA 2.3 m2    TDI SEPTAL 0.06 m/s    LV LATERAL E/E' RATIO 9.00 m/s    LV SEPTAL E/E' RATIO 10.50 m/s    TDI LATERAL 0.07 m/s    PV PEAK VELOCITY 1.81 cm/s    LVIDD 4.80 3.5 - 6.0 cm    IVS 1.10 (A) 0.6 - 1.1 cm    PW 1.10 (A) 0.6 - 1.1 cm    Ao root annulus 3.40 cm    LVIDS 2.56 2.1 - 4.0 cm    FS 47 28 - 44 %    LV mass 193.96 g    LA size 3.68 cm    TAPSE 1.44 cm    Left Ventricle Relative Wall Thickness 0.46 cm    AV mean gradient 6 mmHg    AV valve area 4.31 cm2    AV Velocity Ratio 0.78     AV index (prosthetic) 1.06     E/A ratio 0.62     Mean e' 0.07 m/s    E wave decelartion time 338.80 msec    LVOT diameter 2.27 cm    LVOT area 4.0 cm2    LVOT peak tyrell 1.16 m/s    LVOT peak VTI 26.25 cm    Ao peak tyrell 1.48 m/s    Ao VTI 24.66 cm    LVOT stroke volume 106.18 cm3    AV  peak gradient 9 mmHg    E/E' ratio 9.69 m/s    MV Peak E Simone 0.63 m/s    MV Peak A Simone 1.02 m/s    LV Systolic Volume 23.57 mL    LV Systolic Volume Index 10.6 mL/m2    LV Diastolic Volume 68.84 mL    LV Diastolic Volume Index 30.84 mL/m2    LV Mass Index 87 g/m2    Right Atrial Pressure (from IVC) 3 mmHg    Narrative    · Normal left ventricular systolic function. The estimated ejection   fraction is 55%  · Concentric left ventricular remodeling.  · No wall motion abnormalities.  · Normal right ventricular systolic function.  · Normal central venous pressure (3 mm Hg).         amLODIPine  10 mg Oral Daily    doxazosin  8 mg Oral Daily    [START ON 12/15/2019] epoetin gege-ebpx (RETACRIT) injection  20,000 Units Subcutaneous Q7 Days    furosemide  40 mg Intravenous BID    insulin aspart U-100  17 Units Subcutaneous TIDWM    insulin detemir U-100  62 Units Subcutaneous QHS    iron sucrose (VENOFER) IVPB  100 mg Intravenous Weekly    mupirocin   Nasal BID    pantoprazole  40 mg Oral BID AC    polyethylene glycol  17 g Oral Daily    potassium chloride  20 mEq Oral Once    potassium chloride  40 mEq Oral Once    rosuvastatin  20 mg Oral Daily    sevelamer carbonate  800 mg Oral TID WM    simethicone  125 mg Oral Q6H    vitamin renal formula (B-complex-vitamin c-folic acid)  1 capsule Oral Daily       Assessment and Plan:         Active Diagnoses:    Diagnosis Date Noted POA    PRINCIPAL PROBLEM:  Acute on chronic diastolic heart failure [I50.33] 12/08/2019 Yes    Hypotension [I95.9] 12/13/2019 Yes    Hypokalemia [E87.6] 12/13/2019 No    Ulcer of esophagus without bleeding [K22.10] 12/12/2019 No    Constipation [K59.00] 12/12/2019 Yes    Anemia [D64.9] 12/03/2019 Yes    Edema [R60.9] 12/03/2019 Yes    JOSE (acute kidney injury) [N17.9] 12/03/2019 Yes    Shortness of breath [R06.02] 12/03/2019 Yes    PAD (peripheral artery disease) [I73.9] 12/29/2014 Yes    Type 2 diabetes mellitus with kidney  complication, with long-term current use of insulin [E11.29, Z79.4] 11/15/2012 Not Applicable    CAD (coronary artery disease) [I25.10] 11/15/2012 Yes    Essential hypertension [I10] 11/15/2012 Yes    Hyperlipidemia [E78.5] 11/15/2012 Yes      Problems Resolved During this Admission:    Diagnosis Date Noted Date Resolved POA    Abdominal distension [R14.0] 12/10/2019 12/13/2019 No    Hypotension [I95.9] 12/03/2019 12/12/2019 Yes    Leukocytosis [D72.829] 12/03/2019 12/13/2019 Yes    Hyperkalemia [E87.5] 12/03/2019 12/12/2019 Yes       Acute on chronic diastolic heart failure  -echo with normal LVEF  -related to  volume overload secondary to PRBC transfusion  -transitioned to IV Lasix BID ; net neg -1.5  -PT and OT on board      Constipation  -related to immobility  -given Lactulose yesterday with results  -will place on daily stool softener     Hematemesis  -nausea with vomiting yesterday with visible hematemesis  - c/w PPI  -GI consulted; plans for EGD on 12/13        JOSE (acute kidney injury)  -multifactoral  -concerned about ATN given bleed and hypotension  -creatinine 1.2 upon admission with trend up to 3.7-4.5-5.3-5.4-5.5-5.0  -BUN 68creatinine cr stable at 3.5  -Nephrology on board; no HD in past 48 hours; on IV Lasix BID with good response  -will continue to avoid nephrotoxic agents and hypotension      PAD (peripheral artery disease)  -admitted for elective LE angiogram for further evaluation of LE claudication  -successful atherectomy and PTA with scoring balloon and  DCB to dSFA,  pSFA CFA and TPT   -foot warm and pulses present   -continue statin; no ASA and Plavix due to bleeding issue   -will need serial LE arterial ultrasounds as an outpatient         CAD (coronary artery disease)  -s/p LAD and LCX MARIAN in 2011  -was on  ASA, statin, Plavix as an outpatient along with CCB  -will continue to hold DAPT given concern for acute bleeding  -resumed CCB  -echocardiogram with normal LV function with EF  55%    Essential hypertension  -on Norvasc, Chlorthalidone, Metoprolol and Losartan at home  -meds held due to hypotension last week  -BP trended back up; CCB resumed with stable BP  -continue to monitor BP and adjust meds prn     Hopefully home early next week    VTE Risk Mitigation (From admission, onward)         Ordered     Place DARRION hose  Until discontinued      12/05/19 1005     heparin (porcine) injection 2,300 Units  As needed (PRN)      12/04/19 1137                Santana Hdez MD  Cardiology  [facility]

## 2019-12-14 NOTE — SUBJECTIVE & OBJECTIVE
Interval History:  NAEON. Good Urine Ouput. Off Dialysis    Review of Systems   Constitutional: Negative.    HENT: Negative.    Eyes: Negative.    Respiratory: Negative.    Cardiovascular: Negative.    Gastrointestinal: Negative.    Musculoskeletal: Negative.    Skin: Negative.    Neurological: Positive for weakness (Generalized).   Hematological: Negative.    Psychiatric/Behavioral: Negative.      Objective:     Vital Signs (Most Recent):  Temp: 97.3 °F (36.3 °C) (12/14/19 1213)  Pulse: 72 (12/14/19 1213)  Resp: 17 (12/14/19 1213)  BP: (!) 156/68 (12/14/19 1213)  SpO2: (!) 94 % (12/14/19 1214) Vital Signs (24h Range):  Temp:  [96.8 °F (36 °C)-98.1 °F (36.7 °C)] 97.3 °F (36.3 °C)  Pulse:  [67-88] 72  Resp:  [17-20] 17  SpO2:  [93 %-97 %] 94 %  BP: (129-156)/(59-70) 156/68     Weight: 103 kg (227 lb 1.2 oz)  Body mass index is 33.05 kg/m².    Intake/Output Summary (Last 24 hours) at 12/14/2019 1321  Last data filed at 12/14/2019 0617  Gross per 24 hour   Intake 375 ml   Output 1801 ml   Net -1426 ml      Physical Exam   Constitutional: He is oriented to person, place, and time.   Eyes: Pupils are equal, round, and reactive to light.   Cardiovascular: Normal rate, regular rhythm, normal heart sounds and intact distal pulses.   No murmur heard.  Pulmonary/Chest: Effort normal and breath sounds normal. No respiratory distress. He has no wheezes. He has no rales.   Abdominal: Soft. Bowel sounds are normal. He exhibits no distension. There is no tenderness.   Musculoskeletal: He exhibits no edema.   Neurological: He is alert and oriented to person, place, and time.   Skin: Skin is warm and dry. Capillary refill takes less than 2 seconds.   Psychiatric: He has a normal mood and affect. His behavior is normal.   Nursing note and vitals reviewed.      Significant Labs:   CBC:   Recent Labs   Lab 12/12/19  2307 12/13/19  0527 12/14/19  0548   WBC  --  9.96 9.06   HGB 8.1* 8.4*  8.4* 8.3*   HCT 25.7* 26.1*  26.1* 26.0*   PLT   --  240 267     CMP:   Recent Labs   Lab 12/13/19  0527 12/14/19  0548    135*   K 3.1* 3.0*   CL 99 97   CO2 26 29   * 175*   BUN 68* 78*   CREATININE 3.4* 3.5*   CALCIUM 8.2* 8.4*   ANIONGAP 11 9   EGFRNONAA 17* 16*     Cardiac Markers: No results for input(s): CKMB, MYOGLOBIN, BNP, TROPISTAT in the last 48 hours.  Coagulation: No results for input(s): PT, INR, APTT in the last 48 hours.  Magnesium:   Recent Labs   Lab 12/14/19  0548   MG 2.2     Troponin: No results for input(s): TROPONINI in the last 48 hours.  All pertinent labs within the past 24 hours have been reviewed.    Significant Imaging: I have reviewed all pertinent imaging results/findings within the past 24 hours. No New

## 2019-12-14 NOTE — NURSING
Cued into patient's room for evening rounds with permission. In supine position. Edgardo pain at this time. No complaints. Head of bed is elevated and side rails x2 up for patient safety. Bed alarm is activated and is on. Education done on fall precautions. Patient verbalized understanding. Will continue to be available to assist as needed with patient care

## 2019-12-14 NOTE — PT/OT/SLP PROGRESS
"Physical Therapy Treatment    Patient Name:  Cy Rutherford   MRN:  [id number]    Recommendations:     Discharge Recommendations:  rehabilitation facility   Discharge Equipment Recommendations: bedside commode, shower chair   Barriers to discharge: Decreased caregiver support and  Increased level of assistance currently needed    Assessment:     Cy Rutherford is a 74 y.o. male admitted with a medical diagnosis of Acute on chronic diastolic heart failure.  He presents with the following impairments/functional limitations:  weakness, impaired endurance, impaired self care skills, impaired functional mobilty, gait instability, impaired balance, decreased coordination, decreased upper extremity function, decreased lower extremity function, decreased safety awareness, decreased ROM, edema, impaired coordination, impaired fine motor. Pt able to perform ambulation training by 2 trials ~15-20 ft each with RW requiring min A. Would benefit from continued PT services to increase pt's out of bed therapeutic activity and exercise addressing all impairments listed above.    Rehab Prognosis: Good; patient would benefit from acute skilled PT services to address these deficits and reach maximum level of function.    Recent Surgery: Procedure(s) (LRB):  ESOPHAGOGASTRODUODENOSCOPY (EGD) (N/A) 1 Day Post-Op    Plan:     During this hospitalization, patient to be seen 6 x/week to address the identified rehab impairments via gait training, therapeutic activities, therapeutic exercises and progress toward the following goals:    · Plan of Care Expires:  01/06/20    Subjective     Chief Complaint: None exprressed  Patient/Family Comments/goals: "I can't believe this is happened to me".  Pain/Comfort:  · Pain Rating 1: (Did not complain of pain)  · Pain Rating Post-Intervention 1: (Did not complain of pain)      Objective:     Communicated with  nurse prior to session.  Patient found supine with oxygen, central line, telemetry upon PT " entry to room.     General Precautions: Standard, fall   Orthopedic Precautions:N/A   Braces: N/A     Functional Mobility:  · Bed Mobility:     · Rolling Right: minimum assistance  · Scooting: minimum assistance  · Supine to Sit: minimum assistance and moderate assistance  · Transfers:     · Sit to Stand:  minimum assistance with rolling walker  · Gait:  x2 trials ~15-20 ft with RW requiring min A plus constant verbal ceuing for proper sequencing      AM-PAC 6 CLICK MOBILITY  Turning over in bed (including adjusting bedclothes, sheets and blankets)?: 2  Sitting down on and standing up from a chair with arms (e.g., wheelchair, bedside commode, etc.): 3  Moving from lying on back to sitting on the side of the bed?: 2  Moving to and from a bed to a chair (including a wheelchair)?: 3  Need to walk in hospital room?: 3  Climbing 3-5 steps with a railing?: 1  Basic Mobility Total Score: 14       Therapeutic Activities and Exercises:  All bed mobility with assistance as documented above also requiring extra time to complete. Able to perform ambulation training by 2 trials of ~15-20 ft each with RW and min A plus verbal cueing for proper sequencing. 1 loss of balance to the left that required min A to recover. Demonstrates slow josh and a short step length. Sitting at EOB pt requested to use urinal needing min/mod A to place urinal correctly.      Patient left up in chair with all lines intact, call button in reach, chair alarm on and  nurse notified..    GOALS:   Multidisciplinary Problems     Physical Therapy Goals        Problem: Physical Therapy Goal    Goal Priority Disciplines Outcome Goal Variances Interventions   Physical Therapy Goal     PT, PT/OT Ongoing, Progressing     Description:  Goals to be met by: 2020     Patient will increase functional independence with mobility by performin. Supine to sit with Set-up [place]  2. Sit to stand transfer with Supervision  3. Bed to chair transfer with  Supervision using Rolling Walker  4. Gait  x 120 feet with Supervision using Rolling Walker.   5. Lower extremity exercise program x15 reps per handout, with supervision                      Time Tracking:     PT Received On: 12/14/19  PT Start Time: 0751     PT Stop Time: 0821  PT Total Time (min): 30 min     Billable Minutes: Gait Training  20 and Therapeutic Activity  10    Treatment Type: Treatment  PT/PTA: PTA     PTA Visit Number: 1     Earlene Kelly, PTA  12/14/2019

## 2019-12-14 NOTE — PLAN OF CARE
Problem: Physical Therapy Goal  Goal: Physical Therapy Goal  Description  Goals to be met by: 2020     Patient will increase functional independence with mobility by performin. Supine to sit with Set-up [place]  2. Sit to stand transfer with Supervision  3. Bed to chair transfer with Supervision using Rolling Walker  4. Gait  x 120 feet with Supervision using Rolling Walker.   5. Lower extremity exercise program x15 reps per handout, with supervision     Outcome: Ongoing, Progressing   Patient with minimal improvement in tolerance to activity; still requiring increased level of assistance; despite patient's co-morbidities, patient was living in community setting functioning at independent level for ADLs, and mobility prior to this event.  There is an expectation of returning to prior level of function to maintain independence thus avoiding readmission.  Patient's clinical condition meets full Inpatient Rehab (IPR) criteria; including the ability to actively participate in 3 hours of therapy.  A lower level of care(SNF) cannot provide the interdisciplinary treatment approach needed.

## 2019-12-15 LAB
ANION GAP SERPL CALC-SCNC: 10 MMOL/L (ref 8–16)
BASOPHILS # BLD AUTO: 0.01 K/UL (ref 0–0.2)
BASOPHILS # BLD AUTO: 0.01 K/UL (ref 0–0.2)
BASOPHILS NFR BLD: 0.1 % (ref 0–1.9)
BASOPHILS NFR BLD: 0.1 % (ref 0–1.9)
BUN SERPL-MCNC: 81 MG/DL (ref 8–23)
CALCIUM SERPL-MCNC: 8.3 MG/DL (ref 8.7–10.5)
CHLORIDE SERPL-SCNC: 98 MMOL/L (ref 95–110)
CO2 SERPL-SCNC: 28 MMOL/L (ref 23–29)
CREAT SERPL-MCNC: 3.3 MG/DL (ref 0.5–1.4)
DIFFERENTIAL METHOD: ABNORMAL
DIFFERENTIAL METHOD: ABNORMAL
EOSINOPHIL # BLD AUTO: 0.3 K/UL (ref 0–0.5)
EOSINOPHIL # BLD AUTO: 0.3 K/UL (ref 0–0.5)
EOSINOPHIL NFR BLD: 3.7 % (ref 0–8)
EOSINOPHIL NFR BLD: 3.7 % (ref 0–8)
ERYTHROCYTE [DISTWIDTH] IN BLOOD BY AUTOMATED COUNT: 14.9 % (ref 11.5–14.5)
ERYTHROCYTE [DISTWIDTH] IN BLOOD BY AUTOMATED COUNT: 14.9 % (ref 11.5–14.5)
EST. GFR  (AFRICAN AMERICAN): 20 ML/MIN/1.73 M^2
EST. GFR  (NON AFRICAN AMERICAN): 17 ML/MIN/1.73 M^2
GLUCOSE SERPL-MCNC: 132 MG/DL (ref 70–110)
HCT VFR BLD AUTO: 25.8 % (ref 40–54)
HCT VFR BLD AUTO: 25.8 % (ref 40–54)
HGB BLD-MCNC: 8.3 G/DL (ref 14–18)
HGB BLD-MCNC: 8.3 G/DL (ref 14–18)
LYMPHOCYTES # BLD AUTO: 0.9 K/UL (ref 1–4.8)
LYMPHOCYTES # BLD AUTO: 0.9 K/UL (ref 1–4.8)
LYMPHOCYTES NFR BLD: 10.6 % (ref 18–48)
LYMPHOCYTES NFR BLD: 10.6 % (ref 18–48)
MCH RBC QN AUTO: 29.2 PG (ref 27–31)
MCH RBC QN AUTO: 29.2 PG (ref 27–31)
MCHC RBC AUTO-ENTMCNC: 32.2 G/DL (ref 32–36)
MCHC RBC AUTO-ENTMCNC: 32.2 G/DL (ref 32–36)
MCV RBC AUTO: 91 FL (ref 82–98)
MCV RBC AUTO: 91 FL (ref 82–98)
MONOCYTES # BLD AUTO: 0.4 K/UL (ref 0.3–1)
MONOCYTES # BLD AUTO: 0.4 K/UL (ref 0.3–1)
MONOCYTES NFR BLD: 4.1 % (ref 4–15)
MONOCYTES NFR BLD: 4.1 % (ref 4–15)
NEUTROPHILS # BLD AUTO: 6.9 K/UL (ref 1.8–7.7)
NEUTROPHILS # BLD AUTO: 6.9 K/UL (ref 1.8–7.7)
NEUTROPHILS NFR BLD: 81.5 % (ref 38–73)
NEUTROPHILS NFR BLD: 81.5 % (ref 38–73)
PHOSPHATE SERPL-MCNC: 4.9 MG/DL (ref 2.7–4.5)
PLATELET # BLD AUTO: 315 K/UL (ref 150–350)
PLATELET # BLD AUTO: 315 K/UL (ref 150–350)
PMV BLD AUTO: 9.1 FL (ref 9.2–12.9)
PMV BLD AUTO: 9.1 FL (ref 9.2–12.9)
POCT GLUCOSE: 136 MG/DL (ref 70–110)
POCT GLUCOSE: 184 MG/DL (ref 70–110)
POCT GLUCOSE: 188 MG/DL (ref 70–110)
POCT GLUCOSE: 222 MG/DL (ref 70–110)
POCT GLUCOSE: 234 MG/DL (ref 70–110)
POTASSIUM SERPL-SCNC: 3.2 MMOL/L (ref 3.5–5.1)
RBC # BLD AUTO: 2.84 M/UL (ref 4.6–6.2)
RBC # BLD AUTO: 2.84 M/UL (ref 4.6–6.2)
SODIUM SERPL-SCNC: 136 MMOL/L (ref 136–145)
WBC # BLD AUTO: 8.49 K/UL (ref 3.9–12.7)
WBC # BLD AUTO: 8.49 K/UL (ref 3.9–12.7)

## 2019-12-15 PROCEDURE — 99233 PR SUBSEQUENT HOSPITAL CARE,LEVL III: ICD-10-PCS | Mod: ,,, | Performed by: STUDENT IN AN ORGANIZED HEALTH CARE EDUCATION/TRAINING PROGRAM

## 2019-12-15 PROCEDURE — 63600175 PHARM REV CODE 636 W HCPCS: Performed by: NURSE PRACTITIONER

## 2019-12-15 PROCEDURE — 25000003 PHARM REV CODE 250: Performed by: NURSE PRACTITIONER

## 2019-12-15 PROCEDURE — C9399 UNCLASSIFIED DRUGS OR BIOLOG: HCPCS | Performed by: INTERNAL MEDICINE

## 2019-12-15 PROCEDURE — 97530 THERAPEUTIC ACTIVITIES: CPT

## 2019-12-15 PROCEDURE — 99233 SBSQ HOSP IP/OBS HIGH 50: CPT | Mod: ,,, | Performed by: STUDENT IN AN ORGANIZED HEALTH CARE EDUCATION/TRAINING PROGRAM

## 2019-12-15 PROCEDURE — 63600175 PHARM REV CODE 636 W HCPCS: Performed by: INTERNAL MEDICINE

## 2019-12-15 PROCEDURE — 25000003 PHARM REV CODE 250: Performed by: INTERNAL MEDICINE

## 2019-12-15 PROCEDURE — 80048 BASIC METABOLIC PNL TOTAL CA: CPT

## 2019-12-15 PROCEDURE — 97116 GAIT TRAINING THERAPY: CPT

## 2019-12-15 PROCEDURE — 11000001 HC ACUTE MED/SURG PRIVATE ROOM

## 2019-12-15 PROCEDURE — 94761 N-INVAS EAR/PLS OXIMETRY MLT: CPT

## 2019-12-15 PROCEDURE — 85025 COMPLETE CBC W/AUTO DIFF WBC: CPT

## 2019-12-15 PROCEDURE — 84100 ASSAY OF PHOSPHORUS: CPT

## 2019-12-15 RX ORDER — POTASSIUM CHLORIDE 20 MEQ/1
40 TABLET, EXTENDED RELEASE ORAL ONCE
Status: COMPLETED | OUTPATIENT
Start: 2019-12-15 | End: 2019-12-15

## 2019-12-15 RX ORDER — SEVELAMER CARBONATE 800 MG/1
800 TABLET, FILM COATED ORAL DAILY
Status: DISCONTINUED | OUTPATIENT
Start: 2019-12-15 | End: 2019-12-19 | Stop reason: HOSPADM

## 2019-12-15 RX ORDER — POTASSIUM CHLORIDE 20 MEQ/1
20 TABLET, EXTENDED RELEASE ORAL ONCE
Status: COMPLETED | OUTPATIENT
Start: 2019-12-15 | End: 2019-12-15

## 2019-12-15 RX ORDER — SEVELAMER CARBONATE 800 MG/1
800 TABLET, FILM COATED ORAL 2 TIMES DAILY WITH MEALS
Status: DISCONTINUED | OUTPATIENT
Start: 2019-12-15 | End: 2019-12-15

## 2019-12-15 RX ADMIN — INSULIN ASPART 17 UNITS: 100 INJECTION, SOLUTION INTRAVENOUS; SUBCUTANEOUS at 09:12

## 2019-12-15 RX ADMIN — MUPIROCIN: 20 OINTMENT TOPICAL at 10:12

## 2019-12-15 RX ADMIN — SEVELAMER CARBONATE 800 MG: 800 TABLET, FILM COATED ORAL at 09:12

## 2019-12-15 RX ADMIN — AMLODIPINE BESYLATE 10 MG: 5 TABLET ORAL at 09:12

## 2019-12-15 RX ADMIN — ROSUVASTATIN CALCIUM 20 MG: 10 TABLET, FILM COATED ORAL at 09:12

## 2019-12-15 RX ADMIN — POTASSIUM CHLORIDE 40 MEQ: 1500 TABLET, EXTENDED RELEASE ORAL at 01:12

## 2019-12-15 RX ADMIN — EPOETIN ALFA-EPBX 20000 UNITS: 10000 INJECTION, SOLUTION INTRAVENOUS; SUBCUTANEOUS at 12:12

## 2019-12-15 RX ADMIN — POTASSIUM CHLORIDE 40 MEQ: 1500 TABLET, EXTENDED RELEASE ORAL at 12:12

## 2019-12-15 RX ADMIN — INSULIN ASPART 2 UNITS: 100 INJECTION, SOLUTION INTRAVENOUS; SUBCUTANEOUS at 09:12

## 2019-12-15 RX ADMIN — POTASSIUM CHLORIDE 20 MEQ: 1500 TABLET, EXTENDED RELEASE ORAL at 04:12

## 2019-12-15 RX ADMIN — SIMETHICONE 125 MG: 125 TABLET, CHEWABLE ORAL at 07:12

## 2019-12-15 RX ADMIN — INSULIN ASPART 2 UNITS: 100 INJECTION, SOLUTION INTRAVENOUS; SUBCUTANEOUS at 04:12

## 2019-12-15 RX ADMIN — SIMETHICONE 125 MG: 125 TABLET, CHEWABLE ORAL at 12:12

## 2019-12-15 RX ADMIN — PANTOPRAZOLE SODIUM 40 MG: 40 TABLET, DELAYED RELEASE ORAL at 07:12

## 2019-12-15 RX ADMIN — SIMETHICONE 125 MG: 125 TABLET, CHEWABLE ORAL at 03:12

## 2019-12-15 RX ADMIN — FUROSEMIDE 40 MG: 10 INJECTION, SOLUTION INTRAVENOUS at 09:12

## 2019-12-15 RX ADMIN — FUROSEMIDE 40 MG: 10 INJECTION, SOLUTION INTRAVENOUS at 05:12

## 2019-12-15 RX ADMIN — DOXAZOSIN 8 MG: 2 TABLET ORAL at 09:12

## 2019-12-15 RX ADMIN — INSULIN ASPART 17 UNITS: 100 INJECTION, SOLUTION INTRAVENOUS; SUBCUTANEOUS at 12:12

## 2019-12-15 RX ADMIN — SEVELAMER CARBONATE 800 MG: 800 TABLET, FILM COATED ORAL at 12:12

## 2019-12-15 RX ADMIN — SIMETHICONE 125 MG: 125 TABLET, CHEWABLE ORAL at 05:12

## 2019-12-15 RX ADMIN — INSULIN DETEMIR 62 UNITS: 100 INJECTION, SOLUTION SUBCUTANEOUS at 09:12

## 2019-12-15 RX ADMIN — INSULIN ASPART 2 UNITS: 100 INJECTION, SOLUTION INTRAVENOUS; SUBCUTANEOUS at 12:12

## 2019-12-15 RX ADMIN — POLYETHYLENE GLYCOL 3350 17 G: 17 POWDER, FOR SOLUTION ORAL at 09:12

## 2019-12-15 RX ADMIN — POTASSIUM CHLORIDE 20 MEQ: 1500 TABLET, EXTENDED RELEASE ORAL at 08:12

## 2019-12-15 RX ADMIN — INSULIN ASPART 4 UNITS: 100 INJECTION, SOLUTION INTRAVENOUS; SUBCUTANEOUS at 09:12

## 2019-12-15 RX ADMIN — SEVELAMER CARBONATE 800 MG: 800 TABLET, FILM COATED ORAL at 04:12

## 2019-12-15 RX ADMIN — PANTOPRAZOLE SODIUM 40 MG: 40 TABLET, DELAYED RELEASE ORAL at 04:12

## 2019-12-15 RX ADMIN — INSULIN ASPART 17 UNITS: 100 INJECTION, SOLUTION INTRAVENOUS; SUBCUTANEOUS at 04:12

## 2019-12-15 RX ADMIN — NEPHROCAP 1 CAPSULE: 1 CAP ORAL at 09:12

## 2019-12-15 NOTE — PLAN OF CARE
Problem: Physical Therapy Goal  Goal: Physical Therapy Goal  Description  Goals to be met by: 2020     Patient will increase functional independence with mobility by performin. Supine to sit with Set-up [place]  2. Sit to stand transfer with Supervision  3. Bed to chair transfer with Supervision using Rolling Walker  4. Gait  x 120 feet with Supervision using Rolling Walker.   5. Lower extremity exercise program x15 reps per handout, with supervision     Outcome: Ongoing, Progressing   Pt continues to work and progress toward established goals.

## 2019-12-15 NOTE — PROGRESS NOTES
[facility]  Cardiology  Progress Note    Patient Name: Cy Rutherford  MRN: [id number]  Admission Date: 12/2/2019  Hospital Length of Stay: 12 days  Code Status: Full Code   Attending Physician: James Sacnhez MD   Primary Care Physician: Heide Porter MD  Expected Discharge Date:   Principal Problem:Acute on chronic diastolic heart failure    Subjective:     12/2/2019 Admitted for elective LE angiogram for evaluation of RLE claudication. Taken to the cath lab for the procedure via LCFA access with following results:     successful atherectomy (Hawk LX) with distal filter protection, followed by PTA with scoring balloon (5.0 x 150 dSFA, 6.0 x 150 pSFA/CFA) followed by PTA with DCB to dSFA (5.0 x 150), pSFA (5.0 x 100) and CFA (7 x 40) with good results. Successful PTA to TPT with 3.0 x 40 balloon- see cath report for full report      Tolerated procedure well and recovered in pre post cath area. Hypotension noted along with back pain and diaphoresis. No hematoma noted and manual pressure applied out of concern for bleeding. STAT H&H down to 7.4&24.1 from 11.6&36.1. Given profound symptoms, major concern for bleeding prompting re evaluation with recurrent angiogram. Placed on IVFs along with IV Levophed. Repeat procedure via right radial access with images  in multiple views with no active bleed noted. Cuff pressure with significant difference in comparison to  aortic pressure which was significantly higher.  Admitted to ICU for close monitoring with kanchan placed. T&S with PRBC transfusion initiated with IV Lasix given in between   12/3/2019 Remain on IV Levophed drip at .46mcg/kg/min with BP 110s-130s/40s-50s HR 60s. Serial H&Hs Q4hrs overnight with  H&H this AM 10.0&30.2 with slight trend down to 9.1&27.5 on repeat check. Complains of SOB with increased RR. Only 500cc out overnight. Will repeat IV Lasix 40mg this AM. Echocardiogram and CXR as well. Will attempt to wean IV Levophed drip as  BP tolerates   12/4/2019 Creatinine trended up to 3.7 overnight with K+ 6.1. Continued with no urine output. Nephrology consulted yesterday with trialysis catheter placed yesterday with initiation of CRRT overnight. CRRT x 2-3 hours prior to clotting off. H&H trended down further to 7.3&21.8 with repeat PRBC transfusion. H&H trended up to 8.4&24.9 with continued serial monitoring with recurrent drop to 7.0&21.2. Concerned about recurrent bleeding with plans for repeat angiogram for re evaluation of acute bleed. Remains on IV Levophed with stable BP and wean in process. Complains of mild SOB with stable sats on Venti mask.   12/5/2019 Repeat angiogram yesterday using CO2 with no active bleed noted after extensive angiogram. H&H down 6.6&18.9 yesterday evening with repeat PRBC. CT abdomen/pelvis with evidence of large left renal subcapsular hematoma with surrounding retroperitoneal hemorrhage and no definite contrast extravasation seen to suggest active bleeding on delayed images. H&H up to 7.4&22.0-8.5&25.3. Weaned off IV Levophed with stable BP. Resumed CRRT yesterday evening with 695cc removed and 30cc urine output noted +2.7 liters. Continued mild SOB with stable sats on venti mask. BMP with K+ 5.0 BUN 62 creatinine 5.0. Will continue with serial H&Hs for now. Will place TEDs and consult PT   12/6/2019 Attempted CRRT and HD yesterday with clotted line and approximately 400cc blood loss due to inability to rinse back. Placed on IV Lasix drip at 20mg/hr by Nephrology with 675cc urine output overnight positive 3 liters since admission. K+ 5.0 with BUN 69 creatinine 5.4 this AM. H&H 74&22.4 this AM unchanged from overnight-will continue with serial H&Hs for now. Off IV pressors for over 48 hours with BP 130s-170s/70s overnight. Mild SOB remains per patient with slight improvement noted on PE. Updated son at bedside this morning   12/7/2019 HR and BP stable. H&H 7.7&23.2 unchanged from yesterday. Remain on IV Lasix  with adequate urine output. BUN 89 creatinine 6.0. Hopeful to transfer to floor tomorrow.   12/8/2019 H&H down to 6.7 & 20.5 this AM with repeat PRBC transfusion. Plan for HD today for clearance. Remains on IV Lasix drip with adequate urine output. Resumed antihypertensives with stabel BP. PT and OT on board  12/9/2019 H&H 8.8&26.6 this AM after PRBC transfusion yesterday. Approximately one hour of HD yesterday with cessation due to line malfunction. HR and BP remains stable. 2.7 liters out overnight negative 4.4 liters since admission.  creatinine 5.3. Will reconsult General Surgery today for new trialysis line placement for ongoing intermittent HD/CRRT.   12/10/2019 H&H 9.1&27.5 this AM. BUN 82 creatinine 4.0 after HD run yesterday afternoon. IV Lasix drip at 5mg/hr with 3.0 liters out overnight negative 5.6 liters since admission. HR and BP stable. Plan to transfer out of ICU today   12/11/2019 Transferred out of ICU yesterday. BMP with K+ 3.7 BUN 66 creatinine 3.3. Remains on IV Lasix drip with 1.8 liters urine output with 1.4 liters removed with HD negative 7.4 liters since admission. HR and BP stable. Working with PT currently. Mild abdominal distension with Simethicone and Miralax ordered yesterday with plans for Lactulose today   12/12/2019 Slight increase in abdominal pain with nausea followed by vomiting yesterday. Appearance of blood in emesis yesterday. Serial H&Hs overnight with no sharp drop-H&H this AM 8.4&27.2. Remains on IV Protonix drip with GI consulted with plans for EGD tomorrow. On IV Lasix drip as well with total of 2.6 liters out overnight (1.5 urine output 1.1 HD) negative 9.1 liters since admission. BUN 58 creatinine 3.1. KUB reviewed with improvement in air and successful BM-abdominal distension improved. Nephrology on board as well as PT  12/13/2019 H&H stable with no significant drop-will change CBCs to daily. BMP with BUN 68 creatinine 3.4. Transitioned from IV Lasix drip to IV  Lasix BID with 3.1 liters out overnight negative 11.9 liters since admission. EGD today with esophageal ulcer with recs for Protonix BID. HR and BP stable. PT on board.     12/14: Doing well today. No acute issues. Sitting up at bedside.  12/15: Doing well. No acute issues. Sitting in chair.     Review of Systems   Constitution: Negative for chills, decreased appetite, diaphoresis, fever, malaise/fatigue, weight gain and weight loss.   Cardiovascular: Negative for chest pain, claudication, dyspnea on exertion, irregular heartbeat, leg swelling, near-syncope, orthopnea, palpitations and paroxysmal nocturnal dyspnea.   Respiratory: Negative for cough, shortness of breath, snoring, sputum production and wheezing.    Endocrine: Negative for cold intolerance, heat intolerance, polydipsia, polyphagia and polyuria.   Skin: Negative for color change, dry skin, itching, nail changes and poor wound healing.   Musculoskeletal: Negative for back pain, gout, joint pain and joint swelling.   Gastrointestinal: Negative for bloating, abdominal pain, constipation, diarrhea, hematemesis, hematochezia, melena, nausea and vomiting.   Genitourinary: Negative for dysuria, hematuria and nocturia.   Neurological: Negative for dizziness, headaches, light-headedness, numbness, paresthesias and weakness.   Psychiatric/Behavioral: Negative for altered mental status, depression and memory loss.   Objective:     Vital Signs (Most Recent):  Temp: 98.3 °F (36.8 °C) (12/15/19 0803)  Pulse: 77 (12/15/19 0803)  Resp: 19 (12/15/19 0803)  BP: (!) 157/70 (12/15/19 0948)  SpO2: (!) 93 % (12/15/19 0803) Vital Signs (24h Range):  Temp:  [97.3 °F (36.3 °C)-98.9 °F (37.2 °C)] 98.3 °F (36.8 °C)  Pulse:  [68-79] 77  Resp:  [14-19] 19  SpO2:  [92 %-97 %] 93 %  BP: (141-158)/(65-70) 157/70     Weight: 103 kg (227 lb 1.2 oz)  Body mass index is 33.05 kg/m².    SpO2: (!) 93 %  O2 Device (Oxygen Therapy): room air      Intake/Output Summary (Last 24 hours) at  12/15/2019 1103  Last data filed at 12/15/2019 1000  Gross per 24 hour   Intake 585 ml   Output 1598 ml   Net -1013 ml       Lines/Drains/Airways     Central Venous Catheter Line                 Trialysis (Dialysis) Catheter 12/09/19 1230 right internal jugular 5 days          Airway                 Airway - Non-Surgical 12/13/19 0907 Nasal Cannula 2 days                Physical Exam   Constitutional: He is oriented to person, place, and time. He appears well-developed and well-nourished.   HENT:   Head: Normocephalic and atraumatic.   Eyes: Conjunctivae and EOM are normal.   Neck: Normal range of motion. Neck supple. No JVD present.   Cardiovascular: Normal rate, regular rhythm and normal heart sounds.   No murmur heard.  Pulmonary/Chest: Effort normal and breath sounds normal. No respiratory distress. He has no wheezes. He has no rales.   Abdominal: Soft. Bowel sounds are normal. He exhibits no distension.   Musculoskeletal: Normal range of motion. He exhibits edema.   2+ b/l   Neurological: He is alert and oriented to person, place, and time.   Skin: Skin is warm and dry. No erythema.   Psychiatric: He has a normal mood and affect. His behavior is normal. Judgment and thought content normal.   Nursing note and vitals reviewed.      Significant Labs:   BMP:   Recent Labs   Lab 12/14/19  0548 12/15/19  0742   * 132*  132*  132*   * 136  136  136   K 3.0* 3.2*  3.2*  3.2*   CL 97 98  98  98   CO2 29 28  28  28   BUN 78* 81*  81*  81*   CREATININE 3.5* 3.3*  3.3*  3.3*   CALCIUM 8.4* 8.3*  8.3*  8.3*   MG 2.2  --    , CMP   Recent Labs   Lab 12/14/19  0548 12/15/19  0742   * 136  136  136   K 3.0* 3.2*  3.2*  3.2*   CL 97 98  98  98   CO2 29 28  28  28   * 132*  132*  132*   BUN 78* 81*  81*  81*   CREATININE 3.5* 3.3*  3.3*  3.3*   CALCIUM 8.4* 8.3*  8.3*  8.3*   ANIONGAP 9 10  10  10   ESTGFRAFRICA 19* 20*  20*  20*   EGFRNONAA 16* 17*  17*  17*   ,  Troponin No results for input(s): TROPONINI in the last 48 hours. and All pertinent lab results from the last 24 hours have been reviewed.    Significant Imaging: Echocardiogram:   2D echo with color flow doppler:   Results for orders placed or performed during the hospital encounter of 05/08/18   2D echo with color flow doppler   Result Value Ref Range    QEF 65 55 - 65    Diastolic Dysfunction No     Est. PA Systolic Pressure 18.63     Narrative    Date of Procedure: 05/09/2018        TEST DESCRIPTION   Technical Quality: This is a portable study performed at the patient's bedside. This is a technically challenging study. There is poor endocardial definition. This study was performed in conjunction with a 3ml intravenous injection of Optison contrast   agent.     Aorta: The aortic root is normal in size, measuring 2.9 cm at sinotubular junction and 3.4 cm at Sinuses of Valsalva. The proximal ascending aorta is normal in size, measuring 3.3 cm across.     Left Atrium: The left atrial volume index is normal, measuring 19.39 cc/m2.     Left Ventricle: The left ventricle is normal in size, with an end-diastolic diameter of 4.4 cm, and an end-systolic diameter of 2.4 cm. LV wall thickness is normal, with the septum and the posterior wall each measuring 1.0 cm across. Relative wall   thickness was increased at 0.45, and the LV mass index was 84.4 g/m2 consistent with concentric remodeling. There are no regional wall motion abnormalities. Left ventricular systolic function appears normal. Visually estimated ejection fraction is   60-65%. The LV Doppler derived stroke volume equals 101.0 ccs.     Diastolic indices: E wave velocity 0.8 m/s, E/A ratio 0.8,  msec., E/e' ratio(avg) 9. Diastolic function is normal.     Right Atrium: The right atrium is normal in size, measuring 4.5 cm in length and 3.3 cm in width in the apical view.     Right Ventricle: The right ventricle is normal in size measuring 4.0 cm at the base  in the apical right ventricle-focused view. Global right ventricular systolic function appears normal. Tricuspid annular plane systolic excursion (TAPSE) is 2.3 cm.   Tissue Doppler-derived tricuspid annular peak systolic velocity (S prime) is 14.0 cm/s. The estimated PA systolic pressure is 19 mmHg.     Aortic Valve:  Aortic valve is normal in structure with normal leaflet mobility.     Mitral Valve:  Mitral valve is normal in structure with normal leaflet mobility.     Tricuspid Valve:  Tricuspid valve is normal in structure with normal leaflet mobility.     Pulmonary Valve:  The pulmonic valve is not well seen.     IVC: IVC is enlarged but collapses > 50% with a sniff, suggesting intermediate right atrial pressure of 8 mmHg.     Intracavitary: There is no evidence of pericardial effusion, intracavity mass, thrombi, or vegetation.         CONCLUSIONS     1 - Normal left ventricular systolic function (EF 60-65%).     2 - Normal right ventricular systolic function .     3 - Normal left ventricular diastolic function.     4 - The estimated PA systolic pressure is 19 mmHg.             This document has been electronically    SIGNED BY: Dionicio Garces MD On: 05/09/2018 15:21    This document was originally electronically signed on: 05/09/2018 15:21    and Transthoracic echo (TTE) complete (Cupid Only):   Results for orders placed or performed during the hospital encounter of 12/02/19   Echo Color Flow Doppler? Yes   Result Value Ref Range    BSA 2.3 m2    TDI SEPTAL 0.06 m/s    LV LATERAL E/E' RATIO 9.00 m/s    LV SEPTAL E/E' RATIO 10.50 m/s    TDI LATERAL 0.07 m/s    PV PEAK VELOCITY 1.81 cm/s    LVIDD 4.80 3.5 - 6.0 cm    IVS 1.10 (A) 0.6 - 1.1 cm    PW 1.10 (A) 0.6 - 1.1 cm    Ao root annulus 3.40 cm    LVIDS 2.56 2.1 - 4.0 cm    FS 47 28 - 44 %    LV mass 193.96 g    LA size 3.68 cm    TAPSE 1.44 cm    Left Ventricle Relative Wall Thickness 0.46 cm    AV mean gradient 6 mmHg    AV valve area 4.31 cm2    AV Velocity  Ratio 0.78     AV index (prosthetic) 1.06     E/A ratio 0.62     Mean e' 0.07 m/s    E wave decelartion time 338.80 msec    LVOT diameter 2.27 cm    LVOT area 4.0 cm2    LVOT peak simone 1.16 m/s    LVOT peak VTI 26.25 cm    Ao peak simone 1.48 m/s    Ao VTI 24.66 cm    LVOT stroke volume 106.18 cm3    AV peak gradient 9 mmHg    E/E' ratio 9.69 m/s    MV Peak E Simone 0.63 m/s    MV Peak A Simone 1.02 m/s    LV Systolic Volume 23.57 mL    LV Systolic Volume Index 10.6 mL/m2    LV Diastolic Volume 68.84 mL    LV Diastolic Volume Index 30.84 mL/m2    LV Mass Index 87 g/m2    Right Atrial Pressure (from IVC) 3 mmHg    Narrative    · Normal left ventricular systolic function. The estimated ejection   fraction is 55%  · Concentric left ventricular remodeling.  · No wall motion abnormalities.  · Normal right ventricular systolic function.  · Normal central venous pressure (3 mm Hg).         amLODIPine  10 mg Oral Daily    doxazosin  8 mg Oral Daily    epoetin gege-ebpx (RETACRIT) injection  20,000 Units Subcutaneous Q7 Days    furosemide  40 mg Intravenous BID    insulin aspart U-100  17 Units Subcutaneous TIDWM    insulin detemir U-100  62 Units Subcutaneous QHS    iron sucrose (VENOFER) IVPB  100 mg Intravenous Weekly    mupirocin   Nasal BID    pantoprazole  40 mg Oral BID AC    polyethylene glycol  17 g Oral Daily    potassium chloride  20 mEq Oral Once    rosuvastatin  20 mg Oral Daily    sevelamer carbonate  800 mg Oral TID WM    simethicone  125 mg Oral Q6H    vitamin renal formula (B-complex-vitamin c-folic acid)  1 capsule Oral Daily       Assessment and Plan:         Active Diagnoses:    Diagnosis Date Noted POA    PRINCIPAL PROBLEM:  Acute on chronic diastolic heart failure [I50.33] 12/08/2019 Yes    Hypokalemia [E87.6] 12/13/2019 No    Ulcer of esophagus without bleeding [K22.10] 12/12/2019 No    Constipation [K59.00] 12/12/2019 Yes    Anemia [D64.9] 12/03/2019 Yes    JOSE (acute kidney injury) [N17.9]  12/03/2019 Yes    PAD (peripheral artery disease) [I73.9] 12/29/2014 Yes    Type 2 diabetes mellitus with kidney complication, with long-term current use of insulin [E11.29, Z79.4] 11/15/2012 Not Applicable    CAD (coronary artery disease) [I25.10] 11/15/2012 Yes    Essential hypertension [I10] 11/15/2012 Yes    Hyperlipidemia [E78.5] 11/15/2012 Yes      Problems Resolved During this Admission:    Diagnosis Date Noted Date Resolved POA    Hypotension [I95.9] 12/13/2019 12/14/2019 Yes    Abdominal distension [R14.0] 12/10/2019 12/13/2019 No    Hypotension [I95.9] 12/03/2019 12/12/2019 Yes    Leukocytosis [D72.829] 12/03/2019 12/13/2019 Yes    Hyperkalemia [E87.5] 12/03/2019 12/12/2019 Yes       Acute on chronic diastolic heart failure  -echo with normal LVEF  -related to  volume overload secondary to PRBC transfusion  -transitioned to IV Lasix BID ; net neg -1 per day  -PT and OT on board    - pt continues to have 2+ pitting edema b/l    Constipation  -related to immobility  -given Lactulose yesterday with results  -will place on daily stool softener     Hematemesis  -nausea with vomiting yesterday with visible hematemesis  - c/w PPI  -GI consulted; plans for EGD on 12/13        JOSE (acute kidney injury)  -multifactoral  -concerned about ATN given bleed and hypotension  -creatinine 1.2 upon admission with trend up to 3.7-4.5-5.3-5.4-5.5-5.0  -BUN 68creatinine cr stable at 3.5 > 3.3  -Nephrology on board; no HD in past 48 hours; on IV Lasix BID with good response  -will continue to avoid nephrotoxic agents and hypotension      PAD (peripheral artery disease)  -admitted for elective LE angiogram for further evaluation of LE claudication  -successful atherectomy and PTA with scoring balloon and  DCB to dSFA,  pSFA CFA and TPT   -foot warm and pulses present   -continue statin; no ASA and Plavix due to bleeding issue   -will need serial LE arterial ultrasounds as an outpatient         CAD (coronary artery  disease)  -s/p LAD and LCX MARIAN in 2011  -was on  ASA, statin, Plavix as an outpatient along with CCB  -will continue to hold DAPT given concern for acute bleeding  -resumed CCB  -echocardiogram with normal LV function with EF 55%    Essential hypertension  -on Norvasc, Chlorthalidone, Metoprolol and Losartan at home  -meds held due to hypotension last week  -BP trended back up; CCB resumed with stable BP  -continue to monitor BP and adjust meds prn     Hopefully home early next week    VTE Risk Mitigation (From admission, onward)         Ordered     Place DARRION hose  Until discontinued      12/05/19 1005     heparin (porcine) injection 2,300 Units  As needed (PRN)      12/04/19 1137                Santana Hdez MD  Cardiology  [facility]

## 2019-12-15 NOTE — PT/OT/SLP PROGRESS
"Physical Therapy Treatment    Patient Name:  Cy Rutherford   MRN:  [id number]    Recommendations:     Discharge Recommendations:  rehabilitation facility   Discharge Equipment Recommendations: bedside commode, shower chair   Barriers to discharge: Decreased caregiver support and  Increased level of assistance currently needed    Assessment:     Cy Rutherford is a 74 y.o. male admitted with a medical diagnosis of Acute on chronic diastolic heart failure.  He presents with the following impairments/functional limitations:  weakness, impaired endurance, impaired self care skills, impaired balance, gait instability, impaired functional mobilty, decreased coordination, decreased upper extremity function, decreased lower extremity function, decreased ROM, edema, impaired fine motor, impaired coordination, decreased safety awareness. Pt able to increase ambulation distance this session. Performed ambulation training ~40 ft with RW requiring min A. 2 losses of balance which required min A to recover from. Would benefit from continued PT services to increase pt's out of bed therapeutic activity and exercise addressing all impairments listed above.    Rehab Prognosis: Good; patient would benefit from acute skilled PT services to address these deficits and reach maximum level of function.    Recent Surgery: Procedure(s) (LRB):  ESOPHAGOGASTRODUODENOSCOPY (EGD) (N/A) 2 Days Post-Op    Plan:     During this hospitalization, patient to be seen 6 x/week to address the identified rehab impairments via gait training, therapeutic activities, therapeutic exercises and progress toward the following goals:    · Plan of Care Expires:  01/06/20    Subjective     Chief Complaint: None Expressed  Patient/Family Comments/goals: "I feel better today".  Pain/Comfort:  · Pain Rating 1: ( Did not complain of pain)  · Pain Rating Post-Intervention 1: ( Did not complain of pain)      Objective:     Communicated with nurse prior to session.  " Patient found supine with telemetry, central line upon PT entry to room.     General Precautions: Standard, fall   Orthopedic Precautions:N/A   Braces: N/A     Functional Mobility:  · Bed Mobility:     · Rolling Right: minimum assistance and  Extra time needed to complete correctly  · Scooting: contact guard assistance  · Supine to Sit: minimum assistance  · Transfers:     · Sit to Stand:  contact guard assistance, minimum assistance and  Extra time plus verbal cueing for proper execution with rolling walker  · Gait:  ~8-10 ft to B/S commode and ~40 ft with RW, NO O2, and min A  · Balance: static sitting=G  static standing within RW= F+  dynamic standing (ambualtion with RW)= F-      AM-PAC 6 CLICK MOBILITY  Turning over in bed (including adjusting bedclothes, sheets and blankets)?: 3( Extra time)  Sitting down on and standing up from a chair with arms (e.g., wheelchair, bedside commode, etc.): 3  Moving from lying on back to sitting on the side of the bed?: 3(Extra time)  Moving to and from a bed to a chair (including a wheelchair)?: 3  Need to walk in hospital room?: 3  Climbing 3-5 steps with a railing?: 1  Basic Mobility Total Score: 16       Therapeutic Activities and Exercises:   Pt requested to ambulate to sit on B/S commode. Rolled right to left to doff wet diaper requiring CGA and assisted pt to place RUE onto bed rail once there pt able to pull to the side. Demonstrated an increased ability to use right hand when reaching for objects, holding them and able to increase  on RW handle with right hand and to keep it in place during ambulation. Ambulated x 2 trials ~8-10 ft from EOB to B/S commode and ~40 ft both with RW and min A. Sat on B/S commode ~10 minutes and unable to have a bowel movement. 2 losses of balance one to the right side and one posterior and to the right which required min A to recover from during longer distance. Demonstrates a slow josh and an increase in hip flexion during swing  phase RLE.     Patient left  in B/S chair with feet elevated and on 2 pillows as bilateral feet with visible/pitting edema with all lines intact, call button in reach, chair alarm on and  nurse notified..    GOALS:   Multidisciplinary Problems     Physical Therapy Goals        Problem: Physical Therapy Goal    Goal Priority Disciplines Outcome Goal Variances Interventions   Physical Therapy Goal     PT, PT/OT Ongoing, Progressing     Description:  Goals to be met by: 2020     Patient will increase functional independence with mobility by performin. Supine to sit with Set-up [place]  2. Sit to stand transfer with Supervision  3. Bed to chair transfer with Supervision using Rolling Walker  4. Gait  x 120 feet with Supervision using Rolling Walker.   5. Lower extremity exercise program x15 reps per handout, with supervision                      Time Tracking:     PT Received On: 12/15/19  PT Start Time: 852     PT Stop Time: 926  PT Total Time (min): 34 min     Billable Minutes: Gait Training  20 and Therapeutic Activity  14    Treatment Type: Treatment  PT/PTA: PTA     PTA Visit Number: 2     Earlene Kelly, PTA  12/15/2019

## 2019-12-15 NOTE — PLAN OF CARE
Patient's overall condition unchanged. Vss see flowsheet, pt denied sob/pain/n/v throughout shift. Responding well to IV Lasix; see uop documentation. Patient sat up in chair for about 2 hours and tolerated well. NAD noted at this time, safety maintained, call light in reach, will continue to monitor.

## 2019-12-15 NOTE — PROGRESS NOTES
[facility] Hospital Medicine  Progress Note    Patient Name: Cy Rutherford  MRN: [id number]  Patient Class: IP- Inpatient   Admission Date: 12/2/2019  Length of Stay: 12 days  Attending Physician: James Sanchez MD  Primary Care Provider: Heide Porter MD        Subjective:     Principal Problem:Acute on chronic diastolic heart failure        HPI:  Admitted for elective LE angiogram for evaluation of RLE claudication. Taken to the cath lab for the procedure via LCFA access with following results:     successful atherectomy (Hawk LX) with distal filter protection, followed by PTA with scoring balloon (5.0 x 150 dSFA, 6.0 x 150 pSFA/CFA) followed by PTA with DCB to dSFA (5.0 x 150), pSFA (5.0 x 100) and CFA (7 x 40) with good results. 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 He has elevated BG, no anion gap.  We will place on insulin sq and monito closely , if dka develop, we will place him on insulin drip    Overview/Hospital Course:  Had hypotension after the procedure and then had JOSE and had to be started on H.D(last H.D on 12/11). Nephrology following. Patient is also on lasix and diuresing well for past 2 days. Currently on I.V lasix pushes. Creatinine stable for past 2 days also. Blood sugar levels are controlled with Levemir, prandial insulin and ISS. Also found to having large left renal subcapsular hematoma on CT abd done on 12/4. ASA, Plavix and pletal are therefore held. On 12/11/19, patient had an episode of  hematemesis. Initially had drop in H/H, but stable since then. CANDIHoaMILLICENT consulted and had EGD on 12/13. This showed Non-bleeding esophageal ulcer, which was biopsied and erythematous mucosa in the stomach. Advised to start on Protonix 40 mg BID for 12 weeks. Repeat UGD in 8 weeks to establish healing.                 PT/OT eval done and Rehab placement advised. Social work consult placed.   12/15 pt seen doing ok, had not have HD since wed per him.  BG atable around 135, 136, K is 3.2 will replace orally    Interval History: making urine, replacing K today, continue current DM managaement    Review of Systems   Constitutional: Positive for activity change. Negative for chills and fatigue.   Respiratory: Negative for shortness of breath.    Cardiovascular: Negative for chest pain.   Genitourinary: Negative for difficulty urinating.   Neurological: Negative for dizziness and numbness.     Objective:     Vital Signs (Most Recent):  Temp: 98.1 °F (36.7 °C) (12/15/19 1304)  Pulse: 82 (12/15/19 1304)  Resp: 20 (12/15/19 1304)  BP: (!) 123/58 (12/15/19 1304)  SpO2: 99 % (12/15/19 1304) Vital Signs (24h Range):  Temp:  [98.1 °F (36.7 °C)-98.9 °F (37.2 °C)] 98.1 °F (36.7 °C)  Pulse:  [68-82] 82  Resp:  [14-20] 20  SpO2:  [92 %-99 %] 99 %  BP: (123-158)/(58-70) 123/58     Weight: 103 kg (227 lb 1.2 oz)  Body mass index is 33.05 kg/m².    Intake/Output Summary (Last 24 hours) at 12/15/2019 1405  Last data filed at 12/15/2019 1230  Gross per 24 hour   Intake 580 ml   Output 2098 ml   Net -1518 ml      Physical Exam   Constitutional: He is oriented to person, place, and time. He appears well-developed and well-nourished.   HENT:   Head: Normocephalic and atraumatic.   Eyes: EOM are normal.   Neck: Normal range of motion.   Pulmonary/Chest: Effort normal. No respiratory distress.   Neurological: He is alert and oriented to person, place, and time.   Psychiatric: He has a normal mood and affect. His behavior is normal. Judgment and  thought content normal.       Significant Labs:   Recent Labs   Lab 12/13/19  0527 12/14/19  0548 12/15/19  0742   WBC 9.96 9.06 8.49  8.49   HGB 8.4*  8.4* 8.3* 8.3*  8.3*   HCT 26.1*  26.1* 26.0* 25.8*  25.8*    267 315  315     Recent Labs   Lab 12/13/19  0527 12/14/19  0548 12/15/19  0742    135* 136  136  136   K 3.1* 3.0* 3.2*  3.2*  3.2*   CL 99 97 98  98  98   CO2 26 29 28  28  28   BUN 68* 78* 81*  81*  81*   CREATININE 3.4* 3.5* 3.3*  3.3*  3.3*   * 175* 132*  132*  132*   CALCIUM 8.2* 8.4* 8.3*  8.3*  8.3*   MG  --  2.2  --    PHOS 4.9* 5.7* 4.9*     No results for input(s): ALKPHOS, ALT, AST, ALBUMIN, PROT, BILITOT, INR in the last 168 hours.   No results for input(s): CPK, CPKMB, MB, TROPONINI in the last 72 hours.  Recent Labs   Lab 12/14/19  1114 12/14/19  1618 12/14/19  2128 12/15/19  0628 12/15/19  0953 12/15/19  1218   POCTGLUCOSE 183* 132* 134* 136* 222* 184*     Hemoglobin A1C   Date Value Ref Range Status   12/02/2019 5.6 4.0 - 5.6 % Final     Comment:     ADA Screening Guidelines:  5.7-6.4%  Consistent with prediabetes  >or=6.5%  Consistent with diabetes  High levels of fetal hemoglobin interfere with the HbA1C  assay. Heterozygous hemoglobin variants (HbS, HgC, etc)do  not significantly interfere with this assay.   However, presence of multiple variants may affect accuracy.     05/08/2018 7.7 (H) 4.0 - 5.6 % Final     Comment:     According to ADA guidelines, hemoglobin A1c <7.0% represents  optimal control in non-pregnant diabetic patients. Different  metrics may apply to specific patient populations.   Standards of Medical Care in Diabetes-2016.  For the purpose of screening for the presence of diabetes:  <5.7%     Consistent with the absence of diabetes  5.7-6.4%  Consistent with increasing risk for diabetes   (prediabetes)  >or=6.5%  Consistent with diabetes  Currently, no consensus exists for use of hemoglobin A1c  for diagnosis of diabetes for  children.  This Hemoglobin A1c assay has significant interference with fetal   hemoglobin   (HbF). The results are invalid for patients with abnormal amounts of   HbF,   including those with known Hereditary Persistence   of Fetal Hemoglobin. Heterozygous hemoglobin variants (HbAS, HbAC,   HbAD, HbAE, HbA2) do not significantly interfere with this assay;   however, presence of multiple variants in a sample may impact the %   interference.     06/09/2011 7.9 (H) 4.0 - 6.2 % Final     Scheduled Meds:   amLODIPine  10 mg Oral Daily    doxazosin  8 mg Oral Daily    epoetin gege-ebpx (RETACRIT) injection  20,000 Units Subcutaneous Q7 Days    furosemide  40 mg Intravenous BID    insulin aspart U-100  17 Units Subcutaneous TIDWM    insulin detemir U-100  62 Units Subcutaneous QHS    iron sucrose (VENOFER) IVPB  100 mg Intravenous Weekly    mupirocin   Nasal BID    pantoprazole  40 mg Oral BID AC    polyethylene glycol  17 g Oral Daily    potassium chloride  20 mEq Oral Once    potassium chloride  20 mEq Oral Once    potassium chloride  20 mEq Oral Once    rosuvastatin  20 mg Oral Daily    sevelamer carbonate  800 mg Oral TID WM    simethicone  125 mg Oral Q6H    vitamin renal formula (B-complex-vitamin c-folic acid)  1 capsule Oral Daily     Continuous Infusions:  As Needed: acetaminophen, albuterol-ipratropium, alteplase, bisacodyl, Dextrose 10% Bolus, Dextrose 10% Bolus, Dextrose 10% Bolus, Dextrose 10% Bolus, glucagon (human recombinant), glucose, glucose, heparin (porcine), insulin aspart U-100, morphine, ondansetron, ondansetron, polyethylene glycol, sodium chloride 0.9%    Significant Imaging:   No new imaging over last 24 hours      Assessment/Plan:      * Acute on chronic diastolic heart failure  On Lasix BID per Primary and Nephrology with good urine output  Echo showed normal LVEF  Lopressor being held for acute on chronic diastolic HF and ARB for JOSE  Diuresing well and  improving.      Hypokalemia  Will replace as needed orally      Constipation  Glycoloax daily and PRN suppository      Ulcer of esophagus without bleeding  Hematemesis recently sec to same  EGD on 12/13 showed esophageal ulcer with no bleeding now  G.I input appreciated  PPI drip d/jose ramon and on Protonix 40 BID.  Will need this for 12 weeks as per G.I  Will need repeat UGD in 8 weeks to establish healing.       JOSE (acute kidney injury)  Sec to ATN sec to hypotension and bleeding mostly  Nephrology on board and patient is on H.D  Cont management as per them.  Last H.D on 12/11 and creatinine stable since then inspite of receiving no H.D and diuresing well too.    Anemia  Sec to acute blood loss sec to hematemesis mostly  Will cont to f/u and transfuse prn  H/H stable for past 24 hr.       PAD (peripheral artery disease)  As per primary      CAD (coronary artery disease)  As per primary      Hyperlipidemia  Continue Home dose statin      Essential hypertension  Cont home dose Norvasc and Doxazosin.        Type 2 diabetes mellitus with kidney complication, with long-term current use of insulin  Lab Results   Component Value Date    HGBA1C 5.6 12/02/2019     - Blood sugar levels better controlled now.  - Continue levemir 62 units nightly   - Continue aspart 17 units TID with meals  - Accuchecks with prn low dose SSI   12/15 BG accu check around 136. Continue current plan of care      VTE Risk Mitigation (From admission, onward)         Ordered     Place DARRION hose  Until discontinued      12/05/19 1005     heparin (porcine) injection 2,300 Units  As needed (PRN)      12/04/19 1137                      Nehemias Gu DO  Department of Hospital Medicine   [facility]

## 2019-12-15 NOTE — PLAN OF CARE
Patient AAox3. Trialysis cath in place.Blood return noted. Patient denies any pain or SOB. Abd round firm. BLE +2 noted. Patient had small soft bowel movement during shift. Bed alarm in place. Call light within reach

## 2019-12-15 NOTE — SUBJECTIVE & OBJECTIVE
Interval History: making urine, replacing K today, continue current DM managaement    Review of Systems   Constitutional: Positive for activity change. Negative for chills and fatigue.   Respiratory: Negative for shortness of breath.    Cardiovascular: Negative for chest pain.   Genitourinary: Negative for difficulty urinating.   Neurological: Negative for dizziness and numbness.     Objective:     Vital Signs (Most Recent):  Temp: 98.1 °F (36.7 °C) (12/15/19 1304)  Pulse: 82 (12/15/19 1304)  Resp: 20 (12/15/19 1304)  BP: (!) 123/58 (12/15/19 1304)  SpO2: 99 % (12/15/19 1304) Vital Signs (24h Range):  Temp:  [98.1 °F (36.7 °C)-98.9 °F (37.2 °C)] 98.1 °F (36.7 °C)  Pulse:  [68-82] 82  Resp:  [14-20] 20  SpO2:  [92 %-99 %] 99 %  BP: (123-158)/(58-70) 123/58     Weight: 103 kg (227 lb 1.2 oz)  Body mass index is 33.05 kg/m².    Intake/Output Summary (Last 24 hours) at 12/15/2019 1405  Last data filed at 12/15/2019 1230  Gross per 24 hour   Intake 580 ml   Output 2098 ml   Net -1518 ml      Physical Exam   Constitutional: He is oriented to person, place, and time. He appears well-developed and well-nourished.   HENT:   Head: Normocephalic and atraumatic.   Eyes: EOM are normal.   Neck: Normal range of motion.   Pulmonary/Chest: Effort normal. No respiratory distress.   Neurological: He is alert and oriented to person, place, and time.   Psychiatric: He has a normal mood and affect. His behavior is normal. Judgment and thought content normal.       Significant Labs:   Recent Labs   Lab 12/13/19  0527 12/14/19  0548 12/15/19  0742   WBC 9.96 9.06 8.49  8.49   HGB 8.4*  8.4* 8.3* 8.3*  8.3*   HCT 26.1*  26.1* 26.0* 25.8*  25.8*    267 315  315     Recent Labs   Lab 12/13/19  0527 12/14/19  0548 12/15/19  0742    135* 136  136  136   K 3.1* 3.0* 3.2*  3.2*  3.2*   CL 99 97 98  98  98   CO2 26 29 28  28  28   BUN 68* 78* 81*  81*  81*   CREATININE 3.4* 3.5* 3.3*  3.3*  3.3*   * 175* 132*   132*  132*   CALCIUM 8.2* 8.4* 8.3*  8.3*  8.3*   MG  --  2.2  --    PHOS 4.9* 5.7* 4.9*     No results for input(s): ALKPHOS, ALT, AST, ALBUMIN, PROT, BILITOT, INR in the last 168 hours.   No results for input(s): CPK, CPKMB, MB, TROPONINI in the last 72 hours.  Recent Labs   Lab 12/14/19  1114 12/14/19  1618 12/14/19  2128 12/15/19  0628 12/15/19  0953 12/15/19  1218   POCTGLUCOSE 183* 132* 134* 136* 222* 184*     Hemoglobin A1C   Date Value Ref Range Status   12/02/2019 5.6 4.0 - 5.6 % Final     Comment:     ADA Screening Guidelines:  5.7-6.4%  Consistent with prediabetes  >or=6.5%  Consistent with diabetes  High levels of fetal hemoglobin interfere with the HbA1C  assay. Heterozygous hemoglobin variants (HbS, HgC, etc)do  not significantly interfere with this assay.   However, presence of multiple variants may affect accuracy.     05/08/2018 7.7 (H) 4.0 - 5.6 % Final     Comment:     According to ADA guidelines, hemoglobin A1c <7.0% represents  optimal control in non-pregnant diabetic patients. Different  metrics may apply to specific patient populations.   Standards of Medical Care in Diabetes-2016.  For the purpose of screening for the presence of diabetes:  <5.7%     Consistent with the absence of diabetes  5.7-6.4%  Consistent with increasing risk for diabetes   (prediabetes)  >or=6.5%  Consistent with diabetes  Currently, no consensus exists for use of hemoglobin A1c  for diagnosis of diabetes for children.  This Hemoglobin A1c assay has significant interference with fetal   hemoglobin   (HbF). The results are invalid for patients with abnormal amounts of   HbF,   including those with known Hereditary Persistence   of Fetal Hemoglobin. Heterozygous hemoglobin variants (HbAS, HbAC,   HbAD, HbAE, HbA2) do not significantly interfere with this assay;   however, presence of multiple variants in a sample may impact the %   interference.     06/09/2011 7.9 (H) 4.0 - 6.2 % Final     Scheduled Meds:    amLODIPine  10 mg Oral Daily    doxazosin  8 mg Oral Daily    epoetin gege-ebpx (RETACRIT) injection  20,000 Units Subcutaneous Q7 Days    furosemide  40 mg Intravenous BID    insulin aspart U-100  17 Units Subcutaneous TIDWM    insulin detemir U-100  62 Units Subcutaneous QHS    iron sucrose (VENOFER) IVPB  100 mg Intravenous Weekly    mupirocin   Nasal BID    pantoprazole  40 mg Oral BID AC    polyethylene glycol  17 g Oral Daily    potassium chloride  20 mEq Oral Once    potassium chloride  20 mEq Oral Once    potassium chloride  20 mEq Oral Once    rosuvastatin  20 mg Oral Daily    sevelamer carbonate  800 mg Oral TID WM    simethicone  125 mg Oral Q6H    vitamin renal formula (B-complex-vitamin c-folic acid)  1 capsule Oral Daily     Continuous Infusions:  As Needed: acetaminophen, albuterol-ipratropium, alteplase, bisacodyl, Dextrose 10% Bolus, Dextrose 10% Bolus, Dextrose 10% Bolus, Dextrose 10% Bolus, glucagon (human recombinant), glucose, glucose, heparin (porcine), insulin aspart U-100, morphine, ondansetron, ondansetron, polyethylene glycol, sodium chloride 0.9%    Significant Imaging:   No new imaging over last 24 hours

## 2019-12-15 NOTE — PLAN OF CARE
VN rounds: VN cued into pt's room with pt's permission. Pt sitting up in chair at bs. Fall risk protocol discussed with pt. VN instructed pt to call for assistance. Pt aware and agreeable. NAD noted. Allowed time for questions.  Will cont to be available and intervene as needed.     12/15/19 1246   Type of Frequent Check   Type Patient Rounds;Other (see comments)  (vn round)   Safety/Activity   Patient Rounds visualized patient;call light in patient/parent reach   Safety Promotion/Fall Prevention instructed to call staff for mobility;Fall Risk reviewed with patient/family   Activity Management up in chair   Pain/Comfort/Sleep   Preferred Pain Scale number (Numeric Rating Pain Scale)   Pain Rating (0-10): Rest 0   Sleep/Rest/Relaxation awake;no problem identified   Assessments (Pre/Post)   Level of Consciousness (AVPU) alert

## 2019-12-16 ENCOUNTER — TELEPHONE (OUTPATIENT)
Dept: GASTROENTEROLOGY | Facility: CLINIC | Age: 74
End: 2019-12-16

## 2019-12-16 ENCOUNTER — TELEPHONE (OUTPATIENT)
Dept: ENDOSCOPY | Facility: HOSPITAL | Age: 74
End: 2019-12-16

## 2019-12-16 DIAGNOSIS — K22.10 ULCER OF ESOPHAGUS WITHOUT BLEEDING: Primary | ICD-10-CM

## 2019-12-16 LAB
ANION GAP SERPL CALC-SCNC: 11 MMOL/L (ref 8–16)
BASOPHILS # BLD AUTO: 0.01 K/UL (ref 0–0.2)
BASOPHILS NFR BLD: 0.1 % (ref 0–1.9)
BUN SERPL-MCNC: 86 MG/DL (ref 8–23)
CALCIUM SERPL-MCNC: 8.3 MG/DL (ref 8.7–10.5)
CHLORIDE SERPL-SCNC: 99 MMOL/L (ref 95–110)
CO2 SERPL-SCNC: 26 MMOL/L (ref 23–29)
CREAT SERPL-MCNC: 3.2 MG/DL (ref 0.5–1.4)
DIFFERENTIAL METHOD: ABNORMAL
EOSINOPHIL # BLD AUTO: 0.3 K/UL (ref 0–0.5)
EOSINOPHIL NFR BLD: 3.7 % (ref 0–8)
ERYTHROCYTE [DISTWIDTH] IN BLOOD BY AUTOMATED COUNT: 15.1 % (ref 11.5–14.5)
EST. GFR  (AFRICAN AMERICAN): 21 ML/MIN/1.73 M^2
EST. GFR  (NON AFRICAN AMERICAN): 18 ML/MIN/1.73 M^2
GLUCOSE SERPL-MCNC: 220 MG/DL (ref 70–110)
HCT VFR BLD AUTO: 26.1 % (ref 40–54)
HGB BLD-MCNC: 8.4 G/DL (ref 14–18)
LYMPHOCYTES # BLD AUTO: 1 K/UL (ref 1–4.8)
LYMPHOCYTES NFR BLD: 13 % (ref 18–48)
MCH RBC QN AUTO: 29.5 PG (ref 27–31)
MCHC RBC AUTO-ENTMCNC: 32.2 G/DL (ref 32–36)
MCV RBC AUTO: 92 FL (ref 82–98)
MONOCYTES # BLD AUTO: 0.4 K/UL (ref 0.3–1)
MONOCYTES NFR BLD: 4.8 % (ref 4–15)
NEUTROPHILS # BLD AUTO: 6.2 K/UL (ref 1.8–7.7)
NEUTROPHILS NFR BLD: 78.4 % (ref 38–73)
PHOSPHATE SERPL-MCNC: 4.3 MG/DL (ref 2.7–4.5)
PHOSPHATE SERPL-MCNC: 4.3 MG/DL (ref 2.7–4.5)
PLATELET # BLD AUTO: 351 K/UL (ref 150–350)
PMV BLD AUTO: 9.2 FL (ref 9.2–12.9)
POCT GLUCOSE: 214 MG/DL (ref 70–110)
POCT GLUCOSE: 218 MG/DL (ref 70–110)
POCT GLUCOSE: 232 MG/DL (ref 70–110)
POCT GLUCOSE: 262 MG/DL (ref 70–110)
POCT GLUCOSE: 328 MG/DL (ref 70–110)
POTASSIUM SERPL-SCNC: 3.6 MMOL/L (ref 3.5–5.1)
RBC # BLD AUTO: 2.85 M/UL (ref 4.6–6.2)
SODIUM SERPL-SCNC: 136 MMOL/L (ref 136–145)
WBC # BLD AUTO: 7.87 K/UL (ref 3.9–12.7)

## 2019-12-16 PROCEDURE — 97110 THERAPEUTIC EXERCISES: CPT

## 2019-12-16 PROCEDURE — 99233 PR SUBSEQUENT HOSPITAL CARE,LEVL III: ICD-10-PCS | Mod: ,,, | Performed by: STUDENT IN AN ORGANIZED HEALTH CARE EDUCATION/TRAINING PROGRAM

## 2019-12-16 PROCEDURE — 25000003 PHARM REV CODE 250: Performed by: NURSE PRACTITIONER

## 2019-12-16 PROCEDURE — 11000001 HC ACUTE MED/SURG PRIVATE ROOM

## 2019-12-16 PROCEDURE — 84100 ASSAY OF PHOSPHORUS: CPT

## 2019-12-16 PROCEDURE — 63600175 PHARM REV CODE 636 W HCPCS: Performed by: INTERNAL MEDICINE

## 2019-12-16 PROCEDURE — 80048 BASIC METABOLIC PNL TOTAL CA: CPT

## 2019-12-16 PROCEDURE — 85025 COMPLETE CBC W/AUTO DIFF WBC: CPT

## 2019-12-16 PROCEDURE — 97116 GAIT TRAINING THERAPY: CPT

## 2019-12-16 PROCEDURE — 94761 N-INVAS EAR/PLS OXIMETRY MLT: CPT

## 2019-12-16 PROCEDURE — 25000003 PHARM REV CODE 250: Performed by: INTERNAL MEDICINE

## 2019-12-16 PROCEDURE — 97530 THERAPEUTIC ACTIVITIES: CPT

## 2019-12-16 PROCEDURE — 99900035 HC TECH TIME PER 15 MIN (STAT)

## 2019-12-16 PROCEDURE — 99233 SBSQ HOSP IP/OBS HIGH 50: CPT | Mod: ,,, | Performed by: STUDENT IN AN ORGANIZED HEALTH CARE EDUCATION/TRAINING PROGRAM

## 2019-12-16 PROCEDURE — 97535 SELF CARE MNGMENT TRAINING: CPT

## 2019-12-16 RX ORDER — POTASSIUM CHLORIDE 20 MEQ/1
20 TABLET, EXTENDED RELEASE ORAL ONCE
Status: COMPLETED | OUTPATIENT
Start: 2019-12-16 | End: 2019-12-16

## 2019-12-16 RX ORDER — POTASSIUM CHLORIDE 20 MEQ/1
40 TABLET, EXTENDED RELEASE ORAL DAILY
Status: DISCONTINUED | OUTPATIENT
Start: 2019-12-16 | End: 2019-12-19 | Stop reason: HOSPADM

## 2019-12-16 RX ADMIN — INSULIN ASPART 17 UNITS: 100 INJECTION, SOLUTION INTRAVENOUS; SUBCUTANEOUS at 08:12

## 2019-12-16 RX ADMIN — FUROSEMIDE 40 MG: 10 INJECTION, SOLUTION INTRAVENOUS at 08:12

## 2019-12-16 RX ADMIN — POLYETHYLENE GLYCOL 3350 17 G: 17 POWDER, FOR SOLUTION ORAL at 08:12

## 2019-12-16 RX ADMIN — SIMETHICONE 125 MG: 125 TABLET, CHEWABLE ORAL at 12:12

## 2019-12-16 RX ADMIN — SIMETHICONE 125 MG: 125 TABLET, CHEWABLE ORAL at 05:12

## 2019-12-16 RX ADMIN — POTASSIUM CHLORIDE 20 MEQ: 1500 TABLET, EXTENDED RELEASE ORAL at 12:12

## 2019-12-16 RX ADMIN — SIMETHICONE 125 MG: 125 TABLET, CHEWABLE ORAL at 11:12

## 2019-12-16 RX ADMIN — PANTOPRAZOLE SODIUM 40 MG: 40 TABLET, DELAYED RELEASE ORAL at 04:12

## 2019-12-16 RX ADMIN — POTASSIUM CHLORIDE 40 MEQ: 1500 TABLET, EXTENDED RELEASE ORAL at 10:12

## 2019-12-16 RX ADMIN — INSULIN ASPART 4 UNITS: 100 INJECTION, SOLUTION INTRAVENOUS; SUBCUTANEOUS at 09:12

## 2019-12-16 RX ADMIN — ROSUVASTATIN CALCIUM 20 MG: 10 TABLET, FILM COATED ORAL at 08:12

## 2019-12-16 RX ADMIN — FUROSEMIDE 40 MG: 10 INJECTION, SOLUTION INTRAVENOUS at 05:12

## 2019-12-16 RX ADMIN — NEPHROCAP 1 CAPSULE: 1 CAP ORAL at 08:12

## 2019-12-16 RX ADMIN — SEVELAMER CARBONATE 800 MG: 800 TABLET, FILM COATED ORAL at 08:12

## 2019-12-16 RX ADMIN — DOXAZOSIN 8 MG: 2 TABLET ORAL at 08:12

## 2019-12-16 RX ADMIN — INSULIN ASPART 17 UNITS: 100 INJECTION, SOLUTION INTRAVENOUS; SUBCUTANEOUS at 12:12

## 2019-12-16 RX ADMIN — INSULIN ASPART 4 UNITS: 100 INJECTION, SOLUTION INTRAVENOUS; SUBCUTANEOUS at 05:12

## 2019-12-16 RX ADMIN — PANTOPRAZOLE SODIUM 40 MG: 40 TABLET, DELAYED RELEASE ORAL at 05:12

## 2019-12-16 RX ADMIN — INSULIN DETEMIR 62 UNITS: 100 INJECTION, SOLUTION SUBCUTANEOUS at 09:12

## 2019-12-16 RX ADMIN — AMLODIPINE BESYLATE 10 MG: 5 TABLET ORAL at 08:12

## 2019-12-16 RX ADMIN — INSULIN ASPART 17 UNITS: 100 INJECTION, SOLUTION INTRAVENOUS; SUBCUTANEOUS at 04:12

## 2019-12-16 NOTE — PROGRESS NOTES
Progress Note  Nephrology      Consult Requested By: James Sanchez MD  Reason for Consult: ARF    SUBJECTIVE:     Review of Systems   Constitutional: Negative for chills and fever.   Respiratory: Negative for cough and shortness of breath.    Cardiovascular: Negative for chest pain and leg swelling.   Gastrointestinal: Negative for nausea.     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    Abnormality of gait as late effect of cerebrovascular accident (CVA)    Tightness of right gastrocnemius muscle    Claudication    Anemia    JOSE (acute kidney injury)    Acute on chronic diastolic heart failure    Ulcer of esophagus without bleeding    Constipation    Hypokalemia       OBJECTIVE:     Medications:   amLODIPine  10 mg Oral Daily    doxazosin  8 mg Oral Daily    epoetin gege-ebpx (RETACRIT) injection  20,000 Units Subcutaneous Q7 Days    furosemide  40 mg Intravenous BID    insulin aspart U-100  17 Units Subcutaneous TIDWM    insulin detemir U-100  62 Units Subcutaneous QHS    iron sucrose (VENOFER) IVPB  100 mg Intravenous Weekly    pantoprazole  40 mg Oral BID AC    polyethylene glycol  17 g Oral Daily    potassium chloride  20 mEq Oral Once    potassium chloride  40 mEq Oral Daily    rosuvastatin  20 mg Oral Daily    sevelamer carbonate  800 mg Oral Daily    simethicone  125 mg Oral Q6H    vitamin renal formula  (B-complex-vitamin c-folic acid)  1 capsule Oral Daily       Vitals:    12/16/19 1154   BP:    Pulse: 73   Resp:    Temp:      I/O last 3 completed shifts:  In: 1560 [P.O.:1560]  Out: 2620 [Urine:2620]  Physical Exam   Constitutional: He is oriented to person, place, and time. He appears well-developed and well-nourished. No distress.   HENT:   Head: Normocephalic and atraumatic.   Eyes: EOM are normal. No scleral icterus.   Neck: Neck supple. No JVD present.   Cardiovascular: Regular rhythm and intact distal pulses. Exam reveals no friction rub.   No murmur heard.  Pulmonary/Chest: Effort normal and breath sounds normal. No respiratory distress. He has no wheezes. He has no rales.   Abdominal: Soft. Bowel sounds are normal. He exhibits no distension. There is no tenderness.   Musculoskeletal: He exhibits edema (4+ BLE).   Neurological: He is alert and oriented to person, place, and time.   Skin: Skin is warm and dry. No rash noted. He is not diaphoretic. No erythema.   Psychiatric: He has a normal mood and affect.     Laboratory:  Recent Labs   Lab 12/14/19  0548 12/15/19  0742 12/16/19  0537   WBC 9.06 8.49  8.49 7.87   HGB 8.3* 8.3*  8.3* 8.4*   HCT 26.0* 25.8*  25.8* 26.1*    315  315 351*   MONO 9.3  0.8 4.1  4.1  0.4  0.4 4.8  0.4     Recent Labs   Lab 12/14/19  0548 12/15/19  0742 12/16/19  0537   * 136  136  136 136   K 3.0* 3.2*  3.2*  3.2* 3.6   CL 97 98  98  98 99   CO2 29 28  28  28 26   BUN 78* 81*  81*  81* 86*   CREATININE 3.5* 3.3*  3.3*  3.3* 3.2*   CALCIUM 8.4* 8.3*  8.3*  8.3* 8.3*   PHOS 5.7* 4.9* 4.3  4.3     Labs reviewed  Diagnostic Results:  X-Ray: Reviewed  US: Reviewed  Echo: Reviewed      ASSESSMENT/PLAN:   1. ARF  On CKD 3, baseline Cr 1-1.2  JOSE from hemodynamic instability from acute blood loss and large retroperitoneal hematoma  Was on CRRT with multipple clotting episodes, worsenign anemia and nausea/vomiting (was not tolerating RRT well) but  stable off any RRT for now. Good UO 2 l    2. Hypokalemia - liberalize diet, PO replacement in progress    3. Anemia of CKd + acute blood loss -   Epogen 20K SC q 7 days  Recent Labs   Lab 12/14/19  0548 12/15/19  0742 12/16/19  0537   WBC 9.06 8.49  8.49 7.87   HGB 8.3* 8.3*  8.3* 8.4*   HCT 26.0* 25.8*  25.8* 26.1*    315  315 351*     Lab Results   Component Value Date    IRON 21 (L) 12/07/2019    TIBC 268 12/07/2019         4. MBD - at goal  Lab Results   Component Value Date    CALCIUM 8.3 (L) 12/16/2019    PHOS 4.3 12/16/2019    PHOS 4.3 12/16/2019     Recent Labs   Lab 12/14/19  0548   MG 2.2     No results found for: [id number]  Lab Results   Component Value Date    CO2 26 12/16/2019           Thank you for allowing me to participate in the care of your patients  With any question please call [phone]  Madina Medina    [facility]  JEANA Diego MD, FACP,   MHoa Watts MD,   MD ROSETTA Olsen, NP  [address]  [place], [address] [phone]

## 2019-12-16 NOTE — PLAN OF CARE
Problem: Occupational Therapy Goal  Goal: Occupational Therapy Goal  Description  Goals to be met by: 1/5/2020    Patient will increase functional independence with ADLs by performing:    UE Dressing with Modified [place]de.  LE Dressing with Set-up Assistance.  Grooming while standing with Supervision.  Toileting from bedside commode with Supervision for hygiene and clothing management.   Step transfer with Supervision  Toilet transfer to bedside commode with Supervision.  Increased functional strength to WFL for self care.  Upper extremity exercise program x10 reps per handout, with assistance as needed.     Outcome: Ongoing, Progressing   Pt progressing well towards goals. Cont OT POC

## 2019-12-16 NOTE — TELEPHONE ENCOUNTER
Post procedure Instructions: Repeat upper endoscopy in 8 weeks to check healing. Please contact patient to schedule.

## 2019-12-16 NOTE — PROGRESS NOTES
Progress Note  Nephrology      Consult Requested By: James Sanchez MD      SUBJECTIVE:     Overnight events  Patient is a 74 y.o. male     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    Abnormality of gait as late effect of cerebrovascular accident (CVA)    Tightness of right gastrocnemius muscle    Claudication    Anemia    JOSE (acute kidney injury)    Acute on chronic diastolic heart failure    Ulcer of esophagus without bleeding    Constipation    Hypokalemia     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    Type 2 diabetes mellitus with kidney complication, without long-term current use of insulin 11/15/2012              OBJECTIVE:     Vitals:    12/15/19 1304 12/15/19 1550 12/15/19 1600 12/15/19 1656   BP: (!) 123/58   (!) 157/67   BP Location:       Patient Position:       Pulse: 82 74  73   Resp: 20   20   Temp: 98.1 °F (36.7 °C)   98.3 °F (36.8 °C)   TempSrc:       SpO2: 99%  95% 95%   Weight:       Height:           Temp: 98.3 °F (36.8 °C) (12/15/19 1656)  Pulse: 73 (12/15/19 1656)  Resp: 20 (12/15/19 1656)  BP: (!) 157/67 (12/15/19 1656)  SpO2: 95 % (12/15/19 1656)    Date 12/15/19 0700 - 12/16/19 0659    Shift 9361-4820 7679-3688 0675-9215 24 Hour Total   INTAKE   P.O. 300 700  1000   Shift Total(mL/kg) 300(2.9) 700(6.8)  1000(9.7)   OUTPUT   Urine(mL/kg/hr) 1008(1.2) 250  1258   Shift Total(mL/kg) 1008(9.8) 250(2.4)  1258(12.2)   Weight (kg) 103 103 103 103             Medications:   amLODIPine  10 mg Oral Daily    doxazosin  8 mg Oral Daily    epoetin gege-ebpx (RETACRIT) injection  20,000 Units Subcutaneous Q7 Days    furosemide  40 mg Intravenous BID    insulin aspart U-100  17 Units Subcutaneous TIDWM    insulin detemir U-100  62 Units Subcutaneous QHS    iron sucrose (VENOFER) IVPB  100 mg Intravenous Weekly    mupirocin   Nasal BID    pantoprazole  40 mg Oral BID AC    polyethylene glycol  17 g Oral Daily    potassium chloride  20 mEq Oral Once    potassium chloride  20 mEq Oral Once    rosuvastatin  20 mg Oral Daily    sevelamer carbonate  800 mg Oral TID WM    simethicone  125 mg Oral Q6H    vitamin renal formula (B-complex-vitamin c-folic acid)  1 capsule Oral Daily                 Physical Exam:  General appearance:NAD  No SOB  No CP  No cough  Lungs: diminished breath sounds  Heart: pulse 73  Abdomen: soft  Extremities: edema  Skin: dry    Laboratory:  ABG  Labs reviewed  Recent Results (from the past 336 hour(s))   Basic metabolic panel    Collection Time: 12/15/19  7:42 AM   Result Value Ref Range    Sodium 136 136 - 145 mmol/L    Potassium 3.2 (L) 3.5 - 5.1 mmol/L    Chloride 98 95 - 110 mmol/L    CO2 28 23 - 29 mmol/L    BUN, Bld 81 (H) 8 - 23 mg/dL    Creatinine 3.3 (H) 0.5 - 1.4 mg/dL    Calcium 8.3 (L) 8.7 - 10.5 mg/dL    Anion Gap 10 8 - 16 mmol/L   Basic metabolic panel    Collection Time: 12/15/19  7:42 AM   Result Value Ref Range    Sodium 136 136 - 145 mmol/L    Potassium 3.2 (L) 3.5 - 5.1 mmol/L    Chloride 98 95 - 110 mmol/L    CO2 28 23 - 29 mmol/L    BUN, Bld 81 (H) 8 - 23 mg/dL    Creatinine 3.3 (H) 0.5 - 1.4 mg/dL    Calcium 8.3 (L) 8.7 - 10.5 mg/dL    Anion Gap 10 8 -  16 mmol/L   Basic metabolic panel    Collection Time: 12/15/19  7:42 AM   Result Value Ref Range    Sodium 136 136 - 145 mmol/L    Potassium 3.2 (L) 3.5 - 5.1 mmol/L    Chloride 98 95 - 110 mmol/L    CO2 28 23 - 29 mmol/L    BUN, Bld 81 (H) 8 - 23 mg/dL    Creatinine 3.3 (H) 0.5 - 1.4 mg/dL    Calcium 8.3 (L) 8.7 - 10.5 mg/dL    Anion Gap 10 8 - 16 mmol/L     Recent Results (from the past 336 hour(s))   CBC auto differential    Collection Time: 12/15/19  7:42 AM   Result Value Ref Range    WBC 8.49 3.90 - 12.70 K/uL    Hemoglobin 8.3 (L) 14.0 - 18.0 g/dL    Hematocrit 25.8 (L) 40.0 - 54.0 %    Platelets 315 150 - 350 K/uL   CBC auto differential    Collection Time: 12/15/19  7:42 AM   Result Value Ref Range    WBC 8.49 3.90 - 12.70 K/uL    Hemoglobin 8.3 (L) 14.0 - 18.0 g/dL    Hematocrit 25.8 (L) 40.0 - 54.0 %    Platelets 315 150 - 350 K/uL   CBC auto differential    Collection Time: 12/14/19  5:48 AM   Result Value Ref Range    WBC 9.06 3.90 - 12.70 K/uL    Hemoglobin 8.3 (L) 14.0 - 18.0 g/dL    Hematocrit 26.0 (L) 40.0 - 54.0 %    Platelets 267 150 - 350 K/uL     Urinalysis  No results for input(s): COLORU, CLARITYU, SPECGRAV, PHUR, PROTEINUA, GLUCOSEU, BILIRUBINCON, BLOODU, WBCU, RBCU, BACTERIA, MUCUS, NITRITE, LEUKOCYTESUR, UROBILINOGEN, HYALINECASTS in the last 24 hours.    Diagnostic Results:  X-Ray: Reviewed  US: Reviewed  Echo: Reviewed  ACCESS    ASSESSMENT/PLAN:   JOSE/CKD 4  Azotemia  Metabolic bone disease  Anemia  Iron  Epogen  Poor nutrition  Supplemetns  Edema  Continue diuretics  I and O  Weight  daily  Renal diet

## 2019-12-16 NOTE — PROGRESS NOTES
[facility]  Cardiology  Progress Note    Patient Name: Cy Rutherford  MRN: [id number]  Admission Date: 12/2/2019  Hospital Length of Stay: 13 days  Code Status: Full Code   Attending Physician: James Sanchez MD   Primary Care Physician: Heide Porter MD  Expected Discharge Date:   Principal Problem:Acute on chronic diastolic heart failure    Subjective:     Hospital Course:   12/2/2019 Admitted for elective LE angiogram for evaluation of RLE claudication. Taken to the cath lab for the procedure via LCFA access with following results:    successful atherectomy (Hawk LX) with distal filter protection, followed by PTA with scoring balloon (5.0 x 150 dSFA, 6.0 x 150 pSFA/CFA) followed by PTA with DCB to dSFA (5.0 x 150), pSFA (5.0 x 100) and CFA (7 x 40) with good results. Successful PTA to TPT with 3.0 x 40 balloon- see cath report for full report     Tolerated procedure well and recovered in pre post cath area. Hypotension noted along with back pain and diaphoresis. No hematoma noted and manual pressure applied out of concern for bleeding. STAT H&H down to 7.4&24.1 from 11.6&36.1. Given profound symptoms, major concern for bleeding prompting re evaluation with recurrent angiogram. Placed on IVFs along with IV Levophed. Repeat procedure via right radial access with images  in multiple views with no active bleed noted. Cuff pressure with significant difference in comparison to  aortic pressure which was significantly higher.  Admitted to ICU for close monitoring with kanchan placed. T&S with PRBC transfusion initiated with IV Lasix given in between   12/3/2019 Remain on IV Levophed drip at .46mcg/kg/min with BP 110s-130s/40s-50s HR 60s. Serial H&Hs Q4hrs overnight with  H&H this AM 10.0&30.2 with slight trend down to 9.1&27.5 on repeat check. Complains of SOB with increased RR. Only 500cc out overnight. Will repeat IV Lasix 40mg this AM. Echocardiogram and CXR as well. Will attempt to wean IV  Levophed drip as BP tolerates   12/4/2019 Creatinine trended up to 3.7 overnight with K+ 6.1. Continued with no urine output. Nephrology consulted yesterday with trialysis catheter placed yesterday with initiation of CRRT overnight. CRRT x 2-3 hours prior to clotting off. H&H trended down further to 7.3&21.8 with repeat PRBC transfusion. H&H trended up to 8.4&24.9 with continued serial monitoring with recurrent drop to 7.0&21.2. Concerned about recurrent bleeding with plans for repeat angiogram for re evaluation of acute bleed. Remains on IV Levophed with stable BP and wean in process. Complains of mild SOB with stable sats on Venti mask.   12/5/2019 Repeat angiogram yesterday using CO2 with no active bleed noted after extensive angiogram. H&H down 6.6&18.9 yesterday evening with repeat PRBC. CT abdomen/pelvis with evidence of large left renal subcapsular hematoma with surrounding retroperitoneal hemorrhage and no definite contrast extravasation seen to suggest active bleeding on delayed images. H&H up to 7.4&22.0-8.5&25.3. Weaned off IV Levophed with stable BP. Resumed CRRT yesterday evening with 695cc removed and 30cc urine output noted +2.7 liters. Continued mild SOB with stable sats on venti mask. BMP with K+ 5.0 BUN 62 creatinine 5.0. Will continue with serial H&Hs for now. Will place TEDs and consult PT   12/6/2019 Attempted CRRT and HD yesterday with clotted line and approximately 400cc blood loss due to inability to rinse back. Placed on IV Lasix drip at 20mg/hr by Nephrology with 675cc urine output overnight positive 3 liters since admission. K+ 5.0 with BUN 69 creatinine 5.4 this AM. H&H 74&22.4 this AM unchanged from overnight-will continue with serial H&Hs for now. Off IV pressors for over 48 hours with BP 130s-170s/70s overnight. Mild SOB remains per patient with slight improvement noted on PE. Updated son at bedside this morning   12/7/2019 HR and BP stable. H&H 7.7&23.2 unchanged from yesterday.  Remain on IV Lasix with adequate urine output. BUN 89 creatinine 6.0. Hopeful to transfer to floor tomorrow.   12/8/2019 H&H down to 6.7 & 20.5 this AM with repeat PRBC transfusion. Plan for HD today for clearance. Remains on IV Lasix drip with adequate urine output. Resumed antihypertensives with stabel BP. PT and OT on board  12/9/2019 H&H 8.8&26.6 this AM after PRBC transfusion yesterday. Approximately one hour of HD yesterday with cessation due to line malfunction. HR and BP remains stable. 2.7 liters out overnight negative 4.4 liters since admission.  creatinine 5.3. Will reconsult General Surgery today for new trialysis line placement for ongoing intermittent HD/CRRT.   12/10/2019 H&H 9.1&27.5 this AM. BUN 82 creatinine 4.0 after HD run yesterday afternoon. IV Lasix drip at 5mg/hr with 3.0 liters out overnight negative 5.6 liters since admission. HR and BP stable. Plan to transfer out of ICU today   12/11/2019 Transferred out of ICU yesterday. BMP with K+ 3.7 BUN 66 creatinine 3.3. Remains on IV Lasix drip with 1.8 liters urine output with 1.4 liters removed with HD negative 7.4 liters since admission. HR and BP stable. Working with PT currently. Mild abdominal distension with Simethicone and Miralax ordered yesterday with plans for Lactulose today   12/12/2019 Slight increase in abdominal pain with nausea followed by vomiting yesterday. Appearance of blood in emesis yesterday. Serial H&Hs overnight with no sharp drop-H&H this AM 8.4&27.2. Remains on IV Protonix drip with GI consulted with plans for EGD tomorrow. On IV Lasix drip as well with total of 2.6 liters out overnight (1.5 urine output 1.1 HD) negative 9.1 liters since admission. BUN 58 creatinine 3.1. KUB reviewed with improvement in air and successful BM-abdominal distension improved. Nephrology on board as well as PT  12/13/2019 H&H stable with no significant drop-will change CBCs to daily. BMP with BUN 68 creatinine 3.4. Transitioned from IV  Lasix drip to IV Lasix BID with 3.1 liters out overnight negative 11.9 liters since admission. EGD today with esophageal ulcer with recs for Protonix BID. HR and BP stable. PT on board.  12/14/2019 Stable overnight. No acute issues overnight. Sitting in chair   12/15/2019 Doing well. No acute issues. Sitting in chair  12/16/2019 HR and BP stable overnight. BUN 86 creatinine 3.2 this AM. Remains on IV Lasix BID with 2.1 liters out overnight negative 14.6 liters since admission. CBC with Hgb 8.4 Hct 26.1. PT on board and OOB to chair over the weekend. Awaiting further recs in regards to IV diuresis from Nephrology           Review of Systems   Constitution: Negative for chills, decreased appetite, diaphoresis, fever, malaise/fatigue, weight gain and weight loss.   Cardiovascular: Positive for leg swelling. Negative for chest pain, claudication, dyspnea on exertion, irregular heartbeat, near-syncope, orthopnea, palpitations and paroxysmal nocturnal dyspnea.   Respiratory: Negative for cough, shortness of breath, snoring, sputum production and wheezing.    Endocrine: Negative for cold intolerance, heat intolerance, polydipsia, polyphagia and polyuria.   Skin: Negative for color change, dry skin, itching, nail changes and poor wound healing.   Musculoskeletal: Negative for back pain, gout, joint pain and joint swelling.   Gastrointestinal: Negative for bloating, abdominal pain, constipation, diarrhea, hematemesis, hematochezia, melena, nausea and vomiting.   Genitourinary: Negative for dysuria, hematuria and nocturia.   Neurological: Negative for dizziness, headaches, light-headedness, numbness, paresthesias and weakness.   Psychiatric/Behavioral: Negative for altered mental status, depression and memory loss.     Objective:     Vital Signs (Most Recent):  Temp: 97.5 °F (36.4 °C) (12/13/19 1152)  Pulse: 76 (12/13/19 1200)  Resp: 18 (12/13/19 1152)  BP: (!) 140/63 (12/13/19 1152)  SpO2: 98 % (12/13/19 1148) Vital Signs  (24h Range):  Temp:  [97.3 °F (36.3 °C)-98.8 °F (37.1 °C)] 97.5 °F (36.4 °C)  Pulse:  [75-83] 76  Resp:  [16-20] 18  SpO2:  [94 %-100 %] 98 %  BP: (133-155)/(49-68) 140/63     Weight: 105.5 kg (232 lb 9.4 oz)  Body mass index is 33.85 kg/m².     SpO2: 98 %  O2 Device (Oxygen Therapy): nasal cannula      Intake/Output Summary (Last 24 hours) at 12/13/2019 1516  Last data filed at 12/13/2019 1000  Gross per 24 hour   Intake 425 ml   Output 1997 ml   Net -1572 ml       Lines/Drains/Airways     Central Venous Catheter Line                 Trialysis (Dialysis) Catheter 12/09/19 1230 right internal jugular 4 days          Airway                 Airway - Non-Surgical 12/13/19 0907 Nasal Cannula less than 1 day                Physical Exam   Constitutional: He is oriented to person, place, and time. He appears well-developed and well-nourished. No distress.   Neck: Normal range of motion. Neck supple. No JVD present.   Cardiovascular: Normal rate and regular rhythm. Exam reveals no gallop.   No murmur heard.  Pulmonary/Chest: Effort normal and breath sounds normal. No respiratory distress. He has no wheezes.   Abdominal: Soft. Bowel sounds are normal. He exhibits no distension. There is no tenderness.   Musculoskeletal: He exhibits edema (1-2+ BLE edema bilaterally ).   Neurological: He is alert and oriented to person, place, and time.   Skin: Skin is warm and dry.   Psychiatric: He has a normal mood and affect. His behavior is normal. Judgment and thought content normal.       Significant Labs:     Recent Labs   Lab 12/16/19  0537   WBC 7.87   RBC 2.85*   HGB 8.4*   HCT 26.1*   *   MCV 92   MCH 29.5   MCHC 32.2     Recent Labs   Lab 12/16/19  0537      K 3.6   CL 99   CO2 26   BUN 86*   CREATININE 3.2*       Significant Imaging: Echocardiogram:   Transthoracic echo (TTE) complete (Cupid Only):   Results for orders placed or performed during the hospital encounter of 12/02/19   Echo Color Flow Doppler? Yes    Result Value Ref Range    BSA 2.3 m2    TDI SEPTAL 0.06 m/s    LV LATERAL E/E' RATIO 9.00 m/s    LV SEPTAL E/E' RATIO 10.50 m/s    TDI LATERAL 0.07 m/s    PV PEAK VELOCITY 1.81 cm/s    LVIDD 4.80 3.5 - 6.0 cm    IVS 1.10 (A) 0.6 - 1.1 cm    PW 1.10 (A) 0.6 - 1.1 cm    Ao root annulus 3.40 cm    LVIDS 2.56 2.1 - 4.0 cm    FS 47 28 - 44 %    LV mass 193.96 g    LA size 3.68 cm    TAPSE 1.44 cm    Left Ventricle Relative Wall Thickness 0.46 cm    AV mean gradient 6 mmHg    AV valve area 4.31 cm2    AV Velocity Ratio 0.78     AV index (prosthetic) 1.06     E/A ratio 0.62     Mean e' 0.07 m/s    E wave decelartion time 338.80 msec    LVOT diameter 2.27 cm    LVOT area 4.0 cm2    LVOT peak simone 1.16 m/s    LVOT peak VTI 26.25 cm    Ao peak simone 1.48 m/s    Ao VTI 24.66 cm    LVOT stroke volume 106.18 cm3    AV peak gradient 9 mmHg    E/E' ratio 9.69 m/s    MV Peak E Simone 0.63 m/s    MV Peak A Simone 1.02 m/s    LV Systolic Volume 23.57 mL    LV Systolic Volume Index 10.6 mL/m2    LV Diastolic Volume 68.84 mL    LV Diastolic Volume Index 30.84 mL/m2    LV Mass Index 87 g/m2    Right Atrial Pressure (from IVC) 3 mmHg    Narrative    · Normal left ventricular systolic function. The estimated ejection   fraction is 55%  · Concentric left ventricular remodeling.  · No wall motion abnormalities.  · Normal right ventricular systolic function.  · Normal central venous pressure (3 mm Hg).        Assessment and Plan:     Brief HPI: Seen this AM on NP rounds while resting in bed. Denies any complaints.Discussed POC as detailed below-verbalized understanding and agrees with POC     * Acute on chronic diastolic heart failure  -echo with normal LVEF  -related to  volume overload secondary to PRBC transfusion  -remains on IV Lasix BID; 2.1 liters out overnight;  negative 14.6 liters since admisson  -on CCB; SBPs overnight 140s-150s  -SOB improving  -PT and OT on board     Constipation  -related to immobility  -given Lactulose yesterday with  results  -will place on daily stool softener    Ulcer of esophagus without bleeding  -nausea with vomiting last week with visible hematemesis  -started on IV Protonix drip with transition to Protonix BID  -H&H stable  -stool for OCB negative  -GI consulted;  EGD on 12/13 non bleeding esophageal ulcer     JOSE (acute kidney injury)  -multifactoral  -concerned about ATN given bleed and hypotension  -creatinine 1.2 upon admission with trend up to 3.7-4.5-5.3-5.4-5.5-5.0  -BUN 86 creatinine 3.2 this AM  -Nephrology on board; no HD since 12/11; on IV Lasix BID with excellent response and 2.1 liters out overnight negative 14.6 liters since admission   -will continue to avoid nephrotoxic agents and hypotension      Anemia  -initial H&H 11.1&36.1 with trend down to 7.4&24.1 post procedure  -repeat angiogram on 12/2 with no active bleeding with recurrent trend down prompting repeat  angiogram  12/4 with no active bleeding; CTA abdomen with renal subcapsular hematoma with surrounding retroperitoneal hemorrhage and no definite contrast extravasation seen to suggest active bleeding on delayed images  -H&H 6.7&20.5 12/8 with repeat PRBC transfusion with trend up of H&H to 8.8&26.6-9.1 & 27.5   -hematemesis on 12/11 with no significant drop of H&H; 8.4&26.1 today    -will continue with daily CBCs for now     PAD (peripheral artery disease)  -admitted for elective LE angiogram for further evaluation of LE claudication  -successful atherectomy and PTA with scoring balloon and  DCB to dSFA,  pSFA CFA and TPT   -foot warm and pulses present   -continue statin; no ASA and Plavix due to bleeding issue   -will need serial LE arterial ultrasounds as an outpatient       CAD (coronary artery disease)  -s/p LAD and LCX MARIAN in 2011  -was on  ASA, statin, Plavix as an outpatient along with CCB  -will continue to hold DAPT given concern for acute bleeding  -resumed CCB  -echocardiogram with normal LV function with EF  55%    Hyperlipidemia  -continue statin therapy  -FLP with LDL 80 in 9/2019    Essential hypertension  -on Norvasc, Chlorthalidone, Metoprolol and Losartan at home  -meds held due to hypotension  -BP trended back up; CCB resumed with stable BP  -continue to monitor BP and adjust meds prn     Type 2 diabetes mellitus with kidney complication, with long-term current use of insulin  --184 overnight   -on Lantus and mealtime insulin at home  -diabetes management per Hospital Medicine   -recent HgbA1c 5.6 9/2019        VTE Risk Mitigation (From admission, onward)         Ordered     Place DARRION hose  Until discontinued      12/05/19 1005     heparin (porcine) injection 2,300 Units  As needed (PRN)      12/04/19 1137                KATHLEEN Cazares, ANP  Cardiology  [facility]

## 2019-12-16 NOTE — PLAN OF CARE
Patient safety maintained. Medications administered per order. Assistance provided as needed for positioning or transferring.Continued glucose levels monitoring and treating per orders.

## 2019-12-16 NOTE — PROGRESS NOTES
VN cued into the patient's room with permission. The patient was awake and alert in bed. He denies any pain. He reports that he feels better and is happy that he doesn't need dialysis. Safety education was reviewed with the patient. Will continue to monitor.      12/16/19 1311   Type of Frequent Check   Type Patient Rounds   Safety/Activity   Patient Rounds bed in low position;ID band on;visualized patient   Safety Promotion/Fall Prevention assistive device/personal item within reach   Activity Management activity adjusted per tolerance   Positioning   Body Position weight shift assistance provided   Head of Bed (HOB) HOB at 20-30 degrees   Positioning/Transfer Devices pillows;in use   Pain/Comfort/Sleep   Preferred Pain Scale number (Numeric Rating Pain Scale)   Comfort/Acceptable Pain Level 0   Pain Rating (0-10): Rest 0   Pain Rating (0-10): Activity 0        Cardiac/Telemetry Details / Alarms   Cardiac/Telemetry Monitor On Yes   Cardiac/Telemetry Audible Yes   Cardiac/Telemetry Alarms Set Yes   ECG   Lead Monitored Lead III   Assessments (Pre/Post)   Level of Consciousness (AVPU) alert

## 2019-12-16 NOTE — PLAN OF CARE
CM visited with pt for d/c planning, pt attempting to urinate at this time.        [facility] (Priyanka) states she has been following and was in touch with [facility]  who states medical director today has stated pt is not medically ready for rehab.  Marcin agrees pt meets criteria for rehab once medically ready for d/c.  Per [facility] rep, pt needed EGD on Friday, inpatient level of care could not be approved as they felt pt was still required care at the acute level.  Informed that peer to peer until Monday could be done if disagreed with denial (pt still on IV lasix, HD needs still pending, EGD needed on Friday for bloody emesis).  Marcin has access to records, has noted + diuresis with IV lasix, no HD since 12/11.  Marcin request that inpatient rehab auth be requested once pt has been transitioned to PO lasix, Nephrology has determined final HD needs and all acute needs resolved.  Per insurance auth will be denied as documentation supports pt still requiring inpatient hospitalization.    Primary team will speak with Nephrology about recs.      CM will continue to work with [facility] for d/c planning.  Please don't hesitate to contact this CM with questions.      Grace Rodriguez, RN    [phone]

## 2019-12-16 NOTE — PT/OT/SLP PROGRESS
Occupational Therapy   Treatment    Name: Cy Rutherford  MRN: [id number]  Admitting Diagnosis:  Acute on chronic diastolic heart failure  3 Days Post-Op    Recommendations:     Discharge Recommendations: rehabilitation facility  Discharge Equipment Recommendations:  bedside commode, shower chair  Barriers to discharge:  Decreased caregiver support    Assessment:     Cy Rutherford is a 74 y.o. male with a medical diagnosis of Acute on chronic diastolic heart failure.  He presents with deconditioning, limited stability in gait initially but improved after v/c for pt to maintain balance before attempting ambulation. Performance deficits affecting function are weakness, impaired self care skills, impaired balance, decreased coordination, decreased safety awareness, decreased ROM, edema, impaired coordination, decreased upper extremity function, impaired functional mobilty, impaired endurance, impaired sensation, gait instability, decreased lower extremity function.     Rehab Prognosis:  Good; patient would benefit from acute skilled OT services to address these deficits and reach maximum level of function.       Plan:     Patient to be seen 5 x/week to address the above listed problems via self-care/home management, therapeutic activities, therapeutic exercises  · Plan of Care Expires: 01/05/20  · Plan of Care Reviewed with: patient    Subjective     Pain/Comfort:  · Pain Rating 1: 0/10    Objective:     Communicated with: nsnancy prior to session.  Patient found HOB elevated with telemetry, central line upon OT entry to room.    General Precautions: Standard, fall   Orthopedic Precautions:N/A   Braces: N/A     Occupational Performance:     Bed Mobility:    · Patient completed Rolling/Turning to Left with  minimum assistance  · Patient completed Scooting/Bridging with minimum assistance  · Patient completed Supine to Sit with moderate assistance    Functional Mobility/Transfers:  · Patient completed Sit <> Stand Transfer  with minimum assistance  with  rolling walker   · Patient completed Bed <> Chair Transfer using Step Transfer technique with contact guard assistance with rolling walker  Functional Mobility: Pt with fair to fair- dynamic seated and standing balance.      Activities of Daily Living:  · Grooming: maximal assistance to apply lotion to BLE   · Lower Body Dressing: total assistance to don/doff B socks and DARRION hose      [facility] 6 Click ADL: 15    Treatment & Education:  Pt educated on role of OT and POC.   Pt performing skills as listed above.   MD recommended use of DARRION hose during session, OT spoke with RN for sizing and assisted pt to don DARRION hose during session.  Pt provided with gentle retrograde massage BLE to assist with edema management after MD ferrell. Pt encouraged to continue AROM therex for BLE to further assist in edema management.  Pt performed 2x5 reps therex per HEP; pt required skilled educated to complete HEP using adaptive techniques 2/2 RUE weakness due to hemiparesis. Pt supplied with red Theraband with loop tied for RUE to assist with adaptive use of grasp to complete UE therex. Pt verbalized understanding of all adaptations used this date.    Patient left up in chair with all lines intact, call button in reach, chair alarm on, nsg notified  GOALS:   Multidisciplinary Problems     Occupational Therapy Goals        Problem: Occupational Therapy Goal    Goal Priority Disciplines Outcome Interventions   Occupational Therapy Goal     OT, PT/OT Ongoing, Progressing    Description:  Goals to be met by: 1/5/2020    Patient will increase functional independence with ADLs by performing:    UE Dressing with Modified [place].  LE Dressing with Set-up Assistance.  Grooming while standing with Supervision.  Toileting from bedside commode with Supervision for hygiene and clothing management.   Step transfer with Supervision  Toilet transfer to bedside commode with Supervision.  Increased functional strength to  WFL for self care.  Upper extremity exercise program x10 reps per handout, with assistance as needed.                      Time Tracking:     OT Date of Treatment: 12/16/19  OT Start Time: 1329  OT Stop Time: 1414  OT Total Time (min): 45 min    Billable Minutes:Self Care/Home Management 15  Therapeutic Activity 15  Therapeutic Exercise 15    Kanwal Cool, ALISON  12/16/2019

## 2019-12-17 ENCOUNTER — ANESTHESIA (OUTPATIENT)
Dept: CARDIOLOGY | Facility: HOSPITAL | Age: 74
DRG: 270 | End: 2019-12-17
Payer: MEDICARE

## 2019-12-17 ENCOUNTER — ANESTHESIA EVENT (OUTPATIENT)
Dept: CARDIOLOGY | Facility: HOSPITAL | Age: 74
DRG: 270 | End: 2019-12-17
Payer: MEDICARE

## 2019-12-17 LAB
ALBUMIN SERPL BCP-MCNC: 2.4 G/DL (ref 3.5–5.2)
ALP SERPL-CCNC: 64 U/L (ref 55–135)
ALT SERPL W/O P-5'-P-CCNC: 31 U/L (ref 10–44)
ANION GAP SERPL CALC-SCNC: 10 MMOL/L (ref 8–16)
AST SERPL-CCNC: 29 U/L (ref 10–40)
BASOPHILS # BLD AUTO: 0.02 K/UL (ref 0–0.2)
BASOPHILS NFR BLD: 0.3 % (ref 0–1.9)
BILIRUB DIRECT SERPL-MCNC: 0.4 MG/DL (ref 0.1–0.3)
BILIRUB SERPL-MCNC: 0.7 MG/DL (ref 0.1–1)
BUN SERPL-MCNC: 79 MG/DL (ref 8–23)
CALCIUM SERPL-MCNC: 8.5 MG/DL (ref 8.7–10.5)
CHLORIDE SERPL-SCNC: 99 MMOL/L (ref 95–110)
CO2 SERPL-SCNC: 29 MMOL/L (ref 23–29)
CREAT SERPL-MCNC: 2.8 MG/DL (ref 0.5–1.4)
DIFFERENTIAL METHOD: ABNORMAL
EOSINOPHIL # BLD AUTO: 0.3 K/UL (ref 0–0.5)
EOSINOPHIL NFR BLD: 3.6 % (ref 0–8)
ERYTHROCYTE [DISTWIDTH] IN BLOOD BY AUTOMATED COUNT: 15.1 % (ref 11.5–14.5)
EST. GFR  (AFRICAN AMERICAN): 24 ML/MIN/1.73 M^2
EST. GFR  (NON AFRICAN AMERICAN): 21 ML/MIN/1.73 M^2
GLUCOSE SERPL-MCNC: 188 MG/DL (ref 70–110)
HCT VFR BLD AUTO: 27.5 % (ref 40–54)
HGB BLD-MCNC: 8.5 G/DL (ref 14–18)
LYMPHOCYTES # BLD AUTO: 0.6 K/UL (ref 1–4.8)
LYMPHOCYTES NFR BLD: 7.9 % (ref 18–48)
MCH RBC QN AUTO: 28.7 PG (ref 27–31)
MCHC RBC AUTO-ENTMCNC: 30.9 G/DL (ref 32–36)
MCV RBC AUTO: 93 FL (ref 82–98)
MONOCYTES # BLD AUTO: 0.8 K/UL (ref 0.3–1)
MONOCYTES NFR BLD: 10.2 % (ref 4–15)
NEUTROPHILS # BLD AUTO: 5.8 K/UL (ref 1.8–7.7)
NEUTROPHILS NFR BLD: 78 % (ref 38–73)
PHOSPHATE SERPL-MCNC: 4.1 MG/DL (ref 2.7–4.5)
PLATELET # BLD AUTO: 375 K/UL (ref 150–350)
PMV BLD AUTO: 8.9 FL (ref 9.2–12.9)
POCT GLUCOSE: 162 MG/DL (ref 70–110)
POCT GLUCOSE: 182 MG/DL (ref 70–110)
POCT GLUCOSE: 189 MG/DL (ref 70–110)
POCT GLUCOSE: 197 MG/DL (ref 70–110)
POTASSIUM SERPL-SCNC: 3.5 MMOL/L (ref 3.5–5.1)
PROT SERPL-MCNC: 6 G/DL (ref 6–8.4)
RBC # BLD AUTO: 2.96 M/UL (ref 4.6–6.2)
SODIUM SERPL-SCNC: 138 MMOL/L (ref 136–145)
WBC # BLD AUTO: 7.46 K/UL (ref 3.9–12.7)

## 2019-12-17 PROCEDURE — 80076 HEPATIC FUNCTION PANEL: CPT

## 2019-12-17 PROCEDURE — 85025 COMPLETE CBC W/AUTO DIFF WBC: CPT

## 2019-12-17 PROCEDURE — 11000001 HC ACUTE MED/SURG PRIVATE ROOM

## 2019-12-17 PROCEDURE — 99233 PR SUBSEQUENT HOSPITAL CARE,LEVL III: ICD-10-PCS | Mod: ,,, | Performed by: STUDENT IN AN ORGANIZED HEALTH CARE EDUCATION/TRAINING PROGRAM

## 2019-12-17 PROCEDURE — 97530 THERAPEUTIC ACTIVITIES: CPT

## 2019-12-17 PROCEDURE — 25000003 PHARM REV CODE 250: Performed by: INTERNAL MEDICINE

## 2019-12-17 PROCEDURE — 86580 TB INTRADERMAL TEST: CPT | Performed by: INTERNAL MEDICINE

## 2019-12-17 PROCEDURE — 84100 ASSAY OF PHOSPHORUS: CPT

## 2019-12-17 PROCEDURE — 99233 SBSQ HOSP IP/OBS HIGH 50: CPT | Mod: ,,, | Performed by: STUDENT IN AN ORGANIZED HEALTH CARE EDUCATION/TRAINING PROGRAM

## 2019-12-17 PROCEDURE — 25000003 PHARM REV CODE 250: Performed by: NURSE PRACTITIONER

## 2019-12-17 PROCEDURE — 94761 N-INVAS EAR/PLS OXIMETRY MLT: CPT

## 2019-12-17 PROCEDURE — 36000 PLACE NEEDLE IN VEIN: CPT | Performed by: ORTHOPAEDIC SURGERY

## 2019-12-17 PROCEDURE — 30200315 PPD INTRADERMAL TEST REV CODE 302: Performed by: INTERNAL MEDICINE

## 2019-12-17 PROCEDURE — 80048 BASIC METABOLIC PNL TOTAL CA: CPT

## 2019-12-17 PROCEDURE — 97535 SELF CARE MNGMENT TRAINING: CPT

## 2019-12-17 PROCEDURE — 63600175 PHARM REV CODE 636 W HCPCS: Performed by: INTERNAL MEDICINE

## 2019-12-17 PROCEDURE — 97116 GAIT TRAINING THERAPY: CPT

## 2019-12-17 PROCEDURE — 99900035 HC TECH TIME PER 15 MIN (STAT)

## 2019-12-17 RX ORDER — POTASSIUM CHLORIDE 20 MEQ/1
20 TABLET, EXTENDED RELEASE ORAL ONCE
Status: COMPLETED | OUTPATIENT
Start: 2019-12-17 | End: 2019-12-17

## 2019-12-17 RX ORDER — INSULIN ASPART 100 [IU]/ML
10 INJECTION, SOLUTION INTRAVENOUS; SUBCUTANEOUS
Status: DISCONTINUED | OUTPATIENT
Start: 2019-12-18 | End: 2019-12-18

## 2019-12-17 RX ADMIN — INSULIN ASPART 17 UNITS: 100 INJECTION, SOLUTION INTRAVENOUS; SUBCUTANEOUS at 05:12

## 2019-12-17 RX ADMIN — SIMETHICONE 125 MG: 125 TABLET, CHEWABLE ORAL at 05:12

## 2019-12-17 RX ADMIN — POTASSIUM CHLORIDE 20 MEQ: 1500 TABLET, EXTENDED RELEASE ORAL at 11:12

## 2019-12-17 RX ADMIN — PANTOPRAZOLE SODIUM 40 MG: 40 TABLET, DELAYED RELEASE ORAL at 05:12

## 2019-12-17 RX ADMIN — SIMETHICONE 125 MG: 125 TABLET, CHEWABLE ORAL at 06:12

## 2019-12-17 RX ADMIN — INSULIN ASPART 17 UNITS: 100 INJECTION, SOLUTION INTRAVENOUS; SUBCUTANEOUS at 08:12

## 2019-12-17 RX ADMIN — POTASSIUM CHLORIDE 40 MEQ: 1500 TABLET, EXTENDED RELEASE ORAL at 08:12

## 2019-12-17 RX ADMIN — TUBERCULIN PURIFIED PROTEIN DERIVATIVE 5 UNITS: 5 INJECTION INTRADERMAL at 11:12

## 2019-12-17 RX ADMIN — AMLODIPINE BESYLATE 10 MG: 5 TABLET ORAL at 08:12

## 2019-12-17 RX ADMIN — DOXAZOSIN 8 MG: 2 TABLET ORAL at 08:12

## 2019-12-17 RX ADMIN — FUROSEMIDE 40 MG: 10 INJECTION, SOLUTION INTRAVENOUS at 08:12

## 2019-12-17 RX ADMIN — SIMETHICONE 125 MG: 125 TABLET, CHEWABLE ORAL at 11:12

## 2019-12-17 RX ADMIN — POLYETHYLENE GLYCOL 3350 17 G: 17 POWDER, FOR SOLUTION ORAL at 08:12

## 2019-12-17 RX ADMIN — SEVELAMER CARBONATE 800 MG: 800 TABLET, FILM COATED ORAL at 08:12

## 2019-12-17 RX ADMIN — INSULIN ASPART 1 UNITS: 100 INJECTION, SOLUTION INTRAVENOUS; SUBCUTANEOUS at 08:12

## 2019-12-17 RX ADMIN — FUROSEMIDE 40 MG: 10 INJECTION, SOLUTION INTRAVENOUS at 05:12

## 2019-12-17 RX ADMIN — INSULIN ASPART 17 UNITS: 100 INJECTION, SOLUTION INTRAVENOUS; SUBCUTANEOUS at 11:12

## 2019-12-17 RX ADMIN — NEPHROCAP 1 CAPSULE: 1 CAP ORAL at 08:12

## 2019-12-17 RX ADMIN — PANTOPRAZOLE SODIUM 40 MG: 40 TABLET, DELAYED RELEASE ORAL at 06:12

## 2019-12-17 RX ADMIN — ROSUVASTATIN CALCIUM 20 MG: 10 TABLET, FILM COATED ORAL at 08:12

## 2019-12-17 NOTE — PT/OT/SLP PROGRESS
Physical Therapy Treatment    Patient Name:  Cy Rutherford   MRN:  [id number]    Recommendations:     Discharge Recommendations:  rehabilitation facility   Discharge Equipment Recommendations: bedside commode, shower chair   Barriers to discharge: Decreased caregiver support and increaed assistance for functional mobility needed at this time.    Assessment:     Cy Rutherford is a 74 y.o. male admitted with a medical diagnosis of Acute on chronic diastolic heart failure.  He presents with the following impairments/functional limitations:  weakness, impaired endurance, impaired functional mobilty, impaired self care skills, gait instability, impaired balance, decreased coordination, decreased upper extremity function, decreased lower extremity function, decreased ROM, impaired coordination, impaired fine motor.  Pt would continue to benefit from P.T. To assist pt's return to PLOF.      Rehab Prognosis: Fair+; patient would benefit from acute skilled PT services to address these deficits and reach maximum level of function.    Recent Surgery: Procedure(s) (LRB):  ESOPHAGOGASTRODUODENOSCOPY (EGD) (N/A) 4 Days Post-Op    Plan:     During this hospitalization, patient to be seen 6 x/week to address the identified rehab impairments via gait training, therapeutic activities, therapeutic exercises and progress toward the following goals:    · Plan of Care Expires:  01/06/20    Subjective       Patient/Family Comments/goals: Pt agreed to tx.  Pain/Comfort:  · Pain Rating 1: 0/10  · Pain Rating Post-Intervention 1: 0/10      Objective:     Communicated with RN Nahomy prior to session.  Patient found supine with telemetry, central line upon PT entry to room.     General Precautions: Standard, fall   Orthopedic Precautions:N/A   Braces:       Functional Mobility:  · Bed Mobility:     · Rolling Right: stand by assistance, contact guard assistance and with b/r for assist  · Scooting: contact guard assistance, minimum assistance  and to EOB  · Supine to Sit: minimum assistance and with HOB elevated ~30* and use of b/r coming up from R to midline  · Sit to Supine: minimum assistance  · Transfers:     · Sit to Stand:  minimum assistance with rolling walker and vc's/tc's for hand placement  · Gait: 50ft x 2 with RW close CGA/Richard.  Pt ambulates with decreased josh decreased R hip/knee flexion, no heel strike, and lack of control for right foot placement when advancing (flops on floor).  Pt requires support for R hand at times on RW.    · Balance: sitting fair/fair+, standing fair RW, gait fair-/fair      AM-PAC 6 CLICK MOBILITY  Turning over in bed (including adjusting bedclothes, sheets and blankets)?: 3  Sitting down on and standing up from a chair with arms (e.g., wheelchair, bedside commode, etc.): 3  Moving from lying on back to sitting on the side of the bed?: 3  Moving to and from a bed to a chair (including a wheelchair)?: 3  Need to walk in hospital room?: 3  Climbing 3-5 steps with a railing?: 1  Basic Mobility Total Score: 16       Therapeutic Activities and Exercises:   Seated BLE therex: AP(L, Max A on R), LAQ with assist on R for increased ROM, hip flexion (R weaker/decreased range compared to L) x 15 reps.  Manual Stretch to B hamstrings in sitting.  Pt and RN report pt sat up in b/s chair earlier, but was in bed at this time secondary to trying to get new IV line placement.    Patient left supine with all lines intact, call button in reach, bed alarm on and RN notified..    GOALS:   Multidisciplinary Problems     Physical Therapy Goals        Problem: Physical Therapy Goal    Goal Priority Disciplines Outcome Goal Variances Interventions   Physical Therapy Goal     PT, PT/OT Ongoing, Progressing     Description:  Goals to be met by: 2020     Patient will increase functional independence with mobility by performin. Supine to sit with Set-up [place]  2. Sit to stand transfer with Supervision  3. Bed to chair  transfer with Supervision using Rolling Walker  4. Gait  x 120 feet with Supervision using Rolling Walker.   5. Lower extremity exercise program x15 reps per handout, with supervision                      Time Tracking:     PT Received On: 12/17/19  PT Start Time: 1403     PT Stop Time: 1430  PT Total Time (min): 27 min     Billable Minutes: Gait Training 14 and Therapeutic Activity 13    Treatment Type: Treatment  PT/PTA: PTA     PTA Visit Number: 4     Jessica Milan PTA  12/17/2019

## 2019-12-17 NOTE — PLAN OF CARE
called [facility] at [phone], spoke with Hunter , completed LOCET. SW faxed PASRR, awaiting form 142 from State.       12/17/19 1004   Post-Acute Status   Post-Acute Authorization Placement   Post-Acute Placement Status Pending State Certification

## 2019-12-17 NOTE — PROGRESS NOTES
Progress Note  Nephrology      Consult Requested By: James Sanchez MD  Reason for Consult: ARF    SUBJECTIVE:     Review of Systems   Constitutional: Negative for chills and fever.   Respiratory: Negative for cough and shortness of breath.    Cardiovascular: Negative for chest pain and leg swelling.   Gastrointestinal: Negative for nausea.     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    Abnormality of gait as late effect of cerebrovascular accident (CVA)    Tightness of right gastrocnemius muscle    Claudication    Anemia    JOSE (acute kidney injury)    Acute on chronic diastolic heart failure    Ulcer of esophagus without bleeding    Constipation    Hypokalemia       OBJECTIVE:     Medications:   amLODIPine  10 mg Oral Daily    doxazosin  8 mg Oral Daily    epoetin gege-ebpx (RETACRIT) injection  20,000 Units Subcutaneous Q7 Days    furosemide  40 mg Intravenous BID    insulin aspart U-100  17 Units Subcutaneous TIDWM    insulin detemir U-100  62 Units Subcutaneous QHS    iron sucrose (VENOFER) IVPB  100 mg Intravenous Weekly    pantoprazole  40 mg Oral BID AC    polyethylene glycol  17 g Oral Daily    potassium chloride  20 mEq Oral Once    potassium chloride  20 mEq Oral Once    potassium chloride  40 mEq Oral Daily    rosuvastatin  20 mg Oral Daily    sevelamer carbonate  800 mg Oral Daily    simethicone   125 mg Oral Q6H    tuberculin  5 Units Intradermal Once    vitamin renal formula (B-complex-vitamin c-folic acid)  1 capsule Oral Daily       Vitals:    12/17/19 0822   BP: (!) 168/73   Pulse: 79   Resp: 18   Temp: 97.7 °F (36.5 °C)     I/O last 3 completed shifts:  In: 1220 [P.O.:1220]  Out: 3150 [Urine:3150]  Physical Exam   Constitutional: He is oriented to person, place, and time. He appears well-developed and well-nourished. No distress.   HENT:   Head: Normocephalic and atraumatic.   Eyes: EOM are normal. No scleral icterus.   Neck: Neck supple. No JVD present.   Cardiovascular: Regular rhythm and intact distal pulses. Exam reveals no friction rub.   No murmur heard.  Pulmonary/Chest: Effort normal and breath sounds normal. No respiratory distress. He has no wheezes. He has no rales.   Abdominal: Soft. Bowel sounds are normal. He exhibits no distension. There is no tenderness.   Musculoskeletal: He exhibits edema (4+ BLE).   Neurological: He is alert and oriented to person, place, and time.   Skin: Skin is warm and dry. No rash noted. He is not diaphoretic. No erythema.   Psychiatric: He has a normal mood and affect.     Laboratory:  Recent Labs   Lab 12/15/19  0742 12/16/19  0537 12/17/19  0632   WBC 8.49  8.49 7.87 7.46   HGB 8.3*  8.3* 8.4* 8.5*   HCT 25.8*  25.8* 26.1* 27.5*     315 351* 375*   MONO 4.1  4.1  0.4  0.4 4.8  0.4 10.2  0.8     Recent Labs   Lab 12/15/19  0742 12/16/19  0537 12/17/19  0632     136  136 136 138   K 3.2*  3.2*  3.2* 3.6 3.5   CL 98  98  98 99 99   CO2 28  28  28 26 29   BUN 81*  81*  81* 86* 79*   CREATININE 3.3*  3.3*  3.3* 3.2* 2.8*   CALCIUM 8.3*  8.3*  8.3* 8.3* 8.5*   PHOS 4.9* 4.3  4.3 4.1     Labs reviewed  Diagnostic Results:  X-Ray: Reviewed  US: Reviewed  Echo: Reviewed      ASSESSMENT/PLAN:   1. ARF  On CKD 3, baseline Cr 1-1.2  JOSE from hemodynamic instability from acute blood loss and large retroperitoneal hematoma  Was on CRRT  with multipple clotting episodes, worsenign anemia and nausea/vomiting (was not tolerating RRT well) but stable off any RRT for now. Good UO 2 l      Not anticipating RRT anytime soon  Remove the trialysis cath    2. Hypokalemia - liberalize diet, PO replacement in progress    3. Anemia of CKd + acute blood loss -   Epogen 20K SC q 7 days  Recent Labs   Lab 12/15/19  0742 12/16/19  0537 12/17/19  0632   WBC 8.49  8.49 7.87 7.46   HGB 8.3*  8.3* 8.4* 8.5*   HCT 25.8*  25.8* 26.1* 27.5*     315 351* 375*     Lab Results   Component Value Date    IRON 21 (L) 12/07/2019    TIBC 268 12/07/2019         4. MBD - at goal  Lab Results   Component Value Date    CALCIUM 8.5 (L) 12/17/2019    PHOS 4.1 12/17/2019     Recent Labs   Lab 12/14/19  0548   MG 2.2     No results found for: [id number]  Lab Results   Component Value Date    CO2 29 12/17/2019           Thank you for allowing me to participate in the care of your patients  With any question please call [phone]  Madina Medina    [facility]  JEANA Diego MD, KULDEEP ROJAS MD,   MD ROSETTA Olsen, NP  [address]  [place], [address]  [phone]

## 2019-12-17 NOTE — ANESTHESIA PROCEDURE NOTES
Peripheral IV Insertion    Diagnosis: I87.9 Disorder of vein, unspecified.    Patient location during procedure: floor  Procedure start time: 12/17/2019 4:45 PM  Timeout: 12/17/2019 4:50 PM  Procedure end time: 12/17/2019 5:00 PM    Staffing  Authorizing Provider: Jacqueline Dangelo MD  Performing Provider: Galina Colbert MD    Anesthesiologist was present at the time of the procedure.    Preanesthetic Checklist  Completed: patient identified and risks and benefits discussedPeripheral IV Insertion  Skin Prep: chlorhexidine gluconate  Orientation: right  Location: forearm  Catheter Size: 20 G  Catheter placement by Ultrasound guidance. Heme positive aspiration all ports.  Vessel Caliber: medium, patent, compressibility normal  Vascular Doppler:  not done  Needle advanced into vessel with real time Ultrasound guidance.Insertion Attempts: 3  Assessment  Dressing: secured with tape and tegaderm  Patient: Tolerated well  Line flushed easily.

## 2019-12-17 NOTE — PT/OT/SLP PROGRESS
Occupational Therapy   Treatment    Name: Cy Rutherford  MRN: [id number]  Admitting Diagnosis:  Acute on chronic diastolic heart failure  4 Days Post-Op    Recommendations:     Discharge Recommendations: rehabilitation facility  Discharge Equipment Recommendations:  bedside commode, shower chair  Barriers to discharge:  Decreased caregiver support    Assessment:   Patient improving with activity tolerance, but having multiple BMs, so somewhat limited session. Patient will benefit from post-acute therapy (SNF vs IPR -- patient reports he wants to D/C to a SNF). Cont OT.      Cy Rutherford is a 74 y.o. male with a medical diagnosis of Acute on chronic diastolic heart failure. Performance deficits affecting function are weakness, impaired endurance, impaired self care skills, impaired functional mobilty, gait instability, impaired balance, decreased upper extremity function, decreased lower extremity function, decreased coordination, decreased ROM, impaired coordination, impaired fine motor.     Rehab Prognosis:  Good; patient would benefit from acute skilled OT services to address these deficits and reach maximum level of function.       Plan:     Patient to be seen 5 x/week to address the above listed problems via self-care/home management, therapeutic activities, therapeutic exercises  · Plan of Care Expires: 01/05/20  · Plan of Care Reviewed with: patient    Subjective     Pain/Comfort:  · Pain Rating 1: 0/10  · Pain Rating Post-Intervention 1: 0/10    Objective:     Communicated with: nurseAniya prior to session.  Patient found HOB elevated with telemetry, central line upon OT entry to room.    General Precautions: Standard, fall   Orthopedic Precautions:N/A   Braces: N/A     Bed Mobility:    · Patient completed Rolling/Turning to Right with contact guard assistance and with side rail  · Patient completed Scooting/Bridging with contact guard assistance  · Patient completed Supine to Sit with contact guard  assistance, minimum assistance, with side rail and VCs to push through R elbow     Functional Mobility/Transfers:  · Patient completed Sit <> Stand Transfer with contact guard assistance and minimum assistance  with  rolling walker   · Patient completed Bed <> Chair Transfer using Step Transfer technique with contact guard assistance and minimum assistance with rolling walker  · Patient completed Toilet Transfer Step Transfer technique with minimum assistance with  rolling walker and grab bars    Activities of Daily Living:  · Grooming: supervision from chair level  · Upper Body Dressing: contact guard assistance    · Lower Body Dressing: total assistance    · Toileting: maximal assistance      [facility] 6 Click ADL: 16    Treatment & Education:  Patient /c bed mob as noted above. Increased time and effort to transition. Patient donned gown as robe EOB. Stood CG-Richard using RW, but was soiled /c BM. Multiple stands required for cleaning 2/2 patient still having BM. Patient able to ambulate around EOB to bathroom to complete toileting. Richard to stand from low surface height toilet. T/f into b/s chair and completed G/H tasks.      Patient left up in chair with all lines intact, call button in reach, chair alarm on and nsg notifiedEducation:      GOALS:   Multidisciplinary Problems     Occupational Therapy Goals        Problem: Occupational Therapy Goal    Goal Priority Disciplines Outcome Interventions   Occupational Therapy Goal     OT, PT/OT Ongoing, Progressing    Description:  Goals to be met by: 1/5/2020    Patient will increase functional independence with ADLs by performing:    UE Dressing with Modified [place].  LE Dressing with Set-up Assistance.  Grooming while standing with Supervision.  Toileting from bedside commode with Supervision for hygiene and clothing management.   Step transfer with Supervision  Toilet transfer to bedside commode with Supervision.  Increased functional strength to WFL for self  care.  Upper extremity exercise program x10 reps per handout, with assistance as needed.                      Time Tracking:     OT Date of Treatment: 12/17/19  OT Start Time: 0855  OT Stop Time: 0955  OT Total Time (min): 60 min    Billable Minutes:Self Care/Home Management 45  Therapeutic Activity 15    CECILLE Devlin  12/17/2019

## 2019-12-17 NOTE — PLAN OF CARE
Pt has informed CM he gave incorrect name of skilled facility.  States he would like placement at [facility] in [place], [place].  Referral cancelled for [facility], new referral placed for [facility].       12/17/19 1000   Post-Acute Status   Post-Acute Authorization Placement   Post-Acute Placement Status Referrals Sent   Patient choice form signed by patient/caregiver   (pt has declined snf list, states this is only skilled in his area)   Discharge Delays None known at this time       Grace Rodriguez RN    [phone]

## 2019-12-17 NOTE — PT/OT/SLP PROGRESS
Physical Therapy Treatment    Patient Name:  Cy Rutherford   MRN:  [id number]    Recommendations:     Discharge Recommendations:  rehabilitation facility   Discharge Equipment Recommendations: bedside commode, shower chair   Barriers to discharge: Decreased caregiver support and increased level of assistance needed for functional mobililty at this time.    Assessment:     Cy Rutherford is a 74 y.o. male admitted with a medical diagnosis of Acute on chronic diastolic heart failure.  He presents with the following impairments/functional limitations:  weakness, impaired endurance, impaired self care skills, impaired functional mobilty, gait instability, impaired balance, decreased coordination, decreased upper extremity function, decreased lower extremity function, decreased safety awareness, decreased ROM.  Pt would continue to benefit from P.T. To assist pt's return to PLOF.  .    Rehab Prognosis: Fair+; patient would benefit from acute skilled PT services to address these deficits and reach maximum level of function.    Recent Surgery: Procedure(s) (LRB):  ESOPHAGOGASTRODUODENOSCOPY (EGD) (N/A) 3 Days Post-Op    Plan:     During this hospitalization, patient to be seen 6 x/week to address the identified rehab impairments via gait training, therapeutic activities, therapeutic exercises and progress toward the following goals:    · Plan of Care Expires:  01/06/20    Subjective       Patient/Family Comments/goals: Pt agreed to tx.  Pain/Comfort:  · Pain Rating 1: 0/10  · Pain Rating Post-Intervention 1: 0/10      Objective:     Communicated with RN (Shmuel) prior to session.  Patient found up in chair with telemetry, central line upon PT entry to room.     General Precautions: Standard, fall   Orthopedic Precautions:N/A   Braces:       Functional Mobility:  · Transfers:     · Sit to Stand:  minimum assistance with rolling walker  · Gait: 36ft and 22ft with RW and Richard for increased stability and RW management.  Pt  required a standing rest break between bouts.  After a seated rest break, Pt ambulated 36ft and 22 ft again with RW/Richard and a standing rest break between bouts.  Pt sat up in b/s chair after last bout of gait.  Decreased josh, assist to stabilize R hand on RW secondary to weakness, decreased step length and occasional decreased balance during R stande  · Balance: sitting fair+, standing fair, gait fair-      AM-PAC 6 CLICK MOBILITY  Turning over in bed (including adjusting bedclothes, sheets and blankets)?: 3  Sitting down on and standing up from a chair with arms (e.g., wheelchair, bedside commode, etc.): 3  Moving from lying on back to sitting on the side of the bed?: 3  Moving to and from a bed to a chair (including a wheelchair)?: 3  Need to walk in hospital room?: 3  Climbing 3-5 steps with a railing?: 1  Basic Mobility Total Score: 16       Therapeutic Activities and Exercises:  Seated BLE therex: aP, LAQs, hip flexion x 15 reps vc'/tc's for proper technique.  BLE therex with LE elevated in recliner: heelslides, hip ABD/ADD x 15 reps with assist on R and vc's for proper technique    Patient left up in chair with all lines intact, call button in reach and chair alarm on and RN notified..    GOALS:   Multidisciplinary Problems     Physical Therapy Goals        Problem: Physical Therapy Goal    Goal Priority Disciplines Outcome Goal Variances Interventions   Physical Therapy Goal     PT, PT/OT Ongoing, Progressing     Description:  Goals to be met by: 2020     Patient will increase functional independence with mobility by performin. Supine to sit with Set-up [place]  2. Sit to stand transfer with Supervision  3. Bed to chair transfer with Supervision using Rolling Walker  4. Gait  x 120 feet with Supervision using Rolling Walker.   5. Lower extremity exercise program x15 reps per handout, with supervision                      Time Tracking:     PT Received On: 19  PT Start Time: 2234      PT Stop Time: 1453  PT Total Time (min): 26 min     Billable Minutes: Gait Training 17 and Therapeutic Exercise 8       PT/PTA: PTA     PTA Visit Number: 3     Jessica Milan PTA  12/16/2019

## 2019-12-17 NOTE — PLAN OF CARE
12/17/19 1112   Post-Acute Status   Post-Acute Authorization Placement   Post-Acute Placement Status Pending Post-Acute Medical Review   Discharge Delays None known at this time

## 2019-12-17 NOTE — PLAN OF CARE
POC reviewed, patient verbalize understanding. Pt denies pain/discomfort. NSR on Tele monitor. Fall precaution maintained: bed on lowest position, call light within reach, bed alarm set, side rail up x 2, non skid sock on. Will continue to monitor.

## 2019-12-17 NOTE — PROGRESS NOTES
[facility]  Cardiology  Progress Note    Patient Name: Cy Rutherford  MRN: [id number]  Admission Date: 12/2/2019  Hospital Length of Stay: 14 days  Code Status: Full Code   Attending Physician: James Sanchez MD   Primary Care Physician: Heide Porter MD  Expected Discharge Date:   Principal Problem:Acute on chronic diastolic heart failure    Subjective:     Hospital Course:   12/2/2019 Admitted for elective LE angiogram for evaluation of RLE claudication. Taken to the cath lab for the procedure via LCFA access with following results:    successful atherectomy (Hawk LX) with distal filter protection, followed by PTA with scoring balloon (5.0 x 150 dSFA, 6.0 x 150 pSFA/CFA) followed by PTA with DCB to dSFA (5.0 x 150), pSFA (5.0 x 100) and CFA (7 x 40) with good results. Successful PTA to TPT with 3.0 x 40 balloon- see cath report for full report     Tolerated procedure well and recovered in pre post cath area. Hypotension noted along with back pain and diaphoresis. No hematoma noted and manual pressure applied out of concern for bleeding. STAT H&H down to 7.4&24.1 from 11.6&36.1. Given profound symptoms, major concern for bleeding prompting re evaluation with recurrent angiogram. Placed on IVFs along with IV Levophed. Repeat procedure via right radial access with images  in multiple views with no active bleed noted. Cuff pressure with significant difference in comparison to  aortic pressure which was significantly higher.  Admitted to ICU for close monitoring with kanchan placed. T&S with PRBC transfusion initiated with IV Lasix given in between   12/3/2019 Remain on IV Levophed drip at .46mcg/kg/min with BP 110s-130s/40s-50s HR 60s. Serial H&Hs Q4hrs overnight with  H&H this AM 10.0&30.2 with slight trend down to 9.1&27.5 on repeat check. Complains of SOB with increased RR. Only 500cc out overnight. Will repeat IV Lasix 40mg this AM. Echocardiogram and CXR as well. Will attempt to wean IV  Levophed drip as BP tolerates   12/4/2019 Creatinine trended up to 3.7 overnight with K+ 6.1. Continued with no urine output. Nephrology consulted yesterday with trialysis catheter placed yesterday with initiation of CRRT overnight. CRRT x 2-3 hours prior to clotting off. H&H trended down further to 7.3&21.8 with repeat PRBC transfusion. H&H trended up to 8.4&24.9 with continued serial monitoring with recurrent drop to 7.0&21.2. Concerned about recurrent bleeding with plans for repeat angiogram for re evaluation of acute bleed. Remains on IV Levophed with stable BP and wean in process. Complains of mild SOB with stable sats on Venti mask.   12/5/2019 Repeat angiogram yesterday using CO2 with no active bleed noted after extensive angiogram. H&H down 6.6&18.9 yesterday evening with repeat PRBC. CT abdomen/pelvis with evidence of large left renal subcapsular hematoma with surrounding retroperitoneal hemorrhage and no definite contrast extravasation seen to suggest active bleeding on delayed images. H&H up to 7.4&22.0-8.5&25.3. Weaned off IV Levophed with stable BP. Resumed CRRT yesterday evening with 695cc removed and 30cc urine output noted +2.7 liters. Continued mild SOB with stable sats on venti mask. BMP with K+ 5.0 BUN 62 creatinine 5.0. Will continue with serial H&Hs for now. Will place TEDs and consult PT   12/6/2019 Attempted CRRT and HD yesterday with clotted line and approximately 400cc blood loss due to inability to rinse back. Placed on IV Lasix drip at 20mg/hr by Nephrology with 675cc urine output overnight positive 3 liters since admission. K+ 5.0 with BUN 69 creatinine 5.4 this AM. H&H 74&22.4 this AM unchanged from overnight-will continue with serial H&Hs for now. Off IV pressors for over 48 hours with BP 130s-170s/70s overnight. Mild SOB remains per patient with slight improvement noted on PE. Updated son at bedside this morning   12/7/2019 HR and BP stable. H&H 7.7&23.2 unchanged from yesterday.  Remain on IV Lasix with adequate urine output. BUN 89 creatinine 6.0. Hopeful to transfer to floor tomorrow.   12/8/2019 H&H down to 6.7 & 20.5 this AM with repeat PRBC transfusion. Plan for HD today for clearance. Remains on IV Lasix drip with adequate urine output. Resumed antihypertensives with stabel BP. PT and OT on board  12/9/2019 H&H 8.8&26.6 this AM after PRBC transfusion yesterday. Approximately one hour of HD yesterday with cessation due to line malfunction. HR and BP remains stable. 2.7 liters out overnight negative 4.4 liters since admission.  creatinine 5.3. Will reconsult General Surgery today for new trialysis line placement for ongoing intermittent HD/CRRT.   12/10/2019 H&H 9.1&27.5 this AM. BUN 82 creatinine 4.0 after HD run yesterday afternoon. IV Lasix drip at 5mg/hr with 3.0 liters out overnight negative 5.6 liters since admission. HR and BP stable. Plan to transfer out of ICU today   12/11/2019 Transferred out of ICU yesterday. BMP with K+ 3.7 BUN 66 creatinine 3.3. Remains on IV Lasix drip with 1.8 liters urine output with 1.4 liters removed with HD negative 7.4 liters since admission. HR and BP stable. Working with PT currently. Mild abdominal distension with Simethicone and Miralax ordered yesterday with plans for Lactulose today   12/12/2019 Slight increase in abdominal pain with nausea followed by vomiting yesterday. Appearance of blood in emesis yesterday. Serial H&Hs overnight with no sharp drop-H&H this AM 8.4&27.2. Remains on IV Protonix drip with GI consulted with plans for EGD tomorrow. On IV Lasix drip as well with total of 2.6 liters out overnight (1.5 urine output 1.1 HD) negative 9.1 liters since admission. BUN 58 creatinine 3.1. KUB reviewed with improvement in air and successful BM-abdominal distension improved. Nephrology on board as well as PT  12/13/2019 H&H stable with no significant drop-will change CBCs to daily. BMP with BUN 68 creatinine 3.4. Transitioned from IV  Lasix drip to IV Lasix BID with 3.1 liters out overnight negative 11.9 liters since admission. EGD today with esophageal ulcer with recs for Protonix BID. HR and BP stable. PT on board.  12/14/2019 Stable overnight. No acute issues overnight. Sitting in chair   12/15/2019 Doing well. No acute issues. Sitting in chair  12/16/2019 HR and BP stable overnight. BUN 86 creatinine 3.2 this AM. Remains on IV Lasix BID with 2.1 liters out overnight negative 14.6 liters since admission. CBC with Hgb 8.4 Hct 26.1. PT on board and OOB to chair over the weekend. Awaiting further recs in regards to IV diuresis from Nephrology   12/17/2019 H&H 8.5&27.5 this AM. BUN 79 creatinine 2.8 and remains on IV Lasix BID with 2.2 liters out overnight negative 18.3 liters since admission. No HD since 12/11/2019 with plans for removal of Trialysis today per Nephrology via case management-will confirm. Will discuss timing of transition to oral Lasix with Nephrology as well. HR and BP stable. PT and OT on board. Case management consulted with placement with SNF vs rehab in process        Review of Systems   Constitution: Negative for chills, decreased appetite, diaphoresis, fever, malaise/fatigue, weight gain and weight loss.   Cardiovascular: Negative for chest pain, claudication, dyspnea on exertion, irregular heartbeat, leg swelling, near-syncope, orthopnea, palpitations and paroxysmal nocturnal dyspnea.   Respiratory: Negative for cough, shortness of breath, snoring, sputum production and wheezing.    Endocrine: Negative for cold intolerance, heat intolerance, polydipsia, polyphagia and polyuria.   Skin: Negative for color change, dry skin, itching, nail changes and poor wound healing.   Musculoskeletal: Negative for back pain, gout, joint pain and joint swelling.   Gastrointestinal: Negative for bloating, abdominal pain, constipation, diarrhea, hematemesis, hematochezia, melena, nausea and vomiting.   Genitourinary: Negative for dysuria,  hematuria and nocturia.   Neurological: Negative for dizziness, headaches, light-headedness, numbness, paresthesias and weakness.   Psychiatric/Behavioral: Negative for altered mental status, depression and memory loss.     Objective:     Vital Signs (Most Recent):  Temp: 97.7 °F (36.5 °C) (12/17/19 0822)  Pulse: 79 (12/17/19 0822)  Resp: 18 (12/17/19 0822)  BP: (!) 168/73 (12/17/19 0822)  SpO2: (!) 93 % (12/17/19 0822) Vital Signs (24h Range):  Temp:  [96.8 °F (36 °C)-98.6 °F (37 °C)] 97.7 °F (36.5 °C)  Pulse:  [72-83] 79  Resp:  [18-20] 18  SpO2:  [91 %-96 %] 93 %  BP: (134-175)/(63-73) 168/73     Weight: 102.3 kg (225 lb 8.5 oz)  Body mass index is 32.83 kg/m².     SpO2: (!) 93 %  O2 Device (Oxygen Therapy): room air      Intake/Output Summary (Last 24 hours) at 12/17/2019 1010  Last data filed at 12/17/2019 0835  Gross per 24 hour   Intake 480 ml   Output 2300 ml   Net -1820 ml       Lines/Drains/Airways     Central Venous Catheter Line                 Trialysis (Dialysis) Catheter 12/09/19 1230 right internal jugular 7 days          Airway                 Airway - Non-Surgical 12/13/19 0907 Nasal Cannula 4 days                Physical Exam   Constitutional: He is oriented to person, place, and time. He appears well-developed and well-nourished. No distress.   Neck: Normal range of motion. Neck supple. No JVD present.   Cardiovascular: Normal rate and regular rhythm. Exam reveals no gallop.   No murmur heard.  Pulmonary/Chest: Effort normal and breath sounds normal. No respiratory distress. He has no wheezes.   Abdominal: Soft. Bowel sounds are normal. He exhibits no distension. There is no tenderness.   Musculoskeletal: He exhibits edema.   Neurological: He is alert and oriented to person, place, and time.   Skin: Skin is warm and dry.   Psychiatric: He has a normal mood and affect. His behavior is normal. Judgment and thought content normal.       Significant Labs:     Recent Labs   Lab 12/17/19  0632   WBC  7.46   RBC 2.96*   HGB 8.5*   HCT 27.5*   *   MCV 93   MCH 28.7   MCHC 30.9*     Recent Labs   Lab 12/17/19  0632      K 3.5   CL 99   CO2 29   BUN 79*   CREATININE 2.8*       Significant Imaging: Echocardiogram:   Transthoracic echo (TTE) complete ([facility]upid Only):   Results for orders placed or performed during the hospital encounter of 12/02/19   Echo Color Flow Doppler? Yes   Result Value Ref Range    BSA 2.3 m2    TDI SEPTAL 0.06 m/s    LV LATERAL E/E' RATIO 9.00 m/s    LV SEPTAL E/E' RATIO 10.50 m/s    TDI LATERAL 0.07 m/s    PV PEAK VELOCITY 1.81 cm/s    LVIDD 4.80 3.5 - 6.0 cm    IVS 1.10 (A) 0.6 - 1.1 cm    PW 1.10 (A) 0.6 - 1.1 cm    Ao root annulus 3.40 cm    LVIDS 2.56 2.1 - 4.0 cm    FS 47 28 - 44 %    LV mass 193.96 g    LA size 3.68 cm    TAPSE 1.44 cm    Left Ventricle Relative Wall Thickness 0.46 cm    AV mean gradient 6 mmHg    AV valve area 4.31 cm2    AV Velocity Ratio 0.78     AV index (prosthetic) 1.06     E/A ratio 0.62     Mean e' 0.07 m/s    E wave decelartion time 338.80 msec    LVOT diameter 2.27 cm    LVOT area 4.0 cm2    LVOT peak simone 1.16 m/s    LVOT peak VTI 26.25 cm    Ao peak simone 1.48 m/s    Ao VTI 24.66 cm    LVOT stroke volume 106.18 cm3    AV peak gradient 9 mmHg    E/E' ratio 9.69 m/s    MV Peak E Simone 0.63 m/s    MV Peak A Simone 1.02 m/s    LV Systolic Volume 23.57 mL    LV Systolic Volume Index 10.6 mL/m2    LV Diastolic Volume 68.84 mL    LV Diastolic Volume Index 30.84 mL/m2    LV Mass Index 87 g/m2    Right Atrial Pressure (from IVC) 3 mmHg    Narrative    · Normal left ventricular systolic function. The estimated ejection   fraction is 55%  · Concentric left ventricular remodeling.  · No wall motion abnormalities.  · Normal right ventricular systolic function.  · Normal central venous pressure (3 mm Hg).        Assessment and Plan:     Brief HPI: Seen this morning on AM NP rounds while resting in bed. Denies any complaints. States he spoke with his family yesterday  evening and he would like to consider discharge to [facility] for inpatient PT/OT-instructed he will be seen by Case management today for further communication. Also discussed plan to discuss case with Nephrology and anticipate removal of Trialysis cathter-verbalized understanding and agrees with POC     * Acute on chronic diastolic heart failure  -echo with normal LVEF  -related to  volume overload secondary to PRBC transfusion initially upon admisson/post procedure   -aggressive diuresis with excellent response with transition to IV Lasix BID; 2.2 liters out overnight;  negative 18.3 liters since admisson  -on CCB; SBPs overnight 140s-150s  -SOB improving; anticipate transition to oral Lasix tomorrow   -PT and OT on board     Constipation  -related to immobility  -given Lactulose yesterday with results  -will place on daily stool softener    Ulcer of esophagus without bleeding  -nausea with vomiting last week with visible hematemesis  -started on IV Protonix drip with transition to Protonix BID  -H&H stable  -stool for OCB negative  -GI consulted;  EGD on 12/13 non bleeding esophageal ulcer     JOSE (acute kidney injury)  -multifactoral  -related to  ATN given bleed and hypotension  -creatinine 1.2 upon admission with trend up to 3.7-4.5-5.3-5.4-5.5-5.0  -BUN 79 creatinine 2.8 this AM  -Nephrology on board; no HD since 12/11; on IV Lasix BID with excellent response and 2.2 liters out overnight negative 18.3 liters since admission; will have Trialysis catheter removed today and anticipate transition to oral Lasix tomorrow   -will continue to avoid nephrotoxic agents and hypotension      Anemia  -initial H&H 11.1&36.1 with trend down to 7.4&24.1 post procedure  -repeat angiogram on 12/2 with no active bleeding with recurrent trend down prompting repeat  angiogram  12/4 with no active bleeding; CTA abdomen with renal subcapsular hematoma with surrounding retroperitoneal hemorrhage and no definite contrast  extravasation seen to suggest active bleeding on delayed images  -H&H 6.7&20.5 12/8 with repeat PRBC transfusion with trend up of H&H to 8.8&26.6-9.1 & 27.5   -hematemesis on 12/11 with no significant drop of H&H; EGD with superficial nonbleeding esophageal ulcer; H&H 8.5&27.5 today    -will change CBCs with every other day     PAD (peripheral artery disease)  -admitted for elective LE angiogram for further evaluation of LE claudication  -successful atherectomy and PTA with scoring balloon and  DCB to dSFA,  pSFA CFA and TPT   -foot warm and pulses present   -continue statin; no ASA and Plavix due to bleeding issue   -will need serial LE arterial ultrasounds as an outpatient       CAD (coronary artery disease)  -s/p LAD and LCX MARIAN in 2011  -was on  ASA, statin, Plavix as an outpatient along with CCB  -will continue to hold DAPT given concern for acute bleeding  -resumed CCB  -echocardiogram with normal LV function with EF 55%    Hyperlipidemia  -continue statin therapy  -FLP with LDL 80 in 9/2019    Essential hypertension  -on Norvasc, Chlorthalidone, Metoprolol and Losartan at home  -meds held due to hypotension  -BP trended back up; CCB resumed with stable BP; will plan to continue to hold Losartan upon discharge and will likely hold Chlorthalidone as well given plans for oral Lasix upon discharge   -continue to monitor BP and adjust meds prn     Type 2 diabetes mellitus with kidney complication, with long-term current use of insulin  --184 overnight   -on Lantus and mealtime insulin at home  -diabetes management per Hospital Medicine   -recent HgbA1c 5.6 9/2019        VTE Risk Mitigation (From admission, onward)         Ordered     Place DARRION hose  Until discontinued      12/16/19 1609     Place DARRION hose  Until discontinued      12/05/19 1005     heparin (porcine) injection 2,300 Units  As needed (PRN)      12/04/19 1137                Marzena Valdez, APRN, ANP  Cardiology  [facility]

## 2019-12-17 NOTE — SUBJECTIVE & OBJECTIVE
Review of Systems   Constitution: Negative for chills, decreased appetite, diaphoresis, fever, malaise/fatigue, weight gain and weight loss.   Cardiovascular: Negative for chest pain, claudication, dyspnea on exertion, irregular heartbeat, leg swelling, near-syncope, orthopnea, palpitations and paroxysmal nocturnal dyspnea.   Respiratory: Negative for cough, shortness of breath, snoring, sputum production and wheezing.    Endocrine: Negative for cold intolerance, heat intolerance, polydipsia, polyphagia and polyuria.   Skin: Negative for color change, dry skin, itching, nail changes and poor wound healing.   Musculoskeletal: Negative for back pain, gout, joint pain and joint swelling.   Gastrointestinal: Negative for bloating, abdominal pain, constipation, diarrhea, hematemesis, hematochezia, melena, nausea and vomiting.   Genitourinary: Negative for dysuria, hematuria and nocturia.   Neurological: Negative for dizziness, headaches, light-headedness, numbness, paresthesias and weakness.   Psychiatric/Behavioral: Negative for altered mental status, depression and memory loss.     Objective:     Vital Signs (Most Recent):  Temp: 97.7 °F (36.5 °C) (12/17/19 0822)  Pulse: 79 (12/17/19 0822)  Resp: 18 (12/17/19 0822)  BP: (!) 168/73 (12/17/19 0822)  SpO2: (!) 93 % (12/17/19 0822) Vital Signs (24h Range):  Temp:  [96.8 °F (36 °C)-98.6 °F (37 °C)] 97.7 °F (36.5 °C)  Pulse:  [72-83] 79  Resp:  [18-20] 18  SpO2:  [91 %-96 %] 93 %  BP: (134-175)/(63-73) 168/73     Weight: 102.3 kg (225 lb 8.5 oz)  Body mass index is 32.83 kg/m².     SpO2: (!) 93 %  O2 Device (Oxygen Therapy): room air      Intake/Output Summary (Last 24 hours) at 12/17/2019 1010  Last data filed at 12/17/2019 0835  Gross per 24 hour   Intake 480 ml   Output 2300 ml   Net -1820 ml       Lines/Drains/Airways     Central Venous Catheter Line                 Trialysis (Dialysis) Catheter 12/09/19 1230 right internal jugular 7 days          Airway                  Airway - Non-Surgical 12/13/19 0907 Nasal Cannula 4 days                Physical Exam   Constitutional: He is oriented to person, place, and time. He appears well-developed and well-nourished. No distress.   Neck: Normal range of motion. Neck supple. No JVD present.   Cardiovascular: Normal rate and regular rhythm. Exam reveals no gallop.   No murmur heard.  Pulmonary/Chest: Effort normal and breath sounds normal. No respiratory distress. He has no wheezes.   Abdominal: Soft. Bowel sounds are normal. He exhibits no distension. There is no tenderness.   Musculoskeletal: He exhibits edema.   Neurological: He is alert and oriented to person, place, and time.   Skin: Skin is warm and dry.   Psychiatric: He has a normal mood and affect. His behavior is normal. Judgment and thought content normal.       Significant Labs:     Recent Labs   Lab 12/17/19  0632   WBC 7.46   RBC 2.96*   HGB 8.5*   HCT 27.5*   *   MCV 93   MCH 28.7   MCHC 30.9*     Recent Labs   Lab 12/17/19  0632      K 3.5   CL 99   CO2 29   BUN 79*   CREATININE 2.8*       Significant Imaging: Echocardiogram:   Transthoracic echo (TTE) complete (Cupid Only):   Results for orders placed or performed during the hospital encounter of 12/02/19   Echo Color Flow Doppler? Yes   Result Value Ref Range    BSA 2.3 m2    TDI SEPTAL 0.06 m/s    LV LATERAL E/E' RATIO 9.00 m/s    LV SEPTAL E/E' RATIO 10.50 m/s    TDI LATERAL 0.07 m/s    PV PEAK VELOCITY 1.81 cm/s    LVIDD 4.80 3.5 - 6.0 cm    IVS 1.10 (A) 0.6 - 1.1 cm    PW 1.10 (A) 0.6 - 1.1 cm    Ao root annulus 3.40 cm    LVIDS 2.56 2.1 - 4.0 cm    FS 47 28 - 44 %    LV mass 193.96 g    LA size 3.68 cm    TAPSE 1.44 cm    Left Ventricle Relative Wall Thickness 0.46 cm    AV mean gradient 6 mmHg    AV valve area 4.31 cm2    AV Velocity Ratio 0.78     AV index (prosthetic) 1.06     E/A ratio 0.62     Mean e' 0.07 m/s    E wave decelartion time 338.80 msec    LVOT diameter 2.27 cm    LVOT area 4.0 cm2     LVOT peak simone 1.16 m/s    LVOT peak VTI 26.25 cm    Ao peak simone 1.48 m/s    Ao VTI 24.66 cm    LVOT stroke volume 106.18 cm3    AV peak gradient 9 mmHg    E/E' ratio 9.69 m/s    MV Peak E Simone 0.63 m/s    MV Peak A Simone 1.02 m/s    LV Systolic Volume 23.57 mL    LV Systolic Volume Index 10.6 mL/m2    LV Diastolic Volume 68.84 mL    LV Diastolic Volume Index 30.84 mL/m2    LV Mass Index 87 g/m2    Right Atrial Pressure (from IVC) 3 mmHg    Narrative    · Normal left ventricular systolic function. The estimated ejection   fraction is 55%  · Concentric left ventricular remodeling.  · No wall motion abnormalities.  · Normal right ventricular systolic function.  · Normal central venous pressure (3 mm Hg).

## 2019-12-17 NOTE — PLAN OF CARE
called Jeny Galvin Jules admissions rep, informed SW that patient is currently under review. [facility] will continue to follow up pending approval.       12/17/19 6243   Post-Acute Status   Post-Acute Authorization Placement   Post-Acute Placement Status Patient Evaluation by Facility

## 2019-12-17 NOTE — PLAN OF CARE
Problem: Occupational Therapy Goal  Goal: Occupational Therapy Goal  Description  Goals to be met by: 1/5/2020    Patient will increase functional independence with ADLs by performing:    UE Dressing with Modified [place].  LE Dressing with Set-up Assistance.  Grooming while standing with Supervision.  Toileting from bedside commode with Supervision for hygiene and clothing management.   Step transfer with Supervision  Toilet transfer to bedside commode with Supervision.  Increased functional strength to WF for self care.  Upper extremity exercise program x10 reps per handout, with assistance as needed.     Outcome: Ongoing, Progressing     Patient improving with activity tolerance, but having multiple BMs, so somewhat limited session. Patient will benefit from post-acute therapy (SNF vs IPR -- patient reports he wants to D/C to a SNF). Cont OT.

## 2019-12-17 NOTE — PLAN OF CARE
Problem: Physical Therapy Goal  Goal: Physical Therapy Goal  Description  Goals to be met by: 2020     Patient will increase functional independence with mobility by performin. Supine to sit with Set-up [place]  2. Sit to stand transfer with Supervision  3. Bed to chair transfer with Supervision using Rolling Walker  4. Gait  x 120 feet with Supervision using Rolling Walker.   5. Lower extremity exercise program x15 reps per handout, with supervision     Outcome: Ongoing, Progressing   Continue working toward goals.

## 2019-12-17 NOTE — PLAN OF CARE
Problem: Physical Therapy Goal  Goal: Physical Therapy Goal  Description  Goals to be met by: 2020     Patient will increase functional independence with mobility by performin. Supine to sit with Set-up [place]  2. Sit to stand transfer with Supervision  3. Bed to chair transfer with Supervision using Rolling Walker  4. Gait  x 120 feet with Supervision using Rolling Walker.   5. Lower extremity exercise program x15 reps per handout, with supervision     Outcome: Ongoing, Progressing  Continue working toward goals.

## 2019-12-17 NOTE — PLAN OF CARE
CM met with pt, states he and family have decided skilled nursing at [facility] would better meet his needs.  Pt lives alone and may require a longer transition to return to independence.  [facility] is closer to family and they can visit frequently.  CM did explain in detail recommendation by therapy of rehab, explained the differences between the two.  Pt verbalized understanding and would like to proceed with SNF placement at [facility].    Referral will be sent to [facility], will notify facility to begin review but not to initiate auth request until final recs in from Nephrology, transitioned to PO lasix and medically stable for d/c as this would be denied.  PPD order in place to begin process, sw to begin PASRR, locet.    Please contact this CM with any questions or concerns.    Grace Rodriguez RN    [phone]

## 2019-12-17 NOTE — PLAN OF CARE
VN rounds: VN cued into pt's room with pt's permission. Pt resting in bed. Fall risk protocol discussed with pt. VN instructed pt to call for assistance. Pt aware and agreeable. NAD noted. r Central line to be removed but pt needs PIV prior to removal. Allowed time for questions. Will cont to be available and intervene as needed.        12/17/19 1500   Type of Frequent Check   Type Patient Rounds;Other (see comments)  (VN rounds)   Safety/Activity   Patient Rounds visualized patient;call light in patient/parent reach   Safety Promotion/Fall Prevention Fall Risk reviewed with patient/family;assistive device/personal item within reach;instructed to call staff for mobility   Positioning   Body Position supine   Pain/Comfort/Sleep   Preferred Pain Scale word (verbal rating pain scale)   Pain Rating: Rest 0 - no pain   Sleep/Rest/Relaxation awake;no problem identified   Assessments (Pre/Post)   Level of Consciousness (AVPU) alert

## 2019-12-17 NOTE — NURSING
Aniya, floor nurse asks for assistance in removing trialysis line. Cardiology team ordered to obtain peripheral access before pulling line. Unable to obtain access after 3 attempts. Notified dr villavicencio. New order for anesthesia consult for peripheral access.

## 2019-12-17 NOTE — PLAN OF CARE
Problem: Adult Inpatient Plan of Care  Goal: Plan of Care Review  Outcome: Ongoing, Progressing  Flowsheets (Taken 12/16/2019 2054)  Plan of Care Reviewed With: patient     Problem: Fall Injury Risk  Goal: Absence of Fall and Fall-Related Injury  Outcome: Ongoing, Progressing  Intervention: Identify and Manage Contributors to Fall Injury Risk  Flowsheets (Taken 12/16/2019 2054)  Self-Care Promotion: independence encouraged; BADL personal objects within reach; BADL personal routines maintained  Medication Review/Management: high risk medications identified; medications reviewed  Intervention: Promote Injury-Free Environment  Flowsheets (Taken 12/16/2019 2054)  Safety Promotion/Fall Prevention: commode/urinal/bedpan at bedside; assistive device/personal item within reach; bed alarm set; diversional activities provided; Fall Risk reviewed with patient/family; Fall Risk signage in place; high risk medications identified; medications reviewed; nonskid shoes/socks when out of bed; side rails raised x 2; supervised activity; instructed to call staff for mobility  Environmental Safety Modification: clutter free environment maintained; assistive device/personal items within reach; room organization consistent     Cued into patient's room.  Permission received per patient to turn camera to view patient.  Introduced as VN for night shift that will be working with floor nurse and nursing assistant.  Educated patient on VN's role in patient care. Plan of care reviewed with patient. Education per flowsheet.  Opportunity given for questions and questions answered.  No complaints.  Instructed to call for assistance.  Will cont to monitor.    Labs, notes, and orders reviewed.

## 2019-12-18 LAB
ANION GAP SERPL CALC-SCNC: 10 MMOL/L (ref 8–16)
BILIRUB UR QL STRIP: NEGATIVE
BUN SERPL-MCNC: 74 MG/DL (ref 8–23)
CALCIUM SERPL-MCNC: 8.6 MG/DL (ref 8.7–10.5)
CHLORIDE SERPL-SCNC: 99 MMOL/L (ref 95–110)
CLARITY UR: CLEAR
CO2 SERPL-SCNC: 28 MMOL/L (ref 23–29)
COLOR UR: YELLOW
CREAT SERPL-MCNC: 2.5 MG/DL (ref 0.5–1.4)
EST. GFR  (AFRICAN AMERICAN): 28 ML/MIN/1.73 M^2
EST. GFR  (NON AFRICAN AMERICAN): 24 ML/MIN/1.73 M^2
GLUCOSE SERPL-MCNC: 99 MG/DL (ref 70–110)
GLUCOSE UR QL STRIP: ABNORMAL
HGB UR QL STRIP: ABNORMAL
KETONES UR QL STRIP: NEGATIVE
LEUKOCYTE ESTERASE UR QL STRIP: NEGATIVE
NITRITE UR QL STRIP: NEGATIVE
PH UR STRIP: 6 [PH] (ref 5–8)
PHOSPHATE SERPL-MCNC: 3.8 MG/DL (ref 2.7–4.5)
POCT GLUCOSE: 233 MG/DL (ref 70–110)
POCT GLUCOSE: 247 MG/DL (ref 70–110)
POCT GLUCOSE: 337 MG/DL (ref 70–110)
POCT GLUCOSE: 93 MG/DL (ref 70–110)
POTASSIUM SERPL-SCNC: 3.8 MMOL/L (ref 3.5–5.1)
PROT UR QL STRIP: NEGATIVE
SODIUM SERPL-SCNC: 137 MMOL/L (ref 136–145)
SP GR UR STRIP: 1.01 (ref 1–1.03)
URN SPEC COLLECT METH UR: ABNORMAL
UROBILINOGEN UR STRIP-ACNC: NEGATIVE EU/DL

## 2019-12-18 PROCEDURE — 97110 THERAPEUTIC EXERCISES: CPT

## 2019-12-18 PROCEDURE — 63600175 PHARM REV CODE 636 W HCPCS: Performed by: INTERNAL MEDICINE

## 2019-12-18 PROCEDURE — 81003 URINALYSIS AUTO W/O SCOPE: CPT

## 2019-12-18 PROCEDURE — 99900035 HC TECH TIME PER 15 MIN (STAT)

## 2019-12-18 PROCEDURE — 11000001 HC ACUTE MED/SURG PRIVATE ROOM

## 2019-12-18 PROCEDURE — 84100 ASSAY OF PHOSPHORUS: CPT

## 2019-12-18 PROCEDURE — 51798 US URINE CAPACITY MEASURE: CPT

## 2019-12-18 PROCEDURE — 25000003 PHARM REV CODE 250: Performed by: INTERNAL MEDICINE

## 2019-12-18 PROCEDURE — 94761 N-INVAS EAR/PLS OXIMETRY MLT: CPT

## 2019-12-18 PROCEDURE — 97116 GAIT TRAINING THERAPY: CPT

## 2019-12-18 PROCEDURE — 36415 COLL VENOUS BLD VENIPUNCTURE: CPT

## 2019-12-18 PROCEDURE — 25000003 PHARM REV CODE 250: Performed by: NURSE PRACTITIONER

## 2019-12-18 PROCEDURE — 97535 SELF CARE MNGMENT TRAINING: CPT

## 2019-12-18 PROCEDURE — 80048 BASIC METABOLIC PNL TOTAL CA: CPT

## 2019-12-18 PROCEDURE — 97530 THERAPEUTIC ACTIVITIES: CPT

## 2019-12-18 RX ORDER — SEVELAMER CARBONATE 800 MG/1
800 TABLET, FILM COATED ORAL DAILY
Qty: 30 TABLET | Refills: 11
Start: 2019-12-19 | End: 2021-03-04

## 2019-12-18 RX ORDER — PANTOPRAZOLE SODIUM 40 MG/1
40 TABLET, DELAYED RELEASE ORAL
Qty: 60 TABLET | Refills: 2 | Status: ON HOLD
Start: 2019-12-18 | End: 2021-12-04 | Stop reason: CLARIF

## 2019-12-18 RX ORDER — TAMSULOSIN HYDROCHLORIDE 0.4 MG/1
0.4 CAPSULE ORAL DAILY
Status: DISCONTINUED | OUTPATIENT
Start: 2019-12-18 | End: 2019-12-19 | Stop reason: HOSPADM

## 2019-12-18 RX ORDER — POTASSIUM CHLORIDE 20 MEQ/1
20 TABLET, EXTENDED RELEASE ORAL ONCE
Status: COMPLETED | OUTPATIENT
Start: 2019-12-18 | End: 2019-12-18

## 2019-12-18 RX ORDER — FUROSEMIDE 80 MG/1
80 TABLET ORAL 2 TIMES DAILY
Qty: 60 TABLET | Refills: 11
Start: 2019-12-18 | End: 2020-02-27 | Stop reason: SDUPTHER

## 2019-12-18 RX ORDER — FUROSEMIDE 40 MG/1
80 TABLET ORAL 2 TIMES DAILY
Status: DISCONTINUED | OUTPATIENT
Start: 2019-12-18 | End: 2019-12-19 | Stop reason: HOSPADM

## 2019-12-18 RX ORDER — INSULIN ASPART 100 [IU]/ML
12 INJECTION, SOLUTION INTRAVENOUS; SUBCUTANEOUS
Status: DISCONTINUED | OUTPATIENT
Start: 2019-12-18 | End: 2019-12-19

## 2019-12-18 RX ORDER — INSULIN ASPART 100 [IU]/ML
1-10 INJECTION, SOLUTION INTRAVENOUS; SUBCUTANEOUS
Qty: 1 BOX | Refills: 0
Start: 2019-12-18 | End: 2020-02-21 | Stop reason: SDUPTHER

## 2019-12-18 RX ORDER — INSULIN ASPART 100 [IU]/ML
10 INJECTION, SOLUTION INTRAVENOUS; SUBCUTANEOUS 3 TIMES DAILY
Qty: 1 BOX | Refills: 0
Start: 2019-12-18 | End: 2019-12-19

## 2019-12-18 RX ORDER — POTASSIUM CHLORIDE 20 MEQ/1
40 TABLET, EXTENDED RELEASE ORAL DAILY
Qty: 30 TABLET | Refills: 11
Start: 2019-12-19 | End: 2021-03-04

## 2019-12-18 RX ADMIN — INSULIN ASPART 10 UNITS: 100 INJECTION, SOLUTION INTRAVENOUS; SUBCUTANEOUS at 11:12

## 2019-12-18 RX ADMIN — INSULIN ASPART 4 UNITS: 100 INJECTION, SOLUTION INTRAVENOUS; SUBCUTANEOUS at 05:12

## 2019-12-18 RX ADMIN — SIMETHICONE 125 MG: 125 TABLET, CHEWABLE ORAL at 11:12

## 2019-12-18 RX ADMIN — POTASSIUM CHLORIDE 20 MEQ: 1500 TABLET, EXTENDED RELEASE ORAL at 10:12

## 2019-12-18 RX ADMIN — TAMSULOSIN HYDROCHLORIDE 0.4 MG: 0.4 CAPSULE ORAL at 03:12

## 2019-12-18 RX ADMIN — NEPHROCAP 1 CAPSULE: 1 CAP ORAL at 08:12

## 2019-12-18 RX ADMIN — DOXAZOSIN 8 MG: 2 TABLET ORAL at 08:12

## 2019-12-18 RX ADMIN — SIMETHICONE 125 MG: 125 TABLET, CHEWABLE ORAL at 06:12

## 2019-12-18 RX ADMIN — FUROSEMIDE 40 MG: 10 INJECTION, SOLUTION INTRAVENOUS at 08:12

## 2019-12-18 RX ADMIN — INSULIN ASPART 4 UNITS: 100 INJECTION, SOLUTION INTRAVENOUS; SUBCUTANEOUS at 09:12

## 2019-12-18 RX ADMIN — SEVELAMER CARBONATE 800 MG: 800 TABLET, FILM COATED ORAL at 08:12

## 2019-12-18 RX ADMIN — ROSUVASTATIN CALCIUM 20 MG: 10 TABLET, FILM COATED ORAL at 08:12

## 2019-12-18 RX ADMIN — PANTOPRAZOLE SODIUM 40 MG: 40 TABLET, DELAYED RELEASE ORAL at 05:12

## 2019-12-18 RX ADMIN — FUROSEMIDE 80 MG: 40 TABLET ORAL at 06:12

## 2019-12-18 RX ADMIN — PANTOPRAZOLE SODIUM 40 MG: 40 TABLET, DELAYED RELEASE ORAL at 06:12

## 2019-12-18 RX ADMIN — AMLODIPINE BESYLATE 10 MG: 5 TABLET ORAL at 08:12

## 2019-12-18 RX ADMIN — INSULIN ASPART 10 UNITS: 100 INJECTION, SOLUTION INTRAVENOUS; SUBCUTANEOUS at 08:12

## 2019-12-18 RX ADMIN — POTASSIUM CHLORIDE 40 MEQ: 1500 TABLET, EXTENDED RELEASE ORAL at 08:12

## 2019-12-18 RX ADMIN — INSULIN ASPART 12 UNITS: 100 INJECTION, SOLUTION INTRAVENOUS; SUBCUTANEOUS at 05:12

## 2019-12-18 NOTE — PLAN OF CARE
Problem: Physical Therapy Goal  Goal: Physical Therapy Goal  Description  Goals to be met by: 2020     Patient will increase functional independence with mobility by performin. Supine to sit with Set-up [place]  2. Sit to stand transfer with Supervision  3. Bed to chair transfer with Supervision using Rolling Walker  4. Gait  x 120 feet with Supervision using Rolling Walker.   5. Lower extremity exercise program x15 reps per handout, with supervision     Outcome: Ongoing, Progressing   Pt continues to work toward established goals.

## 2019-12-18 NOTE — PLAN OF CARE
Problem: Occupational Therapy Goal  Goal: Occupational Therapy Goal  Description  Goals to be met by: 1/5/2020    Patient will increase functional independence with ADLs by performing:    UE Dressing with Modified [place].  LE Dressing with Set-up Assistance.  Grooming while standing with Supervision.  Toileting from bedside commode with Supervision for hygiene and clothing management.   Step transfer with Supervision  Toilet transfer to bedside commode with Supervision.  Increased functional strength to [facility] for self care.  Upper extremity exercise program x10 reps per handout, with assistance as needed.     Outcome: Ongoing, Progressing     Patient improving with activity tolerance and BUE strength. Patient remains pleasant and motivated for therapy. Patient will benefit from continued skilled OT to address deficits and improve performance in functional ADL tasks. Cont OT.

## 2019-12-18 NOTE — PLAN OF CARE
CM made aware pt is medically ready for skilled nursing, transitioned to oral lasix, no plans for HD.  Contacted Jeny You to please submit [employer] auth request today.  DON will contact this CM with questions.  Pt is aware and remains in agreement with plan.    Facility aware of importance of keeping f/u appt with Dr Watts, will transport pt.    10:35  Call to DON regarding additional info needed,  States Crystal in admissions will be contacting this CM once today's notes, MAR reviewed.  PPD uploaded in right care.    Grace Rodriguez, NICOLE    [phone]

## 2019-12-18 NOTE — PROGRESS NOTES
[facility] Hospital Medicine  Progress Note    Patient Name: Cy Rutherford  MRN: [id number]  Patient Class: IP- Inpatient   Admission Date: 12/2/2019  Length of Stay: 14 days  Attending Physician: James Sanchez MD  Primary Care Provider: Heide Porter MD        Subjective:     Principal Problem:Acute on chronic diastolic heart failure        HPI:  Admitted for elective LE angiogram for evaluation of RLE claudication. Taken to the cath lab for the procedure via LCFA access with following results:     successful atherectomy (Hawk LX) with distal filter protection, followed by PTA with scoring balloon (5.0 x 150 dSFA, 6.0 x 150 pSFA/CFA) followed by PTA with DCB to dSFA (5.0 x 150), pSFA (5.0 x 100) and CFA (7 x 40) with good results. 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 He has elevated BG, no anion gap.  We will place on insulin sq and monito closely , if dka develop, we will place him on insulin drip    Overview/Hospital Course:  Had hypotension after the procedure and then had JOSE and had to be started on H.D(last H.D on 12/11). Nephrology following. Patient is also on lasix and diuresing well for past 2 days. Currently on I.V lasix pushes. Creatinine stable for past 2 days also. Blood sugar levels are controlled with Levemir, prandial insulin and ISS. Also found to having large left renal subcapsular hematoma on CT abd done on 12/4. ASA, Plavix and pletal are therefore held. On 12/11/19, patient had an episode of  hematemesis. Initially had drop in H/H, but stable since then. RADHAMES consulted and had EGD on 12/13. This showed Non-bleeding esophageal ulcer, which was biopsied and erythematous mucosa in the stomach. Advised to start on Protonix 40 mg BID for 12 weeks. Repeat UGD in 8 weeks to establish healing.                 PT/OT eval done and Rehab placement advised. Social work consult placed.   12/15 pt seen doing ok, had not have HD since wed per him.  BG atable around 135, 136, K is 3.2 will replace [id number] adjust insulin regimen      Interval History: awake and alert, family by bedside,    Adjust insulin regimen.   Awaiting rehab placement     Review of Systems   Constitutional: Positive for activity change. Negative for chills and fatigue.   Respiratory: Negative for shortness of breath.    Cardiovascular: Negative for chest pain.   Genitourinary: Negative for difficulty urinating.   Neurological: Negative for dizziness and numbness.     Objective:     Vital Signs (Most Recent):  Temp: 97.5 °F (36.4 °C) (12/17/19 1134)  Pulse: 73 (12/17/19 1600)  Resp: 18 (12/17/19 1134)  BP: (!) 169/74 (12/17/19 1134)  SpO2: 96 % (12/17/19 1545) Vital Signs (24h Range):  Temp:  [96.8 °F (36 °C)-97.9 °F (36.6 °C)] 97.5 °F (36.4 °C)  Pulse:  [72-84] 73  Resp:  [18-20] 18  SpO2:  [93 %-96 %] 96 %  BP: (154-175)/(68-74) 169/74     Weight: 102.3 kg (225 lb 8.5 oz)  Body mass index is 32.83 kg/m².    Intake/Output Summary (Last 24 hours) at 12/17/2019 1857  Last data filed at 12/17/2019 1200  Gross per 24 hour   Intake 1020 ml   Output 1750 ml   Net -730 ml      Physical Exam   Constitutional: He is oriented to person, place, and time. He appears well-developed and well-nourished.   HENT:   Head: Normocephalic and atraumatic.   Eyes: EOM are normal.   Neck: Normal range of motion.   Pulmonary/Chest: Effort normal. No respiratory distress.   Neurological: He is alert and oriented to person, place, and time.   Psychiatric: He has a normal  mood and affect. His behavior is normal. Judgment and thought content normal.       Significant Labs:   Recent Labs   Lab 12/15/19  0742 12/16/19  0537 12/17/19  0632   WBC 8.49  8.49 7.87 7.46   HGB 8.3*  8.3* 8.4* 8.5*   HCT 25.8*  25.8* 26.1* 27.5*     315 351* 375*     Recent Labs   Lab 12/14/19  0548 12/15/19  0742 12/16/19  0537 12/17/19  0632   * 136  136  136 136 138   K 3.0* 3.2*  3.2*  3.2* 3.6 3.5   CL 97 98  98  98 99 99   CO2 29 28  28  28 26 29   BUN 78* 81*  81*  81* 86* 79*   CREATININE 3.5* 3.3*  3.3*  3.3* 3.2* 2.8*   * 132*  132*  132* 220* 188*   CALCIUM 8.4* 8.3*  8.3*  8.3* 8.3* 8.5*   MG 2.2  --   --   --    PHOS 5.7* 4.9* 4.3  4.3 4.1     Recent Labs   Lab 12/17/19  0632   ALKPHOS 64   ALT 31   AST 29   ALBUMIN 2.4*   PROT 6.0   BILITOT 0.7      No results for input(s): CPK, CPKMB, MB, TROPONINI in the last 72 hours.  Recent Labs   Lab 12/16/19  1126 12/16/19  1647 12/16/19  2059 12/17/19  0619 12/17/19  1109 12/17/19  1752   POCTGLUCOSE 232* 262* 328* 182* 189* 162*     Hemoglobin A1C   Date Value Ref Range Status   12/02/2019 5.6 4.0 - 5.6 % Final     Comment:     ADA Screening Guidelines:  5.7-6.4%  Consistent with prediabetes  >or=6.5%  Consistent with diabetes  High levels of fetal hemoglobin interfere with the HbA1C  assay. Heterozygous hemoglobin variants (HbS, HgC, etc)do  not significantly interfere with this assay.   However, presence of multiple variants may affect accuracy.     05/08/2018 7.7 (H) 4.0 - 5.6 % Final     Comment:     According to ADA guidelines, hemoglobin A1c <7.0% represents  optimal control in non-pregnant diabetic patients. Different  metrics may apply to specific patient populations.   Standards of Medical Care in Diabetes-2016.  For the purpose of screening for the presence of diabetes:  <5.7%     Consistent with the absence of diabetes  5.7-6.4%  Consistent with increasing risk for diabetes   (prediabetes)  >or=6.5%   Consistent with diabetes  Currently, no consensus exists for use of hemoglobin A1c  for diagnosis of diabetes for children.  This Hemoglobin A1c assay has significant interference with fetal   hemoglobin   (HbF). The results are invalid for patients with abnormal amounts of   HbF,   including those with known Hereditary Persistence   of Fetal Hemoglobin. Heterozygous hemoglobin variants (HbAS, HbAC,   HbAD, HbAE, HbA2) do not significantly interfere with this assay;   however, presence of multiple variants in a sample may impact the %   interference.     06/09/2011 7.9 (H) 4.0 - 6.2 % Final     Scheduled Meds:   amLODIPine  10 mg Oral Daily    doxazosin  8 mg Oral Daily    epoetin gege-ebpx (RETACRIT) injection  20,000 Units Subcutaneous Q7 Days    furosemide  40 mg Intravenous BID    insulin aspart U-100  17 Units Subcutaneous TIDWM    insulin detemir U-100  62 Units Subcutaneous QHS    iron sucrose (VENOFER) IVPB  100 mg Intravenous Weekly    pantoprazole  40 mg Oral BID AC    polyethylene glycol  17 g Oral Daily    potassium chloride  20 mEq Oral Once    potassium chloride  40 mEq Oral Daily    rosuvastatin  20 mg Oral Daily    sevelamer carbonate  800 mg Oral Daily    simethicone  125 mg Oral Q6H    vitamin renal formula (B-complex-vitamin c-folic acid)  1 capsule Oral Daily     Continuous Infusions:  As Needed: acetaminophen, albuterol-ipratropium, alteplase, bisacodyl, Dextrose 10% Bolus, Dextrose 10% Bolus, Dextrose 10% Bolus, Dextrose 10% Bolus, glucagon (human recombinant), glucose, glucose, heparin (porcine), insulin aspart U-100, morphine, ondansetron, ondansetron, polyethylene glycol, sodium chloride 0.9%    Significant Imaging:   No new imaging over last 24 hours      Assessment/Plan:      * Acute on chronic diastolic heart failure  On Lasix BID per Primary and Nephrology with good urine output  Echo showed normal LVEF  Lopressor being held for acute on chronic diastolic HF and ARB for  JOSE  Diuresing well and improving.      Hypokalemia  Will replace as needed orally      Constipation  Glycoloax daily and PRN suppository      Ulcer of esophagus without bleeding  Hematemesis recently sec to same  EGD on 12/13 showed esophageal ulcer with no bleeding now  G.I input appreciated  PPI drip d/jose ramon and on Protonix 40 BID.  Will need this for 12 weeks as per G.I  Will need repeat UGD in 8 weeks to establish healing.       JOSE (acute kidney injury)  Sec to ATN sec to hypotension and bleeding mostly  Nephrology on board and patient is on H.D  Cont management as per them.  Last H.D on 12/11 and creatinine stable since then inspite of receiving no H.D and diuresing well too.    Anemia  Sec to acute blood loss sec to hematemesis mostly  Will cont to f/u and transfuse prn  H/H stable for past 24 hr.       PAD (peripheral artery disease)  As per primary      CAD (coronary artery disease)  As per primary      Hyperlipidemia  Continue Home dose statin      Essential hypertension  Cont home dose Norvasc and Doxazosin.        Type 2 diabetes mellitus with kidney complication, with long-term current use of insulin  Lab Results   Component Value Date    HGBA1C 5.6 12/02/2019     - Blood sugar levels better controlled now.  - Continue levemir 62 units nightly   - Continue aspart 17 units TID with meals  - Accuchecks with prn low dose SSI   12/15 BG accu check around 136. Continue current plan of care      VTE Risk Mitigation (From admission, onward)         Ordered     Place DARRION hose  Until discontinued      12/16/19 1609     Place DARRION hose  Until discontinued      12/05/19 1005     heparin (porcine) injection 2,300 Units  As needed (PRN)      12/04/19 1137                      Sheryl Guerra MD  Department of Hospital Medicine   [facility]

## 2019-12-18 NOTE — PT/OT/SLP PROGRESS
"Physical Therapy Treatment    Patient Name:  Cy Rutherford   MRN:  [id number]    Recommendations:     Discharge Recommendations:  rehabilitation facility   Discharge Equipment Recommendations: bedside commode, shower chair   Barriers to discharge: Decreased caregiver support and  Increased assistance for all functional mobility needed at this time    Assessment:     Cy Rutherford is a 74 y.o. male admitted with a medical diagnosis of Acute on chronic diastolic heart failure.  He presents with the following impairments/functional limitations:  weakness, impaired endurance, impaired self care skills, impaired functional mobilty, gait instability, impaired balance, decreased coordination, decreased upper extremity function, decreased lower extremity function, decreased safety awareness, decreased ROM, impaired fine motor, impaired coordination, edema. Pt able to ambulate x 2 trials ~30 ft and ~20 ft with RW requiring close min A. Ambulation distanced slightly decreased this session secondary to having multiple bowel movements throughout session. Would benefit from continued PT services to increase pt's out of bed therapeutic activity and exercise addressing all impairments listed above.    Rehab Prognosis: Good; patient would benefit from acute skilled PT services to address these deficits and reach maximum level of function.    Recent Surgery: Procedure(s) (LRB):  ESOPHAGOGASTRODUODENOSCOPY (EGD) (N/A) 5 Days Post-Op    Plan:     During this hospitalization, patient to be seen 6 x/week to address the identified rehab impairments via gait training, therapeutic activities, therapeutic exercises and progress toward the following goals:    · Plan of Care Expires:  01/06/20    Subjective     Chief Complaint: None Expressed  Patient/Family Comments/goals: "I think I might have to go to the bathroom. And I might not be able to make it to the bathroom can I use the B/S commode".  Pain/Comfort:  · Pain Rating 1: (Did not " express any pain)  · Pain Rating Post-Intervention 1: ( Did not express any pain)      Objective:     Communicated with nurse prior to session.  Patient found supine with central line, telemetry upon PT entry to room.     General Precautions: Standard, fall   Orthopedic Precautions:N/A   Braces: N/A     Functional Mobility:  · Bed Mobility:     · Rolling Right: stand by assistance and contact guard assistance  · Scooting: stand by assistance and  To EOB  · Supine to Sit: minimum assistance and  with HOB elevated and using bed rails  · Transfers:     · Sit to Stand:  contact guard assistance, minimum assistance and  x 3 trials 1 from EOB and 2 from B/S commode with rolling walker  · Gait:  2 trials ~20 ft and ~30 ft both with RW and close min A      AM-PAC 6 CLICK MOBILITY  Turning over in bed (including adjusting bedclothes, sheets and blankets)?: 3  Sitting down on and standing up from a chair with arms (e.g., wheelchair, bedside commode, etc.): 3  Moving from lying on back to sitting on the side of the bed?: 3  Moving to and from a bed to a chair (including a wheelchair)?: 3  Need to walk in hospital room?: 3  Climbing 3-5 steps with a railing?: 1  Basic Mobility Total Score: 16       Therapeutic Activities and Exercises:   Pt performed 1st ambulation trial ~20 ft with RW and close min A. Sat on EOB and requested to use B/S commode. Sat on B/S commode ~10 minutes at 1st bout. Pt statically stood in RW for PTA to clean ~3 minutes.  Mepilex changed secondary to being soiled. After cleaning pt stated he felt he had to have another bowel movement. Sat back on B/S commode ~3-5 minutes at which time pt statically stood for PTA to clean. Performed 2nd ambulation trial ~30 ft at which time pt stated he needed to return to B/S commode. Pt left on B/S commode with nurse (Lotus) and PCT (Jocelyn) notified. During ambulation trials pt demonstrates a decrease in hip/knee flexion during swing phase and a decreased dorsiflexion  with foot slap on RLE. Able to maintain right hand  for majority of time on RW handle with 1 incidence of right hand slipping which pt able to correct.    Patient left  on B/S commode with all lines intact, call button in reach,  nurse notified and  PCT (Jocelyn) notified..    GOALS:   Multidisciplinary Problems     Physical Therapy Goals        Problem: Physical Therapy Goal    Goal Priority Disciplines Outcome Goal Variances Interventions   Physical Therapy Goal     PT, PT/OT Ongoing, Progressing     Description:  Goals to be met by: 2020     Patient will increase functional independence with mobility by performin. Supine to sit with Set-up [place]  2. Sit to stand transfer with Supervision  3. Bed to chair transfer with Supervision using Rolling Walker  4. Gait  x 120 feet with Supervision using Rolling Walker.   5. Lower extremity exercise program x15 reps per handout, with supervision                      Time Tracking:     PT Received On: 19  PT Start Time: 736     PT Stop Time: 0816  PT Total Time (min): 40 min     Billable Minutes: Gait Training  15 and Therapeutic Activity  25    Treatment Type: Treatment  PT/PTA: PTA     PTA Visit Number: 5     Earlene Kelly, PTA  2019

## 2019-12-18 NOTE — SUBJECTIVE & OBJECTIVE
Interval History: awake and alert, family by bedside,    Adjust insulin regimen.   Awaiting rehab placement     Review of Systems   Constitutional: Positive for activity change. Negative for chills and fatigue.   Respiratory: Negative for shortness of breath.    Cardiovascular: Negative for chest pain.   Genitourinary: Negative for difficulty urinating.   Neurological: Negative for dizziness and numbness.     Objective:     Vital Signs (Most Recent):  Temp: 97.5 °F (36.4 °C) (12/17/19 1134)  Pulse: 73 (12/17/19 1600)  Resp: 18 (12/17/19 1134)  BP: (!) 169/74 (12/17/19 1134)  SpO2: 96 % (12/17/19 1545) Vital Signs (24h Range):  Temp:  [96.8 °F (36 °C)-97.9 °F (36.6 °C)] 97.5 °F (36.4 °C)  Pulse:  [72-84] 73  Resp:  [18-20] 18  SpO2:  [93 %-96 %] 96 %  BP: (154-175)/(68-74) 169/74     Weight: 102.3 kg (225 lb 8.5 oz)  Body mass index is 32.83 kg/m².    Intake/Output Summary (Last 24 hours) at 12/17/2019 1857  Last data filed at 12/17/2019 1200  Gross per 24 hour   Intake 1020 ml   Output 1750 ml   Net -730 ml      Physical Exam   Constitutional: He is oriented to person, place, and time. He appears well-developed and well-nourished.   HENT:   Head: Normocephalic and atraumatic.   Eyes: EOM are normal.   Neck: Normal range of motion.   Pulmonary/Chest: Effort normal. No respiratory distress.   Neurological: He is alert and oriented to person, place, and time.   Psychiatric: He has a normal mood and affect. His behavior is normal. Judgment and thought content normal.       Significant Labs:   Recent Labs   Lab 12/15/19  0742 12/16/19  0537 12/17/19  0632   WBC 8.49  8.49 7.87 7.46   HGB 8.3*  8.3* 8.4* 8.5*   HCT 25.8*  25.8* 26.1* 27.5*     315 351* 375*     Recent Labs   Lab 12/14/19  0548 12/15/19  0742 12/16/19  0537 12/17/19  0632   * 136  136  136 136 138   K 3.0* 3.2*  3.2*  3.2* 3.6 3.5   CL 97 98  98  98 99 99   CO2 29 28  28  28 26 29   BUN 78* 81*  81*  81* 86* 79*   CREATININE 3.5*  3.3*  3.3*  3.3* 3.2* 2.8*   * 132*  132*  132* 220* 188*   CALCIUM 8.4* 8.3*  8.3*  8.3* 8.3* 8.5*   MG 2.2  --   --   --    PHOS 5.7* 4.9* 4.3  4.3 4.1     Recent Labs   Lab 12/17/19  0632   ALKPHOS 64   ALT 31   AST 29   ALBUMIN 2.4*   PROT 6.0   BILITOT 0.7      No results for input(s): CPK, CPKMB, MB, TROPONINI in the last 72 hours.  Recent Labs   Lab 12/16/19  1126 12/16/19  1647 12/16/19  2059 12/17/19  0619 12/17/19  1109 12/17/19  1752   POCTGLUCOSE 232* 262* 328* 182* 189* 162*     Hemoglobin A1C   Date Value Ref Range Status   12/02/2019 5.6 4.0 - 5.6 % Final     Comment:     ADA Screening Guidelines:  5.7-6.4%  Consistent with prediabetes  >or=6.5%  Consistent with diabetes  High levels of fetal hemoglobin interfere with the HbA1C  assay. Heterozygous hemoglobin variants (HbS, HgC, etc)do  not significantly interfere with this assay.   However, presence of multiple variants may affect accuracy.     05/08/2018 7.7 (H) 4.0 - 5.6 % Final     Comment:     According to ADA guidelines, hemoglobin A1c <7.0% represents  optimal control in non-pregnant diabetic patients. Different  metrics may apply to specific patient populations.   Standards of Medical Care in Diabetes-2016.  For the purpose of screening for the presence of diabetes:  <5.7%     Consistent with the absence of diabetes  5.7-6.4%  Consistent with increasing risk for diabetes   (prediabetes)  >or=6.5%  Consistent with diabetes  Currently, no consensus exists for use of hemoglobin A1c  for diagnosis of diabetes for children.  This Hemoglobin A1c assay has significant interference with fetal   hemoglobin   (HbF). The results are invalid for patients with abnormal amounts of   HbF,   including those with known Hereditary Persistence   of Fetal Hemoglobin. Heterozygous hemoglobin variants (HbAS, HbAC,   HbAD, HbAE, HbA2) do not significantly interfere with this assay;   however, presence of multiple variants in a sample may impact the %    interference.     06/09/2011 7.9 (H) 4.0 - 6.2 % Final     Scheduled Meds:   amLODIPine  10 mg Oral Daily    doxazosin  8 mg Oral Daily    epoetin gege-ebpx (RETACRIT) injection  20,000 Units Subcutaneous Q7 Days    furosemide  40 mg Intravenous BID    insulin aspart U-100  17 Units Subcutaneous TIDWM    insulin detemir U-100  62 Units Subcutaneous QHS    iron sucrose (VENOFER) IVPB  100 mg Intravenous Weekly    pantoprazole  40 mg Oral BID AC    polyethylene glycol  17 g Oral Daily    potassium chloride  20 mEq Oral Once    potassium chloride  40 mEq Oral Daily    rosuvastatin  20 mg Oral Daily    sevelamer carbonate  800 mg Oral Daily    simethicone  125 mg Oral Q6H    vitamin renal formula (B-complex-vitamin c-folic acid)  1 capsule Oral Daily     Continuous Infusions:  As Needed: acetaminophen, albuterol-ipratropium, alteplase, bisacodyl, Dextrose 10% Bolus, Dextrose 10% Bolus, Dextrose 10% Bolus, Dextrose 10% Bolus, glucagon (human recombinant), glucose, glucose, heparin (porcine), insulin aspart U-100, morphine, ondansetron, ondansetron, polyethylene glycol, sodium chloride 0.9%    Significant Imaging:   No new imaging over last 24 hours

## 2019-12-18 NOTE — PT/OT/SLP PROGRESS
Occupational Therapy   Treatment    Name: Cy Rutherford  MRN: [id number]  Admitting Diagnosis:  Acute on chronic diastolic heart failure  5 Days Post-Op    Recommendations:     Discharge Recommendations: rehabilitation facility  Discharge Equipment Recommendations:  bedside commode, shower chair  Barriers to discharge:  Decreased caregiver support    Assessment:   Patient improving with activity tolerance and BUE strength. Patient remains pleasant and motivated for therapy. Patient will benefit from continued skilled OT to address deficits and improve performance in functional ADL tasks. Cont OT.    Cy Rutherford is a 74 y.o. male with a medical diagnosis of Acute on chronic diastolic heart failure. Performance deficits affecting function are weakness, impaired endurance, impaired self care skills, impaired functional mobilty, gait instability, impaired balance, decreased upper extremity function, decreased lower extremity function, decreased safety awareness, decreased coordination, decreased ROM, impaired fine motor, impaired coordination, edema.     Rehab Prognosis:  Good; patient would benefit from acute skilled OT services to address these deficits and reach maximum level of function.       Plan:     Patient to be seen 5 x/week to address the above listed problems via self-care/home management, therapeutic activities, therapeutic exercises  · Plan of Care Expires: 01/05/20  · Plan of Care Reviewed with: patient    Subjective     Pain/Comfort:  · Pain Rating 1: 0/10  · Pain Rating Post-Intervention 1: 0/10    Objective:     Communicated with: nurseLotus prior to session.  Patient found up in chair with telemetry, peripheral IV(chair alarm) upon OT entry to room.    General Precautions: Standard, fall   Orthopedic Precautions:N/A   Braces: N/A     Activities of Daily Living:  · Grooming: supervision from chair level; increased time to use R hand for fncl tasks    [facility] 6 Click ADL: 16    Treatment &  Education:  Patient instructed in and completed x 10 reps using med resistance theraband (/c modified loop for R hand) -- bicep curl, sh flexion, diagonals, punches. AROM on RUE. VCs required throughout for PLB, as patient tends to hold breath. Patient sup for G/H tasks from chair /c increased time for FMC of RUE.     Patient left up in chair with all lines intact, call button in reach, chair alarm on and nsg notifiedEducation:      GOALS:   Multidisciplinary Problems     Occupational Therapy Goals        Problem: Occupational Therapy Goal    Goal Priority Disciplines Outcome Interventions   Occupational Therapy Goal     OT, PT/OT Ongoing, Progressing    Description:  Goals to be met by: 1/5/2020    Patient will increase functional independence with ADLs by performing:    UE Dressing with Modified [place].  LE Dressing with Set-up Assistance.  Grooming while standing with Supervision.  Toileting from bedside commode with Supervision for hygiene and clothing management.   Step transfer with Supervision  Toilet transfer to bedside commode with Supervision.  Increased functional strength to WFL for self care.  Upper extremity exercise program x10 reps per handout, with assistance as needed.                      Time Tracking:     OT Date of Treatment: 12/18/19  OT Start Time: 1026  OT Stop Time: 1049  OT Total Time (min): 23 min    Billable Minutes:Self Care/Home Management 10  Therapeutic Exercise 13    GRIFFIN Devlin/JAKY  12/18/2019

## 2019-12-18 NOTE — CARE UPDATE
[facility]    FACILITY TRANSFER ORDERS      Patient Name: Cy Rutehrford  YOB: 1945    PCP: Heide Porter MD   PCP Address: [address] [place] / [place] [address]  PCP Phone Number: [phone]  PCP Fax: [phone]    Encounter Date: 12/19/2019    Admit to: [facility]    Vital Signs:  Routine    Diagnoses:   Active Hospital Problems    Diagnosis  POA    *Acute on chronic diastolic heart failure [I50.33]  Yes    Hypokalemia [E87.6]  No    Constipation [K59.00]  Yes    Ulcer of esophagus without bleeding [K22.10]  Yes    JOSE (acute kidney injury) [N17.9]  Yes    Anemia [D64.9]  Yes    PAD (peripheral artery disease) [I73.9]  Yes    Type 2 diabetes mellitus with kidney complication, with long-term current use of insulin [E11.29, Z79.4]  Not Applicable    CAD (coronary artery disease) [I25.10]  Yes    Essential hypertension [I10]  Yes    Hyperlipidemia [E78.5]  Yes      Resolved Hospital Problems    Diagnosis Date Resolved POA    Hypotension [I95.9] 12/14/2019 Yes    Abdominal distension [R14.0] 12/13/2019 No    Hypotension [I95.9] 12/12/2019 Yes    Leukocytosis [D72.829] 12/13/2019 Yes    Hyperkalemia [E87.5] 12/12/2019 Yes       Allergies:Review of patient's allergies indicates:  No Known Allergies    Diet: diabetic diet: 2000 calorie cardiac     Activities: Activity as tolerated     Labs: H&H in 7 days 12/26/2019 results to Dr. Katerin Watts     CONSULTS:    Physical Therapy to evaluate and treat.  and Occupational Therapy to evaluate and treat.    Medications: Review discharge medications with patient and family and provide education.      Current Discharge Medication List      START taking these medications    Details   epoetin gege-epbx (RETACRIT) 10,000 unit/mL imjection Inject 2 mLs (20,000 Units total) into the skin every 7 days. Until Hgb reaches 10  Qty: 1 vial, Refills: 11      insulin detemir U-100 (LEVEMIR FLEXTOUCH) 100 unit/mL (3 mL)  SubQ InPn pen Inject 30 Units into the skin every evening.  Qty: 1 Box, Refills: 0      pantoprazole (PROTONIX) 40 MG tablet Take 1 tablet (40 mg total) by mouth 2 (two) times daily before meals.  Qty: 60 tablet, Refills: 2      potassium chloride SA (K-DUR,KLOR-CON) 20 MEQ tablet Take 2 tablets (40 mEq total) by mouth once daily.  Qty: 30 tablet, Refills: 11      sevelamer carbonate (RENVELA) 800 mg Tab Take 1 tablet (800 mg total) by mouth once daily.  Qty: 30 tablet, Refills: 11      Ferrous Sulfate 325 mg tablet  Take 1 tablet (325 mg total) by mouth once daily  Qty 30, refill 11      vitamin renal formula, B-complex-vitamin c-folic acid, (NEPHROCAP) 1 mg Cap Take 1 capsule by mouth once daily.  Qty: 30 capsule, Refills: 0         CONTINUE these medications which have CHANGED    Details   furosemide (LASIX) 80 MG tablet Take 1 tablet (80 mg total) by mouth 2 (two) times daily.  Qty: 60 tablet, Refills: 11      !! insulin aspart U-100 (NOVOLOG) 100 unit/mL (3 mL) InPn pen Inject 15 Units into the skin 3 (three) times daily.  Qty: 1 Box, Refills: 0      !! insulin aspart U-100 (NOVOLOG) 100 unit/mL (3 mL) InPn pen Inject 1-10 Units into the skin before meals and at bedtime as needed (Hyperglycemia).  Qty: 1 Box, Refills: 0   SSI as follows  151-200 pre meal 2units bedtime 1 unit  201-250 pre meal 4 units bedtime 2 units  251-300 pre meal 6 units bedtime 3 units  301-350 pre meal 8 units bedtime 4 units  >350 pre meal 10 units bedtime 5 units and notify MD       !! - Potential duplicate medications found. Please discuss with provider.      CONTINUE these medications which have NOT CHANGED    Details   clopidogrel (PLAVIX) 75 mg tablet Take 1 tablet (75 mg total) by mouth once daily.  Qty: 90 tablet, Refills: 3      doxazosin (CARDURA) 8 MG Tab Take 1 tablet (8 mg total) by mouth every evening.  Qty: 90 tablet, Refills: 3      fenofibrate micronized (LOFIBRA) 134 MG Cap Take 1 capsule (134 mg total) by mouth  nightly.  Qty: 90 capsule, Refills: 3      metoprolol succinate (TOPROL-XL) 100 MG 24 hr tablet Take 1 tablet (100 mg total) by mouth 2 (two) times daily.  Qty: 180 tablet, Refills: 3      multivitamin with minerals (ONE-A-DAY MAXIMUM FORMULA ORAL) Take 1 tablet by mouth once daily.      rosuvastatin (CRESTOR) 20 MG tablet Take 1 tablet (20 mg total) by mouth once daily.  Qty: 90 tablet, Refills: 3    Associated Diagnoses: Coronary artery disease, angina presence unspecified, unspecified vessel or lesion type, unspecified whether native or transplanted heart; Essential hypertension      fish oil-omega-3 fatty acids 300-1,000 mg capsule Take 1 tablet by mouth once daily.         STOP taking these medications       amLODIPine (NORVASC) 5 MG tablet Comments:   Reason for Stopping:         aspirin (ECOTRIN) 81 MG EC tablet Comments:   Reason for Stopping:         chlorthalidone (HYGROTEN) 25 MG Tab Comments:   Reason for Stopping:         cilostazol (PLETAL) 50 MG Tab Comments:   Reason for Stopping:         insulin glargine (LANTUS) 100 unit/mL injection Comments:   Reason for Stopping:         losartan (COZAAR) 50 MG tablet Comments:   Reason for Stopping:         omeprazole (PRILOSEC) 20 MG capsule Comments:   Reason for Stopping:         insulin lispro (HUMALOG) 100 unit/mL injection Comments:   Reason for Stopping:         metFORMIN (GLUCOPHAGE) 500 MG tablet Comments:   Reason for Stopping:                    _________________________________  KATHLEEN Cazares, ANP  12/18/2019

## 2019-12-18 NOTE — PLAN OF CARE
VN rounds: VN cued into pt's room with pt's permission. Pt sitting up in chair. Fall risk protocol discussed with pt. VN instructed pt to call for assistance. Pt aware and agreeable. NAD noted. Allowed time for questions. Pt c/o urinary retention and he already notified his bedside nurse. VN will touch base with nurse. Will cont to be available and intervene as needed.        12/18/19 1213   Type of Frequent Check   Type Patient Rounds;Other (see comments)  (VN rounds)   Safety/Activity   Patient Rounds visualized patient;call light in patient/parent reach   Safety Promotion/Fall Prevention Fall Risk reviewed with patient/family;assistive device/personal item within reach;instructed to call staff for mobility   Activity Management up in chair   Pain/Comfort/Sleep   Preferred Pain Scale word (verbal rating pain scale)   Pain Rating: Rest 0 - no pain   Sleep/Rest/Relaxation awake   Assessments (Pre/Post)   Level of Consciousness (AVPU) alert

## 2019-12-18 NOTE — SUBJECTIVE & OBJECTIVE
Interval History: awake and alert, family by bedside,    Adjust insulin regimen- increase prandal insulin dose to 12 Units with meal. Possible discharge to SNF in AM.       Review of Systems   Constitutional: Positive for activity change. Negative for chills and fatigue.   Respiratory: Negative for shortness of breath.    Cardiovascular: Negative for chest pain.   Genitourinary: Negative for difficulty urinating.   Neurological: Negative for dizziness and numbness.     Objective:     Vital Signs (Most Recent):  Temp: 97.4 °F (36.3 °C) (12/18/19 1458)  Pulse: 88 (12/18/19 1458)  Resp: 18 (12/18/19 1458)  BP: (!) 146/65 (12/18/19 1458)  SpO2: (!) 92 % (12/18/19 1216) Vital Signs (24h Range):  Temp:  [97.4 °F (36.3 °C)-99 °F (37.2 °C)] 97.4 °F (36.3 °C)  Pulse:  [66-88] 88  Resp:  [18-20] 18  SpO2:  [92 %-97 %] 92 %  BP: (129-156)/(60-68) 146/65     Weight: 102.3 kg (225 lb 8.5 oz)  Body mass index is 32.83 kg/m².    Intake/Output Summary (Last 24 hours) at 12/18/2019 1646  Last data filed at 12/18/2019 1500  Gross per 24 hour   Intake 480 ml   Output 2350 ml   Net -1870 ml      Physical Exam   Constitutional: He is oriented to person, place, and time. He appears well-developed and well-nourished.   HENT:   Head: Normocephalic and atraumatic.   Eyes: EOM are normal.   Neck: Normal range of motion.   Pulmonary/Chest: Effort normal. No respiratory distress.   Neurological: He is alert and oriented to person, place, and time.   Psychiatric: He has a normal mood and affect. His behavior is normal. Judgment and thought content normal.       Significant Labs:   Recent Labs   Lab 12/15/19  0742 12/16/19  0537 12/17/19  0632   WBC 8.49  8.49 7.87 7.46   HGB 8.3*  8.3* 8.4* 8.5*   HCT 25.8*  25.8* 26.1* 27.5*     315 351* 375*     Recent Labs   Lab 12/14/19  0548  12/16/19  0537 12/17/19  0632 12/18/19  0329   *   < > 136 138 137   K 3.0*   < > 3.6 3.5 3.8   CL 97   < > 99 99 99   CO2 29   < > 26 29 28   BUN  78*   < > 86* 79* 74*   CREATININE 3.5*   < > 3.2* 2.8* 2.5*   *   < > 220* 188* 99   CALCIUM 8.4*   < > 8.3* 8.5* 8.6*   MG 2.2  --   --   --   --    PHOS 5.7*   < > 4.3  4.3 4.1 3.8    < > = values in this interval not displayed.     Recent Labs   Lab 12/17/19  0632   ALKPHOS 64   ALT 31   AST 29   ALBUMIN 2.4*   PROT 6.0   BILITOT 0.7      No results for input(s): CPK, CPKMB, MB, TROPONINI in the last 72 hours.  Recent Labs   Lab 12/17/19  0619 12/17/19  1109 12/17/19  1752 12/17/19 2006 12/18/19  0522 12/18/19  1122   POCTGLUCOSE 182* 189* 162* 197* 93 233*     Hemoglobin A1C   Date Value Ref Range Status   12/02/2019 5.6 4.0 - 5.6 % Final     Comment:     ADA Screening Guidelines:  5.7-6.4%  Consistent with prediabetes  >or=6.5%  Consistent with diabetes  High levels of fetal hemoglobin interfere with the HbA1C  assay. Heterozygous hemoglobin variants (HbS, HgC, etc)do  not significantly interfere with this assay.   However, presence of multiple variants may affect accuracy.     05/08/2018 7.7 (H) 4.0 - 5.6 % Final     Comment:     According to ADA guidelines, hemoglobin A1c <7.0% represents  optimal control in non-pregnant diabetic patients. Different  metrics may apply to specific patient populations.   Standards of Medical Care in Diabetes-2016.  For the purpose of screening for the presence of diabetes:  <5.7%     Consistent with the absence of diabetes  5.7-6.4%  Consistent with increasing risk for diabetes   (prediabetes)  >or=6.5%  Consistent with diabetes  Currently, no consensus exists for use of hemoglobin A1c  for diagnosis of diabetes for children.  This Hemoglobin A1c assay has significant interference with fetal   hemoglobin   (HbF). The results are invalid for patients with abnormal amounts of   HbF,   including those with known Hereditary Persistence   of Fetal Hemoglobin. Heterozygous hemoglobin variants (HbAS, HbAC,   HbAD, HbAE, HbA2) do not significantly interfere with this assay;    however, presence of multiple variants in a sample may impact the %   interference.     06/09/2011 7.9 (H) 4.0 - 6.2 % Final     Scheduled Meds:   amLODIPine  10 mg Oral Daily    doxazosin  8 mg Oral Daily    epoetin gege-ebpx (RETACRIT) injection  20,000 Units Subcutaneous Q7 Days    furosemide  80 mg Oral BID    insulin aspart U-100  12 Units Subcutaneous TIDWM    insulin detemir U-100  20 Units Subcutaneous QHS    iron sucrose (VENOFER) IVPB  100 mg Intravenous Weekly    pantoprazole  40 mg Oral BID AC    polyethylene glycol  17 g Oral Daily    potassium chloride  20 mEq Oral Once    potassium chloride  40 mEq Oral Daily    rosuvastatin  20 mg Oral Daily    sevelamer carbonate  800 mg Oral Daily    simethicone  125 mg Oral Q6H    tamsulosin  0.4 mg Oral Daily    vitamin renal formula (B-complex-vitamin c-folic acid)  1 capsule Oral Daily     Continuous Infusions:  As Needed: acetaminophen, albuterol-ipratropium, alteplase, bisacodyl, Dextrose 10% Bolus, Dextrose 10% Bolus, Dextrose 10% Bolus, Dextrose 10% Bolus, glucagon (human recombinant), glucose, glucose, heparin (porcine), insulin aspart U-100, morphine, ondansetron, ondansetron, sodium chloride 0.9%    Significant Imaging:   No new imaging over last 24 hours

## 2019-12-18 NOTE — PROGRESS NOTES
Progress Note  Nephrology      Consult Requested By: James Sanchez MD  Reason for Consult: ARF    SUBJECTIVE:     Review of Systems   Constitutional: Negative for chills and fever.   Respiratory: Negative for cough and shortness of breath.    Cardiovascular: Negative for chest pain and leg swelling.   Gastrointestinal: Negative for nausea.     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    Abnormality of gait as late effect of cerebrovascular accident (CVA)    Tightness of right gastrocnemius muscle    Claudication    Anemia    JSOE (acute kidney injury)    Acute on chronic diastolic heart failure    Ulcer of esophagus without bleeding    Constipation    Hypokalemia       OBJECTIVE:     Medications:   amLODIPine  10 mg Oral Daily    doxazosin  8 mg Oral Daily    epoetin gege-ebpx (RETACRIT) injection  20,000 Units Subcutaneous Q7 Days    furosemide  40 mg Intravenous BID    insulin aspart U-100  10 Units Subcutaneous TIDWM    insulin detemir U-100  20 Units Subcutaneous QHS    iron sucrose (VENOFER) IVPB  100 mg Intravenous Weekly    pantoprazole  40 mg Oral BID AC    polyethylene glycol  17 g Oral Daily    potassium chloride  20 mEq Oral Once    potassium chloride  20 mEq Oral Once    potassium chloride  40 mEq Oral Daily    rosuvastatin  20 mg Oral Daily    sevelamer carbonate  800 mg Oral Daily    simethicone   125 mg Oral Q6H    vitamin renal formula (B-complex-vitamin c-folic acid)  1 capsule Oral Daily       Vitals:    12/18/19 0830   BP:    Pulse: 84   Resp: 18   Temp:      I/O last 3 completed shifts:  In: 1500 [P.O.:1500]  Out: 3250 [Urine:3250]  Physical Exam   Constitutional: He is oriented to person, place, and time. He appears well-developed and well-nourished. No distress.   HENT:   Head: Normocephalic and atraumatic.   Eyes: EOM are normal. No scleral icterus.   Neck: Neck supple. No JVD present.   Cardiovascular: Regular rhythm and intact distal pulses. Exam reveals no friction rub.   No murmur heard.  Pulmonary/Chest: Effort normal and breath sounds normal. No respiratory distress. He has no wheezes. He has no rales.   Abdominal: Soft. Bowel sounds are normal. He exhibits no distension. There is no tenderness.   Musculoskeletal: He exhibits edema (4+ BLE).   Neurological: He is alert and oriented to person, place, and time.   Skin: Skin is warm and dry. No rash noted. He is not diaphoretic. No erythema.   Psychiatric: He has a normal mood and affect.     Laboratory:  Recent Labs   Lab 12/15/19  0742 12/16/19  0537 12/17/19  0632   WBC 8.49  8.49 7.87 7.46   HGB 8.3*  8.3* 8.4* 8.5*   HCT 25.8*  25.8* 26.1* 27.5*     315 351* 375*   MONO 4.1  4.1  0.4  0.4 4.8  0.4 10.2  0.8     Recent Labs   Lab 12/16/19  0537 12/17/19  0632 12/18/19  0329    138 137   K 3.6 3.5 3.8   CL 99 99 99   CO2 26 29 28   BUN 86* 79* 74*   CREATININE 3.2* 2.8* 2.5*   CALCIUM 8.3* 8.5* 8.6*   PHOS 4.3  4.3 4.1 3.8     Labs reviewed  Diagnostic Results:  X-Ray: Reviewed  US: Reviewed  Echo: Reviewed      ASSESSMENT/PLAN:   1. ARF  On CKD 3, baseline Cr 1-1.2  JOSE from hemodynamic instability from acute blood loss and large retroperitoneal hematoma  Was on CRRT with multipple clotting episodes, worsenign anemia and nausea/vomiting (was not tolerating RRT well) but stable off any RRT for now. Good UO 2 l    Cath  was removed 12/17/19    Still volume overloaded, transition to PO Lasix, low salt diet, compression stockings  If pt is d/c follow up with Dr. Watts in clinic on Jan 13th    2. Hypokalemia - liberalize diet, PO replacement in progress    3. Anemia of CKd + acute blood loss -   Epogen 20K SC q 7 days  Recent Labs   Lab 12/15/19  0742 12/16/19  0537 12/17/19  0632   WBC 8.49  8.49 7.87 7.46   HGB 8.3*  8.3* 8.4* 8.5*   HCT 25.8*  25.8* 26.1* 27.5*     315 351* 375*     Lab Results   Component Value Date    IRON 21 (L) 12/07/2019    TIBC 268 12/07/2019         4. MBD - at goal  Lab Results   Component Value Date    CALCIUM 8.6 (L) 12/18/2019    PHOS 3.8 12/18/2019     Recent Labs   Lab 12/14/19  0548   MG 2.2     No results found for: [id number]  Lab Results   Component Value Date    CO2 28 12/18/2019           Thank you for allowing me to participate in the care of your patients  With any question please call [phone]  Madina Medina    [facility]  JEANA Diego MD, FACP,   KULDEEP Watts MD,   MD ROSETTA Olsen, NP  [address]  [place], [address]  [phone]

## 2019-12-18 NOTE — PLAN OF CARE
Patient awake, alert and oriented.VVS.On tele, no ectopy on tele. No complaints of pain. DARRION'S for VTE prevention.Blood glucose monitored AC/HS and covered per sliding scale.  IV dressing CDI and flushes . Bed in low position and locked. Call light and personal items with in reach. Bed alarm remains on. Instructed to call for assistance, verbalized understanding. Educated on new medications.

## 2019-12-18 NOTE — PLAN OF CARE
Pt assessed by [facility] from [facility], pt has been medically accepted, pending [employer] auth as of today.  Multiple questions addressed with facility, facility concerned about urinary retention.  Plan discussed with Cardiology, Nephrology.  I&O cath done, bladder scan ordered q6hr with parameters for I&O >300cc.  Nephrology to add flomax and reassess tomorrow.  If pt continues with retention will likely place indwelling cath and f/u with Dr Robbins as outpt 7-10 days for voiding trial.  [facility] will require dx of Obstructive Uropathy on NH orders with rubio order.  Per [facility] Neurogenic Bladder and Obstructive Uropathy are only two accepting dx of note.    Facility can continue SQ EPO daily, will need order to draw H&H in 7days with results called to Dr Watts who will be following as outpt.      Will reassess in AM.    Grace Rodriguez RN    [phone]

## 2019-12-18 NOTE — PLAN OF CARE
Orders uploaded to [facility] and sent to facility.  Crystal in admissions is on way to assess pt and will contact CM once here.  Made aware of EPO SQ orders with hgb in 7 days to assess further need, DR Watts will f/u with pt in clinic.  Will contact review orders and contact CM with needs/questions.    Grace Rodriguez RN    [phone]

## 2019-12-18 NOTE — NURSING
Patient safety maintained. Medications administered per order. Assistance provided as needed for turning, positioning, and ambulating. Continued telemetry monitoring.

## 2019-12-18 NOTE — PROGRESS NOTES
[facility] Hospital Medicine  Progress Note    Patient Name: Cy Rutherford  MRN: [id number]  Patient Class: IP- Inpatient   Admission Date: 12/2/2019  Length of Stay: 15 days  Attending Physician: James Sanchez MD  Primary Care Provider: Heide Porter MD        Subjective:     Principal Problem:Acute on chronic diastolic heart failure        HPI:  Admitted for elective LE angiogram for evaluation of RLE claudication. Taken to the cath lab for the procedure via LCFA access with following results:     successful atherectomy (Hawk LX) with distal filter protection, followed by PTA with scoring balloon (5.0 x 150 dSFA, 6.0 x 150 pSFA/CFA) followed by PTA with DCB to dSFA (5.0 x 150), pSFA (5.0 x 100) and CFA (7 x 40) with good results. 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 He has elevated BG, no anion gap.  We will place on insulin sq and monito closely , if dka develop, we will place him on insulin drip    Overview/Hospital Course:  Had hypotension after the procedure and then had JOSE and had to be started on H.D(last H.D on 12/11). Nephrology following. Patient is also on lasix and diuresing well for past 2 days. Currently on I.V lasix pushes. Creatinine stable for past 2 days also. Blood sugar levels are controlled with Levemir, prandial insulin and ISS. Also found to having large left renal subcapsular hematoma on CT abd done on 12/4. ASA, Plavix and pletal are therefore held. On 12/11/19, patient had an episode of  hematemesis. Initially had drop in H/H, but stable since then. RADHAMES consulted and had EGD on 12/13. This showed Non-bleeding esophageal ulcer, which was biopsied and erythematous mucosa in the stomach. Advised to start on Protonix 40 mg BID for 12 weeks. Repeat UGD in 8 weeks to establish healing.                 PT/OT eval done and Rehab placement advised. Social work consult placed.   12/15 pt seen doing ok, had not have HD since wed per him.  BG atable around 135, 136, K is 3.2 will replace [id number] adjust insulin regimen      Interval History: awake and alert, family by bedside,    Adjust insulin regimen- increase prandal insulin dose to 12 Units with meal. Possible discharge to SNF in AM.       Review of Systems   Constitutional: Positive for activity change. Negative for chills and fatigue.   Respiratory: Negative for shortness of breath.    Cardiovascular: Negative for chest pain.   Genitourinary: Negative for difficulty urinating.   Neurological: Negative for dizziness and numbness.     Objective:     Vital Signs (Most Recent):  Temp: 97.4 °F (36.3 °C) (12/18/19 1458)  Pulse: 88 (12/18/19 1458)  Resp: 18 (12/18/19 1458)  BP: (!) 146/65 (12/18/19 1458)  SpO2: (!) 92 % (12/18/19 1216) Vital Signs (24h Range):  Temp:  [97.4 °F (36.3 °C)-99 °F (37.2 °C)] 97.4 °F (36.3 °C)  Pulse:  [66-88] 88  Resp:  [18-20] 18  SpO2:  [92 %-97 %] 92 %  BP: (129-156)/(60-68) 146/65     Weight: 102.3 kg (225 lb 8.5 oz)  Body mass index is 32.83 kg/m².    Intake/Output Summary (Last 24 hours) at 12/18/2019 1646  Last data filed at 12/18/2019 1500  Gross per 24 hour   Intake 480 ml   Output 2350 ml   Net -1870 ml      Physical Exam   Constitutional: He is oriented to person, place, and time. He appears well-developed and well-nourished.   HENT:   Head: Normocephalic and atraumatic.   Eyes: EOM are normal.   Neck: Normal range of motion.   Pulmonary/Chest: Effort normal. No respiratory distress.   Neurological: He is alert and  oriented to person, place, and time.   Psychiatric: He has a normal mood and affect. His behavior is normal. Judgment and thought content normal.       Significant Labs:   Recent Labs   Lab 12/15/19  0742 12/16/19  0537 12/17/19  0632   WBC 8.49  8.49 7.87 7.46   HGB 8.3*  8.3* 8.4* 8.5*   HCT 25.8*  25.8* 26.1* 27.5*     315 351* 375*     Recent Labs   Lab 12/14/19  0548  12/16/19  0537 12/17/19  0632 12/18/19  0329   *   < > 136 138 137   K 3.0*   < > 3.6 3.5 3.8   CL 97   < > 99 99 99   CO2 29   < > 26 29 28   BUN 78*   < > 86* 79* 74*   CREATININE 3.5*   < > 3.2* 2.8* 2.5*   *   < > 220* 188* 99   CALCIUM 8.4*   < > 8.3* 8.5* 8.6*   MG 2.2  --   --   --   --    PHOS 5.7*   < > 4.3  4.3 4.1 3.8    < > = values in this interval not displayed.     Recent Labs   Lab 12/17/19  0632   ALKPHOS 64   ALT 31   AST 29   ALBUMIN 2.4*   PROT 6.0   BILITOT 0.7      No results for input(s): CPK, CPKMB, MB, TROPONINI in the last 72 hours.  Recent Labs   Lab 12/17/19  0619 12/17/19  1109 12/17/19  1752 12/17/19 2006 12/18/19  0522 12/18/19  1122   POCTGLUCOSE 182* 189* 162* 197* 93 233*     Hemoglobin A1C   Date Value Ref Range Status   12/02/2019 5.6 4.0 - 5.6 % Final     Comment:     ADA Screening Guidelines:  5.7-6.4%  Consistent with prediabetes  >or=6.5%  Consistent with diabetes  High levels of fetal hemoglobin interfere with the HbA1C  assay. Heterozygous hemoglobin variants (HbS, HgC, etc)do  not significantly interfere with this assay.   However, presence of multiple variants may affect accuracy.     05/08/2018 7.7 (H) 4.0 - 5.6 % Final     Comment:     According to ADA guidelines, hemoglobin A1c <7.0% represents  optimal control in non-pregnant diabetic patients. Different  metrics may apply to specific patient populations.   Standards of Medical Care in Diabetes-2016.  For the purpose of screening for the presence of diabetes:  <5.7%     Consistent with the absence of diabetes  5.7-6.4%   Consistent with increasing risk for diabetes   (prediabetes)  >or=6.5%  Consistent with diabetes  Currently, no consensus exists for use of hemoglobin A1c  for diagnosis of diabetes for children.  This Hemoglobin A1c assay has significant interference with fetal   hemoglobin   (HbF). The results are invalid for patients with abnormal amounts of   HbF,   including those with known Hereditary Persistence   of Fetal Hemoglobin. Heterozygous hemoglobin variants (HbAS, HbAC,   HbAD, HbAE, HbA2) do not significantly interfere with this assay;   however, presence of multiple variants in a sample may impact the %   interference.     06/09/2011 7.9 (H) 4.0 - 6.2 % Final     Scheduled Meds:   amLODIPine  10 mg Oral Daily    doxazosin  8 mg Oral Daily    epoetin gege-ebpx (RETACRIT) injection  20,000 Units Subcutaneous Q7 Days    furosemide  80 mg Oral BID    insulin aspart U-100  12 Units Subcutaneous TIDWM    insulin detemir U-100  20 Units Subcutaneous QHS    iron sucrose (VENOFER) IVPB  100 mg Intravenous Weekly    pantoprazole  40 mg Oral BID AC    polyethylene glycol  17 g Oral Daily    potassium chloride  20 mEq Oral Once    potassium chloride  40 mEq Oral Daily    rosuvastatin  20 mg Oral Daily    sevelamer carbonate  800 mg Oral Daily    simethicone  125 mg Oral Q6H    tamsulosin  0.4 mg Oral Daily    vitamin renal formula (B-complex-vitamin c-folic acid)  1 capsule Oral Daily     Continuous Infusions:  As Needed: acetaminophen, albuterol-ipratropium, alteplase, bisacodyl, Dextrose 10% Bolus, Dextrose 10% Bolus, Dextrose 10% Bolus, Dextrose 10% Bolus, glucagon (human recombinant), glucose, glucose, heparin (porcine), insulin aspart U-100, morphine, ondansetron, ondansetron, sodium chloride 0.9%    Significant Imaging:   No new imaging over last 24 hours      Assessment/Plan:      * Acute on chronic diastolic heart failure  On Lasix BID per Primary and Nephrology with good urine output  Echo showed  normal LVEF  Lopressor being held for acute on chronic diastolic HF and ARB for JOSE  Diuresing well and improving.      Hypokalemia  Will replace as needed orally      Constipation  Glycoloax daily and PRN suppository      Ulcer of esophagus without bleeding  Hematemesis recently sec to same  EGD on 12/13 showed esophageal ulcer with no bleeding now  G.I input appreciated  PPI drip d/jose ramon and on Protonix 40 BID.  Will need this for 12 weeks as per G.I  Will need repeat UGD in 8 weeks to establish healing.       JOSE (acute kidney injury)  Sec to ATN sec to hypotension and bleeding mostly  Nephrology on board and patient is on H.D  Cont management as per them.  Last H.D on 12/11 and creatinine stable since then inspite of receiving no H.D and diuresing well too.    Anemia  Sec to acute blood loss sec to hematemesis mostly  Will cont to f/u and transfuse prn  H/H stable for past 24 hr.       PAD (peripheral artery disease)  As per primary      CAD (coronary artery disease)  As per primary      Hyperlipidemia  Continue Home dose statin      Essential hypertension  Cont home dose Norvasc and Doxazosin.        Type 2 diabetes mellitus with kidney complication, with long-term current use of insulin  Lab Results   Component Value Date    HGBA1C 5.6 12/02/2019     - Blood sugar levels better controlled now.  - Continue levemir 62 units nightly   - Continue aspart 17 units TID with meals  - Accuchecks with prn low dose SSI   12/15 BG accu check around 136. Continue current plan of care      VTE Risk Mitigation (From admission, onward)         Ordered     Place DARRION hose  Until discontinued      12/16/19 1609     Place DARRION hose  Until discontinued      12/05/19 1005     heparin (porcine) injection 2,300 Units  As needed (PRN)      12/04/19 1137                      Sheryl Guerra MD  Department of Hospital Medicine   [facility]

## 2019-12-19 VITALS
TEMPERATURE: 98 F | RESPIRATION RATE: 17 BRPM | OXYGEN SATURATION: 96 % | WEIGHT: 225.5 LBS | HEIGHT: 70 IN | DIASTOLIC BLOOD PRESSURE: 72 MMHG | HEART RATE: 78 BPM | BODY MASS INDEX: 32.28 KG/M2 | SYSTOLIC BLOOD PRESSURE: 161 MMHG

## 2019-12-19 LAB
ANION GAP SERPL CALC-SCNC: 9 MMOL/L (ref 8–16)
BUN SERPL-MCNC: 68 MG/DL (ref 8–23)
CALCIUM SERPL-MCNC: 8.5 MG/DL (ref 8.7–10.5)
CHLORIDE SERPL-SCNC: 100 MMOL/L (ref 95–110)
CO2 SERPL-SCNC: 30 MMOL/L (ref 23–29)
CREAT SERPL-MCNC: 2.4 MG/DL (ref 0.5–1.4)
EST. GFR  (AFRICAN AMERICAN): 29 ML/MIN/1.73 M^2
EST. GFR  (NON AFRICAN AMERICAN): 25 ML/MIN/1.73 M^2
GLUCOSE SERPL-MCNC: 233 MG/DL (ref 70–110)
PHOSPHATE SERPL-MCNC: 3.4 MG/DL (ref 2.7–4.5)
POCT GLUCOSE: 188 MG/DL (ref 70–110)
POCT GLUCOSE: 217 MG/DL (ref 70–110)
POCT GLUCOSE: 225 MG/DL (ref 70–110)
POTASSIUM SERPL-SCNC: 4.1 MMOL/L (ref 3.5–5.1)
SODIUM SERPL-SCNC: 139 MMOL/L (ref 136–145)

## 2019-12-19 PROCEDURE — 25000003 PHARM REV CODE 250: Performed by: NURSE PRACTITIONER

## 2019-12-19 PROCEDURE — 97116 GAIT TRAINING THERAPY: CPT

## 2019-12-19 PROCEDURE — 84100 ASSAY OF PHOSPHORUS: CPT

## 2019-12-19 PROCEDURE — 25000003 PHARM REV CODE 250: Performed by: INTERNAL MEDICINE

## 2019-12-19 PROCEDURE — 80048 BASIC METABOLIC PNL TOTAL CA: CPT

## 2019-12-19 PROCEDURE — 97535 SELF CARE MNGMENT TRAINING: CPT

## 2019-12-19 PROCEDURE — 99239 PR HOSPITAL DISCHARGE DAY,>30 MIN: ICD-10-PCS | Mod: ,,, | Performed by: INTERNAL MEDICINE

## 2019-12-19 PROCEDURE — 36415 COLL VENOUS BLD VENIPUNCTURE: CPT

## 2019-12-19 PROCEDURE — 94761 N-INVAS EAR/PLS OXIMETRY MLT: CPT

## 2019-12-19 PROCEDURE — 99239 HOSP IP/OBS DSCHRG MGMT >30: CPT | Mod: ,,, | Performed by: INTERNAL MEDICINE

## 2019-12-19 RX ORDER — INSULIN ASPART 100 [IU]/ML
15 INJECTION, SOLUTION INTRAVENOUS; SUBCUTANEOUS 3 TIMES DAILY
Qty: 1 BOX | Refills: 0
Start: 2019-12-19 | End: 2024-03-05

## 2019-12-19 RX ORDER — FERROUS SULFATE 325(65) MG
325 TABLET ORAL
Qty: 30 TABLET | Refills: 11 | Status: SHIPPED | OUTPATIENT
Start: 2019-12-19 | End: 2020-02-27 | Stop reason: SDUPTHER

## 2019-12-19 RX ORDER — INSULIN ASPART 100 [IU]/ML
15 INJECTION, SOLUTION INTRAVENOUS; SUBCUTANEOUS
Status: DISCONTINUED | OUTPATIENT
Start: 2019-12-19 | End: 2019-12-19 | Stop reason: HOSPADM

## 2019-12-19 RX ADMIN — INSULIN ASPART 4 UNITS: 100 INJECTION, SOLUTION INTRAVENOUS; SUBCUTANEOUS at 08:12

## 2019-12-19 RX ADMIN — SEVELAMER CARBONATE 800 MG: 800 TABLET, FILM COATED ORAL at 08:12

## 2019-12-19 RX ADMIN — FUROSEMIDE 80 MG: 40 TABLET ORAL at 08:12

## 2019-12-19 RX ADMIN — PANTOPRAZOLE SODIUM 40 MG: 40 TABLET, DELAYED RELEASE ORAL at 05:12

## 2019-12-19 RX ADMIN — TAMSULOSIN HYDROCHLORIDE 0.4 MG: 0.4 CAPSULE ORAL at 08:12

## 2019-12-19 RX ADMIN — AMLODIPINE BESYLATE 10 MG: 5 TABLET ORAL at 08:12

## 2019-12-19 RX ADMIN — SIMETHICONE 125 MG: 125 TABLET, CHEWABLE ORAL at 11:12

## 2019-12-19 RX ADMIN — INSULIN ASPART 4 UNITS: 100 INJECTION, SOLUTION INTRAVENOUS; SUBCUTANEOUS at 11:12

## 2019-12-19 RX ADMIN — INSULIN ASPART 12 UNITS: 100 INJECTION, SOLUTION INTRAVENOUS; SUBCUTANEOUS at 08:12

## 2019-12-19 RX ADMIN — ROSUVASTATIN CALCIUM 20 MG: 10 TABLET, FILM COATED ORAL at 08:12

## 2019-12-19 RX ADMIN — PANTOPRAZOLE SODIUM 40 MG: 40 TABLET, DELAYED RELEASE ORAL at 04:12

## 2019-12-19 RX ADMIN — SIMETHICONE 125 MG: 125 TABLET, CHEWABLE ORAL at 05:12

## 2019-12-19 RX ADMIN — NEPHROCAP 1 CAPSULE: 1 CAP ORAL at 08:12

## 2019-12-19 RX ADMIN — INSULIN ASPART 15 UNITS: 100 INJECTION, SOLUTION INTRAVENOUS; SUBCUTANEOUS at 04:12

## 2019-12-19 RX ADMIN — POTASSIUM CHLORIDE 40 MEQ: 1500 TABLET, EXTENDED RELEASE ORAL at 08:12

## 2019-12-19 RX ADMIN — DOXAZOSIN 8 MG: 2 TABLET ORAL at 08:12

## 2019-12-19 RX ADMIN — INSULIN ASPART 15 UNITS: 100 INJECTION, SOLUTION INTRAVENOUS; SUBCUTANEOUS at 11:12

## 2019-12-19 NOTE — PLAN OF CARE
spoke with bedside nurse Lotus, informed of number for report to [facility] , [phone], to nurse caring for . SW informed nurse that transportation has been requested for 4 PM. Nurse verbalized understanding. Patient aware of discharge plan.        12/19/19 1522   Final Note   Assessment Type Final Discharge Note   Anticipated Discharge Disposition SNF   What phone number can be called within the next 1-3 days to see how you are doing after discharge? [phone]   Hospital Follow Up  Appt(s) scheduled? Yes   Discharge plans and expectations educations in teach back method with documentation complete? Yes   Right Care Referral Info   Post Acute Recommendation SNF / Sub-Acute Rehab   Facility Name [facility]    [address] [address]

## 2019-12-19 NOTE — PLAN OF CARE
VN note: VN cued into patient's room for introduction. Patient sitting in chair, no family at bedside. Patient inquiring what time transport will be here. VN messaged DIMITRI Edwards. She stated SNF will be contacting them soon with a time for transport. VN updated patient. Will continue to be available to patient and intervene prn.       12/19/19 1503   Type of Frequent Check   Type Patient Rounds;Other (see comments)  (VN Rounds)   Safety/Activity   Patient Rounds bed in low position;visualized patient   Safety Promotion/Fall Prevention side rails raised x 2   Activity Management up in chair   Positioning   Body Position positioned/repositioned independently   Head of Bed (HOB) HOB at 60-90 degrees   Assessments (Pre/Post)   Level of Consciousness (AVPU) alert

## 2019-12-19 NOTE — PT/OT/SLP PROGRESS
Occupational Therapy   Treatment    Name: Cy Rutherford  MRN: [id number]  Admitting Diagnosis:  Acute on chronic diastolic heart failure  6 Days Post-Op    Recommendations:     Discharge Recommendations: rehabilitation facility  Discharge Equipment Recommendations:  bedside commode, shower chair  Barriers to discharge:  Decreased caregiver support    Assessment:     Cy Rutherford is a 74 y.o. male with a medical diagnosis of Acute on chronic diastolic heart failure.  He presents with deconditioning, improved edema BLE, increased endurance for functional mobility and occupational performance this date. Performance deficits affecting function are weakness, impaired endurance, impaired self care skills, impaired balance, gait instability, impaired functional mobilty, decreased coordination, decreased upper extremity function, decreased lower extremity function, impaired fine motor, impaired cardiopulmonary response to activity, edema.     Rehab Prognosis:  Good; patient would benefit from acute skilled OT services to address these deficits and reach maximum level of function.       Plan:     Patient to be seen 5 x/week to address the above listed problems via self-care/home management, therapeutic activities, therapeutic exercises  · Plan of Care Expires: 01/05/20  · Plan of Care Reviewed with: patient    Subjective     Pain/Comfort:  · Pain Rating 1: 0/10    Objective:     Communicated with: nsg prior to session.  Patient found HOB elevated with peripheral IV(RN removed PIV during session) upon OT entry to room.    General Precautions: Standard, fall   Orthopedic Precautions:N/A   Braces: N/A     Occupational Performance:     Bed Mobility:    · Patient completed Rolling/Turning to Right with supervision  · Patient completed Scooting/Bridging with supervision  · Patient completed Supine to Sit with supervision  · Patient completed Sit to Supine with supervision     Functional Mobility/Transfers:  · Patient completed  Sit <> Stand Transfer with stand by assistance  with  rolling walker   · Patient completed Toilet Transfer Step Transfer technique with stand by assistance with  rolling walker  Functional Mobility: Pt with fair dynamic seated and standing balance.    Activities of Daily Living:  · Upper Body Dressing: modified independence to don sweater seated EOB  · Lower Body Dressing: Max A overall, moderate assistance to thread BLE into underwear and pants and to manage waistband, total A to don B compressions stockings, socks and mod A to don B shoes with velcro fasteners  · Toileting: moderate assistance for waistband management and courtesy wipe for pericare 2/2 pt stating he may need assist 2/2 sacral mepilex      AMPA 6 Click ADL: 18    Treatment & Education:  Pt educated on role of OT and POC.   Pt performing skills as listed above.   Pt demonstrated no SOB this date during session but reported limited ability to complete LE dressing using his typical figure of four technique. Pt mildly diaphoretic at end of session but stated he was not fatigued    Patient left seated EOB with all lines intact, call button in reach, nsg notified and case management  presentEducation:      GOALS:   Multidisciplinary Problems     Occupational Therapy Goals        Problem: Occupational Therapy Goal    Goal Priority Disciplines Outcome Interventions   Occupational Therapy Goal     OT, PT/OT Ongoing, Progressing    Description:  Goals to be met by: 1/5/2020    Patient will increase functional independence with ADLs by performing:    UE Dressing with Modified [place]. MET 12/19  LE Dressing with Set-up Assistance.  Grooming while standing with Supervision.  Toileting from bedside commode with Supervision for hygiene and clothing management.   Step transfer with Supervision  Toilet transfer to bedside commode with Supervision.  Increased functional strength to WFL for self care.  Upper extremity exercise program x10 reps per handout,  with assistance as needed.                       Time Tracking:     OT Date of Treatment: 12/19/19  OT Start Time: 1318  OT Stop Time: 1357  OT Total Time (min): 39 min    Billable Minutes:Self Care/Home Management 39    Kanwal Cool OT  12/19/2019

## 2019-12-19 NOTE — PLAN OF CARE
Appointments:    Future Appointments   Date Time Provider Department Center   1/13/2020 11:45 AM Katerin Watts MD [facility]   1/23/2020  3:40 PM James Sanchez MD [facility]

## 2019-12-19 NOTE — PLAN OF CARE
Problem: Occupational Therapy Goal  Goal: Occupational Therapy Goal  Description  Goals to be met by: 1/5/2020    Patient will increase functional independence with ADLs by performing:    UE Dressing with Modified [place]. MET 12/19  LE Dressing with Set-up Assistance.  Grooming while standing with Supervision.  Toileting from bedside commode with Supervision for hygiene and clothing management.   Step transfer with Supervision  Toilet transfer to bedside commode with Supervision.  Increased functional strength to WFL for self care.  Upper extremity exercise program x10 reps per handout, with assistance as needed.      Outcome: Ongoing, Progressing   Pt progressing well towards goals. Cont OT POC

## 2019-12-19 NOTE — SUBJECTIVE & OBJECTIVE
Interval History: awake and alert, no new complaint.   Discussed insulin regimen with patient. Medically stable for discharge from our service.       Review of Systems   Constitutional: Positive for activity change. Negative for chills and fatigue.   Respiratory: Negative for shortness of breath.    Cardiovascular: Negative for chest pain.   Genitourinary: Negative for difficulty urinating.   Neurological: Negative for dizziness and numbness.     Objective:     Vital Signs (Most Recent):  Temp: 98.3 °F (36.8 °C) (12/19/19 0538)  Pulse: 78 (12/19/19 0538)  Resp: 16 (12/19/19 0538)  BP: (!) 152/68 (12/19/19 0538)  SpO2: 95 % (12/19/19 0538) Vital Signs (24h Range):  Temp:  [97.4 °F (36.3 °C)-98.6 °F (37 °C)] 98.3 °F (36.8 °C)  Pulse:  [75-88] 78  Resp:  [16-18] 16  SpO2:  [92 %-96 %] 95 %  BP: (145-167)/(63-72) 152/68     Weight: 102.3 kg (225 lb 8.5 oz)  Body mass index is 32.83 kg/m².    Intake/Output Summary (Last 24 hours) at 12/19/2019 0802  Last data filed at 12/19/2019 0310  Gross per 24 hour   Intake 150 ml   Output 2210 ml   Net -2060 ml      Physical Exam   Constitutional: He is oriented to person, place, and time. He appears well-developed and well-nourished.   HENT:   Head: Normocephalic and atraumatic.   Eyes: EOM are normal.   Neck: Normal range of motion.   Pulmonary/Chest: Effort normal. No respiratory distress.   Neurological: He is alert and oriented to person, place, and time.   Psychiatric: He has a normal mood and affect. His behavior is normal. Judgment and thought content normal.       Significant Labs:   Recent Labs   Lab 12/15/19  0742 12/16/19  0537 12/17/19  0632   WBC 8.49  8.49 7.87 7.46   HGB 8.3*  8.3* 8.4* 8.5*   HCT 25.8*  25.8* 26.1* 27.5*     315 351* 375*     Recent Labs   Lab 12/14/19  0548  12/17/19  0632 12/18/19  0329 12/19/19  0352   *   < > 138 137 139   K 3.0*   < > 3.5 3.8 4.1   CL 97   < > 99 99 100   CO2 29   < > 29 28 30*   BUN 78*   < > 79* 74* 68*    CREATININE 3.5*   < > 2.8* 2.5* 2.4*   *   < > 188* 99 233*   CALCIUM 8.4*   < > 8.5* 8.6* 8.5*   MG 2.2  --   --   --   --    PHOS 5.7*   < > 4.1 3.8 3.4    < > = values in this interval not displayed.     Recent Labs   Lab 12/17/19  0632   ALKPHOS 64   ALT 31   AST 29   ALBUMIN 2.4*   PROT 6.0   BILITOT 0.7      No results for input(s): CPK, CPKMB, MB, TROPONINI in the last 72 hours.  Recent Labs   Lab 12/17/19 2006 12/18/19  0522 12/18/19  1122 12/18/19  1657 12/18/19 2113 12/19/19  0534   POCTGLUCOSE 197* 93 233* 247* 337* 217*     Hemoglobin A1C   Date Value Ref Range Status   12/02/2019 5.6 4.0 - 5.6 % Final     Comment:     ADA Screening Guidelines:  5.7-6.4%  Consistent with prediabetes  >or=6.5%  Consistent with diabetes  High levels of fetal hemoglobin interfere with the HbA1C  assay. Heterozygous hemoglobin variants (HbS, HgC, etc)do  not significantly interfere with this assay.   However, presence of multiple variants may affect accuracy.     05/08/2018 7.7 (H) 4.0 - 5.6 % Final     Comment:     According to ADA guidelines, hemoglobin A1c <7.0% represents  optimal control in non-pregnant diabetic patients. Different  metrics may apply to specific patient populations.   Standards of Medical Care in Diabetes-2016.  For the purpose of screening for the presence of diabetes:  <5.7%     Consistent with the absence of diabetes  5.7-6.4%  Consistent with increasing risk for diabetes   (prediabetes)  >or=6.5%  Consistent with diabetes  Currently, no consensus exists for use of hemoglobin A1c  for diagnosis of diabetes for children.  This Hemoglobin A1c assay has significant interference with fetal   hemoglobin   (HbF). The results are invalid for patients with abnormal amounts of   HbF,   including those with known Hereditary Persistence   of Fetal Hemoglobin. Heterozygous hemoglobin variants (HbAS, HbAC,   HbAD, HbAE, HbA2) do not significantly interfere with this assay;   however, presence of  multiple variants in a sample may impact the %   interference.     06/09/2011 7.9 (H) 4.0 - 6.2 % Final     Scheduled Meds:   amLODIPine  10 mg Oral Daily    doxazosin  8 mg Oral Daily    epoetin gege-ebpx (RETACRIT) injection  20,000 Units Subcutaneous Q7 Days    furosemide  80 mg Oral BID    insulin aspart U-100  15 Units Subcutaneous TIDWM    insulin detemir U-100  30 Units Subcutaneous QHS    iron sucrose (VENOFER) IVPB  100 mg Intravenous Weekly    pantoprazole  40 mg Oral BID AC    polyethylene glycol  17 g Oral Daily    potassium chloride  20 mEq Oral Once    potassium chloride  40 mEq Oral Daily    rosuvastatin  20 mg Oral Daily    sevelamer carbonate  800 mg Oral Daily    simethicone  125 mg Oral Q6H    tamsulosin  0.4 mg Oral Daily    vitamin renal formula (B-complex-vitamin c-folic acid)  1 capsule Oral Daily     Continuous Infusions:  As Needed: acetaminophen, albuterol-ipratropium, alteplase, bisacodyl, Dextrose 10% Bolus, Dextrose 10% Bolus, Dextrose 10% Bolus, Dextrose 10% Bolus, glucagon (human recombinant), glucose, glucose, heparin (porcine), insulin aspart U-100, morphine, ondansetron, ondansetron, sodium chloride 0.9%    Significant Imaging:   No new imaging over last 24 hours

## 2019-12-19 NOTE — PLAN OF CARE
Received patient upon rounds at [facility], patient seen in bed on semi-dangelo's position. Conscious , coherent to time, date, place, person and situation. GCS 15. No subjective complaint of any pain. With saline lock gauge 20 right hand flushed patent,  with clean and dry dressing. Advised patient to call for any assistance. Call bell within reach. Safety fall precaution maintained.No telemetry. Patient complaints of abdominal gas, simethicone pill chewable given.   Will continue to monitor patient.  0610H- Patient stable VS over the night, afebrile. No episodes of nausea and vomiting.Still with abdominal pain gas, no complaints of abdominal pain. Two small loose bowel movement last night.  Telemetry no ectopy noted over the night. Free from fall. IV line patent.Voiding well.Will endorse patient to day shift Nurse.

## 2019-12-19 NOTE — PLAN OF CARE
Changes to insulin updated in orders, discussed with Prachi at [facility].  Pt has been approved for admission by insurance, son is signing paperwork at this time.      Prachi will contact  with time for transport and report information.  Will notify nursing once known.    Grace Rodriguez RN    [phone]

## 2019-12-19 NOTE — PT/OT/SLP PROGRESS
"Physical Therapy Treatment    Patient Name:  Cy Rutherford   MRN:  [id number]    Recommendations:     Discharge Recommendations:  rehabilitation facility   Discharge Equipment Recommendations: bedside commode, shower chair   Barriers to discharge: impaired functional mobility    Assessment:     Cy Rutherford is a 74 y.o. male admitted with a medical diagnosis of Acute on chronic diastolic heart failure.  He presents with the following impairments/functional limitations:  weakness, gait instability, impaired balance, impaired endurance, decreased lower extremity function, decreased safety awareness, impaired self care skills, impaired cognition, impaired functional mobilty, decreased coordination. Pt instructed in gait 120ft c/ RW at CGA <> Min A.     Rehab Prognosis: Good; patient would benefit from acute skilled PT services to address these deficits and reach maximum level of function.    Recent Surgery: Procedure(s) (LRB):  ESOPHAGOGASTRODUODENOSCOPY (EGD) (N/A) 6 Days Post-Op    Plan:     During this hospitalization, patient to be seen 6 x/week to address the identified rehab impairments via gait training, therapeutic activities, therapeutic exercises, neuromuscular re-education and progress toward the following goals:    · Plan of Care Expires:  12/19/19    Subjective     Chief Complaint: none  Patient/Family Comments/goals: "okay" to PT POC  Pain/Comfort:  · Pain Rating 1: 0/10      Objective:     Communicated with RNLotus prior to session.  Patient found HOB elevated with bed alarm, telemetry upon PT entry to room.     General Precautions: Standard, fall   Orthopedic Precautions:N/A   Braces: N/A     Functional Mobility:  · Bed Mobility:     · Rolling Right: stand by assistance  · Scooting: contact guard assistance  · Supine to Sit: minimum assistance  · Transfers:     · Sit to Stand:  contact guard assistance with rolling walker  · Bed to Chair: contact guard assistance and minimum assistance with  " rolling walker  using  Step Transfer  · Gait: 120ft c/ RW at CGA <> Min A; pt demo wide JORDAN; flat foot landimg; VC for safe approximation of RW c/ Turns; Vc for slow josh.   · Balance: dynamic gait- fair      AM-PAC 6 CLICK MOBILITY  Turning over in bed (including adjusting bedclothes, sheets and blankets)?: 3  Sitting down on and standing up from a chair with arms (e.g., wheelchair, bedside commode, etc.): 3  Moving from lying on back to sitting on the side of the bed?: 3  Moving to and from a bed to a chair (including a wheelchair)?: 3  Need to walk in hospital room?: 3  Climbing 3-5 steps with a railing?: 2  Basic Mobility Total Score: 17       Therapeutic Activities and Exercises:  PT instructed in gait training as detailed above.     Patient left up in chair with all lines intact, call button in reach, chair alarm on and RN notified..    GOALS:   Multidisciplinary Problems     Physical Therapy Goals        Problem: Physical Therapy Goal    Goal Priority Disciplines Outcome Goal Variances Interventions   Physical Therapy Goal     PT, PT/OT Ongoing, Progressing     Description:  Goals to be met by: 2020     Patient will increase functional independence with mobility by performin. Supine to sit with Set-up [place]  2. Sit to stand transfer with Supervision  3. Bed to chair transfer with Supervision using Rolling Walker  4. Gait  x 120 feet with Supervision using Rolling Walker.   5. Lower extremity exercise program x15 reps per handout, with supervision                      Time Tracking:     PT Received On: 19  PT Start Time: 54     PT Stop Time: 1014  PT Total Time (min): 20 min     Billable Minutes: Gait Training 20    Treatment Type: 6th Visit  PT/PTA: PT     PTA Visit Number: 5     Portia Flores PT, DPT  2019

## 2019-12-19 NOTE — DISCHARGE INSTRUCTIONS
Heart Failure, Discharge Instructions for (English) View Edit Remove   Heart Failure: Making Changes to Your Diet (English) View Edit Remove   Heart Failure: Tracking Your Weight (English) View Edit Remove   Heart Failure: Warning Signs of a Flare-Up (English) View Edit Remove   Diabetes, Diet (English) View Edit Remove   Cardiac Catheterization, Discharge Instructions for (English) View Edit Remove   Epoetin [place] injection (English) View Edit Remove   Insulin Solution for injection (English) View Edit Remove

## 2019-12-19 NOTE — PLAN OF CARE
Problem: Physical Therapy Goal  Goal: Physical Therapy Goal  Description  Goals to be met by: 2020     Patient will increase functional independence with mobility by performin. Supine to sit with Set-up [place]  2. Sit to stand transfer with Supervision  3. Bed to chair transfer with Supervision using Rolling Walker  4. Gait  x 120 feet with Supervision using Rolling Walker.   5. Lower extremity exercise program x15 reps per handout, with supervision     Outcome: Ongoing, Progressing   Patient is making progress towards goals; all goals addressed as appropriate. Continue with PT POC.

## 2019-12-19 NOTE — PLAN OF CARE
Patient awake, alert and oriented.VVS.No complaints of pain. DARRION'S for VTE prevention.Blood glucose monitored AC/HS and covered per sliding scale. IV antibiotics for infection. IV dressing CDI and flushes . Bed in low position and locked. Call light and personal items with in reach. Bed alarm remains on. Instructed to call for assistance, verbalized understanding. Educated on new medications.

## 2019-12-19 NOTE — PROGRESS NOTES
[facility] Hospital Medicine  Progress Note    Patient Name: Cy Rutherford  MRN: [id number]  Patient Class: IP- Inpatient   Admission Date: 12/2/2019  Length of Stay: 16 days  Attending Physician: James Sanchez MD  Primary Care Provider: Heide Porter MD        Subjective:     Principal Problem:Acute on chronic diastolic heart failure        HPI:  Admitted for elective LE angiogram for evaluation of RLE claudication. Taken to the cath lab for the procedure via LCFA access with following results:     successful atherectomy (Hawk LX) with distal filter protection, followed by PTA with scoring balloon (5.0 x 150 dSFA, 6.0 x 150 pSFA/CFA) followed by PTA with DCB to dSFA (5.0 x 150), pSFA (5.0 x 100) and CFA (7 x 40) with good results. 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 He has elevated BG, no anion gap.  We will place on insulin sq and monito closely , if dka develop, we will place him on insulin drip    Overview/Hospital Course:  Had hypotension after the procedure and then had JOSE and had to be started on H.D(last H.D on 12/11). Nephrology following. Patient is also on lasix and diuresing well for past 2 days. Currently on I.V lasix pushes. Creatinine stable for past 2 days also. Blood sugar levels are controlled with Levemir, prandial insulin and ISS. Also found to having large left renal subcapsular hematoma on CT abd done on 12/4. ASA, Plavix and pletal are therefore held. On 12/11/19, patient had an episode of  hematemesis. Initially had drop in H/H, but stable since then. RADHAMES consulted and had EGD on 12/13. This showed Non-bleeding esophageal ulcer, which was biopsied and erythematous mucosa in the stomach. Advised to start on Protonix 40 mg BID for 12 weeks. Repeat UGD in 8 weeks to establish healing.                 PT/OT eval done and Rehab placement advised. Social work consult placed.   12/15 pt seen doing ok, had not have HD since wed per him.  BG atable around 135, 136, K is 3.2 will replace [id number] adjust insulin regimen      Interval History: awake and alert, no new complaint.   Discussed insulin regimen with patient. Medically stable for discharge from our service.       Review of Systems   Constitutional: Positive for activity change. Negative for chills and fatigue.   Respiratory: Negative for shortness of breath.    Cardiovascular: Negative for chest pain.   Genitourinary: Negative for difficulty urinating.   Neurological: Negative for dizziness and numbness.     Objective:     Vital Signs (Most Recent):  Temp: 98.3 °F (36.8 °C) (12/19/19 0538)  Pulse: 78 (12/19/19 0538)  Resp: 16 (12/19/19 0538)  BP: (!) 152/68 (12/19/19 0538)  SpO2: 95 % (12/19/19 0538) Vital Signs (24h Range):  Temp:  [97.4 °F (36.3 °C)-98.6 °F (37 °C)] 98.3 °F (36.8 °C)  Pulse:  [75-88] 78  Resp:  [16-18] 16  SpO2:  [92 %-96 %] 95 %  BP: (145-167)/(63-72) 152/68     Weight: 102.3 kg (225 lb 8.5 oz)  Body mass index is 32.83 kg/m².    Intake/Output Summary (Last 24 hours) at 12/19/2019 0802  Last data filed at 12/19/2019 0310  Gross per 24 hour   Intake 150 ml   Output 2210 ml   Net -2060 ml      Physical Exam   Constitutional: He is oriented to person, place, and time. He appears well-developed and well-nourished.   HENT:   Head: Normocephalic and atraumatic.   Eyes: EOM are normal.   Neck: Normal range of motion.   Pulmonary/Chest: Effort normal. No respiratory distress.   Neurological: He is alert and oriented to person, place, and  time.   Psychiatric: He has a normal mood and affect. His behavior is normal. Judgment and thought content normal.       Significant Labs:   Recent Labs   Lab 12/15/19  0742 12/16/19  0537 12/17/19  0632   WBC 8.49  8.49 7.87 7.46   HGB 8.3*  8.3* 8.4* 8.5*   HCT 25.8*  25.8* 26.1* 27.5*     315 351* 375*     Recent Labs   Lab 12/14/19  0548  12/17/19  0632 12/18/19  0329 12/19/19  0352   *   < > 138 137 139   K 3.0*   < > 3.5 3.8 4.1   CL 97   < > 99 99 100   CO2 29   < > 29 28 30*   BUN 78*   < > 79* 74* 68*   CREATININE 3.5*   < > 2.8* 2.5* 2.4*   *   < > 188* 99 233*   CALCIUM 8.4*   < > 8.5* 8.6* 8.5*   MG 2.2  --   --   --   --    PHOS 5.7*   < > 4.1 3.8 3.4    < > = values in this interval not displayed.     Recent Labs   Lab 12/17/19  0632   ALKPHOS 64   ALT 31   AST 29   ALBUMIN 2.4*   PROT 6.0   BILITOT 0.7      No results for input(s): CPK, CPKMB, MB, TROPONINI in the last 72 hours.  Recent Labs   Lab 12/17/19 2006 12/18/19  0522 12/18/19  1122 12/18/19  1657 12/18/19  2113 12/19/19  0534   POCTGLUCOSE 197* 93 233* 247* 337* 217*     Hemoglobin A1C   Date Value Ref Range Status   12/02/2019 5.6 4.0 - 5.6 % Final     Comment:     ADA Screening Guidelines:  5.7-6.4%  Consistent with prediabetes  >or=6.5%  Consistent with diabetes  High levels of fetal hemoglobin interfere with the HbA1C  assay. Heterozygous hemoglobin variants (HbS, HgC, etc)do  not significantly interfere with this assay.   However, presence of multiple variants may affect accuracy.     05/08/2018 7.7 (H) 4.0 - 5.6 % Final     Comment:     According to ADA guidelines, hemoglobin A1c <7.0% represents  optimal control in non-pregnant diabetic patients. Different  metrics may apply to specific patient populations.   Standards of Medical Care in Diabetes-2016.  For the purpose of screening for the presence of diabetes:  <5.7%     Consistent with the absence of diabetes  5.7-6.4%  Consistent with increasing risk for  diabetes   (prediabetes)  >or=6.5%  Consistent with diabetes  Currently, no consensus exists for use of hemoglobin A1c  for diagnosis of diabetes for children.  This Hemoglobin A1c assay has significant interference with fetal   hemoglobin   (HbF). The results are invalid for patients with abnormal amounts of   HbF,   including those with known Hereditary Persistence   of Fetal Hemoglobin. Heterozygous hemoglobin variants (HbAS, HbAC,   HbAD, HbAE, HbA2) do not significantly interfere with this assay;   however, presence of multiple variants in a sample may impact the %   interference.     06/09/2011 7.9 (H) 4.0 - 6.2 % Final     Scheduled Meds:   amLODIPine  10 mg Oral Daily    doxazosin  8 mg Oral Daily    epoetin gege-ebpx (RETACRIT) injection  20,000 Units Subcutaneous Q7 Days    furosemide  80 mg Oral BID    insulin aspart U-100  15 Units Subcutaneous TIDWM    insulin detemir U-100  30 Units Subcutaneous QHS    iron sucrose (VENOFER) IVPB  100 mg Intravenous Weekly    pantoprazole  40 mg Oral BID AC    polyethylene glycol  17 g Oral Daily    potassium chloride  20 mEq Oral Once    potassium chloride  40 mEq Oral Daily    rosuvastatin  20 mg Oral Daily    sevelamer carbonate  800 mg Oral Daily    simethicone  125 mg Oral Q6H    tamsulosin  0.4 mg Oral Daily    vitamin renal formula (B-complex-vitamin c-folic acid)  1 capsule Oral Daily     Continuous Infusions:  As Needed: acetaminophen, albuterol-ipratropium, alteplase, bisacodyl, Dextrose 10% Bolus, Dextrose 10% Bolus, Dextrose 10% Bolus, Dextrose 10% Bolus, glucagon (human recombinant), glucose, glucose, heparin (porcine), insulin aspart U-100, morphine, ondansetron, ondansetron, sodium chloride 0.9%    Significant Imaging:   No new imaging over last 24 hours      Assessment/Plan:      * Acute on chronic diastolic heart failure  On Lasix BID per Primary and Nephrology with good urine output  Echo showed normal LVEF  Lopressor being held for  acute on chronic diastolic HF and ARB for JOSE  Diuresing well and improving.      Hypokalemia  Will replace as needed orally      Constipation  Glycoloax daily and PRN suppository      Ulcer of esophagus without bleeding  Hematemesis recently sec to same  EGD on 12/13 showed esophageal ulcer with no bleeding now  G.I input appreciated  PPI drip d/jose ramon and on Protonix 40 BID.  Will need this for 12 weeks as per G.I  Will need repeat UGD in 8 weeks to establish healing.       JOSE (acute kidney injury)  Sec to ATN sec to hypotension and bleeding mostly  Nephrology on board and patient is on H.D  Cont management as per them.  Last H.D on 12/11 and creatinine stable since then inspite of receiving no H.D and diuresing well too.    Anemia  Sec to acute blood loss sec to hematemesis mostly  Will cont to f/u and transfuse prn  H/H stable for past 24 hr.       PAD (peripheral artery disease)  As per primary      CAD (coronary artery disease)  As per primary      Hyperlipidemia  Continue Home dose statin      Essential hypertension  Cont home dose Norvasc and Doxazosin.        Type 2 diabetes mellitus with kidney complication, with long-term current use of insulin  Lab Results   Component Value Date    HGBA1C 5.6 12/02/2019     - Blood sugar levels better controlled now.  - Continue levemir 62 units nightly   - Continue aspart 17 units TID with meals  - Accuchecks with prn low dose SSI   12/15 BG accu check around 136. Continue current plan of care      VTE Risk Mitigation (From admission, onward)         Ordered     Place DARRION hose  Until discontinued      12/16/19 1609     heparin (porcine) injection 2,300 Units  As needed (PRN)      12/04/19 1137                      Sheryl Guerra MD  Department of Hospital Medicine   [facility]

## 2019-12-30 LAB
FINAL PATHOLOGIC DIAGNOSIS: NORMAL
GROSS: NORMAL
MICROSCOPIC EXAM: NORMAL

## 2020-01-02 ENCOUNTER — TELEPHONE (OUTPATIENT)
Dept: GASTROENTEROLOGY | Facility: CLINIC | Age: 75
End: 2020-01-02

## 2020-01-02 NOTE — DISCHARGE SUMMARY
[facility]  Cardiology  Discharge Summary      Patient Name: Cy Rutherford  MRN: [id number]  Admission Date: 12/2/2019  Hospital Length of Stay: 16 days  Discharge Date and Time: 12/19/2019  5:50 PM  Attending Physician: No att. providers found    Discharging Provider: Shant Ramires NP  Primary Care Physician: Heide Porter MD    HPI:   No notes on file    Procedure(s) (LRB):  ESOPHAGOGASTRODUODENOSCOPY (EGD) (N/A)     Indwelling Lines/Drains at time of discharge:  Lines/Drains/Airways     None                 Hospital Course:  12/2/2019 Admitted for elective LE angiogram for evaluation of RLE claudication. Taken to the cath lab for the procedure via LCFA access with following results:    successful atherectomy (Hawk LX) with distal filter protection, followed by PTA with scoring balloon (5.0 x 150 dSFA, 6.0 x 150 pSFA/CFA) followed by PTA with DCB to dSFA (5.0 x 150), pSFA (5.0 x 100) and CFA (7 x 40) with good results. Successful PTA to TPT with 3.0 x 40 balloon- see cath report for full report     Tolerated procedure well and recovered in pre post cath area. Hypotension noted along with back pain and diaphoresis. No hematoma noted and manual pressure applied out of concern for bleeding. STAT H&H down to 7.4&24.1 from 11.6&36.1. Given profound symptoms, major concern for bleeding prompting re evaluation with recurrent angiogram. Placed on IVFs along with IV Levophed. Repeat procedure via right radial access with images  in multiple views with no active bleed noted. Cuff pressure with significant difference in comparison to  aortic pressure which was significantly higher.  Admitted to ICU for close monitoring with kanchan placed. T&S with PRBC transfusion initiated with IV Lasix given in between   12/3/2019 Remain on IV Levophed drip at .46mcg/kg/min with BP 110s-130s/40s-50s HR 60s. Serial H&Hs Q4hrs overnight with  H&H this AM 10.0&30.2 with slight trend down to 9.1&27.5 on repeat  check. Complains of SOB with increased RR. Only 500cc out overnight. Will repeat IV Lasix 40mg this AM. Echocardiogram and CXR as well. Will attempt to wean IV Levophed drip as BP tolerates   12/4/2019 Creatinine trended up to 3.7 overnight with K+ 6.1. Continued with no urine output. Nephrology consulted yesterday with trialysis catheter placed yesterday with initiation of CRRT overnight. CRRT x 2-3 hours prior to clotting off. H&H trended down further to 7.3&21.8 with repeat PRBC transfusion. H&H trended up to 8.4&24.9 with continued serial monitoring with recurrent drop to 7.0&21.2. Concerned about recurrent bleeding with plans for repeat angiogram for re evaluation of acute bleed. Remains on IV Levophed with stable BP and wean in process. Complains of mild SOB with stable sats on Venti mask.   12/5/2019 Repeat angiogram yesterday using CO2 with no active bleed noted after extensive angiogram. H&H down 6.6&18.9 yesterday evening with repeat PRBC. CT abdomen/pelvis with evidence of large left renal subcapsular hematoma with surrounding retroperitoneal hemorrhage and no definite contrast extravasation seen to suggest active bleeding on delayed images. H&H up to 7.4&22.0-8.5&25.3. Weaned off IV Levophed with stable BP. Resumed CRRT yesterday evening with 695cc removed and 30cc urine output noted +2.7 liters. Continued mild SOB with stable sats on venti mask. BMP with K+ 5.0 BUN 62 creatinine 5.0. Will continue with serial H&Hs for now. Will place TEDs and consult PT   12/6/2019 Attempted CRRT and HD yesterday with clotted line and approximately 400cc blood loss due to inability to rinse back. Placed on IV Lasix drip at 20mg/hr by Nephrology with 675cc urine output overnight positive 3 liters since admission. K+ 5.0 with BUN 69 creatinine 5.4 this AM. H&H 74&22.4 this AM unchanged from overnight-will continue with serial H&Hs for now. Off IV pressors for over 48 hours with BP 130s-170s/70s overnight. Mild SOB remains  per patient with slight improvement noted on PE. Updated son at bedside this morning   12/7/2019 HR and BP stable. H&H 7.7&23.2 unchanged from yesterday. Remain on IV Lasix with adequate urine output. BUN 89 creatinine 6.0. Hopeful to transfer to floor tomorrow.   12/8/2019 H&H down to 6.7 & 20.5 this AM with repeat PRBC transfusion. Plan for HD today for clearance. Remains on IV Lasix drip with adequate urine output. Resumed antihypertensives with stabel BP. PT and OT on board  12/9/2019 H&H 8.8&26.6 this AM after PRBC transfusion yesterday. Approximately one hour of HD yesterday with cessation due to line malfunction. HR and BP remains stable. 2.7 liters out overnight negative 4.4 liters since admission.  creatinine 5.3. Will reconsult General Surgery today for new trialysis line placement for ongoing intermittent HD/CRRT.   12/10/2019 H&H 9.1&27.5 this AM. BUN 82 creatinine 4.0 after HD run yesterday afternoon. IV Lasix drip at 5mg/hr with 3.0 liters out overnight negative 5.6 liters since admission. HR and BP stable. Plan to transfer out of ICU today   12/11/2019 Transferred out of ICU yesterday. BMP with K+ 3.7 BUN 66 creatinine 3.3. Remains on IV Lasix drip with 1.8 liters urine output with 1.4 liters removed with HD negative 7.4 liters since admission. HR and BP stable. Working with PT currently. Mild abdominal distension with Simethicone and Miralax ordered yesterday with plans for Lactulose today   12/12/2019 Slight increase in abdominal pain with nausea followed by vomiting yesterday. Appearance of blood in emesis yesterday. Serial H&Hs overnight with no sharp drop-H&H this AM 8.4&27.2. Remains on IV Protonix drip with GI consulted with plans for EGD tomorrow. On IV Lasix drip as well with total of 2.6 liters out overnight (1.5 urine output 1.1 HD) negative 9.1 liters since admission. BUN 58 creatinine 3.1. KUB reviewed with improvement in air and successful BM-abdominal distension improved.  Nephrology on board as well as PT  12/13/2019 H&H stable with no significant drop-will change CBCs to daily. BMP with BUN 68 creatinine 3.4. Transitioned from IV Lasix drip to IV Lasix BID with 3.1 liters out overnight negative 11.9 liters since admission. EGD today with esophageal ulcer with recs for Protonix BID. HR and BP stable. PT on board.  12/14/2019 Stable overnight. No acute issues overnight. Sitting in chair   12/15/2019 Doing well. No acute issues. Sitting in chair  12/16/2019 HR and BP stable overnight. BUN 86 creatinine 3.2 this AM. Remains on IV Lasix BID with 2.1 liters out overnight negative 14.6 liters since admission. CBC with Hgb 8.4 Hct 26.1. PT on board and OOB to chair over the weekend. Awaiting further recs in regards to IV diuresis from Nephrology   12/17/2019 H&H 8.5&27.5 this AM. BUN 79 creatinine 2.8 and remains on IV Lasix BID with 2.2 liters out overnight negative 18.3 liters since admission. No HD since 12/11/2019 with plans for removal of Trialysis today per Nephrology via case management-will confirm. Will discuss timing of transition to oral Lasix with Nephrology as well. HR and BP stable. PT and OT on board. Case management consulted with placement with SNF vs rehab in process  12/19/2019 Patient discharged to SNF in satisfactory condition. Hemodynamically stable. Follow up in clinic in 2-3 weeks     Consults:   Consults (From admission, onward)        Status Ordering Provider     [facility]  [facility]     Provider:  (Not yet assigned)    Completed AKIKO MONTENEGRO     Inpatient consult to General Surgery  Once     Provider:  Eric Sol Jr., MD    Completed SMOOTH SHARMA     Inpatient consult to Hospital Medicine-[facility]  Once     Provider:  Nehemias Gu DO    Completed SMOOTH SHARMA     Inpatient consult to Nephrology-[facility] (Stefanie Diego, Carol)  Once     Provider:  (Not yet assigned)    SMOOTH Valodvinos           Significant Diagnostic Studies: Cardiac Graphics: Echocardiogram:   2D echo with color flow doppler:   Results for orders placed or performed during the hospital encounter of 05/08/18   2D echo with color flow doppler   Result Value Ref Range    QEF 65 55 - 65    Diastolic Dysfunction No     Est. PA Systolic Pressure 18.63     Narrative    Date of Procedure: 05/09/2018        TEST DESCRIPTION   Technical Quality: This is a portable study performed at the patient's bedside. This is a technically challenging study. There is poor endocardial definition. This study was performed in conjunction with a 3ml intravenous injection of Optison contrast   agent.     Aorta: The aortic root is normal in size, measuring 2.9 cm at sinotubular junction and 3.4 cm at Sinuses of Valsalva. The proximal ascending aorta is normal in size, measuring 3.3 cm across.     Left Atrium: The left atrial volume index is normal, measuring 19.39 cc/m2.     Left Ventricle: The left ventricle is normal in size, with an end-diastolic diameter of 4.4 cm, and an end-systolic diameter of 2.4 cm. LV wall thickness is normal, with the septum and the posterior wall each measuring 1.0 cm across. Relative wall   thickness was increased at 0.45, and the LV mass index was 84.4 g/m2 consistent with concentric remodeling. There are no regional wall motion abnormalities. Left ventricular systolic function appears normal. Visually estimated ejection fraction is   60-65%. The LV Doppler derived stroke volume equals 101.0 ccs.     Diastolic indices: E wave velocity 0.8 m/s, E/A ratio 0.8,  msec., E/e' ratio(avg) 9. Diastolic function is normal.     Right Atrium: The right atrium is normal in size, measuring 4.5 cm in length and 3.3 cm in width in the apical view.     Right Ventricle: The right ventricle is normal in size measuring 4.0 cm at the base in the apical right ventricle-focused view. Global right ventricular systolic function appears normal.  Tricuspid annular plane systolic excursion (TAPSE) is 2.3 cm.   Tissue Doppler-derived tricuspid annular peak systolic velocity (S prime) is 14.0 cm/s. The estimated PA systolic pressure is 19 mmHg.     Aortic Valve:  Aortic valve is normal in structure with normal leaflet mobility.     Mitral Valve:  Mitral valve is normal in structure with normal leaflet mobility.     Tricuspid Valve:  Tricuspid valve is normal in structure with normal leaflet mobility.     Pulmonary Valve:  The pulmonic valve is not well seen.     IVC: IVC is enlarged but collapses > 50% with a sniff, suggesting intermediate right atrial pressure of 8 mmHg.     Intracavitary: There is no evidence of pericardial effusion, intracavity mass, thrombi, or vegetation.         CONCLUSIONS     1 - Normal left ventricular systolic function (EF 60-65%).     2 - Normal right ventricular systolic function .     3 - Normal left ventricular diastolic function.     4 - The estimated PA systolic pressure is 19 mmHg.             This document has been electronically    SIGNED BY: Dionicio Garces MD On: 05/09/2018 15:21    This document was originally electronically signed on: 05/09/2018 15:21    and Transthoracic echo (TTE) complete (Cupid Only):   Results for orders placed or performed during the hospital encounter of 12/02/19   Echo Color Flow Doppler? Yes   Result Value Ref Range    BSA 2.3 m2    TDI SEPTAL 0.06 m/s    LV LATERAL E/E' RATIO 9.00 m/s    LV SEPTAL E/E' RATIO 10.50 m/s    TDI LATERAL 0.07 m/s    PV PEAK VELOCITY 1.81 cm/s    LVIDD 4.80 3.5 - 6.0 cm    IVS 1.10 (A) 0.6 - 1.1 cm    PW 1.10 (A) 0.6 - 1.1 cm    Ao root annulus 3.40 cm    LVIDS 2.56 2.1 - 4.0 cm    FS 47 28 - 44 %    LV mass 193.96 g    LA size 3.68 cm    TAPSE 1.44 cm    Left Ventricle Relative Wall Thickness 0.46 cm    AV mean gradient 6 mmHg    AV valve area 4.31 cm2    AV Velocity Ratio 0.78     AV index (prosthetic) 1.06     E/A ratio 0.62     Mean e' 0.07 m/s    E wave decelartion  time 338.80 msec    LVOT diameter 2.27 cm    LVOT area 4.0 cm2    LVOT peak simone 1.16 m/s    LVOT peak VTI 26.25 cm    Ao peak simone 1.48 m/s    Ao VTI 24.66 cm    LVOT stroke volume 106.18 cm3    AV peak gradient 9 mmHg    E/E' ratio 9.69 m/s    MV Peak E Simone 0.63 m/s    MV Peak A Simone 1.02 m/s    LV Systolic Volume 23.57 mL    LV Systolic Volume Index 10.6 mL/m2    LV Diastolic Volume 68.84 mL    LV Diastolic Volume Index 30.84 mL/m2    LV Mass Index 87 g/m2    Right Atrial Pressure (from IVC) 3 mmHg    Narrative    · Normal left ventricular systolic function. The estimated ejection   fraction is 55%  · Concentric left ventricular remodeling.  · No wall motion abnormalities.  · Normal right ventricular systolic function.  · Normal central venous pressure (3 mm Hg).          Pending Diagnostic Studies:     None          Final Active Diagnoses:    Diagnosis Date Noted POA    PRINCIPAL PROBLEM:  Acute on chronic diastolic heart failure [I50.33] 12/08/2019 Yes    Hypokalemia [E87.6] 12/13/2019 No    Constipation [K59.00] 12/12/2019 Yes    Ulcer of esophagus without bleeding [K22.10] 12/12/2019 Yes    JOSE (acute kidney injury) [N17.9] 12/03/2019 Yes    Anemia [D64.9] 12/03/2019 Yes    PAD (peripheral artery disease) [I73.9] 12/29/2014 Yes    Type 2 diabetes mellitus with kidney complication, with long-term current use of insulin [E11.29, Z79.4] 11/15/2012 Not Applicable    CAD (coronary artery disease) [I25.10] 11/15/2012 Yes    Essential hypertension [I10] 11/15/2012 Yes    Hyperlipidemia [E78.5] 11/15/2012 Yes      Problems Resolved During this Admission:    Diagnosis Date Noted Date Resolved POA    Hypotension [I95.9] 12/13/2019 12/14/2019 Yes    Abdominal distension [R14.0] 12/10/2019 12/13/2019 No    Hypotension [I95.9] 12/03/2019 12/12/2019 Yes    Leukocytosis [D72.829] 12/03/2019 12/13/2019 Yes    Hyperkalemia [E87.5] 12/03/2019 12/12/2019 Yes     No new Assessment & Plan notes have been filed under  this hospital service since the last note was generated.  Service: Cardiology      Discharged Condition: stable    Disposition: Skilled Nursing Facility    Follow Up:  Follow-up Information     Katerin Watts MD On 1/13/2020.    Specialty:  Nephrology  Why:  11:45 AM   Contact information:  [address]  [address]  [facility]  [place] [address]  [phone]             James Sanchez MD On 1/23/2020.    Specialties:  INTERVENTIONAL CARDIOLOGY, Cardiology  Why:  3:40PM  Contact information:  [address]  [address]  [place] [address]  [phone]                 Patient Instructions:      Diet diabetic     Notify your health care provider if you experience any of the following:  persistent nausea and vomiting or diarrhea     Notify your health care provider if you experience any of the following:  severe uncontrolled pain     Notify your health care provider if you experience any of the following:  redness, tenderness, or signs of infection (pain, swelling, redness, odor or green/yellow discharge around incision site)     Notify your health care provider if you experience any of the following:  difficulty breathing or increased cough     Notify your health care provider if you experience any of the following:  persistent dizziness, light-headedness, or visual disturbances     Notify your health care provider if you experience any of the following:  increased confusion or weakness     Activity as tolerated     Medications:  Reconciled Home Medications:      Medication List      START taking these medications    epoetin gege-epbx 10,000 unit/mL imjection  Commonly known as:  RETACRIT  Inject 2 mLs (20,000 Units total) into the skin every 7 days. Until Hgb reaches 10     ferrous sulfate 325 mg (65 mg iron) Tab tablet  Commonly known as:  FEOSOL  Take 1 tablet (325 mg total) by mouth daily with breakfast.     insulin detemir U-100 100 unit/mL (3 mL) Inpn pen  Commonly known as:  LEVEMIR  FLEXTOUCH  Inject 30 Units into the skin every evening.     pantoprazole 40 MG tablet  Commonly known as:  PROTONIX  Take 1 tablet (40 mg total) by mouth 2 (two) times daily before meals.     potassium chloride SA 20 MEQ tablet  Commonly known as:  K-DUR,KLOR-CON  Take 2 tablets (40 mEq total) by mouth once daily.     sevelamer carbonate 800 mg Tab  Commonly known as:  RENVELA  Take 1 tablet (800 mg total) by mouth once daily.     vitamin renal formula (B-complex-vitamin c-folic acid) 1 mg Cap  Commonly known as:  NEPHROCAP  Take 1 capsule by mouth once daily.        CHANGE how you take these medications    furosemide 80 MG tablet  Commonly known as:  LASIX  Take 1 tablet (80 mg total) by mouth 2 (two) times daily.  What changed:    · medication strength  · how much to take  · when to take this     * insulin aspart U-100 100 unit/mL (3 mL) Inpn pen  Commonly known as:  NovoLOG  Inject 1-10 Units into the skin before meals and at bedtime as needed (Hyperglycemia).  What changed:  See the new instructions.     * insulin aspart U-100 100 unit/mL (3 mL) Inpn pen  Commonly known as:  NovoLOG  Inject 15 Units into the skin 3 (three) times daily.  What changed:  You were already taking a medication with the same name, and this prescription was added. Make sure you understand how and when to take each.         * This list has 2 medication(s) that are the same as other medications prescribed for you. Read the directions carefully, and ask your doctor or other care provider to review them with you.            CONTINUE taking these medications    clopidogrel 75 mg tablet  Commonly known as:  PLAVIX  Take 1 tablet (75 mg total) by mouth once daily.     doxazosin 8 MG Tab  Commonly known as:  CARDURA  Take 1 tablet (8 mg total) by mouth every evening.     fenofibrate micronized 134 MG Cap  Commonly known as:  LOFIBRA  Take 1 capsule (134 mg total) by mouth nightly.     fish oil-omega-3 fatty acids 300-1,000 mg capsule  Take by  mouth once daily.     metoprolol succinate 100 MG 24 hr tablet  Commonly known as:  TOPROL-XL  Take 1 tablet (100 mg total) by mouth 2 (two) times daily.     ONE-A-DAY MAXIMUM FORMULA ORAL  Take 1 tablet by mouth once daily.     rosuvastatin 20 MG tablet  Commonly known as:  CRESTOR  Take 1 tablet (20 mg total) by mouth once daily.        STOP taking these medications    amLODIPine 5 MG tablet  Commonly known as:  NORVASC     aspirin 81 MG EC tablet  Commonly known as:  ECOTRIN     chlorthalidone 25 MG Tab  Commonly known as:  HYGROTEN     cilostazol 50 MG Tab  Commonly known as:  PLETAL     insulin glargine 100 unit/mL injection  Commonly known as:  LANTUS     insulin lispro 100 unit/mL injection     losartan 50 MG tablet  Commonly known as:  COZAAR     metFORMIN 500 MG tablet  Commonly known as:  GLUCOPHAGE     omeprazole 20 MG capsule  Commonly known as:  PRILOSEC        ASK your doctor about these medications    sodium chloride 0.9% SolP 100 mL with iron sucrose 100 mg iron/5 mL Soln 100 mg  Inject 100 mg into the vein once a week. for 5 days  Ask about: Should I take this medication?            Time spent on the discharge of patient: 45 minutes    Shant Ramires NP  Cardiology  [facility]

## 2020-01-02 NOTE — TELEPHONE ENCOUNTER
Path results discussed with patient.  Unable to schedule repeat EGD at this time due to being inpatient at Rehab hospital is [facility]. Requested the office call back in a few weeks to see if he is able to have procedure done.

## 2020-01-03 ENCOUNTER — TELEPHONE (OUTPATIENT)
Dept: ENDOSCOPY | Facility: HOSPITAL | Age: 75
End: 2020-01-03

## 2020-01-03 NOTE — TELEPHONE ENCOUNTER
----- Message from Harish Rodrigues MD sent at 1/2/2020 11:29 AM CST -----  Please let the patient know the biopsies of the esophageal ulcer was negative for Rinaldi's. EGD in 8 weeks to check healing.

## 2020-01-07 ENCOUNTER — PATIENT OUTREACH (OUTPATIENT)
Dept: OTHER | Facility: OTHER | Age: 75
End: 2020-01-07

## 2020-01-08 NOTE — PHYSICIAN QUERY
PT Name: Cy Rutherford  MR #: [id number]    Physician Query Form - Cause and Effect Relationship Clarification      CDS/: Lesvia Mcconnell  RN, CCDS             Contact information: [email or web address]    This form is a permanent document in the medical record.     Query Date: January 8, 2020    By submitting this query, we are merely seeking further clarification of documentation. Please utilize your independent clinical judgment when addressing the question(s) below.    The Medical record contains the following:  Supporting Clinical Findings   Location in record                                                                       Patient seen following HD with an episode of hematemesis                              Placed on Protonix gtt  GI consulted                                                     Anemia  Sec to acute blood loss sec to hematemesis mostly           Ulcer of esophagus without bleeding  Hematemesis recently sec to same                   EGD on 12/13 showed esophageal ulcer with no bleeding now               12/11 care update          12/14 prog note      12/13--12/19  prog notes    -continue ASA, statin, Plavix       -will hold DAPT given concern for acute bleeding          ASA, Plavix and pletal held for recent bleeding episodes                                                       Alteplase 1.2 mg intracath x1  Alteplase  2 mg intracath x1  Heparin 2,300 units IV x1                                                plts-   309-->183--> 75--> 89--> 143-->151-->  162-->175-->203-->184-->191-->240-->267-->315-->351-->375    PT-11.4  INR-1.1  APTT-24.2 12/3 cards note    12/5 cards note      12/12- 12/14 prog notes      12/5 mar  12/8 mar  12/9 mar    12/3-- 12/17 lab        12/3 lab         Provider, please clarify if there is any correlation between __Hematemesis___ and __anticoagulation.           Are the conditions:        Upon further review, hematemesis was felt to be directly associated  with use of DAPT in setting of esophageal ulcer.            [ x ] Due to or associated with each other   [  ] Unrelated to each other   [  ] Other (Please Specify): _________________________   [  ] Clinically Undetermined

## 2020-01-29 NOTE — PROGRESS NOTES
"Digital Medicine: Health  Follow-Up    Patient reports he is well, no complaints. Reports he was recently discharged from rehab hospital, home now resting. Denies symptoms of hypertension, including HA, chest pains, SOB and changes in vision.    Discussed proper BP technique, patient sometimes takes BP over his sleeve. Reports he typically eats lunch right before readings, advised patient to take before lunch. Reports he will "consider" making these adjustments. Reports he likely needs to charge his device, verbalized plans to do so today.    The history is provided by the patient.     Follow Up  Follow-up reason(s): reading review and routine education      Readings are trending up due to inaccurate technique.        INTERVENTION(S)  recommended diet modifications, reviewed monitoring technique, encouragement/support, denied further coaching, denied resources and denied questions    PLAN  patient verbalizes understanding, demonstrates understanding via teach back, patient amenable to changes and continue monitoring          Topic    Urine Protein Check     Eye Exam        Last 5 Patient Entered Readings                                      Current 30 Day Average: 147/71     Recent Readings 1/29/2020 1/28/2020 1/27/2020 1/25/2020 1/24/2020    SBP (mmHg) 114 147 128 167 161    DBP (mmHg) 58 60 53 70 72    Pulse 52 61 61 58 55                      Diet Screening   Breakfast is typically between. Eggs and sands, sandwich, yogurt and orange juice.  Lunch is typically between. Caroleen- chicken salad and lunch meat, tuna salad.    Dinner is typically between. TV dinners.    Patient reports eating or drinking the following: juice, soda, water and milk, diet soda, light orange juiceHe has the following dietary restrictions: low sodium dietHe has meals prepared by family and granddaughter.    Patient lets family grocery shop.  He gets groceries from the grocery store.      Reports he is eating mostly TV dinners. " "Reports he typically checks the sodium content prior to purchasing, typically less than 600 mg sodium per meal.    Reports his granddaughter has been grocery shopping for him.     Reports he has been "watching" his sodium intake, reading food labels and not adding salt to his plate.         Physical Activity Screening   No change to exercise routine.    When asked if exercising, patient responded: unable    Medication Adherence Screening   He misses doses: never        Reports he is still BP medications as prescribed.       SDOH  "

## 2020-02-13 ENCOUNTER — PATIENT OUTREACH (OUTPATIENT)
Dept: OTHER | Facility: OTHER | Age: 75
End: 2020-02-13

## 2020-02-14 ENCOUNTER — TELEPHONE (OUTPATIENT)
Dept: CARDIOLOGY | Facility: CLINIC | Age: 75
End: 2020-02-14

## 2020-02-14 NOTE — TELEPHONE ENCOUNTER
----- Message from James Sanchez MD sent at 2/14/2020  9:27 AM CST -----  Can we get him in clinic sooner than March? Like within the next 2 weeks  Thanks  [place]

## 2020-02-14 NOTE — TELEPHONE ENCOUNTER
Reached out to pt     Advised about sooner clinical visit with Dr. Sanchez on 2/27 in [facility] at 11:40

## 2020-02-17 ENCOUNTER — PATIENT OUTREACH (OUTPATIENT)
Dept: OTHER | Facility: OTHER | Age: 75
End: 2020-02-17

## 2020-02-17 NOTE — PROGRESS NOTES
Digital Medicine: Health  Follow-Up    Called to discuss elevated BP readings today, patient reports he is unsure of cause of elevation Denies symptoms of hypertension-  including HA, chest pains, SOB and changes in vision.    Reports dining out at a restaurant, a smokehouse, yesterday. Reports eating small breakfast sandwich and yogurt with orange juice for breakfast, a breakfast sandwich for lunch yesterday, with sands. Reports he ate red beans and rice, prepared meal but doesn't know brand. Reports he may have forgotten to take his insulin last night, reports BG reading this morning was over 300 mg/dL. Endorsed medication adherence.    Gave patient direct number to [facility]. Reminded patient that Digital Medicine Care Team is not here for emergencies. Recommended patient call 911 or [facility], or go to nearest ER in case of emergency.     Requested that patient take another BP reading this evening, patient verbalized intent to do so. Reviewed importance of resting prior to readings.     Routed chart to DM clinician.     The history is provided by the patient.     Follow Up  Follow-up reason(s): reading review and routine education      Alert received.   Care Team received high BP alert.  Patient is not experiencing symptoms.      INTERVENTION(S)  reviewed monitoring technique, encouragement/support, denied further coaching, denied resources and denied questions    PLAN  patient verbalizes understanding, demonstrates understanding via teach back, patient amenable to changes and continue monitoring          Topic    Urine Protein Check     Eye Exam        Last 5 Patient Entered Readings                                      Current 30 Day Average: 149/69     Recent Readings 2/17/2020 2/17/2020 2/12/2020 2/10/2020 2/7/2020    SBP (mmHg) 188 215 147 146 139    DBP (mmHg) 79 86 65 60 60    Pulse 53 55 55 58 66                      Diet Screening   Breakfast is typically between. Breakfast sandwich  "(600+mg sodium).  Lunch is typically between. Saini sandwich .    Dinner is typically between. Frozen meal (600mg).    Patient reports eating or drinking the following: frozen meals and restaurant food    Reports he does not "pay a lot of attention" to water intake.       Physical Activity Screening   When asked if exercising, patient responded: yes    Patient participates in the following activities: yard/housework and laundry     Medication Adherence Screening   He did not miss a dose this month.      SDOH  "

## 2020-02-18 ENCOUNTER — PATIENT OUTREACH (OUTPATIENT)
Dept: OTHER | Facility: OTHER | Age: 75
End: 2020-02-18

## 2020-02-18 NOTE — PROGRESS NOTES
Called patient as received ALERT about elevated BP.  He denies signs or symptoms of elevated BP: headache, change in vision, numbness/tingling on one side, chest pain or shortness of breath. Discussed restarting amlodipine as Dr. Sanchez agree's however, patient thinks that he is already taking this medication. Attempted to review medications and he requested a call back later today.

## 2020-02-20 ENCOUNTER — PATIENT MESSAGE (OUTPATIENT)
Dept: OTHER | Facility: OTHER | Age: 75
End: 2020-02-20

## 2020-02-21 ENCOUNTER — PATIENT OUTREACH (OUTPATIENT)
Dept: OTHER | Facility: OTHER | Age: 75
End: 2020-02-21

## 2020-02-21 RX ORDER — AMLODIPINE BESYLATE 5 MG/1
5 TABLET ORAL DAILY
COMMUNITY
End: 2020-04-07 | Stop reason: SDUPTHER

## 2020-02-21 RX ORDER — LOSARTAN POTASSIUM 50 MG/1
50 TABLET ORAL 2 TIMES DAILY
COMMUNITY
End: 2020-07-28 | Stop reason: SDUPTHER

## 2020-02-21 NOTE — PROGRESS NOTES
Digital Medicine: Clinician Follow-Up    Patient returned call for digital medicine follow-up. Home readings have been elevated due to inaccurate technique. Patient resumed several medications that were discontinued at hospital discharge(metformin, losartan, chlorthalidone and amlodipine). Says that his blood pressure have been 130's to 140's when taken by correctly by home health. Patient has upcoming appointment with Dr. Sanchez and plans to discuss his medications in detail at this time.     The history is provided by the patient. No  was used.     Follow Up  Follow-up reason(s): reading review and routine education                Assessment:  Patient's current 30-day average is not at goal of <130/80 mmHg.       Plan:  Advised to stop metformin and chlorthalidone as last eGFr was 25.  Continue other medications as prescribed and will route note to Dr. Sanchez for review.   Patients health , Crystal Goode, will follow-up as scheduled.    I will continue to monitor regularly and will follow-up after patient;s appointment with cardiology.     Patient has my contact information and knows to call with any concerns or clinical changes.                Topic    Eye Exam        Last 5 Patient Entered Readings                                      Current 30 Day Average: 155/71     Recent Readings 2/21/2020 2/20/2020 2/19/2020 2/18/2020 2/18/2020    SBP (mmHg) 181 144 154 202 165    DBP (mmHg) 77 61 68 79 69    Pulse 52 53 58 60 57             Hypertension Medications             amLODIPine (NORVASC) 5 MG tablet Take 5 mg by mouth once daily.    doxazosin (CARDURA) 8 MG Tab Take 1 tablet (8 mg total) by mouth every evening.    furosemide (LASIX) 80 MG tablet Take 1 tablet (80 mg total) by mouth 2 (two) times daily.    losartan (COZAAR) 50 MG tablet Take 50 mg by mouth 2 (two) times daily.    metoprolol succinate (TOPROL-XL) 100 MG 24 hr tablet Take 1 tablet (100 mg total) by mouth 2 (two) times  daily.                 Screenings

## 2020-02-21 NOTE — PROGRESS NOTES
"Digital Medicine: Health  Follow-Up    Called to address BP alerts, on 2/21 and 2/18.     Reports he is "back on his regular medication", rather than the ones he was on in the hospital. Reports he is also taking amlodipine, chlorthalidone and losartan as well. Transferred call to DM clinician to discuss this further with patient.     Will call back in 1-2 weeks to assess lifestyle again.    The history is provided by the patient.     Follow Up  Follow-up reason(s): reading review and routine education      Alert received.   Care Team received high BP alert.  Patient is not experiencing symptoms.  Reading was invalid because placed BP cuff over clothing      INTERVENTION(S)  reviewed monitoring technique, encouragement/support, denied further coaching, denied resources and denied questions    PLAN  patient verbalizes understanding, demonstrates understanding via teach back, patient amenable to changes and continue monitoring          Topic    Urine Protein Check     Eye Exam        Last 5 Patient Entered Readings                                      Current 30 Day Average: 155/71     Recent Readings 2/21/2020 2/20/2020 2/19/2020 2/18/2020 2/18/2020    SBP (mmHg) 181 144 154 202 165    DBP (mmHg) 77 61 68 79 69    Pulse 52 53 58 60 57                  Screenings    SDOH  "

## 2020-02-27 ENCOUNTER — OFFICE VISIT (OUTPATIENT)
Dept: CARDIOLOGY | Facility: CLINIC | Age: 75
End: 2020-02-27
Payer: MEDICARE

## 2020-02-27 VITALS
DIASTOLIC BLOOD PRESSURE: 60 MMHG | OXYGEN SATURATION: 98 % | SYSTOLIC BLOOD PRESSURE: 124 MMHG | HEIGHT: 69 IN | WEIGHT: 226.88 LBS | HEART RATE: 54 BPM | BODY MASS INDEX: 33.6 KG/M2

## 2020-02-27 DIAGNOSIS — I67.9 LACUNAR ATAXIC HEMIPARESIS: ICD-10-CM

## 2020-02-27 DIAGNOSIS — E11.22 TYPE 2 DIABETES MELLITUS WITH CHRONIC KIDNEY DISEASE, WITH LONG-TERM CURRENT USE OF INSULIN, UNSPECIFIED CKD STAGE: ICD-10-CM

## 2020-02-27 DIAGNOSIS — I73.9 PAD (PERIPHERAL ARTERY DISEASE): Primary | ICD-10-CM

## 2020-02-27 DIAGNOSIS — I69.398 ABNORMALITY OF GAIT AS LATE EFFECT OF CEREBROVASCULAR ACCIDENT (CVA): ICD-10-CM

## 2020-02-27 DIAGNOSIS — D64.9 ANEMIA, UNSPECIFIED TYPE: ICD-10-CM

## 2020-02-27 DIAGNOSIS — I50.33 ACUTE ON CHRONIC DIASTOLIC HEART FAILURE: ICD-10-CM

## 2020-02-27 DIAGNOSIS — E78.5 HYPERLIPIDEMIA, UNSPECIFIED HYPERLIPIDEMIA TYPE: ICD-10-CM

## 2020-02-27 DIAGNOSIS — R26.9 ABNORMALITY OF GAIT AS LATE EFFECT OF CEREBROVASCULAR ACCIDENT (CVA): ICD-10-CM

## 2020-02-27 DIAGNOSIS — I73.9 CLAUDICATION: ICD-10-CM

## 2020-02-27 DIAGNOSIS — I10 ESSENTIAL HYPERTENSION: ICD-10-CM

## 2020-02-27 DIAGNOSIS — G46.7 LACUNAR ATAXIC HEMIPARESIS: ICD-10-CM

## 2020-02-27 DIAGNOSIS — Z86.73 HISTORY OF ISCHEMIC VERTEBROBASILAR ARTERY BRAINSTEM STROKE: ICD-10-CM

## 2020-02-27 DIAGNOSIS — Z95.5 STATUS POST CORONARY ARTERY STENT PLACEMENT: ICD-10-CM

## 2020-02-27 DIAGNOSIS — I67.2 INTRACRANIAL ATHEROSCLEROSIS: ICD-10-CM

## 2020-02-27 DIAGNOSIS — K22.10 ULCER OF ESOPHAGUS WITHOUT BLEEDING: ICD-10-CM

## 2020-02-27 DIAGNOSIS — I25.10 CORONARY ARTERY DISEASE INVOLVING NATIVE CORONARY ARTERY OF NATIVE HEART WITHOUT ANGINA PECTORIS: ICD-10-CM

## 2020-02-27 DIAGNOSIS — N17.9 AKI (ACUTE KIDNEY INJURY): ICD-10-CM

## 2020-02-27 DIAGNOSIS — Z79.4 TYPE 2 DIABETES MELLITUS WITH CHRONIC KIDNEY DISEASE, WITH LONG-TERM CURRENT USE OF INSULIN, UNSPECIFIED CKD STAGE: ICD-10-CM

## 2020-02-27 DIAGNOSIS — N18.9 CHRONIC KIDNEY DISEASE, UNSPECIFIED CKD STAGE: ICD-10-CM

## 2020-02-27 PROCEDURE — 3288F PR FALLS RISK ASSESSMENT DOCUMENTED: ICD-10-PCS | Mod: CPTII,S$GLB,, | Performed by: INTERNAL MEDICINE

## 2020-02-27 PROCEDURE — 3288F FALL RISK ASSESSMENT DOCD: CPT | Mod: CPTII,S$GLB,, | Performed by: INTERNAL MEDICINE

## 2020-02-27 PROCEDURE — 1100F PTFALLS ASSESS-DOCD GE2>/YR: CPT | Mod: CPTII,S$GLB,, | Performed by: INTERNAL MEDICINE

## 2020-02-27 PROCEDURE — 1159F PR MEDICATION LIST DOCUMENTED IN MEDICAL RECORD: ICD-10-PCS | Mod: S$GLB,,, | Performed by: INTERNAL MEDICINE

## 2020-02-27 PROCEDURE — 99215 OFFICE O/P EST HI 40 MIN: CPT | Mod: S$GLB,,, | Performed by: INTERNAL MEDICINE

## 2020-02-27 PROCEDURE — 3078F DIAST BP <80 MM HG: CPT | Mod: CPTII,S$GLB,, | Performed by: INTERNAL MEDICINE

## 2020-02-27 PROCEDURE — 1159F MED LIST DOCD IN RCRD: CPT | Mod: S$GLB,,, | Performed by: INTERNAL MEDICINE

## 2020-02-27 PROCEDURE — 1126F AMNT PAIN NOTED NONE PRSNT: CPT | Mod: S$GLB,,, | Performed by: INTERNAL MEDICINE

## 2020-02-27 PROCEDURE — 99215 PR OFFICE/OUTPT VISIT, EST, LEVL V, 40-54 MIN: ICD-10-PCS | Mod: S$GLB,,, | Performed by: INTERNAL MEDICINE

## 2020-02-27 PROCEDURE — 99999 PR PBB SHADOW E&M-EST. PATIENT-LVL IV: CPT | Mod: PBBFAC,,, | Performed by: INTERNAL MEDICINE

## 2020-02-27 PROCEDURE — 1126F PR PAIN SEVERITY QUANTIFIED, NO PAIN PRESENT: ICD-10-PCS | Mod: S$GLB,,, | Performed by: INTERNAL MEDICINE

## 2020-02-27 PROCEDURE — 3074F SYST BP LT 130 MM HG: CPT | Mod: CPTII,S$GLB,, | Performed by: INTERNAL MEDICINE

## 2020-02-27 PROCEDURE — 99999 PR PBB SHADOW E&M-EST. PATIENT-LVL IV: ICD-10-PCS | Mod: PBBFAC,,, | Performed by: INTERNAL MEDICINE

## 2020-02-27 PROCEDURE — 3078F PR MOST RECENT DIASTOLIC BLOOD PRESSURE < 80 MM HG: ICD-10-PCS | Mod: CPTII,S$GLB,, | Performed by: INTERNAL MEDICINE

## 2020-02-27 PROCEDURE — 1100F PR PT FALLS ASSESS DOC 2+ FALLS/FALL W/INJURY/YR: ICD-10-PCS | Mod: CPTII,S$GLB,, | Performed by: INTERNAL MEDICINE

## 2020-02-27 PROCEDURE — 3074F PR MOST RECENT SYSTOLIC BLOOD PRESSURE < 130 MM HG: ICD-10-PCS | Mod: CPTII,S$GLB,, | Performed by: INTERNAL MEDICINE

## 2020-02-27 RX ORDER — FERROUS SULFATE 325(65) MG
325 TABLET ORAL
Qty: 90 TABLET | Refills: 3 | Status: SHIPPED | OUTPATIENT
Start: 2020-02-27 | End: 2020-11-19

## 2020-02-27 RX ORDER — FUROSEMIDE 80 MG/1
80 TABLET ORAL 2 TIMES DAILY
Qty: 180 TABLET | Refills: 3
Start: 2020-02-27 | End: 2020-03-09 | Stop reason: SDUPTHER

## 2020-02-27 NOTE — PROGRESS NOTES
Subjective:    Patient ID:  Cy Rutherford is a 74 y.o. male who presents for follow-up of No chief complaint on file.      HPI    73 y/o male former pt of Dr Fish. 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   Had CVA 2018 with residual weakness. Holter ordered without significant arrhythmias (did show PAT).   Had been having uncontrolled HTN and chlorthalidone added to regimen along with doxazosin (also on losartan, metoprolol and amlodipine). -130's with increase in doxazosin and decrease in amlodipine (leg swelling).   Developed RLE ambulatory pain and arterial doppler ordered with possible stenosis at level of RCFA and Lprofunda. Had intervention to RCFA and RSFA, however, vessels very heavily calcified and may require further intervention with debulking. Continued to have significant claudication and had re-intervention with Hawk atherectomy with de-bulking with excellent result. Post procedure developed spontaneous subcapsular left kidney hematoma with subsequent blood loss anemia requiring PRBC transfusion and JOSE requiring temporary HD. Had prolonged hospitalization and D/C'd to SNF after. Now s/p SNF and doing well at home. Claudication of right hip/thigh resolved, however continues to have Rodolfo IIa claudication of right calf that improves with rest and not lifestyle limiting. Leg edema at baseline. Feeling well and staying active. Off ASA still. Had EGD during hospitalization that showed non bleeding esophagel ulcer and Schatzki ring; no GI f/u yet. Denies dysphagia and no problems with urinary output. No labs  since hospitalization.    He denies regular CP, orthopnea, PND, palps, syncope. He is compliant with his meds. Remote smoking hx. No non healing ulceration or evidence of limb ischemia.     Review of Systems   Constitution: Positive for malaise/fatigue.   HENT: Negative for congestion.    Eyes: Negative for blurred vision.   Cardiovascular: Positive for claudication and leg swelling. Negative for chest pain, cyanosis, dyspnea on exertion, irregular heartbeat, near-syncope, orthopnea, palpitations, paroxysmal nocturnal dyspnea and syncope.   Respiratory: Negative for shortness of breath.    Endocrine: Negative for polyuria.   Hematologic/Lymphatic: Negative for bleeding problem.   Skin: Negative for itching and rash.   Musculoskeletal: Negative for joint swelling, muscle cramps and muscle weakness.   Gastrointestinal: Negative for abdominal pain, hematemesis, hematochezia, melena, nausea and vomiting.   Genitourinary: Negative for dysuria and hematuria.   Neurological: Positive for focal weakness. Negative for dizziness, headaches, light-headedness, loss of balance and weakness.   Psychiatric/Behavioral: Negative for depression. The patient is not nervous/anxious.         Objective:    Physical Exam   Constitutional: He is oriented to person, place, and time. He appears well-developed and well-nourished.   HENT:   Head: Normocephalic and atraumatic.   Neck: Neck supple. No JVD present.   Cardiovascular: Normal rate, regular rhythm and normal heart sounds.   Pulses:       Carotid pulses are 2+ on the right side, and 2+ on the left side.       Radial pulses are 2+ on the right side, and 2+ on the left side.        Femoral pulses are 2+ on the right side, and 2+ on the left side.  Pulmonary/Chest: Effort normal and breath sounds normal.   Abdominal: Soft. Bowel sounds are normal.   Musculoskeletal: He exhibits edema.   Neurological: He is alert and oriented to person, place, and time.   Skin: Skin is warm and dry.    Psychiatric: He has a normal mood and affect. His behavior is normal. Thought content normal.         Assessment:       1. PAD (peripheral artery disease)    2. Anemia, unspecified type    3. Chronic kidney disease, unspecified CKD stage    4. Claudication    5. Coronary artery disease involving native coronary artery of native heart without angina pectoris    6. Acute on chronic diastolic heart failure    7. Status post coronary artery stent placement    8. Essential hypertension    9. Hyperlipidemia, unspecified hyperlipidemia type    10. JOSE (acute kidney injury)    11. History of ischemic vertebrobasilar artery brainstem stroke    12. 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 Ataxic hemiparesis    14. Intracranial atherosclerosis    15. Type 2 diabetes mellitus with chronic kidney disease, with long-term current use of insulin, unspecified CKD stage    16. Ulcer of esophagus without bleeding      75 y/o pt with hx and presentation as above. Doing well from a cardiac perspective and compensated from a HF perspective. Leg edema at baseline and 2DE during hospitalization with normal EF. Regarding PAD, stable with stable symptoms. Will update arterial doppler. Needs updated labs for anemia and JOSE. Need to address issue of anti-platelet therapy for secondary stroke prevention and PAD in the near future. For now continue to hold. Will refer to GI for f/u. May need nephrology f/u soon also. Discussed the etiology, evaluation, and management of PAD, CAD, CHF, CVA, anemia, JOSE/CKD, HTN, HLD, DM. Discussed the importance of med compliance, heart healthy diet, and regular exercise.      Plan:       -CBC, CMP  -Referral to GI  -Bilateral LE arterial doppler  -f/u in 1 month

## 2020-03-06 ENCOUNTER — TELEPHONE (OUTPATIENT)
Dept: GASTROENTEROLOGY | Facility: CLINIC | Age: 75
End: 2020-03-06

## 2020-03-06 NOTE — TELEPHONE ENCOUNTER
EGD Prep Instructions    [facility] [address] Clinic Office [phone]  Endoscopy Lab [phone]    You are scheduled for an EGD with Dr. Rodrigues on 04/27/2020 at 1030am at [facility].  You will check in at Admit on the first floor of the hospital. Please contact the office two days before procedure date to reschedule if needed.    You may not have anything to eat after 7pm and nothing to drink after midnight.  You can drink clear liquids between 7pm and midnight.    You MAY take your blood pressure, heart, and seizure medication on the morning of the procedure, with a SIP of water.  Hold ALL other medications until after the procedure.    If you are on blood thinners THAT YOU HAVE BEEN INSTRUCTED TO HOLD BY YOUR DOCTOR FOR THIS PROCEDURE, then do NOT take this the morning of your EGD.  Do NOT stop these medications on your own, they must be approved to be held by your doctor.  Your EGD can NOT be done if you are on these medications.  Examples of blood thinners include: Coumadin, Aggrenox, Plavix, Pradaxa, Reapro, Pletal, Xarelto, Ticagrelor, Brilinta, Eliquis, and high dose aspirin (325 mg).  You do not have to stop baby aspirin 81 mg.

## 2020-03-06 NOTE — TELEPHONE ENCOUNTER
----- Message from Nieves Adler sent at 3/6/2020  1:26 PM CST -----  Contact: [phone]  Dr. Sanchez placed a referral for patient to follow up with Gastroenterology for ulcer of esophagus without bleeding. Patient states he was supposed to follow up with Dr. Rodrigues sometime in December but did not. Please call patient.

## 2020-03-09 ENCOUNTER — PATIENT OUTREACH (OUTPATIENT)
Dept: OTHER | Facility: OTHER | Age: 75
End: 2020-03-09

## 2020-03-09 ENCOUNTER — HOSPITAL ENCOUNTER (OUTPATIENT)
Dept: RADIOLOGY | Facility: HOSPITAL | Age: 75
Discharge: HOME | End: 2020-03-09
Attending: INTERNAL MEDICINE
Payer: MEDICARE

## 2020-03-09 ENCOUNTER — PATIENT MESSAGE (OUTPATIENT)
Dept: OTHER | Facility: OTHER | Age: 75
End: 2020-03-09

## 2020-03-09 DIAGNOSIS — I73.9 PAD (PERIPHERAL ARTERY DISEASE): ICD-10-CM

## 2020-03-09 DIAGNOSIS — I10 ESSENTIAL HYPERTENSION: Primary | ICD-10-CM

## 2020-03-09 PROCEDURE — 93925 LOWER EXTREMITY STUDY: CPT | Mod: TC,PO

## 2020-03-09 RX ORDER — FUROSEMIDE 80 MG/1
80 TABLET ORAL 2 TIMES DAILY
Qty: 180 TABLET | Refills: 3 | Status: SHIPPED | OUTPATIENT
Start: 2020-03-09 | End: 2020-03-25 | Stop reason: SDUPTHER

## 2020-03-09 RX ORDER — FUROSEMIDE 80 MG/1
80 TABLET ORAL 2 TIMES DAILY
Qty: 28 TABLET | Refills: 0 | Status: SHIPPED | OUTPATIENT
Start: 2020-03-09 | End: 2020-03-25 | Stop reason: SDUPTHER

## 2020-03-09 NOTE — PROGRESS NOTES
Digital Medicine: Health  Follow-Up    Patient reports he is well. Denies signs and symptoms of hypertension.   Patient continues to take BP readings over clothing despite multiple advisements to take on bare skin. Reviewed importance of this again today.   Last office BP: 124/60        The history is provided by the patient.     Follow Up  Follow-up reason(s): reading review and routine education      Readings are trending up due to inaccurate technique and reports he has been taking BP over clothing .        INTERVENTION(S)  recommended diet modifications, reviewed monitoring technique, encouragement/support, denied further coaching, denied resources and denied questions    PLAN  patient verbalizes understanding, demonstrates understanding via teach back, patient amenable to changes and continue monitoring          Topic    Eye Exam        Last 5 Patient Entered Readings                                      Current 30 Day Average: 156/69     Recent Readings 3/7/2020 3/7/2020 3/6/2020 3/5/2020 2/28/2020    SBP (mmHg) 152 157 145 167 130    DBP (mmHg) 63 61 64 74 53    Pulse 61 62 57 58 59                      Diet Screening   Lunch is typically between. Lunch meat sandwich .    Patient reports eating or drinking the following: packaged/processed foods and deli meat    Reports he does not add salt to his meal, and reads food labels. Reports his goal is to limit sodium intake to 600 mg per meal. Discussed elevated levels of sodium in lunch meats, patient verbalized intent to read label today.     Physical Activity Screening   No change to exercise routine.        Medication Adherence Screening       Reports he has not yet received furosemide rx. Reports he has been out of this medication for over 1 week. Informed DM clinician via secure chat.       [place]

## 2020-03-18 ENCOUNTER — TELEPHONE (OUTPATIENT)
Dept: CARDIOLOGY | Facility: CLINIC | Age: 75
End: 2020-03-18

## 2020-03-18 NOTE — TELEPHONE ENCOUNTER
----- Message from James Sanchez MD sent at 3/17/2020 12:54 PM CDT -----  Leg ultrasound looks improved and acceptable. Labs improved including improvement in kidney function and blood count

## 2020-03-25 ENCOUNTER — PATIENT MESSAGE (OUTPATIENT)
Dept: ADMINISTRATIVE | Facility: OTHER | Age: 75
End: 2020-03-25

## 2020-03-25 DIAGNOSIS — I10 ESSENTIAL HYPERTENSION: ICD-10-CM

## 2020-03-25 RX ORDER — FUROSEMIDE 80 MG/1
80 TABLET ORAL 2 TIMES DAILY
Qty: 180 TABLET | Refills: 1 | Status: SHIPPED | OUTPATIENT
Start: 2020-03-25 | End: 2020-06-29 | Stop reason: DRUGHIGH

## 2020-04-07 ENCOUNTER — OFFICE VISIT (OUTPATIENT)
Dept: CARDIOLOGY | Facility: CLINIC | Age: 75
End: 2020-04-07
Payer: MEDICARE

## 2020-04-07 DIAGNOSIS — G46.7 LACUNAR ATAXIC HEMIPARESIS: ICD-10-CM

## 2020-04-07 DIAGNOSIS — Z85.038 HISTORY OF COLON CANCER: ICD-10-CM

## 2020-04-07 DIAGNOSIS — E11.22 TYPE 2 DIABETES MELLITUS WITH CHRONIC KIDNEY DISEASE, WITH LONG-TERM CURRENT USE OF INSULIN, UNSPECIFIED CKD STAGE: ICD-10-CM

## 2020-04-07 DIAGNOSIS — I50.33 ACUTE ON CHRONIC DIASTOLIC HEART FAILURE: ICD-10-CM

## 2020-04-07 DIAGNOSIS — Z86.73 HISTORY OF ISCHEMIC VERTEBROBASILAR ARTERY BRAINSTEM STROKE: ICD-10-CM

## 2020-04-07 DIAGNOSIS — I73.9 PAD (PERIPHERAL ARTERY DISEASE): Primary | ICD-10-CM

## 2020-04-07 DIAGNOSIS — I25.10 CORONARY ARTERY DISEASE INVOLVING NATIVE CORONARY ARTERY OF NATIVE HEART WITHOUT ANGINA PECTORIS: ICD-10-CM

## 2020-04-07 DIAGNOSIS — I73.9 CLAUDICATION: ICD-10-CM

## 2020-04-07 DIAGNOSIS — R26.9 ABNORMALITY OF GAIT AS LATE EFFECT OF CEREBROVASCULAR ACCIDENT (CVA): ICD-10-CM

## 2020-04-07 DIAGNOSIS — E78.5 HYPERLIPIDEMIA, UNSPECIFIED HYPERLIPIDEMIA TYPE: ICD-10-CM

## 2020-04-07 DIAGNOSIS — R53.1 RIGHT SIDED WEAKNESS: ICD-10-CM

## 2020-04-07 DIAGNOSIS — I10 ESSENTIAL HYPERTENSION: ICD-10-CM

## 2020-04-07 DIAGNOSIS — I69.398 ABNORMALITY OF GAIT AS LATE EFFECT OF CEREBROVASCULAR ACCIDENT (CVA): ICD-10-CM

## 2020-04-07 DIAGNOSIS — D50.9 IRON DEFICIENCY ANEMIA, UNSPECIFIED IRON DEFICIENCY ANEMIA TYPE: ICD-10-CM

## 2020-04-07 DIAGNOSIS — Z95.5 STATUS POST CORONARY ARTERY STENT PLACEMENT: ICD-10-CM

## 2020-04-07 DIAGNOSIS — Z79.4 TYPE 2 DIABETES MELLITUS WITH CHRONIC KIDNEY DISEASE, WITH LONG-TERM CURRENT USE OF INSULIN, UNSPECIFIED CKD STAGE: ICD-10-CM

## 2020-04-07 DIAGNOSIS — I67.9 LACUNAR ATAXIC HEMIPARESIS: ICD-10-CM

## 2020-04-07 RX ORDER — AMLODIPINE BESYLATE 10 MG/1
10 TABLET ORAL DAILY
Qty: 90 TABLET | Refills: 3 | Status: SHIPPED | OUTPATIENT
Start: 2020-04-07 | End: 2020-06-29 | Stop reason: SDUPTHER

## 2020-04-07 NOTE — PROGRESS NOTES
Subjective:    Patient ID:  Cy Rutherford Jr. is a 74 y.o. male who presents for follow-up of Peripheral Arterial Disease      HPI    The patient location is: home, [place]  The chief complaint leading to consultation is: PAD  Visit type: Virtual visit with synchronous audio and video  Total time spent with patient: 30  Each patient to whom he or she provides medical services by telemedicine is:  (1) informed of the relationship between the physician and patient and the respective role of any other health care provider with respect to management of the patient; and (2) notified that he or she may decline to receive medical services by telemedicine and may withdraw from such care at any time.    Notes:       75 y/o male former pt of Dr Fish. 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   Had CVA 2018 with residual weakness. Holter ordered without significant arrhythmias (did show PAT).   Had been having uncontrolled HTN and chlorthalidone added to regimen along with doxazosin (also on losartan, metoprolol and amlodipine). -130's with increase in doxazosin and decrease in amlodipine (leg swelling).   Developed RLE ambulatory pain and arterial doppler ordered with possible stenosis at level of RCFA and Lprofunda. Had intervention to RCFA and RSFA, however, vessels very heavily calcified and may require further intervention with debulking. Continued to have significant claudication and had re-intervention with Hawk atherectomy with de-bulking with excellent result. Post procedure developed spontaneous subcapsular left kidney hematoma  with subsequent blood loss anemia requiring PRBC transfusion and JOSE requiring temporary HD. Had prolonged hospitalization and D/C'd to SNF after. Now s/p SNF and doing well at home. Claudication of right hip/thigh resolved, however continues to have Rodolfo IIa claudication of right calf that improves with rest and not lifestyle limiting. Leg edema improved. Feeling well and staying active. Off ASA, on Plavix. Had EGD during hospitalization that showed non bleeding esophagel ulcer and Schatzki ring; no GI f/u yet, although has EGD scheduled for the end of the month. Denies dysphagia and no problems with urinary output.  Last clinic visit had arterial doppler ordered which showed improved velocities and no new lesions. Had labwork which showed significant improvement with Cr 1.3, close to baseline (~1). H/H improved. Has been off chlorthalidone and 's.   He denies regular CP, orthopnea, PND, palps, syncope. He is compliant with his meds. Remote smoking hx. No non healing ulceration or evidence of limb ischemia.     Review of Systems   Constitution: Negative for malaise/fatigue.   HENT: Negative for congestion.    Eyes: Negative for blurred vision.   Cardiovascular: Positive for dyspnea on exertion and leg swelling. Negative for chest pain, claudication, cyanosis, irregular heartbeat, near-syncope, orthopnea, palpitations, paroxysmal nocturnal dyspnea and syncope.   Respiratory: Negative for shortness of breath.    Endocrine: Negative for polyuria.   Hematologic/Lymphatic: Negative for bleeding problem.   Skin: Negative for itching and rash.   Musculoskeletal: Negative for joint swelling, muscle cramps and muscle weakness.   Gastrointestinal: Negative for abdominal pain, hematemesis, hematochezia, melena, nausea and vomiting.   Genitourinary: Negative for dysuria and hematuria.   Neurological: Positive for focal weakness. Negative for dizziness, headaches, light-headedness, loss of balance and weakness.    Psychiatric/Behavioral: Negative for depression. The patient is not nervous/anxious.         Objective:    Physical Exam   Constitutional: He is oriented to person, place, and time. He appears well-developed and well-nourished.   HENT:   Head: Atraumatic.   Eyes: EOM are normal.   Neck: No JVD present.   Pulmonary/Chest: Effort normal.   Musculoskeletal: He exhibits edema.   Neurological: He is alert and oriented to person, place, and time.   Psychiatric: He has a normal mood and affect. His behavior is normal. Judgment and thought content normal.         Assessment:       1. PAD (peripheral artery disease)    2. Claudication    3. Acute on chronic diastolic heart failure    4. Coronary artery disease involving native coronary artery of native heart without angina pectoris    5. Essential hypertension    6. Hyperlipidemia, unspecified hyperlipidemia type    7. Status post coronary artery stent placement    8. History of ischemic vertebrobasilar artery brainstem stroke    9. Ataxic hemiparesis    10. 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 Iron deficiency anemia, unspecified iron deficiency anemia type    12. History of colon cancer    13. Type 2 diabetes mellitus with chronic kidney disease, with long-term current use of insulin, unspecified CKD stage    14. Right sided weakness      73 y/o pt with hx and presentation as above. Doing well from a cardiac perspective and compensated from a HF perspective. Leg edema improved. Regarding PAD, stable with stable symptoms, updated arterial doppler with acceptable findings. Labs improved wth Cr almost at baseline and anemia improved. Currently on SAPT with plavix, which I think is reasonable. Has EGD scheduled for the end of the month and was told to call and discuss, as current Covid crisis. BP elevated and will increase amlodipine. Will d/c chlorthalidone due to hx of JOSE and Cr almost at baseline. Discussed the etiology, evaluation,  and management of PAD, CAD, CHF, CVA, anemia, JOSE/CKD, HTN, HLD, DM. Discussed the importance of med compliance, heart healthy diet, and regular exercise.      Plan:       -Increase amlodipine to 10 mg daily  -BP log  -f/u in 1 month virtual visit for HTN

## 2020-04-07 NOTE — LETTER
April 7, 2020      Heide Porter MD  [address]   [address]  [place] [address]           [place] [facility]  [address]  [facility] [address]  Phone: [phone]          Patient: Cy Rutherford Jr.   MR Number: [id number]   YOB: 1945   Date of Visit: 4/7/2020       Dear Dr. Heide Porter:    Thank you for referring Cy Rutherford to me for evaluation. Attached you will find relevant portions of my assessment and plan of care.    If you have questions, please do not hesitate to call me. I look forward to following Cy Rutherford along with you.    Sincerely,    James Sanchez MD    Enclosure  CC:  No Recipients    If you would like to receive this communication electronically, please contact [email or web address] or [phone] to request more information on [facility] access.    For providers and/or their staff who would like to refer a patient to [facility], please contact us through our one-stop-shop provider referral line, [facility], at [phone].    If you feel you have received this communication in error or would no longer like to receive these types of communications, please e-mail [email or web address]

## 2020-04-11 ENCOUNTER — PATIENT MESSAGE (OUTPATIENT)
Dept: CARDIOLOGY | Facility: CLINIC | Age: 75
End: 2020-04-11

## 2020-04-17 ENCOUNTER — RESEARCH ENCOUNTER (OUTPATIENT)
Dept: NEUROLOGY | Facility: CLINIC | Age: 75
End: 2020-04-17

## 2020-04-20 ENCOUNTER — PATIENT OUTREACH (OUTPATIENT)
Dept: OTHER | Facility: OTHER | Age: 75
End: 2020-04-20

## 2020-04-20 NOTE — PROGRESS NOTES
Digital Medicine: Health  Follow-Up    Patient reports he is well, no complaints. Denies symptoms of hypertension- HA, chest pains and SOB.     Discussed COVID-19 and importance of proper hand hygiene and social distancing.       The history is provided by the patient.     Follow Up  Follow-up reason(s): reading review and routine education          INTERVENTION(S)  recommended diet modifications, recommend physical activity, encouragement/support, denied further coaching, denied resources and denied questions    PLAN  patient verbalizes understanding, demonstrates understanding via teach back, patient amenable to changes and continue monitoring          Topic    Eye Exam        Last 5 Patient Entered Readings                                      Current 30 Day Average: 138/59     Recent Readings 4/20/2020 4/19/2020 4/18/2020 4/17/2020 4/16/2020    SBP (mmHg) 131 135 140 146 117    DBP (mmHg) 53 59 59 58 48    Pulse 49 49 52 45 45                      Diet Screening   Breakfast is typically between. Orange juice, coffee.  Dinner is typically between. Frozen meals in microwave- Lean Cuisine .    Patient reports eating or drinking the following: soda, water, frozen meals and diet coke 1/day,     Reports his granddaughter goes grocery shopping for him, denies changes to diet during this time. Endorsed efforts to stay hydrated, patient unsure of exact amount.     Physical Activity Screening   When asked if exercising, patient responded: yes  Patient has limited mobility: recovering from surgery/rehab    Patient participates in the following activities: physical therapy/rehab    Reports he is recovering but doing some PT exercises at home during this time.     Medication Adherence Screening   He did not miss a dose this month.      SDOH

## 2020-04-25 ENCOUNTER — PATIENT MESSAGE (OUTPATIENT)
Dept: ADMINISTRATIVE | Facility: OTHER | Age: 75
End: 2020-04-25

## 2020-05-14 ENCOUNTER — TELEPHONE (OUTPATIENT)
Dept: CARDIOLOGY | Facility: CLINIC | Age: 75
End: 2020-05-14

## 2020-05-14 NOTE — TELEPHONE ENCOUNTER
Reached out to pt in regards to message     Pt states he received a phone call from his endocrinologist, Dr. James Mendiola, in regards to blood test results the pt had done     Pt states there was some concern over his kidney function levels, requesting to know if dosage on his Lasix can be decreased     Pt states that his ankles are still a little swollen, not as bad as it was before, but requesting to know if adjustments need to be made with his lasix     I reached out to Dr. James Mendiola office, left a message requesting lab orders be faxed to our office

## 2020-05-14 NOTE — TELEPHONE ENCOUNTER
----- Message from Francis Johnston sent at 5/14/2020 10:17 AM CDT -----  Type:  Needs Medical Advice    Who Called: patient  Reason: His endocrinologist questioned some of the readings and would like to check the dosage on his diuretic.  Would the patient rather a call back or a response via [facility]? call  Best Call Back Number: [phone]  Additional Information: no

## 2020-05-18 NOTE — TELEPHONE ENCOUNTER
Reached out to pt and advised per Dr. Daniel sutherland to decrease lasix to 40 mg once a day     Advised pt to reach out to the office if he has any issues

## 2020-06-01 ENCOUNTER — PATIENT OUTREACH (OUTPATIENT)
Dept: OTHER | Facility: OTHER | Age: 75
End: 2020-06-01

## 2020-06-01 NOTE — PROGRESS NOTES
"Digital Medicine: Health  Follow-Up    Patient reports he is well, no complaints. Denies symptoms of hypertension- HA, chest pains and SOB.     Discussed COVID-19 and importance of proper hand hygiene and social distancing.     Patient reports he charges his BP device about once every 2 weeks.     The history is provided by the patient.     Follow Up  Follow-up reason(s): reading review and routine education          INTERVENTION(S)  recommended diet modifications, recommend physical activity, encouragement/support, denied further coaching, denied resources and denied questions    PLAN  patient verbalizes understanding, demonstrates understanding via teach back, patient amenable to changes and continue monitoring          Topic    Eye Exam     Hemoglobin A1C        Last 5 Patient Entered Readings                                      Current 30 Day Average: 137/59     Recent Readings 5/29/2020 5/28/2020 5/28/2020 5/27/2020 5/26/2020    SBP (mmHg) 139 142 119 132 138    DBP (mmHg) 59 58 49 52 61    Pulse 45 43 37 46 46                      Diet Screening   Breakfast is typically between. Thai Aj breakfast sandwich.  Lunch is typically between. [place].    Dinner is typically between. Lean cuisine frozen meals with vegetables, jambalaya (premade).    Patient reports eating or drinking the following: soda, frozen meals, fresh vegetables and diet coke He cooks for self.    Patient does the shopping for groceries.  He gets groceries from the grocery store.      Reports his granddaughter has been going grocery shopping for him.     Endorsed efforts to drink about 4 bottles of water per day.     Physical Activity Screening   When asked if exercising, patient responded: yes    Patient participates in the following activities: yoga/stretching    Reports he is recovering but doing some PT exercises at home during this time, trying to do this once per day.    Reports he cannot walk "too far", but tries to walk around " his house.    Medication Adherence Screening   He did not miss a dose this month.      [place]O

## 2020-06-26 ENCOUNTER — PATIENT OUTREACH (OUTPATIENT)
Dept: OTHER | Facility: OTHER | Age: 75
End: 2020-06-26

## 2020-06-26 DIAGNOSIS — I10 ESSENTIAL HYPERTENSION: Primary | ICD-10-CM

## 2020-06-29 RX ORDER — AMLODIPINE BESYLATE 10 MG/1
5 TABLET ORAL 2 TIMES DAILY
Qty: 90 TABLET | Refills: 0
Start: 2020-06-29 | End: 2020-12-28

## 2020-06-29 RX ORDER — FUROSEMIDE 40 MG/1
40 TABLET ORAL 2 TIMES DAILY
Qty: 90 TABLET | Refills: 1 | Status: SHIPPED | OUTPATIENT
Start: 2020-06-29 | End: 2020-07-19

## 2020-06-29 NOTE — PROGRESS NOTES
Digital Medicine: Clinician Follow-Up    Called patient for digital medicine follow up. Blood pressure continues to fluctuate despite increasing amlodipine to 10 mg at l;ast cardiologist appointment in April. Reviewed technique and patient is checking blood pressure as discussed. Denies s/s of hypertension with elevated readings.     The history is provided by the patient. No  was used.   Follow Up  Follow-up reason(s): reading review and routine education              Assessment:  Patient's current 30-day average is not at goal of <130/80 mmHg.       Plan:  Amlodipine changed to 5 mg twice daily. Continue other medications as prescribed.  Patient requested prescription for furosemide 40 mg as dose was reduced.   Patients health , Crystal Goode, will follow-up as scheduled.    I will continue to monitor regularly and will follow-up in 2-3 weeks, sooner if blood pressure begins to trend upward or downward.     Patient has my contact information and knows to call with any concerns or clinical changes.              Last 5 Patient Entered Readings                                      Current 30 Day Average: 142/61     Recent Readings 6/29/2020 6/27/2020 6/26/2020 6/25/2020 6/24/2020    SBP (mmHg) 134 133 144 145 151    DBP (mmHg) 54 56 64 60 64    Pulse 44 43 45 46 52        Hypertension Medications             amLODIPine (NORVASC) 10 MG tablet Take 0.5 tablets (5 mg total) by mouth 2 (two) times daily.    doxazosin (CARDURA) 8 MG Tab Take 1 tablet (8 mg total) by mouth every evening.    furosemide (LASIX) 40 MG tablet Take 1 tablet (40 mg total) by mouth 2 (two) times daily.    losartan (COZAAR) 50 MG tablet Take 50 mg by mouth 2 (two) times daily.    metoprolol succinate (TOPROL-XL) 100 MG 24 hr tablet Take 1 tablet (100 mg total) by mouth 2 (two) times daily.

## 2020-07-13 ENCOUNTER — PATIENT OUTREACH (OUTPATIENT)
Dept: OTHER | Facility: OTHER | Age: 75
End: 2020-07-13

## 2020-07-13 NOTE — PROGRESS NOTES
"Digital Medicine: Health  Follow-Up    Patient reports he is well, no complaints. Denies symptoms of hypertension- HA, chest pains and SOB.     Discussed COVID-19 and importance of proper hand hygiene and social distancing.       The history is provided by the patient.   Follow Up  Follow-up reason(s): reading review and routine education      Readings are trending up due to patient unsure, unable to identify cause .        INTERVENTION(S)  recommended diet modifications, recommend physical activity, encouragement/support, denied further coaching, denied resources and denied questions    PLAN  patient verbalizes understanding, demonstrates understanding via teach back, patient amenable to changes and continue monitoring    Limited patient interaction in conversation today, not interested in discussing health goals or lifestyle modification.           Topic    Eye Exam     Hemoglobin A1C        Last 5 Patient Entered Readings                                      Current 30 Day Average: 142/60     Recent Readings 7/11/2020 7/10/2020 7/9/2020 7/8/2020 7/7/2020    SBP (mmHg) 132 137 151 150 155    DBP (mmHg) 53 59 62 60 67    Pulse 42 43 46 43 44                      Diet Screening   Lunch is typically between. Brick, potato salad, coleslaw.    Dinner is typically between. Lean Cuisine (600 mg sodium/meal).    Snacks are typically. Peanut butter crackers.    Patient reports eating or drinking the following: juice, soda, frozen meals, packaged/processed foods and diet lemonade, orange juice, coffee    Reports he is working to reduce soda intake. Reports he is working on "moderation" with this, typically only once per day.     Reports he has not been grocery shopping during this time.     Reports he has not been eating very much salt lately.     Physical Activity Screening   When asked if exercising, patient responded: yes    Patient participates in the following activities: walking and yard/housework    Reports " "he has been unable to exercise "as much" lately due to circulation issues. Reports he does walk around his house "a little", and does "some" exercises, but declined to elaborate.     Medication Adherence Screening   He did not miss a dose this month.      SDOH  "

## 2020-07-28 DIAGNOSIS — I10 ESSENTIAL HYPERTENSION: Primary | ICD-10-CM

## 2020-07-28 RX ORDER — LOSARTAN POTASSIUM 50 MG/1
50 TABLET ORAL 2 TIMES DAILY
Qty: 180 TABLET | Refills: 3 | Status: SHIPPED | OUTPATIENT
Start: 2020-07-28 | End: 2020-12-07 | Stop reason: ALTCHOICE

## 2020-07-28 NOTE — TELEPHONE ENCOUNTER
----- Message from Iwona Darnell sent at 7/28/2020 10:47 AM CDT -----  Contact: [phone]/Self  Patient is requesting to speak with nurse regarding medication. Please advise.    
Pt requesting refill on Losartan to be sent to [facility] order pharmacy   
Statement Selected

## 2020-08-11 ENCOUNTER — TELEPHONE (OUTPATIENT)
Dept: GASTROENTEROLOGY | Facility: CLINIC | Age: 75
End: 2020-08-11

## 2020-09-08 ENCOUNTER — PATIENT OUTREACH (OUTPATIENT)
Dept: OTHER | Facility: OTHER | Age: 75
End: 2020-09-08

## 2020-09-08 NOTE — PROGRESS NOTES
"Digital Medicine: Health  Follow-Up    The history is provided by the patient.             Reason for review: Blood pressure not at goal      Additional Follow-up details: Patient reports he is well, no complaints. Denies symptoms of hypertension- HA, chest pains and SOB.     Discussed COVID-19 and importance of proper hand hygiene and social distancing.     Limited patient engagement in conversation, not interested in discussing lifestyle modification or health goals today.        Diet-Change    Patient reports eating or drinking the following: Reports the only thing that has changed since last call is that he switched from diet coke to diet dr. Rizzo, stated "so hopefully I went the right way". Declined to discuss eating habits further today.       Physical Activity-no change to routine  No change to exercise routine.       Additional physical activity details: Reports he is still having a cirtulation problem and is unable to exercise. Patient stated "it is what it is" and declined to discuss further today.       Medication Adherence-Medication adherence was assessed.        Substance, Sleep, Stress-change  stress-not assessed  Details:  Intervention(s):    Sleep-assessed  Details:"its okay I think"  Intervention(s):    Alcohol -not assessed  Details:  Intervention(s):    Tobacco-Not Assessed  Details:  Intervention(s):          Continue current diet/physical activity routine.  Instructed to charge device.  Provided patient education.  Reviewed Device Techniques.     Addressed any questions or concerns and patient has my contact information if needed prior to next outreach. Patient verbalizes understanding.      Explained the importance of self-monitoring and medication adherence. Encouraged the patient to communicate with their health  for lifestyle modifications to help improve or maintain a healthy lifestyle.                Topic    Eye Exam     Hemoglobin A1C        Last 5 Patient Entered Readings "                                      Current 30 Day Average: 142/60     Recent Readings 9/7/2020 9/5/2020 9/4/2020 9/3/2020 9/2/2020    SBP (mmHg) 141 140 143 145 140    DBP (mmHg) 65 59 62 63 56    Pulse 43 43 43 46 43

## 2020-10-17 ENCOUNTER — PATIENT MESSAGE (OUTPATIENT)
Dept: ADMINISTRATIVE | Facility: OTHER | Age: 75
End: 2020-10-17

## 2020-10-27 LAB
LEFT EYE DM RETINOPATHY: NEGATIVE
RIGHT EYE DM RETINOPATHY: POSITIVE

## 2020-10-28 ENCOUNTER — PATIENT OUTREACH (OUTPATIENT)
Dept: OTHER | Facility: OTHER | Age: 75
End: 2020-10-28

## 2020-10-28 DIAGNOSIS — I10 ESSENTIAL HYPERTENSION: Primary | ICD-10-CM

## 2020-10-28 RX ORDER — CARVEDILOL 12.5 MG/1
12.5 TABLET ORAL 2 TIMES DAILY WITH MEALS
Qty: 180 TABLET | Refills: 1 | Status: SHIPPED | OUTPATIENT
Start: 2020-10-28 | End: 2021-01-12

## 2020-10-28 NOTE — PROGRESS NOTES
Digital Medicine: Clinician Follow-Up    Called patient for digital medicine follow up. Mr. Rutherford states that he is doing well. Blood pressure remains above goal of <130/80. Confirmed that patient is taking medications as prescribed.     The history is provided by the patient.      Review of patient's allergies indicates:  No Known Allergies  Follow-up reason(s): routine follow up.     Hypertension    Readings are trending up Patient is not experiencing signs/symptoms of hypertension.            Last 5 Patient Entered Readings                                      Current 30 Day Average: 150/64     Recent Readings 10/28/2020 10/27/2020 10/24/2020 10/23/2020 10/22/2020    SBP (mmHg) 158 155 144 157 152    DBP (mmHg) 66 67 62 67 69    Pulse 43 43 44 45 44                 Depression Screening  Did not address depression screening.    Sleep Apnea Screening    Did not address sleep apnea screening.     Medication Affordability Screening  Did not address medication affordability screening.     Medication Adherence-Medication adherence was assessed.          ASSESSMENT(S)  Patients BP average is 150/64 mmHg, which is above goal. Patient's BP goal is less than or equal to 130/80.     Hypertension Plan  Hypertension Medication Change. Stop metoprolol 100 mg twice daily and start carvedilol 12.5 mg twice daily. Continue ther medications as prescribed.        Addressed patient questions and patient has my contact information if needed prior to next outreach. Patient verbalizes understanding.                 Topic    Eye Exam     Hemoglobin A1C     Lipid (Cholesterol) Test      There are no preventive care reminders to display for this patient.      Hypertension Medications             amLODIPine (NORVASC) 10 MG tablet Take 0.5 tablets (5 mg total) by mouth 2 (two) times daily.    carvediloL (COREG) 12.5 MG tablet Take 1 tablet (12.5 mg total) by mouth 2 (two) times daily with meals.    doxazosin (CARDURA) 8 MG Tab TAKE 1  TABLET (8 MG TOTAL) BY MOUTH EVERY EVENING.    furosemide (LASIX) 40 MG tablet TAKE 1 TABLET TWICE DAILY    losartan (COZAAR) 50 MG tablet Take 1 tablet (50 mg total) by mouth 2 (two) times daily.

## 2020-11-05 ENCOUNTER — PATIENT OUTREACH (OUTPATIENT)
Dept: OTHER | Facility: OTHER | Age: 75
End: 2020-11-05

## 2020-11-05 NOTE — PROGRESS NOTES
Digital Medicine: Health  Follow-Up    Called to follow up with patient and introduce myself as his temporary health , current BP average 150/64 is not at goal <130/<80. Patient is doing well and reports no complaints/concerns at this time. Patient denied health coaching, goal setting, and resources, he is not interested in making lifestyle changes at this time.    Patient started new medication yesterday as [facility] clinician, Nayeli Ortega, prescribed. Patient denied any issues or side effects of new medication.          The history is provided by the patient.             Reason for review: Blood pressure not at goal              Diet-Not assessed          Physical Activity-Not assessed    Medication Adherence-Medication adherence was assessed.      Substance, Sleep, Stress-Not assessed      Additional monitoring needed. Will watch readings to see if new medication brings patient to goal bp  Continue current diet/physical activity routine. Denied lifestyle changes at this time       Addressed patient questions and patient has my contact information if needed prior to next outreach. Patient verbalizes understanding.      Explained the importance of self-monitoring and medication adherence. Encouraged the patient to communicate with their health  for lifestyle modifications to help improve or maintain a healthy lifestyle.                   Topic    Eye Exam     Hemoglobin A1C     Lipid (Cholesterol) Test          Last 5 Patient Entered Readings                                      Current 30 Day Average: 150/64     Recent Readings 11/5/2020 11/4/2020 11/3/2020 11/2/2020 10/31/2020    SBP (mmHg) 153 151 142 149 148    DBP (mmHg) 61 66 56 65 59    Pulse 47 46 41 44 44

## 2020-11-09 ENCOUNTER — PATIENT MESSAGE (OUTPATIENT)
Dept: OTHER | Facility: OTHER | Age: 75
End: 2020-11-09

## 2020-11-11 ENCOUNTER — PATIENT OUTREACH (OUTPATIENT)
Dept: OTHER | Facility: OTHER | Age: 75
End: 2020-11-11

## 2020-11-11 NOTE — PROGRESS NOTES
Digital Medicine: Clinician Follow-Up    Called patient for digital medicine follow up. At last follow up, metoprolol was switched to carvedilol. Patient started new regimen last week. He is tolerating well with no complaints. Blood pressure remains unchanged. Checks mostly early mornings right after breakfast and medications. We discussed giving medications some time to work and varying the time of his readings.     The history is provided by the patient.   Follow-up reason(s): medication change follow-up.     Hypertension    Patient's blood pressure is stable.   Patient is not experiencing signs/symptoms of hypotension.  Patient is not experiencing signs/symptoms of hypertension.      Patient did make medication change.    Is patient tolerating med change? yes            Last 5 Patient Entered Readings                                      Current 30 Day Average: 151/65     Recent Readings 11/11/2020 11/11/2020 11/11/2020 11/10/2020 11/9/2020    SBP (mmHg) 156 170 149 153 152    DBP (mmHg) 67 69 67 63 65    Pulse 49 50 46 49 49                 Depression Screening  Did not address depression screening.    Sleep Apnea Screening    Did not address sleep apnea screening.     Medication Affordability Screening  Did not address medication affordability screening.     Medication Adherence-Medication adherence was assessed.          ASSESSMENT(S)  Patients BP average is 151/65 mmHg, which is above goal. Patient's BP goal is less than or equal to 130/80.     Elevated readings likely due to technique. New regimen as of 1 week ago.       Hypertension Plan  Additional monitoring needed.  Continue current therapy.  Provided patient education.       Addressed patient questions and patient has my contact information if needed prior to next outreach. Patient verbalizes understanding.                 Topic    Eye Exam     Hemoglobin A1C     Lipid (Cholesterol) Test      There are no preventive care reminders to display for  this patient.      Hypertension Medications             amLODIPine (NORVASC) 10 MG tablet Take 0.5 tablets (5 mg total) by mouth 2 (two) times daily.    carvediloL (COREG) 12.5 MG tablet Take 1 tablet (12.5 mg total) by mouth 2 (two) times daily with meals.    doxazosin (CARDURA) 8 MG Tab TAKE 1 TABLET (8 MG TOTAL) BY MOUTH EVERY EVENING.    furosemide (LASIX) 40 MG tablet TAKE 1 TABLET TWICE DAILY    losartan (COZAAR) 50 MG tablet Take 1 tablet (50 mg total) by mouth 2 (two) times daily.

## 2020-12-02 ENCOUNTER — PATIENT OUTREACH (OUTPATIENT)
Dept: OTHER | Facility: OTHER | Age: 75
End: 2020-12-02

## 2020-12-02 NOTE — PROGRESS NOTES
"Digital Medicine: Health  Follow-Up    The history is provided by the patient.             Reason for review: Blood pressure not at goal      Additional Follow-up details: Patient reports he is well, no complaints. Denies symptoms of hypertension- HA, chest pains and SOB. Reports he had a cortizone shot 1 week ago for psoriasis on his leg.    Discussed COVID-19 and importance of proper hand hygiene and social distancing.     Celebrated his 75th birthday on Sunday. Denies changes to lifestyle since last call. Not interested in discussing health goals today. Patient denies further needs from the program today.     Patient reports he is interested in possibly increasing BP medication. Informed DM clinician via routed note.                   Diet-Change    Patient reports eating or drinking the following: Denies changes to diet since last call. Declined to discuss further today. Will attempt to discuss in more detail next call.        Physical Activity-no change to routine  No change to exercise routine.     Medication Adherence-Medication adherence was assessed.        Substance, Sleep, Stress-No change  stress-assessed  Details:"no stress"   Intervention(s):    Sleep-assessed  Details:"sleeping good"   Intervention(s):    Alcohol -  Details:  Intervention(s):    Tobacco-  Details:  Intervention(s):          Continue current diet/physical activity routine.  Provided patient education.       Addressed patient questions and patient has my contact information if needed prior to next outreach. Patient verbalizes understanding.      Explained the importance of self-monitoring and medication adherence. Encouraged the patient to communicate with their health  for lifestyle modifications to help improve or maintain a healthy lifestyle.                   Topic    Eye Exam     Hemoglobin A1C     Lipid (Cholesterol) Test          Last 5 Patient Entered Readings                                      Current 30 Day " Average: 155/66     Recent Readings 12/2/2020 12/1/2020 11/30/2020 11/28/2020 11/27/2020    SBP (mmHg) 153 161 149 153 155    DBP (mmHg) 63 65 64 68 67    Pulse 50 54 48 50 48

## 2020-12-03 ENCOUNTER — PATIENT OUTREACH (OUTPATIENT)
Dept: OTHER | Facility: OTHER | Age: 75
End: 2020-12-03

## 2020-12-03 DIAGNOSIS — I10 ESSENTIAL HYPERTENSION: Primary | ICD-10-CM

## 2020-12-07 RX ORDER — VALSARTAN 160 MG/1
160 TABLET ORAL DAILY
Qty: 90 TABLET | Refills: 1 | Status: SHIPPED | OUTPATIENT
Start: 2020-12-07 | End: 2021-01-12

## 2020-12-07 NOTE — PROGRESS NOTES
Digital Medicine: Clinician Follow-Up    Called patient for digital medicine follow up. Mr. Rutherford states that he is doing well. Continues to tolerate switch from metoprolol to carvedilol with no complaints. Blood pressure remains elevated but heart rate has improved slightly.     The history is provided by the patient.      Review of patient's allergies indicates:  No Known Allergies  Follow-up reason(s): routine follow up.     Hypertension    Patient's blood pressure is stable.   Patient is not experiencing signs/symptoms of hypotension.  Patient is not experiencing signs/symptoms of hypertension.            Last 5 Patient Entered Readings                                      Current 30 Day Average: 156/66     Recent Readings 12/7/2020 12/5/2020 12/4/2020 12/3/2020 12/2/2020    SBP (mmHg) 157 156 147 158 153    DBP (mmHg) 67 68 61 69 63    Pulse 50 49 50 48 50                 Depression Screening  Did not address depression screening.    Sleep Apnea Screening    Did not address sleep apnea screening.     Medication Affordability Screening  Did not address medication affordability screening.     Medication Adherence-Medication adherence was assessed.          ASSESSMENT(S)  Patients BP average is 156/66 mmHg, which is above goal. Patient's BP goal is less than or equal to 130/80.     Hypertension Plan  Hypertension Medication Change. Stop losartan and start valsartan 160 mg daily. Continue other medications as prescribed.   Provided patient education.  Unable to maximize BB due to low heart rate.      Addressed patient questions and patient has my contact information if needed prior to next outreach. Patient verbalizes understanding.                 Topic    Eye Exam     Hemoglobin A1C     Lipid (Cholesterol) Test      There are no preventive care reminders to display for this patient.      Hypertension Medications             amLODIPine (NORVASC) 10 MG tablet Take 0.5 tablets (5 mg total) by mouth 2 (two)  times daily.    carvediloL (COREG) 12.5 MG tablet Take 1 tablet (12.5 mg total) by mouth 2 (two) times daily with meals.    doxazosin (CARDURA) 8 MG Tab TAKE 1 TABLET (8 MG TOTAL) BY MOUTH EVERY EVENING.    furosemide (LASIX) 40 MG tablet TAKE 1 TABLET TWICE DAILY    valsartan (DIOVAN) 160 MG tablet Take 1 tablet (160 mg total) by mouth once daily.

## 2020-12-21 ENCOUNTER — PATIENT OUTREACH (OUTPATIENT)
Dept: OTHER | Facility: OTHER | Age: 75
End: 2020-12-21

## 2020-12-21 ENCOUNTER — RESEARCH ENCOUNTER (OUTPATIENT)
Dept: NEUROLOGY | Facility: CLINIC | Age: 75
End: 2020-12-21

## 2020-12-22 NOTE — PROGRESS NOTES
Digital Medicine: Clinician Follow-Up    Called patient for digital medicine follow up. Started valsartan in place of losartan last week. Tolerating well so far with no complaints.     The history is provided by the patient.   Follow-up reason(s): medication change follow-up.     Hypertension    Patient's blood pressure readings are labile.   Patient is not experiencing signs/symptoms of hypotension.  Patient is not experiencing signs/symptoms of hypertension.            Last 5 Patient Entered Readings                                      Current 30 Day Average: 155/66     Recent Readings 12/21/2020 12/19/2020 12/18/2020 12/17/2020 12/16/2020    SBP (mmHg) 142 162 146 143 177    DBP (mmHg) 59 69 61 61 76    Pulse 47 47 50 47 51                 Depression Screening  Did not address depression screening.    Sleep Apnea Screening    Did not address sleep apnea screening.     Medication Affordability Screening  Did not address medication affordability screening.     Medication Adherence-Medication adherence was assessed.          ASSESSMENT(S)  Patients BP average is 155/66 mmHg, which is above goal. Patient's BP goal is less than or equal to 130/80.     Hypertension Plan  Additional monitoring needed.  Continue current therapy.  Plan to schedule repeat BMP at next follow up. May need to maximize valsartan dose pending labs and review of readings if not trending down.      Addressed patient questions and patient has my contact information if needed prior to next outreach. Patient verbalizes understanding.                 Topic    Eye Exam     Hemoglobin A1C     Lipid (Cholesterol) Test      There are no preventive care reminders to display for this patient.      Hypertension Medications             amLODIPine (NORVASC) 10 MG tablet Take 0.5 tablets (5 mg total) by mouth 2 (two) times daily.    carvediloL (COREG) 12.5 MG tablet Take 1 tablet (12.5 mg total) by mouth 2 (two) times daily with meals.    doxazosin  (CARDURA) 8 MG Tab TAKE 1 TABLET (8 MG TOTAL) BY MOUTH EVERY EVENING.    furosemide (LASIX) 40 MG tablet TAKE 1 TABLET TWICE DAILY    valsartan (DIOVAN) 160 MG tablet Take 1 tablet (160 mg total) by mouth once daily.

## 2021-01-27 ENCOUNTER — LAB VISIT (OUTPATIENT)
Dept: LAB | Facility: HOSPITAL | Age: 76
End: 2021-01-27
Attending: INTERNAL MEDICINE
Payer: MEDICARE

## 2021-01-27 DIAGNOSIS — I10 ESSENTIAL HYPERTENSION: ICD-10-CM

## 2021-01-27 LAB
ANION GAP SERPL CALC-SCNC: 7 MMOL/L (ref 8–16)
CALCIUM SERPL-MCNC: 9.4 MG/DL (ref 8.7–10.5)
CHLORIDE SERPL-SCNC: 104 MMOL/L (ref 95–110)
CO2 SERPL-SCNC: 31 MMOL/L (ref 23–29)
CREAT SERPL-MCNC: 1.59 MG/DL (ref 0.5–1.4)
EST. GFR  (AFRICAN AMERICAN): 47.7 ML/MIN/1.73 M^2
EST. GFR  (NON AFRICAN AMERICAN): 41.3 ML/MIN/1.73 M^2
GLUCOSE SERPL-MCNC: 119 MG/DL (ref 70–110)
POTASSIUM SERPL-SCNC: 4.6 MMOL/L (ref 3.5–5.1)
SODIUM SERPL-SCNC: 142 MMOL/L (ref 136–145)
UUN UR-MCNC: 31 MG/DL (ref 2–20)

## 2021-01-27 PROCEDURE — 36415 COLL VENOUS BLD VENIPUNCTURE: CPT | Mod: PO

## 2021-01-27 PROCEDURE — 80048 BASIC METABOLIC PNL TOTAL CA: CPT | Mod: PO

## 2021-01-29 LAB
LEFT EYE DM RETINOPATHY: NEGATIVE
RIGHT EYE DM RETINOPATHY: POSITIVE

## 2021-02-23 ENCOUNTER — RESEARCH ENCOUNTER (OUTPATIENT)
Dept: NEUROLOGY | Facility: CLINIC | Age: 76
End: 2021-02-23

## 2021-03-04 NOTE — SUBJECTIVE & OBJECTIVE
Review of Systems   Constitution: Negative for chills, decreased appetite, diaphoresis and fever.   Cardiovascular: Negative for chest pain, claudication, cyanosis, dyspnea on exertion, irregular heartbeat, leg swelling, near-syncope, orthopnea, palpitations, paroxysmal nocturnal dyspnea and syncope.   Respiratory: Negative for cough, hemoptysis, shortness of breath and wheezing.    Gastrointestinal: Negative for bloating, abdominal pain, constipation, diarrhea, melena, nausea and vomiting.   Neurological: Negative for dizziness and weakness.     Objective:     Vital Signs (Most Recent):  Temp: 98.9 °F (37.2 °C) (12/10/19 0645)  Pulse: 80 (12/10/19 1015)  Resp: (!) 23 (12/10/19 1015)  BP: (!) 156/69 (12/10/19 1000)  SpO2: 98 % (12/10/19 1015) Vital Signs (24h Range):  Temp:  [98 °F (36.7 °C)-99.5 °F (37.5 °C)] 98.9 °F (37.2 °C)  Pulse:  [74-89] 80  Resp:  [17-34] 23  SpO2:  [81 %-100 %] 98 %  BP: (132-178)/() 156/69     Weight: 106.1 kg (233 lb 14.5 oz)  Body mass index is 34.05 kg/m².     SpO2: 98 %  O2 Device (Oxygen Therapy): nasal cannula      Intake/Output Summary (Last 24 hours) at 12/10/2019 1150  Last data filed at 12/10/2019 1000  Gross per 24 hour   Intake 1899.5 ml   Output 2830 ml   Net -930.5 ml       Lines/Drains/Airways     Central Venous Catheter Line                 Trialysis (Dialysis) Catheter 12/09/19 1230 right internal jugular less than 1 day          Drain                 Urethral Catheter 12/03/19 0445 16 Fr. 7 days                Physical Exam   Constitutional: He is oriented to person, place, and time. He appears well-developed and well-nourished. No distress.   Cardiovascular: Normal rate and regular rhythm. Exam reveals no gallop.   No murmur heard.  Pulmonary/Chest: Effort normal. No respiratory distress. He has decreased breath sounds. He has no wheezes.   Abdominal: Soft. Bowel sounds are normal. He exhibits no distension. There is no tenderness.   Musculoskeletal: He  Hospital Medicine Progress Note      Admit Date: 2/26/2021       CC: F/U for SOB, UTI    HPI: The patient is a 78 yrs old female, who  has a past medical history of Contraindication to anticoagulation therapy, Hiatal hernia, High blood pressure, Neurogenic bladder, Parkinson disease ([address] pulmonary embolism, and Suprapubic catheter ([address]  The patient had recently been admitted for R upper and lower lobe PE (12/31). She was initially treated with heparin gtt and was transitioned to PO eliquis. Initially there was some concern for [facility] given that she is a fall risk and has an IVC filter. Ultimately she was discharged on eliquis. At the time, US of BLE was negative for DVT. She presented to [facility] ER on the date of admission with complaints of abd pain, nausea, vomiting, and coffee ground emesis. Furthermore - she was also constipated. CT A/P showed a small bowel obstruction with transition point in the RLQ of the abdomen, a suspected thrombus in the R femoral vein, bilateral inguinal hernias and a hiatal hernia. H&H was 11.8 and 35.2 at the time of presentation - which was close to her baseline. She was admitted for further workup and treatment of SBO. NGT was inserted. PPI Gtt was initiated. AC was held. H&H was monitored. GI was consulted. The patient underwent EGD on 2/26/21, which showed GI bleeding from gastritis. General surgery was consulted but SBO was managed conservatively with NGT and bowel rest. The patient had been started on IV rocephin for presumed UTI. UC resulted positive for pseudomonas. Abx were adjusted to cefepime for pseudomonal coverage.      3/1: replace K today. Cont NG tube per Gen Surg. States she is keeping water and ice down. No abd pain. No further vomiting.       3/2: still waiting to have BM. Can do suppos and start her miralax.  Ok to remove NG tube.  Cont cefepime for pseudomonas UTI.     3/3: tolerating clears. meds in applesauce.   Today will be last day of antibx for UTI     Replace potassium     Stable and d/c when ready per gen surg based on oral intake and tolerance of diet and BM. Interval History/Subjective: just passing gas. No BM. Ate cream of wheat. Wants prune juice. H/H stable. Back on eliquis. Review of Systems:       The patient denied headaches, visual changes, LOC, SOB, CP, ABD pain, N/V/D, skin changes, new or worsening weakness or neuromuscular deficits. Comprehensive ROS negative except as mentioned above. Past Medical History:        Diagnosis Date    Contraindication to anticoagulation therapy     fall risk    Hiatal hernia     High blood pressure     Neurogenic bladder     Parkinson disease ([facility] [address]     pulmonary embolism 12/13/2020    right upper and lower lobes    Suprapubic catheter [facility])        Past Surgical History:        Procedure Laterality Date    ABDOMEN SURGERY      bowel obstruction    APPENDECTOMY  1950    CYSTOSCOPY N/A 12/03/2020    CYSTOSCOPY,  WITH INTRAVESICAL INJECTION OF BOTOX 200UNITS, WITH SUPRAPUBIC TUBE EXCHANGE performed by Kat Hurd MD at [id number] [facility]    IVC FILTER INSERTION Right 12/17/2020    Dr. Osbaldo Ayala. Bessy retreivable IVC filter    TONSILLECTOMY AND ADENOIDECTOMY  1943    UPPER GASTROINTESTINAL ENDOSCOPY N/A 2/26/2021    EGD BIOPSY performed by Ally Orta MD at [facility]  2006    repair vagina bladder and rectum       Allergies:  Caduet [amlodipine-atorvastatin], Estrogens conjugated, Penicillins, and Terramycin [oxytetracycline-lidocaine]    Past medical and surgical history reviewed. Any changes have been noted.      PHYSICAL EXAM:  BP (!) 148/77   Pulse 78   Temp 98.4 °F (36.9 °C) (Oral)   Resp 16   Ht 4' 11\" (1.499 m)   Wt 148 lb 5.9 oz (67.3 kg)   SpO2 91%   BMI 29.97 kg/m²       Intake/Output Summary (Last 24 hours) at 3/4/2021 0803  Last data filed at 3/4/2021 1332  Gross exhibits edema.   Neurological: He is alert and oriented to person, place, and time.   Skin: Skin is warm and dry.       Significant Labs:     Recent Labs   Lab 12/10/19  0528   WBC 12.13   RBC 3.06*   HGB 9.1*   HCT 27.5*      MCV 90   MCH 29.7   MCHC 33.1     Recent Labs   Lab 12/10/19  0528   *   K 3.3*      CO2 25   BUN 82*   CREATININE 4.0*       Significant Imaging: Echocardiogram:   Transthoracic echo (TTE) complete (Cupid Only):   Results for orders placed or performed during the hospital encounter of 12/02/19   Echo Color Flow Doppler? Yes   Result Value Ref Range    BSA 2.3 m2    TDI SEPTAL 0.06 m/s    LV LATERAL E/E' RATIO 9.00 m/s    LV SEPTAL E/E' RATIO 10.50 m/s    TDI LATERAL 0.07 m/s    PV PEAK VELOCITY 1.81 cm/s    LVIDD 4.80 3.5 - 6.0 cm    IVS 1.10 (A) 0.6 - 1.1 cm    PW 1.10 (A) 0.6 - 1.1 cm    Ao root annulus 3.40 cm    LVIDS 2.56 2.1 - 4.0 cm    FS 47 28 - 44 %    LV mass 193.96 g    LA size 3.68 cm    TAPSE 1.44 cm    Left Ventricle Relative Wall Thickness 0.46 cm    AV mean gradient 6 mmHg    AV valve area 4.31 cm2    AV Velocity Ratio 0.78     AV index (prosthetic) 1.06     E/A ratio 0.62     Mean e' 0.07 m/s    E wave decelartion time 338.80 msec    LVOT diameter 2.27 cm    LVOT area 4.0 cm2    LVOT peak simone 1.16 m/s    LVOT peak VTI 26.25 cm    Ao peak simone 1.48 m/s    Ao VTI 24.66 cm    LVOT stroke volume 106.18 cm3    AV peak gradient 9 mmHg    E/E' ratio 9.69 m/s    MV Peak E Simone 0.63 m/s    MV Peak A Simone 1.02 m/s    LV Systolic Volume 23.57 mL    LV Systolic Volume Index 10.6 mL/m2    LV Diastolic Volume 68.84 mL    LV Diastolic Volume Index 30.84 mL/m2    LV Mass Index 87 g/m2    Right Atrial Pressure (from IVC) 3 mmHg    Narrative    · Normal left ventricular systolic function. The estimated ejection   fraction is 55%  · Concentric left ventricular remodeling.  · No wall motion abnormalities.  · Normal right ventricular systolic function.  · Normal central venous  per 24 hour   Intake 2800 ml   Output 1675 ml   Net 1125 ml        General appearance:   No apparent distress, appears stated age. Cooperative. HEENT:  Normocephalic, atraumatic. PERRLA. EOMi. Conjunctivae/corneas clear, no icterus, non-injected. Neck: Supple, with full range of motion. No jugular venous distention. Trachea midline. Respiratory:  Normal respiratory effort. Clear to auscultation, bilaterally without Rales/Wheezes/Rhonchi. Cardiovascular:  Regular rate and rhythm without murmurs, rubs or gallops. Abdomen: Soft, non-tender, non-distended, without rebound or guarding. Normal bowel sounds. Musculoskeletal:  No clubbing, cyanosis or edema bilaterally. Full range of motion without deformity. Skin: Skin color, texture, turgor normal.  No rashes or lesions. Neurologic:  Neurovascularly intact without any focal sensory/motor deficits. Cranial nerves: II-XII intact, grossly intact. No facial asymmetry, tongue midline. Psychiatric:  Alert and oriented, thought content appropriate  Capillary Refill: Brisk,< 3 seconds   Peripheral Pulses: +2 palpable, equal bilaterally       LABS:    Lab Results   Component Value Date    WBC 6.1 03/04/2021    HGB 8.8 (L) 03/04/2021    HCT 26.6 (L) 03/04/2021    MCV 85.5 03/04/2021     03/04/2021    LYMPHOPCT 24.4 03/04/2021    RBC 3.11 (L) 03/04/2021    MCH 28.4 03/04/2021    MCHC 33.2 03/04/2021    RDW 15.3 03/04/2021       Lab Results   Component Value Date    CREATININE <0.5 (L) 03/04/2021    BUN 3 (L) 03/04/2021     03/04/2021    K 3.1 (L) 03/04/2021     03/04/2021    CO2 26 03/04/2021       Lab Results   Component Value Date    MG 1.70 03/04/2021       Lab Results   Component Value Date    ALT 17 02/26/2021    AST 19 02/26/2021    ALKPHOS 102 02/26/2021    BILITOT <0.2 02/26/2021        No flowsheet data found.     No results found for: LABA1C    Imaging:  XR ABDOMEN (KUB) (SINGLE AP VIEW)   Final Result   NG tube tip and proximal side-port pressure (3 mm Hg).         reside in the stomach. There are   gas-filled loops of large and small bowel suggesting an ileus. The distal   small bowel obstruction noted on CT from 1 day earlier is not perceived on   the conventional radiographs. CT ABDOMEN PELVIS W IV CONTRAST Additional Contrast? None   Final Result   1. Small bowel obstruction with a transition point in the right lower   quadrant of the abdomen. 2. Gallstones without adjacent inflammatory changes. 3. Hiatal hernia. 4. Trace right pleural effusion. 5. Suspected thrombus in the right femoral vein. 6. Bilateral inguinal hernias. 7. Suprapubic catheter in the bladder lumen. XR CHEST PORTABLE   Final Result   Low lung volumes with bibasilar atelectasis, left side greater than right. There may be trace bilateral pleural effusions. Scheduled and prn Medications:    Scheduled Meds:   polyethylene glycol  17 g Oral Daily    [Held by provider] enoxaparin  30 mg Subcutaneous Daily    apixaban  5 mg Oral BID    esomeprazole  40 mg Intravenous QAM AC    mirtazapine  7.5 mg Oral Nightly    lisinopril  5 mg Oral Daily    pravastatin  20 mg Oral Nightly    sertraline  50 mg Oral Daily    lactobacillus  1 capsule Oral BID WC    carbidopa-levodopa  1 tablet Oral 5x Daily    verapamil  360 mg Oral QAM    rivastigmine  1.5 mg Oral BID    carbidopa-levodopa  1 tablet Oral Nightly    sodium chloride flush  10 mL Intravenous 2 times per day     Continuous Infusions:   sodium chloride 100 mL/hr at 03/03/21 2320     PRN Meds:.potassium chloride **OR** potassium alternative oral replacement **OR** potassium chloride, polyvinyl alcohol-povidone, acetaminophen **OR** acetaminophen, sodium chloride flush, phenol, promethazine **OR** ondansetron    Assessment & Plan:        Diet NPO Effective Now  PRN analgesia  No peritoneal signs  IV push for PPI - stop infusion  Treating UTI with IV antibx- will be done 3/3  General Surgery following.   GI nexium    Diet: Dietary Nutrition Supplements: Clear Liquid Oral Supplement  DIET FULL LIQUID;    Consults:  IP CONSULT TO HOSPITALIST  IP CONSULT TO GENERAL SURGERY  IP CONSULT TO GENERAL SURGERY  IP CONSULT TO GI  IP CONSULT TO SPIRITUAL SERVICES    DISPO/placement plan: back to [facility] if able in next couple days if continues to improve    Code Status: DNR-CCA      WILMAR Newman - JOAN  03/04/21

## 2021-03-13 NOTE — PROGRESS NOTES
"Last 5 Patient Entered Readings                                      Current 30 Day Average: 147/65     Recent Readings 4/3/2019 4/3/2019 4/3/2019 4/3/2019 4/2/2019    SBP (mmHg) 127 124 148 149 138    DBP (mmHg) 56 49 64 63 63    Pulse 48 49 50 54 49        Patient has been taking BP readings while sitting in a chair, arm supported, sitting for 5 minutes, and waiting at least an hour after taking BP medications. Reviewed proper BP technique and importance of adherence to ensure accurate readings.    Digital Medicine: Health  Introduction    Introduced Mr. Cy Rutherford to Digital Medicine. Discussed health  role and recommended lifestyle modifications.    Lifestyle Assessment:  Current Dietary Habits(i.e. low sodium, food labels, dining out): Patient reports he lives alone and is currently recovering from a stroke.  lives alone and recovering from stroke. Typical meals consist of the following:    Breakfast: Eggos, breakfast sandwich, yogurt, juice or coffee   Lunch: sandwich and chips   Dinner: microwave-meal <1,000 mg sodium per meal   Snacks: Peanut butter crackers, fruits   Beverages: juice, coffee, diet coke, lemonade, limited water intake    Exercise: "Moderate" activity level, stays active with occupational therapy, grocery shopping and house work.    Alcohol/Tobacco: Drink alcohol "on occasion".      Medication Adherence: has been compliant with the medicaiton regimen.     Reviewed AHA/AACE recommendations:  Limit sodium intake to <2000mg/day  Recommended CHO intake, 45-65% of daily caloric intake   Perform 150 minutes of physical activity per week    Reviewed the importance of self-monitoring, medication adherence, and that the health  can be used as a resource for lifestyle modifications to help reduce or maintain a healthy lifestyle.  Reviewed that the Digital Medicine team is not available for emergencies and instructed the patient to call 911 or [facility] ([phone] or " [phone]) if one arises.       REVIEW OF SYSTEMS:    CONSTITUTIONAL: No weakness, fevers or chills  EYES/ENT: No visual changes;  No vertigo or throat pain   NECK: No pain or stiffness  RESPIRATORY: No cough, wheezing, hemoptysis; No shortness of breath  CARDIOVASCULAR: No chest pain or palpitations  GASTROINTESTINAL: No abdominal or epigastric pain. No nausea, vomiting, or hematemesis; No diarrhea or constipation. No melena or hematochezia.  GENITOURINARY: No dysuria, frequency or hematuria  NEUROLOGICAL: No numbness or weakness  SKIN: No itching, burning, rashes, or lesions   PSYCH: No change in mood  All other review of systems is negative unless indicated above. REVIEW OF SYSTEMS:  Unable to obtain ROS due to mental status.

## 2021-03-30 ENCOUNTER — TELEPHONE (OUTPATIENT)
Dept: FAMILY MEDICINE | Facility: CLINIC | Age: 76
End: 2021-03-30

## 2021-04-14 ENCOUNTER — OFFICE VISIT (OUTPATIENT)
Dept: FAMILY MEDICINE | Facility: CLINIC | Age: 76
End: 2021-04-14
Payer: MEDICARE

## 2021-04-14 VITALS
HEART RATE: 51 BPM | HEIGHT: 69 IN | BODY MASS INDEX: 36.51 KG/M2 | DIASTOLIC BLOOD PRESSURE: 60 MMHG | OXYGEN SATURATION: 99 % | SYSTOLIC BLOOD PRESSURE: 122 MMHG | WEIGHT: 246.5 LBS

## 2021-04-14 DIAGNOSIS — G81.91 RIGHT HEMIPLEGIA: ICD-10-CM

## 2021-04-14 DIAGNOSIS — N18.32 TYPE 2 DIABETES MELLITUS WITH STAGE 3B CHRONIC KIDNEY DISEASE, WITH LONG-TERM CURRENT USE OF INSULIN: ICD-10-CM

## 2021-04-14 DIAGNOSIS — I10 ESSENTIAL HYPERTENSION: ICD-10-CM

## 2021-04-14 DIAGNOSIS — I25.10 CORONARY ARTERY DISEASE INVOLVING NATIVE CORONARY ARTERY OF NATIVE HEART WITHOUT ANGINA PECTORIS: Primary | ICD-10-CM

## 2021-04-14 DIAGNOSIS — N18.32 STAGE 3B CHRONIC KIDNEY DISEASE: ICD-10-CM

## 2021-04-14 DIAGNOSIS — I69.398 ABNORMALITY OF GAIT AS LATE EFFECT OF CEREBROVASCULAR ACCIDENT (CVA): ICD-10-CM

## 2021-04-14 DIAGNOSIS — E11.22 TYPE 2 DIABETES MELLITUS WITH STAGE 3B CHRONIC KIDNEY DISEASE, WITH LONG-TERM CURRENT USE OF INSULIN: ICD-10-CM

## 2021-04-14 DIAGNOSIS — R26.9 ABNORMALITY OF GAIT AS LATE EFFECT OF CEREBROVASCULAR ACCIDENT (CVA): ICD-10-CM

## 2021-04-14 DIAGNOSIS — I73.9 PAD (PERIPHERAL ARTERY DISEASE): ICD-10-CM

## 2021-04-14 DIAGNOSIS — Z79.4 TYPE 2 DIABETES MELLITUS WITH STAGE 3B CHRONIC KIDNEY DISEASE, WITH LONG-TERM CURRENT USE OF INSULIN: ICD-10-CM

## 2021-04-14 DIAGNOSIS — E78.5 DYSLIPIDEMIA: ICD-10-CM

## 2021-04-14 PROBLEM — I67.2 INTRACRANIAL ATHEROSCLEROSIS: Status: RESOLVED | Noted: 2018-05-08 | Resolved: 2021-04-14

## 2021-04-14 PROBLEM — E87.6 HYPOKALEMIA: Status: RESOLVED | Noted: 2019-12-13 | Resolved: 2021-04-14

## 2021-04-14 PROBLEM — K59.00 CONSTIPATION: Status: RESOLVED | Noted: 2019-12-12 | Resolved: 2021-04-14

## 2021-04-14 PROBLEM — N17.9 AKI (ACUTE KIDNEY INJURY): Status: RESOLVED | Noted: 2019-12-03 | Resolved: 2021-04-14

## 2021-04-14 PROBLEM — I50.32 CHRONIC DIASTOLIC HEART FAILURE: Status: ACTIVE | Noted: 2019-12-08

## 2021-04-14 PROBLEM — D64.9 ANEMIA: Status: RESOLVED | Noted: 2019-12-03 | Resolved: 2021-04-14

## 2021-04-14 PROBLEM — M62.89 TIGHTNESS OF RIGHT GASTROCNEMIUS MUSCLE: Status: RESOLVED | Noted: 2019-05-17 | Resolved: 2021-04-14

## 2021-04-14 PROBLEM — Z00.6 RESEARCH SUBJECT: Status: RESOLVED | Noted: 2018-06-11 | Resolved: 2021-04-14

## 2021-04-14 PROCEDURE — 99499 UNLISTED E&M SERVICE: CPT | Mod: S$GLB,,, | Performed by: INTERNAL MEDICINE

## 2021-04-14 PROCEDURE — 99203 OFFICE O/P NEW LOW 30 MIN: CPT | Mod: S$GLB,,, | Performed by: INTERNAL MEDICINE

## 2021-04-14 PROCEDURE — 99203 PR OFFICE/OUTPT VISIT, NEW, LEVL III, 30-44 MIN: ICD-10-PCS | Mod: S$GLB,,, | Performed by: INTERNAL MEDICINE

## 2021-04-14 PROCEDURE — 99999 PR PBB SHADOW E&M-EST. PATIENT-LVL IV: ICD-10-PCS | Mod: PBBFAC,,, | Performed by: INTERNAL MEDICINE

## 2021-04-14 PROCEDURE — 1159F MED LIST DOCD IN RCRD: CPT | Mod: S$GLB,,, | Performed by: INTERNAL MEDICINE

## 2021-04-14 PROCEDURE — 1159F PR MEDICATION LIST DOCUMENTED IN MEDICAL RECORD: ICD-10-PCS | Mod: S$GLB,,, | Performed by: INTERNAL MEDICINE

## 2021-04-14 PROCEDURE — 99999 PR PBB SHADOW E&M-EST. PATIENT-LVL IV: CPT | Mod: PBBFAC,,, | Performed by: INTERNAL MEDICINE

## 2021-04-14 PROCEDURE — 99499 RISK ADDL DX/OHS AUDIT: ICD-10-PCS | Mod: S$GLB,,, | Performed by: INTERNAL MEDICINE

## 2021-04-29 ENCOUNTER — PATIENT OUTREACH (OUTPATIENT)
Dept: ADMINISTRATIVE | Facility: HOSPITAL | Age: 76
End: 2021-04-29

## 2021-05-11 ENCOUNTER — RESEARCH ENCOUNTER (OUTPATIENT)
Dept: RESEARCH | Facility: HOSPITAL | Age: 76
End: 2021-05-11
Payer: MEDICARE

## 2021-05-11 ENCOUNTER — OFFICE VISIT (OUTPATIENT)
Dept: CARDIOLOGY | Facility: CLINIC | Age: 76
End: 2021-05-11
Payer: MEDICARE

## 2021-05-11 VITALS
SYSTOLIC BLOOD PRESSURE: 156 MMHG | BODY MASS INDEX: 37 KG/M2 | HEART RATE: 47 BPM | HEIGHT: 69 IN | WEIGHT: 249.81 LBS | DIASTOLIC BLOOD PRESSURE: 57 MMHG

## 2021-05-11 DIAGNOSIS — I69.398 ABNORMALITY OF GAIT AS LATE EFFECT OF CEREBROVASCULAR ACCIDENT (CVA): ICD-10-CM

## 2021-05-11 DIAGNOSIS — R26.9 ABNORMALITY OF GAIT AS LATE EFFECT OF CEREBROVASCULAR ACCIDENT (CVA): ICD-10-CM

## 2021-05-11 DIAGNOSIS — I50.32 CHRONIC DIASTOLIC HEART FAILURE: ICD-10-CM

## 2021-05-11 DIAGNOSIS — N18.32 TYPE 2 DIABETES MELLITUS WITH STAGE 3B CHRONIC KIDNEY DISEASE, WITH LONG-TERM CURRENT USE OF INSULIN: ICD-10-CM

## 2021-05-11 DIAGNOSIS — Z79.4 TYPE 2 DIABETES MELLITUS WITH STAGE 3B CHRONIC KIDNEY DISEASE, WITH LONG-TERM CURRENT USE OF INSULIN: ICD-10-CM

## 2021-05-11 DIAGNOSIS — I10 ESSENTIAL HYPERTENSION: ICD-10-CM

## 2021-05-11 DIAGNOSIS — E78.5 DYSLIPIDEMIA: ICD-10-CM

## 2021-05-11 DIAGNOSIS — N18.32 STAGE 3B CHRONIC KIDNEY DISEASE: ICD-10-CM

## 2021-05-11 DIAGNOSIS — I25.10 CORONARY ARTERY DISEASE INVOLVING NATIVE CORONARY ARTERY OF NATIVE HEART WITHOUT ANGINA PECTORIS: Primary | ICD-10-CM

## 2021-05-11 DIAGNOSIS — Z86.73 HISTORY OF ISCHEMIC VERTEBROBASILAR ARTERY BRAINSTEM STROKE: ICD-10-CM

## 2021-05-11 DIAGNOSIS — E78.2 MIXED HYPERLIPIDEMIA: ICD-10-CM

## 2021-05-11 DIAGNOSIS — Z86.73 HISTORY OF CVA IN ADULTHOOD: ICD-10-CM

## 2021-05-11 DIAGNOSIS — I67.9 LACUNAR ATAXIC HEMIPARESIS: ICD-10-CM

## 2021-05-11 DIAGNOSIS — G46.7 LACUNAR ATAXIC HEMIPARESIS: ICD-10-CM

## 2021-05-11 DIAGNOSIS — Z95.5 STATUS POST CORONARY ARTERY STENT PLACEMENT: ICD-10-CM

## 2021-05-11 DIAGNOSIS — I73.9 PAD (PERIPHERAL ARTERY DISEASE): ICD-10-CM

## 2021-05-11 DIAGNOSIS — I73.9 CLAUDICATION: ICD-10-CM

## 2021-05-11 DIAGNOSIS — E11.22 TYPE 2 DIABETES MELLITUS WITH STAGE 3B CHRONIC KIDNEY DISEASE, WITH LONG-TERM CURRENT USE OF INSULIN: ICD-10-CM

## 2021-05-11 PROCEDURE — 3288F FALL RISK ASSESSMENT DOCD: CPT | Mod: CPTII,S$GLB,, | Performed by: INTERNAL MEDICINE

## 2021-05-11 PROCEDURE — 99214 PR OFFICE/OUTPT VISIT, EST, LEVL IV, 30-39 MIN: ICD-10-PCS | Mod: S$GLB,,, | Performed by: INTERNAL MEDICINE

## 2021-05-11 PROCEDURE — 1126F AMNT PAIN NOTED NONE PRSNT: CPT | Mod: S$GLB,,, | Performed by: INTERNAL MEDICINE

## 2021-05-11 PROCEDURE — 99499 RISK ADDL DX/OHS AUDIT: ICD-10-PCS | Mod: S$GLB,,, | Performed by: INTERNAL MEDICINE

## 2021-05-11 PROCEDURE — 93005 ELECTROCARDIOGRAM TRACING: CPT | Mod: PO

## 2021-05-11 PROCEDURE — 1101F PT FALLS ASSESS-DOCD LE1/YR: CPT | Mod: CPTII,S$GLB,, | Performed by: INTERNAL MEDICINE

## 2021-05-11 PROCEDURE — 1101F PR PT FALLS ASSESS DOC 0-1 FALLS W/OUT INJ PAST YR: ICD-10-PCS | Mod: CPTII,S$GLB,, | Performed by: INTERNAL MEDICINE

## 2021-05-11 PROCEDURE — 99999 PR PBB SHADOW E&M-EST. PATIENT-LVL IV: ICD-10-PCS | Mod: PBBFAC,,, | Performed by: INTERNAL MEDICINE

## 2021-05-11 PROCEDURE — 93010 EKG 12-LEAD: ICD-10-PCS | Mod: S$GLB,,, | Performed by: INTERNAL MEDICINE

## 2021-05-11 PROCEDURE — 3288F PR FALLS RISK ASSESSMENT DOCUMENTED: ICD-10-PCS | Mod: CPTII,S$GLB,, | Performed by: INTERNAL MEDICINE

## 2021-05-11 PROCEDURE — 99214 OFFICE O/P EST MOD 30 MIN: CPT | Mod: S$GLB,,, | Performed by: INTERNAL MEDICINE

## 2021-05-11 PROCEDURE — 93010 ELECTROCARDIOGRAM REPORT: CPT | Mod: S$GLB,,, | Performed by: INTERNAL MEDICINE

## 2021-05-11 PROCEDURE — 1126F PR PAIN SEVERITY QUANTIFIED, NO PAIN PRESENT: ICD-10-PCS | Mod: S$GLB,,, | Performed by: INTERNAL MEDICINE

## 2021-05-11 PROCEDURE — 99999 PR PBB SHADOW E&M-EST. PATIENT-LVL IV: CPT | Mod: PBBFAC,,, | Performed by: INTERNAL MEDICINE

## 2021-05-11 PROCEDURE — 1159F MED LIST DOCD IN RCRD: CPT | Mod: S$GLB,,, | Performed by: INTERNAL MEDICINE

## 2021-05-11 PROCEDURE — 1159F PR MEDICATION LIST DOCUMENTED IN MEDICAL RECORD: ICD-10-PCS | Mod: S$GLB,,, | Performed by: INTERNAL MEDICINE

## 2021-05-11 PROCEDURE — 99499 UNLISTED E&M SERVICE: CPT | Mod: S$GLB,,, | Performed by: INTERNAL MEDICINE

## 2021-05-11 RX ORDER — HYDROCHLOROTHIAZIDE 12.5 MG/1
12.5 TABLET ORAL EVERY MORNING
Qty: 90 TABLET | Refills: 6 | Status: SHIPPED | OUTPATIENT
Start: 2021-05-11 | End: 2021-07-13 | Stop reason: SDUPTHER

## 2021-05-11 RX ORDER — CARVEDILOL 6.25 MG/1
6.25 TABLET ORAL 2 TIMES DAILY
Qty: 180 TABLET | Refills: 5 | Status: SHIPPED | OUTPATIENT
Start: 2021-05-11 | End: 2021-11-18

## 2021-05-11 RX ORDER — CLONIDINE HYDROCHLORIDE 0.1 MG/1
0.1 TABLET ORAL 3 TIMES DAILY PRN
Qty: 90 TABLET | Refills: 6 | Status: SHIPPED | OUTPATIENT
Start: 2021-05-11 | End: 2021-10-12

## 2021-05-11 RX ORDER — AMLODIPINE BESYLATE 5 MG/1
5 TABLET ORAL NIGHTLY
Qty: 90 TABLET | Refills: 5 | Status: SHIPPED | OUTPATIENT
Start: 2021-05-11 | End: 2022-06-15

## 2021-05-12 ENCOUNTER — PATIENT MESSAGE (OUTPATIENT)
Dept: ADMINISTRATIVE | Facility: OTHER | Age: 76
End: 2021-05-12

## 2021-05-28 ENCOUNTER — TELEPHONE (OUTPATIENT)
Dept: CARDIOLOGY | Facility: CLINIC | Age: 76
End: 2021-05-28

## 2021-06-03 ENCOUNTER — HOSPITAL ENCOUNTER (OUTPATIENT)
Dept: CARDIOLOGY | Facility: HOSPITAL | Age: 76
Discharge: HOME | End: 2021-06-03
Attending: INTERNAL MEDICINE
Payer: MEDICARE

## 2021-06-03 DIAGNOSIS — I73.9 PAD (PERIPHERAL ARTERY DISEASE): ICD-10-CM

## 2021-06-03 PROCEDURE — 93925 LOWER EXTREMITY STUDY: CPT | Mod: PO

## 2021-06-03 PROCEDURE — 93925 LOWER EXTREMITY STUDY: CPT | Mod: 26,,, | Performed by: INTERNAL MEDICINE

## 2021-06-03 PROCEDURE — 93925 CV US DOPPLER ARTERIAL LEGS BILATERAL (CUPID ONLY): ICD-10-PCS | Mod: 26,,, | Performed by: INTERNAL MEDICINE

## 2021-06-04 LAB
LEFT ANT TIBIAL SYS PSV: 22 CM/S
LEFT CFA PSV: 177 CM/S
LEFT PERONEAL SYS PSV: 0 CM/S
LEFT POPLITEAL PSV: 83 CM/S
LEFT POST TIBIAL SYS PSV: 126 CM/S
LEFT PROFUNDA SYS PSV: 416 CM/S
LEFT SUPER FEMORAL DIST SYS PSV: 125 CM/S
LEFT SUPER FEMORAL MID SYS PSV: 91 CM/S
LEFT TIB/PER TRUNK SYS PSV: 139 CM/S
OHS CV LEFT LOWER EXTREMITY ABI (NO CALC): 0.6
OHS CV LOWER EXTREMITY STENT MEASUREMENTS - LEFT - PSFA S1 - DIST: 142
OHS CV LOWER EXTREMITY STENT MEASUREMENTS - LEFT - PSFA S1 - MID: 120
OHS CV LOWER EXTREMITY STENT MEASUREMENTS - LEFT - PSFA S1 - OST: 160
OHS CV LOWER EXTREMITY STENT MEASUREMENTS - LEFT - PSFA S1 - PROX: 138
OHS CV RIGHT ABI LOWER EXTREMITY (NO CALC): 0.5
RIGHT ANT TIBIAL SYS PSV: 61 CM/S
RIGHT CFA PSV: 212 CM/S
RIGHT PERONEAL SYS PSV: 28 CM/S
RIGHT POPLITEAL PSV: 173 CM/S
RIGHT POST TIBIAL SYS PSV: 36 CM/S
RIGHT PROFUNDA SYS PSV: 128 CM/S
RIGHT SUPER FEMORAL DIST SYS PSV: 82 CM/S
RIGHT SUPER FEMORAL MID SYS PSV: 425 CM/S
RIGHT SUPER FEMORAL OSTIAL SYS PSV: 138 CM/S
RIGHT SUPER FEMORAL PROX SYS PSV: 170 CM/S
RIGHT TIB/PER TRUNK SYS PSV: 94 CM/S

## 2021-06-15 ENCOUNTER — HOSPITAL ENCOUNTER (OUTPATIENT)
Dept: RADIOLOGY | Facility: HOSPITAL | Age: 76
Discharge: HOME | End: 2021-06-15
Attending: INTERNAL MEDICINE
Payer: MEDICARE

## 2021-06-15 ENCOUNTER — HOSPITAL ENCOUNTER (OUTPATIENT)
Dept: CARDIOLOGY | Facility: HOSPITAL | Age: 76
Discharge: HOME | End: 2021-06-15
Attending: INTERNAL MEDICINE
Payer: MEDICARE

## 2021-06-15 VITALS — BODY MASS INDEX: 36.88 KG/M2 | WEIGHT: 249 LBS | HEIGHT: 69 IN

## 2021-06-15 DIAGNOSIS — E11.22 TYPE 2 DIABETES MELLITUS WITH STAGE 3B CHRONIC KIDNEY DISEASE, WITH LONG-TERM CURRENT USE OF INSULIN: ICD-10-CM

## 2021-06-15 DIAGNOSIS — G46.7 LACUNAR ATAXIC HEMIPARESIS: ICD-10-CM

## 2021-06-15 DIAGNOSIS — Z86.73 HISTORY OF ISCHEMIC VERTEBROBASILAR ARTERY BRAINSTEM STROKE: ICD-10-CM

## 2021-06-15 DIAGNOSIS — I73.9 PAD (PERIPHERAL ARTERY DISEASE): ICD-10-CM

## 2021-06-15 DIAGNOSIS — Z95.5 STATUS POST CORONARY ARTERY STENT PLACEMENT: ICD-10-CM

## 2021-06-15 DIAGNOSIS — E78.2 MIXED HYPERLIPIDEMIA: ICD-10-CM

## 2021-06-15 DIAGNOSIS — I25.10 CORONARY ARTERY DISEASE INVOLVING NATIVE CORONARY ARTERY OF NATIVE HEART WITHOUT ANGINA PECTORIS: ICD-10-CM

## 2021-06-15 DIAGNOSIS — N18.32 STAGE 3B CHRONIC KIDNEY DISEASE: ICD-10-CM

## 2021-06-15 DIAGNOSIS — Z79.4 TYPE 2 DIABETES MELLITUS WITH STAGE 3B CHRONIC KIDNEY DISEASE, WITH LONG-TERM CURRENT USE OF INSULIN: ICD-10-CM

## 2021-06-15 DIAGNOSIS — I69.398 ABNORMALITY OF GAIT AS LATE EFFECT OF CEREBROVASCULAR ACCIDENT (CVA): ICD-10-CM

## 2021-06-15 DIAGNOSIS — I50.32 CHRONIC DIASTOLIC HEART FAILURE: ICD-10-CM

## 2021-06-15 DIAGNOSIS — R26.9 ABNORMALITY OF GAIT AS LATE EFFECT OF CEREBROVASCULAR ACCIDENT (CVA): ICD-10-CM

## 2021-06-15 DIAGNOSIS — I67.9 LACUNAR ATAXIC HEMIPARESIS: ICD-10-CM

## 2021-06-15 DIAGNOSIS — I73.9 CLAUDICATION: ICD-10-CM

## 2021-06-15 DIAGNOSIS — I10 ESSENTIAL HYPERTENSION: ICD-10-CM

## 2021-06-15 DIAGNOSIS — N18.32 TYPE 2 DIABETES MELLITUS WITH STAGE 3B CHRONIC KIDNEY DISEASE, WITH LONG-TERM CURRENT USE OF INSULIN: ICD-10-CM

## 2021-06-15 DIAGNOSIS — Z86.73 HISTORY OF CVA IN ADULTHOOD: ICD-10-CM

## 2021-06-15 DIAGNOSIS — E78.5 DYSLIPIDEMIA: ICD-10-CM

## 2021-06-15 PROCEDURE — 93016 CV STRESS TEST SUPVJ ONLY: CPT | Mod: ,,, | Performed by: INTERNAL MEDICINE

## 2021-06-15 PROCEDURE — 63600175 PHARM REV CODE 636 W HCPCS: Mod: PO | Performed by: INTERNAL MEDICINE

## 2021-06-15 PROCEDURE — 93018 CV STRESS TEST I&R ONLY: CPT | Mod: ,,, | Performed by: INTERNAL MEDICINE

## 2021-06-15 PROCEDURE — 78452 HT MUSCLE IMAGE SPECT MULT: CPT | Mod: 26,,, | Performed by: INTERNAL MEDICINE

## 2021-06-15 PROCEDURE — 93016 STRESS TEST WITH MYOCARDIAL PERFUSION (CUPID ONLY): ICD-10-PCS | Mod: ,,, | Performed by: INTERNAL MEDICINE

## 2021-06-15 PROCEDURE — 78452 STRESS TEST WITH MYOCARDIAL PERFUSION (CUPID ONLY): ICD-10-PCS | Mod: 26,,, | Performed by: INTERNAL MEDICINE

## 2021-06-15 PROCEDURE — A9502 TC99M TETROFOSMIN: HCPCS | Mod: PO

## 2021-06-15 PROCEDURE — 93017 CV STRESS TEST TRACING ONLY: CPT | Mod: PO

## 2021-06-15 PROCEDURE — 78452 HT MUSCLE IMAGE SPECT MULT: CPT | Mod: PO

## 2021-06-15 PROCEDURE — 93018 PR CARDIAC STRESS TST,INTERP/REPT ONLY: ICD-10-PCS | Mod: ,,, | Performed by: INTERNAL MEDICINE

## 2021-06-15 RX ORDER — REGADENOSON 0.08 MG/ML
0.4 INJECTION, SOLUTION INTRAVENOUS ONCE
Status: COMPLETED | OUTPATIENT
Start: 2021-06-15 | End: 2021-06-15

## 2021-06-15 RX ADMIN — REGADENOSON 0.4 MG: 0.08 INJECTION, SOLUTION INTRAVENOUS at 10:06

## 2021-06-16 LAB
CV PHARM DOSE: 0.4 MG
CV STRESS BASE HR: 54 BPM
DIASTOLIC BLOOD PRESSURE: 66 MMHG
NUC REST DIASTOLIC VOLUME INDEX: 95
NUC REST EJECTION FRACTION: 61
NUC REST SYSTOLIC VOLUME INDEX: 37
OHS CV CPX 1 MINUTE RECOVERY HEART RATE: 63 BPM
OHS CV CPX 85 PERCENT MAX PREDICTED HEART RATE MALE: 122
OHS CV CPX MAX PREDICTED HEART RATE: 143
OHS CV CPX PATIENT IS FEMALE: 0
OHS CV CPX PATIENT IS MALE: 1
OHS CV CPX PEAK DIASTOLIC BLOOD PRESSURE: 66 MMHG
OHS CV CPX PEAK HEAR RATE: 65 BPM
OHS CV CPX PEAK RATE PRESSURE PRODUCT: NORMAL
OHS CV CPX PEAK SYSTOLIC BLOOD PRESSURE: 175 MMHG
OHS CV CPX PERCENT MAX PREDICTED HEART RATE ACHIEVED: 45
OHS CV CPX RATE PRESSURE PRODUCT PRESENTING: 9450
OHS CV PHARM TIME: 1007 MIN
SYSTOLIC BLOOD PRESSURE: 175 MMHG

## 2021-06-22 ENCOUNTER — TELEPHONE (OUTPATIENT)
Dept: CARDIOLOGY | Facility: CLINIC | Age: 76
End: 2021-06-22

## 2021-07-13 ENCOUNTER — OFFICE VISIT (OUTPATIENT)
Dept: FAMILY MEDICINE | Facility: CLINIC | Age: 76
End: 2021-07-13
Payer: MEDICARE

## 2021-07-13 ENCOUNTER — IMMUNIZATION (OUTPATIENT)
Dept: PHARMACY | Facility: CLINIC | Age: 76
End: 2021-07-13
Payer: MEDICARE

## 2021-07-13 VITALS
OXYGEN SATURATION: 98 % | HEIGHT: 69 IN | HEART RATE: 51 BPM | DIASTOLIC BLOOD PRESSURE: 70 MMHG | SYSTOLIC BLOOD PRESSURE: 150 MMHG | BODY MASS INDEX: 35.1 KG/M2 | WEIGHT: 237 LBS

## 2021-07-13 DIAGNOSIS — Z23 NEED FOR VACCINATION: ICD-10-CM

## 2021-07-13 DIAGNOSIS — Z79.4 TYPE 2 DIABETES MELLITUS WITH STAGE 3B CHRONIC KIDNEY DISEASE, WITH LONG-TERM CURRENT USE OF INSULIN: ICD-10-CM

## 2021-07-13 DIAGNOSIS — I73.9 CLAUDICATION: ICD-10-CM

## 2021-07-13 DIAGNOSIS — E78.5 DYSLIPIDEMIA: ICD-10-CM

## 2021-07-13 DIAGNOSIS — I50.32 CHRONIC DIASTOLIC HEART FAILURE: ICD-10-CM

## 2021-07-13 DIAGNOSIS — I73.9 PAD (PERIPHERAL ARTERY DISEASE): ICD-10-CM

## 2021-07-13 DIAGNOSIS — N18.32 TYPE 2 DIABETES MELLITUS WITH STAGE 3B CHRONIC KIDNEY DISEASE, WITH LONG-TERM CURRENT USE OF INSULIN: ICD-10-CM

## 2021-07-13 DIAGNOSIS — E78.2 MIXED HYPERLIPIDEMIA: ICD-10-CM

## 2021-07-13 DIAGNOSIS — I25.10 CORONARY ARTERY DISEASE INVOLVING NATIVE CORONARY ARTERY OF NATIVE HEART WITHOUT ANGINA PECTORIS: ICD-10-CM

## 2021-07-13 DIAGNOSIS — E03.9 ACQUIRED HYPOTHYROIDISM: Primary | ICD-10-CM

## 2021-07-13 DIAGNOSIS — Z86.73 HISTORY OF ISCHEMIC VERTEBROBASILAR ARTERY BRAINSTEM STROKE: ICD-10-CM

## 2021-07-13 DIAGNOSIS — E11.22 TYPE 2 DIABETES MELLITUS WITH STAGE 3B CHRONIC KIDNEY DISEASE, WITH LONG-TERM CURRENT USE OF INSULIN: ICD-10-CM

## 2021-07-13 DIAGNOSIS — I10 ESSENTIAL HYPERTENSION: ICD-10-CM

## 2021-07-13 PROCEDURE — 3078F PR MOST RECENT DIASTOLIC BLOOD PRESSURE < 80 MM HG: ICD-10-PCS | Mod: CPTII,S$GLB,, | Performed by: INTERNAL MEDICINE

## 2021-07-13 PROCEDURE — 3077F PR MOST RECENT SYSTOLIC BLOOD PRESSURE >= 140 MM HG: ICD-10-PCS | Mod: CPTII,S$GLB,, | Performed by: INTERNAL MEDICINE

## 2021-07-13 PROCEDURE — 3078F DIAST BP <80 MM HG: CPT | Mod: CPTII,S$GLB,, | Performed by: INTERNAL MEDICINE

## 2021-07-13 PROCEDURE — 99214 OFFICE O/P EST MOD 30 MIN: CPT | Mod: S$GLB,,, | Performed by: INTERNAL MEDICINE

## 2021-07-13 PROCEDURE — 3077F SYST BP >= 140 MM HG: CPT | Mod: CPTII,S$GLB,, | Performed by: INTERNAL MEDICINE

## 2021-07-13 PROCEDURE — 99999 PR PBB SHADOW E&M-EST. PATIENT-LVL V: ICD-10-PCS | Mod: PBBFAC,,, | Performed by: INTERNAL MEDICINE

## 2021-07-13 PROCEDURE — 1159F PR MEDICATION LIST DOCUMENTED IN MEDICAL RECORD: ICD-10-PCS | Mod: S$GLB,,, | Performed by: INTERNAL MEDICINE

## 2021-07-13 PROCEDURE — 99999 PR PBB SHADOW E&M-EST. PATIENT-LVL V: CPT | Mod: PBBFAC,,, | Performed by: INTERNAL MEDICINE

## 2021-07-13 PROCEDURE — 3051F PR MOST RECENT HEMOGLOBIN A1C LEVEL 7.0 - < 8.0%: ICD-10-PCS | Mod: CPTII,S$GLB,, | Performed by: INTERNAL MEDICINE

## 2021-07-13 PROCEDURE — 99214 PR OFFICE/OUTPT VISIT, EST, LEVL IV, 30-39 MIN: ICD-10-PCS | Mod: S$GLB,,, | Performed by: INTERNAL MEDICINE

## 2021-07-13 PROCEDURE — 3051F HG A1C>EQUAL 7.0%<8.0%: CPT | Mod: CPTII,S$GLB,, | Performed by: INTERNAL MEDICINE

## 2021-07-13 PROCEDURE — 1159F MED LIST DOCD IN RCRD: CPT | Mod: S$GLB,,, | Performed by: INTERNAL MEDICINE

## 2021-07-13 RX ORDER — LEVOTHYROXINE SODIUM 25 UG/1
25 TABLET ORAL
Qty: 90 TABLET | Refills: 0 | Status: ON HOLD | OUTPATIENT
Start: 2021-07-13 | End: 2021-08-20

## 2021-07-13 RX ORDER — HYDROCHLOROTHIAZIDE 25 MG/1
25 TABLET ORAL EVERY MORNING
Qty: 90 TABLET | Refills: 1 | Status: SHIPPED | OUTPATIENT
Start: 2021-07-13 | End: 2021-10-22

## 2021-07-13 RX ORDER — ZOSTER VACCINE RECOMBINANT, ADJUVANTED 50 MCG/0.5
0.5 KIT INTRAMUSCULAR ONCE
Qty: 1 EACH | Refills: 1 | Status: SHIPPED | OUTPATIENT
Start: 2021-07-13 | End: 2021-10-13

## 2021-07-13 RX ORDER — ASPIRIN 81 MG/1
81 TABLET ORAL NIGHTLY
COMMUNITY

## 2021-07-13 RX ORDER — ROSUVASTATIN CALCIUM 20 MG/1
20 TABLET, COATED ORAL DAILY
Qty: 90 TABLET | Refills: 3 | Status: SHIPPED | OUTPATIENT
Start: 2021-07-13 | End: 2022-06-05

## 2021-07-13 RX ORDER — ROSUVASTATIN CALCIUM 20 MG/1
20 TABLET, COATED ORAL DAILY
Qty: 90 TABLET | Refills: 3 | Status: SHIPPED | OUTPATIENT
Start: 2021-07-13 | End: 2021-07-13 | Stop reason: SDUPTHER

## 2021-07-22 ENCOUNTER — LAB VISIT (OUTPATIENT)
Dept: LAB | Facility: HOSPITAL | Age: 76
End: 2021-07-22
Payer: MEDICARE

## 2021-07-22 ENCOUNTER — OFFICE VISIT (OUTPATIENT)
Dept: CARDIOLOGY | Facility: CLINIC | Age: 76
End: 2021-07-22
Payer: MEDICARE

## 2021-07-22 VITALS
WEIGHT: 237 LBS | SYSTOLIC BLOOD PRESSURE: 177 MMHG | HEART RATE: 56 BPM | HEIGHT: 70 IN | BODY MASS INDEX: 33.93 KG/M2 | DIASTOLIC BLOOD PRESSURE: 69 MMHG

## 2021-07-22 DIAGNOSIS — I25.119 CORONARY ARTERY DISEASE INVOLVING NATIVE CORONARY ARTERY OF NATIVE HEART WITH ANGINA PECTORIS: Primary | ICD-10-CM

## 2021-07-22 DIAGNOSIS — R94.39 ABNORMAL NUCLEAR STRESS TEST: ICD-10-CM

## 2021-07-22 DIAGNOSIS — I73.9 PAD (PERIPHERAL ARTERY DISEASE): ICD-10-CM

## 2021-07-22 DIAGNOSIS — E11.22 TYPE 2 DIABETES MELLITUS WITH STAGE 3B CHRONIC KIDNEY DISEASE, WITH LONG-TERM CURRENT USE OF INSULIN: ICD-10-CM

## 2021-07-22 DIAGNOSIS — Z85.038 HISTORY OF COLON CANCER: ICD-10-CM

## 2021-07-22 DIAGNOSIS — I25.10 CORONARY ARTERY DISEASE INVOLVING NATIVE CORONARY ARTERY OF NATIVE HEART WITHOUT ANGINA PECTORIS: ICD-10-CM

## 2021-07-22 DIAGNOSIS — R79.1 ABNORMAL COAGULATION PROFILE: ICD-10-CM

## 2021-07-22 DIAGNOSIS — Z86.73 HISTORY OF ISCHEMIC VERTEBROBASILAR ARTERY BRAINSTEM STROKE: ICD-10-CM

## 2021-07-22 DIAGNOSIS — I50.32 CHRONIC DIASTOLIC HEART FAILURE: ICD-10-CM

## 2021-07-22 DIAGNOSIS — R26.9 ABNORMALITY OF GAIT AS LATE EFFECT OF CEREBROVASCULAR ACCIDENT (CVA): ICD-10-CM

## 2021-07-22 DIAGNOSIS — E78.5 DYSLIPIDEMIA: ICD-10-CM

## 2021-07-22 DIAGNOSIS — Z79.4 TYPE 2 DIABETES MELLITUS WITH STAGE 3B CHRONIC KIDNEY DISEASE, WITH LONG-TERM CURRENT USE OF INSULIN: ICD-10-CM

## 2021-07-22 DIAGNOSIS — I10 ESSENTIAL HYPERTENSION: ICD-10-CM

## 2021-07-22 DIAGNOSIS — N18.32 TYPE 2 DIABETES MELLITUS WITH STAGE 3B CHRONIC KIDNEY DISEASE, WITH LONG-TERM CURRENT USE OF INSULIN: ICD-10-CM

## 2021-07-22 DIAGNOSIS — I69.398 ABNORMALITY OF GAIT AS LATE EFFECT OF CEREBROVASCULAR ACCIDENT (CVA): ICD-10-CM

## 2021-07-22 DIAGNOSIS — I73.9 CLAUDICATION: ICD-10-CM

## 2021-07-22 DIAGNOSIS — Z95.5 STENTED CORONARY ARTERY: ICD-10-CM

## 2021-07-22 DIAGNOSIS — I25.119 CORONARY ARTERY DISEASE INVOLVING NATIVE CORONARY ARTERY OF NATIVE HEART WITH ANGINA PECTORIS: ICD-10-CM

## 2021-07-22 LAB
APTT BLDCRRT: 24.1 SEC (ref 21–32)
BASOPHILS # BLD AUTO: 0.03 K/UL (ref 0–0.2)
BASOPHILS NFR BLD: 0.7 % (ref 0–1.9)
DIFFERENTIAL METHOD: ABNORMAL
EOSINOPHIL # BLD AUTO: 0.2 K/UL (ref 0–0.5)
EOSINOPHIL NFR BLD: 4.1 % (ref 0–8)
ERYTHROCYTE [DISTWIDTH] IN BLOOD BY AUTOMATED COUNT: 13.7 % (ref 11.5–14.5)
HCT VFR BLD AUTO: 36.9 % (ref 40–54)
HGB BLD-MCNC: 11.9 G/DL (ref 14–18)
IMM GRANULOCYTES # BLD AUTO: 0.02 K/UL (ref 0–0.04)
IMM GRANULOCYTES NFR BLD AUTO: 0.4 % (ref 0–0.5)
INR PPP: 1 (ref 0.8–1.2)
LYMPHOCYTES # BLD AUTO: 1.1 K/UL (ref 1–4.8)
LYMPHOCYTES NFR BLD: 23.1 % (ref 18–48)
MCH RBC QN AUTO: 29 PG (ref 27–31)
MCHC RBC AUTO-ENTMCNC: 32.2 G/DL (ref 32–36)
MCV RBC AUTO: 90 FL (ref 82–98)
MONOCYTES # BLD AUTO: 0.4 K/UL (ref 0.3–1)
MONOCYTES NFR BLD: 8.5 % (ref 4–15)
NEUTROPHILS # BLD AUTO: 2.9 K/UL (ref 1.8–7.7)
NEUTROPHILS NFR BLD: 63.2 % (ref 38–73)
NRBC BLD-RTO: 0 /100 WBC
PLATELET # BLD AUTO: 201 K/UL (ref 150–450)
PMV BLD AUTO: 11.4 FL (ref 9.2–12.9)
PROTHROMBIN TIME: 10.9 SEC (ref 9–12.5)
RBC # BLD AUTO: 4.1 M/UL (ref 4.6–6.2)
WBC # BLD AUTO: 4.58 K/UL (ref 3.9–12.7)

## 2021-07-22 PROCEDURE — 3077F PR MOST RECENT SYSTOLIC BLOOD PRESSURE >= 140 MM HG: ICD-10-PCS | Mod: CPTII,S$GLB,, | Performed by: PHYSICIAN ASSISTANT

## 2021-07-22 PROCEDURE — 99999 PR PBB SHADOW E&M-EST. PATIENT-LVL III: CPT | Mod: PBBFAC,,, | Performed by: PHYSICIAN ASSISTANT

## 2021-07-22 PROCEDURE — 3078F PR MOST RECENT DIASTOLIC BLOOD PRESSURE < 80 MM HG: ICD-10-PCS | Mod: CPTII,S$GLB,, | Performed by: PHYSICIAN ASSISTANT

## 2021-07-22 PROCEDURE — 99214 PR OFFICE/OUTPT VISIT, EST, LEVL IV, 30-39 MIN: ICD-10-PCS | Mod: S$GLB,,, | Performed by: PHYSICIAN ASSISTANT

## 2021-07-22 PROCEDURE — 1126F PR PAIN SEVERITY QUANTIFIED, NO PAIN PRESENT: ICD-10-PCS | Mod: CPTII,S$GLB,, | Performed by: PHYSICIAN ASSISTANT

## 2021-07-22 PROCEDURE — 3078F DIAST BP <80 MM HG: CPT | Mod: CPTII,S$GLB,, | Performed by: PHYSICIAN ASSISTANT

## 2021-07-22 PROCEDURE — 36415 COLL VENOUS BLD VENIPUNCTURE: CPT | Mod: PO | Performed by: PHYSICIAN ASSISTANT

## 2021-07-22 PROCEDURE — 85610 PROTHROMBIN TIME: CPT | Mod: PO | Performed by: PHYSICIAN ASSISTANT

## 2021-07-22 PROCEDURE — 3077F SYST BP >= 140 MM HG: CPT | Mod: CPTII,S$GLB,, | Performed by: PHYSICIAN ASSISTANT

## 2021-07-22 PROCEDURE — 1159F MED LIST DOCD IN RCRD: CPT | Mod: CPTII,S$GLB,, | Performed by: PHYSICIAN ASSISTANT

## 2021-07-22 PROCEDURE — 80053 COMPREHEN METABOLIC PANEL: CPT | Performed by: PHYSICIAN ASSISTANT

## 2021-07-22 PROCEDURE — 99499 UNLISTED E&M SERVICE: CPT | Mod: HCNC,S$GLB,, | Performed by: PHYSICIAN ASSISTANT

## 2021-07-22 PROCEDURE — 1159F PR MEDICATION LIST DOCUMENTED IN MEDICAL RECORD: ICD-10-PCS | Mod: CPTII,S$GLB,, | Performed by: PHYSICIAN ASSISTANT

## 2021-07-22 PROCEDURE — 3051F HG A1C>EQUAL 7.0%<8.0%: CPT | Mod: CPTII,S$GLB,, | Performed by: PHYSICIAN ASSISTANT

## 2021-07-22 PROCEDURE — 85730 THROMBOPLASTIN TIME PARTIAL: CPT | Mod: PO | Performed by: PHYSICIAN ASSISTANT

## 2021-07-22 PROCEDURE — 99214 OFFICE O/P EST MOD 30 MIN: CPT | Mod: S$GLB,,, | Performed by: PHYSICIAN ASSISTANT

## 2021-07-22 PROCEDURE — 1126F AMNT PAIN NOTED NONE PRSNT: CPT | Mod: CPTII,S$GLB,, | Performed by: PHYSICIAN ASSISTANT

## 2021-07-22 PROCEDURE — 85025 COMPLETE CBC W/AUTO DIFF WBC: CPT | Performed by: PHYSICIAN ASSISTANT

## 2021-07-22 PROCEDURE — 99499 RISK ADDL DX/OHS AUDIT: ICD-10-PCS | Mod: HCNC,S$GLB,, | Performed by: PHYSICIAN ASSISTANT

## 2021-07-22 PROCEDURE — 99999 PR PBB SHADOW E&M-EST. PATIENT-LVL III: ICD-10-PCS | Mod: PBBFAC,,, | Performed by: PHYSICIAN ASSISTANT

## 2021-07-22 PROCEDURE — 3051F PR MOST RECENT HEMOGLOBIN A1C LEVEL 7.0 - < 8.0%: ICD-10-PCS | Mod: CPTII,S$GLB,, | Performed by: PHYSICIAN ASSISTANT

## 2021-07-22 RX ORDER — SODIUM CHLORIDE 0.9 % (FLUSH) 0.9 %
10 SYRINGE (ML) INJECTION
Status: CANCELLED | OUTPATIENT
Start: 2021-07-22

## 2021-07-22 RX ORDER — SODIUM CHLORIDE 9 MG/ML
INJECTION, SOLUTION INTRAVENOUS ONCE
Status: CANCELLED | OUTPATIENT
Start: 2021-07-22 | End: 2021-07-22

## 2021-07-23 LAB
ALBUMIN SERPL BCP-MCNC: 3.6 G/DL (ref 3.5–5.2)
ALP SERPL-CCNC: 41 U/L (ref 55–135)
ALT SERPL W/O P-5'-P-CCNC: 36 U/L (ref 10–44)
ANION GAP SERPL CALC-SCNC: 12 MMOL/L (ref 8–16)
AST SERPL-CCNC: 30 U/L (ref 10–40)
BILIRUB SERPL-MCNC: 0.3 MG/DL (ref 0.1–1)
BUN SERPL-MCNC: 37 MG/DL (ref 8–23)
CALCIUM SERPL-MCNC: 9.5 MG/DL (ref 8.7–10.5)
CHLORIDE SERPL-SCNC: 101 MMOL/L (ref 95–110)
CO2 SERPL-SCNC: 28 MMOL/L (ref 23–29)
CREAT SERPL-MCNC: 2.2 MG/DL (ref 0.5–1.4)
EST. GFR  (AFRICAN AMERICAN): 32.2 ML/MIN/1.73 M^2
EST. GFR  (NON AFRICAN AMERICAN): 27.9 ML/MIN/1.73 M^2
GLUCOSE SERPL-MCNC: 150 MG/DL (ref 70–110)
POTASSIUM SERPL-SCNC: 4.3 MMOL/L (ref 3.5–5.1)
PROT SERPL-MCNC: 7 G/DL (ref 6–8.4)
SODIUM SERPL-SCNC: 141 MMOL/L (ref 136–145)

## 2021-07-27 ENCOUNTER — TELEPHONE (OUTPATIENT)
Dept: CARDIOLOGY | Facility: CLINIC | Age: 76
End: 2021-07-27

## 2021-07-27 DIAGNOSIS — N18.30 STAGE 3 CHRONIC KIDNEY DISEASE, UNSPECIFIED WHETHER STAGE 3A OR 3B CKD: Primary | ICD-10-CM

## 2021-07-29 ENCOUNTER — PATIENT MESSAGE (OUTPATIENT)
Dept: FAMILY MEDICINE | Facility: CLINIC | Age: 76
End: 2021-07-29

## 2021-07-29 DIAGNOSIS — Z79.4 TYPE 2 DIABETES MELLITUS WITH STAGE 3B CHRONIC KIDNEY DISEASE, WITH LONG-TERM CURRENT USE OF INSULIN: Primary | ICD-10-CM

## 2021-07-29 DIAGNOSIS — N18.32 TYPE 2 DIABETES MELLITUS WITH STAGE 3B CHRONIC KIDNEY DISEASE, WITH LONG-TERM CURRENT USE OF INSULIN: Primary | ICD-10-CM

## 2021-07-29 DIAGNOSIS — E11.22 TYPE 2 DIABETES MELLITUS WITH STAGE 3B CHRONIC KIDNEY DISEASE, WITH LONG-TERM CURRENT USE OF INSULIN: Primary | ICD-10-CM

## 2021-08-16 ENCOUNTER — TELEPHONE (OUTPATIENT)
Dept: CARDIOLOGY | Facility: CLINIC | Age: 76
End: 2021-08-16

## 2021-08-17 ENCOUNTER — LAB VISIT (OUTPATIENT)
Dept: LAB | Facility: HOSPITAL | Age: 76
End: 2021-08-17
Attending: PHYSICIAN ASSISTANT
Payer: MEDICARE

## 2021-08-17 DIAGNOSIS — N18.30 STAGE 3 CHRONIC KIDNEY DISEASE, UNSPECIFIED WHETHER STAGE 3A OR 3B CKD: ICD-10-CM

## 2021-08-17 LAB
ANION GAP SERPL CALC-SCNC: 13 MMOL/L (ref 8–16)
BUN SERPL-MCNC: 31 MG/DL (ref 8–23)
CALCIUM SERPL-MCNC: 9.1 MG/DL (ref 8.7–10.5)
CHLORIDE SERPL-SCNC: 102 MMOL/L (ref 95–110)
CO2 SERPL-SCNC: 23 MMOL/L (ref 23–29)
CREAT SERPL-MCNC: 1.8 MG/DL (ref 0.5–1.4)
EST. GFR  (AFRICAN AMERICAN): 41.1 ML/MIN/1.73 M^2
EST. GFR  (NON AFRICAN AMERICAN): 35.5 ML/MIN/1.73 M^2
GLUCOSE SERPL-MCNC: 176 MG/DL (ref 70–110)
POTASSIUM SERPL-SCNC: 4 MMOL/L (ref 3.5–5.1)
SODIUM SERPL-SCNC: 138 MMOL/L (ref 136–145)

## 2021-08-17 PROCEDURE — 36415 COLL VENOUS BLD VENIPUNCTURE: CPT | Mod: PO | Performed by: PHYSICIAN ASSISTANT

## 2021-08-17 PROCEDURE — 80048 BASIC METABOLIC PNL TOTAL CA: CPT | Performed by: PHYSICIAN ASSISTANT

## 2021-08-20 PROBLEM — R94.39 ABNORMAL NUCLEAR STRESS TEST: Status: ACTIVE | Noted: 2021-08-20

## 2021-09-01 ENCOUNTER — PATIENT OUTREACH (OUTPATIENT)
Dept: ADMINISTRATIVE | Facility: OTHER | Age: 76
End: 2021-09-01

## 2021-09-07 ENCOUNTER — OFFICE VISIT (OUTPATIENT)
Dept: VASCULAR SURGERY | Facility: CLINIC | Age: 76
End: 2021-09-07
Payer: MEDICARE

## 2021-09-07 VITALS
SYSTOLIC BLOOD PRESSURE: 163 MMHG | HEIGHT: 70 IN | WEIGHT: 233.44 LBS | BODY MASS INDEX: 33.42 KG/M2 | HEART RATE: 54 BPM | DIASTOLIC BLOOD PRESSURE: 65 MMHG

## 2021-09-07 DIAGNOSIS — R26.89 IMPAIRED BALANCE AS LATE EFFECT OF CEREBROVASCULAR ACCIDENT: ICD-10-CM

## 2021-09-07 DIAGNOSIS — R94.39 ABNORMAL NUCLEAR STRESS TEST: ICD-10-CM

## 2021-09-07 DIAGNOSIS — G81.91 RIGHT HEMIPLEGIA: ICD-10-CM

## 2021-09-07 DIAGNOSIS — I25.10 CORONARY ARTERY DISEASE INVOLVING NATIVE CORONARY ARTERY OF NATIVE HEART WITHOUT ANGINA PECTORIS: Primary | ICD-10-CM

## 2021-09-07 DIAGNOSIS — N18.32 TYPE 2 DIABETES MELLITUS WITH STAGE 3B CHRONIC KIDNEY DISEASE, WITH LONG-TERM CURRENT USE OF INSULIN: ICD-10-CM

## 2021-09-07 DIAGNOSIS — R26.9 ABNORMALITY OF GAIT AS LATE EFFECT OF CEREBROVASCULAR ACCIDENT (CVA): ICD-10-CM

## 2021-09-07 DIAGNOSIS — I69.398 IMPAIRED BALANCE AS LATE EFFECT OF CEREBROVASCULAR ACCIDENT: ICD-10-CM

## 2021-09-07 DIAGNOSIS — Z86.73 HISTORY OF ISCHEMIC VERTEBROBASILAR ARTERY BRAINSTEM STROKE: ICD-10-CM

## 2021-09-07 DIAGNOSIS — Z79.4 TYPE 2 DIABETES MELLITUS WITH STAGE 3B CHRONIC KIDNEY DISEASE, WITH LONG-TERM CURRENT USE OF INSULIN: ICD-10-CM

## 2021-09-07 DIAGNOSIS — N18.32 STAGE 3B CHRONIC KIDNEY DISEASE: ICD-10-CM

## 2021-09-07 DIAGNOSIS — Z95.5 STENTED CORONARY ARTERY: ICD-10-CM

## 2021-09-07 DIAGNOSIS — E11.22 TYPE 2 DIABETES MELLITUS WITH STAGE 3B CHRONIC KIDNEY DISEASE, WITH LONG-TERM CURRENT USE OF INSULIN: ICD-10-CM

## 2021-09-07 DIAGNOSIS — I69.398 ABNORMALITY OF GAIT AS LATE EFFECT OF CEREBROVASCULAR ACCIDENT (CVA): ICD-10-CM

## 2021-09-07 DIAGNOSIS — Z86.73 HISTORY OF STROKE: ICD-10-CM

## 2021-09-07 DIAGNOSIS — I73.9 CLAUDICATION: ICD-10-CM

## 2021-09-07 PROCEDURE — 1159F PR MEDICATION LIST DOCUMENTED IN MEDICAL RECORD: ICD-10-PCS | Mod: CPTII,S$GLB,, | Performed by: THORACIC SURGERY (CARDIOTHORACIC VASCULAR SURGERY)

## 2021-09-07 PROCEDURE — 3288F PR FALLS RISK ASSESSMENT DOCUMENTED: ICD-10-PCS | Mod: CPTII,S$GLB,, | Performed by: THORACIC SURGERY (CARDIOTHORACIC VASCULAR SURGERY)

## 2021-09-07 PROCEDURE — 3051F PR MOST RECENT HEMOGLOBIN A1C LEVEL 7.0 - < 8.0%: ICD-10-PCS | Mod: CPTII,S$GLB,, | Performed by: THORACIC SURGERY (CARDIOTHORACIC VASCULAR SURGERY)

## 2021-09-07 PROCEDURE — 3078F DIAST BP <80 MM HG: CPT | Mod: CPTII,S$GLB,, | Performed by: THORACIC SURGERY (CARDIOTHORACIC VASCULAR SURGERY)

## 2021-09-07 PROCEDURE — 3288F FALL RISK ASSESSMENT DOCD: CPT | Mod: CPTII,S$GLB,, | Performed by: THORACIC SURGERY (CARDIOTHORACIC VASCULAR SURGERY)

## 2021-09-07 PROCEDURE — 3078F PR MOST RECENT DIASTOLIC BLOOD PRESSURE < 80 MM HG: ICD-10-PCS | Mod: CPTII,S$GLB,, | Performed by: THORACIC SURGERY (CARDIOTHORACIC VASCULAR SURGERY)

## 2021-09-07 PROCEDURE — 1160F RVW MEDS BY RX/DR IN RCRD: CPT | Mod: CPTII,S$GLB,, | Performed by: THORACIC SURGERY (CARDIOTHORACIC VASCULAR SURGERY)

## 2021-09-07 PROCEDURE — 3077F SYST BP >= 140 MM HG: CPT | Mod: CPTII,S$GLB,, | Performed by: THORACIC SURGERY (CARDIOTHORACIC VASCULAR SURGERY)

## 2021-09-07 PROCEDURE — 1126F PR PAIN SEVERITY QUANTIFIED, NO PAIN PRESENT: ICD-10-PCS | Mod: CPTII,S$GLB,, | Performed by: THORACIC SURGERY (CARDIOTHORACIC VASCULAR SURGERY)

## 2021-09-07 PROCEDURE — 99205 OFFICE O/P NEW HI 60 MIN: CPT | Mod: S$GLB,,, | Performed by: THORACIC SURGERY (CARDIOTHORACIC VASCULAR SURGERY)

## 2021-09-07 PROCEDURE — 1159F MED LIST DOCD IN RCRD: CPT | Mod: CPTII,S$GLB,, | Performed by: THORACIC SURGERY (CARDIOTHORACIC VASCULAR SURGERY)

## 2021-09-07 PROCEDURE — 1101F PT FALLS ASSESS-DOCD LE1/YR: CPT | Mod: CPTII,S$GLB,, | Performed by: THORACIC SURGERY (CARDIOTHORACIC VASCULAR SURGERY)

## 2021-09-07 PROCEDURE — 1160F PR REVIEW ALL MEDS BY PRESCRIBER/CLIN PHARMACIST DOCUMENTED: ICD-10-PCS | Mod: CPTII,S$GLB,, | Performed by: THORACIC SURGERY (CARDIOTHORACIC VASCULAR SURGERY)

## 2021-09-07 PROCEDURE — 99999 PR PBB SHADOW E&M-EST. PATIENT-LVL IV: CPT | Mod: PBBFAC,,, | Performed by: THORACIC SURGERY (CARDIOTHORACIC VASCULAR SURGERY)

## 2021-09-07 PROCEDURE — 3077F PR MOST RECENT SYSTOLIC BLOOD PRESSURE >= 140 MM HG: ICD-10-PCS | Mod: CPTII,S$GLB,, | Performed by: THORACIC SURGERY (CARDIOTHORACIC VASCULAR SURGERY)

## 2021-09-07 PROCEDURE — 3051F HG A1C>EQUAL 7.0%<8.0%: CPT | Mod: CPTII,S$GLB,, | Performed by: THORACIC SURGERY (CARDIOTHORACIC VASCULAR SURGERY)

## 2021-09-07 PROCEDURE — 1101F PR PT FALLS ASSESS DOC 0-1 FALLS W/OUT INJ PAST YR: ICD-10-PCS | Mod: CPTII,S$GLB,, | Performed by: THORACIC SURGERY (CARDIOTHORACIC VASCULAR SURGERY)

## 2021-09-07 PROCEDURE — 99499 RISK ADDL DX/OHS AUDIT: ICD-10-PCS | Mod: HCNC,S$GLB,, | Performed by: THORACIC SURGERY (CARDIOTHORACIC VASCULAR SURGERY)

## 2021-09-07 PROCEDURE — 99499 UNLISTED E&M SERVICE: CPT | Mod: HCNC,S$GLB,, | Performed by: THORACIC SURGERY (CARDIOTHORACIC VASCULAR SURGERY)

## 2021-09-07 PROCEDURE — 99999 PR PBB SHADOW E&M-EST. PATIENT-LVL IV: ICD-10-PCS | Mod: PBBFAC,,, | Performed by: THORACIC SURGERY (CARDIOTHORACIC VASCULAR SURGERY)

## 2021-09-07 PROCEDURE — 1126F AMNT PAIN NOTED NONE PRSNT: CPT | Mod: CPTII,S$GLB,, | Performed by: THORACIC SURGERY (CARDIOTHORACIC VASCULAR SURGERY)

## 2021-09-07 PROCEDURE — 99205 PR OFFICE/OUTPT VISIT, NEW, LEVL V, 60-74 MIN: ICD-10-PCS | Mod: S$GLB,,, | Performed by: THORACIC SURGERY (CARDIOTHORACIC VASCULAR SURGERY)

## 2021-09-07 RX ORDER — PEN NEEDLE, DIABETIC 31 GX5/16"
1 NEEDLE, DISPOSABLE MISCELLANEOUS 4 TIMES DAILY
COMMUNITY
Start: 2021-07-07 | End: 2024-03-05

## 2021-09-09 ENCOUNTER — PATIENT MESSAGE (OUTPATIENT)
Dept: VASCULAR SURGERY | Facility: CLINIC | Age: 76
End: 2021-09-09

## 2021-09-16 ENCOUNTER — HOSPITAL ENCOUNTER (OUTPATIENT)
Dept: RADIOLOGY | Facility: HOSPITAL | Age: 76
Discharge: HOME | End: 2021-09-16
Attending: THORACIC SURGERY (CARDIOTHORACIC VASCULAR SURGERY)
Payer: MEDICARE

## 2021-09-16 DIAGNOSIS — Z86.73 HISTORY OF STROKE: ICD-10-CM

## 2021-09-16 DIAGNOSIS — G81.91 RIGHT HEMIPLEGIA: ICD-10-CM

## 2021-09-16 PROCEDURE — 93880 EXTRACRANIAL BILAT STUDY: CPT | Mod: 26,HCNC,, | Performed by: RADIOLOGY

## 2021-09-16 PROCEDURE — 93880 EXTRACRANIAL BILAT STUDY: CPT | Mod: TC,HCNC,PO

## 2021-09-16 PROCEDURE — 93880 US CAROTID BILATERAL: ICD-10-PCS | Mod: 26,HCNC,, | Performed by: RADIOLOGY

## 2021-09-21 DIAGNOSIS — Z01.818 PRE-OP TESTING: ICD-10-CM

## 2021-09-21 DIAGNOSIS — I25.10 CORONARY ARTERY DISEASE INVOLVING NATIVE CORONARY ARTERY OF NATIVE HEART WITHOUT ANGINA PECTORIS: Primary | ICD-10-CM

## 2021-09-21 DIAGNOSIS — I25.10 CORONARY ARTERY DISEASE, ANGINA PRESENCE UNSPECIFIED, UNSPECIFIED VESSEL OR LESION TYPE, UNSPECIFIED WHETHER NATIVE OR TRANSPLANTED HEART: ICD-10-CM

## 2021-09-22 ENCOUNTER — TELEPHONE (OUTPATIENT)
Dept: CARDIOLOGY | Facility: CLINIC | Age: 76
End: 2021-09-22

## 2021-10-06 LAB
ALBUMIN CREATININE RATIO: 9 MG/G
ALBUMIN, URINE: 0.4 MG/L
CREATININE, URINE: 44 MG/DL
URINE MICROALBUMIN CONCENTRATION: 9 UG/ML

## 2021-10-12 ENCOUNTER — IMMUNIZATION (OUTPATIENT)
Dept: PHARMACY | Facility: CLINIC | Age: 76
End: 2021-10-12
Payer: MEDICARE

## 2021-10-12 ENCOUNTER — OFFICE VISIT (OUTPATIENT)
Dept: FAMILY MEDICINE | Facility: CLINIC | Age: 76
End: 2021-10-12
Payer: MEDICARE

## 2021-10-12 VITALS
OXYGEN SATURATION: 99 % | BODY MASS INDEX: 34.91 KG/M2 | HEART RATE: 52 BPM | WEIGHT: 235.69 LBS | DIASTOLIC BLOOD PRESSURE: 78 MMHG | SYSTOLIC BLOOD PRESSURE: 130 MMHG | HEIGHT: 69 IN

## 2021-10-12 DIAGNOSIS — E11.22 TYPE 2 DIABETES MELLITUS WITH STAGE 3B CHRONIC KIDNEY DISEASE, WITH LONG-TERM CURRENT USE OF INSULIN: ICD-10-CM

## 2021-10-12 DIAGNOSIS — Z95.5 STATUS POST CORONARY ARTERY STENT PLACEMENT: ICD-10-CM

## 2021-10-12 DIAGNOSIS — Z79.4 TYPE 2 DIABETES MELLITUS WITH STAGE 3B CHRONIC KIDNEY DISEASE, WITH LONG-TERM CURRENT USE OF INSULIN: ICD-10-CM

## 2021-10-12 DIAGNOSIS — I10 ESSENTIAL HYPERTENSION: Primary | ICD-10-CM

## 2021-10-12 DIAGNOSIS — I25.10 CORONARY ARTERY DISEASE INVOLVING NATIVE CORONARY ARTERY OF NATIVE HEART WITHOUT ANGINA PECTORIS: ICD-10-CM

## 2021-10-12 DIAGNOSIS — N18.32 TYPE 2 DIABETES MELLITUS WITH STAGE 3B CHRONIC KIDNEY DISEASE, WITH LONG-TERM CURRENT USE OF INSULIN: ICD-10-CM

## 2021-10-12 DIAGNOSIS — I50.32 CHRONIC DIASTOLIC HEART FAILURE: ICD-10-CM

## 2021-10-12 DIAGNOSIS — Z86.73 HISTORY OF ISCHEMIC VERTEBROBASILAR ARTERY BRAINSTEM STROKE: ICD-10-CM

## 2021-10-12 DIAGNOSIS — N18.32 STAGE 3B CHRONIC KIDNEY DISEASE: ICD-10-CM

## 2021-10-12 DIAGNOSIS — I73.9 PAD (PERIPHERAL ARTERY DISEASE): ICD-10-CM

## 2021-10-12 PROCEDURE — 1100F PR PT FALLS ASSESS DOC 2+ FALLS/FALL W/INJURY/YR: ICD-10-PCS | Mod: HCNC,CPTII,S$GLB, | Performed by: INTERNAL MEDICINE

## 2021-10-12 PROCEDURE — 99999 PR PBB SHADOW E&M-EST. PATIENT-LVL IV: ICD-10-PCS | Mod: PBBFAC,HCNC,, | Performed by: INTERNAL MEDICINE

## 2021-10-12 PROCEDURE — 1100F PTFALLS ASSESS-DOCD GE2>/YR: CPT | Mod: HCNC,CPTII,S$GLB, | Performed by: INTERNAL MEDICINE

## 2021-10-12 PROCEDURE — 1159F PR MEDICATION LIST DOCUMENTED IN MEDICAL RECORD: ICD-10-PCS | Mod: HCNC,CPTII,S$GLB, | Performed by: INTERNAL MEDICINE

## 2021-10-12 PROCEDURE — 1160F PR REVIEW ALL MEDS BY PRESCRIBER/CLIN PHARMACIST DOCUMENTED: ICD-10-PCS | Mod: HCNC,CPTII,S$GLB, | Performed by: INTERNAL MEDICINE

## 2021-10-12 PROCEDURE — 3288F FALL RISK ASSESSMENT DOCD: CPT | Mod: HCNC,CPTII,S$GLB, | Performed by: INTERNAL MEDICINE

## 2021-10-12 PROCEDURE — 99214 OFFICE O/P EST MOD 30 MIN: CPT | Mod: HCNC,S$GLB,, | Performed by: INTERNAL MEDICINE

## 2021-10-12 PROCEDURE — 3078F PR MOST RECENT DIASTOLIC BLOOD PRESSURE < 80 MM HG: ICD-10-PCS | Mod: HCNC,CPTII,S$GLB, | Performed by: INTERNAL MEDICINE

## 2021-10-12 PROCEDURE — 3051F PR MOST RECENT HEMOGLOBIN A1C LEVEL 7.0 - < 8.0%: ICD-10-PCS | Mod: HCNC,CPTII,S$GLB, | Performed by: INTERNAL MEDICINE

## 2021-10-12 PROCEDURE — 3075F PR MOST RECENT SYSTOLIC BLOOD PRESS GE 130-139MM HG: ICD-10-PCS | Mod: HCNC,CPTII,S$GLB, | Performed by: INTERNAL MEDICINE

## 2021-10-12 PROCEDURE — 1159F MED LIST DOCD IN RCRD: CPT | Mod: HCNC,CPTII,S$GLB, | Performed by: INTERNAL MEDICINE

## 2021-10-12 PROCEDURE — 3288F PR FALLS RISK ASSESSMENT DOCUMENTED: ICD-10-PCS | Mod: HCNC,CPTII,S$GLB, | Performed by: INTERNAL MEDICINE

## 2021-10-12 PROCEDURE — 1126F AMNT PAIN NOTED NONE PRSNT: CPT | Mod: HCNC,CPTII,S$GLB, | Performed by: INTERNAL MEDICINE

## 2021-10-12 PROCEDURE — 99999 PR PBB SHADOW E&M-EST. PATIENT-LVL IV: CPT | Mod: PBBFAC,HCNC,, | Performed by: INTERNAL MEDICINE

## 2021-10-12 PROCEDURE — 3078F DIAST BP <80 MM HG: CPT | Mod: HCNC,CPTII,S$GLB, | Performed by: INTERNAL MEDICINE

## 2021-10-12 PROCEDURE — 99214 PR OFFICE/OUTPT VISIT, EST, LEVL IV, 30-39 MIN: ICD-10-PCS | Mod: HCNC,S$GLB,, | Performed by: INTERNAL MEDICINE

## 2021-10-12 PROCEDURE — 3051F HG A1C>EQUAL 7.0%<8.0%: CPT | Mod: HCNC,CPTII,S$GLB, | Performed by: INTERNAL MEDICINE

## 2021-10-12 PROCEDURE — 3075F SYST BP GE 130 - 139MM HG: CPT | Mod: HCNC,CPTII,S$GLB, | Performed by: INTERNAL MEDICINE

## 2021-10-12 PROCEDURE — 1126F PR PAIN SEVERITY QUANTIFIED, NO PAIN PRESENT: ICD-10-PCS | Mod: HCNC,CPTII,S$GLB, | Performed by: INTERNAL MEDICINE

## 2021-10-12 PROCEDURE — 1160F RVW MEDS BY RX/DR IN RCRD: CPT | Mod: HCNC,CPTII,S$GLB, | Performed by: INTERNAL MEDICINE

## 2021-10-12 RX ORDER — CLONIDINE HYDROCHLORIDE 0.1 MG/1
0.1 TABLET ORAL 2 TIMES DAILY
Qty: 180 TABLET | Refills: 3 | Status: ON HOLD
Start: 2021-10-12 | End: 2021-12-08 | Stop reason: HOSPADM

## 2021-10-18 ENCOUNTER — TELEPHONE (OUTPATIENT)
Dept: VASCULAR SURGERY | Facility: CLINIC | Age: 76
End: 2021-10-18
Payer: MEDICARE

## 2021-10-19 ENCOUNTER — PATIENT OUTREACH (OUTPATIENT)
Dept: ADMINISTRATIVE | Facility: HOSPITAL | Age: 76
End: 2021-10-19

## 2021-10-29 PROCEDURE — 99454 PR REMOTE MNTR, PHYS PARAM, INITIAL, EA 30 DAYS: ICD-10-PCS | Mod: S$GLB,,, | Performed by: INTERNAL MEDICINE

## 2021-10-29 PROCEDURE — 99454 REM MNTR PHYSIOL PARAM 16-30: CPT | Mod: S$GLB,,, | Performed by: INTERNAL MEDICINE

## 2021-10-31 PROCEDURE — 99457 RPM TX MGMT 1ST 20 MIN: CPT | Mod: S$GLB,,, | Performed by: INTERNAL MEDICINE

## 2021-10-31 PROCEDURE — 99457 PR MONITORING, PHYSIOL PARAM, REMOTE, 1ST 20 MINS, PER MONTH: ICD-10-PCS | Mod: S$GLB,,, | Performed by: INTERNAL MEDICINE

## 2021-11-03 ENCOUNTER — RESEARCH ENCOUNTER (OUTPATIENT)
Dept: NEUROLOGY | Facility: CLINIC | Age: 76
End: 2021-11-03
Payer: MEDICARE

## 2021-11-03 DIAGNOSIS — Z00.6 RESEARCH STUDY PATIENT: Primary | ICD-10-CM

## 2021-11-03 PROCEDURE — 99212 PR OFFICE/OUTPT VISIT, EST, LEVL II, 10-19 MIN: ICD-10-PCS | Mod: S$GLB,,, | Performed by: PSYCHIATRY & NEUROLOGY

## 2021-11-03 PROCEDURE — 99212 OFFICE O/P EST SF 10 MIN: CPT | Mod: S$GLB,,, | Performed by: PSYCHIATRY & NEUROLOGY

## 2021-11-05 ENCOUNTER — PATIENT OUTREACH (OUTPATIENT)
Dept: ADMINISTRATIVE | Facility: HOSPITAL | Age: 76
End: 2021-11-05
Payer: MEDICARE

## 2021-11-17 PROCEDURE — 99454 REM MNTR PHYSIOL PARAM 16-30: CPT | Mod: S$GLB,,, | Performed by: INTERNAL MEDICINE

## 2021-11-17 PROCEDURE — 99454 PR REMOTE MNTR, PHYS PARAM, INITIAL, EA 30 DAYS: ICD-10-PCS | Mod: S$GLB,,, | Performed by: INTERNAL MEDICINE

## 2021-11-18 ENCOUNTER — OFFICE VISIT (OUTPATIENT)
Dept: CARDIOLOGY | Facility: CLINIC | Age: 76
End: 2021-11-18
Payer: MEDICARE

## 2021-11-18 VITALS
BODY MASS INDEX: 35.69 KG/M2 | HEIGHT: 69 IN | WEIGHT: 240.94 LBS | SYSTOLIC BLOOD PRESSURE: 159 MMHG | HEART RATE: 50 BPM | DIASTOLIC BLOOD PRESSURE: 60 MMHG

## 2021-11-18 DIAGNOSIS — Z95.5 STENTED CORONARY ARTERY: ICD-10-CM

## 2021-11-18 DIAGNOSIS — I73.9 PAD (PERIPHERAL ARTERY DISEASE): Primary | ICD-10-CM

## 2021-11-18 DIAGNOSIS — I67.9 LACUNAR ATAXIC HEMIPARESIS: ICD-10-CM

## 2021-11-18 DIAGNOSIS — I73.9 CLAUDICATION: ICD-10-CM

## 2021-11-18 DIAGNOSIS — I25.10 CORONARY ARTERY DISEASE INVOLVING NATIVE CORONARY ARTERY OF NATIVE HEART WITHOUT ANGINA PECTORIS: ICD-10-CM

## 2021-11-18 DIAGNOSIS — Z86.73 HISTORY OF CVA IN ADULTHOOD: ICD-10-CM

## 2021-11-18 DIAGNOSIS — E11.22 TYPE 2 DIABETES MELLITUS WITH STAGE 3B CHRONIC KIDNEY DISEASE, WITH LONG-TERM CURRENT USE OF INSULIN: ICD-10-CM

## 2021-11-18 DIAGNOSIS — I10 ESSENTIAL HYPERTENSION: ICD-10-CM

## 2021-11-18 DIAGNOSIS — I69.398 ABNORMALITY OF GAIT AS LATE EFFECT OF CEREBROVASCULAR ACCIDENT (CVA): ICD-10-CM

## 2021-11-18 DIAGNOSIS — E78.2 MIXED HYPERLIPIDEMIA: ICD-10-CM

## 2021-11-18 DIAGNOSIS — N18.32 TYPE 2 DIABETES MELLITUS WITH STAGE 3B CHRONIC KIDNEY DISEASE, WITH LONG-TERM CURRENT USE OF INSULIN: ICD-10-CM

## 2021-11-18 DIAGNOSIS — I50.32 CHRONIC DIASTOLIC HEART FAILURE: ICD-10-CM

## 2021-11-18 DIAGNOSIS — E78.5 DYSLIPIDEMIA: ICD-10-CM

## 2021-11-18 DIAGNOSIS — N18.32 STAGE 3B CHRONIC KIDNEY DISEASE: ICD-10-CM

## 2021-11-18 DIAGNOSIS — R26.9 ABNORMALITY OF GAIT AS LATE EFFECT OF CEREBROVASCULAR ACCIDENT (CVA): ICD-10-CM

## 2021-11-18 DIAGNOSIS — G46.7 LACUNAR ATAXIC HEMIPARESIS: ICD-10-CM

## 2021-11-18 DIAGNOSIS — Z95.5 STATUS POST CORONARY ARTERY STENT PLACEMENT: ICD-10-CM

## 2021-11-18 DIAGNOSIS — Z79.4 TYPE 2 DIABETES MELLITUS WITH STAGE 3B CHRONIC KIDNEY DISEASE, WITH LONG-TERM CURRENT USE OF INSULIN: ICD-10-CM

## 2021-11-18 DIAGNOSIS — Z95.1 S/P CABG (CORONARY ARTERY BYPASS GRAFT): ICD-10-CM

## 2021-11-18 DIAGNOSIS — Z86.73 HISTORY OF ISCHEMIC VERTEBROBASILAR ARTERY BRAINSTEM STROKE: ICD-10-CM

## 2021-11-18 DIAGNOSIS — G81.91 RIGHT HEMIPLEGIA: ICD-10-CM

## 2021-11-18 PROCEDURE — 1126F PR PAIN SEVERITY QUANTIFIED, NO PAIN PRESENT: ICD-10-PCS | Mod: HCNC,CPTII,S$GLB, | Performed by: INTERNAL MEDICINE

## 2021-11-18 PROCEDURE — 3051F HG A1C>EQUAL 7.0%<8.0%: CPT | Mod: HCNC,CPTII,S$GLB, | Performed by: INTERNAL MEDICINE

## 2021-11-18 PROCEDURE — 3078F PR MOST RECENT DIASTOLIC BLOOD PRESSURE < 80 MM HG: ICD-10-PCS | Mod: HCNC,CPTII,S$GLB, | Performed by: INTERNAL MEDICINE

## 2021-11-18 PROCEDURE — 1159F MED LIST DOCD IN RCRD: CPT | Mod: HCNC,CPTII,S$GLB, | Performed by: INTERNAL MEDICINE

## 2021-11-18 PROCEDURE — 1160F RVW MEDS BY RX/DR IN RCRD: CPT | Mod: HCNC,CPTII,S$GLB, | Performed by: INTERNAL MEDICINE

## 2021-11-18 PROCEDURE — 3288F FALL RISK ASSESSMENT DOCD: CPT | Mod: HCNC,CPTII,S$GLB, | Performed by: INTERNAL MEDICINE

## 2021-11-18 PROCEDURE — 3051F PR MOST RECENT HEMOGLOBIN A1C LEVEL 7.0 - < 8.0%: ICD-10-PCS | Mod: HCNC,CPTII,S$GLB, | Performed by: INTERNAL MEDICINE

## 2021-11-18 PROCEDURE — 3077F SYST BP >= 140 MM HG: CPT | Mod: HCNC,CPTII,S$GLB, | Performed by: INTERNAL MEDICINE

## 2021-11-18 PROCEDURE — 3077F PR MOST RECENT SYSTOLIC BLOOD PRESSURE >= 140 MM HG: ICD-10-PCS | Mod: HCNC,CPTII,S$GLB, | Performed by: INTERNAL MEDICINE

## 2021-11-18 PROCEDURE — 99214 PR OFFICE/OUTPT VISIT, EST, LEVL IV, 30-39 MIN: ICD-10-PCS | Mod: HCNC,S$GLB,, | Performed by: INTERNAL MEDICINE

## 2021-11-18 PROCEDURE — 1159F PR MEDICATION LIST DOCUMENTED IN MEDICAL RECORD: ICD-10-PCS | Mod: HCNC,CPTII,S$GLB, | Performed by: INTERNAL MEDICINE

## 2021-11-18 PROCEDURE — 3078F DIAST BP <80 MM HG: CPT | Mod: HCNC,CPTII,S$GLB, | Performed by: INTERNAL MEDICINE

## 2021-11-18 PROCEDURE — 99999 PR PBB SHADOW E&M-EST. PATIENT-LVL IV: ICD-10-PCS | Mod: PBBFAC,HCNC,, | Performed by: INTERNAL MEDICINE

## 2021-11-18 PROCEDURE — 99499 UNLISTED E&M SERVICE: CPT | Mod: S$GLB,,, | Performed by: INTERNAL MEDICINE

## 2021-11-18 PROCEDURE — 1101F PT FALLS ASSESS-DOCD LE1/YR: CPT | Mod: HCNC,CPTII,S$GLB, | Performed by: INTERNAL MEDICINE

## 2021-11-18 PROCEDURE — 99499 RISK ADDL DX/OHS AUDIT: ICD-10-PCS | Mod: S$GLB,,, | Performed by: INTERNAL MEDICINE

## 2021-11-18 PROCEDURE — 1126F AMNT PAIN NOTED NONE PRSNT: CPT | Mod: HCNC,CPTII,S$GLB, | Performed by: INTERNAL MEDICINE

## 2021-11-18 PROCEDURE — 1101F PR PT FALLS ASSESS DOC 0-1 FALLS W/OUT INJ PAST YR: ICD-10-PCS | Mod: HCNC,CPTII,S$GLB, | Performed by: INTERNAL MEDICINE

## 2021-11-18 PROCEDURE — 99214 OFFICE O/P EST MOD 30 MIN: CPT | Mod: HCNC,S$GLB,, | Performed by: INTERNAL MEDICINE

## 2021-11-18 PROCEDURE — 1160F PR REVIEW ALL MEDS BY PRESCRIBER/CLIN PHARMACIST DOCUMENTED: ICD-10-PCS | Mod: HCNC,CPTII,S$GLB, | Performed by: INTERNAL MEDICINE

## 2021-11-18 PROCEDURE — 3288F PR FALLS RISK ASSESSMENT DOCUMENTED: ICD-10-PCS | Mod: HCNC,CPTII,S$GLB, | Performed by: INTERNAL MEDICINE

## 2021-11-18 PROCEDURE — 99999 PR PBB SHADOW E&M-EST. PATIENT-LVL IV: CPT | Mod: PBBFAC,HCNC,, | Performed by: INTERNAL MEDICINE

## 2021-11-18 RX ORDER — CARVEDILOL 3.12 MG/1
3.12 TABLET ORAL 2 TIMES DAILY
Qty: 1180 TABLET | Refills: 5 | Status: SHIPPED | OUTPATIENT
Start: 2021-11-18 | End: 2022-03-24

## 2021-11-18 RX ORDER — POTASSIUM CHLORIDE 750 MG/1
10 TABLET, EXTENDED RELEASE ORAL EVERY MORNING
Qty: 90 TABLET | Refills: 6 | Status: SHIPPED | OUTPATIENT
Start: 2021-11-18 | End: 2022-11-18

## 2021-11-18 RX ORDER — ISOSORBIDE MONONITRATE 60 MG/1
60 TABLET, EXTENDED RELEASE ORAL DAILY
Qty: 90 TABLET | Refills: 5 | Status: ON HOLD | OUTPATIENT
Start: 2021-11-18 | End: 2021-12-08 | Stop reason: HOSPADM

## 2021-11-24 ENCOUNTER — LAB VISIT (OUTPATIENT)
Dept: LAB | Facility: HOSPITAL | Age: 76
End: 2021-11-24
Attending: INTERNAL MEDICINE
Payer: MEDICARE

## 2021-11-24 DIAGNOSIS — I73.9 PAD (PERIPHERAL ARTERY DISEASE): ICD-10-CM

## 2021-11-24 LAB
ALBUMIN SERPL BCP-MCNC: 3.5 G/DL (ref 3.5–5.2)
ALP SERPL-CCNC: 37 U/L (ref 55–135)
ALT SERPL W/O P-5'-P-CCNC: 32 U/L (ref 10–44)
ANION GAP SERPL CALC-SCNC: 10 MMOL/L (ref 8–16)
AST SERPL-CCNC: 26 U/L (ref 10–40)
BILIRUB SERPL-MCNC: 0.3 MG/DL (ref 0.1–1)
BUN SERPL-MCNC: 38 MG/DL (ref 8–23)
CALCIUM SERPL-MCNC: 9.2 MG/DL (ref 8.7–10.5)
CHLORIDE SERPL-SCNC: 102 MMOL/L (ref 95–110)
CO2 SERPL-SCNC: 26 MMOL/L (ref 23–29)
CREAT SERPL-MCNC: 2 MG/DL (ref 0.5–1.4)
EST. GFR  (AFRICAN AMERICAN): 36.1 ML/MIN/1.73 M^2
EST. GFR  (NON AFRICAN AMERICAN): 31.3 ML/MIN/1.73 M^2
GLUCOSE SERPL-MCNC: 130 MG/DL (ref 70–110)
POTASSIUM SERPL-SCNC: 4.2 MMOL/L (ref 3.5–5.1)
PROT SERPL-MCNC: 7 G/DL (ref 6–8.4)
SODIUM SERPL-SCNC: 138 MMOL/L (ref 136–145)

## 2021-11-24 PROCEDURE — 36415 COLL VENOUS BLD VENIPUNCTURE: CPT | Mod: HCNC,PO | Performed by: INTERNAL MEDICINE

## 2021-11-24 PROCEDURE — 80053 COMPREHEN METABOLIC PANEL: CPT | Mod: HCNC | Performed by: INTERNAL MEDICINE

## 2021-12-09 ENCOUNTER — TELEPHONE (OUTPATIENT)
Dept: CARDIOLOGY | Facility: CLINIC | Age: 76
End: 2021-12-09
Payer: MEDICARE

## 2021-12-09 ENCOUNTER — TELEPHONE (OUTPATIENT)
Dept: VASCULAR SURGERY | Facility: CLINIC | Age: 76
End: 2021-12-09
Payer: MEDICARE

## 2021-12-28 ENCOUNTER — PATIENT MESSAGE (OUTPATIENT)
Dept: ADMINISTRATIVE | Facility: OTHER | Age: 76
End: 2021-12-28
Payer: MEDICARE

## 2021-12-28 ENCOUNTER — TELEPHONE (OUTPATIENT)
Dept: CARDIOLOGY | Facility: CLINIC | Age: 76
End: 2021-12-28
Payer: MEDICARE

## 2021-12-28 ENCOUNTER — LAB VISIT (OUTPATIENT)
Dept: LAB | Facility: OTHER | Age: 76
End: 2021-12-28
Payer: MEDICARE

## 2021-12-28 DIAGNOSIS — Z20.822 ENCOUNTER FOR LABORATORY TESTING FOR COVID-19 VIRUS: ICD-10-CM

## 2021-12-28 PROCEDURE — U0003 INFECTIOUS AGENT DETECTION BY NUCLEIC ACID (DNA OR RNA); SEVERE ACUTE RESPIRATORY SYNDROME CORONAVIRUS 2 (SARS-COV-2) (CORONAVIRUS DISEASE [COVID-19]), AMPLIFIED PROBE TECHNIQUE, MAKING USE OF HIGH THROUGHPUT TECHNOLOGIES AS DESCRIBED BY CMS-2020-01-R: HCPCS | Mod: HCNC | Performed by: NURSE PRACTITIONER

## 2021-12-29 LAB
SARS-COV-2 RNA RESP QL NAA+PROBE: NOT DETECTED
SARS-COV-2- CYCLE NUMBER: NORMAL

## 2022-01-04 ENCOUNTER — OFFICE VISIT (OUTPATIENT)
Dept: CARDIOLOGY | Facility: CLINIC | Age: 77
End: 2022-01-04
Payer: MEDICARE

## 2022-01-04 ENCOUNTER — TELEPHONE (OUTPATIENT)
Dept: CARDIOLOGY | Facility: CLINIC | Age: 77
End: 2022-01-04

## 2022-01-04 VITALS
DIASTOLIC BLOOD PRESSURE: 60 MMHG | WEIGHT: 227 LBS | BODY MASS INDEX: 33.62 KG/M2 | HEIGHT: 69 IN | HEART RATE: 71 BPM | SYSTOLIC BLOOD PRESSURE: 106 MMHG

## 2022-01-04 DIAGNOSIS — Z95.1 S/P CABG (CORONARY ARTERY BYPASS GRAFT): ICD-10-CM

## 2022-01-04 DIAGNOSIS — I73.9 CLAUDICATION: ICD-10-CM

## 2022-01-04 DIAGNOSIS — Z86.73 HISTORY OF ISCHEMIC VERTEBROBASILAR ARTERY BRAINSTEM STROKE: Primary | ICD-10-CM

## 2022-01-04 DIAGNOSIS — R26.9 ABNORMALITY OF GAIT AS LATE EFFECT OF CEREBROVASCULAR ACCIDENT (CVA): ICD-10-CM

## 2022-01-04 DIAGNOSIS — G81.91 RIGHT HEMIPLEGIA: ICD-10-CM

## 2022-01-04 DIAGNOSIS — N18.32 TYPE 2 DIABETES MELLITUS WITH STAGE 3B CHRONIC KIDNEY DISEASE, WITH LONG-TERM CURRENT USE OF INSULIN: ICD-10-CM

## 2022-01-04 DIAGNOSIS — E78.5 DYSLIPIDEMIA: ICD-10-CM

## 2022-01-04 DIAGNOSIS — Z79.4 TYPE 2 DIABETES MELLITUS WITH STAGE 3B CHRONIC KIDNEY DISEASE, WITH LONG-TERM CURRENT USE OF INSULIN: ICD-10-CM

## 2022-01-04 DIAGNOSIS — Z95.5 STENTED CORONARY ARTERY: ICD-10-CM

## 2022-01-04 DIAGNOSIS — E11.22 TYPE 2 DIABETES MELLITUS WITH STAGE 3B CHRONIC KIDNEY DISEASE, WITH LONG-TERM CURRENT USE OF INSULIN: ICD-10-CM

## 2022-01-04 DIAGNOSIS — I73.9 PAD (PERIPHERAL ARTERY DISEASE): ICD-10-CM

## 2022-01-04 DIAGNOSIS — I10 ESSENTIAL HYPERTENSION: ICD-10-CM

## 2022-01-04 DIAGNOSIS — I50.32 CHRONIC DIASTOLIC HEART FAILURE: ICD-10-CM

## 2022-01-04 DIAGNOSIS — N18.32 STAGE 3B CHRONIC KIDNEY DISEASE: ICD-10-CM

## 2022-01-04 DIAGNOSIS — I69.398 ABNORMALITY OF GAIT AS LATE EFFECT OF CEREBROVASCULAR ACCIDENT (CVA): ICD-10-CM

## 2022-01-04 PROCEDURE — 1159F PR MEDICATION LIST DOCUMENTED IN MEDICAL RECORD: ICD-10-PCS | Mod: CPTII,S$GLB,, | Performed by: INTERNAL MEDICINE

## 2022-01-04 PROCEDURE — 3288F PR FALLS RISK ASSESSMENT DOCUMENTED: ICD-10-PCS | Mod: CPTII,S$GLB,, | Performed by: INTERNAL MEDICINE

## 2022-01-04 PROCEDURE — 1126F AMNT PAIN NOTED NONE PRSNT: CPT | Mod: CPTII,S$GLB,, | Performed by: INTERNAL MEDICINE

## 2022-01-04 PROCEDURE — 3074F SYST BP LT 130 MM HG: CPT | Mod: CPTII,S$GLB,, | Performed by: INTERNAL MEDICINE

## 2022-01-04 PROCEDURE — 3074F PR MOST RECENT SYSTOLIC BLOOD PRESSURE < 130 MM HG: ICD-10-PCS | Mod: CPTII,S$GLB,, | Performed by: INTERNAL MEDICINE

## 2022-01-04 PROCEDURE — 1101F PR PT FALLS ASSESS DOC 0-1 FALLS W/OUT INJ PAST YR: ICD-10-PCS | Mod: CPTII,S$GLB,, | Performed by: INTERNAL MEDICINE

## 2022-01-04 PROCEDURE — 1159F MED LIST DOCD IN RCRD: CPT | Mod: CPTII,S$GLB,, | Performed by: INTERNAL MEDICINE

## 2022-01-04 PROCEDURE — 99499 UNLISTED E&M SERVICE: CPT | Mod: S$GLB,,, | Performed by: INTERNAL MEDICINE

## 2022-01-04 PROCEDURE — 3078F DIAST BP <80 MM HG: CPT | Mod: CPTII,S$GLB,, | Performed by: INTERNAL MEDICINE

## 2022-01-04 PROCEDURE — 1126F PR PAIN SEVERITY QUANTIFIED, NO PAIN PRESENT: ICD-10-PCS | Mod: CPTII,S$GLB,, | Performed by: INTERNAL MEDICINE

## 2022-01-04 PROCEDURE — 99214 PR OFFICE/OUTPT VISIT, EST, LEVL IV, 30-39 MIN: ICD-10-PCS | Mod: S$GLB,,, | Performed by: INTERNAL MEDICINE

## 2022-01-04 PROCEDURE — 3288F FALL RISK ASSESSMENT DOCD: CPT | Mod: CPTII,S$GLB,, | Performed by: INTERNAL MEDICINE

## 2022-01-04 PROCEDURE — 99999 PR PBB SHADOW E&M-EST. PATIENT-LVL IV: ICD-10-PCS | Mod: PBBFAC,HCNC,, | Performed by: INTERNAL MEDICINE

## 2022-01-04 PROCEDURE — 1101F PT FALLS ASSESS-DOCD LE1/YR: CPT | Mod: CPTII,S$GLB,, | Performed by: INTERNAL MEDICINE

## 2022-01-04 PROCEDURE — 1111F DSCHRG MED/CURRENT MED MERGE: CPT | Mod: CPTII,S$GLB,, | Performed by: INTERNAL MEDICINE

## 2022-01-04 PROCEDURE — 3078F PR MOST RECENT DIASTOLIC BLOOD PRESSURE < 80 MM HG: ICD-10-PCS | Mod: CPTII,S$GLB,, | Performed by: INTERNAL MEDICINE

## 2022-01-04 PROCEDURE — 1111F PR DISCHARGE MEDS RECONCILED W/ CURRENT OUTPATIENT MED LIST: ICD-10-PCS | Mod: CPTII,S$GLB,, | Performed by: INTERNAL MEDICINE

## 2022-01-04 PROCEDURE — 1160F RVW MEDS BY RX/DR IN RCRD: CPT | Mod: CPTII,S$GLB,, | Performed by: INTERNAL MEDICINE

## 2022-01-04 PROCEDURE — 1160F PR REVIEW ALL MEDS BY PRESCRIBER/CLIN PHARMACIST DOCUMENTED: ICD-10-PCS | Mod: CPTII,S$GLB,, | Performed by: INTERNAL MEDICINE

## 2022-01-04 PROCEDURE — 99214 OFFICE O/P EST MOD 30 MIN: CPT | Mod: S$GLB,,, | Performed by: INTERNAL MEDICINE

## 2022-01-04 PROCEDURE — 99999 PR PBB SHADOW E&M-EST. PATIENT-LVL IV: CPT | Mod: PBBFAC,HCNC,, | Performed by: INTERNAL MEDICINE

## 2022-01-04 PROCEDURE — 99499 RISK ADDL DX/OHS AUDIT: ICD-10-PCS | Mod: S$GLB,,, | Performed by: INTERNAL MEDICINE

## 2022-01-04 RX ORDER — HYDRALAZINE HYDROCHLORIDE 25 MG/1
25 TABLET, FILM COATED ORAL 3 TIMES DAILY
Qty: 90 TABLET | Refills: 11
Start: 2022-01-04 | End: 2022-03-24

## 2022-01-04 RX ORDER — FUROSEMIDE 80 MG/1
80 TABLET ORAL 2 TIMES DAILY
Qty: 60 TABLET | Refills: 11
Start: 2022-01-04 | End: 2022-01-11

## 2022-01-04 NOTE — PROGRESS NOTES
Subjective:    Patient ID:  Cy Rutherford Jr. is a 76 y.o. male patient here for evaluation Coronary Artery Disease      History of Present Illness:  CARDIOLOGY FOLLOW-UP, CABG BEGINNING OF DECEMBER.  Pre CABG PCI stent approximately 10 years ago.  Patient extended acute coronary syndrome.  Complains leg swelling since surgery which has since improved.  Patient on hydrochlorothiazide as well as Lasix.  Medications which may augment swelling include hydralazine and amlodipine.  Blood pressure well controlled.  No angina, no arrhythmia.    Underlying history of hypertension diabetes mellitus dyslipidemia.  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)     right arm and leg weakness    Acidemia     Acute coronary syndrome     2011 ASMI    JOSE (acute kidney injury)     Anemia     Anticoagulant long-term use     CKD (chronic kidney disease) stage 3, GFR 30-59 ml/min     CKD (chronic kidney disease) stage 4, GFR 15-29 ml/min     Coronary artery disease     stents    Essential hypertension 11/15/2012    Hematuria     Hyperkalemia     Hypertension     Hypothyroid     Intracranial atherosclerosis 5/8/2018    Rectal cancer 2002    Stage 2; removal of rectal mass and chemo    Retinopathy     right eye    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    right sided weakness    Type 2 diabetes mellitus with kidney complication, without long-term current use of insulin 11/15/2012     Past Surgical History:   Procedure Laterality Date    ANGIOPLASTY      AORTOGRAPHY WITH SERIALOGRAPHY N/A 10/16/2019    Procedure: AORTOGRAM, WITH SERIALOGRAPHY;  Surgeon: James Sanchez MD;  Location: [facility] CATH LAB/EP;  Service: Cardiology;  Laterality: N/A;    APPENDECTOMY  1959    COLON SURGERY  2005     COLONOSCOPY      CORONARY ANGIOGRAPHY  2021    Procedure: ANGIOGRAM, CORONARY ARTERY;  Surgeon: Galdino Chan MD;  Location: [facility] CATH;  Service: Cardiology;;    CORONARY ANGIOPLASTY      MARIAN to LAD and LCX    CORONARY ARTERY BYPASS GRAFT (CABG) N/A 2021    Procedure: CORONARY ARTERY BYPASS GRAFT (CABG) x4;  Surgeon: Gaudencio Heck MD;  Location: [facility] OR;  Service: Cardiovascular;  Laterality: N/A;    ENDOSCOPIC HARVEST OF VEIN N/A 2021    Procedure: SURGICAL PROCUREMENT, VEIN, ENDOSCOPIC;  Surgeon: Gaudencio Heck MD;  Location: [facility] OR;  Service: Cardiovascular;  Laterality: N/A;    ESOPHAGOGASTRODUODENOSCOPY N/A 2019    Procedure: ESOPHAGOGASTRODUODENOSCOPY (EGD);  Surgeon: Harish Rodrigues MD;  Location: [facility] ENDO;  Service: Endoscopy;  Laterality: N/A;    EXCISION OF HYDROCELE      LEFT HEART CATHETERIZATION  2021    Procedure: Left heart cath;  Surgeon: Galdion Chan MD;  Location: [facility] CATH;  Service: Cardiology;;    RETINAL LASER PROCEDURE Right     and injections    TONSILLECTOMY      VASECTOMY       Social History     Tobacco Use    Smoking status: Former Smoker     Packs/day: 1.00     Years: 25.00     Pack years: 25.00     Types: Cigarettes, Pipe, Cigars     Start date: 1962     Quit date: 1987     Years since quittin.6    Smokeless tobacco: Never Used   Substance Use Topics    Alcohol use: Yes     Alcohol/week: 2.0 standard drinks     Types: 2 Glasses of wine per week     Comment: occasional    Drug use: No        Review of Systems:    As noted in HPI in addition      REVIEW OF SYSTEMS  Review of Systems   Constitutional: Positive for weight loss. Negative for decreased appetite, diaphoresis and night sweats.   HENT: Negative for nosebleeds and odynophagia.    Eyes: Negative for double vision and photophobia.   Cardiovascular: Positive for leg swelling. Negative for chest pain, claudication, cyanosis, dyspnea on exertion, irregular  heartbeat, near-syncope, orthopnea, palpitations, paroxysmal nocturnal dyspnea and syncope.   Respiratory: Negative for cough, hemoptysis, shortness of breath and wheezing.    Hematologic/Lymphatic: Negative for adenopathy.   Skin: Negative for flushing, skin cancer and suspicious lesions.   Musculoskeletal: Negative for gout, myalgias and neck pain.   Gastrointestinal: Negative for abdominal pain, heartburn, hematemesis and hematochezia.   Genitourinary: Negative for bladder incontinence, hesitancy and nocturia.   Neurological: Negative for focal weakness, headaches, light-headedness and paresthesias.        HX CVA, W PARTIAL RT HEMIPARESIS   Psychiatric/Behavioral: Negative for memory loss and substance abuse.              Objective:        Vitals:    01/04/22 1014   BP: 106/60   Pulse: 71       Lab Results   Component Value Date    WBC 8.54 12/07/2021    HGB 8.0 (L) 12/07/2021    HCT 24.6 (L) 12/07/2021     12/07/2021    CHOL 171 07/07/2021    TRIG 199 (H) 07/07/2021    HDL 39 (L) 07/07/2021    ALT 18 12/06/2021    AST 33 12/06/2021     12/08/2021    K 4.3 12/08/2021     12/08/2021    CREATININE 1.74 (H) 12/08/2021    BUN 58 (H) 12/08/2021    CO2 31 12/08/2021    TSH 5.830 (H) 12/04/2021    INR 1.3 12/02/2021    HGBA1C 6.3 (H) 11/30/2021        ECHOCARDIOGRAM RESULTS  Results for orders placed during the hospital encounter of 12/02/19    Echo Color Flow Doppler? Yes    Interpretation Summary  · Normal left ventricular systolic function. The estimated ejection fraction is 55%  · Concentric left ventricular remodeling.  · No wall motion abnormalities.  · Normal right ventricular systolic function.  · Normal central venous pressure (3 mm Hg).        CURRENT/PREVIOUS VISIT EKG  Results for orders placed or performed during the hospital encounter of 12/02/21   EKG 12-lead    Collection Time: 12/04/21 10:25 PM    Narrative    Test Reason : I49.9,    Vent. Rate : 085 BPM     Atrial Rate : 147 BPM      P-R Int : 000 ms          QRS Dur : 140 ms      QT Int : 432 ms       P-R-T Axes : 000 -50 084 degrees     QTc Int : 514 ms    Atrial fibrillation  Right bundle branch block  Left anterior fascicular block   Bifascicular block   Abnormal ECG  When compared with ECG of 02-DEC-2021 22:03,  Atrial fibrillation has replaced Sinus rhythm  Confirmed by Clare Corbin MD ([id number]) on 12/5/2021 10:01:50 AM    Referred By: MANISHA SNEED           Confirmed By:Clare Corbin MD     No valid procedures specified.   Results for orders placed during the hospital encounter of 06/15/21    Nuclear Stress - Cardiology Interpreted    Interpretation Summary    Abnormal myocardial perfusion scan.    There is a mild intensity, small sized, reversible defect that is consistent with ischemia in the inferior wall(s).    The gated perfusion images showed an ejection fraction of 61% at rest. The gated perfusion images showed an ejection fraction of % post stress. Normal ejection fraction is greater than %.    The EKG portion of this study is negative for ischemia.    No valid procedures specified.    PHYSICAL EXAM  CONSTITUTIONAL: Well built, well nourished in no apparent distress  NECK: no carotid bruit, no JVD  LUNGS: CTA  CHEST WALL: no tenderness,  HEART: regular rate and rhythm, S1, S2 normal, no murmur, click, rub or gallop   ABDOMEN: soft, non-tender; bowel sounds normal; no masses,  no organomegaly  EXTREMITIES: Extremities normal, 4+ lower extremity edema, no calf tenderness noted  NEURO: AAO X 3    I HAVE REVIEWED :    The vital signs, nurses notes, and all the pertinent radiology and labs.         Current Outpatient Medications   Medication Instructions    amLODIPine (NORVASC) 5 mg, Oral, Nightly    aspirin (ECOTRIN) 81 mg, Oral, Nightly    carvediloL (COREG) 3.125 mg, Oral, 2 times daily    clopidogreL (PLAVIX) 75 mg tablet TAKE 1 TABLET EVERY DAY    desoximetasone (TOPICORT) 0.25 % cream 1 application, Topical (Top), 2 times  "daily PRN    docusate sodium (COLACE) 100 mg, Oral, Daily    doxazosin (CARDURA) 8 MG Tab TAKE 1 TABLET EVERY EVENING    DROPLET PEN NEEDLE 31 gauge x 5/16" Ndle 1 each, Misc.(Non-Drug; Combo Route), 4 times daily, to inject insulin    EYLEA 2 mg, Intravitreal, Every 28 days    fenofibrate micronized (LOFIBRA) 134 mg, Oral, Nightly    FEROSUL 325 mg (65 mg iron) Tab tablet TAKE 1 TABLET EVERY DAY WITH BREAKFAST    fish oil-omega-3 fatty acids 300-1,000 mg capsule 1 capsule, Oral, Every morning    furosemide (LASIX) 40 MG tablet TAKE 1 TABLET TWICE DAILY    hydrALAZINE (APRESOLINE) 50 mg, Oral, Every 8 hours    hydroCHLOROthiazide (HYDRODIURIL) 25 mg, Oral, Every morning    HYDROcodone-acetaminophen (NORCO) 5-325 mg per tablet 1 tablet, Oral, Every 4 hours PRN    insulin aspart U-100 (NOVOLOG) 15 Units, Subcutaneous, 3 times daily    INV INSULIN GLARGINE, LANTUS, 100U/ML SOLN FOR INJ SOLOSTAR 70-75 Units, Subcutaneous, Every morning, Per Patient <BR>If BG = 140 give 70 units<BR>If BG = 160-180 give 72 units<BR>If BG = 200 give 75 units    levothyroxine (SYNTHROID) 25 mcg, Oral, Before breakfast    multivitamin with minerals (ONE-A-DAY MAXIMUM FORMULA ORAL) 1 tablet, Oral, Daily    omeprazole (PRILOSEC) 20 mg, Oral, Daily    potassium chloride SA (K-DUR,KLOR-CON) 10 MEQ tablet 10 mEq, Oral, Every morning    rosuvastatin (CRESTOR) 20 mg, Oral, Daily    valsartan (DIOVAN) 320 mg, Oral, Daily          Assessment:   ASCVD.  CAD.  History of CABG December of this year.  Pre CABG PCI stent.  Lower extremity edema since CABG, improving of recent.  Chronic kidney disease with last documented creatinine clearance 28.  Ejection fraction by history normal.  Multiple CV risk factors including diabetes mellitus hypertension dyslipidemia.          Plan:   Increase loop diuretic to 80 mg   Decrease hydralazine to 25 mg q.8 in lieu of 50 Q 8  Repeat labs, follow-up cardiac echo.  Include BNP      No follow-ups on " file.

## 2022-01-04 NOTE — TELEPHONE ENCOUNTER
----- Message from Chandu Alves sent at 1/4/2022 12:40 PM CST -----  Contact: casandra at [facility]  Needing a call back about med changes the care provider has the pt on something that could cause an interaction   Call back at [phone] ask for nurse for pt thanks

## 2022-01-05 ENCOUNTER — TELEPHONE (OUTPATIENT)
Dept: CARDIOLOGY | Facility: CLINIC | Age: 77
End: 2022-01-05
Payer: MEDICARE

## 2022-01-05 NOTE — TELEPHONE ENCOUNTER
Nurse at [facility] calling to clarify orders. Vale advised that pt is currently taking metolazone 5mg daily and lasix 40 mg BID. Vale asking if you still would like to increase lasix to 80 mg BID. Please advise.

## 2022-01-05 NOTE — TELEPHONE ENCOUNTER
----- Message from Nancy Escobar sent at 1/5/2022  2:20 PM CST -----  Contact: Rozina Miller  Type: Return Call    Who Called: Beth Miller  Who Left Message for Pt: nurse      Please call regarding orders for pt  Best Call Back Number: [phone], ask to speak to the nurse for that pt.     He is already on Metolazone 5 mg qd and is on Lasix 40mg twice a day and the orders were for Lasix 80mg. They want to make sure the Dr knows he is on Metolazone.        Thank you~

## 2022-01-09 PROCEDURE — G0180 PR HOME HEALTH MD CERTIFICATION: ICD-10-PCS | Mod: ,,, | Performed by: INTERNAL MEDICINE

## 2022-01-09 PROCEDURE — G0180 MD CERTIFICATION HHA PATIENT: HCPCS | Mod: ,,, | Performed by: INTERNAL MEDICINE

## 2022-01-10 ENCOUNTER — CLINICAL SUPPORT (OUTPATIENT)
Dept: CARDIOLOGY | Facility: HOSPITAL | Age: 77
End: 2022-01-10
Attending: INTERNAL MEDICINE
Payer: MEDICARE

## 2022-01-10 VITALS — HEIGHT: 69 IN | BODY MASS INDEX: 33.62 KG/M2 | WEIGHT: 227 LBS

## 2022-01-10 DIAGNOSIS — R26.9 ABNORMALITY OF GAIT AS LATE EFFECT OF CEREBROVASCULAR ACCIDENT (CVA): ICD-10-CM

## 2022-01-10 DIAGNOSIS — Z95.1 S/P CABG (CORONARY ARTERY BYPASS GRAFT): ICD-10-CM

## 2022-01-10 DIAGNOSIS — E78.5 DYSLIPIDEMIA: ICD-10-CM

## 2022-01-10 DIAGNOSIS — I69.398 ABNORMALITY OF GAIT AS LATE EFFECT OF CEREBROVASCULAR ACCIDENT (CVA): ICD-10-CM

## 2022-01-10 DIAGNOSIS — I73.9 PAD (PERIPHERAL ARTERY DISEASE): ICD-10-CM

## 2022-01-10 DIAGNOSIS — I10 ESSENTIAL HYPERTENSION: ICD-10-CM

## 2022-01-10 DIAGNOSIS — I73.9 CLAUDICATION: ICD-10-CM

## 2022-01-10 DIAGNOSIS — G81.91 RIGHT HEMIPLEGIA: ICD-10-CM

## 2022-01-10 DIAGNOSIS — Z95.5 STENTED CORONARY ARTERY: ICD-10-CM

## 2022-01-10 PROCEDURE — 93306 ECHO (CUPID ONLY): ICD-10-PCS | Mod: 26,,, | Performed by: INTERNAL MEDICINE

## 2022-01-10 PROCEDURE — 93306 TTE W/DOPPLER COMPLETE: CPT | Mod: PO

## 2022-01-10 PROCEDURE — 93306 TTE W/DOPPLER COMPLETE: CPT | Mod: 26,,, | Performed by: INTERNAL MEDICINE

## 2022-01-11 ENCOUNTER — PATIENT MESSAGE (OUTPATIENT)
Dept: CARDIOLOGY | Facility: CLINIC | Age: 77
End: 2022-01-11
Payer: MEDICARE

## 2022-01-11 ENCOUNTER — TELEPHONE (OUTPATIENT)
Dept: CARDIOLOGY | Facility: CLINIC | Age: 77
End: 2022-01-11
Payer: MEDICARE

## 2022-01-11 ENCOUNTER — OFFICE VISIT (OUTPATIENT)
Dept: VASCULAR SURGERY | Facility: CLINIC | Age: 77
End: 2022-01-11
Payer: MEDICARE

## 2022-01-11 VITALS
WEIGHT: 237.19 LBS | HEIGHT: 69 IN | DIASTOLIC BLOOD PRESSURE: 61 MMHG | BODY MASS INDEX: 35.13 KG/M2 | HEART RATE: 68 BPM | SYSTOLIC BLOOD PRESSURE: 158 MMHG

## 2022-01-11 DIAGNOSIS — Z95.1 S/P CABG (CORONARY ARTERY BYPASS GRAFT): Primary | ICD-10-CM

## 2022-01-11 LAB
ASCENDING AORTA: 2.89 CM
AV INDEX (PROSTH): 0.76
AV MEAN GRADIENT: 4 MMHG
AV PEAK GRADIENT: 8 MMHG
AV VALVE AREA: 3.22 CM2
AV VELOCITY RATIO: 0.79
BSA FOR ECHO PROCEDURE: 2.24 M2
CV ECHO LV RWT: 0.48 CM
DOP CALC AO PEAK VEL: 1.45 M/S
DOP CALC AO VTI: 30.45 CM
DOP CALC LVOT AREA: 4.2 CM2
DOP CALC LVOT DIAMETER: 2.32 CM
DOP CALC LVOT PEAK VEL: 1.15 M/S
DOP CALC LVOT STROKE VOLUME: 97.94 CM3
DOP CALCLVOT PEAK VEL VTI: 23.18 CM
E WAVE DECELERATION TIME: 163.61 MSEC
E/A RATIO: 1.13
E/E' RATIO: 15.23 M/S
ECHO LV POSTERIOR WALL: 1.29 CM (ref 0.6–1.1)
EJECTION FRACTION: 65 %
FRACTIONAL SHORTENING: 35 % (ref 28–44)
INTERVENTRICULAR SEPTUM: 1.16 CM (ref 0.6–1.1)
LA MAJOR: 4.41 CM
LA MINOR: 5.16 CM
LA WIDTH: 3.86 CM
LEFT ATRIUM SIZE: 4.07 CM
LEFT ATRIUM VOLUME INDEX: 29.1 ML/M2
LEFT ATRIUM VOLUME: 63.5 CM3
LEFT INTERNAL DIMENSION IN SYSTOLE: 3.54 CM (ref 2.1–4)
LEFT VENTRICLE DIASTOLIC VOLUME INDEX: 65.73 ML/M2
LEFT VENTRICLE DIASTOLIC VOLUME: 143.29 ML
LEFT VENTRICLE MASS INDEX: 126 G/M2
LEFT VENTRICLE SYSTOLIC VOLUME INDEX: 24 ML/M2
LEFT VENTRICLE SYSTOLIC VOLUME: 52.41 ML
LEFT VENTRICULAR INTERNAL DIMENSION IN DIASTOLE: 5.43 CM (ref 3.5–6)
LEFT VENTRICULAR MASS: 274.5 G
LV LATERAL E/E' RATIO: 16.5 M/S
LV SEPTAL E/E' RATIO: 14.14 M/S
MV A" WAVE DURATION": 11.04 MSEC
MV PEAK A VEL: 0.88 M/S
MV PEAK E VEL: 0.99 M/S
MV STENOSIS PRESSURE HALF TIME: 47.45 MS
MV VALVE AREA P 1/2 METHOD: 4.64 CM2
PISA TR MAX VEL: 1.74 M/S
PULM VEIN S/D RATIO: 0.95
PV PEAK D VEL: 0.38 M/S
PV PEAK S VEL: 0.36 M/S
RA MAJOR: 4.26 CM
RA PRESSURE: 8 MMHG
RA WIDTH: 4.39 CM
RIGHT VENTRICULAR END-DIASTOLIC DIMENSION: 4.34 CM
RV TISSUE DOPPLER FREE WALL SYSTOLIC VELOCITY 1 (APICAL 4 CHAMBER VIEW): 10.11 CM/S
SINUS: 3.11 CM
STJ: 3.11 CM
TDI LATERAL: 0.06 M/S
TDI SEPTAL: 0.07 M/S
TDI: 0.07 M/S
TR MAX PG: 12 MMHG
TRICUSPID ANNULAR PLANE SYSTOLIC EXCURSION: 1.3 CM
TV REST PULMONARY ARTERY PRESSURE: 20 MMHG

## 2022-01-11 PROCEDURE — 1101F PR PT FALLS ASSESS DOC 0-1 FALLS W/OUT INJ PAST YR: ICD-10-PCS | Mod: CPTII,S$GLB,, | Performed by: THORACIC SURGERY (CARDIOTHORACIC VASCULAR SURGERY)

## 2022-01-11 PROCEDURE — 3078F PR MOST RECENT DIASTOLIC BLOOD PRESSURE < 80 MM HG: ICD-10-PCS | Mod: CPTII,S$GLB,, | Performed by: THORACIC SURGERY (CARDIOTHORACIC VASCULAR SURGERY)

## 2022-01-11 PROCEDURE — 3288F FALL RISK ASSESSMENT DOCD: CPT | Mod: CPTII,S$GLB,, | Performed by: THORACIC SURGERY (CARDIOTHORACIC VASCULAR SURGERY)

## 2022-01-11 PROCEDURE — 1101F PT FALLS ASSESS-DOCD LE1/YR: CPT | Mod: CPTII,S$GLB,, | Performed by: THORACIC SURGERY (CARDIOTHORACIC VASCULAR SURGERY)

## 2022-01-11 PROCEDURE — 99999 PR PBB SHADOW E&M-EST. PATIENT-LVL IV: CPT | Mod: PBBFAC,,, | Performed by: THORACIC SURGERY (CARDIOTHORACIC VASCULAR SURGERY)

## 2022-01-11 PROCEDURE — 99024 POSTOP FOLLOW-UP VISIT: CPT | Mod: S$GLB,,, | Performed by: THORACIC SURGERY (CARDIOTHORACIC VASCULAR SURGERY)

## 2022-01-11 PROCEDURE — 99999 PR PBB SHADOW E&M-EST. PATIENT-LVL IV: ICD-10-PCS | Mod: PBBFAC,,, | Performed by: THORACIC SURGERY (CARDIOTHORACIC VASCULAR SURGERY)

## 2022-01-11 PROCEDURE — 1160F PR REVIEW ALL MEDS BY PRESCRIBER/CLIN PHARMACIST DOCUMENTED: ICD-10-PCS | Mod: CPTII,S$GLB,, | Performed by: THORACIC SURGERY (CARDIOTHORACIC VASCULAR SURGERY)

## 2022-01-11 PROCEDURE — 3077F PR MOST RECENT SYSTOLIC BLOOD PRESSURE >= 140 MM HG: ICD-10-PCS | Mod: CPTII,S$GLB,, | Performed by: THORACIC SURGERY (CARDIOTHORACIC VASCULAR SURGERY)

## 2022-01-11 PROCEDURE — 3288F PR FALLS RISK ASSESSMENT DOCUMENTED: ICD-10-PCS | Mod: CPTII,S$GLB,, | Performed by: THORACIC SURGERY (CARDIOTHORACIC VASCULAR SURGERY)

## 2022-01-11 PROCEDURE — 99024 PR POST-OP FOLLOW-UP VISIT: ICD-10-PCS | Mod: S$GLB,,, | Performed by: THORACIC SURGERY (CARDIOTHORACIC VASCULAR SURGERY)

## 2022-01-11 PROCEDURE — 3077F SYST BP >= 140 MM HG: CPT | Mod: CPTII,S$GLB,, | Performed by: THORACIC SURGERY (CARDIOTHORACIC VASCULAR SURGERY)

## 2022-01-11 PROCEDURE — 3078F DIAST BP <80 MM HG: CPT | Mod: CPTII,S$GLB,, | Performed by: THORACIC SURGERY (CARDIOTHORACIC VASCULAR SURGERY)

## 2022-01-11 PROCEDURE — 1160F RVW MEDS BY RX/DR IN RCRD: CPT | Mod: CPTII,S$GLB,, | Performed by: THORACIC SURGERY (CARDIOTHORACIC VASCULAR SURGERY)

## 2022-01-11 PROCEDURE — 1126F PR PAIN SEVERITY QUANTIFIED, NO PAIN PRESENT: ICD-10-PCS | Mod: CPTII,S$GLB,, | Performed by: THORACIC SURGERY (CARDIOTHORACIC VASCULAR SURGERY)

## 2022-01-11 PROCEDURE — 1159F PR MEDICATION LIST DOCUMENTED IN MEDICAL RECORD: ICD-10-PCS | Mod: CPTII,S$GLB,, | Performed by: THORACIC SURGERY (CARDIOTHORACIC VASCULAR SURGERY)

## 2022-01-11 PROCEDURE — 1126F AMNT PAIN NOTED NONE PRSNT: CPT | Mod: CPTII,S$GLB,, | Performed by: THORACIC SURGERY (CARDIOTHORACIC VASCULAR SURGERY)

## 2022-01-11 PROCEDURE — 1159F MED LIST DOCD IN RCRD: CPT | Mod: CPTII,S$GLB,, | Performed by: THORACIC SURGERY (CARDIOTHORACIC VASCULAR SURGERY)

## 2022-01-11 RX ORDER — FUROSEMIDE 40 MG/1
TABLET ORAL
COMMUNITY
Start: 2022-01-06 | End: 2022-03-24 | Stop reason: SDUPTHER

## 2022-01-11 RX ORDER — METOLAZONE 5 MG/1
1 TABLET ORAL
COMMUNITY
Start: 2021-12-20 | End: 2022-02-08

## 2022-01-11 RX ORDER — CLONIDINE HYDROCHLORIDE 0.1 MG/1
TABLET ORAL
COMMUNITY
Start: 2022-01-06 | End: 2022-02-08

## 2022-01-11 NOTE — TELEPHONE ENCOUNTER
Spoke with Hoa Krish and informed him to decrease Lasix dose to 40 mg BID.  Also informed him to take Metolazone 5mg every other day until it runs out.  Pt was encouraged to call back with any questions or concerns.

## 2022-01-11 NOTE — PROGRESS NOTES
"Subjective:       Cy Rutherford Jr. presents to the clinic for scheduled post-op follow-op after S/P CABG x4 12/2/2021 by Dr Lakhani. He experienced post-op PAF that was managed by cardiology.        Objective:      BP (!) 158/61   Pulse 68   Ht 5' 9" (1.753 m)   Wt 107.6 kg (237 lb 3.4 oz)   BMI 35.03 kg/m²     Objective  General: Resting in chair in NAD on RA  Cardiovascular: Audible heart tones. Distal pulses intact. 2+ pitting edema to BLE. Wearing DARRION hose  Pulmonary: Clear and equal breath sounds  Integumentary: Surgical incisions well approximated and healing nicely.          Assessment:      Doing well postoperatively.      Plan:        1. Continue any current medications.  2. Wound care discussed.  3. Pt is to increase activities as tolerated.  4. Follow up: As needed. Schedule cardiac rehab    "

## 2022-01-19 ENCOUNTER — DOCUMENT SCAN (OUTPATIENT)
Dept: HOME HEALTH SERVICES | Facility: HOSPITAL | Age: 77
End: 2022-01-19
Payer: MEDICARE

## 2022-01-28 ENCOUNTER — DOCUMENT SCAN (OUTPATIENT)
Dept: HOME HEALTH SERVICES | Facility: HOSPITAL | Age: 77
End: 2022-01-28
Payer: MEDICARE

## 2022-02-03 ENCOUNTER — EXTERNAL HOME HEALTH (OUTPATIENT)
Dept: HOME HEALTH SERVICES | Facility: HOSPITAL | Age: 77
End: 2022-02-03
Payer: MEDICARE

## 2022-02-04 ENCOUNTER — PATIENT OUTREACH (OUTPATIENT)
Dept: ADMINISTRATIVE | Facility: OTHER | Age: 77
End: 2022-02-04
Payer: MEDICARE

## 2022-02-04 NOTE — PROGRESS NOTES
LINKS immunization registry updated  Care Everywhere updated  Health Maintenance updated  Chart reviewed for overdue Proactive [facility] Encounters ([facility]REGORIO) health maintenance testing (CRS, Breast Ca, Diabetic Eye Exam)   Orders entered:N/A

## 2022-02-08 ENCOUNTER — OFFICE VISIT (OUTPATIENT)
Dept: CARDIOLOGY | Facility: CLINIC | Age: 77
End: 2022-02-08
Payer: MEDICARE

## 2022-02-08 VITALS
HEIGHT: 69 IN | HEART RATE: 65 BPM | WEIGHT: 233.44 LBS | BODY MASS INDEX: 34.58 KG/M2 | DIASTOLIC BLOOD PRESSURE: 72 MMHG | SYSTOLIC BLOOD PRESSURE: 163 MMHG

## 2022-02-08 DIAGNOSIS — E11.22 TYPE 2 DIABETES MELLITUS WITH STAGE 3B CHRONIC KIDNEY DISEASE, WITH LONG-TERM CURRENT USE OF INSULIN: ICD-10-CM

## 2022-02-08 DIAGNOSIS — N18.32 TYPE 2 DIABETES MELLITUS WITH STAGE 3B CHRONIC KIDNEY DISEASE, WITH LONG-TERM CURRENT USE OF INSULIN: ICD-10-CM

## 2022-02-08 DIAGNOSIS — I10 ESSENTIAL HYPERTENSION: ICD-10-CM

## 2022-02-08 DIAGNOSIS — I73.9 CLAUDICATION: ICD-10-CM

## 2022-02-08 DIAGNOSIS — I73.9 PAD (PERIPHERAL ARTERY DISEASE): ICD-10-CM

## 2022-02-08 DIAGNOSIS — I25.10 CORONARY ARTERY DISEASE INVOLVING NATIVE CORONARY ARTERY OF NATIVE HEART WITHOUT ANGINA PECTORIS: ICD-10-CM

## 2022-02-08 DIAGNOSIS — Z79.4 TYPE 2 DIABETES MELLITUS WITH STAGE 3B CHRONIC KIDNEY DISEASE, WITH LONG-TERM CURRENT USE OF INSULIN: ICD-10-CM

## 2022-02-08 DIAGNOSIS — E78.5 DYSLIPIDEMIA: ICD-10-CM

## 2022-02-08 DIAGNOSIS — I49.9 CARDIAC ARRHYTHMIA, UNSPECIFIED CARDIAC ARRHYTHMIA TYPE: ICD-10-CM

## 2022-02-08 DIAGNOSIS — Z95.5 STENTED CORONARY ARTERY: ICD-10-CM

## 2022-02-08 DIAGNOSIS — Z95.1 S/P CABG (CORONARY ARTERY BYPASS GRAFT): ICD-10-CM

## 2022-02-08 DIAGNOSIS — I69.398 ABNORMALITY OF GAIT AS LATE EFFECT OF CEREBROVASCULAR ACCIDENT (CVA): Primary | ICD-10-CM

## 2022-02-08 DIAGNOSIS — R26.9 ABNORMALITY OF GAIT AS LATE EFFECT OF CEREBROVASCULAR ACCIDENT (CVA): Primary | ICD-10-CM

## 2022-02-08 PROCEDURE — 1126F PR PAIN SEVERITY QUANTIFIED, NO PAIN PRESENT: ICD-10-PCS | Mod: HCNC,CPTII,S$GLB, | Performed by: INTERNAL MEDICINE

## 2022-02-08 PROCEDURE — 99214 PR OFFICE/OUTPT VISIT, EST, LEVL IV, 30-39 MIN: ICD-10-PCS | Mod: HCNC,S$GLB,, | Performed by: INTERNAL MEDICINE

## 2022-02-08 PROCEDURE — 99214 OFFICE O/P EST MOD 30 MIN: CPT | Mod: HCNC,S$GLB,, | Performed by: INTERNAL MEDICINE

## 2022-02-08 PROCEDURE — 1159F PR MEDICATION LIST DOCUMENTED IN MEDICAL RECORD: ICD-10-PCS | Mod: HCNC,CPTII,S$GLB, | Performed by: INTERNAL MEDICINE

## 2022-02-08 PROCEDURE — 3077F SYST BP >= 140 MM HG: CPT | Mod: HCNC,CPTII,S$GLB, | Performed by: INTERNAL MEDICINE

## 2022-02-08 PROCEDURE — 99999 PR PBB SHADOW E&M-EST. PATIENT-LVL III: ICD-10-PCS | Mod: PBBFAC,HCNC,, | Performed by: INTERNAL MEDICINE

## 2022-02-08 PROCEDURE — 1160F RVW MEDS BY RX/DR IN RCRD: CPT | Mod: HCNC,CPTII,S$GLB, | Performed by: INTERNAL MEDICINE

## 2022-02-08 PROCEDURE — 3077F PR MOST RECENT SYSTOLIC BLOOD PRESSURE >= 140 MM HG: ICD-10-PCS | Mod: HCNC,CPTII,S$GLB, | Performed by: INTERNAL MEDICINE

## 2022-02-08 PROCEDURE — 99499 RISK ADDL DX/OHS AUDIT: ICD-10-PCS | Mod: S$GLB,,, | Performed by: INTERNAL MEDICINE

## 2022-02-08 PROCEDURE — 3078F DIAST BP <80 MM HG: CPT | Mod: HCNC,CPTII,S$GLB, | Performed by: INTERNAL MEDICINE

## 2022-02-08 PROCEDURE — 1159F MED LIST DOCD IN RCRD: CPT | Mod: HCNC,CPTII,S$GLB, | Performed by: INTERNAL MEDICINE

## 2022-02-08 PROCEDURE — 1126F AMNT PAIN NOTED NONE PRSNT: CPT | Mod: HCNC,CPTII,S$GLB, | Performed by: INTERNAL MEDICINE

## 2022-02-08 PROCEDURE — 99999 PR PBB SHADOW E&M-EST. PATIENT-LVL III: CPT | Mod: PBBFAC,HCNC,, | Performed by: INTERNAL MEDICINE

## 2022-02-08 PROCEDURE — 1160F PR REVIEW ALL MEDS BY PRESCRIBER/CLIN PHARMACIST DOCUMENTED: ICD-10-PCS | Mod: HCNC,CPTII,S$GLB, | Performed by: INTERNAL MEDICINE

## 2022-02-08 PROCEDURE — 3078F PR MOST RECENT DIASTOLIC BLOOD PRESSURE < 80 MM HG: ICD-10-PCS | Mod: HCNC,CPTII,S$GLB, | Performed by: INTERNAL MEDICINE

## 2022-02-08 PROCEDURE — 99499 UNLISTED E&M SERVICE: CPT | Mod: S$GLB,,, | Performed by: INTERNAL MEDICINE

## 2022-02-08 NOTE — PROGRESS NOTES
Subjective:    Patient ID:  Cy Rutherford Jr. is a 76 y.o. male patient here for evaluation No chief complaint on file.      History of Present Illness:  Cardiology follow-up visit.  CABG 12/21.  History of pre CABG PCI stent years prior.  Follow-up leg edema better with increased dose of Lasix although labs reflected mild increase in azotemia.  Creatinine clearance varies between 28 and 33.   No angina.  No arrhythmia.  No PND orthopnea.  Stable NASSAR.    History of peripheral arterial disease with right leg claudication greater than left.  History of PTA stent left leg with 50% InStent restenosis, suspect 90% mid SFA lesion on the right by ultrasound performed 6/21.    Past history of CVA.             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)     right arm and leg weakness    Acidemia     Acute coronary syndrome     2011 ASMI    JOSE (acute kidney injury)     Anemia     Anticoagulant long-term use     CKD (chronic kidney disease) stage 3, GFR 30-59 ml/min     CKD (chronic kidney disease) stage 4, GFR 15-29 ml/min     Coronary artery disease     stents    Essential hypertension 11/15/2012    Hematuria     Hyperkalemia     Hypertension     Hypothyroid     Intracranial atherosclerosis 5/8/2018    Rectal cancer 2002    Stage 2; removal of rectal mass and chemo    Retinopathy     right eye    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    right sided weakness    Type 2 diabetes mellitus with kidney complication, without long-term current use of insulin 11/15/2012     Past Surgical History:   Procedure Laterality Date    ANGIOPLASTY      AORTOGRAPHY WITH SERIALOGRAPHY N/A 10/16/2019    Procedure: AORTOGRAM, WITH SERIALOGRAPHY;  Surgeon: James Sanchez MD;  Location: [facility] CATH LAB/EP;  Service: Cardiology;  Laterality: N/A;    APPENDECTOMY  1959    COLON  SURGERY  2005    COLONOSCOPY      CORONARY ANGIOGRAPHY  2021    Procedure: ANGIOGRAM, CORONARY ARTERY;  Surgeon: Galdino Chan MD;  Location: [facility] CATH;  Service: Cardiology;;    CORONARY ANGIOPLASTY      MARIAN to LAD and LCX    CORONARY ARTERY BYPASS GRAFT (CABG) N/A 2021    Procedure: CORONARY ARTERY BYPASS GRAFT (CABG) x4;  Surgeon: Guadencio Heck MD;  Location: [facility] OR;  Service: Cardiovascular;  Laterality: N/A;    ENDOSCOPIC HARVEST OF VEIN N/A 2021    Procedure: SURGICAL PROCUREMENT, VEIN, ENDOSCOPIC;  Surgeon: Gaudencio Heck MD;  Location: [facility] OR;  Service: Cardiovascular;  Laterality: N/A;    ESOPHAGOGASTRODUODENOSCOPY N/A 2019    Procedure: ESOPHAGOGASTRODUODENOSCOPY (EGD);  Surgeon: Harish Rodrigues MD;  Location: [facility] ENDO;  Service: Endoscopy;  Laterality: N/A;    EXCISION OF HYDROCELE      LEFT HEART CATHETERIZATION  2021    Procedure: Left heart cath;  Surgeon: Galdino Chan MD;  Location: [facility] CATH;  Service: Cardiology;;    RETINAL LASER PROCEDURE Right     and injections    TONSILLECTOMY      VASECTOMY       Social History     Tobacco Use    Smoking status: Former Smoker     Packs/day: 1.00     Years: 25.00     Pack years: 25.00     Types: Cigarettes, Pipe, Cigars     Start date: 1962     Quit date: 1987     Years since quittin.7    Smokeless tobacco: Never Used   Substance Use Topics    Alcohol use: Yes     Alcohol/week: 2.0 standard drinks     Types: 2 Glasses of wine per week     Comment: occasional    Drug use: No        Review of Systems:    As noted in HPI in addition      REVIEW OF SYSTEMS  Review of Systems   Constitutional: Negative for decreased appetite, diaphoresis, night sweats, weight gain and weight loss.   HENT: Negative for nosebleeds and odynophagia.    Eyes: Negative for double vision and photophobia.   Cardiovascular: Positive for claudication. Negative for chest pain, cyanosis, dyspnea on exertion,  irregular heartbeat, leg swelling, near-syncope, orthopnea, palpitations, paroxysmal nocturnal dyspnea and syncope.   Respiratory: Negative for cough, hemoptysis, shortness of breath and wheezing.    Hematologic/Lymphatic: Negative for adenopathy.   Skin: Negative for flushing, skin cancer and suspicious lesions.   Musculoskeletal: Negative for gout, myalgias and neck pain.   Gastrointestinal: Negative for abdominal pain, heartburn, hematemesis and hematochezia.   Genitourinary: Negative for bladder incontinence, hesitancy and nocturia.   Neurological: Negative for focal weakness, headaches, light-headedness and paresthesias.   Psychiatric/Behavioral: Negative for memory loss and substance abuse.              Objective:        Vitals:    02/08/22 1014   BP: (!) 163/72   Pulse: 65       Lab Results   Component Value Date    WBC 8.54 12/07/2021    HGB 8.0 (L) 12/07/2021    HCT 24.6 (L) 12/07/2021     12/07/2021    CHOL 171 07/07/2021    TRIG 199 (H) 07/07/2021    HDL 39 (L) 07/07/2021    ALT 18 12/06/2021    AST 33 12/06/2021     (L) 01/10/2022    K 4.1 01/10/2022     01/10/2022    CREATININE 2.2 (H) 01/10/2022    BUN 51 (H) 01/10/2022    CO2 26 01/10/2022    TSH 5.830 (H) 12/04/2021    INR 1.3 12/02/2021    HGBA1C 6.3 (H) 11/30/2021        ECHOCARDIOGRAM RESULTS  Results for orders placed in visit on 01/10/22    Echo    Interpretation Summary  · Concentric hypertrophy and normal systolic function.  · The estimated ejection fraction is 65%.  · Indeterminate left ventricular diastolic function.  · Normal right ventricular size with normal right ventricular systolic function.  · Mild left atrial enlargement.  · Mild mitral regurgitation.  · Mild tricuspid regurgitation.  · Intermediate central venous pressure (8 mmHg).  · The estimated PA systolic pressure is 20 mmHg.        CURRENT/PREVIOUS VISIT EKG  Results for orders placed or performed during the hospital encounter of 12/02/21   EKG 12-lead     Collection Time: 12/04/21 10:25 PM    Narrative    Test Reason : I49.9,    Vent. Rate : 085 BPM     Atrial Rate : 147 BPM     P-R Int : 000 ms          QRS Dur : 140 ms      QT Int : 432 ms       P-R-T Axes : 000 -50 084 degrees     QTc Int : 514 ms    Atrial fibrillation  Right bundle branch block  Left anterior fascicular block   Bifascicular block   Abnormal ECG  When compared with ECG of 02-DEC-2021 22:03,  Atrial fibrillation has replaced Sinus rhythm  Confirmed by Clare Corbin MD ([id number]) on 12/5/2021 10:01:50 AM    Referred By: MANISHA SNEED           Confirmed By:Clare Corbin MD     No valid procedures specified.   Results for orders placed during the hospital encounter of 06/15/21    Nuclear Stress - Cardiology Interpreted    Interpretation Summary    Abnormal myocardial perfusion scan.    There is a mild intensity, small sized, reversible defect that is consistent with ischemia in the inferior wall(s).    The gated perfusion images showed an ejection fraction of 61% at rest. The gated perfusion images showed an ejection fraction of % post stress. Normal ejection fraction is greater than %.    The EKG portion of this study is negative for ischemia.    No valid procedures specified.    PHYSICAL EXAM  CONSTITUTIONAL: Well built, well nourished in no apparent distress  NECK: no carotid bruit, no JVD  LUNGS: CTA  CHEST WALL: no tenderness,  HEART: regular rate and rhythm, S1, S2 normal, no murmur, click, rub or gallop   ABDOMEN: soft, non-tender; bowel sounds normal; no masses,  no organomegaly  EXTREMITIES: Extremities normal, non pitting edema, no calf tenderness noted  NEURO: AAO X 3    I HAVE REVIEWED :    The vital signs, nurses notes, and all the pertinent radiology and labs.         Current Outpatient Medications   Medication Instructions    amLODIPine (NORVASC) 5 mg, Oral, Nightly    aspirin (ECOTRIN) 81 mg, Oral, Nightly    carvediloL (COREG) 3.125 mg, Oral, 2 times daily    cloNIDine (CATAPRES)  "0.1 MG tablet No dose, route, or frequency recorded.    clopidogreL (PLAVIX) 75 mg tablet TAKE 1 TABLET EVERY DAY    desoximetasone (TOPICORT) 0.25 % cream 1 application, Topical (Top), 2 times daily PRN    docusate sodium (COLACE) 100 mg, Oral, Daily    doxazosin (CARDURA) 8 MG Tab TAKE 1 TABLET EVERY EVENING    DROPLET PEN NEEDLE 31 gauge x 5/16" Ndle 1 each, Misc.(Non-Drug; Combo Route), 4 times daily, to inject insulin    EYLEA 2 mg, Intravitreal, Every 28 days    fenofibrate micronized (LOFIBRA) 134 mg, Oral, Nightly    FEROSUL 325 mg (65 mg iron) Tab tablet TAKE 1 TABLET EVERY DAY WITH BREAKFAST    fish oil-omega-3 fatty acids 300-1,000 mg capsule 1 capsule, Oral, Every morning    furosemide (LASIX) 40 MG tablet No dose, route, or frequency recorded.    hydrALAZINE (APRESOLINE) 25 mg, Oral, 3 times daily    hydroCHLOROthiazide (HYDRODIURIL) 25 mg, Oral, Every morning    HYDROcodone-acetaminophen (NORCO) 5-325 mg per tablet 1 tablet, Oral, Every 4 hours PRN    insulin aspart U-100 (NOVOLOG) 15 Units, Subcutaneous, 3 times daily    INV INSULIN GLARGINE, LANTUS, 100U/ML SOLN FOR INJ SOLOSTAR 70-75 Units, Subcutaneous, Every morning, Per Patient <BR>If BG = 140 give 70 units<BR>If BG = 160-180 give 72 units<BR>If BG = 200 give 75 units    levothyroxine (SYNTHROID) 25 mcg, Oral, Before breakfast    metOLazone (ZAROXOLYN) 5 MG tablet 1 tablet, Oral    multivitamin with minerals (ONE-A-DAY MAXIMUM FORMULA ORAL) 1 tablet, Oral, Daily    omeprazole (PRILOSEC) 20 mg, Oral, Daily    potassium chloride SA (K-DUR,KLOR-CON) 10 MEQ tablet 10 mEq, Oral, Every morning    rosuvastatin (CRESTOR) 20 mg, Oral, Daily    valsartan (DIOVAN) 320 mg, Oral, Every morning          Assessment:   ASCVD.  CAD.  History of CABG 12/21.  Peripheral arterial disease with stable claudication, abnormal ultrasound 6/21  Chronic kidney disease, stage III  Hypertension, diabetes mellitus, dyslipidemia.        Plan:   Decrease " Lasix back to 40, use 80 mg as needed, titrate according to fluid retention weight.  Continued risk factor modification.  No additional change in medication.  Patient has a Nephrology follow-up this thirsty, further input to follow.    Return to clinic 1 month, labs per Nephrology.      No follow-ups on file.

## 2022-02-11 ENCOUNTER — DOCUMENT SCAN (OUTPATIENT)
Dept: HOME HEALTH SERVICES | Facility: HOSPITAL | Age: 77
End: 2022-02-11
Payer: MEDICARE

## 2022-02-11 ENCOUNTER — TELEPHONE (OUTPATIENT)
Dept: FAMILY MEDICINE | Facility: CLINIC | Age: 77
End: 2022-02-11
Payer: MEDICARE

## 2022-02-11 DIAGNOSIS — N18.32 TYPE 2 DIABETES MELLITUS WITH STAGE 3B CHRONIC KIDNEY DISEASE, WITH LONG-TERM CURRENT USE OF INSULIN: Primary | ICD-10-CM

## 2022-02-11 DIAGNOSIS — E11.22 TYPE 2 DIABETES MELLITUS WITH STAGE 3B CHRONIC KIDNEY DISEASE, WITH LONG-TERM CURRENT USE OF INSULIN: Primary | ICD-10-CM

## 2022-02-11 DIAGNOSIS — Z79.4 TYPE 2 DIABETES MELLITUS WITH STAGE 3B CHRONIC KIDNEY DISEASE, WITH LONG-TERM CURRENT USE OF INSULIN: Primary | ICD-10-CM

## 2022-02-14 ENCOUNTER — PATIENT MESSAGE (OUTPATIENT)
Dept: FAMILY MEDICINE | Facility: CLINIC | Age: 77
End: 2022-02-14
Payer: MEDICARE

## 2022-02-14 ENCOUNTER — PATIENT MESSAGE (OUTPATIENT)
Dept: CARDIOLOGY | Facility: CLINIC | Age: 77
End: 2022-02-14
Payer: MEDICARE

## 2022-02-15 ENCOUNTER — PATIENT MESSAGE (OUTPATIENT)
Dept: FAMILY MEDICINE | Facility: CLINIC | Age: 77
End: 2022-02-15
Payer: MEDICARE

## 2022-02-24 ENCOUNTER — TELEPHONE (OUTPATIENT)
Dept: CARDIOLOGY | Facility: CLINIC | Age: 77
End: 2022-02-24
Payer: MEDICARE

## 2022-02-24 DIAGNOSIS — Z95.1 S/P CABG (CORONARY ARTERY BYPASS GRAFT): Primary | ICD-10-CM

## 2022-02-24 NOTE — TELEPHONE ENCOUNTER
----- Message from Galdino Chan MD sent at 2/23/2022  4:55 PM CST -----  Okay to change  ----- Message -----  From: Edy Rehman LPN  Sent: 2/23/2022   9:27 AM CST  To: Galdino Chan MD    Ok to send to [facility] for cardiac rehab?  ----- Message -----  From: Edy Rehman LPN  Sent: 2/21/2022   3:23 PM CST  To: Edy Rehman LPN      ----- Message -----  From: Tiana Burger  Sent: 2/21/2022   3:15 PM CST  To: Rocio COOPER. Staff    Lucero Stevenson with [facility] is calling to request that the cardiac rehab order be changed from [facility] to [facility].  He lives around the corner from there.  Please let her know when it has been sent.  Her number is [phone].  Thank you!

## 2022-02-25 ENCOUNTER — TELEPHONE (OUTPATIENT)
Dept: CARDIOLOGY | Facility: CLINIC | Age: 77
End: 2022-02-25
Payer: MEDICARE

## 2022-02-25 NOTE — TELEPHONE ENCOUNTER
[facility] CARDIAC REHAB  PH: [phone]  FAX: [phone]      ----- Message from Anny John sent at 2/25/2022  3:49 PM CST -----  Regarding: returning call  Contact: Jacqui Stevenson with [employer]  Type:  Patient Returning Call    Who Called:  Jacqui Stevenson with [employer]  Who Left Message for Patient:  Kate  Does the patient know what this is regarding?:  referral for cardiac rehab  Best Call Back Number:  [phone]  Additional Information:  Please call Jacqui. Thanks!

## 2022-02-25 NOTE — TELEPHONE ENCOUNTER
N/A AT THIS TIME, UNABLE TO LEAVE MS -  FULL -      ----- Message from Tiana Burger sent at 2/25/2022  2:05 PM CST -----  Asia Parada with [employer] (ph# [phone]) is calling to request that you send the order for cardiac rehab to [facility] since it is closer to his house.  The fax number is [phone].  Thank you!

## 2022-03-07 ENCOUNTER — TELEPHONE (OUTPATIENT)
Dept: CARDIOLOGY | Facility: CLINIC | Age: 77
End: 2022-03-07
Payer: MEDICARE

## 2022-03-07 NOTE — TELEPHONE ENCOUNTER
----- Message from Sarah Alford sent at 3/7/2022 11:19 AM CST -----  Regarding: advice  Contact: [employer]/ Jacqui Stevenson /[phone]  Type: Needs Medical Advice  Who Called:  [employer]/ Jacqui Stevenson /[phone]    Additional Information: Still waiting for a referral for outpatient cardiac rehab to be sent to [facility] at fax [phone]. If the office can please just send the referral over to [place]. Thanks.

## 2022-03-24 ENCOUNTER — OFFICE VISIT (OUTPATIENT)
Dept: CARDIOLOGY | Facility: CLINIC | Age: 77
End: 2022-03-24
Payer: MEDICARE

## 2022-03-24 VITALS
BODY MASS INDEX: 34.71 KG/M2 | SYSTOLIC BLOOD PRESSURE: 190 MMHG | WEIGHT: 234.38 LBS | HEART RATE: 58 BPM | DIASTOLIC BLOOD PRESSURE: 79 MMHG | HEIGHT: 69 IN

## 2022-03-24 DIAGNOSIS — I67.9 LACUNAR ATAXIC HEMIPARESIS: ICD-10-CM

## 2022-03-24 DIAGNOSIS — R94.39 ABNORMAL NUCLEAR STRESS TEST: ICD-10-CM

## 2022-03-24 DIAGNOSIS — I10 ESSENTIAL HYPERTENSION: ICD-10-CM

## 2022-03-24 DIAGNOSIS — G46.7 LACUNAR ATAXIC HEMIPARESIS: ICD-10-CM

## 2022-03-24 DIAGNOSIS — Z95.1 S/P CABG (CORONARY ARTERY BYPASS GRAFT): ICD-10-CM

## 2022-03-24 DIAGNOSIS — E78.5 DYSLIPIDEMIA: ICD-10-CM

## 2022-03-24 DIAGNOSIS — N18.32 STAGE 3B CHRONIC KIDNEY DISEASE: ICD-10-CM

## 2022-03-24 DIAGNOSIS — I50.32 CHRONIC DIASTOLIC HEART FAILURE: ICD-10-CM

## 2022-03-24 DIAGNOSIS — Z95.5 STENTED CORONARY ARTERY: ICD-10-CM

## 2022-03-24 DIAGNOSIS — Z86.73 HISTORY OF ISCHEMIC VERTEBROBASILAR ARTERY BRAINSTEM STROKE: ICD-10-CM

## 2022-03-24 DIAGNOSIS — I25.10 CORONARY ARTERY DISEASE INVOLVING NATIVE CORONARY ARTERY OF NATIVE HEART WITHOUT ANGINA PECTORIS: Primary | ICD-10-CM

## 2022-03-24 DIAGNOSIS — I73.9 PAD (PERIPHERAL ARTERY DISEASE): ICD-10-CM

## 2022-03-24 PROCEDURE — 99215 OFFICE O/P EST HI 40 MIN: CPT | Mod: S$GLB,,, | Performed by: INTERNAL MEDICINE

## 2022-03-24 PROCEDURE — 1159F MED LIST DOCD IN RCRD: CPT | Mod: CPTII,S$GLB,, | Performed by: INTERNAL MEDICINE

## 2022-03-24 PROCEDURE — 3078F DIAST BP <80 MM HG: CPT | Mod: CPTII,S$GLB,, | Performed by: INTERNAL MEDICINE

## 2022-03-24 PROCEDURE — 1126F PR PAIN SEVERITY QUANTIFIED, NO PAIN PRESENT: ICD-10-PCS | Mod: CPTII,S$GLB,, | Performed by: INTERNAL MEDICINE

## 2022-03-24 PROCEDURE — 1101F PT FALLS ASSESS-DOCD LE1/YR: CPT | Mod: CPTII,S$GLB,, | Performed by: INTERNAL MEDICINE

## 2022-03-24 PROCEDURE — 3288F PR FALLS RISK ASSESSMENT DOCUMENTED: ICD-10-PCS | Mod: CPTII,S$GLB,, | Performed by: INTERNAL MEDICINE

## 2022-03-24 PROCEDURE — 3288F FALL RISK ASSESSMENT DOCD: CPT | Mod: CPTII,S$GLB,, | Performed by: INTERNAL MEDICINE

## 2022-03-24 PROCEDURE — 99999 PR PBB SHADOW E&M-EST. PATIENT-LVL IV: ICD-10-PCS | Mod: PBBFAC,,, | Performed by: INTERNAL MEDICINE

## 2022-03-24 PROCEDURE — 1159F PR MEDICATION LIST DOCUMENTED IN MEDICAL RECORD: ICD-10-PCS | Mod: CPTII,S$GLB,, | Performed by: INTERNAL MEDICINE

## 2022-03-24 PROCEDURE — 99999 PR PBB SHADOW E&M-EST. PATIENT-LVL IV: CPT | Mod: PBBFAC,,, | Performed by: INTERNAL MEDICINE

## 2022-03-24 PROCEDURE — 1101F PR PT FALLS ASSESS DOC 0-1 FALLS W/OUT INJ PAST YR: ICD-10-PCS | Mod: CPTII,S$GLB,, | Performed by: INTERNAL MEDICINE

## 2022-03-24 PROCEDURE — 99499 UNLISTED E&M SERVICE: CPT | Mod: HCNC,S$GLB,, | Performed by: INTERNAL MEDICINE

## 2022-03-24 PROCEDURE — 3077F SYST BP >= 140 MM HG: CPT | Mod: CPTII,S$GLB,, | Performed by: INTERNAL MEDICINE

## 2022-03-24 PROCEDURE — 3078F PR MOST RECENT DIASTOLIC BLOOD PRESSURE < 80 MM HG: ICD-10-PCS | Mod: CPTII,S$GLB,, | Performed by: INTERNAL MEDICINE

## 2022-03-24 PROCEDURE — 3077F PR MOST RECENT SYSTOLIC BLOOD PRESSURE >= 140 MM HG: ICD-10-PCS | Mod: CPTII,S$GLB,, | Performed by: INTERNAL MEDICINE

## 2022-03-24 PROCEDURE — 99499 RISK ADDL DX/OHS AUDIT: ICD-10-PCS | Mod: HCNC,S$GLB,, | Performed by: INTERNAL MEDICINE

## 2022-03-24 PROCEDURE — 99215 PR OFFICE/OUTPT VISIT, EST, LEVL V, 40-54 MIN: ICD-10-PCS | Mod: S$GLB,,, | Performed by: INTERNAL MEDICINE

## 2022-03-24 PROCEDURE — 1126F AMNT PAIN NOTED NONE PRSNT: CPT | Mod: CPTII,S$GLB,, | Performed by: INTERNAL MEDICINE

## 2022-03-24 RX ORDER — VALSARTAN 160 MG/1
160 TABLET ORAL EVERY MORNING
Qty: 90 TABLET | Refills: 4 | Status: SHIPPED | OUTPATIENT
Start: 2022-03-24 | End: 2022-06-08 | Stop reason: DRUGHIGH

## 2022-03-24 RX ORDER — EZETIMIBE 10 MG/1
10 TABLET ORAL DAILY
Qty: 90 TABLET | Refills: 3 | Status: SHIPPED | OUTPATIENT
Start: 2022-03-24 | End: 2023-01-11

## 2022-03-24 RX ORDER — SODIUM CHLORIDE 9 MG/ML
INJECTION, SOLUTION INTRAVENOUS ONCE
Status: CANCELLED | OUTPATIENT
Start: 2022-03-24 | End: 2022-03-24

## 2022-03-24 RX ORDER — HYDRALAZINE HYDROCHLORIDE 100 MG/1
100 TABLET, FILM COATED ORAL EVERY 12 HOURS
Qty: 180 TABLET | Refills: 4
Start: 2022-03-24 | End: 2022-07-14

## 2022-03-24 RX ORDER — CLONIDINE HYDROCHLORIDE 0.1 MG/1
0.1 TABLET ORAL 3 TIMES DAILY PRN
Qty: 90 TABLET | Refills: 6 | Status: SHIPPED | OUTPATIENT
Start: 2022-03-24 | End: 2023-11-07

## 2022-03-24 RX ORDER — FUROSEMIDE 20 MG/1
20 TABLET ORAL DAILY
Qty: 90 TABLET | Refills: 5 | Status: SHIPPED | OUTPATIENT
Start: 2022-03-24 | End: 2023-02-14 | Stop reason: SDUPTHER

## 2022-03-24 NOTE — PATIENT INSTRUCTIONS
Angiogram 4/15 at 9 am    Arrive for procedure at: [facility]    You will receive a phone call from [facility] Pre-Op Department with further instructions prior to your scheduled procedure.    Notify the nurse if you are ALLERGIC TO IODINE.    FASTING: You MAY NOT have anything to eat or drink AFTER MIDNIGHT the day before your procedure. If your procedure is scheduled in the afternoon, you may have a LIGHT BREAKFAST 6-8 hours prior to your procedure.  For example: Two slices of toast; black coffee or black tea.    MEDICATIONS: You may take your regular morning medications with water. If there are any medications that you should not take, you will be instructed to hold them for that morning.    CARDIOLOGY PRE-PROCEDURE MEDICATION ORDERS:  ** Please hold any medications that are checked below:    HOLD   # OF DAYS TO HOLD  Coumadin   Consult with Coumadin Clinic   Xarelto    _DAY BEFORE & DAY OF_  Pradaxa  _ DAY BEFORE & DAY OF _  Eliquis   _ DAY BEFORE & DAY OF _  Metformin    Day before procedure & morning of procedure  Short acting insulin   Morning of procedure    CONTINUE the Following Medications   Plavix      Effient     Aspirin    WHAT TO EXPECT:    How long will the procedure take?  The procedure will take an average of 1 - 2 hours to perform.  After the procedure, you will need to lay flat for around 4 - 6 hours to minimize bleeding from the puncture site. If the wrist is accessed you will need to keep your arm still as instructed by the nurse.    When can I go home?  You may be able to be discharged home that same afternoon if there were no complications.  If you have one of the following: balloon; stent; pacemaker or defibrillator procedures, you may spend one night for observation.  Your doctor will determine your discharge based upon your progress.  The results of your procedure will be discussed with you before you are discharged.  Any further testing or procedures will be scheduled for  you either before you leave or you will be instructed to call for a future appointment.      TRANSPORTATION:  PLEASE ARRANGE TO HAVE SOMEONE DRIVE YOU HOME FOLLOWING YOUR PROCEDURE, YOU WILL NOT BE ALLOWED TO DRIVE.

## 2022-03-24 NOTE — PROGRESS NOTES
Subjective:    Patient ID:  Cy Rutherford Jr. is a 76 y.o. male who presents for follow-up of Coronary Artery Disease, Hypertension, and Peripheral Arterial Disease (1mth F/u)      HPI  Here for follow up of  CABG (12/21)/PAD s/p intervention/DLP/resistant HTN/CVA (2018). Still hasn't started CV rehab awaiting [facility] to start. No angina. Stable FC2a rt calf claudication.  LE edema resolved.           severe claudication known root markedly reduced DEBORAH in patient with known PA D as well as left subclavian angiography due to decreased left vertebral flow.    Review of Systems   Constitutional: Negative for malaise/fatigue.   Eyes: Negative for blurred vision.   Cardiovascular: Negative for chest pain, claudication, cyanosis, dyspnea on exertion, irregular heartbeat, leg swelling, near-syncope, orthopnea, palpitations, paroxysmal nocturnal dyspnea and syncope.   Respiratory: Negative for cough and shortness of breath.    Hematologic/Lymphatic: Does not bruise/bleed easily.   Musculoskeletal: Negative for back pain, falls, joint pain, muscle cramps, muscle weakness and myalgias.   Gastrointestinal: Negative for abdominal pain, change in bowel habit, nausea and vomiting.   Genitourinary: Negative for urgency.   Neurological: Negative for dizziness, focal weakness and light-headedness.       Past Medical History:   Diagnosis Date    Abnormality of gait as late effect of cerebrovascular accident (CVA)     right arm and leg weakness    Acidemia     Acute coronary syndrome     2011 ASMI    JOSE (acute kidney injury)     Anemia     Anticoagulant long-term use     CKD (chronic kidney disease) stage 3, GFR 30-59 ml/min     CKD (chronic kidney disease) stage 4, GFR 15-29 ml/min     Coronary artery disease     stents    Essential hypertension 11/15/2012    Hematuria     Hyperkalemia     Hypertension     Hypothyroid     Intracranial atherosclerosis 5/8/2018    Rectal cancer 2002    Stage 2; removal of rectal  mass and chemo    Retinopathy     right eye    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       Objective:     Vitals:    03/24/22 1032   BP: (!) 190/79   Pulse: (!) 58        Physical Exam  Vitals and nursing note reviewed.   Constitutional:       Appearance: He is well-developed.   HENT:      Head: Normocephalic and atraumatic.   Eyes:      Conjunctiva/sclera: Conjunctivae normal.      Right eye: No exudate.     Left eye: No exudate.  Neck:      Thyroid: No thyromegaly.      Vascular: Normal carotid pulses. No carotid bruit or JVD.   Cardiovascular:      Rate and Rhythm: Normal rate and regular rhythm.      Pulses: Intact distal pulses.           Carotid pulses are 2+ on the right side and 2+ on the left side.       Radial pulses are 2+ on the right side and 2+ on the left side.        Posterior tibial pulses are 1+ on the right side and 1+ on the left side.      Heart sounds: Normal heart sounds.   Pulmonary:      Effort: Pulmonary effort is normal.      Breath sounds: Normal breath sounds.   Abdominal:      General: Bowel sounds are normal.      Palpations: Abdomen is soft.   Musculoskeletal:         General: Normal range of motion.      Cervical back: Normal range of motion and neck supple.   Skin:     General: Skin is warm and dry.      Nails: There is no clubbing.   Neurological:      Mental Status: He is alert and oriented to person, place, and time.      Gait: Gait normal.   Psychiatric:         Speech: Speech normal.         Behavior: Behavior normal. Behavior is cooperative.         Thought Content: Thought content normal.         Judgment: Judgment normal.               ..    Chemistry        Component Value Date/Time     02/23/2022 0809     02/23/2022 0809    K 4.5 02/23/2022 0809    K 4.5  02/23/2022 0809     02/23/2022 0809     02/23/2022 0809    CO2 27 02/23/2022 0809    CO2 27 02/23/2022 0809    BUN 29 (H) 02/23/2022 0809    BUN 29 (H) 02/23/2022 0809    CREATININE 1.77 (H) 02/23/2022 0809    CREATININE 1.77 (H) 02/23/2022 0809     (H) 02/23/2022 0809     (H) 02/23/2022 0809        Component Value Date/Time    CALCIUM 9.2 02/23/2022 0809    CALCIUM 9.2 02/23/2022 0809    ALKPHOS 53 02/23/2022 0809    AST 33 02/23/2022 0809    ALT 27 02/23/2022 0809    BILITOT 0.3 02/23/2022 0809    ESTGFRAFRICA 42 (A) 02/23/2022 0809    ESTGFRAFRICA 42 (A) 02/23/2022 0809    EGFRNONAA 36 (A) 02/23/2022 0809    EGFRNONAA 36 (A) 02/23/2022 0809            ..  Lab Results   Component Value Date    CHOL 158 02/23/2022    CHOL 171 07/07/2021    CHOL 166 06/03/2021     Lab Results   Component Value Date    HDL 40 02/23/2022    HDL 39 (L) 07/07/2021    HDL 41 06/03/2021     Lab Results   Component Value Date    LDLCALC 82.4 02/23/2022    LDLCALC 92.2 07/07/2021    LDLCALC 96.2 06/03/2021     Lab Results   Component Value Date    TRIG 178 (H) 02/23/2022    TRIG 199 (H) 07/07/2021    TRIG 144 06/03/2021     Lab Results   Component Value Date    CHOLHDL 25.3 02/23/2022    CHOLHDL 22.8 07/07/2021    CHOLHDL 24.7 06/03/2021     ..  Lab Results   Component Value Date    WBC 6.05 02/23/2022    WBC 6.05 02/23/2022    HGB 12.1 (L) 02/23/2022    HGB 12.1 (L) 02/23/2022    HCT 40.3 02/23/2022    HCT 40.3 02/23/2022    MCV 89 02/23/2022    MCV 89 02/23/2022     02/23/2022     02/23/2022       Test(s) Reviewed  I have reviewed the following in detail:  [] Stress test   [] Angiography   [x] Echocardiogram   [x] Labs   [x] Other:       Assessment:         ICD-10-CM ICD-9-CM   1. Coronary artery disease involving native coronary artery of native heart without angina pectoris  I25.10 414.01   2. PAD (peripheral artery disease)  I73.9 443.9   3. History of ischemic vertebrobasilar artery brainstem  stroke  Z86.73 V12.54   4. Essential hypertension  I10 401.9   5. Chronic diastolic heart failure  I50.32 428.32   6. S/P CABG (coronary artery bypass graft)  Z95.1 V45.81   7. Stented coronary artery  Z95.5 V45.82   8. Stage 3b chronic kidney disease  N18.32 585.3   9. Dyslipidemia  E78.5 272.4   10. Ataxic hemiparesis  I67.9 437.8    G46.7 342.80   11. Abnormal nuclear stress test  R94.39 794.39     Active Problem List with Overview Notes    Diagnosis Date Noted    Abnormal nuclear stress test 08/20/2021    Arrhythmia     Coronary artery disease, angina presence unspecified, unspecified vessel or lesion type, unspecified whether native or transplanted heart     S/P CABG (coronary artery bypass graft) 11/18/2021    Dyslipidemia 04/14/2021    Stage 3b chronic kidney disease     Ulcer of esophagus without bleeding 12/12/2019    Chronic diastolic heart failure 12/08/2019    Claudication 10/03/2019    Impaired balance as late effect of cerebrovascular accident 05/17/2019    Abnormality of gait as late effect of cerebrovascular accident (CVA) 05/17/2019 5/18  occlusion of the V4 segment of the left vertebral artery      Right hemiplegia 03/07/2019    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     L pontine atherothromboembolic from L VA (V4) occlusion (also mild basilar athero) 5/6/18      Ataxic hemiparesis 06/11/2018     right      History of colon cancer 05/08/2018    PAD (peripheral artery disease) 12/29/2014     PAD s/p PTA with MARIAN (SES) to LSFA and PTA to LTPT with resolution of claudication in LLE (RLE 1V AT run off to foot with exertional right calf cramping/claudication),       Type 2 diabetes mellitus with kidney complication, with long-term current use of insulin 11/15/2012    Essential hypertension 11/15/2012    Coronary artery disease involving native coronary artery of native heart without angina pectoris 11/15/2012     8/21  Left main coronary  artery-medium size vessel mild calcific disease with a 20-30% stenosis seen distally.   Left anterior descending-normal size vessel moderately calcified seen throughout entire course with a 70% proximal lesion followed by 70% mid lesion.  There are 2 small diagonal diffuse disease all less than 35-40%.  Circumflex-medium size vessel with a 50% ostial stenosis remainder circumflex has diffuse disease all less than 40%.  First obtuse marginal small vessel 2nd obtuse marginal long branching vessel with 80% proximal lesion.  Right coronary-normal size dominant vessel with a 60% lesion seen mid 80% distal just prior to the bifurcation long posterior descending and posterolateral branch.  There is 70% stenosis in the posterior descending and posterolateral branches.        Stented coronary artery 11/15/2012     2011 s/p PCI with MARIAN to LAD and LCX,          Plan:           Return to clinic 9 months   Low level/low impact aerobic exercise 5x's/wk. Heart healthy diet and risk factor modification.    See labs and med orders.  Angio abd Ao with run off renal and left subclavian due to known PAD, resistant HTN known sbcl stenosis with LIMA-LAD  Decrease lasix to 20, decrease diovan increase hydralzine.   Rt CFA access    Portions of this note may have been created with voice recognition software.  Grammatical, syntax and spelling errors may be inevitable.

## 2022-04-12 ENCOUNTER — OFFICE VISIT (OUTPATIENT)
Dept: FAMILY MEDICINE | Facility: CLINIC | Age: 77
End: 2022-04-12
Payer: MEDICARE

## 2022-04-12 VITALS
BODY MASS INDEX: 34.84 KG/M2 | HEART RATE: 63 BPM | WEIGHT: 235.25 LBS | HEIGHT: 69 IN | DIASTOLIC BLOOD PRESSURE: 80 MMHG | OXYGEN SATURATION: 97 % | SYSTOLIC BLOOD PRESSURE: 132 MMHG

## 2022-04-12 DIAGNOSIS — E11.22 TYPE 2 DIABETES MELLITUS WITH STAGE 3B CHRONIC KIDNEY DISEASE, WITH LONG-TERM CURRENT USE OF INSULIN: ICD-10-CM

## 2022-04-12 DIAGNOSIS — I48.0 PAROXYSMAL ATRIAL FIBRILLATION: ICD-10-CM

## 2022-04-12 DIAGNOSIS — I10 ESSENTIAL HYPERTENSION: ICD-10-CM

## 2022-04-12 DIAGNOSIS — K21.9 GERD WITHOUT ESOPHAGITIS: ICD-10-CM

## 2022-04-12 DIAGNOSIS — E03.9 ACQUIRED HYPOTHYROIDISM: Primary | ICD-10-CM

## 2022-04-12 DIAGNOSIS — I73.9 PAD (PERIPHERAL ARTERY DISEASE): ICD-10-CM

## 2022-04-12 DIAGNOSIS — Z79.4 TYPE 2 DIABETES MELLITUS WITH STAGE 3B CHRONIC KIDNEY DISEASE, WITH LONG-TERM CURRENT USE OF INSULIN: ICD-10-CM

## 2022-04-12 DIAGNOSIS — N18.32 TYPE 2 DIABETES MELLITUS WITH STAGE 3B CHRONIC KIDNEY DISEASE, WITH LONG-TERM CURRENT USE OF INSULIN: ICD-10-CM

## 2022-04-12 DIAGNOSIS — E66.01 MORBID (SEVERE) OBESITY DUE TO EXCESS CALORIES: ICD-10-CM

## 2022-04-12 DIAGNOSIS — N18.32 STAGE 3B CHRONIC KIDNEY DISEASE: ICD-10-CM

## 2022-04-12 DIAGNOSIS — E78.5 DYSLIPIDEMIA: ICD-10-CM

## 2022-04-12 DIAGNOSIS — I25.10 CORONARY ARTERY DISEASE INVOLVING NATIVE CORONARY ARTERY OF NATIVE HEART WITHOUT ANGINA PECTORIS: ICD-10-CM

## 2022-04-12 DIAGNOSIS — Z95.1 S/P CABG (CORONARY ARTERY BYPASS GRAFT): ICD-10-CM

## 2022-04-12 PROBLEM — R94.39 ABNORMAL NUCLEAR STRESS TEST: Status: RESOLVED | Noted: 2021-08-20 | Resolved: 2022-04-12

## 2022-04-12 PROCEDURE — 3288F FALL RISK ASSESSMENT DOCD: CPT | Mod: CPTII,S$GLB,, | Performed by: INTERNAL MEDICINE

## 2022-04-12 PROCEDURE — 3075F PR MOST RECENT SYSTOLIC BLOOD PRESS GE 130-139MM HG: ICD-10-PCS | Mod: CPTII,S$GLB,, | Performed by: INTERNAL MEDICINE

## 2022-04-12 PROCEDURE — 1160F PR REVIEW ALL MEDS BY PRESCRIBER/CLIN PHARMACIST DOCUMENTED: ICD-10-PCS | Mod: CPTII,S$GLB,, | Performed by: INTERNAL MEDICINE

## 2022-04-12 PROCEDURE — 1159F MED LIST DOCD IN RCRD: CPT | Mod: CPTII,S$GLB,, | Performed by: INTERNAL MEDICINE

## 2022-04-12 PROCEDURE — 3079F PR MOST RECENT DIASTOLIC BLOOD PRESSURE 80-89 MM HG: ICD-10-PCS | Mod: CPTII,S$GLB,, | Performed by: INTERNAL MEDICINE

## 2022-04-12 PROCEDURE — 99999 PR PBB SHADOW E&M-EST. PATIENT-LVL V: CPT | Mod: PBBFAC,,, | Performed by: INTERNAL MEDICINE

## 2022-04-12 PROCEDURE — 1160F RVW MEDS BY RX/DR IN RCRD: CPT | Mod: CPTII,S$GLB,, | Performed by: INTERNAL MEDICINE

## 2022-04-12 PROCEDURE — 99999 PR PBB SHADOW E&M-EST. PATIENT-LVL V: ICD-10-PCS | Mod: PBBFAC,,, | Performed by: INTERNAL MEDICINE

## 2022-04-12 PROCEDURE — 3079F DIAST BP 80-89 MM HG: CPT | Mod: CPTII,S$GLB,, | Performed by: INTERNAL MEDICINE

## 2022-04-12 PROCEDURE — 99214 OFFICE O/P EST MOD 30 MIN: CPT | Mod: S$GLB,,, | Performed by: INTERNAL MEDICINE

## 2022-04-12 PROCEDURE — 3075F SYST BP GE 130 - 139MM HG: CPT | Mod: CPTII,S$GLB,, | Performed by: INTERNAL MEDICINE

## 2022-04-12 PROCEDURE — 1159F PR MEDICATION LIST DOCUMENTED IN MEDICAL RECORD: ICD-10-PCS | Mod: CPTII,S$GLB,, | Performed by: INTERNAL MEDICINE

## 2022-04-12 PROCEDURE — 3288F PR FALLS RISK ASSESSMENT DOCUMENTED: ICD-10-PCS | Mod: CPTII,S$GLB,, | Performed by: INTERNAL MEDICINE

## 2022-04-12 PROCEDURE — 99214 PR OFFICE/OUTPT VISIT, EST, LEVL IV, 30-39 MIN: ICD-10-PCS | Mod: S$GLB,,, | Performed by: INTERNAL MEDICINE

## 2022-04-12 PROCEDURE — 1101F PR PT FALLS ASSESS DOC 0-1 FALLS W/OUT INJ PAST YR: ICD-10-PCS | Mod: CPTII,S$GLB,, | Performed by: INTERNAL MEDICINE

## 2022-04-12 PROCEDURE — 1126F PR PAIN SEVERITY QUANTIFIED, NO PAIN PRESENT: ICD-10-PCS | Mod: CPTII,S$GLB,, | Performed by: INTERNAL MEDICINE

## 2022-04-12 PROCEDURE — 1101F PT FALLS ASSESS-DOCD LE1/YR: CPT | Mod: CPTII,S$GLB,, | Performed by: INTERNAL MEDICINE

## 2022-04-12 PROCEDURE — 1126F AMNT PAIN NOTED NONE PRSNT: CPT | Mod: CPTII,S$GLB,, | Performed by: INTERNAL MEDICINE

## 2022-04-12 RX ORDER — LEVOTHYROXINE SODIUM 50 UG/1
50 TABLET ORAL
Qty: 90 TABLET | Refills: 3 | Status: SHIPPED | OUTPATIENT
Start: 2022-04-12 | End: 2022-05-26 | Stop reason: SDUPTHER

## 2022-04-12 RX ORDER — FAMOTIDINE 40 MG/1
40 TABLET, FILM COATED ORAL DAILY
Qty: 90 TABLET | Refills: 3 | Status: SHIPPED | OUTPATIENT
Start: 2022-04-12 | End: 2022-10-11

## 2022-04-12 RX ORDER — HYDROCHLOROTHIAZIDE 25 MG/1
25 TABLET ORAL EVERY MORNING
Qty: 90 TABLET | Refills: 3 | Status: SHIPPED | OUTPATIENT
Start: 2022-04-12 | End: 2022-10-18

## 2022-04-12 NOTE — PROGRESS NOTES
"  Subjective     Cy Rutherford Jr. is a 76 y.o. old, male here for Follow-up    Patient is here for follow-up on chronic medical problems    75 y/o with PMH of CAD s/p CABG, CVA with resulting mild R hemiplegia, PAD, HTN, HLD, Type 2 DM with retinopathy, CKD stage 3, hypothyroidism, GERD    CAD: Had CABG since last visit, recovered well, has plans to attend cardiac rehab. No CP or NASSAR.    Type 2 DM: Avg Dexcom readings 150-160.  Hemoglobin A1C (%)   Date Value   11/30/2021 6.3 (H)   07/07/2021 7.1 (H)   12/02/2019 5.6     R eye retinopathy, need to obtain retina specialist records.    CKD: Followed by nephro, GFR stable in the 30's.  HTN controlled but sometimes reads high in the 140's-150's at home. Could consider increasing valsartan or starting an SGLT2i.    Hypothyroidism: TSH elevated, taking LT4 with daily am meds, no need to change how he takes it, will increase the dose and f/u labs in 6-8 weeks at [facility]    GERD: Interested in changing his PPI due to nephro's suggestion, lack of symptoms and interactions with plavix.    ROS  Medications     Outpatient Medications Marked as Taking for the 4/12/22 encounter (Office Visit) with James Sullivan MD   Medication Sig Dispense Refill    amLODIPine (NORVASC) 5 MG tablet Take 1 tablet (5 mg total) by mouth every evening. 90 tablet 5    aspirin (ECOTRIN) 81 MG EC tablet Take 81 mg by mouth every evening.      cloNIDine (CATAPRES) 0.1 MG tablet Take 1 tablet (0.1 mg total) by mouth 3 (three) times daily as needed (PRN SBP > 165 mmHg). 90 tablet 6    clopidogreL (PLAVIX) 75 mg tablet TAKE 1 TABLET EVERY DAY (Patient taking differently: Take 75 mg by mouth once daily.) 90 tablet 3    desoximetasone (TOPICORT) 0.25 % cream Apply 1 application topically 2 (two) times daily as needed (psoriasis).      DROPLET PEN NEEDLE 31 gauge x 5/16" Ndle 1 each by Misc.(Non-Drug; Combo Route) route 4 (four) times daily. to inject insulin      EYLEA 2 mg/0.05 mL " "Soln 2 mg by Intravitreal route every 28 days.      ezetimibe (ZETIA) 10 mg tablet Take 1 tablet (10 mg total) by mouth once daily. 90 tablet 3    fenofibrate micronized (LOFIBRA) 134 MG Cap Take 1 capsule (134 mg total) by mouth nightly. 90 capsule 3    fish oil-omega-3 fatty acids 300-1,000 mg capsule Take 1 capsule by mouth every morning.      furosemide (LASIX) 20 MG tablet Take 1 tablet (20 mg total) by mouth once daily. 90 tablet 5    hydrALAZINE (APRESOLINE) 100 MG tablet Take 1 tablet (100 mg total) by mouth every 12 (twelve) hours. 180 tablet 4    INV INSULIN GLARGINE, LANTUS, 100U/ML SOLN FOR INJ SOLOSTAR Inject 70-75 Units into the skin every morning. Per Patient   If BG = 140 give 70 units  If BG = 160-180 give 72 units  If BG = 200 give 75 units      multivitamin with minerals (ONE-A-DAY MAXIMUM FORMULA ORAL) Take 1 tablet by mouth once daily.      potassium chloride SA (K-DUR,KLOR-CON) 10 MEQ tablet Take 1 tablet (10 mEq total) by mouth every morning. 90 tablet 6    rosuvastatin (CRESTOR) 20 MG tablet Take 1 tablet (20 mg total) by mouth once daily. (Patient taking differently: Take 20 mg by mouth every morning.) 90 tablet 3    valsartan (DIOVAN) 160 MG tablet Take 1 tablet (160 mg total) by mouth every morning. 90 tablet 4    [DISCONTINUED] hydroCHLOROthiazide (HYDRODIURIL) 25 MG tablet TAKE 1 TABLET EVERY MORNING 90 tablet 0    [DISCONTINUED] levothyroxine (SYNTHROID) 25 MCG tablet TAKE 1 TABLET (25 MCG TOTAL) BY MOUTH BEFORE BREAKFAST. 90 tablet 3    [DISCONTINUED] omeprazole (PRILOSEC) 20 MG capsule Take 20 mg by mouth once daily.       Objective     /80   Pulse 63   Ht 5' 9" (1.753 m)   Wt 106.7 kg (235 lb 3.7 oz)   SpO2 97%   BMI 34.74 kg/m²   Physical Exam  Constitutional:       General: He is not in acute distress.     Appearance: He is well-developed.   Neurological:      Mental Status: He is alert.       Assessment and Plan     Acquired hypothyroidism  -     " levothyroxine (SYNTHROID) 50 MCG tablet; Take 1 tablet (50 mcg total) by mouth before breakfast.  Dispense: 90 tablet; Refill: 3  -     TSH; Future; Expected date: 05/24/2022  -     T4, Free; Future; Expected date: 05/24/2022    Essential hypertension  -     hydroCHLOROthiazide (HYDRODIURIL) 25 MG tablet; Take 1 tablet (25 mg total) by mouth every morning.  Dispense: 90 tablet; Refill: 3    Coronary artery disease involving native coronary artery of native heart without angina pectoris    S/P CABG (coronary artery bypass graft)    Dyslipidemia    PAD (peripheral artery disease)    Stage 3b chronic kidney disease    Type 2 diabetes mellitus with stage 3b chronic kidney disease, with long-term current use of insulin    Morbid (severe) obesity due to excess calories    Paroxysmal atrial fibrillation    GERD without esophagitis  -     famotidine (PEPCID) 40 MG tablet; Take 1 tablet (40 mg total) by mouth once daily.  Dispense: 90 tablet; Refill: 3        Follow up in about 6 months (around 10/12/2022).  ___________________  James Sullivan MD  Internal Medicine and Pediatrics

## 2022-04-13 ENCOUNTER — PATIENT MESSAGE (OUTPATIENT)
Dept: ADMINISTRATIVE | Facility: OTHER | Age: 77
End: 2022-04-13
Payer: MEDICARE

## 2022-05-02 ENCOUNTER — PATIENT MESSAGE (OUTPATIENT)
Dept: CARDIOLOGY | Facility: CLINIC | Age: 77
End: 2022-05-02
Payer: MEDICARE

## 2022-05-02 ENCOUNTER — PATIENT MESSAGE (OUTPATIENT)
Dept: OTHER | Facility: OTHER | Age: 77
End: 2022-05-02
Payer: MEDICARE

## 2022-05-09 ENCOUNTER — PATIENT MESSAGE (OUTPATIENT)
Dept: SMOKING CESSATION | Facility: CLINIC | Age: 77
End: 2022-05-09
Payer: MEDICARE

## 2022-05-09 RX ORDER — CARVEDILOL 3.12 MG/1
3.12 TABLET ORAL 2 TIMES DAILY WITH MEALS
Qty: 180 TABLET | Refills: 4 | Status: SHIPPED | OUTPATIENT
Start: 2022-05-09 | End: 2023-05-15

## 2022-05-17 ENCOUNTER — OFFICE VISIT (OUTPATIENT)
Dept: CARDIOLOGY | Facility: CLINIC | Age: 77
End: 2022-05-17
Payer: MEDICARE

## 2022-05-17 VITALS
HEIGHT: 69 IN | SYSTOLIC BLOOD PRESSURE: 155 MMHG | BODY MASS INDEX: 34.65 KG/M2 | DIASTOLIC BLOOD PRESSURE: 73 MMHG | HEART RATE: 60 BPM | WEIGHT: 233.94 LBS

## 2022-05-17 DIAGNOSIS — N18.32 TYPE 2 DIABETES MELLITUS WITH STAGE 3B CHRONIC KIDNEY DISEASE, WITH LONG-TERM CURRENT USE OF INSULIN: ICD-10-CM

## 2022-05-17 DIAGNOSIS — I73.9 CLAUDICATION: ICD-10-CM

## 2022-05-17 DIAGNOSIS — E11.22 TYPE 2 DIABETES MELLITUS WITH STAGE 3B CHRONIC KIDNEY DISEASE, WITH LONG-TERM CURRENT USE OF INSULIN: ICD-10-CM

## 2022-05-17 DIAGNOSIS — I25.10 CORONARY ARTERY DISEASE INVOLVING NATIVE CORONARY ARTERY OF NATIVE HEART WITHOUT ANGINA PECTORIS: ICD-10-CM

## 2022-05-17 DIAGNOSIS — I10 ESSENTIAL HYPERTENSION: ICD-10-CM

## 2022-05-17 DIAGNOSIS — I48.0 PAROXYSMAL ATRIAL FIBRILLATION: ICD-10-CM

## 2022-05-17 DIAGNOSIS — I50.32 CHRONIC DIASTOLIC HEART FAILURE: Primary | ICD-10-CM

## 2022-05-17 DIAGNOSIS — Z95.1 S/P CABG (CORONARY ARTERY BYPASS GRAFT): ICD-10-CM

## 2022-05-17 DIAGNOSIS — I73.9 PAD (PERIPHERAL ARTERY DISEASE): ICD-10-CM

## 2022-05-17 DIAGNOSIS — E03.9 ACQUIRED HYPOTHYROIDISM: ICD-10-CM

## 2022-05-17 DIAGNOSIS — Z79.4 TYPE 2 DIABETES MELLITUS WITH STAGE 3B CHRONIC KIDNEY DISEASE, WITH LONG-TERM CURRENT USE OF INSULIN: ICD-10-CM

## 2022-05-17 DIAGNOSIS — E78.5 DYSLIPIDEMIA: ICD-10-CM

## 2022-05-17 DIAGNOSIS — N18.32 STAGE 3B CHRONIC KIDNEY DISEASE: ICD-10-CM

## 2022-05-17 PROCEDURE — 3078F PR MOST RECENT DIASTOLIC BLOOD PRESSURE < 80 MM HG: ICD-10-PCS | Mod: CPTII,S$GLB,, | Performed by: PHYSICIAN ASSISTANT

## 2022-05-17 PROCEDURE — 1159F PR MEDICATION LIST DOCUMENTED IN MEDICAL RECORD: ICD-10-PCS | Mod: CPTII,S$GLB,, | Performed by: PHYSICIAN ASSISTANT

## 2022-05-17 PROCEDURE — 99499 RISK ADDL DX/OHS AUDIT: ICD-10-PCS | Mod: S$GLB,,, | Performed by: PHYSICIAN ASSISTANT

## 2022-05-17 PROCEDURE — 99999 PR PBB SHADOW E&M-EST. PATIENT-LVL III: ICD-10-PCS | Mod: PBBFAC,,, | Performed by: PHYSICIAN ASSISTANT

## 2022-05-17 PROCEDURE — 1126F PR PAIN SEVERITY QUANTIFIED, NO PAIN PRESENT: ICD-10-PCS | Mod: CPTII,S$GLB,, | Performed by: PHYSICIAN ASSISTANT

## 2022-05-17 PROCEDURE — 3077F SYST BP >= 140 MM HG: CPT | Mod: CPTII,S$GLB,, | Performed by: PHYSICIAN ASSISTANT

## 2022-05-17 PROCEDURE — 99214 OFFICE O/P EST MOD 30 MIN: CPT | Mod: S$GLB,,, | Performed by: PHYSICIAN ASSISTANT

## 2022-05-17 PROCEDURE — 1126F AMNT PAIN NOTED NONE PRSNT: CPT | Mod: CPTII,S$GLB,, | Performed by: PHYSICIAN ASSISTANT

## 2022-05-17 PROCEDURE — 3077F PR MOST RECENT SYSTOLIC BLOOD PRESSURE >= 140 MM HG: ICD-10-PCS | Mod: CPTII,S$GLB,, | Performed by: PHYSICIAN ASSISTANT

## 2022-05-17 PROCEDURE — 99499 UNLISTED E&M SERVICE: CPT | Mod: S$GLB,,, | Performed by: PHYSICIAN ASSISTANT

## 2022-05-17 PROCEDURE — 99214 PR OFFICE/OUTPT VISIT, EST, LEVL IV, 30-39 MIN: ICD-10-PCS | Mod: S$GLB,,, | Performed by: PHYSICIAN ASSISTANT

## 2022-05-17 PROCEDURE — 1159F MED LIST DOCD IN RCRD: CPT | Mod: CPTII,S$GLB,, | Performed by: PHYSICIAN ASSISTANT

## 2022-05-17 PROCEDURE — 99999 PR PBB SHADOW E&M-EST. PATIENT-LVL III: CPT | Mod: PBBFAC,,, | Performed by: PHYSICIAN ASSISTANT

## 2022-05-17 PROCEDURE — 3078F DIAST BP <80 MM HG: CPT | Mod: CPTII,S$GLB,, | Performed by: PHYSICIAN ASSISTANT

## 2022-05-17 NOTE — PROGRESS NOTES
"Subjective:    Patient ID:  Cy Rutherford Jr. is a 76 y.o. male who presents for follow-up of PAD.       HPI  Mr. Rutherford is a very pleasant gentleman who follows with Dr. Chan. He presents today for follow up after a recent aortogram with cutting angioplasty of the L SFA.  He does feel slightly better since PTA. No claudication in R leg. No CP, NASSAR, or change in his exercise tolerance.     BP at home has been running in the 130s-150s. His valsartan was decreased from 320mg to 160mg in April.     Review of Systems   Constitutional: Negative for chills, diaphoresis, fever, weight gain and weight loss.   HENT: Negative for sore throat.    Eyes: Negative for blurred vision, vision loss in left eye, vision loss in right eye and visual disturbance.   Cardiovascular: Negative for chest pain, claudication, dyspnea on exertion, leg swelling, near-syncope, orthopnea, palpitations, paroxysmal nocturnal dyspnea and syncope.   Respiratory: Negative for cough, hemoptysis, shortness of breath, sputum production and wheezing.    Endocrine: Negative for cold intolerance and heat intolerance.   Hematologic/Lymphatic: Negative for adenopathy. Does not bruise/bleed easily.   Skin: Negative for rash.   Musculoskeletal: Negative for falls, muscle weakness and myalgias.   Gastrointestinal: Negative for abdominal pain, change in bowel habit, constipation, diarrhea, melena and nausea.   Genitourinary: Negative for bladder incontinence.   Neurological: Negative for dizziness, focal weakness, headaches, light-headedness, numbness and weakness.   Psychiatric/Behavioral: Negative for altered mental status.         Vitals:    05/17/22 1332 05/17/22 1351   BP: (!) 193/74 (!) 155/73   BP Location: Left arm Left arm   Patient Position: Sitting Sitting   BP Method: Medium (Automatic) Medium (Automatic)   Pulse: 63 60   Weight: 106.1 kg (233 lb 14.5 oz)    Height: 5' 9" (1.753 m)    Body mass index is 34.54 kg/m².    Objective:    Physical " Exam  Constitutional:       Appearance: He is well-developed.   HENT:      Head: Normocephalic and atraumatic.   Eyes:      Extraocular Movements: Extraocular movements intact.      Pupils: Pupils are equal, round, and reactive to light.   Neck:      Thyroid: No thyromegaly.      Vascular: No JVD.      Trachea: No tracheal deviation.   Cardiovascular:      Rate and Rhythm: Normal rate and regular rhythm.      Chest Wall: PMI is not displaced.      Pulses: Normal pulses and intact distal pulses.      Heart sounds: S1 normal and S2 normal. No murmur heard.    No friction rub. No gallop.   Pulmonary:      Effort: Pulmonary effort is normal. No respiratory distress.      Breath sounds: Normal breath sounds. No wheezing or rales.   Chest:      Chest wall: No tenderness.   Abdominal:      General: Bowel sounds are normal. There is no distension.      Palpations: Abdomen is soft. There is no mass.      Tenderness: There is no abdominal tenderness.   Musculoskeletal:         General: No tenderness. Normal range of motion.      Cervical back: Neck supple.   Skin:     General: Skin is warm and dry.      Findings: No rash.   Neurological:      General: No focal deficit present.      Mental Status: He is alert and oriented to person, place, and time.   Psychiatric:         Mood and Affect: Mood normal.         Behavior: Behavior normal.           Assessment:       Problem List Items Addressed This Visit        Cardiology Problems    Chronic diastolic heart failure - Primary    Coronary artery disease involving native coronary artery of native heart without angina pectoris    Essential hypertension    PAD (peripheral artery disease)    Paroxysmal atrial fibrillation       Other    Acquired hypothyroidism    Claudication    Dyslipidemia    S/P CABG (coronary artery bypass graft)    Stage 3b chronic kidney disease    Type 2 diabetes mellitus with stage 3b chronic kidney disease, with long-term current use of insulin            Plan:       Continue current cardiac medications.   Monitor BP. If consistently > 140/90, would increase Diovan back to 320 mg daily.   Risk factor modification including diet and exercise.   Walking program.   F/U with Dr. Chan at [facility] in 6 months.

## 2022-05-26 ENCOUNTER — PATIENT MESSAGE (OUTPATIENT)
Dept: FAMILY MEDICINE | Facility: CLINIC | Age: 77
End: 2022-05-26
Payer: MEDICARE

## 2022-05-26 DIAGNOSIS — E03.9 ACQUIRED HYPOTHYROIDISM: ICD-10-CM

## 2022-05-26 RX ORDER — LEVOTHYROXINE SODIUM 75 UG/1
75 TABLET ORAL
Qty: 90 TABLET | Refills: 3 | Status: SHIPPED | OUTPATIENT
Start: 2022-05-26 | End: 2023-03-15

## 2022-06-02 ENCOUNTER — TELEPHONE (OUTPATIENT)
Dept: CARDIOLOGY | Facility: CLINIC | Age: 77
End: 2022-06-02
Payer: MEDICARE

## 2022-06-02 NOTE — TELEPHONE ENCOUNTER
----- Message from Vale Ladd sent at 6/2/2022  9:41 AM CDT -----  Contact: patient  Type: Needs Medical Advice  Who Called:  patient  Best Call Back Number: [phone] (home)   Additional Information: patient is requesting a call back from Priyanka regarding adjusting his losartan- she told him to call back in 2 weeks- please advise.

## 2022-06-02 NOTE — TELEPHONE ENCOUNTER
Please advise: pt was advised to call Priyanka two weeks after appointment if BP was still elevated to possible increase valsartan; running 150s/60s

## 2022-06-05 DIAGNOSIS — I73.9 CLAUDICATION: ICD-10-CM

## 2022-06-05 DIAGNOSIS — G46.7 LACUNAR ATAXIC HEMIPARESIS: ICD-10-CM

## 2022-06-05 DIAGNOSIS — E11.22 TYPE 2 DIABETES MELLITUS WITH STAGE 3B CHRONIC KIDNEY DISEASE, WITH LONG-TERM CURRENT USE OF INSULIN: ICD-10-CM

## 2022-06-05 DIAGNOSIS — N18.32 TYPE 2 DIABETES MELLITUS WITH STAGE 3B CHRONIC KIDNEY DISEASE, WITH LONG-TERM CURRENT USE OF INSULIN: ICD-10-CM

## 2022-06-05 DIAGNOSIS — I69.398 ABNORMALITY OF GAIT AS LATE EFFECT OF CEREBROVASCULAR ACCIDENT (CVA): ICD-10-CM

## 2022-06-05 DIAGNOSIS — I50.32 CHRONIC DIASTOLIC HEART FAILURE: ICD-10-CM

## 2022-06-05 DIAGNOSIS — Z86.73 HISTORY OF CVA IN ADULTHOOD: ICD-10-CM

## 2022-06-05 DIAGNOSIS — E78.5 DYSLIPIDEMIA: ICD-10-CM

## 2022-06-05 DIAGNOSIS — I10 ESSENTIAL HYPERTENSION: ICD-10-CM

## 2022-06-05 DIAGNOSIS — I25.10 CORONARY ARTERY DISEASE INVOLVING NATIVE CORONARY ARTERY OF NATIVE HEART WITHOUT ANGINA PECTORIS: ICD-10-CM

## 2022-06-05 DIAGNOSIS — E78.2 MIXED HYPERLIPIDEMIA: ICD-10-CM

## 2022-06-05 DIAGNOSIS — I67.9 LACUNAR ATAXIC HEMIPARESIS: ICD-10-CM

## 2022-06-05 DIAGNOSIS — Z95.5 STATUS POST CORONARY ARTERY STENT PLACEMENT: ICD-10-CM

## 2022-06-05 DIAGNOSIS — Z86.73 HISTORY OF ISCHEMIC VERTEBROBASILAR ARTERY BRAINSTEM STROKE: ICD-10-CM

## 2022-06-05 DIAGNOSIS — Z79.4 TYPE 2 DIABETES MELLITUS WITH STAGE 3B CHRONIC KIDNEY DISEASE, WITH LONG-TERM CURRENT USE OF INSULIN: ICD-10-CM

## 2022-06-05 DIAGNOSIS — N18.32 STAGE 3B CHRONIC KIDNEY DISEASE: ICD-10-CM

## 2022-06-05 DIAGNOSIS — I73.9 PAD (PERIPHERAL ARTERY DISEASE): ICD-10-CM

## 2022-06-05 DIAGNOSIS — R26.9 ABNORMALITY OF GAIT AS LATE EFFECT OF CEREBROVASCULAR ACCIDENT (CVA): ICD-10-CM

## 2022-06-05 RX ORDER — ROSUVASTATIN CALCIUM 20 MG/1
20 TABLET, COATED ORAL EVERY MORNING
Qty: 90 TABLET | Refills: 3 | Status: SHIPPED | OUTPATIENT
Start: 2022-06-05 | End: 2022-06-20

## 2022-06-05 NOTE — TELEPHONE ENCOUNTER
No new care gaps identified.  [facility] Embedded Care Gaps. Reference number: [id number]. 6/05/2022   2:48:34 AM CDT

## 2022-06-06 NOTE — TELEPHONE ENCOUNTER
Refill Routing Note   Medication(s) are not appropriate for processing by [facility] for the following reason(s):      - Patient has been seen in the ED/Hospital since the last PCP visit    [facility] action(s):  Route          Medication reconciliation completed: No     Appointments  past 12m or future 3m with PCP    Date Provider   Last Visit   4/12/2022 James Sullivan MD   Next Visit   10/11/2022 James Sullivan MD   ED visits in past 90 days: 0        Note composed:7:09 PM 06/05/2022         2

## 2022-06-08 ENCOUNTER — TELEPHONE (OUTPATIENT)
Dept: CARDIOLOGY | Facility: CLINIC | Age: 77
End: 2022-06-08
Payer: MEDICARE

## 2022-06-08 NOTE — TELEPHONE ENCOUNTER
----- Message from Adolfo Fernandez MA sent at 6/8/2022 11:58 AM CDT -----  Regarding: medications  [phone]- please call pt regarding medications  Thank you

## 2022-06-15 RX ORDER — AMLODIPINE BESYLATE 5 MG/1
TABLET ORAL
Qty: 90 TABLET | Refills: 4 | Status: SHIPPED | OUTPATIENT
Start: 2022-06-15 | End: 2022-06-20

## 2022-06-23 ENCOUNTER — TELEPHONE (OUTPATIENT)
Dept: CARDIOLOGY | Facility: CLINIC | Age: 77
End: 2022-06-23
Payer: MEDICARE

## 2022-06-23 NOTE — TELEPHONE ENCOUNTER
----- Message from Lalo May sent at 6/23/2022  1:14 PM CDT -----  Contact: pt at [phone]  Type:  RX Refill Request  Who Called:  pt  Refill or New Rx:  refill  RX Name and Strength: rosuvastatin (CRESTOR) 20 MG tablet, amLODIPine (NORVASC) 5 MG tablet  How is the patient currently taking it? (ex. 1XDay):  1XDay  Is this a 30 day or 90 day RX:  90  Preferred Pharmacy with phone number:    [facility] (Now [facility]) - [place], [place] - [address]  [address] [address]  Phone: [phone] Fax: [phone]  Local or Mail Order:  Local  Ordering Provider:  Junior Fishman Call Back Number:  [phone]  Additional Information:  pt is calling the office to let them know the pharmacy has not heard from the provider regarding his rosuvastatin (CRESTOR) 20 MG tablet, amLODIPine (NORVASC) 5 MG tablet. Please call back and advise.

## 2022-07-07 ENCOUNTER — OFFICE VISIT (OUTPATIENT)
Dept: FAMILY MEDICINE | Facility: CLINIC | Age: 77
End: 2022-07-07
Payer: MEDICARE

## 2022-07-07 VITALS
OXYGEN SATURATION: 98 % | BODY MASS INDEX: 34.74 KG/M2 | WEIGHT: 234.56 LBS | SYSTOLIC BLOOD PRESSURE: 158 MMHG | HEIGHT: 69 IN | HEART RATE: 54 BPM | DIASTOLIC BLOOD PRESSURE: 80 MMHG

## 2022-07-07 DIAGNOSIS — N18.32 STAGE 3B CHRONIC KIDNEY DISEASE: ICD-10-CM

## 2022-07-07 DIAGNOSIS — E11.3299 CONTROLLED TYPE 2 DIABETES MELLITUS WITH MILD NONPROLIFERATIVE RETINOPATHY, WITH LONG-TERM CURRENT USE OF INSULIN, MACULAR EDEMA PRESENCE UNSPECIFIED, UNSPECIFIED LATERALITY: ICD-10-CM

## 2022-07-07 DIAGNOSIS — H35.3290 EXUDATIVE AGE-RELATED MACULAR DEGENERATION, UNSPECIFIED LATERALITY, UNSPECIFIED STAGE: ICD-10-CM

## 2022-07-07 DIAGNOSIS — I50.32 CHRONIC DIASTOLIC HEART FAILURE: ICD-10-CM

## 2022-07-07 DIAGNOSIS — Z79.4 TYPE 2 DIABETES MELLITUS WITH STAGE 3B CHRONIC KIDNEY DISEASE, WITH LONG-TERM CURRENT USE OF INSULIN: ICD-10-CM

## 2022-07-07 DIAGNOSIS — Z79.4 CONTROLLED TYPE 2 DIABETES MELLITUS WITH MILD NONPROLIFERATIVE RETINOPATHY, WITH LONG-TERM CURRENT USE OF INSULIN, MACULAR EDEMA PRESENCE UNSPECIFIED, UNSPECIFIED LATERALITY: ICD-10-CM

## 2022-07-07 DIAGNOSIS — E11.22 TYPE 2 DIABETES MELLITUS WITH STAGE 3B CHRONIC KIDNEY DISEASE, WITH LONG-TERM CURRENT USE OF INSULIN: ICD-10-CM

## 2022-07-07 DIAGNOSIS — E66.01 MORBID (SEVERE) OBESITY DUE TO EXCESS CALORIES: ICD-10-CM

## 2022-07-07 DIAGNOSIS — I73.9 PAD (PERIPHERAL ARTERY DISEASE): ICD-10-CM

## 2022-07-07 DIAGNOSIS — I48.0 PAROXYSMAL ATRIAL FIBRILLATION: ICD-10-CM

## 2022-07-07 DIAGNOSIS — Z00.00 ENCOUNTER FOR PREVENTIVE HEALTH EXAMINATION: Primary | ICD-10-CM

## 2022-07-07 DIAGNOSIS — Z99.89 DEPENDENCE ON OTHER ENABLING MACHINES AND DEVICES: ICD-10-CM

## 2022-07-07 DIAGNOSIS — R26.9 ABNORMALITY OF GAIT AND MOBILITY: ICD-10-CM

## 2022-07-07 DIAGNOSIS — I70.0 AORTIC ATHEROSCLEROSIS: ICD-10-CM

## 2022-07-07 DIAGNOSIS — N18.32 TYPE 2 DIABETES MELLITUS WITH STAGE 3B CHRONIC KIDNEY DISEASE, WITH LONG-TERM CURRENT USE OF INSULIN: ICD-10-CM

## 2022-07-07 PROCEDURE — 1101F PT FALLS ASSESS-DOCD LE1/YR: CPT | Mod: CPTII,S$GLB,, | Performed by: NURSE PRACTITIONER

## 2022-07-07 PROCEDURE — 99499 RISK ADDL DX/OHS AUDIT: ICD-10-PCS | Mod: S$GLB,,, | Performed by: NURSE PRACTITIONER

## 2022-07-07 PROCEDURE — 1159F MED LIST DOCD IN RCRD: CPT | Mod: CPTII,S$GLB,, | Performed by: NURSE PRACTITIONER

## 2022-07-07 PROCEDURE — 1126F AMNT PAIN NOTED NONE PRSNT: CPT | Mod: CPTII,S$GLB,, | Performed by: NURSE PRACTITIONER

## 2022-07-07 PROCEDURE — G0439 PPPS, SUBSEQ VISIT: HCPCS | Mod: S$GLB,,, | Performed by: NURSE PRACTITIONER

## 2022-07-07 PROCEDURE — 99999 PR PBB SHADOW E&M-EST. PATIENT-LVL IV: ICD-10-PCS | Mod: PBBFAC,,, | Performed by: NURSE PRACTITIONER

## 2022-07-07 PROCEDURE — 3288F PR FALLS RISK ASSESSMENT DOCUMENTED: ICD-10-PCS | Mod: CPTII,S$GLB,, | Performed by: NURSE PRACTITIONER

## 2022-07-07 PROCEDURE — G0439 PR MEDICARE ANNUAL WELLNESS SUBSEQUENT VISIT: ICD-10-PCS | Mod: S$GLB,,, | Performed by: NURSE PRACTITIONER

## 2022-07-07 PROCEDURE — 1159F PR MEDICATION LIST DOCUMENTED IN MEDICAL RECORD: ICD-10-PCS | Mod: CPTII,S$GLB,, | Performed by: NURSE PRACTITIONER

## 2022-07-07 PROCEDURE — 99999 PR PBB SHADOW E&M-EST. PATIENT-LVL IV: CPT | Mod: PBBFAC,,, | Performed by: NURSE PRACTITIONER

## 2022-07-07 PROCEDURE — 1101F PR PT FALLS ASSESS DOC 0-1 FALLS W/OUT INJ PAST YR: ICD-10-PCS | Mod: CPTII,S$GLB,, | Performed by: NURSE PRACTITIONER

## 2022-07-07 PROCEDURE — 3288F FALL RISK ASSESSMENT DOCD: CPT | Mod: CPTII,S$GLB,, | Performed by: NURSE PRACTITIONER

## 2022-07-07 PROCEDURE — 1126F PR PAIN SEVERITY QUANTIFIED, NO PAIN PRESENT: ICD-10-PCS | Mod: CPTII,S$GLB,, | Performed by: NURSE PRACTITIONER

## 2022-07-07 PROCEDURE — 99499 UNLISTED E&M SERVICE: CPT | Mod: S$GLB,,, | Performed by: NURSE PRACTITIONER

## 2022-07-07 NOTE — PROGRESS NOTES
"  Cy Rutherford presented for a  Medicare AWV and comprehensive Health Risk Assessment today. The following components were reviewed and updated:    · Medical history  · Family History  · Social history  · Allergies and Current Medications  · Health Risk Assessment  · Health Maintenance  · Care Team         ** See Completed Assessments for Annual Wellness Visit within the encounter summary.**         The following assessments were completed:  · Living Situation  · CAGE  · Depression Screening  · Timed Get Up and Go  · Whisper Test  · Cognitive Function Screening          · Nutrition Screening  · ADL Screening  · PAQ Screening        Vitals:    07/07/22 1027   BP: (!) 158/80   BP Location: Left arm   Patient Position: Sitting   BP Method: Medium (Manual)   Pulse: (!) 54   SpO2: 98%   Weight: 106.4 kg (234 lb 9.1 oz)   Height: 5' 9" (1.753 m)     Body mass index is 34.64 kg/m².  Physical Exam  Vitals reviewed.   Constitutional:       Comments: Ambulates with walker   HENT:      Head: Normocephalic.   Cardiovascular:      Pulses:           Dorsalis pedis pulses are 1+ on the right side and 1+ on the left side.        Posterior tibial pulses are 1+ on the right side and 1+ on the left side.   Pulmonary:      Effort: Pulmonary effort is normal. No respiratory distress.   Feet:      Right foot:      Protective Sensation: 6 sites tested. 5 sites sensed.      Skin integrity: No ulcer, blister, skin breakdown or erythema.      Left foot:      Protective Sensation: 6 sites tested. 6 sites sensed.      Skin integrity: No ulcer, blister, skin breakdown or erythema.   Neurological:      Mental Status: He is alert.      Motor: Weakness (right upper ext and r lower ext) present.      Gait: Gait abnormal.   Psychiatric:         Mood and Affect: Mood normal.               Diagnoses and health risks identified today and associated recommendations/orders:    1. Encounter for preventive health examination  Reviewed health maintenance " and provided recommendations   Recommend tdap vaccine  Continue digital htn program    2. PAD (peripheral artery disease)  Continue to monitor  Followed by Dr Chan.      3. Chronic diastolic heart failure  Continue to monitor  Followed by Dr Chan.      4. Paroxysmal atrial fibrillation  Continue to monitor  Followed by Dr Chan.      5. Stage 3b chronic kidney disease  Continue to monitor  Followed by James Sullivan MD   Encourage adequate water intake.    Avoid nsaids    6. Type 2 diabetes mellitus with stage 3b chronic kidney disease, with long-term current use of insulin  Continue to monitor  Followed by Dr Brown  Last a1c 6.9  Taking novolog and lantus.      7. Morbid (severe) obesity due to excess calories  Continue to monitor  Followed by James Sullivan MD   Encourage healthy food chocies.      8. Controlled type 2 diabetes mellitus with mild nonproliferative retinopathy, with long-term current use of insulin, macular edema presence unspecified, unspecified laterality  Continue to monitor  Followed by Dr Brown.      9. Exudative age-related macular degeneration, unspecified laterality, unspecified stage  Continue to monitor  Followed by Dr Mary Jane Espinosa to f/u as directed (q 2 months).      10. Aortic atherosclerosis  Continue to monitor  Followed by Dr Chan  CTA Abd 12/4/19.      11. Dependence on other enabling machines and devices  Continue to monitor  Followed by James Sullivan MD .      12. Abnormality of gait and mobility  Continue to monitor  Followed by James Sullivan MD .        Provided Cy with a 5-10 year written screening schedule and personal prevention plan. Recommendations were developed using the USPSTF age appropriate recommendations. Education, counseling, and referrals were provided as needed. After Visit Summary printed and given to patient which includes a list of additional screenings\tests needed.    Follow up in one year    Fern Harrington NP  I offered  to discuss advanced care planning, including how to pick a person who would make decisions for you if you were unable to make them for yourself, called a health care power of , and what kind of decisions you might make such as use of life sustaining treatments such as ventilators and tube feeding when faced with a life limiting illness recorded on a living will that they will need to know. (How you want to be cared for as you near the end of your natural life)     X  Patient has advanced directives written and agrees to provide copies to the institution.

## 2022-07-07 NOTE — PATIENT INSTRUCTIONS
Counseling and Referral of Other Preventative  (Italic type indicates deductible and co-insurance are waived)    Patient Name: Cy Rutherford  Today's Date: 7/7/2022    Health Maintenance       Date Due Completion Date    TETANUS VACCINE Never done ---    Eye Exam 01/29/2022 1/29/2021    COVID-19 Vaccine (4 - Booster for Moderna series) 03/08/2022 11/8/2021    Foot Exam 04/14/2022 4/14/2021 (Done)    Override on 4/14/2021: Done    Influenza Vaccine (1) 09/01/2022 10/8/2021    Hemoglobin A1c 01/06/2023 7/6/2022    Diabetes Urine Screening 07/06/2023 7/6/2022    Lipid Panel 07/06/2023 7/6/2022        No orders of the defined types were placed in this encounter.    The following information is provided to all patients.  This information is to help you find resources for any of the problems found today that may be affecting your health:                Living healthy guide: www.[facility].[place].gov      Understanding Diabetes: www.diabetes.org      Eating healthy: www.cdc.gov/healthyweight      CDC home safety checklist: www.cdc.gov/steadi/patient.html      Agency on Aging: www.goea.[place].gov      Alcoholics anonymous (AA): www.aa.org      Physical Activity: www.stef.nih.gov/wj9mwpe      Tobacco use: www.quitwithusla.org

## 2022-07-13 ENCOUNTER — PATIENT MESSAGE (OUTPATIENT)
Dept: FAMILY MEDICINE | Facility: CLINIC | Age: 77
End: 2022-07-13
Payer: MEDICARE

## 2022-07-14 PROBLEM — Z79.4: Status: ACTIVE | Noted: 2022-07-14

## 2022-07-14 PROBLEM — E11.3299: Status: ACTIVE | Noted: 2022-07-14

## 2022-07-14 PROBLEM — H35.3290 MACULAR DEGENERATION, WET: Status: ACTIVE | Noted: 2022-07-14

## 2022-07-14 PROBLEM — I70.0 AORTIC ATHEROSCLEROSIS: Status: ACTIVE | Noted: 2022-07-14

## 2022-08-09 LAB
LEFT EYE DM RETINOPATHY: POSITIVE
RIGHT EYE DM RETINOPATHY: POSITIVE

## 2022-08-23 ENCOUNTER — PATIENT OUTREACH (OUTPATIENT)
Dept: ADMINISTRATIVE | Facility: HOSPITAL | Age: 77
End: 2022-08-23
Payer: MEDICARE

## 2022-10-11 ENCOUNTER — OFFICE VISIT (OUTPATIENT)
Dept: FAMILY MEDICINE | Facility: CLINIC | Age: 77
End: 2022-10-11
Payer: MEDICARE

## 2022-10-11 ENCOUNTER — PATIENT MESSAGE (OUTPATIENT)
Dept: ADMINISTRATIVE | Facility: OTHER | Age: 77
End: 2022-10-11
Payer: MEDICARE

## 2022-10-11 VITALS
HEART RATE: 56 BPM | BODY MASS INDEX: 34.61 KG/M2 | DIASTOLIC BLOOD PRESSURE: 82 MMHG | HEIGHT: 69 IN | WEIGHT: 233.69 LBS | OXYGEN SATURATION: 98 % | SYSTOLIC BLOOD PRESSURE: 140 MMHG

## 2022-10-11 DIAGNOSIS — I25.10 CORONARY ARTERY DISEASE INVOLVING NATIVE CORONARY ARTERY OF NATIVE HEART WITHOUT ANGINA PECTORIS: ICD-10-CM

## 2022-10-11 DIAGNOSIS — K21.9 GERD WITHOUT ESOPHAGITIS: ICD-10-CM

## 2022-10-11 DIAGNOSIS — E03.9 ACQUIRED HYPOTHYROIDISM: ICD-10-CM

## 2022-10-11 DIAGNOSIS — Z79.4 CONTROLLED TYPE 2 DIABETES MELLITUS WITH MILD NONPROLIFERATIVE RETINOPATHY, WITH LONG-TERM CURRENT USE OF INSULIN, MACULAR EDEMA PRESENCE UNSPECIFIED, UNSPECIFIED LATERALITY: Primary | ICD-10-CM

## 2022-10-11 DIAGNOSIS — I10 ESSENTIAL HYPERTENSION: ICD-10-CM

## 2022-10-11 DIAGNOSIS — E11.3299 CONTROLLED TYPE 2 DIABETES MELLITUS WITH MILD NONPROLIFERATIVE RETINOPATHY, WITH LONG-TERM CURRENT USE OF INSULIN, MACULAR EDEMA PRESENCE UNSPECIFIED, UNSPECIFIED LATERALITY: Primary | ICD-10-CM

## 2022-10-11 DIAGNOSIS — E11.22 TYPE 2 DIABETES MELLITUS WITH STAGE 3B CHRONIC KIDNEY DISEASE, WITH LONG-TERM CURRENT USE OF INSULIN: ICD-10-CM

## 2022-10-11 DIAGNOSIS — Z79.4 TYPE 2 DIABETES MELLITUS WITH STAGE 3B CHRONIC KIDNEY DISEASE, WITH LONG-TERM CURRENT USE OF INSULIN: ICD-10-CM

## 2022-10-11 DIAGNOSIS — Z23 NEED FOR VACCINATION: ICD-10-CM

## 2022-10-11 DIAGNOSIS — Z95.1 S/P CABG (CORONARY ARTERY BYPASS GRAFT): ICD-10-CM

## 2022-10-11 DIAGNOSIS — L40.9 PSORIASIS: ICD-10-CM

## 2022-10-11 DIAGNOSIS — E78.5 DYSLIPIDEMIA: ICD-10-CM

## 2022-10-11 DIAGNOSIS — N18.32 TYPE 2 DIABETES MELLITUS WITH STAGE 3B CHRONIC KIDNEY DISEASE, WITH LONG-TERM CURRENT USE OF INSULIN: ICD-10-CM

## 2022-10-11 PROCEDURE — 3288F PR FALLS RISK ASSESSMENT DOCUMENTED: ICD-10-PCS | Mod: CPTII,S$GLB,, | Performed by: INTERNAL MEDICINE

## 2022-10-11 PROCEDURE — 99999 PR PBB SHADOW E&M-EST. PATIENT-LVL V: ICD-10-PCS | Mod: PBBFAC,,, | Performed by: INTERNAL MEDICINE

## 2022-10-11 PROCEDURE — 1126F PR PAIN SEVERITY QUANTIFIED, NO PAIN PRESENT: ICD-10-PCS | Mod: CPTII,S$GLB,, | Performed by: INTERNAL MEDICINE

## 2022-10-11 PROCEDURE — 3077F SYST BP >= 140 MM HG: CPT | Mod: CPTII,S$GLB,, | Performed by: INTERNAL MEDICINE

## 2022-10-11 PROCEDURE — 3079F PR MOST RECENT DIASTOLIC BLOOD PRESSURE 80-89 MM HG: ICD-10-PCS | Mod: CPTII,S$GLB,, | Performed by: INTERNAL MEDICINE

## 2022-10-11 PROCEDURE — 1160F PR REVIEW ALL MEDS BY PRESCRIBER/CLIN PHARMACIST DOCUMENTED: ICD-10-PCS | Mod: CPTII,S$GLB,, | Performed by: INTERNAL MEDICINE

## 2022-10-11 PROCEDURE — 1160F RVW MEDS BY RX/DR IN RCRD: CPT | Mod: CPTII,S$GLB,, | Performed by: INTERNAL MEDICINE

## 2022-10-11 PROCEDURE — 1126F AMNT PAIN NOTED NONE PRSNT: CPT | Mod: CPTII,S$GLB,, | Performed by: INTERNAL MEDICINE

## 2022-10-11 PROCEDURE — 1101F PR PT FALLS ASSESS DOC 0-1 FALLS W/OUT INJ PAST YR: ICD-10-PCS | Mod: CPTII,S$GLB,, | Performed by: INTERNAL MEDICINE

## 2022-10-11 PROCEDURE — 99214 OFFICE O/P EST MOD 30 MIN: CPT | Mod: S$GLB,,, | Performed by: INTERNAL MEDICINE

## 2022-10-11 PROCEDURE — 3077F PR MOST RECENT SYSTOLIC BLOOD PRESSURE >= 140 MM HG: ICD-10-PCS | Mod: CPTII,S$GLB,, | Performed by: INTERNAL MEDICINE

## 2022-10-11 PROCEDURE — 1101F PT FALLS ASSESS-DOCD LE1/YR: CPT | Mod: CPTII,S$GLB,, | Performed by: INTERNAL MEDICINE

## 2022-10-11 PROCEDURE — 99999 PR PBB SHADOW E&M-EST. PATIENT-LVL V: CPT | Mod: PBBFAC,,, | Performed by: INTERNAL MEDICINE

## 2022-10-11 PROCEDURE — 3288F FALL RISK ASSESSMENT DOCD: CPT | Mod: CPTII,S$GLB,, | Performed by: INTERNAL MEDICINE

## 2022-10-11 PROCEDURE — 1159F MED LIST DOCD IN RCRD: CPT | Mod: CPTII,S$GLB,, | Performed by: INTERNAL MEDICINE

## 2022-10-11 PROCEDURE — 1159F PR MEDICATION LIST DOCUMENTED IN MEDICAL RECORD: ICD-10-PCS | Mod: CPTII,S$GLB,, | Performed by: INTERNAL MEDICINE

## 2022-10-11 PROCEDURE — 99214 PR OFFICE/OUTPT VISIT, EST, LEVL IV, 30-39 MIN: ICD-10-PCS | Mod: S$GLB,,, | Performed by: INTERNAL MEDICINE

## 2022-10-11 PROCEDURE — 3079F DIAST BP 80-89 MM HG: CPT | Mod: CPTII,S$GLB,, | Performed by: INTERNAL MEDICINE

## 2022-10-11 RX ORDER — DESOXIMETASONE 2.5 MG/G
1 CREAM TOPICAL 2 TIMES DAILY PRN
Qty: 60 G | Refills: 2 | Status: SHIPPED | OUTPATIENT
Start: 2022-10-11 | End: 2023-04-20

## 2022-10-11 RX ORDER — PANTOPRAZOLE SODIUM 40 MG/1
40 TABLET, DELAYED RELEASE ORAL DAILY
Qty: 90 TABLET | Refills: 3 | Status: SHIPPED | OUTPATIENT
Start: 2022-10-11 | End: 2023-07-26

## 2022-10-11 NOTE — PROGRESS NOTES
"  Subjective     Cy Rutherford Jr. is a 76 y.o. old, male here for Follow-up and Medication Refill    77 y/o with PMH of CAD s/p CABG, CVA with resulting mild R hemiplegia, PAD, HTN, HLD, Type 2 DM with retinopathy, CKD stage 3, hypothyroidism, GERD    CAD: Completed cardiac rehab, doing well with no CP or Sig NASSAR. He is followed by cardiology. He [facility]  CVA: stable R hemiplegia  HTN: Uncontrolled with SBP's in the 140's-150's regularly. He has had his dose of amlodipine lowered, was previously on 10 mg daily.  HLD: LDL at goal.  Type 2 DM: Well controlled, h/o retinopathy  Hemoglobin A1C (%)   Date Value   07/06/2022 6.9 (H)   11/30/2021 6.3 (H)   07/07/2021 7.1 (H)   CKD: Stable GFR in the 30's-40's, some baseline LE edema  Hypothyroidism: dose changed from 50 mcg to 75 mcg, will need to recheck.  GERD: previously changed his PPI to an H2 blocker. He is having breakthrough symptoms, will change back to a PPI.    ROS  Medications     Outpatient Medications Marked as Taking for the 10/11/22 encounter (Office Visit) with James Sullivan MD   Medication Sig Dispense Refill    amLODIPine (NORVASC) 5 MG tablet Take 1 tablet (5 mg total) by mouth once daily. 90 tablet 3    aspirin (ECOTRIN) 81 MG EC tablet Take 81 mg by mouth every evening.      carvediloL (COREG) 3.125 MG tablet Take 1 tablet (3.125 mg total) by mouth 2 (two) times daily with meals. 180 tablet 4    cloNIDine (CATAPRES) 0.1 MG tablet Take 1 tablet (0.1 mg total) by mouth 3 (three) times daily as needed (PRN SBP > 165 mmHg). 90 tablet 6    clopidogreL (PLAVIX) 75 mg tablet TAKE 1 TABLET EVERY DAY 90 tablet 3    DROPLET PEN NEEDLE 31 gauge x 5/16" Ndle 1 each by Misc.(Non-Drug; Combo Route) route 4 (four) times daily. to inject insulin      EYLEA 2 mg/0.05 mL Soln 2 mg by Intravitreal route every 28 days.      ezetimibe (ZETIA) 10 mg tablet Take 1 tablet (10 mg total) by mouth once daily. 90 tablet 3    " "fenofibrate micronized (LOFIBRA) 134 MG Cap Take 1 capsule (134 mg total) by mouth nightly. 90 capsule 3    fish oil-omega-3 fatty acids 300-1,000 mg capsule Take 1 capsule by mouth every morning.      furosemide (LASIX) 20 MG tablet Take 1 tablet (20 mg total) by mouth once daily. 90 tablet 5    hydrALAZINE (APRESOLINE) 25 MG tablet TAKE 2 TABLETS BY MOUTH EVERY 8 HOURS 180 tablet 1    hydroCHLOROthiazide (HYDRODIURIL) 25 MG tablet Take 1 tablet (25 mg total) by mouth every morning. 90 tablet 3    INV INSULIN GLARGINE, LANTUS, 100U/ML SOLN FOR INJ SOLOSTAR Inject 70-75 Units into the skin every morning. Per Patient   If BG = 140 give 70 units  If BG = 160-180 give 72 units  If BG = 200 give 75 units      levothyroxine (SYNTHROID) 75 MCG tablet Take 1 tablet (75 mcg total) by mouth before breakfast. 90 tablet 3    multivitamin with minerals (ONE-A-DAY MAXIMUM FORMULA ORAL) Take 1 tablet by mouth once daily.      potassium chloride SA (K-DUR,KLOR-CON) 10 MEQ tablet Take 1 tablet (10 mEq total) by mouth every morning. 90 tablet 6    rosuvastatin (CRESTOR) 20 MG tablet Take 1 tablet (20 mg total) by mouth once daily. 90 tablet 3    valsartan (DIOVAN) 320 MG tablet Take 1 tablet (320 mg total) by mouth once daily. 90 tablet 1    [DISCONTINUED] desoximetasone (TOPICORT) 0.25 % cream Apply 1 application topically 2 (two) times daily as needed (psoriasis).      [DISCONTINUED] famotidine (PEPCID) 40 MG tablet Take 1 tablet (40 mg total) by mouth once daily. 90 tablet 3     Objective     BP (!) 140/82   Pulse (!) 56   Ht 5' 9" (1.753 m)   Wt 106 kg (233 lb 11 oz)   SpO2 98%   BMI 34.51 kg/m²   Physical Exam  Constitutional:       General: He is not in acute distress.     Appearance: Normal appearance. He is well-developed.   Cardiovascular:      Rate and Rhythm: Regular rhythm. Bradycardia present.      Heart sounds: Murmur heard.   Pulmonary:      Effort: Pulmonary effort is normal. No respiratory distress.      Breath " sounds: Normal breath sounds.     Assessment and Plan     Controlled type 2 diabetes mellitus with mild nonproliferative retinopathy, with long-term current use of insulin, macular edema presence unspecified, unspecified laterality    Coronary artery disease involving native coronary artery of native heart without angina pectoris    Essential hypertension    Dyslipidemia    Type 2 diabetes mellitus with stage 3b chronic kidney disease, with long-term current use of insulin  -     Hemoglobin A1C; Future; Expected date: 10/11/2022  -     Renal Function Panel; Future; Expected date: 10/11/2022    Acquired hypothyroidism  -     TSH; Future; Expected date: 10/11/2022  -     T4, FREE; Future; Expected date: 10/11/2022    S/P CABG (coronary artery bypass graft)    Psoriasis  -     desoximetasone (TOPICORT) 0.25 % cream; Apply 1 application topically 2 (two) times daily as needed (psoriasis).  Dispense: 60 g; Refill: 2    Need for vaccination  -     diphth,pertus,acell,,tetanus (BOOSTRIX) 2.5-8-5 Lf-mcg-Lf/0.5mL Susp; Inject 0.5 mLs into the muscle once. for 1 dose  Dispense: 0.5 mL; Refill: 0    GERD without esophagitis  -     pantoprazole (PROTONIX) 40 MG tablet; Take 1 tablet (40 mg total) by mouth once daily.  Dispense: 90 tablet; Refill: 3    Labs done at [facility]  Start protonix.  Would prefer to increase amlodipine, [place] [facility] [place]    Follow up in about 6 months (around 4/11/2023).  ___________________  James Sullivan MD  Internal Medicine and Pediatrics

## 2022-11-15 LAB
LEFT EYE DM RETINOPATHY: POSITIVE
RIGHT EYE DM RETINOPATHY: POSITIVE

## 2022-12-23 ENCOUNTER — PATIENT OUTREACH (OUTPATIENT)
Dept: ADMINISTRATIVE | Facility: HOSPITAL | Age: 77
End: 2022-12-23
Payer: MEDICARE

## 2023-01-06 NOTE — ASSESSMENT & PLAN NOTE
Increase levemir to 60units  
- BG now under 200  - Continue levemir 60 units nightly   - Continue aspart 15 units TID with meals  - LDSSI   
- PPI  - npo p mn  - monitor hct, stable  - EGD in am  
--184 overnight   -on Lantus and mealtime insulin at home  -diabetes management per Hospital Medicine   -recent HgbA1c 5.6 9/2019  
[place] [phone]   -on Lantus and mealtime insulin at home  -diabetes management per Hospital Medicine   -recent HgbA1c 5.6 9/2019  
-BP stable overnight with successful wean of Levophed overnight  -will remove kanchan and will continue to hold antihypertensives for now  -if significant trend up BP then will slowly resume medication   
-[facility] [place] [phone]  -on Lantus and mealtime insulin at home  -will resume home dose of insulin   -recent HgbA1c 5.6 9/2019  
-K+ 5.0 this AM  -related to JOSE/ATN  -Nephrology on board   
-K+ 5.0 this AM  -related to JOSE/ATN  -Nephrology on board   
[place] [phone]   -on Lantus and mealtime insulin at home  -diabetes management per Hospital Medicine   -recent HgbA1c 5.6 9/2019  
-WBCs 28K post procedure with trend down to 22K nad 13K this AM  -afebrile  -likely reactive rather than infectious   
-WBCs 28K post procedure with trend down to 22K this AM  -afebrile  -likely reactive rather than infectious   
-WBCs 28K post procedure with trend down to 22K-13K; 10K  this AM  -afebrile  -likely reactive rather than infectious   
-abdominal ultrasound  with no acute findings  -KUB with air and retained stool; started Simethicone and Miralax with mild improvement; repeat KUB yesterday with continued retained stool  -Lactulose yesterday with successful BM; abdominal distension much improved today   -will continue Simethicone; will start on daily stool softener and continue Dulcolax suppository prn   -no recurrent n/v today   
-abdominal ultrasound  with no acute findings  -KUB with air and retained stool; started Simethicone and Miralax with mild improvement; repeat KUB yesterday with continued retained stool  -improved with Lactulose   -abdominal distension improved  -continue daily stool softener  
-abdominal ultrasound  with no acute findings  -KUB yesterday with air and some retained stool; started Simethicone and Miralax yesterday with minimal relief  -will continue Simethicone; will give dose of Lactulose today and add Dulcolax suppository prn   -no nausea or vomiting present; tolerating diet well   
-abdominal ultrasound yesterday with no acute findings  -KUB today with air and some retained stool  -will place on Simethicone and Miralax   -no nausea or vomiting present; tolerating diet well   
-admitted for elective LE angiogram for further evaluation of LE claudication  -successful atherectomy and PTA with scoring balloon and  DCB to dSFA,  pSFA CFA and TPT   -foot warm and pulses present   -continue statin; no ASA and Plavix due to bleeding issue   -will need serial LE arterial ultrasounds as an outpatient     
-admitted for elective LE angiogram for further evaluation of LE claudication  -successful atherectomy and PTA with scoring balloon and  DCB to dSFA,  pSFA CFA and TPT   -tolerated procedure well with hypotension and diaphoresis post procedure requiring repeat procedure with no active bleeding  -continue ASA, statin and Plavix  -will need serial LE arterial ultrasounds as an outpatient     
-concerning for combination of volume overload and sedentary lifestyle  -unchanged  -will continue to monitor   
-concerning for combination of volume overload and sedentary lifestyle  -unchanged  -will continue to monitor   
-concerning for combination of volume overload and sedentary lifestyle  -will give IV Lasix and monitor closely   
-concerning for volume overload in setting of IVF use and PRBC transfusion  -IV Lasix with no response; JOSE with CRRT in process currently  -CXR reviewed and mild SOB noted on PE; pulse ox WNL; Nephrology on board and anticipate mild volume removal with CRRT   -echocardiogram with normal LVEF   
-concerning for volume overload in setting of IVF use and PRBC transfusion  -given IV Lasix in between  PRBC units yesterday evening; only 500cc out overnight; continued SOB this AM with repeat dose of IV Lasix ordered  -will do CXR as well as echocardiogram   
-concerning for volume overload in setting of IVF use and PRBC transfusion post procedure  -SOB improving  -on IV Lasix with good urine output  -continue with IV Lasix drip for now; continue to follow Nephrology recommendation   -echocardiogram with normal LVEF   
-continue statin therapy  -FLP with LDL 80 in 9/2019  
-echo with normal LVEF  -SOB last week related to JOSE with volume overload secondary to PRBC transfusion  -on IV Lasix drip since late last week with good response; 2.7 liters out overnight; negative 4.4 liters since admisson  -resumed on CCB with trend down of BPs; SBPs overnight 140s-170s  -SOB improving  
-echo with normal LVEF  -related to  volume overload secondary to PRBC transfusion  -on IV Lasix drip since late last week with good response; 3.0 liters out overnight; negative 5.6 liters since admisson  -resumed on CCB with trend down of BPs; SBPs overnight 140s-160s  -SOB improving  -PT and OT on board   
-echo with normal LVEF  -related to  volume overload secondary to PRBC transfusion  -remains on IV Lasix BID; 2.1 liters out overnight;  negative 14.6 liters since admisson  -on CCB; SBPs overnight 140s-150s  -SOB improving  -PT and OT on board   
-echo with normal LVEF  -related to  volume overload secondary to PRBC transfusion  -remains on IV Lasix drip with good response; 1.5 liters urine output overnight; 1.1 liters via HD yesterday; negative 9.1 liters since admisson  -on CCB with trend down of BPs; SBPs overnight 110s-140s  -SOB improving  -PT and OT on board   
-echo with normal LVEF  -related to  volume overload secondary to PRBC transfusion  -remains on IV Lasix drip with good response; 1.8 liters urine output overnight; 1.4 liters via HD yesterday; negative 7.4 liters since admisson  -resumed on CCB with trend down of BPs; SBPs overnight 110s-140s  -SOB improving  -PT and OT on board   
-echo with normal LVEF  -related to  volume overload secondary to PRBC transfusion  -transitioned to IV Lasix BID yesterday; 3.1 liters out overnight;  negative 11.9 liters since admisson  -on CCB with trend down of BPs; SBPs overnight 130s-150s  -SOB improving  -PT and OT on board   
-echo with normal LVEF  -related to  volume overload secondary to PRBC transfusion initially upon admisson/post procedure   -aggressive diuresis with excellent response with transition to IV Lasix BID; 2.2 liters out overnight;  negative 18.3 liters since admisson  -on CCB; SBPs overnight 140s-150s  -SOB improving; anticipate transition to oral Lasix tomorrow   -PT and OT on board   
-initial H&H 11.1&36.1 with trend down to 7.4&24.1  -improved to 10.0&30.2 with slight trend down to 9.1&27.5    -remains on IV Levophed; will continue with serial H&Hs   -monitor closely and recurrent sharp drop will need repeat transfusion   
-initial H&H 11.1&36.1 with trend down to 7.4&24.1 post procedure  -repeat angiogram on 12/2 with no active bleeding with recurrent trend down prompting repeat  angiogram  12/4 with no active bleeding; CTA abdomen with renal subcapsular hematoma with surrounding retroperitoneal hemorrhage and no definite contrast extravasation seen to suggest active bleeding on delayed images  -H&H 6.7&20.5 12/8 with repeat PRBC transfusion with trend up of H&H to 8.8&26.6-9.1 & 27.5   -H&H 9.0&27.6 this AM   -will continue with daily CBCs for now   
-initial H&H 11.1&36.1 with trend down to 7.4&24.1 post procedure  -repeat angiogram on 12/2 with no active bleeding with recurrent trend down prompting repeat  angiogram  12/4 with no active bleeding; CTA abdomen with renal subcapsular hematoma with surrounding retroperitoneal hemorrhage and no definite contrast extravasation seen to suggest active bleeding on delayed images  -H&H 6.7&20.5 12/8 with repeat PRBC transfusion with trend up of H&H to 8.8&26.6-9.1 & 27.5   -hematemesis on 12/11 with no significant drop of H&H; 8.4&26.1 this AM   -will continue with daily CBCs for now   
-initial H&H 11.1&36.1 with trend down to 7.4&24.1 post procedure  -repeat angiogram on 12/2 with no active bleeding with recurrent trend down prompting repeat  angiogram  12/4 with no active bleeding; CTA abdomen with renal subcapsular hematoma with surrounding retroperitoneal hemorrhage and no definite contrast extravasation seen to suggest active bleeding on delayed images  -H&H 6.7&20.5 12/8 with repeat PRBC transfusion with trend up of H&H to 8.8&26.6-9.1 & 27.5   -hematemesis on 12/11 with no significant drop of H&H; 8.4&26.1 today    -will continue with daily CBCs for now   
-initial H&H 11.1&36.1 with trend down to 7.4&24.1 post procedure  -repeat angiogram on 12/2 with no active bleeding with recurrent trend down prompting repeat  angiogram  12/4 with no active bleeding; CTA abdomen with renal subcapsular hematoma with surrounding retroperitoneal hemorrhage and no definite contrast extravasation seen to suggest active bleeding on delayed images  -H&H 6.7&20.5 12/8 with repeat PRBC transfusion with trend up of H&H to 8.8&26.6-9.1 & 27.5   -hematemesis on 12/11 with no significant drop of H&H; 8.4&27.2 this AM   -will continue with daily CBCs for now   
-initial H&H 11.1&36.1 with trend down to 7.4&24.1 post procedure  -repeat angiogram on 12/2 with no active bleeding with recurrent trend down prompting repeat  angiogram  12/4 with no active bleeding; CTA abdomen with renal subcapsular hematoma with surrounding retroperitoneal hemorrhage and no definite contrast extravasation seen to suggest active bleeding on delayed images  -H&H 6.7&20.5 12/8 with repeat PRBC transfusion with trend up of H&H to 8.8&26.6-9.1 & 27.5   -hematemesis on 12/11 with no significant drop of H&H; EGD with superficial nonbleeding esophageal ulcer; H&H 8.5&27.5 today    -will change CBCs with every other day   
-initial H&H 11.1&36.1 with trend down to 7.4&24.1 post procedure  -repeat angiogram on 12/2 with no active bleeding with recurrent trend down prompting repeat  angiogram  12/4 with no active bleeding; CTA abdomen with renal subcapsular hematoma with surrounding retroperitoneal hemorrhage and no definite contrast extravasation seen to suggest active bleeding on delayed images  -H&H 7s/20s last week and stable until weekend with drop to 6.7&20.5; repeat PRBC transfusion with H&H 8.8&26.6 yesterday and up to 9.1 & 27.5 this AM  -will continue with daily CBCs  
-initial H&H 11.1&36.1 with trend down to 7.4&24.1 post procedure  -repeat angiogram on 12/2 with no active bleeding with recurrent trend down prompting repeat  angiogram  12/4 with no active bleeding; CTA abdomen with renal subcapsular hematoma with surrounding retroperitoneal hemorrhage and no definite contrast extravasation seen to suggest active bleeding on delayed images  -H&H 7s/20s last week and stable until yesterday with drop to 6.7&20.5; repeat PRBC transfusion with H&H 8.8&26.6 this AM  -will continue with daily CBCs  
-initial H&H 11.1&36.1 with trend down to 7.4&24.1 post procedure  -repeat angiogram on 12/2 with no active bleeding; PRBC transfusion with trend up of H&H with recurrent drop down to 6.6&19.4 with repeat angiogram  12/4 with no active bleeding; CTA abdomen with renal subcapsular hematoma with surrounding retroperitoneal hemorrhage and no definite contrast extravasation seen to suggest active bleeding on delayed images  -H&H 7.4&22.4 this AM unchanged from yesterday; recurrent drop ?related to blood loss with CRRT/HD  -will continue with serial H&Hs and transfuse as needed   
-initial H&H 11.1&36.1 with trend down to 7.4&24.1 post procedure  -repeat angiogram on 12/2 with no active bleeding; PRBC transfusion with trend up of H&H with recurrent drop down to 6.6&19.4 with repeat angiogram  12/4 with no active bleeding; CTA abdomen with renal subcapsular hematoma with surrounding retroperitoneal hemorrhage and no definite contrast extravasation seen to suggest active bleeding on delayed images  -additional PRBCs given with trend up of H&H to 7.4&22.0 and 8.5&25.3 this AM  -will continue with serial H&Hs and transfuse as needed   
-multifactoral  -concerned about ATN given bleed and hypotension  -creatinine 1.2 upon admission with trend up to 3.7-4.5-5.3-5.4-5.5-5.0  -BUN 58 creatinine 3.1 this AM  -Nephrology on board; successful HD yesterday with 1.1 liters removed; remains on IV Lasix drip-will follow Nephrology recs  -will continue to avoid nephrotoxic agents and hypotension    
-multifactoral  -concerned about ATN given bleed and hypotension  -creatinine 1.2 upon admission with trend up to 3.7-4.5-5.3-5.4-5.5-5.0  -BUN 66 creatinine 3.3 this AM  -Nephrology on board; successful HD yesterday with 1.4 liters removed; remains on IV Lasix drip-will follow Nephrology recs  -will continue to avoid nephrotoxic agents and hypotension    
-multifactoral  -concerned about ATN given bleed and hypotension  -creatinine 1.2 upon admission with trend up to 3.7-4.5-5.3-5.4-5.5-5.0  -BUN 68creatinine 3.4 this AM  -Nephrology on board; no HD in past 48 hours; on IV Lasix BID with excellent response  -will continue to avoid nephrotoxic agents and hypotension    
-multifactoral  -concerned about ATN given bleed and hypotension  -creatinine 1.2 upon admission with trend up to 3.7-4.5-5.3-5.4-5.5-5.0  -BUN 86 creatinine 3.2 this AM  -Nephrology on board; no HD since 12/11; on IV Lasix BID with excellent response and 2.1 liters out overnight negative 14.6 liters since admission   -will continue to avoid nephrotoxic agents and hypotension    
-multifactoral  -concerned about ATN given bleed and hypotension  -creatinine 1.2 upon admission with trend up to 3.7-4.5-5.3-5.4-5.5-5.0;  creatinine 5.3 this AM  -Nephrology on board; attempted HD yesterday with clotted line; remains on IV Lasix drip at 5mg/hr with 2.7 liters out overnight negative 4.4 liters since admission  -will reconsult General surgery for new line placement in anticipation for ongoing intermittent HD/CRRT    -will continue to avoid nephrotoxic agents and hypotension    
-multifactoral  -concerned about ATN given bleed and hypotension  -creatinine 1.2 upon admission with trend up to 3.7-4.5-5.3-5.4-5.5-5.0; BUN 82 creatinine 4.0 this AM  -Nephrology on board; successful HD yesterday; remains on IV Lasix drip at 5mg/hr with 3.0 liters out overnight negative 5.6 liters since admission  -will continue to avoid nephrotoxic agents and hypotension    
-multifactoral  -concerned about ATN given bleed and hypotension  -creatinine 1.2 upon admission with trend up to 3.7-4.5-5.3-5.4-5.5-5.0; creatinine 5.4 this AM  -Nephrology on board; attempted CRRT and HD yesterday with clotted line; on IV Lasix drip currently; await further recs from Nephrology   -will continue to avoid nephrotoxic agents and hypotension    
-multifactoral  -concerned about ATN given bleed and hypotension  -creatinine 1.2 upon admission with trend up to 3.7-4.5-5.3-5.4-5.5; down to 5.0 this AM  -Nephrology on board with CRRT currently  -will continue to avoid nephrotoxic agents and hypotension  -continue to follow Nephrology recs   
-multifactoral  -related to  ATN given bleed and hypotension  -creatinine 1.2 upon admission with trend up to 3.7-4.5-5.3-5.4-5.5-5.0  -BUN 79 creatinine 2.8 this AM  -Nephrology on board; no HD since 12/11; on IV Lasix BID with excellent response and 2.2 liters out overnight negative 18.3 liters since admission; will have Trialysis catheter removed today and anticipate transition to oral Lasix tomorrow   -will continue to avoid nephrotoxic agents and hypotension    
-nausea with vomiting last week with visible hematemesis  -started on IV Protonix drip with transition to Protonix BID  -H&H stable  -stool for OCB negative  -GI consulted;  EGD on 12/13 non bleeding esophageal ulcer   
-nausea with vomiting yesterday with visible hematemesis  -started on IV Protonix drip  -H&H stable  -stool for OCB negative  -GI consulted; plans for EGD on 12/13  
-on Norvasc, Chlorthalidone, Metoprolol and Losartan at home  -meds held due to hypotension  -BP trended back up; CCB resumed with stable BP  -continue to monitor BP and adjust meds prn   
-on Norvasc, Chlorthalidone, Metoprolol and Losartan at home  -meds held due to hypotension  -BP trended back up; CCB resumed with stable BP; will plan to continue to hold Losartan upon discharge and will likely hold Chlorthalidone as well given plans for oral Lasix upon discharge   -continue to monitor BP and adjust meds prn   
-on Norvasc, Chlorthalidone, Metoprolol and Losartan at home  -meds held due to hypotension last week  -BP trended back up and remains off pressors; CCB resumed over the weekend  -continue to monitor BP and adjust meds prn   
-on Norvasc, Chlorthalidone, Metoprolol and Losartan at home  -meds held due to hypotension last week  -BP trended back up; CCB resumed with stable BP  -continue to monitor BP and adjust meds prn   
-on Norvasc, Chlorthalidone, Metoprolol and Losartan at home  -will continue to hold meds for now and monitor BP  -remains off IV Levophed   -BP 130s-170s/70s overnight   
-on Norvasc, Chlorthalidone, Metoprolol and Losartan at home  -will continue to hold meds for now and monitor BP  -weaned off IV Levophed overnight   
-on Norvasc,. Chlorthalidone, Metoprolol and Losartan at home  -will continue to hold meds for now and monitor BP  -slow resumption once weaned off IV Levophed   
-related to immobility  -given Lactulose yesterday with results  -will place on daily stool softener  
-resolved  -K+ 3.7 this AM    
-resolved  -WBCs 28K post procedure with trend down to 22K-13K-10K   -afebrile  -likely reactive rather than infectious   
-resolved  -remains off IV pressors   
-resolved  -remains off IV pressors   -CCB resumed over the weekend with no recurrent hypotension   
-resolved  -remains off IV pressors   -CCB resumed over the weekend with no recurrent hypotension   -continue to monitor BP closely   
-s/p LAD and LCX MARIAN in 2011  -continue ASA, statin, Plavix; no BB or ACEI/ARB due to hypotension and pressor use  -complaints of SOB currently likely due to volume overload in setting IVF and PRBC transfusion  -will do echocardiogram today   
-s/p LAD and LCX MARIAN in 2011  -was on  ASA, statin, Plavix as an outpatient along with CCB  -will continue to hold DAPT given concern for acute bleeding  -CCB resumed over the weekend  -echocardiogram with normal LV function with EF 55%  
-s/p LAD and LCX MARIAN in 2011  -was on  ASA, statin, Plavix as an outpatient along with CCB  -will continue to hold DAPT given concern for acute bleeding  -CCB resumed over the weekend  -echocardiogram with normal LV function with EF 55%  
-s/p LAD and LCX MARIAN in 2011  -was on  ASA, statin, Plavix as an outpatient along with CCB  -will continue to hold DAPT given concern for acute bleeding  -resumed CCB  -echocardiogram with normal LV function with EF 55%  
-s/p LAD and LCX MARIAN in 2011  -was on  ASA, statin, Plavix with holding of  BB or ACEI/ARB due to hypotension and pressor use  -will hold DAPT given concern for acute bleeding   -echocardiogram with normal LV function with EF 55%  
-s/p LAD and LCX MARIAN in 2011  -was on  ASA, statin, Plavix with holding of  BB or ACEI/ARB due to hypotension and pressor use  -will hold DAPT given concern for acute bleeding; will continue to hold BB and ACEI/ARB as well   -echocardiogram with normal LV function with EF 55%  
-s/p LAD and LCX MARINA in 2011  -was on  ASA, statin, Plavix as an outpatient along with CCB  -will continue to hold DAPT given concern for acute bleeding  -resumed CCB  -echocardiogram with normal LV function with EF 55%  
-significant difference between cuff and AO pressures  -[place] placed with better correlation; BP 110s-130s overnight; IV Levophed wean in process  -will continue to hold oral antihypertensives   
-successful atherectomy and PTA with scoring balloon and  DCB to dSFA,  pSFA CFA and TPT   -As above, ASA, Plavix and pletal held for recent bleeding episodes.  -Cont statin.     
As per primary    
As per primary    
Cont home dose Norvasc and Doxazosin.      
Cont home dose Norvasc and Doxazosin.  Better controlled now  Hold Toprol and Cozaar as above.    
Cont statin  ASA and Plavix held for recent bleeding episodes  Hold Toprol for acute on chronic diastolic HF and ARB for JOSE.    
Continue Home dose statin    
Contributing Nutrition Diagnosis  Obesity Grade 1    Related to (etiology):   undesirable food choices    Signs and Symptoms (as evidenced by):   Pt BMI 33.85.    Interventions:  Collaboration with other providers    Nutrition Diagnosis Status:   New    
Glycoloax daily and PRN suppository    
Hematemesis recently sec to same  EGD on 12/13 showed esophageal ulcer with no bleeding now  G.I input appreciated  PPI drip d/jose ramon and on Protonix 40 BID.  Will need this for 12 weeks as per G.I  Will need repeat UGD in 8 weeks to establish healing.     
Hematemesis recently sec to same  EGD on 12/13 showed esophageal ulcer with no bleeding now  G.I input appreciated  PPI drip d/jose ramon and on Protonix 40 BID.  Will need this for 12 weeks as per G.I  Will need repeat UGD in 8 weeks to establish healing.     
Home dose statin    
Improved with lactulose.    
Improved with lactulose.    
Lab Results   Component Value Date    HGBA1C 5.6 12/02/2019     - BS ranged 110 to 184.   - Continue levemir 60 units nightly   - Continue aspart 15 units TID with meals  - Accuchecks with prn low dose SSI   
Lab Results   Component Value Date    HGBA1C 5.6 12/02/2019     - BS ranged 129 to 167.   - Continue levemir 60 units nightly   - Continue aspart 15 units TID with meals  - Accuchecks with prn low dose SSI   
Lab Results   Component Value Date    HGBA1C 5.6 12/02/2019     - BS ranged 143 to 278.   - Continue levemir 60 units nightly   - Continue aspart 15 units TID with meals  - Accuchecks with prn low dose SSI   
Lab Results   Component Value Date    HGBA1C 5.6 12/02/2019     - Blood sugar levels better controlled now.  - Continue levemir 60 units nightly   - Continue aspart 15 units TID with meals  - Accuchecks with prn low dose SSI   
Lab Results   Component Value Date    HGBA1C 5.6 12/02/2019     - Blood sugar levels better controlled now.  - Continue levemir 62 units nightly   - Continue aspart 17 units TID with meals  - Accuchecks with prn low dose SSI   
Lab Results   Component Value Date    HGBA1C 5.6 12/02/2019     - Blood sugar levels better controlled now.  - Continue levemir 62 units nightly   - Continue aspart 17 units TID with meals  - Accuchecks with prn low dose SSI   12/15 BG accu check around 136. Continue current plan of care  
Mr. Cy Rutherford is a 75 yo male presenting with JOSE; right IJ HDC placed at bedside yesterday.     Plan:   -Use HDC as needed. Please contact surgery with any questions.  -Rest of care per primary team.   
No AG   will try to treat with sq for now    
No AG   will try to treat with sq for now      levemir 50 units sq BID  aspart 15 units WM  accu check q 2 hours  
No AG   will try to treat with sq for now      levemir 50 units sq BID  aspart 15 units WM  accu check q 2 hours    12/5 continue current regimen    
On Lasix BID per Primary and Nephrology with good urine output  Echo showed normal LVEF  Lopressor being held for acute on chronic diastolic HF and ARB for JOSE  Diuresing well and improving.    
On Lasix drip and H.D now  D/c lasix drip after current bag and patient will be placed on I.V lasix pushes as per Nephrology.   Echo showed normal LVEF  Lopressor being held for acute on chronic diastolic HF and ARB for JOSE      
On Lasix drip and H.D now  D/jose ramon lasix drip and currently on I.V lasix pushes as per Nephrology.   Echo showed normal LVEF  Lopressor being held for acute on chronic diastolic HF and ARB for JOSE  Diuresing well and improving.    
RADHAMES input appreciated  For EGD in a.m  Cont PPI drip    
Replace as needed.    
Replace as needed.    
Sec to ATN sec to hypotension and bleeding mostly  Nephrology on board and patient being continued on H.D  Cont management as per them.    
Sec to ATN sec to hypotension and bleeding mostly  Nephrology on board and patient is on H.D  Cont management as per them.  Last H.D on 12/11 and creatinine stable since then inspite of receiving no H.D and diuresing well too.  
Sec to ATN sec to hypotension and bleeding mostly  Nephrology on board and patient is on H.D  Cont management as per them.  Last H.D on 12/11 and creatinine stable since then inspite of receiving no H.D and diuresing well too.  
Sec to acute G.I bleed yesterday.  Resolved on labs today.    
Sec to acute G.I bleed yesterday.  Resolved on labs today.    
Sec to acute blood loss sec to hematemesis mostly  Will cont to f/u and transfuse prn  H/H stable for past 24 hr.     
Sec to hematemesis mostly  Will cont to f/u and transfuse prn  H/H stable since last night.     
Will replace as needed orally    
96

## 2023-01-13 ENCOUNTER — TELEPHONE (OUTPATIENT)
Dept: NEPHROLOGY | Facility: CLINIC | Age: 78
End: 2023-01-13
Payer: MEDICARE

## 2023-01-13 NOTE — TELEPHONE ENCOUNTER
----- Message from Belén Alejandra sent at 1/13/2023 11:29 AM CST -----  Regarding: Needs orders put in before appt  Type: Needs Medical Advice  Who Called:  Cy Fishman Call Back Number: [phone]    Additional Information:

## 2023-01-13 NOTE — TELEPHONE ENCOUNTER
Spoke with patient who is happy  with his Feb 7 appointment.  Had no questions and did not express requests for orders or otherwise.   Verified location.

## 2023-01-17 ENCOUNTER — PATIENT MESSAGE (OUTPATIENT)
Dept: FAMILY MEDICINE | Facility: CLINIC | Age: 78
End: 2023-01-17
Payer: MEDICARE

## 2023-01-17 RX ORDER — FENOFIBRATE 134 MG/1
134 CAPSULE ORAL NIGHTLY
Qty: 90 CAPSULE | Refills: 3 | Status: SHIPPED | OUTPATIENT
Start: 2023-01-17

## 2023-01-17 NOTE — TELEPHONE ENCOUNTER
No new care gaps identified.  [facility] Embedded Care Gaps. Reference number: [id number]. 1/17/2023   1:54:04 PM CST

## 2023-01-25 ENCOUNTER — PES CALL (OUTPATIENT)
Dept: ADMINISTRATIVE | Facility: CLINIC | Age: 78
End: 2023-01-25
Payer: MEDICARE

## 2023-01-30 ENCOUNTER — PATIENT MESSAGE (OUTPATIENT)
Dept: ADMINISTRATIVE | Facility: OTHER | Age: 78
End: 2023-01-30
Payer: MEDICARE

## 2023-01-30 ENCOUNTER — TELEPHONE (OUTPATIENT)
Dept: FAMILY MEDICINE | Facility: CLINIC | Age: 78
End: 2023-01-30
Payer: MEDICARE

## 2023-01-30 RX ORDER — NIRMATRELVIR AND RITONAVIR 300-100 MG
KIT ORAL
Qty: 30 TABLET | Refills: 0 | Status: SHIPPED | OUTPATIENT
Start: 2023-01-30 | End: 2023-02-14

## 2023-01-30 NOTE — TELEPHONE ENCOUNTER
----- Message from Derek Aguirre MA sent at 1/30/2023  9:29 AM CST -----  Contact: pt   pt states he tested positive for covid on Saturday and would like rx called in  ----- Message -----  From: Amber Lara  Sent: 1/30/2023   8:05 AM CST  To: Nate Ramirez Staff    Type: Needs Medical Advice  Who Called:  pt   Symptoms (please be specific):  covid   How long has patient had these symptoms:  Saturday   Pharmacy name and phone #:        [facility] - [place] - [address]  [address]  [place] [address]  Phone: [phone] Fax: [phone]      Best Call Back Number: [phone]    Additional Information: pt states he tested positive for covid and would like rx called in. Please advise.

## 2023-01-31 ENCOUNTER — PATIENT MESSAGE (OUTPATIENT)
Dept: FAMILY MEDICINE | Facility: CLINIC | Age: 78
End: 2023-01-31
Payer: MEDICARE

## 2023-01-31 ENCOUNTER — TELEPHONE (OUTPATIENT)
Dept: FAMILY MEDICINE | Facility: CLINIC | Age: 78
End: 2023-01-31
Payer: MEDICARE

## 2023-01-31 NOTE — TELEPHONE ENCOUNTER
----- Message from Venus Yoon sent at 1/30/2023 12:22 PM CST -----  Type: Needs Medical Advice  Who Called:  pt     Best Call Back Number: [phone] (home)     Additional Information: pt tested positive for Covid Saturday, wants to know if there is anything he can do or do he need a rx please advise thank you

## 2023-02-07 DIAGNOSIS — Z00.00 ENCOUNTER FOR MEDICARE ANNUAL WELLNESS EXAM: ICD-10-CM

## 2023-02-14 ENCOUNTER — OFFICE VISIT (OUTPATIENT)
Dept: NEPHROLOGY | Facility: CLINIC | Age: 78
End: 2023-02-14
Payer: MEDICARE

## 2023-02-14 VITALS
SYSTOLIC BLOOD PRESSURE: 158 MMHG | HEIGHT: 69 IN | BODY MASS INDEX: 34.61 KG/M2 | DIASTOLIC BLOOD PRESSURE: 48 MMHG | HEART RATE: 66 BPM | OXYGEN SATURATION: 96 % | WEIGHT: 233.69 LBS

## 2023-02-14 DIAGNOSIS — Z95.1 S/P CABG (CORONARY ARTERY BYPASS GRAFT): Primary | ICD-10-CM

## 2023-02-14 DIAGNOSIS — I10 ESSENTIAL HYPERTENSION: ICD-10-CM

## 2023-02-14 DIAGNOSIS — Z79.4 CONTROLLED TYPE 2 DIABETES MELLITUS WITH MILD NONPROLIFERATIVE RETINOPATHY OF BOTH EYES, WITH LONG-TERM CURRENT USE OF INSULIN, MACULAR EDEMA PRESENCE UNSPECIFIED: ICD-10-CM

## 2023-02-14 DIAGNOSIS — I25.10 CORONARY ARTERY DISEASE INVOLVING NATIVE CORONARY ARTERY OF NATIVE HEART WITHOUT ANGINA PECTORIS: ICD-10-CM

## 2023-02-14 DIAGNOSIS — E11.22 TYPE 2 DIABETES MELLITUS WITH STAGE 3B CHRONIC KIDNEY DISEASE, WITH LONG-TERM CURRENT USE OF INSULIN: ICD-10-CM

## 2023-02-14 DIAGNOSIS — N18.32 STAGE 3B CHRONIC KIDNEY DISEASE: ICD-10-CM

## 2023-02-14 DIAGNOSIS — I48.0 PAROXYSMAL ATRIAL FIBRILLATION: ICD-10-CM

## 2023-02-14 DIAGNOSIS — E11.3293 CONTROLLED TYPE 2 DIABETES MELLITUS WITH MILD NONPROLIFERATIVE RETINOPATHY OF BOTH EYES, WITH LONG-TERM CURRENT USE OF INSULIN, MACULAR EDEMA PRESENCE UNSPECIFIED: ICD-10-CM

## 2023-02-14 DIAGNOSIS — I50.32 CHRONIC DIASTOLIC HEART FAILURE: ICD-10-CM

## 2023-02-14 DIAGNOSIS — N18.32 TYPE 2 DIABETES MELLITUS WITH STAGE 3B CHRONIC KIDNEY DISEASE, WITH LONG-TERM CURRENT USE OF INSULIN: ICD-10-CM

## 2023-02-14 DIAGNOSIS — Z79.4 TYPE 2 DIABETES MELLITUS WITH STAGE 3B CHRONIC KIDNEY DISEASE, WITH LONG-TERM CURRENT USE OF INSULIN: ICD-10-CM

## 2023-02-14 DIAGNOSIS — E66.01 MORBID (SEVERE) OBESITY DUE TO EXCESS CALORIES: ICD-10-CM

## 2023-02-14 PROCEDURE — 1159F PR MEDICATION LIST DOCUMENTED IN MEDICAL RECORD: ICD-10-PCS | Mod: HCNC,CPTII,S$GLB, | Performed by: INTERNAL MEDICINE

## 2023-02-14 PROCEDURE — 1160F RVW MEDS BY RX/DR IN RCRD: CPT | Mod: HCNC,CPTII,S$GLB, | Performed by: INTERNAL MEDICINE

## 2023-02-14 PROCEDURE — 99999 PR PBB SHADOW E&M-EST. PATIENT-LVL IV: CPT | Mod: PBBFAC,HCNC,, | Performed by: INTERNAL MEDICINE

## 2023-02-14 PROCEDURE — 99205 OFFICE O/P NEW HI 60 MIN: CPT | Mod: HCNC,S$GLB,, | Performed by: INTERNAL MEDICINE

## 2023-02-14 PROCEDURE — 99205 PR OFFICE/OUTPT VISIT, NEW, LEVL V, 60-74 MIN: ICD-10-PCS | Mod: HCNC,S$GLB,, | Performed by: INTERNAL MEDICINE

## 2023-02-14 PROCEDURE — 3078F PR MOST RECENT DIASTOLIC BLOOD PRESSURE < 80 MM HG: ICD-10-PCS | Mod: HCNC,CPTII,S$GLB, | Performed by: INTERNAL MEDICINE

## 2023-02-14 PROCEDURE — 3077F SYST BP >= 140 MM HG: CPT | Mod: HCNC,CPTII,S$GLB, | Performed by: INTERNAL MEDICINE

## 2023-02-14 PROCEDURE — 3288F PR FALLS RISK ASSESSMENT DOCUMENTED: ICD-10-PCS | Mod: HCNC,CPTII,S$GLB, | Performed by: INTERNAL MEDICINE

## 2023-02-14 PROCEDURE — 1101F PR PT FALLS ASSESS DOC 0-1 FALLS W/OUT INJ PAST YR: ICD-10-PCS | Mod: HCNC,CPTII,S$GLB, | Performed by: INTERNAL MEDICINE

## 2023-02-14 PROCEDURE — 1160F PR REVIEW ALL MEDS BY PRESCRIBER/CLIN PHARMACIST DOCUMENTED: ICD-10-PCS | Mod: HCNC,CPTII,S$GLB, | Performed by: INTERNAL MEDICINE

## 2023-02-14 PROCEDURE — 1101F PT FALLS ASSESS-DOCD LE1/YR: CPT | Mod: HCNC,CPTII,S$GLB, | Performed by: INTERNAL MEDICINE

## 2023-02-14 PROCEDURE — 3078F DIAST BP <80 MM HG: CPT | Mod: HCNC,CPTII,S$GLB, | Performed by: INTERNAL MEDICINE

## 2023-02-14 PROCEDURE — 1159F MED LIST DOCD IN RCRD: CPT | Mod: HCNC,CPTII,S$GLB, | Performed by: INTERNAL MEDICINE

## 2023-02-14 PROCEDURE — 3077F PR MOST RECENT SYSTOLIC BLOOD PRESSURE >= 140 MM HG: ICD-10-PCS | Mod: HCNC,CPTII,S$GLB, | Performed by: INTERNAL MEDICINE

## 2023-02-14 PROCEDURE — 3288F FALL RISK ASSESSMENT DOCD: CPT | Mod: HCNC,CPTII,S$GLB, | Performed by: INTERNAL MEDICINE

## 2023-02-14 PROCEDURE — 99999 PR PBB SHADOW E&M-EST. PATIENT-LVL IV: ICD-10-PCS | Mod: PBBFAC,HCNC,, | Performed by: INTERNAL MEDICINE

## 2023-02-14 RX ORDER — FUROSEMIDE 40 MG/1
40 TABLET ORAL DAILY
Qty: 90 TABLET | Refills: 3 | Status: SHIPPED | OUTPATIENT
Start: 2023-02-14 | End: 2023-05-22 | Stop reason: SDUPTHER

## 2023-02-14 NOTE — PROGRESS NOTES
"Subjective:       Patient ID: Cy Rutherford Jr. is a 77 y.o. White male who presents for initial evaluation of elevated sr cr referred by PCP, dr Brown.     Renal function per chart review with sr cr baseline of 1.6 - 1.7 mg/dL. DIRK requiring dialysis in 2019.    - DM type 2 diagnosed in 2000 with pre-DM. Review of hemoglobin A1C show moderate control with average HgbA1c between 7-8%. Never hospitalized for DKA. No proteinuria,  currently on RASSi.   - HTN diagnosed about 2021. Patient reports good adherence to the current regiment, which includes Coreg, minoxidil, Valsartan-HCTZ. They are following low salt diet. Previously on Metoprolol. Never hospitalized for uncontrolled HTN or hypotension/syncopal episodes.  - CAD with stent placement and CABG  - Stroke with residual weakness    Denies use of NSAIDs, nephrolithiasis or fam Hx of renal disease.      Review of Systems   Constitutional:  Negative for chills, fatigue and fever.   HENT:  Negative for hearing loss, trouble swallowing and voice change.    Respiratory:  Negative for cough, shortness of breath and wheezing.    Cardiovascular:  Negative for chest pain, palpitations and leg swelling.   Gastrointestinal:  Negative for abdominal distention, abdominal pain, constipation and diarrhea.   Endocrine: Negative for polydipsia, polyphagia and polyuria.   Genitourinary:  Negative for dysuria, flank pain, frequency and hematuria.   Musculoskeletal:  Negative for arthralgias, back pain and myalgias.   Skin:  Negative for rash and wound.   Neurological:  Positive for weakness. Negative for dizziness, syncope and light-headedness.   Hematological:  Negative for adenopathy. Does not bruise/bleed easily.     The past medical, family and social histories were reviewed for this encounter.     BP (!) 158/48 (BP Location: Left arm, Patient Position: Sitting, BP Method: Large (Manual))   Pulse 66   Ht 5' 9" (1.753 m)   Wt 106 kg (233 lb 11 oz)   SpO2 96%   BMI " 34.51 kg/m²     Objective:      Physical Exam  Vitals reviewed.   Constitutional:       General: He is not in acute distress.     Appearance: Normal appearance. He is well-developed. He is obese.   HENT:      Head: Normocephalic and atraumatic.   Eyes:      General: No scleral icterus.     Conjunctiva/sclera: Conjunctivae normal.   Neck:      Vascular: No JVD.   Cardiovascular:      Rate and Rhythm: Normal rate and regular rhythm.      Heart sounds: Normal heart sounds. No murmur heard.    No friction rub. No gallop.   Pulmonary:      Effort: Pulmonary effort is normal. No respiratory distress.      Breath sounds: Normal breath sounds. No wheezing.   Abdominal:      General: Bowel sounds are normal. There is no distension.      Palpations: Abdomen is soft.      Tenderness: There is no abdominal tenderness.   Musculoskeletal:      Cervical back: Normal range of motion.      Right lower leg: No edema.      Left lower leg: No edema.   Skin:     General: Skin is warm and dry.      Capillary Refill: Capillary refill takes less than 2 seconds.      Findings: No rash.   Neurological:      General: No focal deficit present.      Mental Status: He is alert and oriented to person, place, and time.      Gait: Gait abnormal.   Psychiatric:         Mood and Affect: Mood normal.         Behavior: Behavior normal.       Assessment:       1. S/P CABG (coronary artery bypass graft)    2. Essential hypertension    3. Paroxysmal atrial fibrillation    4. Coronary artery disease involving native coronary artery of native heart without angina pectoris    5. Chronic diastolic heart failure    6. Stage 3b chronic kidney disease    7. Type 2 diabetes mellitus with stage 3b chronic kidney disease, with long-term current use of insulin    8. Morbid (severe) obesity due to excess calories    9. Controlled type 2 diabetes mellitus with mild nonproliferative retinopathy of both eyes, with long-term current use of insulin, macular edema presence  unspecified        Plan:   Return to clinic in 3 months    CKD stage G3bA1 in a setting of DM, HTN and CR pathophysiology  Baseline creatinine is 1.7 - 2.2 mg/dL with no proteinuria  Lab Results   Component Value Date    CREATININE 2.21 (H) 12/14/2022      - Euvolemic   - Pt understands importance of blood pressure and glycemic control and is aware that uncontrolled HTN and DM leads to progression of CKD   - Complete avoidance of NSAIDs   - Low salt diet with < 2000 mg/day   - Dose adjust medications to GFR of 30 - 45   - Avoid nephrotoxins including IV contrast    HTN   - BP well controlled: continue current medications, avoid hypotension and dehydration    DM   - DM with diabetic retinopathy: well controlled with most recent HgbA1c of 6.3%, continue yearly optho checks    Anemia   - Check Fe levels    HF   - Increase Lasix to 40 mg twice daily and then go back to daily    CAD and PAF   - Guideline directed therapy per cardiology    Hypothyroidism   - Needs improvement   - He limon tart taking synthroid on empty stomach    Obesity    - Calorie restriction    Med changes:   Increase Lasix to 40 mg twice daily and then go back to daily

## 2023-02-14 NOTE — PATIENT INSTRUCTIONS
DO NOT take any of NSAIDs or pain medications listed below, instead use Tylenol as written on the bottle.   DO NOT take and of the following  ibuprofen.  naproxen.  diclofenac.  celecoxib.  mefenamic acid.  etoricoxib.  indomethacin.  high-dose aspirin (low-dose aspirin is not normally considered to be an NSAID).    Ok to take Tylenol    Low Salt diet < 2000 mg per day    Increase Lasix to 40 mg twice daily and then go back to daily

## 2023-02-15 RX ORDER — ERGOCALCIFEROL 1.25 MG/1
50000 CAPSULE ORAL
Qty: 12 CAPSULE | Refills: 3 | Status: SHIPPED | OUTPATIENT
Start: 2023-02-15 | End: 2023-11-16

## 2023-02-15 RX ORDER — ALLOPURINOL 100 MG/1
100 TABLET ORAL DAILY
Qty: 90 TABLET | Refills: 3 | Status: SHIPPED | OUTPATIENT
Start: 2023-02-15 | End: 2023-11-16

## 2023-02-17 ENCOUNTER — PATIENT MESSAGE (OUTPATIENT)
Dept: NEPHROLOGY | Facility: CLINIC | Age: 78
End: 2023-02-17
Payer: MEDICARE

## 2023-02-28 DIAGNOSIS — N28.89 OTHER SPECIFIED DISORDERS OF KIDNEY AND URETER: Primary | ICD-10-CM

## 2023-03-16 ENCOUNTER — OFFICE VISIT (OUTPATIENT)
Dept: FAMILY MEDICINE | Facility: CLINIC | Age: 78
End: 2023-03-16
Payer: MEDICARE

## 2023-03-16 DIAGNOSIS — Z00.00 ENCOUNTER FOR MEDICARE ANNUAL WELLNESS EXAM: ICD-10-CM

## 2023-03-16 DIAGNOSIS — Z79.4 TYPE 2 DIABETES MELLITUS WITH MILD NONPROLIFERATIVE RETINOPATHY OF BOTH EYES, WITH LONG-TERM CURRENT USE OF INSULIN, MACULAR EDEMA PRESENCE UNSPECIFIED: ICD-10-CM

## 2023-03-16 DIAGNOSIS — N18.32 TYPE 2 DIABETES MELLITUS WITH STAGE 3B CHRONIC KIDNEY DISEASE, WITH LONG-TERM CURRENT USE OF INSULIN: ICD-10-CM

## 2023-03-16 DIAGNOSIS — I73.9 CLAUDICATION: ICD-10-CM

## 2023-03-16 DIAGNOSIS — E66.01 MORBID (SEVERE) OBESITY DUE TO EXCESS CALORIES: ICD-10-CM

## 2023-03-16 DIAGNOSIS — N18.32 STAGE 3B CHRONIC KIDNEY DISEASE: ICD-10-CM

## 2023-03-16 DIAGNOSIS — E11.22 TYPE 2 DIABETES MELLITUS WITH STAGE 3B CHRONIC KIDNEY DISEASE, WITH LONG-TERM CURRENT USE OF INSULIN: ICD-10-CM

## 2023-03-16 DIAGNOSIS — I25.10 CORONARY ARTERY DISEASE INVOLVING NATIVE CORONARY ARTERY OF NATIVE HEART WITHOUT ANGINA PECTORIS: ICD-10-CM

## 2023-03-16 DIAGNOSIS — Z00.00 ENCOUNTER FOR PREVENTIVE HEALTH EXAMINATION: Primary | ICD-10-CM

## 2023-03-16 DIAGNOSIS — G81.91 RIGHT HEMIPLEGIA: ICD-10-CM

## 2023-03-16 DIAGNOSIS — Z79.4 TYPE 2 DIABETES MELLITUS WITH STAGE 3B CHRONIC KIDNEY DISEASE, WITH LONG-TERM CURRENT USE OF INSULIN: ICD-10-CM

## 2023-03-16 DIAGNOSIS — R26.9 ABNORMALITY OF GAIT AND MOBILITY: ICD-10-CM

## 2023-03-16 DIAGNOSIS — I73.9 PAD (PERIPHERAL ARTERY DISEASE): ICD-10-CM

## 2023-03-16 DIAGNOSIS — I70.0 AORTIC ATHEROSCLEROSIS: ICD-10-CM

## 2023-03-16 DIAGNOSIS — Z99.89 DEPENDENCE ON OTHER ENABLING MACHINES AND DEVICES: ICD-10-CM

## 2023-03-16 DIAGNOSIS — I50.32 CHRONIC DIASTOLIC HEART FAILURE: ICD-10-CM

## 2023-03-16 DIAGNOSIS — H35.3290 EXUDATIVE AGE-RELATED MACULAR DEGENERATION, UNSPECIFIED LATERALITY, UNSPECIFIED STAGE: ICD-10-CM

## 2023-03-16 DIAGNOSIS — E11.3293 TYPE 2 DIABETES MELLITUS WITH MILD NONPROLIFERATIVE RETINOPATHY OF BOTH EYES, WITH LONG-TERM CURRENT USE OF INSULIN, MACULAR EDEMA PRESENCE UNSPECIFIED: ICD-10-CM

## 2023-03-16 DIAGNOSIS — I48.0 PAROXYSMAL ATRIAL FIBRILLATION: ICD-10-CM

## 2023-03-16 DIAGNOSIS — I65.23 BILATERAL CAROTID ARTERY STENOSIS: ICD-10-CM

## 2023-03-16 PROCEDURE — 99999 PR PBB SHADOW E&M-EST. PATIENT-LVL V: CPT | Mod: PBBFAC,HCNC,, | Performed by: NURSE PRACTITIONER

## 2023-03-16 PROCEDURE — G0439 PR MEDICARE ANNUAL WELLNESS SUBSEQUENT VISIT: ICD-10-PCS | Mod: HCNC,S$GLB,, | Performed by: NURSE PRACTITIONER

## 2023-03-16 PROCEDURE — 3288F PR FALLS RISK ASSESSMENT DOCUMENTED: ICD-10-PCS | Mod: HCNC,CPTII,S$GLB, | Performed by: NURSE PRACTITIONER

## 2023-03-16 PROCEDURE — 1101F PR PT FALLS ASSESS DOC 0-1 FALLS W/OUT INJ PAST YR: ICD-10-PCS | Mod: HCNC,CPTII,S$GLB, | Performed by: NURSE PRACTITIONER

## 2023-03-16 PROCEDURE — 3078F PR MOST RECENT DIASTOLIC BLOOD PRESSURE < 80 MM HG: ICD-10-PCS | Mod: HCNC,CPTII,S$GLB, | Performed by: NURSE PRACTITIONER

## 2023-03-16 PROCEDURE — 99999 PR PBB SHADOW E&M-EST. PATIENT-LVL V: ICD-10-PCS | Mod: PBBFAC,HCNC,, | Performed by: NURSE PRACTITIONER

## 2023-03-16 PROCEDURE — 1159F PR MEDICATION LIST DOCUMENTED IN MEDICAL RECORD: ICD-10-PCS | Mod: HCNC,CPTII,S$GLB, | Performed by: NURSE PRACTITIONER

## 2023-03-16 PROCEDURE — 1101F PT FALLS ASSESS-DOCD LE1/YR: CPT | Mod: HCNC,CPTII,S$GLB, | Performed by: NURSE PRACTITIONER

## 2023-03-16 PROCEDURE — 3078F DIAST BP <80 MM HG: CPT | Mod: HCNC,CPTII,S$GLB, | Performed by: NURSE PRACTITIONER

## 2023-03-16 PROCEDURE — G0439 PPPS, SUBSEQ VISIT: HCPCS | Mod: HCNC,S$GLB,, | Performed by: NURSE PRACTITIONER

## 2023-03-16 PROCEDURE — 1126F PR PAIN SEVERITY QUANTIFIED, NO PAIN PRESENT: ICD-10-PCS | Mod: HCNC,CPTII,S$GLB, | Performed by: NURSE PRACTITIONER

## 2023-03-16 PROCEDURE — 1160F RVW MEDS BY RX/DR IN RCRD: CPT | Mod: HCNC,CPTII,S$GLB, | Performed by: NURSE PRACTITIONER

## 2023-03-16 PROCEDURE — 1160F PR REVIEW ALL MEDS BY PRESCRIBER/CLIN PHARMACIST DOCUMENTED: ICD-10-PCS | Mod: HCNC,CPTII,S$GLB, | Performed by: NURSE PRACTITIONER

## 2023-03-16 PROCEDURE — 1159F MED LIST DOCD IN RCRD: CPT | Mod: HCNC,CPTII,S$GLB, | Performed by: NURSE PRACTITIONER

## 2023-03-16 PROCEDURE — 3075F PR MOST RECENT SYSTOLIC BLOOD PRESS GE 130-139MM HG: ICD-10-PCS | Mod: HCNC,CPTII,S$GLB, | Performed by: NURSE PRACTITIONER

## 2023-03-16 PROCEDURE — 3075F SYST BP GE 130 - 139MM HG: CPT | Mod: HCNC,CPTII,S$GLB, | Performed by: NURSE PRACTITIONER

## 2023-03-16 PROCEDURE — 1126F AMNT PAIN NOTED NONE PRSNT: CPT | Mod: HCNC,CPTII,S$GLB, | Performed by: NURSE PRACTITIONER

## 2023-03-16 PROCEDURE — 3288F FALL RISK ASSESSMENT DOCD: CPT | Mod: HCNC,CPTII,S$GLB, | Performed by: NURSE PRACTITIONER

## 2023-03-16 NOTE — PATIENT INSTRUCTIONS
Counseling and Referral of Other Preventative  (Italic type indicates deductible and co-insurance are waived)    Patient Name: Cy Rutherford  Today's Date: 3/16/2023    Health Maintenance       Date Due Completion Date    COVID-19 Vaccine (5 - Booster for Moderna series) 07/15/2022 5/20/2022    Hemoglobin A1c 06/14/2023 12/14/2022    Eye Exam 11/15/2023 11/15/2022    Override on 5/31/2022: Done (Dr Hinton)    Diabetes Urine Screening 12/14/2023 12/14/2022    Lipid Panel 12/14/2023 12/14/2022    TETANUS VACCINE 10/11/2032 10/11/2022        No orders of the defined types were placed in this encounter.      The following information is provided to all patients.  This information is to help you find resources for any of the problems found today that may be affecting your health:                Living healthy guide: www.[facility].[place].[facility]      Understanding Diabetes: www.diabetes.org      Eating healthy: www.cdc.gov/healthyweight      CDC home safety checklist: www.cdc.gov/steadi/patient.html      Agency on Aging: www.goea.[place].[facility]      Alcoholics anonymous (AA): www.aa.org      Physical Activity: www.stef.nih.gov/xt6ilmt      Tobacco use: www.quitwithusla.org

## 2023-03-16 NOTE — PROGRESS NOTES
"  Cy Rutherford presented for a  Medicare AWV and comprehensive Health Risk Assessment today. The following components were reviewed and updated:    Medical history  Family History  Social history  Allergies and Current Medications  Health Risk Assessment  Health Maintenance  Care Team         ** See Completed Assessments for Annual Wellness Visit within the encounter summary.**         The following assessments were completed:  Living Situation  CAGE  Depression Screening  Timed Get Up and Go  Whisper Test  Cognitive Function Screening      Nutrition Screening  ADL Screening  PAQ Screening    Review for Opioid Screening: Patient does not have rx for Opioids.    Review for Substance Use Disorders: Patient does not use substance.     Vitals:    03/16/23 0859   BP: 132/60   BP Location: Left arm   Patient Position: Sitting   BP Method: Medium (Manual)   Pulse: 74   SpO2: 97%   Weight: 111.4 kg (245 lb 9.5 oz)   Height: 5' 9" (1.753 m)     Body mass index is 36.27 kg/m².  Physical Exam  Constitutional:       General: He is not in acute distress.  Cardiovascular:      Rate and Rhythm: Normal rate.      Pulses:           Dorsalis pedis pulses are 1+ on the right side and 1+ on the left side.        Posterior tibial pulses are 1+ on the right side and 1+ on the left side.   Pulmonary:      Effort: Pulmonary effort is normal.   Musculoskeletal:      Right lower leg: Edema present.      Left lower leg: Edema present.   Feet:      Right foot:      Protective Sensation: 6 sites tested.  6 sites sensed.      Skin integrity: No ulcer, blister or skin breakdown.      Toenail Condition: Right toenails are normal.      Left foot:      Protective Sensation: 6 sites tested.  6 sites sensed.      Skin integrity: No ulcer, blister or skin breakdown.      Toenail Condition: Left toenails are normal.   Skin:     General: Skin is warm.   Neurological:      Mental Status: He is alert.      Motor: Weakness (right sided) present. "   Psychiatric:         Mood and Affect: Mood normal.             Diagnoses and health risks identified today and associated recommendations/orders:    1. Encounter for preventive health examination  Reviewed health maintenance and provided recommendations  Recommend COVID booster      2. Claudication  Continue to monitor  Followed by James Sullivan MD .      3. Type 2 diabetes mellitus with stage 3b chronic kidney disease, with long-term current use of insulin  Continue to monitor  Followed by James Sullivan MD   Last a1c 6.3.      4. Morbid (severe) obesity due to excess calories  Continue to monitor  Followed by James Sullivan MD   Encourage healthy food chocies.      5. Type 2 diabetes mellitus with mild nonproliferative retinopathy of both eyes, with long-term current use of insulin, macular edema presence unspecified  Continue to monitor  Followed by .James Sullivan MD   Last a1c 6.3  Taking novolog and lantus.      6. Stage 3b chronic kidney disease  Continue to monitor  Followed by Dr Montes De Oca.    Avoid nsaids    7. Aortic atherosclerosis  Continue to monitor  Followed by Dr Chan.      8. Paroxysmal atrial fibrillation  Continue to monitor  Followed by Dr Chan.      9. PAD (peripheral artery disease)  Continue to monitor  Followed by Dr Chan.      10. Bilateral carotid artery stenosis  Continue to monitor  Followed by Dr Chan  Carotid  9/16/21.      11. Right hemiplegia  Continue to monitor  Followed by James Sullivan MD .      12. Exudative age-related macular degeneration, unspecified laterality, unspecified stage  Continue to monitor  Followed by Dr Hinton.      13. Coronary artery disease involving native coronary artery of native heart without angina pectoris  Continue to monitor  Followed by Dr Hai Mera CP.      14. Abnormality of gait and mobility  Continue to monitor  Followed by James Sullivan MD .      15. Dependence on other enabling machines and devices  Continue to  monitor  Followed by James Sullivan MD .      16. Encounter for Medicare annual wellness exam    - Ambulatory Referral/Consult to Enhanced Annual Wellness Visit (eAWV)      Provided Cy with a 5-10 year written screening schedule and personal prevention plan. Recommendations were developed using the USPSTF age appropriate recommendations. Education, counseling, and referrals were provided as needed. After Visit Summary printed and given to patient which includes a list of additional screenings\tests needed.    Follow up in one year    Fern Harrington NP    I offered to discuss advanced care planning, including how to pick a person who would make decisions for you if you were unable to make them for yourself, called a health care power of , and what kind of decisions you might make such as use of life sustaining treatments such as ventilators and tube feeding when faced with a life limiting illness recorded on a living will that they will need to know. (How you want to be cared for as you near the end of your natural life)     X  Patient has advanced directives written and agrees to provide copies to the institution.

## 2023-04-04 VITALS
HEIGHT: 69 IN | BODY MASS INDEX: 36.37 KG/M2 | HEART RATE: 74 BPM | DIASTOLIC BLOOD PRESSURE: 60 MMHG | SYSTOLIC BLOOD PRESSURE: 132 MMHG | WEIGHT: 245.56 LBS | OXYGEN SATURATION: 97 %

## 2023-04-04 PROBLEM — I77.9 CAROTID ARTERIAL DISEASE: Status: ACTIVE | Noted: 2023-04-04

## 2023-04-04 PROBLEM — E11.3293 TYPE 2 DIABETES MELLITUS WITH MILD NONPROLIFERATIVE RETINOPATHY OF BOTH EYES, WITH LONG-TERM CURRENT USE OF INSULIN: Status: ACTIVE | Noted: 2022-07-14

## 2023-04-06 ENCOUNTER — PATIENT MESSAGE (OUTPATIENT)
Dept: ADMINISTRATIVE | Facility: OTHER | Age: 78
End: 2023-04-06
Payer: MEDICARE

## 2023-04-17 ENCOUNTER — TELEPHONE (OUTPATIENT)
Dept: CARDIOLOGY | Facility: CLINIC | Age: 78
End: 2023-04-17
Payer: MEDICARE

## 2023-04-17 NOTE — TELEPHONE ENCOUNTER
----- Message from Inez Brett sent at 4/17/2023  4:08 PM CDT -----  Contact: Pharmacist  Type:  Pharmacy Calling to Clarify an RX    Name of Caller:   Pharmacist  Pharmacy Name: [facility]  What do they need to clarify?:  Pharmacist has concerns  1. Pt is taking Fenofibrate with Atuboststin. He is at an increased risk of myoscopy with that combo. He is already on Omega-3. Would you reconsider his Fenofibrate again?   2. They saw the pt in his home on 03/08. He was noted as swelling in his lower extremities. He takes 40 mg a day of Feroximide or basically a 20s once a day. Would the Dr be willing to split it to 20 once a day for better fluid management?     Please call back at [phone].

## 2023-04-20 ENCOUNTER — OFFICE VISIT (OUTPATIENT)
Dept: FAMILY MEDICINE | Facility: CLINIC | Age: 78
End: 2023-04-20
Payer: MEDICARE

## 2023-04-20 VITALS
OXYGEN SATURATION: 100 % | BODY MASS INDEX: 36.77 KG/M2 | DIASTOLIC BLOOD PRESSURE: 72 MMHG | HEART RATE: 74 BPM | HEIGHT: 69 IN | WEIGHT: 248.25 LBS | SYSTOLIC BLOOD PRESSURE: 132 MMHG

## 2023-04-20 DIAGNOSIS — E11.3293 TYPE 2 DIABETES MELLITUS WITH MILD NONPROLIFERATIVE RETINOPATHY OF BOTH EYES, WITH LONG-TERM CURRENT USE OF INSULIN, MACULAR EDEMA PRESENCE UNSPECIFIED: Primary | ICD-10-CM

## 2023-04-20 DIAGNOSIS — I65.23 BILATERAL CAROTID ARTERY STENOSIS: ICD-10-CM

## 2023-04-20 DIAGNOSIS — Z95.1 S/P CABG (CORONARY ARTERY BYPASS GRAFT): ICD-10-CM

## 2023-04-20 DIAGNOSIS — N18.32 STAGE 3B CHRONIC KIDNEY DISEASE: ICD-10-CM

## 2023-04-20 DIAGNOSIS — Z79.4 TYPE 2 DIABETES MELLITUS WITH MILD NONPROLIFERATIVE RETINOPATHY OF BOTH EYES, WITH LONG-TERM CURRENT USE OF INSULIN, MACULAR EDEMA PRESENCE UNSPECIFIED: Primary | ICD-10-CM

## 2023-04-20 DIAGNOSIS — E03.9 ACQUIRED HYPOTHYROIDISM: ICD-10-CM

## 2023-04-20 DIAGNOSIS — I50.32 CHRONIC DIASTOLIC HEART FAILURE: ICD-10-CM

## 2023-04-20 DIAGNOSIS — E78.5 DYSLIPIDEMIA: ICD-10-CM

## 2023-04-20 DIAGNOSIS — I48.0 PAROXYSMAL ATRIAL FIBRILLATION: ICD-10-CM

## 2023-04-20 DIAGNOSIS — L40.9 PSORIASIS: ICD-10-CM

## 2023-04-20 DIAGNOSIS — I25.10 CORONARY ARTERY DISEASE INVOLVING NATIVE CORONARY ARTERY OF NATIVE HEART WITHOUT ANGINA PECTORIS: ICD-10-CM

## 2023-04-20 PROCEDURE — 3288F FALL RISK ASSESSMENT DOCD: CPT | Mod: HCNC,CPTII,S$GLB, | Performed by: INTERNAL MEDICINE

## 2023-04-20 PROCEDURE — 3288F PR FALLS RISK ASSESSMENT DOCUMENTED: ICD-10-PCS | Mod: HCNC,CPTII,S$GLB, | Performed by: INTERNAL MEDICINE

## 2023-04-20 PROCEDURE — 99214 PR OFFICE/OUTPT VISIT, EST, LEVL IV, 30-39 MIN: ICD-10-PCS | Mod: HCNC,S$GLB,, | Performed by: INTERNAL MEDICINE

## 2023-04-20 PROCEDURE — 3078F DIAST BP <80 MM HG: CPT | Mod: HCNC,CPTII,S$GLB, | Performed by: INTERNAL MEDICINE

## 2023-04-20 PROCEDURE — 99999 PR PBB SHADOW E&M-EST. PATIENT-LVL IV: ICD-10-PCS | Mod: PBBFAC,HCNC,, | Performed by: INTERNAL MEDICINE

## 2023-04-20 PROCEDURE — 99999 PR PBB SHADOW E&M-EST. PATIENT-LVL IV: CPT | Mod: PBBFAC,HCNC,, | Performed by: INTERNAL MEDICINE

## 2023-04-20 PROCEDURE — 1159F PR MEDICATION LIST DOCUMENTED IN MEDICAL RECORD: ICD-10-PCS | Mod: HCNC,CPTII,S$GLB, | Performed by: INTERNAL MEDICINE

## 2023-04-20 PROCEDURE — 1101F PR PT FALLS ASSESS DOC 0-1 FALLS W/OUT INJ PAST YR: ICD-10-PCS | Mod: HCNC,CPTII,S$GLB, | Performed by: INTERNAL MEDICINE

## 2023-04-20 PROCEDURE — 1159F MED LIST DOCD IN RCRD: CPT | Mod: HCNC,CPTII,S$GLB, | Performed by: INTERNAL MEDICINE

## 2023-04-20 PROCEDURE — 3075F SYST BP GE 130 - 139MM HG: CPT | Mod: HCNC,CPTII,S$GLB, | Performed by: INTERNAL MEDICINE

## 2023-04-20 PROCEDURE — 1160F PR REVIEW ALL MEDS BY PRESCRIBER/CLIN PHARMACIST DOCUMENTED: ICD-10-PCS | Mod: HCNC,CPTII,S$GLB, | Performed by: INTERNAL MEDICINE

## 2023-04-20 PROCEDURE — 1160F RVW MEDS BY RX/DR IN RCRD: CPT | Mod: HCNC,CPTII,S$GLB, | Performed by: INTERNAL MEDICINE

## 2023-04-20 PROCEDURE — 99214 OFFICE O/P EST MOD 30 MIN: CPT | Mod: HCNC,S$GLB,, | Performed by: INTERNAL MEDICINE

## 2023-04-20 PROCEDURE — 3078F PR MOST RECENT DIASTOLIC BLOOD PRESSURE < 80 MM HG: ICD-10-PCS | Mod: HCNC,CPTII,S$GLB, | Performed by: INTERNAL MEDICINE

## 2023-04-20 PROCEDURE — 3075F PR MOST RECENT SYSTOLIC BLOOD PRESS GE 130-139MM HG: ICD-10-PCS | Mod: HCNC,CPTII,S$GLB, | Performed by: INTERNAL MEDICINE

## 2023-04-20 PROCEDURE — 1101F PT FALLS ASSESS-DOCD LE1/YR: CPT | Mod: HCNC,CPTII,S$GLB, | Performed by: INTERNAL MEDICINE

## 2023-04-20 RX ORDER — TRIAMCINOLONE ACETONIDE 1 MG/G
CREAM TOPICAL 2 TIMES DAILY
Qty: 453.6 G | Refills: 2 | Status: SHIPPED | OUTPATIENT
Start: 2023-04-20

## 2023-04-20 RX ORDER — CLOPIDOGREL BISULFATE 75 MG/1
75 TABLET ORAL DAILY
Qty: 90 TABLET | Refills: 3 | Status: SHIPPED | OUTPATIENT
Start: 2023-04-20 | End: 2024-01-24

## 2023-04-20 RX ORDER — ROSUVASTATIN CALCIUM 20 MG/1
20 TABLET, COATED ORAL DAILY
Qty: 90 TABLET | Refills: 3 | Status: SHIPPED | OUTPATIENT
Start: 2023-04-20 | End: 2024-03-02

## 2023-04-20 NOTE — PROGRESS NOTES
"  Subjective     Cy Rutherford Jr. is a 77 y.o. old, male here for Follow-up    75 y/o with PMH of CAD s/p CABG, CVA with resulting mild R hemiplegia, PAD, HTN, HLD, Type 2 DM with retinopathy, CKD stage 3, hypothyroidism, GERD    Patient is here for follow-up on chronic medical problems    CAD: stable symptom carrington, followed by Dr. Chan.  Type 2 DM: has a CGM, controlled.   Diastolic HF/CKD: increased LE edema and wt is up. He increased his lasix for a few days awhile back but weight and fluid just returned. Recently had minoxidil decreased and started on cardura by Dr. Chan.  Wt Readings from Last 3 Encounters:   04/20/23 1012 112.6 kg (248 lb 3.8 oz)   04/18/23 1007 108.9 kg (240 lb)   03/16/23 0859 111.4 kg (245 lb 9.5 oz)     Hypothyroidism: On 75 mcg LT4, due for repeat, may need dose adjusted next time.  Psoriasis: steroid cream not covered, will need a new steroid cream to use prn.    ROS  Medications     Outpatient Medications Marked as Taking for the 4/20/23 encounter (Office Visit) with James Sullivan MD   Medication Sig Dispense Refill    allopurinoL (ZYLOPRIM) 100 MG tablet Take 1 tablet (100 mg total) by mouth once daily. 90 tablet 3    aspirin (ECOTRIN) 81 MG EC tablet Take 81 mg by mouth every evening.      carvediloL (COREG) 3.125 MG tablet Take 1 tablet (3.125 mg total) by mouth 2 (two) times daily with meals. 180 tablet 4    doxazosin (CARDURA) 8 MG Tab Take 1 tablet (8 mg total) by mouth every evening. 90 tablet 4    DROPLET PEN NEEDLE 31 gauge x 5/16" Ndle 1 each by Misc.(Non-Drug; Combo Route) route 4 (four) times daily. to inject insulin      ergocalciferol (ERGOCALCIFEROL) 50,000 unit Cap Take 1 capsule (50,000 Units total) by mouth every 7 days. 12 capsule 3    EYLEA 2 mg/0.05 mL Soln 2 mg by Intravitreal route every 28 days.      ezetimibe (ZETIA) 10 mg tablet TAKE 1 TABLET EVERY DAY 90 tablet 4    fenofibrate micronized (LOFIBRA) 134 MG Cap Take 1 capsule (134 mg total) by mouth " "nightly. 90 capsule 3    fish oil-omega-3 fatty acids 300-1,000 mg capsule Take 1 capsule by mouth every morning.      furosemide (LASIX) 40 MG tablet Take 1 tablet (40 mg total) by mouth once daily. 90 tablet 3    INV INSULIN GLARGINE, LANTUS, 100U/ML SOLN FOR INJ SOLOSTAR Inject 70-75 Units into the skin every morning. Per Patient   If BG = 140 give 70 units  If BG = 160-180 give 72 units  If BG = 200 give 75 units      levothyroxine (SYNTHROID) 75 MCG tablet Take 1 tablet (75 mcg total) by mouth before breakfast. 90 tablet 1    minoxidiL (LONITEN) 2.5 MG tablet Take 1 tablet (2.5 mg total) by mouth 2 (two) times daily. 180 tablet 5    multivitamin with minerals (ONE-A-DAY MAXIMUM FORMULA ORAL) Take 1 tablet by mouth once daily.      pantoprazole (PROTONIX) 40 MG tablet Take 1 tablet (40 mg total) by mouth once daily. 90 tablet 3    potassium chloride SA (K-DUR,KLOR-CON M) 10 MEQ tablet TAKE 1 TABLET EVERY MORNING 90 tablet 4    valsartan-hydrochlorothiazide (DIOVAN-HCT) 320-25 mg per tablet Take 1 tablet by mouth every morning. 90 tablet 4    [DISCONTINUED] clopidogreL (PLAVIX) 75 mg tablet TAKE 1 TABLET EVERY DAY 90 tablet 3    [DISCONTINUED] desoximetasone (TOPICORT) 0.25 % cream Apply 1 application topically 2 (two) times daily as needed (psoriasis). 60 g 2    [DISCONTINUED] rosuvastatin (CRESTOR) 20 MG tablet Take 1 tablet (20 mg total) by mouth once daily. 90 tablet 3     Objective     /72   Pulse 74   Ht 5' 9" (1.753 m)   Wt 112.6 kg (248 lb 3.8 oz)   SpO2 100%   BMI 36.66 kg/m²   Physical Exam  Constitutional:       General: He is not in acute distress.     Appearance: Normal appearance. He is well-developed.   Musculoskeletal:      Right lower leg: Edema present.      Left lower leg: Edema present.   Neurological:      Mental Status: He is alert.     Assessment and Plan     Type 2 diabetes mellitus with mild nonproliferative retinopathy of both eyes, with long-term current use of insulin, " macular edema presence unspecified    Bilateral carotid artery stenosis    Chronic diastolic heart failure    Coronary artery disease involving native coronary artery of native heart without angina pectoris  -     clopidogreL (PLAVIX) 75 mg tablet; Take 1 tablet (75 mg total) by mouth once daily.  Dispense: 90 tablet; Refill: 3    Paroxysmal atrial fibrillation    Acquired hypothyroidism  -     TSH; Future; Expected date: 04/20/2023    S/P CABG (coronary artery bypass graft)    Stage 3b chronic kidney disease    Dyslipidemia  -     rosuvastatin (CRESTOR) 20 MG tablet; Take 1 tablet (20 mg total) by mouth once daily.  Dispense: 90 tablet; Refill: 3    Psoriasis  -     triamcinolone acetonide 0.1% (KENALOG) 0.1 % cream; Apply topically 2 (two) times daily.  Dispense: 453.6 g; Refill: 2        Follow up in about 6 months (around 10/20/2023).  ___________________  James Sullivan MD  Internal Medicine and Pediatrics

## 2023-05-16 ENCOUNTER — OFFICE VISIT (OUTPATIENT)
Dept: NEPHROLOGY | Facility: CLINIC | Age: 78
End: 2023-05-16
Payer: MEDICARE

## 2023-05-16 VITALS
WEIGHT: 248.25 LBS | HEART RATE: 64 BPM | HEIGHT: 69 IN | SYSTOLIC BLOOD PRESSURE: 128 MMHG | DIASTOLIC BLOOD PRESSURE: 48 MMHG | OXYGEN SATURATION: 98 % | BODY MASS INDEX: 36.77 KG/M2

## 2023-05-16 DIAGNOSIS — E11.3293 TYPE 2 DIABETES MELLITUS WITH MILD NONPROLIFERATIVE RETINOPATHY OF BOTH EYES, WITH LONG-TERM CURRENT USE OF INSULIN, MACULAR EDEMA PRESENCE UNSPECIFIED: ICD-10-CM

## 2023-05-16 DIAGNOSIS — I10 ESSENTIAL HYPERTENSION: ICD-10-CM

## 2023-05-16 DIAGNOSIS — E11.22 TYPE 2 DIABETES MELLITUS WITH STAGE 3B CHRONIC KIDNEY DISEASE, WITH LONG-TERM CURRENT USE OF INSULIN: ICD-10-CM

## 2023-05-16 DIAGNOSIS — I48.0 PAROXYSMAL ATRIAL FIBRILLATION: ICD-10-CM

## 2023-05-16 DIAGNOSIS — E66.01 MORBID (SEVERE) OBESITY DUE TO EXCESS CALORIES: ICD-10-CM

## 2023-05-16 DIAGNOSIS — Z95.1 S/P CABG (CORONARY ARTERY BYPASS GRAFT): ICD-10-CM

## 2023-05-16 DIAGNOSIS — Z79.4 TYPE 2 DIABETES MELLITUS WITH MILD NONPROLIFERATIVE RETINOPATHY OF BOTH EYES, WITH LONG-TERM CURRENT USE OF INSULIN, MACULAR EDEMA PRESENCE UNSPECIFIED: ICD-10-CM

## 2023-05-16 DIAGNOSIS — N25.81 SECONDARY HYPERPARATHYROIDISM OF RENAL ORIGIN: ICD-10-CM

## 2023-05-16 DIAGNOSIS — N18.32 STAGE 3B CHRONIC KIDNEY DISEASE: ICD-10-CM

## 2023-05-16 DIAGNOSIS — Z95.5 STENTED CORONARY ARTERY: Primary | ICD-10-CM

## 2023-05-16 DIAGNOSIS — N18.32 TYPE 2 DIABETES MELLITUS WITH STAGE 3B CHRONIC KIDNEY DISEASE, WITH LONG-TERM CURRENT USE OF INSULIN: ICD-10-CM

## 2023-05-16 DIAGNOSIS — Z79.4 TYPE 2 DIABETES MELLITUS WITH STAGE 3B CHRONIC KIDNEY DISEASE, WITH LONG-TERM CURRENT USE OF INSULIN: ICD-10-CM

## 2023-05-16 PROCEDURE — 99214 PR OFFICE/OUTPT VISIT, EST, LEVL IV, 30-39 MIN: ICD-10-PCS | Mod: ,,, | Performed by: INTERNAL MEDICINE

## 2023-05-16 PROCEDURE — 3078F PR MOST RECENT DIASTOLIC BLOOD PRESSURE < 80 MM HG: ICD-10-PCS | Mod: CPTII,,, | Performed by: INTERNAL MEDICINE

## 2023-05-16 PROCEDURE — 1157F PR ADVANCE CARE PLAN OR EQUIV PRESENT IN MEDICAL RECORD: ICD-10-PCS | Mod: CPTII,,, | Performed by: INTERNAL MEDICINE

## 2023-05-16 PROCEDURE — 3074F PR MOST RECENT SYSTOLIC BLOOD PRESSURE < 130 MM HG: ICD-10-PCS | Mod: CPTII,,, | Performed by: INTERNAL MEDICINE

## 2023-05-16 PROCEDURE — 99999 PR PBB SHADOW E&M-EST. PATIENT-LVL IV: ICD-10-PCS | Mod: PBBFAC,,, | Performed by: INTERNAL MEDICINE

## 2023-05-16 PROCEDURE — 1157F ADVNC CARE PLAN IN RCRD: CPT | Mod: CPTII,,, | Performed by: INTERNAL MEDICINE

## 2023-05-16 PROCEDURE — 3074F SYST BP LT 130 MM HG: CPT | Mod: CPTII,,, | Performed by: INTERNAL MEDICINE

## 2023-05-16 PROCEDURE — 99214 OFFICE O/P EST MOD 30 MIN: CPT | Mod: ,,, | Performed by: INTERNAL MEDICINE

## 2023-05-16 PROCEDURE — 1101F PT FALLS ASSESS-DOCD LE1/YR: CPT | Mod: CPTII,,, | Performed by: INTERNAL MEDICINE

## 2023-05-16 PROCEDURE — 99999 PR PBB SHADOW E&M-EST. PATIENT-LVL IV: CPT | Mod: PBBFAC,,, | Performed by: INTERNAL MEDICINE

## 2023-05-16 PROCEDURE — 1101F PR PT FALLS ASSESS DOC 0-1 FALLS W/OUT INJ PAST YR: ICD-10-PCS | Mod: CPTII,,, | Performed by: INTERNAL MEDICINE

## 2023-05-16 PROCEDURE — 3078F DIAST BP <80 MM HG: CPT | Mod: CPTII,,, | Performed by: INTERNAL MEDICINE

## 2023-05-16 PROCEDURE — 1159F MED LIST DOCD IN RCRD: CPT | Mod: CPTII,,, | Performed by: INTERNAL MEDICINE

## 2023-05-16 PROCEDURE — 1159F PR MEDICATION LIST DOCUMENTED IN MEDICAL RECORD: ICD-10-PCS | Mod: CPTII,,, | Performed by: INTERNAL MEDICINE

## 2023-05-16 PROCEDURE — 3288F PR FALLS RISK ASSESSMENT DOCUMENTED: ICD-10-PCS | Mod: CPTII,,, | Performed by: INTERNAL MEDICINE

## 2023-05-16 PROCEDURE — 3288F FALL RISK ASSESSMENT DOCD: CPT | Mod: CPTII,,, | Performed by: INTERNAL MEDICINE

## 2023-05-16 NOTE — PROGRESS NOTES
Subjective:       Patient ID: Cy Rutherford Jr. is a 77 y.o. White male who presents for follow up on CKD.     Since last seen at nephrology clinic, Cy Rutherford Jr. denies any new hospitalizations or new medications.  No uremic symptoms, not volume overloaded.    Reports taking their medications on time, without missed doses. As to their chronic conditions:  - CKD: Denies use of NSAIDs.   2022: baseline sr cr of 1.7 - 2.2 mg/dL without proteinuria  - Dmt2: pt reports good glycemic control.  - HTN: well controlled, follows low salt diet. Denies uncontrolled HTN, dizziness/lightheadedness or hypotension/syncopal episodes.  - LE swelling on Minoxidil     Review of Systems   Constitutional:  Negative for chills, fatigue and fever.   HENT:  Negative for hearing loss, trouble swallowing and voice change.    Respiratory:  Negative for cough, shortness of breath and wheezing.    Cardiovascular:  Positive for leg swelling. Negative for chest pain and palpitations.   Gastrointestinal:  Negative for abdominal distention, abdominal pain, constipation and diarrhea.   Endocrine: Negative for polydipsia, polyphagia and polyuria.   Genitourinary:  Negative for dysuria, flank pain, frequency and hematuria.   Musculoskeletal:  Negative for arthralgias, back pain and myalgias.   Skin:  Negative for rash and wound.   Neurological:  Negative for dizziness, syncope, weakness and light-headedness.   Hematological:  Negative for adenopathy. Does not bruise/bleed easily.     The past medical, family and social histories were reviewed for this encounter.     There were no vitals taken for this visit.    Objective:      Physical Exam  Vitals reviewed.   Constitutional:       General: He is not in acute distress.     Appearance: Normal appearance. He is well-developed. He is obese. He is not ill-appearing.   HENT:      Head: Normocephalic and atraumatic.      Mouth/Throat:      Mouth: Mucous membranes are moist.      Pharynx:  Oropharynx is clear.   Eyes:      General: No scleral icterus.     Extraocular Movements: Extraocular movements intact.      Conjunctiva/sclera: Conjunctivae normal.   Neck:      Vascular: No JVD.   Cardiovascular:      Rate and Rhythm: Normal rate and regular rhythm.      Heart sounds: Normal heart sounds. No murmur heard.    No friction rub. No gallop.   Pulmonary:      Effort: Pulmonary effort is normal. No respiratory distress.      Breath sounds: Normal breath sounds. No wheezing or rales.   Abdominal:      General: Bowel sounds are normal. There is no distension.      Palpations: Abdomen is soft.      Tenderness: There is no abdominal tenderness.   Musculoskeletal:      Cervical back: Normal range of motion.      Right lower leg: Edema present.      Left lower leg: Edema present.   Skin:     General: Skin is warm and dry.      Capillary Refill: Capillary refill takes less than 2 seconds.      Findings: No rash.   Neurological:      General: No focal deficit present.      Mental Status: He is alert and oriented to person, place, and time.   Psychiatric:         Mood and Affect: Mood normal.         Behavior: Behavior normal.       Assessment:       1. Stented coronary artery    2. S/P CABG (coronary artery bypass graft)    3. Paroxysmal atrial fibrillation    4. Essential hypertension    5. Stage 3b chronic kidney disease    6. Type 2 diabetes mellitus with mild nonproliferative retinopathy of both eyes, with long-term current use of insulin, macular edema presence unspecified    7. Type 2 diabetes mellitus with stage 3b chronic kidney disease, with long-term current use of insulin    8. Morbid (severe) obesity due to excess calories    9. Secondary hyperparathyroidism of renal origin        Plan:   Return to clinic in 4 months    CKD in a setting of DM/HTN, CR and DIRK requiring dialysis in 2019  Baseline creatinine is 1.7 - 2.2 mg/dL and increased proteinuria  Lab Results   Component Value Date    CREATININE  1.86 (H) 05/03/2023      - Euvolemic   - Pt understands importance of blood pressure and glycemic control and is aware that uncontrolled HTN and DM leads to progression of CKD   - Complete avoidance of NSAIDs   - Low salt diet with < 2000 mg/day   - Dose adjust medications to GFR of 30 - 45   - Avoid nephrotoxins including IV contrast    HTN   - BP well controlled: continue current medications, avoid hypotension and dehydration   - Will call Dr Chan to change medications    DM with nephropathy   - DM with diabetic retinopathy: well controlled with most recent HgbA1c of 6.3%, continue yearly optho checks    Anemia   - Fe levels WNL    SHPT  Lab Results   Component Value Date    .7 (H) 05/03/2023    CALCIUM 9.1 05/03/2023    CAION 1.17 12/06/2021    PHOS 3.5 05/03/2023    - Vit D    HF   - Cont diuretics    CAD and PAF   - Guideline directed therapy per cardiology    Obesity    - Calorie restriction    Med changes: none

## 2023-05-20 ENCOUNTER — PATIENT MESSAGE (OUTPATIENT)
Dept: FAMILY MEDICINE | Facility: CLINIC | Age: 78
End: 2023-05-20
Payer: MEDICARE

## 2023-05-20 DIAGNOSIS — I50.32 CHRONIC DIASTOLIC HEART FAILURE: Primary | ICD-10-CM

## 2023-05-22 ENCOUNTER — TELEPHONE (OUTPATIENT)
Dept: FAMILY MEDICINE | Facility: CLINIC | Age: 78
End: 2023-05-22
Payer: MEDICARE

## 2023-05-22 RX ORDER — FUROSEMIDE 40 MG/1
80 TABLET ORAL DAILY
Qty: 180 TABLET | Refills: 3 | Status: SHIPPED | OUTPATIENT
Start: 2023-05-22 | End: 2023-06-01

## 2023-05-22 NOTE — TELEPHONE ENCOUNTER
----- Message from Shantell Johnston sent at 5/22/2023  9:31 AM CDT -----  Type: Needs Medical Advice  Who Called:  [employer]  Best Call Back Number: [phone]   Additional Information: Wants to know if there is a recent kidney lab, the combinations of meds can cause a interactions, fenofibrate micronized (LOFIBRA) 134 MG Cap and rosuvastatin (CRESTOR) 20 MG tablet, pt is also taking furosemide (LASIX) 40 MG tablet and wants to know when will the pt be seen again, pl call bk to advise thanks

## 2023-05-23 ENCOUNTER — TELEPHONE (OUTPATIENT)
Dept: FAMILY MEDICINE | Facility: CLINIC | Age: 78
End: 2023-05-23
Payer: MEDICARE

## 2023-05-23 NOTE — TELEPHONE ENCOUNTER
Spoke with patient Home care nurse to let her know He is getting labs soon, should not be a problem.

## 2023-05-23 NOTE — TELEPHONE ENCOUNTER
----- Message from Lori Mohrmarinebeth sent at 5/23/2023  1:44 PM CDT -----  Regarding: returning call  Contact: home care nurse  Type: Needs Medical Advice  Who Called:  Home care nurse  Symptoms (please be specific):    How long has patient had these symptoms:    Pharmacy name and phone #:    Best Call Back Number: [phone]    Additional Information: Alhaji Reed I linda Rutherford MRN: [id number] calling back regarding pt medication:   furosemide (LASIX) 40 MG tablet.

## 2023-05-24 NOTE — TELEPHONE ENCOUNTER
6 of 1, half a dozen of the other.  I would prefer the 80 mg once a day, Dr. Montes De Oca said 40 mg twice a day. Either way is probably fine. We both want him to do this for 3 days or more and see if that improves his lower extremity edema and track his weight. He can f/u with either of us.

## 2023-07-26 DIAGNOSIS — K21.9 GERD WITHOUT ESOPHAGITIS: ICD-10-CM

## 2023-07-26 RX ORDER — PANTOPRAZOLE SODIUM 40 MG/1
40 TABLET, DELAYED RELEASE ORAL DAILY
Qty: 90 TABLET | Refills: 3 | Status: SHIPPED | OUTPATIENT
Start: 2023-07-26

## 2023-07-26 NOTE — TELEPHONE ENCOUNTER
Refill Decision Note   Cy Rutherford  is requesting a refill authorization.  Brief Assessment and Rationale for Refill:  Approve     Medication Therapy Plan:         Comments:     Note composed:9:19 AM 07/26/2023             Appointments     Last Visit   4/20/2023 James Sullivan MD   Next Visit   10/19/2023 James Sullivan MD

## 2023-07-26 NOTE — TELEPHONE ENCOUNTER
No care due was identified.  [facility] Embedded Care Due Messages. Reference number: [id number].   7/26/2023 1:53:14 AM CDT

## 2023-09-19 ENCOUNTER — OFFICE VISIT (OUTPATIENT)
Dept: NEPHROLOGY | Facility: CLINIC | Age: 78
End: 2023-09-19
Payer: MEDICARE

## 2023-09-19 VITALS
HEART RATE: 57 BPM | OXYGEN SATURATION: 97 % | SYSTOLIC BLOOD PRESSURE: 142 MMHG | DIASTOLIC BLOOD PRESSURE: 56 MMHG | WEIGHT: 235.88 LBS | BODY MASS INDEX: 34.94 KG/M2 | HEIGHT: 69 IN

## 2023-09-19 DIAGNOSIS — Z95.5 STENTED CORONARY ARTERY: Primary | ICD-10-CM

## 2023-09-19 DIAGNOSIS — Z95.1 S/P CABG (CORONARY ARTERY BYPASS GRAFT): ICD-10-CM

## 2023-09-19 DIAGNOSIS — N18.32 STAGE 3B CHRONIC KIDNEY DISEASE: ICD-10-CM

## 2023-09-19 DIAGNOSIS — E11.22 TYPE 2 DIABETES MELLITUS WITH STAGE 3B CHRONIC KIDNEY DISEASE, WITH LONG-TERM CURRENT USE OF INSULIN: ICD-10-CM

## 2023-09-19 DIAGNOSIS — I48.0 PAROXYSMAL ATRIAL FIBRILLATION: ICD-10-CM

## 2023-09-19 DIAGNOSIS — D47.2 MONOCLONAL PARAPROTEINEMIA: ICD-10-CM

## 2023-09-19 DIAGNOSIS — I50.32 CHRONIC DIASTOLIC HEART FAILURE: ICD-10-CM

## 2023-09-19 DIAGNOSIS — Z79.4 TYPE 2 DIABETES MELLITUS WITH STAGE 3B CHRONIC KIDNEY DISEASE, WITH LONG-TERM CURRENT USE OF INSULIN: ICD-10-CM

## 2023-09-19 DIAGNOSIS — I10 ESSENTIAL HYPERTENSION: ICD-10-CM

## 2023-09-19 DIAGNOSIS — E66.01 MORBID (SEVERE) OBESITY DUE TO EXCESS CALORIES: ICD-10-CM

## 2023-09-19 DIAGNOSIS — N18.32 TYPE 2 DIABETES MELLITUS WITH STAGE 3B CHRONIC KIDNEY DISEASE, WITH LONG-TERM CURRENT USE OF INSULIN: ICD-10-CM

## 2023-09-19 PROCEDURE — 99999 PR PBB SHADOW E&M-EST. PATIENT-LVL IV: CPT | Mod: PBBFAC,HCNC,, | Performed by: INTERNAL MEDICINE

## 2023-09-19 PROCEDURE — 3078F PR MOST RECENT DIASTOLIC BLOOD PRESSURE < 80 MM HG: ICD-10-PCS | Mod: HCNC,CPTII,S$GLB, | Performed by: INTERNAL MEDICINE

## 2023-09-19 PROCEDURE — 99999 PR PBB SHADOW E&M-EST. PATIENT-LVL IV: ICD-10-PCS | Mod: PBBFAC,HCNC,, | Performed by: INTERNAL MEDICINE

## 2023-09-19 PROCEDURE — 1101F PT FALLS ASSESS-DOCD LE1/YR: CPT | Mod: HCNC,CPTII,S$GLB, | Performed by: INTERNAL MEDICINE

## 2023-09-19 PROCEDURE — 1159F MED LIST DOCD IN RCRD: CPT | Mod: HCNC,CPTII,S$GLB, | Performed by: INTERNAL MEDICINE

## 2023-09-19 PROCEDURE — 3288F FALL RISK ASSESSMENT DOCD: CPT | Mod: HCNC,CPTII,S$GLB, | Performed by: INTERNAL MEDICINE

## 2023-09-19 PROCEDURE — 99214 OFFICE O/P EST MOD 30 MIN: CPT | Mod: HCNC,S$GLB,, | Performed by: INTERNAL MEDICINE

## 2023-09-19 PROCEDURE — 3077F SYST BP >= 140 MM HG: CPT | Mod: HCNC,CPTII,S$GLB, | Performed by: INTERNAL MEDICINE

## 2023-09-19 PROCEDURE — 1157F PR ADVANCE CARE PLAN OR EQUIV PRESENT IN MEDICAL RECORD: ICD-10-PCS | Mod: HCNC,CPTII,S$GLB, | Performed by: INTERNAL MEDICINE

## 2023-09-19 PROCEDURE — 3077F PR MOST RECENT SYSTOLIC BLOOD PRESSURE >= 140 MM HG: ICD-10-PCS | Mod: HCNC,CPTII,S$GLB, | Performed by: INTERNAL MEDICINE

## 2023-09-19 PROCEDURE — 1157F ADVNC CARE PLAN IN RCRD: CPT | Mod: HCNC,CPTII,S$GLB, | Performed by: INTERNAL MEDICINE

## 2023-09-19 PROCEDURE — 1159F PR MEDICATION LIST DOCUMENTED IN MEDICAL RECORD: ICD-10-PCS | Mod: HCNC,CPTII,S$GLB, | Performed by: INTERNAL MEDICINE

## 2023-09-19 PROCEDURE — 3288F PR FALLS RISK ASSESSMENT DOCUMENTED: ICD-10-PCS | Mod: HCNC,CPTII,S$GLB, | Performed by: INTERNAL MEDICINE

## 2023-09-19 PROCEDURE — 3078F DIAST BP <80 MM HG: CPT | Mod: HCNC,CPTII,S$GLB, | Performed by: INTERNAL MEDICINE

## 2023-09-19 PROCEDURE — 1101F PR PT FALLS ASSESS DOC 0-1 FALLS W/OUT INJ PAST YR: ICD-10-PCS | Mod: HCNC,CPTII,S$GLB, | Performed by: INTERNAL MEDICINE

## 2023-09-19 PROCEDURE — 99214 PR OFFICE/OUTPT VISIT, EST, LEVL IV, 30-39 MIN: ICD-10-PCS | Mod: HCNC,S$GLB,, | Performed by: INTERNAL MEDICINE

## 2023-09-19 PROCEDURE — 1160F PR REVIEW ALL MEDS BY PRESCRIBER/CLIN PHARMACIST DOCUMENTED: ICD-10-PCS | Mod: HCNC,CPTII,S$GLB, | Performed by: INTERNAL MEDICINE

## 2023-09-19 PROCEDURE — 1160F RVW MEDS BY RX/DR IN RCRD: CPT | Mod: HCNC,CPTII,S$GLB, | Performed by: INTERNAL MEDICINE

## 2023-09-19 NOTE — PROGRESS NOTES
"Subjective:       Patient ID: Cy Rutherford Jr. is a 77 y.o. White male who presents for follow up on CKD.     Since last seen at nephrology clinic, Cy Rutherford Jr. denies any new hospitalizations or new medications.  No uremic symptoms, not volume overloaded.    Reports taking their medications on time, without missed doses. As to their chronic conditions:  - CKD: Denies use of NSAIDs.   2022: baseline sr cr of 1.7 - 2.2 mg/dL without proteinuria  - Dmt2: pt reports good glycemic control.  - HTN: well controlled, follows low salt diet. Denies uncontrolled HTN, dizziness/lightheadedness or hypotension/syncopal episodes.  - Abnormal K/L chain: repeat in 4-6 mo     Review of Systems   Constitutional:  Negative for chills, fatigue and fever.   HENT:  Negative for hearing loss, trouble swallowing and voice change.    Respiratory:  Negative for cough, shortness of breath and wheezing.    Cardiovascular:  Negative for chest pain, palpitations and leg swelling.   Gastrointestinal:  Negative for abdominal distention, abdominal pain, constipation and diarrhea.   Endocrine: Negative for polydipsia, polyphagia and polyuria.   Genitourinary:  Negative for dysuria, flank pain, frequency and hematuria.   Musculoskeletal:  Negative for arthralgias, back pain and myalgias.   Skin:  Negative for rash and wound.   Neurological:  Negative for dizziness, syncope, weakness and light-headedness.   Hematological:  Negative for adenopathy. Does not bruise/bleed easily.       The past medical, family and social histories were reviewed for this encounter.     BP (!) 142/56 (BP Location: Left arm, Patient Position: Sitting, BP Method: Large (Manual))   Pulse (!) 57   Ht 5' 9" (1.753 m)   Wt 107 kg (235 lb 14.3 oz)   SpO2 97%   BMI 34.84 kg/m²     Objective:      Physical Exam  Vitals reviewed.   Constitutional:       General: He is not in acute distress.     Appearance: Normal appearance. He is well-developed. He is obese. " He is not ill-appearing.   HENT:      Head: Normocephalic and atraumatic.      Mouth/Throat:      Mouth: Mucous membranes are moist.      Pharynx: Oropharynx is clear.   Eyes:      General: No scleral icterus.     Extraocular Movements: Extraocular movements intact.      Conjunctiva/sclera: Conjunctivae normal.   Neck:      Vascular: No JVD.   Cardiovascular:      Rate and Rhythm: Normal rate and regular rhythm.      Heart sounds: Normal heart sounds. No murmur heard.     No friction rub. No gallop.   Pulmonary:      Effort: Pulmonary effort is normal. No respiratory distress.      Breath sounds: Normal breath sounds. No wheezing or rales.   Abdominal:      General: Bowel sounds are normal. There is no distension.      Palpations: Abdomen is soft.      Tenderness: There is no abdominal tenderness.   Musculoskeletal:         General: No tenderness.      Cervical back: Normal range of motion.      Right lower leg: No edema.      Left lower leg: No edema.   Skin:     General: Skin is warm and dry.      Capillary Refill: Capillary refill takes less than 2 seconds.      Findings: No rash.   Neurological:      General: No focal deficit present.      Mental Status: He is alert and oriented to person, place, and time.      Gait: Gait abnormal.   Psychiatric:         Mood and Affect: Mood normal.         Behavior: Behavior normal.         Assessment:       1. Stented coronary artery    2. Monoclonal paraproteinemia    3. S/P CABG (coronary artery bypass graft)    4. Paroxysmal atrial fibrillation    5. Essential hypertension    6. Chronic diastolic heart failure    7. Stage 3b chronic kidney disease    8. Type 2 diabetes mellitus with stage 3b chronic kidney disease, with long-term current use of insulin    9. Morbid (severe) obesity due to excess calories          Plan:   Return to clinic in 4 months    CKD in a setting of DM/HTN, CR and DIRK requiring dialysis in 2019  Baseline creatinine is 1.7 - 2.2 mg/dL and increased  proteinuria  Lab Results   Component Value Date    CREATININE 1.83 (H) 09/13/2023      - Euvolemic   - Pt understands importance of blood pressure and glycemic control and is aware that uncontrolled HTN and DM leads to progression of CKD   - Complete avoidance of NSAIDs   - Low salt diet with < 2000 mg/day   - Dose adjust medications to GFR of 30 - 45   - Avoid nephrotoxins including IV contrast    HTN   - BP well controlled: continue current medications, avoid hypotension and dehydration    DM with nephropathy   - DM with diabetic retinopathy: well controlled with most recent HgbA1c of 6.3%, continue yearly optho checks    Anemia   - Fe levels WNL   - Paraprotein peak   - Repeat paraprotein in 4-6 mo, pt wants to wait for referral to heme   - Stable renal function     SHPT  Lab Results   Component Value Date    PTH 55.0 09/13/2023    CALCIUM 9.4 09/13/2023    CAION 1.17 12/06/2021    PHOS 3.7 09/13/2023    - Vit D    HF   - Cont diuretics    CAD and PAF   - Guideline directed therapy per cardiology    Obesity    - Calorie restriction    Paraprotein    - Repeat in 4 mo    - Holding referral for now     Med changes: none

## 2023-10-04 ENCOUNTER — PATIENT MESSAGE (OUTPATIENT)
Dept: ADMINISTRATIVE | Facility: OTHER | Age: 78
End: 2023-10-04
Payer: MEDICARE

## 2023-10-04 ENCOUNTER — PATIENT MESSAGE (OUTPATIENT)
Dept: ADMINISTRATIVE | Facility: HOSPITAL | Age: 78
End: 2023-10-04
Payer: MEDICARE

## 2023-10-15 DIAGNOSIS — E03.9 ACQUIRED HYPOTHYROIDISM: ICD-10-CM

## 2023-10-15 NOTE — TELEPHONE ENCOUNTER
No care due was identified.  [facility] Embedded Care Due Messages. Reference number: [id number].   10/15/2023 2:51:12 AM CDT

## 2023-10-16 RX ORDER — LEVOTHYROXINE SODIUM 75 UG/1
75 TABLET ORAL
Qty: 90 TABLET | Refills: 1 | Status: SHIPPED | OUTPATIENT
Start: 2023-10-16 | End: 2023-10-19

## 2023-10-16 NOTE — TELEPHONE ENCOUNTER
Refill Decision Note   Cy Rutherford  is requesting a refill authorization.  Brief Assessment and Rationale for Refill:  Approve     Medication Therapy Plan:         Comments:     Note composed:4:05 PM 10/16/2023             Appointments     Last Visit   4/20/2023 James Sullivan MD   Next Visit   10/19/2023 James Sullivan MD

## 2023-10-19 ENCOUNTER — OFFICE VISIT (OUTPATIENT)
Dept: FAMILY MEDICINE | Facility: CLINIC | Age: 78
End: 2023-10-19
Payer: MEDICARE

## 2023-10-19 VITALS
BODY MASS INDEX: 34.88 KG/M2 | OXYGEN SATURATION: 97 % | WEIGHT: 236.25 LBS | HEART RATE: 75 BPM | SYSTOLIC BLOOD PRESSURE: 136 MMHG | DIASTOLIC BLOOD PRESSURE: 64 MMHG

## 2023-10-19 DIAGNOSIS — E11.3293 TYPE 2 DIABETES MELLITUS WITH MILD NONPROLIFERATIVE RETINOPATHY OF BOTH EYES, WITH LONG-TERM CURRENT USE OF INSULIN, MACULAR EDEMA PRESENCE UNSPECIFIED: ICD-10-CM

## 2023-10-19 DIAGNOSIS — Z79.4 TYPE 2 DIABETES MELLITUS WITH MILD NONPROLIFERATIVE RETINOPATHY OF BOTH EYES, WITH LONG-TERM CURRENT USE OF INSULIN, MACULAR EDEMA PRESENCE UNSPECIFIED: ICD-10-CM

## 2023-10-19 DIAGNOSIS — I25.10 CORONARY ARTERY DISEASE INVOLVING NATIVE CORONARY ARTERY OF NATIVE HEART WITHOUT ANGINA PECTORIS: Primary | ICD-10-CM

## 2023-10-19 DIAGNOSIS — I50.32 CHRONIC DIASTOLIC HEART FAILURE: ICD-10-CM

## 2023-10-19 DIAGNOSIS — I48.0 PAROXYSMAL ATRIAL FIBRILLATION: ICD-10-CM

## 2023-10-19 DIAGNOSIS — E03.9 ACQUIRED HYPOTHYROIDISM: ICD-10-CM

## 2023-10-19 PROCEDURE — 3288F FALL RISK ASSESSMENT DOCD: CPT | Mod: HCNC,CPTII,S$GLB, | Performed by: INTERNAL MEDICINE

## 2023-10-19 PROCEDURE — 99999 PR PBB SHADOW E&M-EST. PATIENT-LVL V: ICD-10-PCS | Mod: PBBFAC,HCNC,, | Performed by: INTERNAL MEDICINE

## 2023-10-19 PROCEDURE — 1160F RVW MEDS BY RX/DR IN RCRD: CPT | Mod: HCNC,CPTII,S$GLB, | Performed by: INTERNAL MEDICINE

## 2023-10-19 PROCEDURE — 1101F PR PT FALLS ASSESS DOC 0-1 FALLS W/OUT INJ PAST YR: ICD-10-PCS | Mod: HCNC,CPTII,S$GLB, | Performed by: INTERNAL MEDICINE

## 2023-10-19 PROCEDURE — 1157F PR ADVANCE CARE PLAN OR EQUIV PRESENT IN MEDICAL RECORD: ICD-10-PCS | Mod: HCNC,CPTII,S$GLB, | Performed by: INTERNAL MEDICINE

## 2023-10-19 PROCEDURE — 3078F PR MOST RECENT DIASTOLIC BLOOD PRESSURE < 80 MM HG: ICD-10-PCS | Mod: HCNC,CPTII,S$GLB, | Performed by: INTERNAL MEDICINE

## 2023-10-19 PROCEDURE — 1157F ADVNC CARE PLAN IN RCRD: CPT | Mod: HCNC,CPTII,S$GLB, | Performed by: INTERNAL MEDICINE

## 2023-10-19 PROCEDURE — 1101F PT FALLS ASSESS-DOCD LE1/YR: CPT | Mod: HCNC,CPTII,S$GLB, | Performed by: INTERNAL MEDICINE

## 2023-10-19 PROCEDURE — 99214 OFFICE O/P EST MOD 30 MIN: CPT | Mod: HCNC,S$GLB,, | Performed by: INTERNAL MEDICINE

## 2023-10-19 PROCEDURE — 99214 PR OFFICE/OUTPT VISIT, EST, LEVL IV, 30-39 MIN: ICD-10-PCS | Mod: HCNC,S$GLB,, | Performed by: INTERNAL MEDICINE

## 2023-10-19 PROCEDURE — 3288F PR FALLS RISK ASSESSMENT DOCUMENTED: ICD-10-PCS | Mod: HCNC,CPTII,S$GLB, | Performed by: INTERNAL MEDICINE

## 2023-10-19 PROCEDURE — 1159F PR MEDICATION LIST DOCUMENTED IN MEDICAL RECORD: ICD-10-PCS | Mod: HCNC,CPTII,S$GLB, | Performed by: INTERNAL MEDICINE

## 2023-10-19 PROCEDURE — 1159F MED LIST DOCD IN RCRD: CPT | Mod: HCNC,CPTII,S$GLB, | Performed by: INTERNAL MEDICINE

## 2023-10-19 PROCEDURE — 3078F DIAST BP <80 MM HG: CPT | Mod: HCNC,CPTII,S$GLB, | Performed by: INTERNAL MEDICINE

## 2023-10-19 PROCEDURE — 3075F SYST BP GE 130 - 139MM HG: CPT | Mod: HCNC,CPTII,S$GLB, | Performed by: INTERNAL MEDICINE

## 2023-10-19 PROCEDURE — 3075F PR MOST RECENT SYSTOLIC BLOOD PRESS GE 130-139MM HG: ICD-10-PCS | Mod: HCNC,CPTII,S$GLB, | Performed by: INTERNAL MEDICINE

## 2023-10-19 PROCEDURE — 99999 PR PBB SHADOW E&M-EST. PATIENT-LVL V: CPT | Mod: PBBFAC,HCNC,, | Performed by: INTERNAL MEDICINE

## 2023-10-19 PROCEDURE — 1160F PR REVIEW ALL MEDS BY PRESCRIBER/CLIN PHARMACIST DOCUMENTED: ICD-10-PCS | Mod: HCNC,CPTII,S$GLB, | Performed by: INTERNAL MEDICINE

## 2023-10-19 RX ORDER — LEVOTHYROXINE SODIUM 88 UG/1
88 TABLET ORAL
Qty: 90 TABLET | Refills: 3 | Status: SHIPPED | OUTPATIENT
Start: 2023-10-19 | End: 2024-10-18

## 2023-10-19 NOTE — PROGRESS NOTES
"  Subjective     Cy Rutherford Jr. is a 77 y.o. old, male here for Follow-up    78 y/o with PMH of CAD s/p CABG, CVA with resulting mild R hemiplegia, PAD, HTN, HLD, Type 2 DM with retinopathy, CKD stage 3, hypothyroidism, GERD    CAD: stable, followed by Dr. Chan.  HTN: controlled.  PAD: stable claudication, walking regularly.  Psoriasis: using clobetasol prn on LE.  Type 2 DM: using CGM, followed by Dr. Brown, no recent hypoglycemia, BG's <200.  Hypothyroidism: Last TSH's have been 5-9, will increase dose and f/u.    Review of Systems   Constitutional: Negative.    Respiratory: Negative.     Cardiovascular: Negative.      Medications     Outpatient Medications Marked as Taking for the 10/19/23 encounter (Office Visit) with James Sullivan MD   Medication Sig Dispense Refill    allopurinoL (ZYLOPRIM) 100 MG tablet Take 1 tablet (100 mg total) by mouth once daily. 90 tablet 3    amLODIPine (NORVASC) 5 MG tablet Take 1 tablet (5 mg total) by mouth every evening. 90 tablet 6    aspirin (ECOTRIN) 81 MG EC tablet Take 81 mg by mouth every evening.      carvediloL (COREG) 3.125 MG tablet TAKE 1 TABLET TWICE DAILY WITH MEALS 180 tablet 4    clopidogreL (PLAVIX) 75 mg tablet Take 1 tablet (75 mg total) by mouth once daily. 90 tablet 3    doxazosin (CARDURA) 8 MG Tab Take 1 tablet (8 mg total) by mouth every evening. 90 tablet 4    DROPLET PEN NEEDLE 31 gauge x 5/16" Ndle 1 each by Misc.(Non-Drug; Combo Route) route 4 (four) times daily. to inject insulin      ergocalciferol (ERGOCALCIFEROL) 50,000 unit Cap Take 1 capsule (50,000 Units total) by mouth every 7 days. 12 capsule 3    EYLEA 2 mg/0.05 mL Soln 2 mg by Intravitreal route every 28 days.      ezetimibe (ZETIA) 10 mg tablet TAKE 1 TABLET EVERY DAY 90 tablet 4    fenofibrate micronized (LOFIBRA) 134 MG Cap Take 1 capsule (134 mg total) by mouth nightly. 90 capsule 3    fish oil-omega-3 fatty acids 300-1,000 mg capsule Take 1 capsule by mouth every morning. "      INV INSULIN GLARGINE, LANTUS, 100U/ML SOLN FOR INJ SOLOSTAR Inject 70-75 Units into the skin every morning. Per Patient   If BG = 140 give 70 units  If BG = 160-180 give 72 units  If BG = 200 give 75 units      LANTUS SOLOSTAR U-100 INSULIN glargine 100 units/mL SubQ pen       multivitamin with minerals (ONE-A-DAY MAXIMUM FORMULA ORAL) Take 1 tablet by mouth once daily.      pantoprazole (PROTONIX) 40 MG tablet Take 1 tablet (40 mg total) by mouth once daily. 90 tablet 3    potassium chloride SA (K-DUR,KLOR-CON M) 10 MEQ tablet 1/2 Tab p.o. q.day 90 tablet 4    psyllium husk (METAMUCIL ORAL) Take by mouth.      rosuvastatin (CRESTOR) 20 MG tablet Take 1 tablet (20 mg total) by mouth once daily. 90 tablet 3    spironolactone (ALDACTONE) 25 MG tablet Take 0.5 tablets (12.5 mg total) by mouth once daily. 90 tablet 2    torsemide (DEMADEX) 20 MG Tab Take 1 tablet (20 mg total) by mouth once daily. Educated on titratration of diuretic to 2 lb weight gain in 24 hrs, control salt intake and leg elevation. 180 tablet 3    triamcinolone acetonide 0.1% (KENALOG) 0.1 % cream Apply topically 2 (two) times daily. 453.6 g 2    valsartan-hydrochlorothiazide (DIOVAN-HCT) 320-25 mg per tablet TAKE 1 TABLET EVERY MORNING 90 tablet 4    [DISCONTINUED] levothyroxine (SYNTHROID) 75 MCG tablet TAKE 1 TABLET BEFORE BREAKFAST 90 tablet 1     Objective     /64   Pulse 75   Wt 107.2 kg (236 lb 3.6 oz)   SpO2 97%   BMI 34.88 kg/m²   Physical Exam  Constitutional:       General: He is not in acute distress.     Appearance: Normal appearance. He is well-developed.   Cardiovascular:      Rate and Rhythm: Normal rate and regular rhythm.      Heart sounds: No murmur heard.  Pulmonary:      Effort: Pulmonary effort is normal. No respiratory distress.      Breath sounds: Normal breath sounds.   Musculoskeletal:      Comments: Trace edema       Assessment and Plan     Coronary artery disease involving native coronary artery of native  heart without angina pectoris    Type 2 diabetes mellitus with mild nonproliferative retinopathy of both eyes, with long-term current use of insulin, macular edema presence unspecified  -     Ambulatory referral/consult to Optometry; Future; Expected date: 10/26/2023    Chronic diastolic heart failure    Paroxysmal atrial fibrillation    Acquired hypothyroidism  -     levothyroxine (SYNTHROID) 88 MCG tablet; Take 1 tablet (88 mcg total) by mouth before breakfast.  Dispense: 90 tablet; Refill: 3        ___________________  James Sullivan MD  Internal Medicine and Pediatrics

## 2023-11-07 PROBLEM — E66.812 CLASS 2 SEVERE OBESITY DUE TO EXCESS CALORIES WITH SERIOUS COMORBIDITY AND BODY MASS INDEX (BMI) OF 35.0 TO 35.9 IN ADULT: Status: ACTIVE | Noted: 2022-04-12

## 2023-11-16 ENCOUNTER — OFFICE VISIT (OUTPATIENT)
Dept: OPTOMETRY | Facility: CLINIC | Age: 78
End: 2023-11-16
Payer: MEDICARE

## 2023-11-16 DIAGNOSIS — Z79.4 TYPE 2 DIABETES MELLITUS WITH BOTH EYES AFFECTED BY MILD NONPROLIFERATIVE RETINOPATHY WITHOUT MACULAR EDEMA, WITH LONG-TERM CURRENT USE OF INSULIN: Primary | ICD-10-CM

## 2023-11-16 DIAGNOSIS — H35.89 RPE MOTTLING OF MACULA: ICD-10-CM

## 2023-11-16 DIAGNOSIS — H52.4 HYPEROPIA WITH ASTIGMATISM AND PRESBYOPIA, BILATERAL: ICD-10-CM

## 2023-11-16 DIAGNOSIS — H52.03 HYPEROPIA WITH ASTIGMATISM AND PRESBYOPIA, BILATERAL: ICD-10-CM

## 2023-11-16 DIAGNOSIS — H52.203 HYPEROPIA WITH ASTIGMATISM AND PRESBYOPIA, BILATERAL: ICD-10-CM

## 2023-11-16 DIAGNOSIS — E11.3293 TYPE 2 DIABETES MELLITUS WITH BOTH EYES AFFECTED BY MILD NONPROLIFERATIVE RETINOPATHY WITHOUT MACULAR EDEMA, WITH LONG-TERM CURRENT USE OF INSULIN: Primary | ICD-10-CM

## 2023-11-16 DIAGNOSIS — H43.393 VITREOUS FLOATERS OF BOTH EYES: ICD-10-CM

## 2023-11-16 PROCEDURE — 92015 DETERMINE REFRACTIVE STATE: CPT | Mod: HCNC,S$GLB,,

## 2023-11-16 PROCEDURE — 92004 PR EYE EXAM, NEW PATIENT,COMPREHESV: ICD-10-PCS | Mod: HCNC,S$GLB,,

## 2023-11-16 PROCEDURE — 2023F DILAT RTA XM W/O RTNOPTHY: CPT | Mod: HCNC,CPTII,S$GLB,

## 2023-11-16 PROCEDURE — 1159F MED LIST DOCD IN RCRD: CPT | Mod: HCNC,CPTII,S$GLB,

## 2023-11-16 PROCEDURE — 1159F PR MEDICATION LIST DOCUMENTED IN MEDICAL RECORD: ICD-10-PCS | Mod: HCNC,CPTII,S$GLB,

## 2023-11-16 PROCEDURE — 92134 PR COMPUTERIZED OPHTHALMIC IMAGING RETINA: ICD-10-PCS | Mod: HCNC,S$GLB,,

## 2023-11-16 PROCEDURE — 1157F PR ADVANCE CARE PLAN OR EQUIV PRESENT IN MEDICAL RECORD: ICD-10-PCS | Mod: HCNC,CPTII,S$GLB,

## 2023-11-16 PROCEDURE — 1157F ADVNC CARE PLAN IN RCRD: CPT | Mod: HCNC,CPTII,S$GLB,

## 2023-11-16 PROCEDURE — 2023F PR DILATED RETINAL EXAM W/O EVID OF RETINOPATHY: ICD-10-PCS | Mod: HCNC,CPTII,S$GLB,

## 2023-11-16 PROCEDURE — 3288F PR FALLS RISK ASSESSMENT DOCUMENTED: ICD-10-PCS | Mod: HCNC,CPTII,S$GLB,

## 2023-11-16 PROCEDURE — 99999 PR PBB SHADOW E&M-EST. PATIENT-LVL IV: ICD-10-PCS | Mod: PBBFAC,HCNC,,

## 2023-11-16 PROCEDURE — 1126F AMNT PAIN NOTED NONE PRSNT: CPT | Mod: HCNC,CPTII,S$GLB,

## 2023-11-16 PROCEDURE — 1126F PR PAIN SEVERITY QUANTIFIED, NO PAIN PRESENT: ICD-10-PCS | Mod: HCNC,CPTII,S$GLB,

## 2023-11-16 PROCEDURE — 92015 PR REFRACTION: ICD-10-PCS | Mod: HCNC,S$GLB,,

## 2023-11-16 PROCEDURE — 1101F PR PT FALLS ASSESS DOC 0-1 FALLS W/OUT INJ PAST YR: ICD-10-PCS | Mod: HCNC,CPTII,S$GLB,

## 2023-11-16 PROCEDURE — 99999 PR PBB SHADOW E&M-EST. PATIENT-LVL IV: CPT | Mod: PBBFAC,HCNC,,

## 2023-11-16 PROCEDURE — 92134 CPTRZ OPH DX IMG PST SGM RTA: CPT | Mod: HCNC,S$GLB,,

## 2023-11-16 PROCEDURE — 92004 COMPRE OPH EXAM NEW PT 1/>: CPT | Mod: HCNC,S$GLB,,

## 2023-11-16 PROCEDURE — 1101F PT FALLS ASSESS-DOCD LE1/YR: CPT | Mod: HCNC,CPTII,S$GLB,

## 2023-11-16 PROCEDURE — 3288F FALL RISK ASSESSMENT DOCD: CPT | Mod: HCNC,CPTII,S$GLB,

## 2023-11-16 RX ORDER — ERGOCALCIFEROL 1.25 MG/1
CAPSULE ORAL
Qty: 12 CAPSULE | Refills: 10 | Status: SHIPPED | OUTPATIENT
Start: 2023-11-16

## 2023-11-16 RX ORDER — ALLOPURINOL 100 MG/1
100 TABLET ORAL
Qty: 90 TABLET | Refills: 10 | Status: SHIPPED | OUTPATIENT
Start: 2023-11-16

## 2023-11-16 NOTE — PROGRESS NOTES
HPI    Referral - pt here for routine DM exam YAZMIN 2019 (@[facility])    Pt complains of blurred vision w specs, wants updated Rx. Pt has history   of retinal issues w OD that's being treated by Dr. Hinton, sees   q6months. DM and HTN is controlled w aid. Pt notices floaters but no   flashes.     Hemoglobin A1C       Date                     Value               Ref Range             Status                11/01/2023               7.5 (H)             0.0 - 5.6 %           Final                  06/21/2023               6.7 (H)             0.0 - 5.6 %           Final                   12/14/2022               6.3 (H)             0.0 - 5.6 %           Final            Last edited by Hunter Shrestha, OD on 11/16/2023 12:19 PM.            Assessment /Plan     For exam results, see Encounter Report.    Type 2 diabetes mellitus with both eyes affected by mild nonproliferative retinopathy without macular edema, with long-term current use of insulin  -     Ambulatory referral/consult to Optometry    RPE mottling of macula  -     Posterior Segment OCT Retina-Both eyes    Vitreous floaters of both eyes    Hyperopia with astigmatism and presbyopia, bilateral      Few MA's OU, otherwise WNL with no diabetic retinopathy, macular edema, or neovascularization noted OD, OS. Ed pt on importance of maintaining strict BS control due to potential for visual fluctuations and/or vision loss. Monitor with yearly dilated exam.  RPE mottling with mild drusen and disruption of outer retinal layers OD. Pt reports history of injections OD. Pt followed by outside ophthalmologist q6months. Ed pt to continue care and follow-ups as directed. Optom for annual exam needs.  Ed pt on benign nature of vitreous floaters. Reviewed signs and symptoms of a retinal detachment thoroughly and ed pt to RTC asap if experienced.  Discussed spectacle options with pt and released final spec rx. Ed pt on change in rx and adaptation.    RTC: 1 year for comprehensive  exam or sooner prn

## 2024-01-24 DIAGNOSIS — I25.10 CORONARY ARTERY DISEASE INVOLVING NATIVE CORONARY ARTERY OF NATIVE HEART WITHOUT ANGINA PECTORIS: ICD-10-CM

## 2024-01-24 RX ORDER — CLOPIDOGREL BISULFATE 75 MG/1
75 TABLET ORAL
Qty: 90 TABLET | Refills: 2 | Status: SHIPPED | OUTPATIENT
Start: 2024-01-24

## 2024-01-24 NOTE — TELEPHONE ENCOUNTER
No care due was identified.  [facility] Embedded Care Due Messages. Reference number: [id number].   1/24/2024 3:00:52 AM CST

## 2024-01-24 NOTE — TELEPHONE ENCOUNTER
Refill Decision Note   Cy Krish  is requesting a refill authorization.  Brief Assessment and Rationale for Refill:  Approve     Medication Therapy Plan:         Comments:     Note composed:10:23 AM 01/24/2024

## 2024-02-07 ENCOUNTER — TELEPHONE (OUTPATIENT)
Dept: NEPHROLOGY | Facility: CLINIC | Age: 79
End: 2024-02-07
Payer: MEDICARE

## 2024-02-07 NOTE — TELEPHONE ENCOUNTER
Preferred Date Range: 2/27/2024 - 2/29/2024      Preferred Times: Tuesday Morning, Thursday Morning      Reason for visit: I need you to schedule a lab appointment for me just before my March appointment with you at the [facility] at [address] [place] [address]

## 2024-02-08 NOTE — TELEPHONE ENCOUNTER
Jackie Martinez presents to ED with c/o weakness, dizziness and body aches. Tested positive for COVID-19 on 9/23/20 (3 days ago). When patient was tested 3 days ago, she was asymptomatic and was tested as protocol for pre-surgery testing.   addressed

## 2024-02-22 NOTE — PROGRESS NOTES
Digital Medicine: Clinician Follow-Up    Called patient for digital medicine follow-up. Mr. Rutherford says that he is doing ok since [facility]. His home readings are a little elevated since stopping losartan and amlodipine, but he denies experiencing s/s of hypertension. Confirmed that patient has been checking his blood pressure using the correct technique and he has. Also, says that home health has been checking it as well and the readings are the same.     The history is provided by the patient. No  was used.     Follow Up  Follow-up reason(s): reading review              Assessment:  Patient's current 30-day average is not at goal of <130/80 mmHg.       Plan:  Recommend restarting amlodipine however, patient would like to consult with cardiologist first.  Agreed that I would send a message and follow-up after I receive a response from Dr. Sanchez.   Patients health , Crystal Goode, will follow-up as scheduled.    I will continue to monitor regularly and will follow-up in 3-4 weeks, sooner if blood pressure begins to trend upward or downward.     Patient has my contact information and knows to call with any concerns or clinical changes.                Topic    Urine Protein Check     Eye Exam        Last 5 Patient Entered Readings                                      Current 30 Day Average: 145/67     Recent Readings 2/12/2020 2/10/2020 2/7/2020 2/6/2020 2/4/2020    SBP (mmHg) 147 146 139 156 143    DBP (mmHg) 65 60 60 66 66    Pulse 55 58 66 60 62             Hypertension Medications             doxazosin (CARDURA) 8 MG Tab Take 1 tablet (8 mg total) by mouth every evening.    furosemide (LASIX) 80 MG tablet Take 1 tablet (80 mg total) by mouth 2 (two) times daily.    metoprolol succinate (TOPROL-XL) 100 MG 24 hr tablet Take 1 tablet (100 mg total) by mouth 2 (two) times daily.                 Screenings   Imaging Studies/Medications

## 2024-02-27 ENCOUNTER — LAB VISIT (OUTPATIENT)
Dept: LAB | Facility: HOSPITAL | Age: 79
End: 2024-02-27
Payer: MEDICARE

## 2024-02-27 DIAGNOSIS — N18.32 TYPE 2 DIABETES MELLITUS WITH STAGE 3B CHRONIC KIDNEY DISEASE, WITH LONG-TERM CURRENT USE OF INSULIN: ICD-10-CM

## 2024-02-27 DIAGNOSIS — N18.32 STAGE 3B CHRONIC KIDNEY DISEASE: ICD-10-CM

## 2024-02-27 DIAGNOSIS — Z95.1 S/P CABG (CORONARY ARTERY BYPASS GRAFT): ICD-10-CM

## 2024-02-27 DIAGNOSIS — Z79.4 TYPE 2 DIABETES MELLITUS WITH STAGE 3B CHRONIC KIDNEY DISEASE, WITH LONG-TERM CURRENT USE OF INSULIN: ICD-10-CM

## 2024-02-27 DIAGNOSIS — Z95.5 STENTED CORONARY ARTERY: ICD-10-CM

## 2024-02-27 DIAGNOSIS — I48.0 PAROXYSMAL ATRIAL FIBRILLATION: ICD-10-CM

## 2024-02-27 DIAGNOSIS — I10 ESSENTIAL HYPERTENSION: ICD-10-CM

## 2024-02-27 DIAGNOSIS — E11.22 TYPE 2 DIABETES MELLITUS WITH STAGE 3B CHRONIC KIDNEY DISEASE, WITH LONG-TERM CURRENT USE OF INSULIN: ICD-10-CM

## 2024-02-27 DIAGNOSIS — E66.01 MORBID (SEVERE) OBESITY DUE TO EXCESS CALORIES: ICD-10-CM

## 2024-02-27 DIAGNOSIS — D47.2 MONOCLONAL PARAPROTEINEMIA: ICD-10-CM

## 2024-02-27 DIAGNOSIS — I50.32 CHRONIC DIASTOLIC HEART FAILURE: ICD-10-CM

## 2024-02-27 LAB
ALBUMIN SERPL BCP-MCNC: 3.5 G/DL (ref 3.5–5.2)
ANION GAP SERPL CALC-SCNC: 12 MMOL/L (ref 8–16)
BASOPHILS # BLD AUTO: 0.03 K/UL (ref 0–0.2)
BASOPHILS NFR BLD: 0.5 % (ref 0–1.9)
BUN SERPL-MCNC: 37 MG/DL (ref 8–23)
CALCIUM SERPL-MCNC: 9.6 MG/DL (ref 8.7–10.5)
CHLORIDE SERPL-SCNC: 101 MMOL/L (ref 95–110)
CO2 SERPL-SCNC: 27 MMOL/L (ref 23–29)
CREAT SERPL-MCNC: 2.1 MG/DL (ref 0.5–1.4)
DIFFERENTIAL METHOD BLD: ABNORMAL
EOSINOPHIL # BLD AUTO: 0.1 K/UL (ref 0–0.5)
EOSINOPHIL NFR BLD: 1.8 % (ref 0–8)
ERYTHROCYTE [DISTWIDTH] IN BLOOD BY AUTOMATED COUNT: 14.5 % (ref 11.5–14.5)
EST. GFR  (NO RACE VARIABLE): 31.2 ML/MIN/1.73 M^2
GLUCOSE SERPL-MCNC: 143 MG/DL (ref 70–110)
HCT VFR BLD AUTO: 40.1 % (ref 40–54)
HGB BLD-MCNC: 12.6 G/DL (ref 14–18)
IMM GRANULOCYTES # BLD AUTO: 0.03 K/UL (ref 0–0.04)
IMM GRANULOCYTES NFR BLD AUTO: 0.5 % (ref 0–0.5)
LYMPHOCYTES # BLD AUTO: 0.9 K/UL (ref 1–4.8)
LYMPHOCYTES NFR BLD: 15.8 % (ref 18–48)
MCH RBC QN AUTO: 29.4 PG (ref 27–31)
MCHC RBC AUTO-ENTMCNC: 31.4 G/DL (ref 32–36)
MCV RBC AUTO: 94 FL (ref 82–98)
MONOCYTES # BLD AUTO: 0.5 K/UL (ref 0.3–1)
MONOCYTES NFR BLD: 8.1 % (ref 4–15)
NEUTROPHILS # BLD AUTO: 4.4 K/UL (ref 1.8–7.7)
NEUTROPHILS NFR BLD: 73.3 % (ref 38–73)
NRBC BLD-RTO: 0 /100 WBC
PHOSPHATE SERPL-MCNC: 3.4 MG/DL (ref 2.7–4.5)
PLATELET # BLD AUTO: 214 K/UL (ref 150–450)
PMV BLD AUTO: 11.3 FL (ref 9.2–12.9)
POTASSIUM SERPL-SCNC: 4.5 MMOL/L (ref 3.5–5.1)
PTH-INTACT SERPL-MCNC: 96.3 PG/ML (ref 9–77)
RBC # BLD AUTO: 4.28 M/UL (ref 4.6–6.2)
SODIUM SERPL-SCNC: 140 MMOL/L (ref 136–145)
URATE SERPL-MCNC: 6.9 MG/DL (ref 3.4–7)
WBC # BLD AUTO: 5.96 K/UL (ref 3.9–12.7)

## 2024-02-27 PROCEDURE — 86334 IMMUNOFIX E-PHORESIS SERUM: CPT | Mod: HCNC | Performed by: INTERNAL MEDICINE

## 2024-02-27 PROCEDURE — 83521 IG LIGHT CHAINS FREE EACH: CPT | Mod: 59,HCNC | Performed by: INTERNAL MEDICINE

## 2024-02-27 PROCEDURE — 84550 ASSAY OF BLOOD/URIC ACID: CPT | Mod: HCNC | Performed by: INTERNAL MEDICINE

## 2024-02-27 PROCEDURE — 84165 PROTEIN E-PHORESIS SERUM: CPT | Mod: 26,HCNC,, | Performed by: PATHOLOGY

## 2024-02-27 PROCEDURE — 36415 COLL VENOUS BLD VENIPUNCTURE: CPT | Mod: HCNC,PO | Performed by: INTERNAL MEDICINE

## 2024-02-27 PROCEDURE — 85025 COMPLETE CBC W/AUTO DIFF WBC: CPT | Mod: HCNC | Performed by: INTERNAL MEDICINE

## 2024-02-27 PROCEDURE — 84165 PROTEIN E-PHORESIS SERUM: CPT | Mod: HCNC | Performed by: INTERNAL MEDICINE

## 2024-02-27 PROCEDURE — 83970 ASSAY OF PARATHORMONE: CPT | Mod: HCNC | Performed by: INTERNAL MEDICINE

## 2024-02-27 PROCEDURE — 80069 RENAL FUNCTION PANEL: CPT | Mod: HCNC | Performed by: INTERNAL MEDICINE

## 2024-02-27 PROCEDURE — 86334 IMMUNOFIX E-PHORESIS SERUM: CPT | Mod: 26,HCNC,, | Performed by: PATHOLOGY

## 2024-02-28 LAB
ALBUMIN SERPL ELPH-MCNC: 3.89 G/DL (ref 3.35–5.55)
ALPHA1 GLOB SERPL ELPH-MCNC: 0.31 G/DL (ref 0.17–0.41)
ALPHA2 GLOB SERPL ELPH-MCNC: 0.72 G/DL (ref 0.43–0.99)
B-GLOBULIN SERPL ELPH-MCNC: 0.81 G/DL (ref 0.5–1.1)
GAMMA GLOB SERPL ELPH-MCNC: 0.77 G/DL (ref 0.67–1.58)
KAPPA LC SER QL IA: 4.04 MG/DL (ref 0.33–1.94)
KAPPA LC/LAMBDA SER IA: 2.07 (ref 0.26–1.65)
LAMBDA LC SER QL IA: 1.95 MG/DL (ref 0.57–2.63)
PROT SERPL-MCNC: 6.5 G/DL (ref 6–8.4)

## 2024-02-29 LAB — PATHOLOGIST INTERPRETATION SPE: NORMAL

## 2024-03-01 LAB — INTERPRETATION SERPL IFE-IMP: NORMAL

## 2024-03-02 DIAGNOSIS — E78.5 DYSLIPIDEMIA: ICD-10-CM

## 2024-03-02 RX ORDER — ROSUVASTATIN CALCIUM 20 MG/1
20 TABLET, COATED ORAL
Qty: 90 TABLET | Refills: 2 | Status: SHIPPED | OUTPATIENT
Start: 2024-03-02

## 2024-03-02 NOTE — TELEPHONE ENCOUNTER
No care due was identified.  [facility] Embedded Care Due Messages. Reference number: [id number].   3/02/2024 2:20:58 AM CST

## 2024-03-02 NOTE — TELEPHONE ENCOUNTER
Refill Decision Note   Cy Garciasdden  is requesting a refill authorization.  Brief Assessment and Rationale for Refill:  Approve     Medication Therapy Plan:       Medication Reconciliation Completed: No   Comments:     No Care Gaps recommended.     Note composed:10:51 AM 03/02/2024

## 2024-03-05 ENCOUNTER — OFFICE VISIT (OUTPATIENT)
Dept: NEPHROLOGY | Facility: CLINIC | Age: 79
End: 2024-03-05
Payer: MEDICARE

## 2024-03-05 VITALS
WEIGHT: 238.13 LBS | HEIGHT: 69 IN | OXYGEN SATURATION: 95 % | DIASTOLIC BLOOD PRESSURE: 54 MMHG | SYSTOLIC BLOOD PRESSURE: 140 MMHG | HEART RATE: 59 BPM | BODY MASS INDEX: 35.27 KG/M2

## 2024-03-05 DIAGNOSIS — E11.22 TYPE 2 DIABETES MELLITUS WITH STAGE 3B CHRONIC KIDNEY DISEASE, WITH LONG-TERM CURRENT USE OF INSULIN: ICD-10-CM

## 2024-03-05 DIAGNOSIS — Z79.4 TYPE 2 DIABETES MELLITUS WITH STAGE 3B CHRONIC KIDNEY DISEASE, WITH LONG-TERM CURRENT USE OF INSULIN: ICD-10-CM

## 2024-03-05 DIAGNOSIS — I10 ESSENTIAL HYPERTENSION: ICD-10-CM

## 2024-03-05 DIAGNOSIS — N18.32 TYPE 2 DIABETES MELLITUS WITH STAGE 3B CHRONIC KIDNEY DISEASE, WITH LONG-TERM CURRENT USE OF INSULIN: ICD-10-CM

## 2024-03-05 DIAGNOSIS — N18.32 STAGE 3B CHRONIC KIDNEY DISEASE: Primary | ICD-10-CM

## 2024-03-05 DIAGNOSIS — E78.5 DYSLIPIDEMIA: ICD-10-CM

## 2024-03-05 DIAGNOSIS — D47.2 MONOCLONAL PARAPROTEINEMIA: ICD-10-CM

## 2024-03-05 PROCEDURE — 3072F LOW RISK FOR RETINOPATHY: CPT | Mod: HCNC,CPTII,S$GLB, | Performed by: STUDENT IN AN ORGANIZED HEALTH CARE EDUCATION/TRAINING PROGRAM

## 2024-03-05 PROCEDURE — 1157F ADVNC CARE PLAN IN RCRD: CPT | Mod: HCNC,CPTII,S$GLB, | Performed by: STUDENT IN AN ORGANIZED HEALTH CARE EDUCATION/TRAINING PROGRAM

## 2024-03-05 PROCEDURE — 3077F SYST BP >= 140 MM HG: CPT | Mod: HCNC,CPTII,S$GLB, | Performed by: STUDENT IN AN ORGANIZED HEALTH CARE EDUCATION/TRAINING PROGRAM

## 2024-03-05 PROCEDURE — 3078F DIAST BP <80 MM HG: CPT | Mod: HCNC,CPTII,S$GLB, | Performed by: STUDENT IN AN ORGANIZED HEALTH CARE EDUCATION/TRAINING PROGRAM

## 2024-03-05 PROCEDURE — 99214 OFFICE O/P EST MOD 30 MIN: CPT | Mod: HCNC,S$GLB,, | Performed by: STUDENT IN AN ORGANIZED HEALTH CARE EDUCATION/TRAINING PROGRAM

## 2024-03-05 PROCEDURE — 3288F FALL RISK ASSESSMENT DOCD: CPT | Mod: HCNC,CPTII,S$GLB, | Performed by: STUDENT IN AN ORGANIZED HEALTH CARE EDUCATION/TRAINING PROGRAM

## 2024-03-05 PROCEDURE — 1159F MED LIST DOCD IN RCRD: CPT | Mod: HCNC,CPTII,S$GLB, | Performed by: STUDENT IN AN ORGANIZED HEALTH CARE EDUCATION/TRAINING PROGRAM

## 2024-03-05 PROCEDURE — 1101F PT FALLS ASSESS-DOCD LE1/YR: CPT | Mod: HCNC,CPTII,S$GLB, | Performed by: STUDENT IN AN ORGANIZED HEALTH CARE EDUCATION/TRAINING PROGRAM

## 2024-03-05 PROCEDURE — 99999 PR PBB SHADOW E&M-EST. PATIENT-LVL IV: CPT | Mod: PBBFAC,HCNC,, | Performed by: STUDENT IN AN ORGANIZED HEALTH CARE EDUCATION/TRAINING PROGRAM

## 2024-03-05 RX ORDER — DESOXIMETASONE 2.5 MG/G
CREAM TOPICAL
COMMUNITY

## 2024-03-05 RX ORDER — BLOOD SUGAR DIAGNOSTIC
STRIP MISCELLANEOUS
COMMUNITY
Start: 2024-01-17

## 2024-03-05 RX ORDER — LANCETS
EACH MISCELLANEOUS
COMMUNITY
Start: 2024-01-12

## 2024-03-05 NOTE — PROGRESS NOTES
"Subjective:       Patient ID: Cy Rutherford Jr. is a 78 y.o. White male who presents for follow up on CKD.     Since last seen at nephrology clinic, Cy Rutherford Jr. denies any new hospitalizations or new medications. Pertinent medical history for CKD, DM on long term insulin, hypertension, CAD s/p stenting.     Interval History:  3/5/24 -seen today for ongoing evaluation and management of CKD. Labs reviewed with patient at chairside. At baseline kidney function.  Light chain ratio is acceptable for her level of chronic kidney disease, we will continue to follow.    Review of Systems   Constitutional:  Negative for chills, fatigue and fever.   HENT:  Negative for hearing loss, trouble swallowing and voice change.    Respiratory:  Negative for cough, shortness of breath and wheezing.    Cardiovascular:  Negative for chest pain, palpitations and leg swelling.   Gastrointestinal:  Negative for abdominal distention, abdominal pain, constipation and diarrhea.   Endocrine: Negative for polydipsia, polyphagia and polyuria.   Genitourinary:  Negative for dysuria, flank pain, frequency and hematuria.   Musculoskeletal:  Negative for arthralgias, back pain and myalgias.   Skin:  Negative for rash and wound.   Neurological:  Negative for dizziness, syncope, weakness and light-headedness.   Hematological:  Negative for adenopathy. Does not bruise/bleed easily.       The past medical, family and social histories were reviewed for this encounter.     BP (!) 140/54 (BP Location: Right arm, Patient Position: Sitting, BP Method: Large (Manual))   Pulse (!) 59   Ht 5' 9" (1.753 m)   Wt 108 kg (238 lb 1.6 oz)   SpO2 95%   BMI 35.16 kg/m²     Objective:      Physical Exam  Vitals reviewed.   Constitutional:       General: He is not in acute distress.     Appearance: Normal appearance. He is well-developed. He is obese. He is not ill-appearing.   HENT:      Head: Normocephalic and atraumatic.      Mouth/Throat:      " Mouth: Mucous membranes are moist.      Pharynx: Oropharynx is clear.   Eyes:      General: No scleral icterus.     Extraocular Movements: Extraocular movements intact.      Conjunctiva/sclera: Conjunctivae normal.   Neck:      Vascular: No JVD.   Cardiovascular:      Rate and Rhythm: Normal rate and regular rhythm.      Heart sounds: Normal heart sounds. No murmur heard.     No friction rub. No gallop.   Pulmonary:      Effort: Pulmonary effort is normal. No respiratory distress.      Breath sounds: Normal breath sounds. No wheezing or rales.   Abdominal:      General: Bowel sounds are normal. There is no distension.      Palpations: Abdomen is soft.      Tenderness: There is no abdominal tenderness.   Musculoskeletal:         General: No tenderness.      Cervical back: Normal range of motion.      Right lower leg: No edema.      Left lower leg: No edema.   Skin:     General: Skin is warm and dry.      Capillary Refill: Capillary refill takes less than 2 seconds.      Findings: No rash.   Neurological:      General: No focal deficit present.      Mental Status: He is alert and oriented to person, place, and time.      Gait: Gait abnormal.   Psychiatric:         Mood and Affect: Mood normal.         Behavior: Behavior normal.         Assessment:       Lab Results   Component Value Date     02/27/2024    K 4.5 02/27/2024     02/27/2024    CO2 27 02/27/2024    BUN 37 (H) 02/27/2024    CREATININE 2.1 (H) 02/27/2024    CALCIUM 9.6 02/27/2024    ALBUMIN 3.5 02/27/2024    PHOS 3.4 02/27/2024     Lab Results   Component Value Date    MICALBCREAT Unable to calculate 11/01/2023    MICALBCREAT Unable to calculate 09/13/2023    MICALBCREAT Unable to calculate 06/21/2023    MICALBCREAT Unable to calculate 12/14/2022    MICALBCREAT Unable to calculate 07/06/2022    MICALBCREAT 17.2 03/30/2022       1. Stage 3b chronic kidney disease    2. Type 2 diabetes mellitus with stage 3b chronic kidney disease, with long-term  current use of insulin    3. Essential hypertension    4. Dyslipidemia    5. Monoclonal paraproteinemia          Plan:   Return to clinic in 4 months.  Labs for next visit include UPCR, PTH, RFP.  Baseline creatinine is 1.8-2.1mg/dL.      CKD in a setting of DM/HTN, high pre-test probability of diabetic nephropathy, CR and DIRK requiring dialysis in 2019  -renal fxn at baseline today.   -albuminuria well controlled  -on RASI for CKD-MACE risk reduction, proteinuria reduction and kevin-protection  -continue discussion and adjustment of modifiable risk factors. Educated patient on the importance of BP, glycemic and lipid control   -per TANGRI calculator, he has less than 5% risk of ESRD in 5 years.       HTN - managed primarily by cardiology, currently on diovan-hctz, carvedilol, amber, torsemide, CCB, and doxazosin     DM with nephropathy   - DM with diabetic retinopathy: well controlled with most recent HgbA1c of 6.3%, continue yearly optho checks    Anemia - Hct improved, no longer meets true criteria for anemia. Will follow LC ratio annually, last checked 2/2024    SHPT - continue once weekly vit d until current bottle runs out, then switch to daily    HF - Cont diuretics    CAD and PAF  - Guideline directed therapy per cardiology    Obesity  - Calorie restriction    Paraprotein   - appears improved on most recent assessment LCR is acceptable for CKD      Lyle Escamilla MD  [facility] [place]

## 2024-03-06 LAB — PATHOLOGIST INTERPRETATION IFE: NORMAL

## 2024-04-04 ENCOUNTER — OFFICE VISIT (OUTPATIENT)
Dept: FAMILY MEDICINE | Facility: CLINIC | Age: 79
End: 2024-04-04
Payer: MEDICARE

## 2024-04-04 VITALS
DIASTOLIC BLOOD PRESSURE: 64 MMHG | SYSTOLIC BLOOD PRESSURE: 122 MMHG | BODY MASS INDEX: 36.18 KG/M2 | WEIGHT: 244.25 LBS | OXYGEN SATURATION: 97 % | HEIGHT: 69 IN | HEART RATE: 64 BPM

## 2024-04-04 DIAGNOSIS — E66.01 CLASS 2 SEVERE OBESITY DUE TO EXCESS CALORIES WITH SERIOUS COMORBIDITY AND BODY MASS INDEX (BMI) OF 35.0 TO 35.9 IN ADULT: ICD-10-CM

## 2024-04-04 DIAGNOSIS — N25.81 SECONDARY HYPERPARATHYROIDISM OF RENAL ORIGIN: ICD-10-CM

## 2024-04-04 DIAGNOSIS — N18.32 TYPE 2 DIABETES MELLITUS WITH STAGE 3B CHRONIC KIDNEY DISEASE, WITH LONG-TERM CURRENT USE OF INSULIN: ICD-10-CM

## 2024-04-04 DIAGNOSIS — N18.32 STAGE 3B CHRONIC KIDNEY DISEASE: ICD-10-CM

## 2024-04-04 DIAGNOSIS — I70.0 AORTIC ATHEROSCLEROSIS: ICD-10-CM

## 2024-04-04 DIAGNOSIS — I50.32 CHRONIC DIASTOLIC HEART FAILURE: ICD-10-CM

## 2024-04-04 DIAGNOSIS — G81.91 RIGHT HEMIPLEGIA: ICD-10-CM

## 2024-04-04 DIAGNOSIS — Z95.1 S/P CABG (CORONARY ARTERY BYPASS GRAFT): ICD-10-CM

## 2024-04-04 DIAGNOSIS — Z79.4 TYPE 2 DIABETES MELLITUS WITH MILD NONPROLIFERATIVE RETINOPATHY OF BOTH EYES, WITH LONG-TERM CURRENT USE OF INSULIN, MACULAR EDEMA PRESENCE UNSPECIFIED: Primary | ICD-10-CM

## 2024-04-04 DIAGNOSIS — E03.9 ACQUIRED HYPOTHYROIDISM: ICD-10-CM

## 2024-04-04 DIAGNOSIS — Z79.4 TYPE 2 DIABETES MELLITUS WITH STAGE 3B CHRONIC KIDNEY DISEASE, WITH LONG-TERM CURRENT USE OF INSULIN: ICD-10-CM

## 2024-04-04 DIAGNOSIS — I73.9 CLAUDICATION: ICD-10-CM

## 2024-04-04 DIAGNOSIS — E11.3293 TYPE 2 DIABETES MELLITUS WITH MILD NONPROLIFERATIVE RETINOPATHY OF BOTH EYES, WITH LONG-TERM CURRENT USE OF INSULIN, MACULAR EDEMA PRESENCE UNSPECIFIED: Primary | ICD-10-CM

## 2024-04-04 DIAGNOSIS — Z79.4 TYPE 2 DIABETES MELLITUS WITH RIGHT EYE AFFECTED BY MILD NONPROLIFERATIVE RETINOPATHY AND MACULAR EDEMA, WITH LONG-TERM CURRENT USE OF INSULIN: ICD-10-CM

## 2024-04-04 DIAGNOSIS — I48.0 PAROXYSMAL ATRIAL FIBRILLATION: ICD-10-CM

## 2024-04-04 DIAGNOSIS — H35.3290 EXUDATIVE AGE-RELATED MACULAR DEGENERATION, UNSPECIFIED LATERALITY, UNSPECIFIED STAGE: ICD-10-CM

## 2024-04-04 DIAGNOSIS — E11.22 TYPE 2 DIABETES MELLITUS WITH STAGE 3B CHRONIC KIDNEY DISEASE, WITH LONG-TERM CURRENT USE OF INSULIN: ICD-10-CM

## 2024-04-04 DIAGNOSIS — E11.3211 TYPE 2 DIABETES MELLITUS WITH RIGHT EYE AFFECTED BY MILD NONPROLIFERATIVE RETINOPATHY AND MACULAR EDEMA, WITH LONG-TERM CURRENT USE OF INSULIN: ICD-10-CM

## 2024-04-04 PROBLEM — K22.10 ULCER OF ESOPHAGUS WITHOUT BLEEDING: Status: RESOLVED | Noted: 2019-12-12 | Resolved: 2024-04-04

## 2024-04-04 PROCEDURE — 1160F RVW MEDS BY RX/DR IN RCRD: CPT | Mod: HCNC,CPTII,S$GLB, | Performed by: INTERNAL MEDICINE

## 2024-04-04 PROCEDURE — 3288F FALL RISK ASSESSMENT DOCD: CPT | Mod: HCNC,CPTII,S$GLB, | Performed by: INTERNAL MEDICINE

## 2024-04-04 PROCEDURE — 1157F ADVNC CARE PLAN IN RCRD: CPT | Mod: HCNC,CPTII,S$GLB, | Performed by: INTERNAL MEDICINE

## 2024-04-04 PROCEDURE — 3072F LOW RISK FOR RETINOPATHY: CPT | Mod: HCNC,CPTII,S$GLB, | Performed by: INTERNAL MEDICINE

## 2024-04-04 PROCEDURE — 99214 OFFICE O/P EST MOD 30 MIN: CPT | Mod: HCNC,S$GLB,, | Performed by: INTERNAL MEDICINE

## 2024-04-04 PROCEDURE — 3078F DIAST BP <80 MM HG: CPT | Mod: HCNC,CPTII,S$GLB, | Performed by: INTERNAL MEDICINE

## 2024-04-04 PROCEDURE — 1126F AMNT PAIN NOTED NONE PRSNT: CPT | Mod: HCNC,CPTII,S$GLB, | Performed by: INTERNAL MEDICINE

## 2024-04-04 PROCEDURE — 1101F PT FALLS ASSESS-DOCD LE1/YR: CPT | Mod: HCNC,CPTII,S$GLB, | Performed by: INTERNAL MEDICINE

## 2024-04-04 PROCEDURE — 1159F MED LIST DOCD IN RCRD: CPT | Mod: HCNC,CPTII,S$GLB, | Performed by: INTERNAL MEDICINE

## 2024-04-04 PROCEDURE — 99999 PR PBB SHADOW E&M-EST. PATIENT-LVL V: CPT | Mod: PBBFAC,HCNC,, | Performed by: INTERNAL MEDICINE

## 2024-04-04 PROCEDURE — 3074F SYST BP LT 130 MM HG: CPT | Mod: HCNC,CPTII,S$GLB, | Performed by: INTERNAL MEDICINE

## 2024-04-04 RX ORDER — INSULIN ASPART 100 [IU]/ML
INJECTION, SOLUTION INTRAVENOUS; SUBCUTANEOUS
COMMUNITY

## 2024-04-04 NOTE — PROGRESS NOTES
Subjective     Cy Rutherford Jr. is a 78 y.o. old, male here for Follow-up    77 y/o with PMH of CAD s/p CABG, CVA with resulting mild R hemiplegia, PAD, HTN, HLD, Type 2 DM with retinopathy, CKD stage 3, hypothyroidism, GERD     He has had weight gain the past month and increased LE edema R>L  Wt Readings from Last 3 Encounters:   04/04/24 0848 110.8 kg (244 lb 4.3 oz)   03/05/24 0950 108 kg (238 lb 1.6 oz)   11/07/23 1047 108 kg (238 lb)   He doubled up on his torsemide for a week or two with no significant change.    CAD: stable  R hemiplegia stable, using walker. Stable claudication left leg.  HTN: stable  Type 2 DM: Followed by Dr. Brown, has an insulin pump, no hypoglycemia. Last A1C done recently at [facility] - does not know results yet.  CKD: followed by nephrology, creatinine and U protein stable    ROS  Medications     Outpatient Medications Marked as Taking for the 4/4/24 encounter (Office Visit) with James Sullivan MD   Medication Sig Dispense Refill    ACCU-CHEK FASTCLIX LANCET DRUM Misc Use four times a day for 90 days      ACCU-CHEK GUIDE TEST STRIPS Strp       allopurinoL (ZYLOPRIM) 100 MG tablet TAKE 1 TABLET ONE TIME DAILY 90 tablet 10    amLODIPine (NORVASC) 5 MG tablet Take 1 tablet (5 mg total) by mouth 2 (two) times daily. 180 tablet 1    aspirin (ECOTRIN) 81 MG EC tablet Take 81 mg by mouth every evening.      carvediloL (COREG) 3.125 MG tablet TAKE 1 TABLET TWICE DAILY WITH MEALS 180 tablet 4    clopidogreL (PLAVIX) 75 mg tablet TAKE 1 TABLET ONE TIME DAILY 90 tablet 2    desoximetasone (TOPICORT) 0.25 % cream Apply topically to Psoriasis      doxazosin (CARDURA) 8 MG Tab Take 1 tablet (8 mg total) by mouth every evening. 90 tablet 4    ergocalciferol (ERGOCALCIFEROL) 50,000 unit Cap TAKE 1 CAPSULE EVERY 7 DAYS 12 capsule 10    EYLEA 2 mg/0.05 mL Soln 2 mg by Intravitreal route every 28 days.      ezetimibe (ZETIA) 10 mg tablet TAKE 1 TABLET EVERY DAY 90 tablet 4     "fenofibrate micronized (LOFIBRA) 134 MG Cap Take 1 capsule (134 mg total) by mouth nightly. 90 capsule 3    fish oil-omega-3 fatty acids 300-1,000 mg capsule Take 1 capsule by mouth every morning.      levothyroxine (SYNTHROID) 88 MCG tablet Take 1 tablet (88 mcg total) by mouth before breakfast. 90 tablet 3    multivitamin with minerals (ONE-A-DAY MAXIMUM FORMULA ORAL) Take 1 tablet by mouth once daily.      NOVOLOG U-100 INSULIN ASPART 100 unit/mL injection Inject into the skin.      pantoprazole (PROTONIX) 40 MG tablet Take 1 tablet (40 mg total) by mouth once daily. 90 tablet 3    potassium chloride SA (K-DUR,KLOR-CON M) 10 MEQ tablet 1/2 Tab p.o. q.day 90 tablet 4    psyllium husk (METAMUCIL ORAL) Take by mouth.      rosuvastatin (CRESTOR) 20 MG tablet TAKE 1 TABLET ONE TIME DAILY 90 tablet 2    spironolactone (ALDACTONE) 25 MG tablet Take 0.5 tablets (12.5 mg total) by mouth once daily. 90 tablet 2    torsemide (DEMADEX) 20 MG Tab Take 1 tablet (20 mg total) by mouth once daily. Educated on titratration of diuretic to 2 lb weight gain in 24 hrs, control salt intake and leg elevation. 180 tablet 3    triamcinolone acetonide 0.1% (KENALOG) 0.1 % cream Apply topically 2 (two) times daily. 453.6 g 2    valsartan-hydrochlorothiazide (DIOVAN-HCT) 320-25 mg per tablet TAKE 1 TABLET EVERY MORNING 90 tablet 4     Objective     /64   Pulse 64   Ht 5' 9" (1.753 m)   Wt 110.8 kg (244 lb 4.3 oz)   SpO2 97%   BMI 36.07 kg/m²   Physical Exam  Constitutional:       General: He is not in acute distress.     Appearance: Normal appearance. He is well-developed. He is obese.   Cardiovascular:      Rate and Rhythm: Normal rate and regular rhythm.      Heart sounds: No murmur heard.  Pulmonary:      Effort: Pulmonary effort is normal. No respiratory distress.      Breath sounds: Normal breath sounds.   Musculoskeletal:      Right lower leg: Edema present.      Left lower leg: Edema present.      Comments: 2+ RLE edema, " 1+ LLE edema   Neurological:      Comments: R hemiparesis       Assessment and Plan     Type 2 diabetes mellitus with mild nonproliferative retinopathy of both eyes, with long-term current use of insulin, macular edema presence unspecified    Paroxysmal atrial fibrillation    Aortic atherosclerosis    Acquired hypothyroidism    S/P CABG (coronary artery bypass graft)    Stage 3b chronic kidney disease    Type 2 diabetes mellitus with stage 3b chronic kidney disease, with long-term current use of insulin    Claudication    Chronic diastolic heart failure    Right hemiplegia    Type 2 diabetes mellitus with right eye affected by mild nonproliferative retinopathy and macular edema, with long-term current use of insulin    Class 2 severe obesity due to excess calories with serious comorbidity and body mass index (BMI) of 35.0 to 35.9 in adult    Exudative age-related macular degeneration, unspecified laterality, unspecified stage    Secondary hyperparathyroidism of renal origin      I would suggest increasing torsemide to 60 mg for a few days and then continue 40 mg and consider increasing spironolactone. He will consult with cardiology  ___________________  James Sullivan MD  Internal Medicine and Pediatrics

## 2024-04-16 ENCOUNTER — LAB VISIT (OUTPATIENT)
Dept: LAB | Facility: HOSPITAL | Age: 79
End: 2024-04-16
Attending: NURSE PRACTITIONER
Payer: MEDICARE

## 2024-04-16 DIAGNOSIS — I50.32 CHRONIC DIASTOLIC HEART FAILURE: ICD-10-CM

## 2024-04-16 LAB
ANION GAP SERPL CALC-SCNC: 9 MMOL/L (ref 8–16)
BUN SERPL-MCNC: 48 MG/DL (ref 8–23)
CALCIUM SERPL-MCNC: 9.7 MG/DL (ref 8.7–10.5)
CHLORIDE SERPL-SCNC: 99 MMOL/L (ref 95–110)
CO2 SERPL-SCNC: 32 MMOL/L (ref 23–29)
CREAT SERPL-MCNC: 2.2 MG/DL (ref 0.5–1.4)
EST. GFR  (NO RACE VARIABLE): 29.5 ML/MIN/1.73 M^2
GLUCOSE SERPL-MCNC: 107 MG/DL (ref 70–110)
POTASSIUM SERPL-SCNC: 4.8 MMOL/L (ref 3.5–5.1)
SODIUM SERPL-SCNC: 140 MMOL/L (ref 136–145)

## 2024-04-16 PROCEDURE — 36415 COLL VENOUS BLD VENIPUNCTURE: CPT | Mod: HCNC,PO | Performed by: NURSE PRACTITIONER

## 2024-04-16 PROCEDURE — 80048 BASIC METABOLIC PNL TOTAL CA: CPT | Mod: HCNC | Performed by: NURSE PRACTITIONER

## 2024-05-31 ENCOUNTER — TELEPHONE (OUTPATIENT)
Dept: NEPHROLOGY | Facility: CLINIC | Age: 79
End: 2024-05-31
Payer: MEDICARE

## 2024-05-31 DIAGNOSIS — I10 ESSENTIAL HYPERTENSION: ICD-10-CM

## 2024-05-31 RX ORDER — AMLODIPINE BESYLATE 5 MG/1
5 TABLET ORAL 2 TIMES DAILY
Qty: 180 TABLET | Refills: 3 | Status: SHIPPED | OUTPATIENT
Start: 2024-05-31

## 2024-05-31 NOTE — TELEPHONE ENCOUNTER
No care due was identified.  [facility] Embedded Care Due Messages. Reference number: [id number].   5/31/2024 1:24:45 AM CDT

## 2024-05-31 NOTE — TELEPHONE ENCOUNTER
Refill Decision Note   Cy Krish  is requesting a refill authorization.  Brief Assessment and Rationale for Refill:  Approve     Medication Therapy Plan:         Comments:     Note composed:6:22 AM 05/31/2024

## 2024-07-09 ENCOUNTER — LAB VISIT (OUTPATIENT)
Dept: LAB | Facility: HOSPITAL | Age: 79
End: 2024-07-09
Attending: STUDENT IN AN ORGANIZED HEALTH CARE EDUCATION/TRAINING PROGRAM
Payer: MEDICARE

## 2024-07-09 DIAGNOSIS — N18.32 STAGE 3B CHRONIC KIDNEY DISEASE: ICD-10-CM

## 2024-07-09 LAB
CREAT UR-MCNC: 51 MG/DL (ref 23–375)
PROT UR-MCNC: <7 MG/DL (ref 0–15)
PROT/CREAT UR: NORMAL MG/G{CREAT} (ref 0–0.2)

## 2024-07-09 PROCEDURE — 82570 ASSAY OF URINE CREATININE: CPT | Mod: HCNC | Performed by: STUDENT IN AN ORGANIZED HEALTH CARE EDUCATION/TRAINING PROGRAM

## 2024-07-09 PROCEDURE — 84156 ASSAY OF PROTEIN URINE: CPT | Mod: HCNC | Performed by: STUDENT IN AN ORGANIZED HEALTH CARE EDUCATION/TRAINING PROGRAM

## 2024-07-20 RX ORDER — FENOFIBRATE 134 MG/1
134 CAPSULE ORAL NIGHTLY
Qty: 90 CAPSULE | Refills: 3 | Status: SHIPPED | OUTPATIENT
Start: 2024-07-20

## 2024-07-20 NOTE — TELEPHONE ENCOUNTER
No care due was identified.  Health [facility] Embedded Care Due Messages. Reference number: [id number].   7/20/2024 10:01:20 AM CDT

## 2024-07-21 DIAGNOSIS — K21.9 GERD WITHOUT ESOPHAGITIS: ICD-10-CM

## 2024-07-21 RX ORDER — PANTOPRAZOLE SODIUM 40 MG/1
40 TABLET, DELAYED RELEASE ORAL
Qty: 90 TABLET | Refills: 2 | Status: SHIPPED | OUTPATIENT
Start: 2024-07-21

## 2024-07-21 NOTE — TELEPHONE ENCOUNTER
No care due was identified.  Health [facility] Embedded Care Due Messages. Reference number: [id number].   7/21/2024 4:19:21 AM CDT

## 2024-07-21 NOTE — TELEPHONE ENCOUNTER
Refill Decision Note   Cy Krish  is requesting a refill authorization.  Brief Assessment and Rationale for Refill:  Approve     Medication Therapy Plan:         Comments:     Note composed:6:46 AM 07/21/2024

## 2024-07-23 ENCOUNTER — OFFICE VISIT (OUTPATIENT)
Dept: NEPHROLOGY | Facility: CLINIC | Age: 79
End: 2024-07-23
Payer: MEDICARE

## 2024-07-23 VITALS
HEIGHT: 69 IN | HEART RATE: 56 BPM | SYSTOLIC BLOOD PRESSURE: 130 MMHG | BODY MASS INDEX: 35.59 KG/M2 | DIASTOLIC BLOOD PRESSURE: 52 MMHG | OXYGEN SATURATION: 96 %

## 2024-07-23 DIAGNOSIS — Z79.4 TYPE 2 DIABETES MELLITUS WITH STAGE 3B CHRONIC KIDNEY DISEASE, WITH LONG-TERM CURRENT USE OF INSULIN: ICD-10-CM

## 2024-07-23 DIAGNOSIS — E11.22 TYPE 2 DIABETES MELLITUS WITH STAGE 3B CHRONIC KIDNEY DISEASE, WITH LONG-TERM CURRENT USE OF INSULIN: ICD-10-CM

## 2024-07-23 DIAGNOSIS — N25.81 SECONDARY HYPERPARATHYROIDISM OF RENAL ORIGIN: ICD-10-CM

## 2024-07-23 DIAGNOSIS — I10 ESSENTIAL HYPERTENSION: ICD-10-CM

## 2024-07-23 DIAGNOSIS — Z79.4 CONTROLLED TYPE 2 DIABETES MELLITUS WITH MILD NONPROLIFERATIVE RETINOPATHY OF BOTH EYES, WITH LONG-TERM CURRENT USE OF INSULIN, MACULAR EDEMA PRESENCE UNSPECIFIED: ICD-10-CM

## 2024-07-23 DIAGNOSIS — N18.32 TYPE 2 DIABETES MELLITUS WITH STAGE 3B CHRONIC KIDNEY DISEASE, WITH LONG-TERM CURRENT USE OF INSULIN: ICD-10-CM

## 2024-07-23 DIAGNOSIS — N18.32 STAGE 3B CHRONIC KIDNEY DISEASE: Primary | ICD-10-CM

## 2024-07-23 DIAGNOSIS — E11.3293 CONTROLLED TYPE 2 DIABETES MELLITUS WITH MILD NONPROLIFERATIVE RETINOPATHY OF BOTH EYES, WITH LONG-TERM CURRENT USE OF INSULIN, MACULAR EDEMA PRESENCE UNSPECIFIED: ICD-10-CM

## 2024-07-23 DIAGNOSIS — E66.01 MORBID (SEVERE) OBESITY DUE TO EXCESS CALORIES: ICD-10-CM

## 2024-07-23 PROCEDURE — 3078F DIAST BP <80 MM HG: CPT | Mod: HCNC,CPTII,S$GLB, | Performed by: STUDENT IN AN ORGANIZED HEALTH CARE EDUCATION/TRAINING PROGRAM

## 2024-07-23 PROCEDURE — 1157F ADVNC CARE PLAN IN RCRD: CPT | Mod: HCNC,CPTII,S$GLB, | Performed by: STUDENT IN AN ORGANIZED HEALTH CARE EDUCATION/TRAINING PROGRAM

## 2024-07-23 PROCEDURE — 3072F LOW RISK FOR RETINOPATHY: CPT | Mod: HCNC,CPTII,S$GLB, | Performed by: STUDENT IN AN ORGANIZED HEALTH CARE EDUCATION/TRAINING PROGRAM

## 2024-07-23 PROCEDURE — 1101F PT FALLS ASSESS-DOCD LE1/YR: CPT | Mod: HCNC,CPTII,S$GLB, | Performed by: STUDENT IN AN ORGANIZED HEALTH CARE EDUCATION/TRAINING PROGRAM

## 2024-07-23 PROCEDURE — 99214 OFFICE O/P EST MOD 30 MIN: CPT | Mod: HCNC,S$GLB,, | Performed by: STUDENT IN AN ORGANIZED HEALTH CARE EDUCATION/TRAINING PROGRAM

## 2024-07-23 PROCEDURE — 99999 PR PBB SHADOW E&M-EST. PATIENT-LVL IV: CPT | Mod: PBBFAC,HCNC,, | Performed by: STUDENT IN AN ORGANIZED HEALTH CARE EDUCATION/TRAINING PROGRAM

## 2024-07-23 PROCEDURE — 3075F SYST BP GE 130 - 139MM HG: CPT | Mod: HCNC,CPTII,S$GLB, | Performed by: STUDENT IN AN ORGANIZED HEALTH CARE EDUCATION/TRAINING PROGRAM

## 2024-07-23 PROCEDURE — 3288F FALL RISK ASSESSMENT DOCD: CPT | Mod: HCNC,CPTII,S$GLB, | Performed by: STUDENT IN AN ORGANIZED HEALTH CARE EDUCATION/TRAINING PROGRAM

## 2024-07-23 PROCEDURE — 1159F MED LIST DOCD IN RCRD: CPT | Mod: HCNC,CPTII,S$GLB, | Performed by: STUDENT IN AN ORGANIZED HEALTH CARE EDUCATION/TRAINING PROGRAM

## 2024-07-23 NOTE — PROGRESS NOTES
"Subjective:       Patient ID: Cy Rutherford Jr. is a 78 y.o. male who returns for ongoing evaluation and management of CKD.  Pertinent medical history for CKD, DM on long term insulin, hypertension, CAD s/p stenting.     Interval History:  3/5/24 -seen today for ongoing evaluation and management of CKD. Labs reviewed with patient at chairside. At baseline kidney function.  Light chain ratio is acceptable for her level of chronic kidney disease, we will continue to follow.    7/23/24 clinic visit - doing well today. Denies any new medical complaints today. We reviewed his recent chemistry at chairside. His sCr is 2.2mg/dL, which is consistent with his reading from 3 months ago. He has no uremic or urinary symptoms and is in his usual state of health.  He is a resident of [facility] and does not have control over meal plan. He does have protein shake supplement. He weighs himself everday and denies weight changes. Still with baseline chronic edema but denies decompensated CHF sensation.       Review of Systems   Constitutional:  Negative for chills, diaphoresis and fever.   Respiratory:  Negative for cough and shortness of breath.    Cardiovascular:  Negative for chest pain and leg swelling.   Gastrointestinal:  Negative for abdominal pain, diarrhea, nausea and vomiting.   Genitourinary:  Negative for difficulty urinating, dysuria and hematuria.   Musculoskeletal:  Negative for myalgias.   Neurological:  Negative for headaches.   Hematological:  Does not bruise/bleed easily.       The past medical, family and social histories were reviewed for this encounter.     BP (!) 130/52 (BP Location: Left arm, Patient Position: Sitting, BP Method: Large (Manual))   Pulse (!) 56   Ht 5' 9" (1.753 m)   SpO2 96%   BMI 35.59 kg/m²     Objective:      Physical Exam  Vitals reviewed.   Constitutional:       General: He is not in acute distress.     Appearance: He is obese.   HENT:      Head: Normocephalic and " atraumatic.   Eyes:      General: No scleral icterus.     Extraocular Movements: Extraocular movements intact.   Cardiovascular:      Rate and Rhythm: Normal rate and regular rhythm.      Pulses: Normal pulses.      Heart sounds: Normal heart sounds.      No friction rub.   Pulmonary:      Effort: Pulmonary effort is normal. No respiratory distress.      Breath sounds: Normal breath sounds.   Abdominal:      General: Abdomen is flat.      Palpations: Abdomen is soft.   Musculoskeletal:         General: No swelling.      Cervical back: Normal range of motion. No tenderness.      Right lower leg: Edema present.      Left lower leg: Edema present.   Neurological:      General: No focal deficit present.      Mental Status: He is oriented to person, place, and time.      Motor: No weakness.      Gait: Gait abnormal.   Psychiatric:         Mood and Affect: Mood normal.         Behavior: Behavior normal.         Assessment:       Lab Results   Component Value Date     07/09/2024    K 4.8 07/09/2024     07/09/2024    CO2 29 07/09/2024    BUN 42 (H) 07/09/2024    CREATININE 2.2 (H) 07/09/2024    CALCIUM 9.1 07/09/2024    ALBUMIN 3.4 (L) 07/09/2024    PHOS 3.3 07/09/2024     Lab Results   Component Value Date    MICALBCREAT Unable to calculate 11/01/2023    MICALBCREAT Unable to calculate 09/13/2023    MICALBCREAT Unable to calculate 06/21/2023    MICALBCREAT Unable to calculate 12/14/2022    MICALBCREAT Unable to calculate 07/06/2022    MICALBCREAT 17.2 03/30/2022       No diagnosis found.        Plan:   Return to clinic in 6 months.  Labs for next visit include UACR, UA, PTH, RFP  Baseline creatinine is 1.8-2.1mg/dL.      CKD in a setting of DM/HTN, high pre-test probability of diabetic nephropathy, CRS and h/o DIRK requiring temp dialysis in 2019  -renal fxn close to baseline today. GFR when factoring in BSA = 38ml/min  -no proteinuria  -on RASI for CKD-MACE risk reduction, proteinuria reduction and  kevin-protection  -continue discussion and adjustment of modifiable risk factors. Educated patient on the importance of BP, glycemic and lipid control   -per TANGRI calculator, he has less than 5% risk of ESRD in 5 years.     HTN - managed primarily by cardiology, currently on diovan-hctz, carvedilol 3.125, amber 12.5, torsemide, amlodipine, and doxazosin     DM with nephropathy  - DM with diabetic retinopathy: well controlled with most recent HgbA1c of 6.6% (5/2024), continue yearly optho checks    Anemia on CKD - Hct improved, no longer meets true criteria for anemia.     SHPT - pth trend improving. Continue vit d supplement.     CHF - appears compensated on exam today. Followed by cardiology.     CAD and PAF  - Guideline directed therapy per cardiology    Obesity  - Calorie restriction    Paraprotein   - appears improved on most recent assessment LCR is acceptable for CKD      Lyle Escamilla MD  [facility] [place]    Part of this note has been created using M Modal dictation system. Errors in transcription may not be completely avoided.

## 2024-07-28 DIAGNOSIS — E03.9 ACQUIRED HYPOTHYROIDISM: ICD-10-CM

## 2024-07-28 NOTE — TELEPHONE ENCOUNTER
No care due was identified.  Health [facility] Embedded Care Due Messages. Reference number: [id number].   7/28/2024 1:50:04 AM CDT

## 2024-07-29 RX ORDER — LEVOTHYROXINE SODIUM 88 UG/1
88 TABLET ORAL
Qty: 90 TABLET | Refills: 1 | Status: SHIPPED | OUTPATIENT
Start: 2024-07-29

## 2024-07-29 NOTE — TELEPHONE ENCOUNTER
Refill Decision Note   Cy Krish  is requesting a refill authorization.  Brief Assessment and Rationale for Refill:  Approve     Medication Therapy Plan:         Comments:     Note composed:12:20 PM 07/29/2024

## 2024-08-20 LAB — HBA1C MFR BLD: 6.8 % (ref 4–6)

## 2024-09-29 ENCOUNTER — PATIENT MESSAGE (OUTPATIENT)
Dept: ADMINISTRATIVE | Facility: OTHER | Age: 79
End: 2024-09-29
Payer: MEDICARE

## 2024-10-03 ENCOUNTER — OFFICE VISIT (OUTPATIENT)
Dept: FAMILY MEDICINE | Facility: CLINIC | Age: 79
End: 2024-10-03
Payer: MEDICARE

## 2024-10-03 VITALS
WEIGHT: 241.88 LBS | SYSTOLIC BLOOD PRESSURE: 130 MMHG | DIASTOLIC BLOOD PRESSURE: 74 MMHG | OXYGEN SATURATION: 96 % | BODY MASS INDEX: 35.71 KG/M2 | HEART RATE: 59 BPM

## 2024-10-03 DIAGNOSIS — I48.0 PAROXYSMAL ATRIAL FIBRILLATION: ICD-10-CM

## 2024-10-03 DIAGNOSIS — I10 ESSENTIAL HYPERTENSION: ICD-10-CM

## 2024-10-03 DIAGNOSIS — E78.5 DYSLIPIDEMIA: ICD-10-CM

## 2024-10-03 DIAGNOSIS — I25.10 CORONARY ARTERY DISEASE INVOLVING NATIVE CORONARY ARTERY OF NATIVE HEART WITHOUT ANGINA PECTORIS: Primary | ICD-10-CM

## 2024-10-03 DIAGNOSIS — I73.9 CLAUDICATION: ICD-10-CM

## 2024-10-03 PROCEDURE — 3072F LOW RISK FOR RETINOPATHY: CPT | Mod: HCNC,CPTII,S$GLB, | Performed by: INTERNAL MEDICINE

## 2024-10-03 PROCEDURE — G2211 COMPLEX E/M VISIT ADD ON: HCPCS | Mod: HCNC,S$GLB,, | Performed by: INTERNAL MEDICINE

## 2024-10-03 PROCEDURE — 3075F SYST BP GE 130 - 139MM HG: CPT | Mod: HCNC,CPTII,S$GLB, | Performed by: INTERNAL MEDICINE

## 2024-10-03 PROCEDURE — 1160F RVW MEDS BY RX/DR IN RCRD: CPT | Mod: HCNC,CPTII,S$GLB, | Performed by: INTERNAL MEDICINE

## 2024-10-03 PROCEDURE — 3078F DIAST BP <80 MM HG: CPT | Mod: HCNC,CPTII,S$GLB, | Performed by: INTERNAL MEDICINE

## 2024-10-03 PROCEDURE — 99214 OFFICE O/P EST MOD 30 MIN: CPT | Mod: HCNC,S$GLB,, | Performed by: INTERNAL MEDICINE

## 2024-10-03 PROCEDURE — 99999 PR PBB SHADOW E&M-EST. PATIENT-LVL IV: CPT | Mod: PBBFAC,HCNC,, | Performed by: INTERNAL MEDICINE

## 2024-10-03 PROCEDURE — 1157F ADVNC CARE PLAN IN RCRD: CPT | Mod: HCNC,CPTII,S$GLB, | Performed by: INTERNAL MEDICINE

## 2024-10-03 PROCEDURE — 1101F PT FALLS ASSESS-DOCD LE1/YR: CPT | Mod: HCNC,CPTII,S$GLB, | Performed by: INTERNAL MEDICINE

## 2024-10-03 PROCEDURE — 3288F FALL RISK ASSESSMENT DOCD: CPT | Mod: HCNC,CPTII,S$GLB, | Performed by: INTERNAL MEDICINE

## 2024-10-03 PROCEDURE — 1159F MED LIST DOCD IN RCRD: CPT | Mod: HCNC,CPTII,S$GLB, | Performed by: INTERNAL MEDICINE

## 2024-10-03 NOTE — PROGRESS NOTES
Subjective     Cy Rutherford Jr. is a 78 y.o. old, male here for Follow-up    79 y/o with PMH of CAD s/p CABG, CVA with resulting mild R hemiplegia, PAD, HTN, HLD, Type 2 DM with retinopathy, CKD stage 3, hypothyroidism, GERD     CAD: stable, followed by Dr. Chan.  PAD: worsening claudication, L>R, DEBORAH's and US have confirmed dx. Doubtful a surgical candidate but I don't see a reason he could not take cilostazol  HTN: well controlled.  Type 2 IDDM: followed by Dr. Brown, A1C was 6.8.  CKD: followed by Dr. Escamilla, stable on labs here and at [facility].  Hypothyroidism: Last TSH and fT4 wnl.      ROS  Medications     Outpatient Medications Marked as Taking for the 10/3/24 encounter (Office Visit) with James Sullivan MD   Medication Sig Dispense Refill    allopurinoL (ZYLOPRIM) 100 MG tablet TAKE 1 TABLET ONE TIME DAILY 90 tablet 10    amLODIPine (NORVASC) 5 MG tablet TAKE 1 TABLET TWICE DAILY 180 tablet 3    aspirin (ECOTRIN) 81 MG EC tablet Take 81 mg by mouth every evening.      carvediloL (COREG) 3.125 MG tablet TAKE 1 TABLET TWICE DAILY WITH MEALS 180 tablet 4    clopidogreL (PLAVIX) 75 mg tablet TAKE 1 TABLET EVERY DAY 90 tablet 1    doxazosin (CARDURA) 8 MG Tab TAKE 1 TABLET EVERY EVENING 90 tablet 4    ezetimibe (ZETIA) 10 mg tablet TAKE 1 TABLET EVERY DAY 90 tablet 4    fenofibrate micronized (LOFIBRA) 134 MG Cap Take 1 capsule (134 mg total) by mouth nightly. 90 capsule 3    fish oil-omega-3 fatty acids 300-1,000 mg capsule Take 1 capsule by mouth every morning.      levothyroxine (SYNTHROID) 88 MCG tablet TAKE 1 TABLET EVERY DAY BEFORE BREAKFAST. 90 tablet 1    multivitamin with minerals (ONE-A-DAY MAXIMUM FORMULA ORAL) Take 1 tablet by mouth once daily.      NOVOLOG U-100 INSULIN ASPART 100 unit/mL injection Inject into the skin.      pantoprazole (PROTONIX) 40 MG tablet TAKE 1 TABLET EVERY DAY 90 tablet 2    potassium chloride SA (K-DUR,KLOR-CON M) 10 MEQ tablet TAKE 1/2 TABLET EVERY DAY 45  tablet 4    psyllium husk (METAMUCIL ORAL) Take by mouth.      rosuvastatin (CRESTOR) 20 MG tablet TAKE 1 TABLET ONE TIME DAILY 90 tablet 2    spironolactone (ALDACTONE) 25 MG tablet TAKE 1/2 TABLET ONE TIME DAILY 45 tablet 4    torsemide (DEMADEX) 20 MG Tab Take 1 tablet (20 mg total) by mouth once daily. Educated on titratration of diuretic to 2 lb weight gain in 24 hrs, control salt intake and leg elevation. 180 tablet 3    triamcinolone acetonide 0.1% (KENALOG) 0.1 % cream Apply topically 2 (two) times daily. 453.6 g 2    valsartan-hydrochlorothiazide (DIOVAN-HCT) 320-25 mg per tablet TAKE 1 TABLET EVERY MORNING 90 tablet 4     Objective     /74   Pulse (!) 59   Wt 109.7 kg (241 lb 13.5 oz)   SpO2 96%   BMI 35.71 kg/m²   Physical Exam  Constitutional:       General: He is not in acute distress.     Appearance: Normal appearance. He is well-developed.      Comments: Using walker   Musculoskeletal:      Comments: R hemiplegia   Neurological:      Mental Status: He is alert.       Assessment and Plan     Coronary artery disease involving native coronary artery of native heart without angina pectoris    Dyslipidemia    Essential hypertension    Claudication    Paroxysmal atrial fibrillation      Continue medications and treatment as above for stable chronic medical problems.  Consider cilostazol, will message his cardiologist.  ___________________  James Sullivan MD  Internal Medicine and Pediatrics

## 2024-10-03 NOTE — Clinical Note
To Staff: please obtain outside labs from [facility]. To Dr. Chan: are you okay with starting him on cilostazol? He has worsening claudication and seems to be maximized medically otherwise.

## 2024-10-04 ENCOUNTER — PATIENT OUTREACH (OUTPATIENT)
Dept: ADMINISTRATIVE | Facility: HOSPITAL | Age: 79
End: 2024-10-04
Payer: MEDICARE

## 2024-10-04 NOTE — LETTER
FAX      AUTHORIZATION FOR RELEASE OF   CONFIDENTIAL INFORMATION      Dr. Brown,      We are seeing Cy Rutherford Jr., date of birth 1945, in the clinic at [facility]. James Sullivan MD is the patient's PCP. Cy Rutherford Jr. has an outstanding lab/procedure at the time we reviewed their chart. In order to help keep their health information updated, Cy Rutherford Jr. has authorized us to request the following medical records:            MOST RECENT DIABETIC LABS - A1c, LIPID, CMP, & URINE MICRO-ALBUMIN          Please fax records to James Sullivan MD at [facility] [phone].        If you have any questions, please contact Maya at [phone]    Thank you,    Maya Berg LPN LPN Clinical Care Coordinator  [facility]                       Cy Rutherford Jr.  MRN: [id number]  : 1945  Age: 78 y.o.  Sex: male         Patient/Legal Guardian Signature  This signature was collected at 2024           _______________________________   Printed Name/Relationship to Patient      Consent for Examination and Treatment: I hereby authorize the providers and employees of [facility] ([facility]) to provide medical treatment/services which includes, but is not limited to, performing and administering tests and diagnostic procedures that are deemed necessary, including, but not limited to, imaging examinations, blood tests and other laboratory procedures as may be required by the hospital, clinic, or may be ordered by my physician(s) or persons working under the general and/or special instructions of my physician(s).      I understand and agree that this consent covers all authorized persons, including but not limited to physicians, residents, nurse practitioners, physicians' assistants, specialists, consultants, student nurses, and independently contracted physicians, who are  called upon by the physician in charge, to carry out the diagnostic procedures and medical or surgical treatment.     I hereby authorize [facility] to retain or dispose of any specimens or tissue, should there be such remaining from any test or procedure.     I hereby authorize and give consent for [facility] providers and employees to take photographs, images or videotapes of such diagnostic, surgical or treatment procedures of Patient as may be required by [facility] or as may be ordered by a physician. I further acknowledge and agree that [facility] may use cameras or other devices for patient monitoring.     I am aware that the practice of medicine is not an exact science, and I acknowledge that no guarantees have been made to me as to the outcome of any tests, procedures or treatment.     Authorization for Release of Information: I understand that my insurance company and/or their agents may need information necessary to make determinations about payment/reimbursement. I hereby provide authorization to release to all insurance companies, their successors, assignees, other parties with whom they may have contracted, or others acting on their behalf, that are involved with payment for any hospital and/or clinic charges incurred by the patient, any information that they request and deem necessary for payment/reimbursement, and/or quality review.  I further authorize the release of my health information to physicians or other health care practitioners on staff who are involved in my health care now and in the future, and to other health care providers, entities, or institutions for the purpose of my continued care and treatment, including referrals.     REGISTRATION AUTHORIZATION  Form No. 84632 (Rev. 7/13/2022)       Medicare Patient's Certification and Authorization to Release Information and Payment Request:  I certify that the information given by me in applying for payment under Title XVIII of the Social Security Act is  correct. I authorize any bergman of medical or other information about me to release to the [facility], or its intermediaries or carriers, any information needed for this or a related Medicare claim. I request that payment of authorized benefits be made on my behalf.     Assignment of Insurance Benefits:   I hereby authorize any and all insurance companies, health plans, defined   benefit plans, health insurers or any entity that is or may be responsible for payment of my medical expenses to pay all hospital and medical benefits now due, and to become due and payable to me under any hospital benefits, sick benefits, injury benefits or any other benefit for services rendered to me, including Major Medical Benefits, direct to [facility] and all independently contracted physicians. I assign any and all rights that I may have against any and all insurance companies, health plans, defined benefit plans, health insurers or any entity that is or may be responsible for payment of my medical expenses, including, but not limited to any right to appeal a denial of a claim, any right to bring any action, lawsuit, administrative proceeding, or other cause of action on my behalf. I specifically assign my right to pursue litigation against any and all insurance companies, health plans, defined benefit plans, health insurers or any entity that is or may be responsible for payment of my medical expenses based upon a refusal to pay charges.            E. Valuables: It is understood and agreed that [facility] is not liable for the damage to or loss of any money, jewelry,   documents, dentures, eye glasses, hearing aids, prosthetics, or other property of value.     F. Computer Equipment: I understand and agree that should I choose to use computer equipment owned by [facility] or if I choose to access the Internet via [facility]s network, I do so at my own risk. [facility] is not responsible for any damage to my computer equipment  or to any damages of any type that might arise from my loss of equipment or data.     G. Acceptance of Financial Responsibility:  I agree that in consideration of the services and   supplies that have been   or will be furnished to the patient, I am hereby obligated to pay all charges made for or on the account of the patient according to the standard rates (in effect at the time the services and supplies are delivered) established by [facility], including its Patient Financial Assistance Policy to the extent it is applicable. I understand that I am responsible for all charges, or portions thereof, not covered by insurance or other sources. Patient refunds will be distributed only after balances at all [facility] facilities are paid.     H. Communication Authorization:  I hereby authorize [facility] and its representatives, along with any billing service   or  who may work on their behalf, to contact me on   my cell phone and/or home phone using pre- recorded messages, artificial voice messages, automatic telephone dialing devices or other computer assisted technology, or by electronic      mail, text messaging, or by any other form of electronic communication. This includes, but is not limited to, appointment reminders, yearly physical exam reminders, preventive care reminders, patient campaigns, welcome calls, and calls about account balances on my account or any account on which I am listed as a guarantor. I understand I have the right to opt out of these communications at any time.      Relationship  Between  Facility and  Provider:      I understand that some, but not all, providers furnishing services to the patient are not employees or agents of [facility]. The patient is under the care and supervision of his/her attending physician, and it is the responsibility of the facility and its nursing staff to carry out the instructions of such physicians. It is the responsibility of the patient's  physician/designee to obtain the patient's informed consent, when required, for medical or surgical treatment, special diagnostic or therapeutic procedures, or hospital services rendered for the patient under the special instructions of the physician/designee.     REGISTRATION AUTHORIZATION  Form No. 74540 (Rev. 7/13/2022)      Notice of Privacy Practices: I acknowledge I have received a copy of [facility]'s Notice of Privacy Practices.     Facility  Directory: I have discussed with the organization my desire to be either included or excluded  in the facility directory in the event of my being an inpatient at an [facility] facility. I understand that if my choice is to opt-out of being identified in the facility directory that the facility will not provide any information about me such as my condition (e.g. fair, stable, etc.) or my location in the facility (e.g., room number, department).     Immunizations: [facility] shares immunization information with state sponsored health departments to help you and your doctor keep track of your immunization records. By signing, you consent to have this information shared with the health department in your state:                                [place] - LINKS ([place] Immunization Network for Kids Statewide)                                [place] - MIIX ([place] Immunization Information eXchange)                                [place] - ImmPRINT (Immunization Patient Registry with Integrated Technology)     TERM: This authorization is valid for this and subsequent care/treatment I receive at [facility] and will remain valid unless/until revoked in writing by me.     [facility]: As used in this document, [facility] means all [facility] owned and managed facilities, including, but not limited to, all health centers, surgery centers, clinics, urgent care centers, and hospitals.         [facility] complies with applicable Federal civil rights laws and does  not discriminate on the basis of race, color, national origin, age, disability, or sex.  ATENCIÓN: si habla español, tiene a blackburn disposición servicios gratuitos de asistencia lingüística. Llkelly al [phone].  CHÚ Ý: N?u b?n nói Ti?ng Vi?t, có các d?ch v? h? tr? ngôn ng? mi?n phí dành cho b?n. G?i s? [phone].        REGISTRATION AUTHORIZATION  Form No. 04568 (Rev. 2022)     Patient Name: Cy Rutherford Jr.  : 1945  Patient Phone #: [phone]

## 2024-10-04 NOTE — PROGRESS NOTES
Population Health Chart Review & Patient Outreach Details      Additional [facility] Health Notes:               Updates Requested / Reviewed:      Updated Care Coordination Note         Health Maintenance Topics Overdue:      VBHM Score: 0     Patient is not due for any topics at this time.    Influenza Vaccine  RSV Vaccine                  Health Maintenance Topic(s) Outreach Outcomes & Actions Taken:    Lab(s) - Outreach Outcomes & Actions Taken  : External Records Requested & Care Team Updated if Applicable

## 2024-10-17 ENCOUNTER — PATIENT OUTREACH (OUTPATIENT)
Dept: ADMINISTRATIVE | Facility: HOSPITAL | Age: 79
End: 2024-10-17
Payer: MEDICARE

## 2024-10-17 NOTE — PROGRESS NOTES
Population Health Chart Review & Patient Outreach Details      Additional [facility] Health Notes:               Updates Requested / Reviewed:      Updated Care Coordination Note         Health Maintenance Topics Overdue:      VBHM Score: 0     Patient is not due for any topics at this time.    Influenza Vaccine  RSV Vaccine                  Health Maintenance Topic(s) Outreach Outcomes & Actions Taken:    Lab(s) - Outreach Outcomes & Actions Taken  : External Records Uploaded & Care Team Updated if Applicable  
no...

## 2024-11-05 ENCOUNTER — OFFICE VISIT (OUTPATIENT)
Dept: OPTOMETRY | Facility: CLINIC | Age: 79
End: 2024-11-05
Payer: MEDICARE

## 2024-11-05 DIAGNOSIS — H52.203 HYPEROPIA WITH ASTIGMATISM AND PRESBYOPIA, BILATERAL: ICD-10-CM

## 2024-11-05 DIAGNOSIS — H25.13 AGE-RELATED NUCLEAR CATARACT, BILATERAL: ICD-10-CM

## 2024-11-05 DIAGNOSIS — H35.89 RPE MOTTLING OF MACULA: ICD-10-CM

## 2024-11-05 DIAGNOSIS — E11.3293 TYPE 2 DIABETES MELLITUS WITH BOTH EYES AFFECTED BY MILD NONPROLIFERATIVE RETINOPATHY WITHOUT MACULAR EDEMA, WITH LONG-TERM CURRENT USE OF INSULIN: Primary | ICD-10-CM

## 2024-11-05 DIAGNOSIS — Z79.4 TYPE 2 DIABETES MELLITUS WITH BOTH EYES AFFECTED BY MILD NONPROLIFERATIVE RETINOPATHY WITHOUT MACULAR EDEMA, WITH LONG-TERM CURRENT USE OF INSULIN: Primary | ICD-10-CM

## 2024-11-05 DIAGNOSIS — H52.03 HYPEROPIA WITH ASTIGMATISM AND PRESBYOPIA, BILATERAL: ICD-10-CM

## 2024-11-05 DIAGNOSIS — H52.4 HYPEROPIA WITH ASTIGMATISM AND PRESBYOPIA, BILATERAL: ICD-10-CM

## 2024-11-05 PROCEDURE — 99999 PR PBB SHADOW E&M-EST. PATIENT-LVL III: CPT | Mod: PBBFAC,HCNC,,

## 2024-11-05 NOTE — PROGRESS NOTES
HPI    Pt here for annual diabetic exam w/ OCT. Last DM exam- 1 year    Pt sts [facility] stable with current specs. Pt see's Dr. Hinton q6mo for   retina problems OD, last saw ~ 1 month. Occasional floaters OD. Pt denies   flashes/pain. Current .     Hemoglobin A1C       Date                     Value               Ref Range             Status                08/20/2024               6.8 (A)             4.0 - 6.0 %           Final                 11/01/2023               7.5 (H)             0.0 - 5.6 %           Final                  06/21/2023               6.7 (H)             0.0 - 5.6 %           Final            Last edited by Hunter Shrestha, OD on 11/5/2024 11:06 AM.            Assessment /Plan     For exam results, see Encounter Report.    Type 2 diabetes mellitus with both eyes affected by mild nonproliferative retinopathy without macular edema, with long-term current use of insulin    RPE mottling of macula  -     Posterior Segment OCT Retina-Both eyes    Age-related nuclear cataract, bilateral    Hyperopia with astigmatism and presbyopia, bilateral      1-2 dot blot hemorrhages OD, OS. No diabetic macular edema or neovascularization OD, OS. Reviewed all findings with pt and emphasized the importance of strict blood sugar control to prevent visual fluctuations/vision loss. Monitor yearly for changes, sooner prn.  RPE mottling with mild drusen and disruption of outer retinal layers OD. Longstanding, pt reports history of injections OD. Pt followed by Dr. Hinton [id number]. Ed pt to continue follow-up as directed with ophthalmology.  Mild visual significance, good BCVA. Surgery not recommended at this time. Ed pt on symptoms of worsening cataracts including reduced BCVA and glare. Monitor yearly for changes.  Discussed spectacle options with pt and released final spec rx. Reduced DVA OD secondary to macular pathology. Ed pt on minimal change in rx and adaptation.    RTC: 1 year for comprehensive exam  or sooner prn

## 2024-12-27 ENCOUNTER — PATIENT MESSAGE (OUTPATIENT)
Dept: OPTOMETRY | Facility: CLINIC | Age: 79
End: 2024-12-27
Payer: MEDICARE

## 2024-12-27 DIAGNOSIS — E03.9 ACQUIRED HYPOTHYROIDISM: ICD-10-CM

## 2024-12-27 DIAGNOSIS — E78.5 DYSLIPIDEMIA: ICD-10-CM

## 2024-12-27 RX ORDER — LEVOTHYROXINE SODIUM 88 UG/1
88 TABLET ORAL
Qty: 90 TABLET | Refills: 0 | Status: SHIPPED | OUTPATIENT
Start: 2024-12-27

## 2024-12-27 RX ORDER — ROSUVASTATIN CALCIUM 20 MG/1
20 TABLET, COATED ORAL
Qty: 90 TABLET | Refills: 0 | Status: SHIPPED | OUTPATIENT
Start: 2024-12-27

## 2024-12-27 NOTE — TELEPHONE ENCOUNTER
Refill Routing Note   Medication(s) are not appropriate for processing by [facility] for the following reason(s):        Required labs outdated    [facility] action(s):  Defer     Requires labs : Yes             Appointments  past 12m or future 3m with PCP    Date Provider   Last Visit   10/3/2024 James Sullivan MD   Next Visit   4/3/2025 James Sullivan MD   ED visits in past 90 days: 0        Note composed:10:09 AM 12/27/2024

## 2024-12-27 NOTE — TELEPHONE ENCOUNTER
Care Due:                  Date            Visit Type   Department     Provider  --------------------------------------------------------------------------------                                EP -                              [facility] FAMILY  Last Visit: 10-      CARE ([facility])   SD Sullivan                              EP -                              PRIMARY      [facility] FAMILY  Next Visit: 04-      CARE ([facility])   SD Sullivan                                                            Last  Test          Frequency    Reason                     Performed    Due Date  --------------------------------------------------------------------------------    CBC.........  12 months..  clopidogreL, fenofibrate.  02- 02-    CMP.........  12 months..  fenofibrate, rosuvastatin  12- 11-    Lipid Panel.  12 months..  fenofibrate, rosuvastatin  11-   10-    [facility] Embedded Care Due Messages. Reference number: [id number].   12/27/2024 1:40:34 AM CST

## 2025-01-06 ENCOUNTER — TELEPHONE (OUTPATIENT)
Dept: FAMILY MEDICINE | Facility: CLINIC | Age: 80
End: 2025-01-06
Payer: MEDICARE

## 2025-01-06 NOTE — TELEPHONE ENCOUNTER
----- Message from Prachi sent at 1/6/2025 11:29 AM CST -----  Contact: PT  Type: Needs Medical Advice  Who Called:  PT  Symptoms (please be specific):  Lower Leg extremity  How long has patient had these symptoms:  a while  Pharmacy name and phone #:      [facility] - [place] - [address]  [place] [address]  Phone: [phone] Fax: [phone]    Best Call Back Number: [phone]  Additional Information:   Would like a call first PT states he discussed being prescribed medication during lov

## 2025-01-10 ENCOUNTER — TELEPHONE (OUTPATIENT)
Dept: FAMILY MEDICINE | Facility: CLINIC | Age: 80
End: 2025-01-10
Payer: MEDICARE

## 2025-01-10 NOTE — TELEPHONE ENCOUNTER
----- Message from Alexa sent at 1/10/2025  1:30 PM CST -----  Type:  Needs Medical Advice    Who Called: pt    Symptoms (please be specific): treatment for LE circulation problem     Would the patient rather a call back or a response via [facility]? Call    Best Call Back Number:[phone]     Additional Information: Patient talked to Dr last visit regarding meds for his lower extremities for circulation. Patient has quo advise. Thanks! estions    Please call back to advise

## 2025-01-13 ENCOUNTER — PATIENT MESSAGE (OUTPATIENT)
Dept: FAMILY MEDICINE | Facility: CLINIC | Age: 80
End: 2025-01-13
Payer: MEDICARE

## 2025-01-13 DIAGNOSIS — Z00.00 ENCOUNTER FOR MEDICARE ANNUAL WELLNESS EXAM: ICD-10-CM

## 2025-01-20 DIAGNOSIS — I25.10 CORONARY ARTERY DISEASE INVOLVING NATIVE CORONARY ARTERY OF NATIVE HEART WITHOUT ANGINA PECTORIS: ICD-10-CM

## 2025-01-20 NOTE — TELEPHONE ENCOUNTER
No care due was identified.  [facility] Embedded Care Due Messages. Reference number: [id number].   1/20/2025 1:31:24 AM CST

## 2025-01-21 NOTE — TELEPHONE ENCOUNTER
Refill Routing Note   Medication(s) are not appropriate for processing by [facility] for the following reason(s):        Required labs abnormal    [facility] action(s):  Defer             Appointments  past 12m or future 3m with PCP    Date Provider   Last Visit   10/3/2024 James Sullivan MD   Next Visit   4/3/2025 James Sullivan MD   ED visits in past 90 days: 0        Note composed:1:04 PM 01/21/2025

## 2025-01-23 RX ORDER — CLOPIDOGREL BISULFATE 75 MG/1
75 TABLET ORAL
Qty: 90 TABLET | Refills: 2 | Status: SHIPPED | OUTPATIENT
Start: 2025-01-23

## 2025-01-28 ENCOUNTER — LAB VISIT (OUTPATIENT)
Dept: LAB | Facility: HOSPITAL | Age: 80
End: 2025-01-28
Attending: STUDENT IN AN ORGANIZED HEALTH CARE EDUCATION/TRAINING PROGRAM
Payer: MEDICARE

## 2025-01-28 DIAGNOSIS — I69.398 ABNORMALITY OF GAIT AS LATE EFFECT OF CEREBROVASCULAR ACCIDENT (CVA): ICD-10-CM

## 2025-01-28 DIAGNOSIS — Z95.1 S/P CABG (CORONARY ARTERY BYPASS GRAFT): ICD-10-CM

## 2025-01-28 DIAGNOSIS — Z79.4 TYPE 2 DIABETES MELLITUS WITH BOTH EYES AFFECTED BY MILD NONPROLIFERATIVE RETINOPATHY AND MACULAR EDEMA, WITH LONG-TERM CURRENT USE OF INSULIN: ICD-10-CM

## 2025-01-28 DIAGNOSIS — I73.9 CLAUDICATION: ICD-10-CM

## 2025-01-28 DIAGNOSIS — I10 ESSENTIAL HYPERTENSION: ICD-10-CM

## 2025-01-28 DIAGNOSIS — N18.32 STAGE 3B CHRONIC KIDNEY DISEASE: ICD-10-CM

## 2025-01-28 DIAGNOSIS — I65.23 BILATERAL CAROTID ARTERY STENOSIS: ICD-10-CM

## 2025-01-28 DIAGNOSIS — Z95.5 STENTED CORONARY ARTERY: ICD-10-CM

## 2025-01-28 DIAGNOSIS — I50.32 CHRONIC DIASTOLIC HEART FAILURE: ICD-10-CM

## 2025-01-28 DIAGNOSIS — E78.5 DYSLIPIDEMIA: ICD-10-CM

## 2025-01-28 DIAGNOSIS — E11.3213 TYPE 2 DIABETES MELLITUS WITH BOTH EYES AFFECTED BY MILD NONPROLIFERATIVE RETINOPATHY AND MACULAR EDEMA, WITH LONG-TERM CURRENT USE OF INSULIN: ICD-10-CM

## 2025-01-28 DIAGNOSIS — G46.7 LACUNAR ATAXIC HEMIPARESIS: ICD-10-CM

## 2025-01-28 DIAGNOSIS — I73.9 PAD (PERIPHERAL ARTERY DISEASE): ICD-10-CM

## 2025-01-28 DIAGNOSIS — I25.10 CORONARY ARTERY DISEASE INVOLVING NATIVE CORONARY ARTERY OF NATIVE HEART WITHOUT ANGINA PECTORIS: ICD-10-CM

## 2025-01-28 DIAGNOSIS — I48.0 PAROXYSMAL ATRIAL FIBRILLATION: ICD-10-CM

## 2025-01-28 DIAGNOSIS — R26.9 ABNORMALITY OF GAIT AS LATE EFFECT OF CEREBROVASCULAR ACCIDENT (CVA): ICD-10-CM

## 2025-01-28 DIAGNOSIS — I67.9 LACUNAR ATAXIC HEMIPARESIS: ICD-10-CM

## 2025-01-28 LAB
ALBUMIN SERPL BCP-MCNC: 3.3 G/DL (ref 3.5–5.2)
ANION GAP SERPL CALC-SCNC: 7 MMOL/L (ref 8–16)
BASOPHILS # BLD AUTO: 0.03 K/UL (ref 0–0.2)
BASOPHILS NFR BLD: 0.5 % (ref 0–1.9)
BUN SERPL-MCNC: 40 MG/DL (ref 8–23)
CALCIUM SERPL-MCNC: 9.2 MG/DL (ref 8.7–10.5)
CHLORIDE SERPL-SCNC: 103 MMOL/L (ref 95–110)
CHOLEST SERPL-MCNC: 95 MG/DL (ref 120–199)
CHOLEST/HDLC SERPL: 2.6 {RATIO} (ref 2–5)
CO2 SERPL-SCNC: 28 MMOL/L (ref 23–29)
CREAT SERPL-MCNC: 1.8 MG/DL (ref 0.5–1.4)
DIFFERENTIAL METHOD BLD: ABNORMAL
EOSINOPHIL # BLD AUTO: 0.2 K/UL (ref 0–0.5)
EOSINOPHIL NFR BLD: 2.9 % (ref 0–8)
ERYTHROCYTE [DISTWIDTH] IN BLOOD BY AUTOMATED COUNT: 14.1 % (ref 11.5–14.5)
EST. GFR  (NO RACE VARIABLE): 37.3 ML/MIN/1.73 M^2
GLUCOSE SERPL-MCNC: 78 MG/DL (ref 70–110)
HCT VFR BLD AUTO: 34.3 % (ref 40–54)
HDLC SERPL-MCNC: 36 MG/DL (ref 40–75)
HDLC SERPL: 37.9 % (ref 20–50)
HGB BLD-MCNC: 10.8 G/DL (ref 14–18)
IMM GRANULOCYTES # BLD AUTO: 0.01 K/UL (ref 0–0.04)
IMM GRANULOCYTES NFR BLD AUTO: 0.2 % (ref 0–0.5)
LDLC SERPL CALC-MCNC: 42.8 MG/DL (ref 63–159)
LYMPHOCYTES # BLD AUTO: 1.1 K/UL (ref 1–4.8)
LYMPHOCYTES NFR BLD: 18 % (ref 18–48)
MCH RBC QN AUTO: 29 PG (ref 27–31)
MCHC RBC AUTO-ENTMCNC: 31.5 G/DL (ref 32–36)
MCV RBC AUTO: 92 FL (ref 82–98)
MONOCYTES # BLD AUTO: 0.6 K/UL (ref 0.3–1)
MONOCYTES NFR BLD: 9.3 % (ref 4–15)
NEUTROPHILS # BLD AUTO: 4.3 K/UL (ref 1.8–7.7)
NEUTROPHILS NFR BLD: 69.1 % (ref 38–73)
NONHDLC SERPL-MCNC: 59 MG/DL
NRBC BLD-RTO: 0 /100 WBC
PHOSPHATE SERPL-MCNC: 3.7 MG/DL (ref 2.7–4.5)
PLATELET # BLD AUTO: 240 K/UL (ref 150–450)
PMV BLD AUTO: 10.8 FL (ref 9.2–12.9)
POTASSIUM SERPL-SCNC: 4.9 MMOL/L (ref 3.5–5.1)
PTH-INTACT SERPL-MCNC: 78.2 PG/ML (ref 9–77)
RBC # BLD AUTO: 3.72 M/UL (ref 4.6–6.2)
SODIUM SERPL-SCNC: 138 MMOL/L (ref 136–145)
TRIGL SERPL-MCNC: 81 MG/DL (ref 30–150)
WBC # BLD AUTO: 6.23 K/UL (ref 3.9–12.7)

## 2025-01-28 PROCEDURE — 36415 COLL VENOUS BLD VENIPUNCTURE: CPT | Mod: HCNC,PO | Performed by: STUDENT IN AN ORGANIZED HEALTH CARE EDUCATION/TRAINING PROGRAM

## 2025-01-28 PROCEDURE — 85025 COMPLETE CBC W/AUTO DIFF WBC: CPT | Mod: HCNC | Performed by: STUDENT IN AN ORGANIZED HEALTH CARE EDUCATION/TRAINING PROGRAM

## 2025-01-28 PROCEDURE — 80061 LIPID PANEL: CPT | Mod: HCNC | Performed by: INTERNAL MEDICINE

## 2025-01-28 PROCEDURE — 83970 ASSAY OF PARATHORMONE: CPT | Mod: HCNC | Performed by: STUDENT IN AN ORGANIZED HEALTH CARE EDUCATION/TRAINING PROGRAM

## 2025-01-28 PROCEDURE — 80069 RENAL FUNCTION PANEL: CPT | Mod: HCNC | Performed by: STUDENT IN AN ORGANIZED HEALTH CARE EDUCATION/TRAINING PROGRAM

## 2025-02-04 ENCOUNTER — OFFICE VISIT (OUTPATIENT)
Dept: FAMILY MEDICINE | Facility: CLINIC | Age: 80
End: 2025-02-04
Payer: MEDICARE

## 2025-02-04 VITALS
SYSTOLIC BLOOD PRESSURE: 130 MMHG | BODY MASS INDEX: 36.1 KG/M2 | HEART RATE: 60 BPM | OXYGEN SATURATION: 98 % | DIASTOLIC BLOOD PRESSURE: 76 MMHG | WEIGHT: 244.5 LBS

## 2025-02-04 DIAGNOSIS — N18.32 STAGE 3B CHRONIC KIDNEY DISEASE: ICD-10-CM

## 2025-02-04 DIAGNOSIS — I73.9 PAD (PERIPHERAL ARTERY DISEASE): ICD-10-CM

## 2025-02-04 DIAGNOSIS — I73.9 CLAUDICATION: Primary | ICD-10-CM

## 2025-02-04 PROCEDURE — 3075F SYST BP GE 130 - 139MM HG: CPT | Mod: HCNC,CPTII,S$GLB, | Performed by: INTERNAL MEDICINE

## 2025-02-04 PROCEDURE — 99999 PR PBB SHADOW E&M-EST. PATIENT-LVL V: CPT | Mod: PBBFAC,HCNC,, | Performed by: INTERNAL MEDICINE

## 2025-02-04 PROCEDURE — 3072F LOW RISK FOR RETINOPATHY: CPT | Mod: HCNC,CPTII,S$GLB, | Performed by: INTERNAL MEDICINE

## 2025-02-04 PROCEDURE — 3288F FALL RISK ASSESSMENT DOCD: CPT | Mod: HCNC,CPTII,S$GLB, | Performed by: INTERNAL MEDICINE

## 2025-02-04 PROCEDURE — 1100F PTFALLS ASSESS-DOCD GE2>/YR: CPT | Mod: HCNC,CPTII,S$GLB, | Performed by: INTERNAL MEDICINE

## 2025-02-04 PROCEDURE — 99214 OFFICE O/P EST MOD 30 MIN: CPT | Mod: HCNC,S$GLB,, | Performed by: INTERNAL MEDICINE

## 2025-02-04 PROCEDURE — 1160F RVW MEDS BY RX/DR IN RCRD: CPT | Mod: HCNC,CPTII,S$GLB, | Performed by: INTERNAL MEDICINE

## 2025-02-04 PROCEDURE — G2211 COMPLEX E/M VISIT ADD ON: HCPCS | Mod: HCNC,S$GLB,, | Performed by: INTERNAL MEDICINE

## 2025-02-04 PROCEDURE — 1157F ADVNC CARE PLAN IN RCRD: CPT | Mod: HCNC,CPTII,S$GLB, | Performed by: INTERNAL MEDICINE

## 2025-02-04 PROCEDURE — 1159F MED LIST DOCD IN RCRD: CPT | Mod: HCNC,CPTII,S$GLB, | Performed by: INTERNAL MEDICINE

## 2025-02-04 PROCEDURE — 3078F DIAST BP <80 MM HG: CPT | Mod: HCNC,CPTII,S$GLB, | Performed by: INTERNAL MEDICINE

## 2025-02-04 RX ORDER — CILOSTAZOL 100 MG/1
100 TABLET ORAL 2 TIMES DAILY
Qty: 180 TABLET | Refills: 1 | Status: SHIPPED | OUTPATIENT
Start: 2025-02-04 | End: 2026-02-04

## 2025-02-04 NOTE — PROGRESS NOTES
Subjective     Cy Rutherford Jr. is a 79 y.o. old, male here for Leg Pain    77 y/o with PMH of CAD s/p CABG, CVA with resulting mild R hemiplegia, PAD, HTN, HLD, Type 2 DM with retinopathy, CKD stage 3, hypothyroidism, GERD     Here for f/u on claudication.  It continues to interfere with everyday life, getting up to use the bathroom, walking to the dining jarrett, etc.  L>R, DEBORAH's and US have confirmed dx. H/o stents in the left leg.    Recent admissions to [facility] for [facility], thought to have a diverticular bleed. Aspirin has been held for the meantime. He remains on plavix.    In 2019, he had been on the combo of aspirin, plavix, and cilostazol, however the latter was stopped during a hospital admission with related hematemesis.    Cardiology, Dr. Chan, prefers not to have him on the combo of aspirin, plavix and pletal, mentioned in his note 10/2024    ROS  Medications     Outpatient Medications Marked as Taking for the 2/4/25 encounter (Office Visit) with James Sullivan MD   Medication Sig Dispense Refill    ACCU-CHEK FASTCLIX LANCET [facility]       ACCU-CHEK GUIDE TEST STRIPS Strp       amLODIPine (NORVASC) 5 MG tablet TAKE 1 TABLET TWICE DAILY 180 tablet 3    carvediloL (COREG) 3.125 MG tablet TAKE 1 TABLET TWICE DAILY WITH MEALS 180 tablet 4    clopidogreL (PLAVIX) 75 mg tablet TAKE 1 TABLET EVERY DAY 90 tablet 2    doxazosin (CARDURA) 8 MG Tab TAKE 1 TABLET EVERY EVENING 90 tablet 4    EYLEA 2 mg/0.05 mL Soln 2 mg by Intravitreal route every 28 days.      ezetimibe (ZETIA) 10 mg tablet TAKE 1 TABLET EVERY DAY 90 tablet 4    fenofibrate micronized (LOFIBRA) 134 MG Cap Take 1 capsule (134 mg total) by mouth nightly. 90 capsule 3    fish oil-omega-3 fatty acids 300-1,000 mg capsule Take 1 capsule by mouth every morning.      levothyroxine (SYNTHROID) 88 MCG tablet TAKE 1 TABLET EVERY DAY BEFORE BREAKFAST. 90 tablet 0    multivitamin with minerals (ONE-A-DAY MAXIMUM FORMULA ORAL) Take 1 tablet by  mouth once daily.      NOVOLOG U-100 INSULIN ASPART 100 unit/mL injection Inject into the skin.      pantoprazole (PROTONIX) 40 MG tablet TAKE 1 TABLET EVERY DAY 90 tablet 2    potassium chloride SA (K-DUR,KLOR-CON M) 10 MEQ tablet TAKE 1/2 TABLET EVERY DAY 45 tablet 4    psyllium husk (METAMUCIL ORAL) Take by mouth.      rosuvastatin (CRESTOR) 20 MG tablet TAKE 1 TABLET EVERY DAY 90 tablet 0    spironolactone (ALDACTONE) 25 MG tablet TAKE 1/2 TABLET ONE TIME DAILY 45 tablet 4    torsemide (DEMADEX) 20 MG Tab Take 1 tablet (20 mg total) by mouth once daily. Educated on titratration of diuretic to 2 lb weight gain in 24 hrs, control salt intake and leg elevation. 180 tablet 3    triamcinolone acetonide 0.1% (KENALOG) 0.1 % cream Apply topically 2 (two) times daily. 453.6 g 2    valsartan-hydrochlorothiazide (DIOVAN-HCT) 320-25 mg per tablet TAKE 1 TABLET EVERY MORNING 90 tablet 4    [DISCONTINUED] allopurinoL (ZYLOPRIM) 100 MG tablet TAKE 1 TABLET ONE TIME DAILY 90 tablet 10     Objective     /76   Pulse 60   Wt 110.9 kg (244 lb 7.8 oz)   SpO2 98%   BMI 36.10 kg/m²   Physical Exam  Constitutional:       General: He is not in acute distress.     Appearance: Normal appearance. He is well-developed.   Neurological:      Mental Status: He is alert.       Assessment and Plan     Claudication  -     Ambulatory referral/consult to Vascular Surgery; Future; Expected date: 02/11/2025  -     cilostazoL (PLETAL) 100 MG Tab; Take 1 tablet (100 mg total) by mouth 2 (two) times daily.  Dispense: 180 tablet; Refill: 1    PAD (peripheral artery disease)  -     cilostazoL (PLETAL) 100 MG Tab; Take 1 tablet (100 mg total) by mouth 2 (two) times daily.  Dispense: 180 tablet; Refill: 1    Stage 3b chronic kidney disease      Since aspirin is stopped for now I would consider a trial of pletal to see if this helps with claudication symptoms and will seek the input from vascular surgery.    F/u 6 mo  ___________________  James  MD Nate  Internal Medicine and Pediatrics

## 2025-02-08 ENCOUNTER — PATIENT MESSAGE (OUTPATIENT)
Dept: FAMILY MEDICINE | Facility: CLINIC | Age: 80
End: 2025-02-08
Payer: MEDICARE

## 2025-02-10 ENCOUNTER — TELEPHONE (OUTPATIENT)
Dept: VASCULAR SURGERY | Facility: CLINIC | Age: 80
End: 2025-02-10
Payer: MEDICARE

## 2025-02-10 NOTE — TELEPHONE ENCOUNTER
Spoke w/ pt. Stated he was having circulatory issues. Informed pt to reach out to his cardiologist. Pt stated he has a appt w/ his nephrologist on 2/13 and will bring up concerns to him.         ----- Message from Gillian sent at 2/10/2025 12:58 PM CST -----  Regarding: appt  Contact: pt  Type:  Needs Medical Advice    Who Called:  pt    Would the patient rather a call back or a response via [facility]? Call back    Best Call Back Number: [phone]    Additional Information: requesting an appt to see any doctor about circulation problems on tues or thurs.  Call back or email back at   [email or web address]

## 2025-02-13 ENCOUNTER — OFFICE VISIT (OUTPATIENT)
Dept: NEPHROLOGY | Facility: CLINIC | Age: 80
End: 2025-02-13
Payer: MEDICARE

## 2025-02-13 VITALS
BODY MASS INDEX: 36.21 KG/M2 | HEART RATE: 60 BPM | HEIGHT: 69 IN | DIASTOLIC BLOOD PRESSURE: 62 MMHG | WEIGHT: 244.5 LBS | OXYGEN SATURATION: 97 % | SYSTOLIC BLOOD PRESSURE: 144 MMHG

## 2025-02-13 DIAGNOSIS — E78.5 DYSLIPIDEMIA: ICD-10-CM

## 2025-02-13 DIAGNOSIS — N25.81 SECONDARY HYPERPARATHYROIDISM OF RENAL ORIGIN: ICD-10-CM

## 2025-02-13 DIAGNOSIS — N18.32 ANEMIA IN STAGE 3B CHRONIC KIDNEY DISEASE: ICD-10-CM

## 2025-02-13 DIAGNOSIS — Z79.4 TYPE 2 DIABETES MELLITUS WITH STAGE 3B CHRONIC KIDNEY DISEASE, WITH LONG-TERM CURRENT USE OF INSULIN: ICD-10-CM

## 2025-02-13 DIAGNOSIS — I10 ESSENTIAL HYPERTENSION: ICD-10-CM

## 2025-02-13 DIAGNOSIS — D63.1 ANEMIA IN STAGE 3B CHRONIC KIDNEY DISEASE: ICD-10-CM

## 2025-02-13 DIAGNOSIS — E66.01 MORBID (SEVERE) OBESITY DUE TO EXCESS CALORIES: ICD-10-CM

## 2025-02-13 DIAGNOSIS — N18.32 STAGE 3B CHRONIC KIDNEY DISEASE: Primary | ICD-10-CM

## 2025-02-13 DIAGNOSIS — E11.22 TYPE 2 DIABETES MELLITUS WITH STAGE 3B CHRONIC KIDNEY DISEASE, WITH LONG-TERM CURRENT USE OF INSULIN: ICD-10-CM

## 2025-02-13 DIAGNOSIS — N18.32 TYPE 2 DIABETES MELLITUS WITH STAGE 3B CHRONIC KIDNEY DISEASE, WITH LONG-TERM CURRENT USE OF INSULIN: ICD-10-CM

## 2025-02-13 PROCEDURE — 3078F DIAST BP <80 MM HG: CPT | Mod: HCNC,CPTII,S$GLB, | Performed by: STUDENT IN AN ORGANIZED HEALTH CARE EDUCATION/TRAINING PROGRAM

## 2025-02-13 PROCEDURE — 3077F SYST BP >= 140 MM HG: CPT | Mod: HCNC,CPTII,S$GLB, | Performed by: STUDENT IN AN ORGANIZED HEALTH CARE EDUCATION/TRAINING PROGRAM

## 2025-02-13 PROCEDURE — 99999 PR PBB SHADOW E&M-EST. PATIENT-LVL IV: CPT | Mod: PBBFAC,HCNC,, | Performed by: STUDENT IN AN ORGANIZED HEALTH CARE EDUCATION/TRAINING PROGRAM

## 2025-02-13 PROCEDURE — 1159F MED LIST DOCD IN RCRD: CPT | Mod: HCNC,CPTII,S$GLB, | Performed by: STUDENT IN AN ORGANIZED HEALTH CARE EDUCATION/TRAINING PROGRAM

## 2025-02-13 PROCEDURE — 1157F ADVNC CARE PLAN IN RCRD: CPT | Mod: HCNC,CPTII,S$GLB, | Performed by: STUDENT IN AN ORGANIZED HEALTH CARE EDUCATION/TRAINING PROGRAM

## 2025-02-13 PROCEDURE — 3072F LOW RISK FOR RETINOPATHY: CPT | Mod: HCNC,CPTII,S$GLB, | Performed by: STUDENT IN AN ORGANIZED HEALTH CARE EDUCATION/TRAINING PROGRAM

## 2025-02-13 PROCEDURE — 99214 OFFICE O/P EST MOD 30 MIN: CPT | Mod: HCNC,S$GLB,, | Performed by: STUDENT IN AN ORGANIZED HEALTH CARE EDUCATION/TRAINING PROGRAM

## 2025-02-13 RX ORDER — FENOFIBRATE 134 MG/1
134 CAPSULE ORAL EVERY OTHER DAY
Start: 2025-02-13

## 2025-02-13 NOTE — PROGRESS NOTES
[facility]  Nephrology Clinic    Subjective:       HPI ID: Cy Rutherford Jr. is a 79 y.o. male who returns for ongoing evaluation and management of CKD.  Pertinent medical history for CKD, DM on long term insulin, hypertension, CAD s/p stenting.     Interval History:  3/5/24 -seen today for ongoing evaluation and management of CKD. Labs reviewed with patient at chairside. At baseline kidney function.  Light chain ratio is acceptable for her level of chronic kidney disease, we will continue to follow.    7/23/24 clinic visit - doing well today. Denies any new medical complaints today. We reviewed his recent chemistry at chairside. His sCr is 2.2mg/dL, which is consistent with his reading from 3 months ago. He has no uremic or urinary symptoms and is in his usual state of health.  He is a resident of [facility] and does not have control over meal plan. He does have protein shake supplement. He weighs himself everday and denies weight changes. Still with baseline chronic edema but denies decompensated CHF sensation.     02/13/25 - here today for CKD f/u. Feels well other than chronic leg pains. He is looking to have lower extremity angiography but is concerned about JOSE-DIRK d/t his history (previously required RRT from JOSE-DIRK in '19). Discuss with cardiology about CO2 angiography. We reviewed recent labs at chairside.      Review of Systems   Constitutional:  Negative for chills, diaphoresis and fever.   Respiratory:  Negative for cough and shortness of breath.    Cardiovascular:  Negative for chest pain and leg swelling.   Gastrointestinal:  Negative for abdominal pain, diarrhea, nausea and vomiting.   Genitourinary:  Negative for difficulty urinating, dysuria and hematuria.   Musculoskeletal:  Negative for myalgias.   Neurological:  Negative for headaches.   Hematological:  Does not bruise/bleed easily.       The past medical, family and social histories were reviewed for this  "encounter.     BP (!) 144/62 (BP Location: Left arm, Patient Position: Sitting)   Pulse 60   Ht 5' 9" (1.753 m)   Wt 110.9 kg (244 lb 7.8 oz)   SpO2 97%   BMI 36.10 kg/m²     Objective:      Physical Exam  Vitals reviewed.   Constitutional:       General: He is not in acute distress.     Appearance: He is obese.   Cardiovascular:      Pulses: Normal pulses.      Heart sounds: Normal heart sounds.      No friction rub.   Pulmonary:      Effort: Pulmonary effort is normal. No respiratory distress.      Breath sounds: Normal breath sounds.   Abdominal:      General: Abdomen is flat.      Palpations: Abdomen is soft.   Musculoskeletal:         General: No swelling.      Cervical back: Normal range of motion. No tenderness.      Right lower leg: Edema present.      Left lower leg: Edema present.   Neurological:      General: No focal deficit present.      Mental Status: He is oriented to person, place, and time.      Motor: No weakness.      Gait: Gait abnormal.   Psychiatric:         Mood and Affect: Mood normal.         Behavior: Behavior normal.         Assessment:       Lab Results   Component Value Date     01/28/2025    K 4.9 01/28/2025     01/28/2025    CO2 28 01/28/2025    BUN 40 (H) 01/28/2025    CREATININE 1.8 (H) 01/28/2025    CALCIUM 9.2 01/28/2025    ALBUMIN 3.3 (L) 01/28/2025    PHOS 3.7 01/28/2025     Lab Results   Component Value Date    MICALBCREAT Unable to calculate 11/01/2023    MICALBCREAT Unable to calculate 09/13/2023    MICALBCREAT Unable to calculate 06/21/2023    MICALBCREAT Unable to calculate 12/14/2022    MICALBCREAT Unable to calculate 07/06/2022    MICALBCREAT 17.2 03/30/2022       1. Stage 3b chronic kidney disease    2. Type 2 diabetes mellitus with stage 3b chronic kidney disease, with long-term current use of insulin    3. Essential hypertension    4. Secondary hyperparathyroidism of renal origin    5. Morbid (severe) obesity due to excess calories    6. Dyslipidemia  "   7. Anemia in stage 3b chronic kidney disease            Plan:   Return to clinic in 4 months.  Labs for next visit include RPF, UACR, UPCR, ferritin, tibc  Baseline creatinine is 1.8-2.1mg/dL.    CKD G3b/ A1  -DM/HTN, high pre-test probability of diabetic nephropathy, CRS and h/o DIRK requiring temp dialysis in 2019  -renal fxn close to baseline today. GFR when factoring in BSA = 38ml/min  -no proteinuria  -on RASI for CKD-MACE risk reduction, proteinuria reduction and kevin-protection  -continue discussion and adjustment of modifiable risk factors. Educated patient on the importance of BP, glycemic and lipid control   -per TANGRI calculator, he has less than 5% risk of ESRD in 5 years.     HTN - managed primarily by cardiology, currently on diovan-hctz, carvedilol 3.125, amber 25, torsemide, amlodipine, and doxazosin     DM with nephropathy  - DM with diabetic retinopathy: well controlled with most recent HgbA1c of 6.6% (5/2024), continue yearly optho checks    Anemia on CKD - check iron profile for next visit.     SHPT - pth trend improving. Continue vit d supplement.     CHF - appears compensated on exam today. Followed by cardiology.     CAD and PAF  - Guideline directed therapy per cardiology    Obesity  - Calorie restriction    Paraprotein   - appears improved on most recent assessment LCR is acceptable for CKD    Claudication - discuss with cardiology; ask about CO2 angiography    __________________________  Lyle Escamilla MD  [facility]    Part of this note has been created using [employer] dictation system. Errors in transcription may not be completely avoided.    Computed KFRE 2-Year unavailable. One or more values for this score either were not found within the given timeframe or did not fit some other criterion.    Computed KFRE 5-Year unavailable. One or more values for this score either were not found within the given timeframe or did not fit some other criterion.    Tao Score  for Post-PCI Contrast Nephropathy from [email or web address]  on 2/13/2025  ** All calculations should be rechecked by clinician prior to use **    RESULT SUMMARY:  14 points  DIRK Risk Score    26.1 %  Risk of any post-PCI DIRK    1.09 %  Risk of post-PCI DIRK requiring dialysis      INPUTS:  Hypotension --> 0 = No  Intra-aortic balloon pump --> 0 = No  Congestive heart failure --> 0 = No  Age >75 years --> 4 = Yes  Anemia --> 3 = Yes  Diabetes --> 3 = Yes  Contrast media volume --> 50 mL  eGFR, mL/min/1.73 m² --> 4 = 20 to <40

## 2025-02-24 ENCOUNTER — DOCUMENT SCAN (OUTPATIENT)
Dept: HOME HEALTH SERVICES | Facility: HOSPITAL | Age: 80
End: 2025-02-24
Payer: MEDICARE

## 2025-02-27 DIAGNOSIS — K21.9 GERD WITHOUT ESOPHAGITIS: ICD-10-CM

## 2025-02-27 RX ORDER — PANTOPRAZOLE SODIUM 40 MG/1
40 TABLET, DELAYED RELEASE ORAL
Qty: 90 TABLET | Refills: 3 | Status: SHIPPED | OUTPATIENT
Start: 2025-02-27

## 2025-02-27 NOTE — TELEPHONE ENCOUNTER
Airway       Patient location during procedure: OR       Procedure Start/Stop Times: 10/3/2022 12:14 PM  Staff -        Anesthesiologist:  Fran Zelaya MD       CRNA: Emma Sheppard APRN CRNA       Performed By: CRNA  Consent for Airway        Urgency: elective  Indications and Patient Condition       Indications for airway management: christine-procedural and airway protection       Induction type:intravenous       Mask difficulty assessment: 1 - vent by mask    Final Airway Details       Final airway type: endotracheal airway       Successful airway: ETT - single and Oral  Endotracheal Airway Details        ETT size (mm): 7.0       Cuffed: yes       Successful intubation technique: video laryngoscopy       VL Blade Size: Cerna 3       Grade View of Cords: 1       Adjucts: stylet       Position: Right       Measured from: lips       Secured at (cm): 21       Bite block used: None    Post intubation assessment        Placement verified by: capnometry, equal breath sounds and chest rise        Number of attempts at approach: 1       Number of other approaches attempted: 0       Secured with: pink tape       Ease of procedure: easy       Dentition: Unchanged    Medication(s) Administered   Medication Administration Time: 10/3/2022 12:14 PM       Care Due:                  Date            Visit Type   Department     Provider  --------------------------------------------------------------------------------                                EP -                              [facility] FAMILY  Last Visit: 02-      CARE ([facility])   SD Sullivan                               -                              [facility]  Next Visit: 10-      CARE ([facility])   SD Sullivan                                                            Last  Test          Frequency    Reason                     Performed    Due Date  --------------------------------------------------------------------------------    CMP.........  12 months..  rosuvastatin.............  12- 11-    [facility] Embedded Care Due Messages. Reference number: [id number].   2/27/2025 6:12:27 AM CST

## 2025-02-27 NOTE — TELEPHONE ENCOUNTER
Provider Staff:  Action required for this patient    Requires labs      Please see care gap opportunities below in Care Due Message.    Thanks!  [facility]     Appointments      Date Provider   Last Visit   2/4/2025 James Sullivan MD   Next Visit   Visit date not found James Sullivan MD     Refill Decision Note   Cy Rutherford  is requesting a refill authorization.  Brief Assessment and Rationale for Refill:  Approve     Medication Therapy Plan:         Comments:     Note composed:5:59 PM 02/27/2025

## 2025-03-10 DIAGNOSIS — E03.9 ACQUIRED HYPOTHYROIDISM: ICD-10-CM

## 2025-03-10 DIAGNOSIS — E78.5 DYSLIPIDEMIA: ICD-10-CM

## 2025-03-10 RX ORDER — ROSUVASTATIN CALCIUM 20 MG/1
20 TABLET, COATED ORAL
Qty: 90 TABLET | Refills: 2 | Status: SHIPPED | OUTPATIENT
Start: 2025-03-10

## 2025-03-10 RX ORDER — LEVOTHYROXINE SODIUM 88 UG/1
88 TABLET ORAL
Qty: 90 TABLET | Refills: 0 | Status: SHIPPED | OUTPATIENT
Start: 2025-03-10

## 2025-03-10 NOTE — TELEPHONE ENCOUNTER
No care due was identified.  [facility] Embedded Care Due Messages. Reference number: [id number].   3/10/2025 1:48:47 AM CDT

## 2025-03-19 DIAGNOSIS — I10 ESSENTIAL HYPERTENSION: ICD-10-CM

## 2025-03-19 RX ORDER — AMLODIPINE BESYLATE 5 MG/1
5 TABLET ORAL 2 TIMES DAILY
Qty: 180 TABLET | Refills: 3 | Status: SHIPPED | OUTPATIENT
Start: 2025-03-19

## 2025-03-19 NOTE — TELEPHONE ENCOUNTER
No care due was identified.  [facility] Embedded Care Due Messages. Reference number: [id number].   3/19/2025 5:38:01 AM CDT

## 2025-03-19 NOTE — TELEPHONE ENCOUNTER
Refill Decision Note   Cy Rutherford  is requesting a refill authorization.  Brief Assessment and Rationale for Refill:  Approve     Medication Therapy Plan:         Comments:     Note composed:1:27 PM 03/19/2025

## 2025-04-01 ENCOUNTER — PATIENT MESSAGE (OUTPATIENT)
Dept: ADMINISTRATIVE | Facility: OTHER | Age: 80
End: 2025-04-01
Payer: MEDICARE

## 2025-04-16 ENCOUNTER — EXTERNAL HOME HEALTH (OUTPATIENT)
Dept: HOME HEALTH SERVICES | Facility: HOSPITAL | Age: 80
End: 2025-04-16
Payer: MEDICARE

## 2025-05-05 NOTE — SUBJECTIVE & OBJECTIVE
Review of Systems   Constitution: Negative for chills, decreased appetite, diaphoresis and fever.   Cardiovascular: Negative for chest pain, claudication, cyanosis, dyspnea on exertion, irregular heartbeat, leg swelling, near-syncope, orthopnea, palpitations, paroxysmal nocturnal dyspnea and syncope.   Respiratory: Negative for cough, hemoptysis, shortness of breath and wheezing.    Gastrointestinal: Negative for bloating, abdominal pain, constipation, diarrhea, melena, nausea and vomiting.   Neurological: Negative for dizziness and weakness.     Objective:     Vital Signs (Most Recent):  Temp: 98.5 °F (36.9 °C) (12/12/19 1335)  Pulse: 83 (12/12/19 1335)  Resp: 16 (12/12/19 1335)  BP: (!) 154/67 (12/12/19 1335)  SpO2: 99 % (12/12/19 1335) Vital Signs (24h Range):  Temp:  [97 °F (36.1 °C)-99.6 °F (37.6 °C)] 98.5 °F (36.9 °C)  Pulse:  [77-92] 83  Resp:  [16-18] 16  SpO2:  [95 %-99 %] 99 %  BP: (131-156)/(59-68) 154/67     Weight: 105.2 kg (231 lb 14.8 oz)  Body mass index is 33.76 kg/m².     SpO2: 99 %  O2 Device (Oxygen Therapy): nasal cannula      Intake/Output Summary (Last 24 hours) at 12/12/2019 1425  Last data filed at 12/12/2019 0600  Gross per 24 hour   Intake 603.33 ml   Output 1550 ml   Net -946.67 ml       Lines/Drains/Airways     Central Venous Catheter Line                 Trialysis (Dialysis) Catheter 12/09/19 1230 right internal jugular 3 days          Drain                 Urethral Catheter 12/03/19 0445 16 Fr. 9 days                Physical Exam   Constitutional: He is oriented to person, place, and time. He appears well-developed and well-nourished. No distress.   Cardiovascular: Normal rate and regular rhythm. Exam reveals no gallop.   No murmur heard.  Pulmonary/Chest: Effort normal and breath sounds normal. No respiratory distress. He has no wheezes.   Abdominal: Soft. Bowel sounds are normal. He exhibits no distension. There is no tenderness.   Neurological: He is alert and oriented to  person, place, and time.   Skin: Skin is warm and dry.       Significant Labs:     Recent Labs   Lab 12/12/19  0420  12/12/19  1322   WBC 11.27  --   --    RBC 2.91*  --   --    HGB 8.4*   < > 8.4*   HCT 27.2*   < > 26.9*     --   --    MCV 94  --   --    MCH 28.9  --   --    MCHC 30.9*  --   --     < > = values in this interval not displayed.     Recent Labs   Lab 12/12/19  0420   *   K 3.4*      CO2 23   BUN 58*   CREATININE 3.1*       Significant Imaging: Echocardiogram:   Transthoracic echo (TTE) complete (Cupid Only):   Results for orders placed or performed during the hospital encounter of 12/02/19   Echo Color Flow Doppler? Yes   Result Value Ref Range    BSA 2.3 m2    TDI SEPTAL 0.06 m/s    LV LATERAL E/E' RATIO 9.00 m/s    LV SEPTAL E/E' RATIO 10.50 m/s    TDI LATERAL 0.07 m/s    PV PEAK VELOCITY 1.81 cm/s    LVIDD 4.80 3.5 - 6.0 cm    IVS 1.10 (A) 0.6 - 1.1 cm    PW 1.10 (A) 0.6 - 1.1 cm    Ao root annulus 3.40 cm    LVIDS 2.56 2.1 - 4.0 cm    FS 47 28 - 44 %    LV mass 193.96 g    LA size 3.68 cm    TAPSE 1.44 cm    Left Ventricle Relative Wall Thickness 0.46 cm    AV mean gradient 6 mmHg    AV valve area 4.31 cm2    AV Velocity Ratio 0.78     AV index (prosthetic) 1.06     E/A ratio 0.62     Mean e' 0.07 m/s    E wave decelartion time 338.80 msec    LVOT diameter 2.27 cm    LVOT area 4.0 cm2    LVOT peak simone 1.16 m/s    LVOT peak VTI 26.25 cm    Ao peak simone 1.48 m/s    Ao VTI 24.66 cm    LVOT stroke volume 106.18 cm3    AV peak gradient 9 mmHg    E/E' ratio 9.69 m/s    MV Peak E Simone 0.63 m/s    MV Peak A Simone 1.02 m/s    LV Systolic Volume 23.57 mL    LV Systolic Volume Index 10.6 mL/m2    LV Diastolic Volume 68.84 mL    LV Diastolic Volume Index 30.84 mL/m2    LV Mass Index 87 g/m2    Right Atrial Pressure (from IVC) 3 mmHg    Narrative    · Normal left ventricular systolic function. The estimated ejection   fraction is 55%  · Concentric left ventricular remodeling.  · No wall motion  abnormalities.  · Normal right ventricular systolic function.  · Normal central venous pressure (3 mm Hg).         Spine appears normal, range of motion is not limited, no muscle or joint tenderness

## 2025-05-12 DIAGNOSIS — E78.5 DYSLIPIDEMIA: Primary | ICD-10-CM

## 2025-05-12 RX ORDER — FENOFIBRATE 134 MG/1
CAPSULE ORAL
Qty: 90 CAPSULE | Refills: 2 | Status: SHIPPED | OUTPATIENT
Start: 2025-05-12

## 2025-05-12 NOTE — TELEPHONE ENCOUNTER
Care Due:                  Date            Visit Type   Department     Provider  --------------------------------------------------------------------------------                                EP -                              [facility] FAMILY  Last Visit: 02-      CARE ([facility])   SD Sullivan                              EP -                              PRIMARY      [facility] FAMILY  Next Visit: 10-      CARE ([facility])   SD Sullivan                                                            Last  Test          Frequency    Reason                     Performed    Due Date  --------------------------------------------------------------------------------    CMP.........  12 months..  rosuvastatin.............  12- 11-    [facility] Embedded Care Due Messages. Reference number: [id number].   5/12/2025 1:42:13 AM CDT

## 2025-05-12 NOTE — TELEPHONE ENCOUNTER
Refill Routing Note   Medication(s) are not appropriate for processing by [facility] for the following reason(s):        Required labs abnormal  Clarification of medication (Rx) details    [facility] action(s):  Defer   Requires labs : Yes      Medication Therapy Plan: 1 TAB QHS OR Q TAB QOD      Appointments  past 12m or future 3m with PCP    Date Provider   Last Visit   2/4/2025 James Sullivan MD   Next Visit   10/2/2025 James Sullivan MD   ED visits in past 90 days: 0        Note composed:10:42 AM 05/12/2025

## 2025-05-21 DIAGNOSIS — E03.9 ACQUIRED HYPOTHYROIDISM: ICD-10-CM

## 2025-05-21 RX ORDER — LEVOTHYROXINE SODIUM 88 UG/1
88 TABLET ORAL
Qty: 90 TABLET | Refills: 0 | Status: SHIPPED | OUTPATIENT
Start: 2025-05-21

## 2025-05-21 NOTE — TELEPHONE ENCOUNTER
Refill Routing Note   Medication(s) are not appropriate for processing by [facility] for the following reason(s):        Required labs outdated    [facility] action(s):  Defer               Appointments  past 12m or future 3m with PCP    Date Provider   Last Visit   2/4/2025 James Sullivan MD   Next Visit   10/2/2025 James Sullivan MD   ED visits in past 90 days: 0        Note composed:11:36 AM 05/21/2025

## 2025-05-21 NOTE — TELEPHONE ENCOUNTER
No care due was identified.  [facility] Embedded Care Due Messages. Reference number: [id number].   5/21/2025 4:14:23 AM CDT

## 2025-06-11 ENCOUNTER — PATIENT OUTREACH (OUTPATIENT)
Dept: ADMINISTRATIVE | Facility: HOSPITAL | Age: 80
End: 2025-06-11
Payer: MEDICARE

## 2025-06-11 DIAGNOSIS — N18.32 TYPE 2 DIABETES MELLITUS WITH STAGE 3B CHRONIC KIDNEY DISEASE, WITH LONG-TERM CURRENT USE OF INSULIN: Primary | ICD-10-CM

## 2025-06-11 DIAGNOSIS — E11.22 TYPE 2 DIABETES MELLITUS WITH STAGE 3B CHRONIC KIDNEY DISEASE, WITH LONG-TERM CURRENT USE OF INSULIN: Primary | ICD-10-CM

## 2025-06-11 DIAGNOSIS — Z79.4 TYPE 2 DIABETES MELLITUS WITH STAGE 3B CHRONIC KIDNEY DISEASE, WITH LONG-TERM CURRENT USE OF INSULIN: Primary | ICD-10-CM

## 2025-06-12 ENCOUNTER — LAB VISIT (OUTPATIENT)
Dept: LAB | Facility: HOSPITAL | Age: 80
End: 2025-06-12
Attending: STUDENT IN AN ORGANIZED HEALTH CARE EDUCATION/TRAINING PROGRAM
Payer: MEDICARE

## 2025-06-12 DIAGNOSIS — N18.32 STAGE 3B CHRONIC KIDNEY DISEASE: ICD-10-CM

## 2025-06-12 LAB
ALBUMIN/CREAT UR: NORMAL
CREAT UR-MCNC: 34 MG/DL (ref 23–375)
CREAT UR-MCNC: 35 MG/DL (ref 23–375)
MICROALBUMIN UR-MCNC: <5 UG/ML (ref ?–5000)
PROT UR-MCNC: <7 MG/DL
PROT/CREAT UR: NORMAL MG/G{CREAT}

## 2025-06-12 PROCEDURE — 82570 ASSAY OF URINE CREATININE: CPT | Mod: 59,HCNC

## 2025-06-12 PROCEDURE — 82570 ASSAY OF URINE CREATININE: CPT | Mod: HCNC

## 2025-06-19 ENCOUNTER — OFFICE VISIT (OUTPATIENT)
Dept: NEPHROLOGY | Facility: CLINIC | Age: 80
End: 2025-06-19
Payer: MEDICARE

## 2025-06-19 VITALS
BODY MASS INDEX: 36.21 KG/M2 | DIASTOLIC BLOOD PRESSURE: 58 MMHG | HEART RATE: 56 BPM | OXYGEN SATURATION: 96 % | WEIGHT: 244.5 LBS | SYSTOLIC BLOOD PRESSURE: 150 MMHG | HEIGHT: 69 IN

## 2025-06-19 DIAGNOSIS — N18.32 ANEMIA IN STAGE 3B CHRONIC KIDNEY DISEASE: ICD-10-CM

## 2025-06-19 DIAGNOSIS — N25.81 SECONDARY HYPERPARATHYROIDISM OF RENAL ORIGIN: ICD-10-CM

## 2025-06-19 DIAGNOSIS — E11.22 TYPE 2 DIABETES MELLITUS WITH STAGE 3B CHRONIC KIDNEY DISEASE, WITH LONG-TERM CURRENT USE OF INSULIN: ICD-10-CM

## 2025-06-19 DIAGNOSIS — I73.9 CLAUDICATION IN PERIPHERAL VASCULAR DISEASE: ICD-10-CM

## 2025-06-19 DIAGNOSIS — Z79.4 TYPE 2 DIABETES MELLITUS WITH STAGE 3B CHRONIC KIDNEY DISEASE, WITH LONG-TERM CURRENT USE OF INSULIN: ICD-10-CM

## 2025-06-19 DIAGNOSIS — N18.32 STAGE 3B CHRONIC KIDNEY DISEASE: Primary | ICD-10-CM

## 2025-06-19 DIAGNOSIS — E78.5 DYSLIPIDEMIA: ICD-10-CM

## 2025-06-19 DIAGNOSIS — N18.32 TYPE 2 DIABETES MELLITUS WITH STAGE 3B CHRONIC KIDNEY DISEASE, WITH LONG-TERM CURRENT USE OF INSULIN: ICD-10-CM

## 2025-06-19 DIAGNOSIS — E66.01 MORBID (SEVERE) OBESITY DUE TO EXCESS CALORIES: ICD-10-CM

## 2025-06-19 DIAGNOSIS — D63.1 ANEMIA IN STAGE 3B CHRONIC KIDNEY DISEASE: ICD-10-CM

## 2025-06-19 PROCEDURE — 3072F LOW RISK FOR RETINOPATHY: CPT | Mod: CPTII,HCNC,S$GLB, | Performed by: STUDENT IN AN ORGANIZED HEALTH CARE EDUCATION/TRAINING PROGRAM

## 2025-06-19 PROCEDURE — 99999 PR PBB SHADOW E&M-EST. PATIENT-LVL III: CPT | Mod: PBBFAC,HCNC,, | Performed by: STUDENT IN AN ORGANIZED HEALTH CARE EDUCATION/TRAINING PROGRAM

## 2025-06-19 PROCEDURE — 1159F MED LIST DOCD IN RCRD: CPT | Mod: CPTII,HCNC,S$GLB, | Performed by: STUDENT IN AN ORGANIZED HEALTH CARE EDUCATION/TRAINING PROGRAM

## 2025-06-19 PROCEDURE — 3077F SYST BP >= 140 MM HG: CPT | Mod: CPTII,HCNC,S$GLB, | Performed by: STUDENT IN AN ORGANIZED HEALTH CARE EDUCATION/TRAINING PROGRAM

## 2025-06-19 PROCEDURE — 3078F DIAST BP <80 MM HG: CPT | Mod: CPTII,HCNC,S$GLB, | Performed by: STUDENT IN AN ORGANIZED HEALTH CARE EDUCATION/TRAINING PROGRAM

## 2025-06-19 PROCEDURE — 1157F ADVNC CARE PLAN IN RCRD: CPT | Mod: CPTII,HCNC,S$GLB, | Performed by: STUDENT IN AN ORGANIZED HEALTH CARE EDUCATION/TRAINING PROGRAM

## 2025-06-19 PROCEDURE — 99214 OFFICE O/P EST MOD 30 MIN: CPT | Mod: HCNC,S$GLB,, | Performed by: STUDENT IN AN ORGANIZED HEALTH CARE EDUCATION/TRAINING PROGRAM

## 2025-06-19 NOTE — PROGRESS NOTES
[facility]  Nephrology Clinic    Subjective:       HPI ID: Cy Rutherford Jr. is a 79 y.o. male who returns for ongoing evaluation and management of CKD.  Pertinent medical history for CKD, DM on long term insulin, hypertension, CAD s/p stenting.     Interval History:  3/5/24 -seen today for ongoing evaluation and management of CKD. Labs reviewed with patient at chairside. At baseline kidney function.  Light chain ratio is acceptable for her level of chronic kidney disease, we will continue to follow.    7/23/24 clinic visit - doing well today. Denies any new medical complaints today. We reviewed his recent chemistry at chairside. His sCr is 2.2mg/dL, which is consistent with his reading from 3 months ago. He has no uremic or urinary symptoms and is in his usual state of health.  He is a resident of [facility] and does not have control over meal plan. He does have protein shake supplement. He weighs himself everday and denies weight changes. Still with baseline chronic edema but denies decompensated CHF sensation.     02/13/25 - here today for CKD f/u. Feels well other than chronic leg pains. He is looking to have lower extremity angiography but is concerned about JOSE-DIRK d/t his history (previously required RRT from JOSE-DIRK in '19). Discuss with cardiology about CO2 angiography[facility]    06/[facility]/25 - here for CKD f/u. Feels well overall except for occasional GI issues. Has not proceeded with angiography since last visit. We reviewed recent labs at chairside. Renal fxn based on sCr trend has bumped some since last visit. He reports doubling up his loop diuretic to address additional leg swelling. Also reports recent diarrhea last week around the time of his lab. He reports his weight is about the same.   -reviewed BP trends at home. AM BP is 110s systolic. Throughout the day BP is 130s/50s. He is on multiple agents for anti-htn including diuretic.  -he  "is having more claudication since last visit; will send for ultrasound w/ DEBORAH      Review of Systems   Constitutional:  Negative for chills, diaphoresis and fever.   Respiratory:  Negative for cough and shortness of breath.    Cardiovascular:  Negative for chest pain and leg swelling.   Gastrointestinal:  Negative for abdominal pain, diarrhea, nausea and vomiting.   Genitourinary:  Negative for difficulty urinating, dysuria and hematuria.   Musculoskeletal:  Negative for myalgias.   Neurological:  Negative for headaches.   Hematological:  Does not bruise/bleed easily.       The past medical, family and social histories were reviewed for this encounter.     BP (!) 150/58 (BP Location: Left arm, Patient Position: Sitting)   Pulse (!) 56   Ht 5' 9" (1.753 m)   Wt 110.9 kg (244 lb 7.8 oz)   SpO2 96%   BMI 36.10 kg/m²     Objective:      Physical Exam  Vitals reviewed.   Constitutional:       General: He is not in acute distress.     Appearance: He is obese.   Cardiovascular:      Pulses: Normal pulses.      Heart sounds: Normal heart sounds.      No friction rub.   Pulmonary:      Effort: Pulmonary effort is normal. No respiratory distress.      Breath sounds: Normal breath sounds.   Abdominal:      General: Abdomen is flat.      Palpations: Abdomen is soft.   Musculoskeletal:         General: No swelling.      Cervical back: Normal range of motion. No tenderness.      Right lower leg: Edema present.      Left lower leg: Edema present.   Neurological:      General: No focal deficit present.      Mental Status: He is oriented to person, place, and time.      Motor: No weakness.      Gait: Gait abnormal.   Psychiatric:         Mood and Affect: Mood normal.         Behavior: Behavior normal.         Assessment:       Lab Results   Component Value Date     (L) 06/12/2025    K 5.0 06/12/2025    CL 95 06/12/2025    CO2 26 06/12/2025    BUN 74 (H) 06/12/2025    CREATININE 2.3 (H) 06/12/2025    CALCIUM 8.6 (L) " 06/12/2025    ALBUMIN 3.4 (L) 06/12/2025    PHOS 4.0 06/12/2025     Lab Results   Component Value Date    MICALBCREAT  06/12/2025      Comment:      UNABLE TO CALCULATE    MICALBCREAT Unable to calculate 11/01/2023    MICALBCREAT Unable to calculate 09/13/2023    MICALBCREAT Unable to calculate 06/21/2023    MICALBCREAT Unable to calculate 12/14/2022    MICALBCREAT Unable to calculate 07/06/2022       No diagnosis found.    Plan:   Return to clinic in 4 months.  Baseline creatinine is 1.8-2.1mg/dL.  No orders of the defined types were placed in this encounter.      CKD G3b / A1 PLAN  -DM/HTN, high pre-test probability of diabetic nephropathy, CRS and h/o DIRK requiring temp dialysis in 2019  -renal fxn close to baseline today. GFR when factoring in BSA = 38ml/min  -no proteinuria  -on RASI for CKD-MACE risk reduction, proteinuria reduction and kevin-protection  -continue discussion and adjustment of modifiable risk factors. Educated patient on the importance of BP, glycemic and lipid control   -per TANGRI calculator, he has less than 5% risk of ESRD in 5 years.     HTN - managed primarily by cardiology, currently on diovan-hctz, carvedilol 3.125, amber 25, torsemide, amlodipine, and doxazosin. Demonstrates good control of BP at home with this regiment.      IDDM with nephropathy - DM with diabetic retinopathy: well controlled with most recent HgbA1c of 6.8%, continue yearly optho checks. On insulin pump with HgbA1c at goal.     Anemia on CKD - check iron profile for next visit.     SHPT - pth trend improving. Continue vit d supplement.     H/o CHF - appears compensated on exam today. Followed by cardiology.     CAD and PAF  - Guideline directed therapy per cardiology    Obesity  - Calorie restriction    Paraprotein   - appears improved on most recent assessment LCR is acceptable for CKD    Claudication - discuss with cardiology; arterial ultrasound of legs.     Complex left renal cyst - evaluated on MRI in 2023 ->  non-enhancing.   __________________________  Lyle Escamilla MD  [facility]    Part of this note has been created using [employer] dictation system. Errors in transcription may not be completely avoided.    Computed KFRE 2-Year unavailable. One or more values for this score either were not found within the given timeframe or did not fit some other criterion.    Computed KFRE 5-Year unavailable. One or more values for this score either were not found within the given timeframe or did not fit some other criterion.

## 2025-07-06 DIAGNOSIS — I25.10 CORONARY ARTERY DISEASE INVOLVING NATIVE CORONARY ARTERY OF NATIVE HEART WITHOUT ANGINA PECTORIS: ICD-10-CM

## 2025-07-06 NOTE — TELEPHONE ENCOUNTER
No care due was identified.  [facility] Embedded Care Due Messages. Reference number: [id number].   7/06/2025 2:20:07 PM CDT

## 2025-07-07 RX ORDER — CLOPIDOGREL BISULFATE 75 MG/1
75 TABLET ORAL
Qty: 100 TABLET | Refills: 1 | Status: SHIPPED | OUTPATIENT
Start: 2025-07-07

## 2025-07-07 NOTE — TELEPHONE ENCOUNTER
Refill Routing Note   Medication(s) are not appropriate for processing by [facility] for the following reason(s):        Required labs abnormal    [facility] action(s):  Defer        Medication Therapy Plan: HGB is below range      Appointments  past 12m or future 3m with PCP    Date Provider   Last Visit   2/4/2025 James Sullivan MD   Next Visit   10/2/2025 James Sullivan MD   ED visits in past 90 days: 0        Note composed:4:50 PM 07/07/2025

## 2025-08-04 DIAGNOSIS — E03.9 ACQUIRED HYPOTHYROIDISM: ICD-10-CM

## 2025-08-04 NOTE — TELEPHONE ENCOUNTER
Care Due:                  Date            Visit Type   Department     Provider  --------------------------------------------------------------------------------                                EP -                              [facility] FAMILY  Last Visit: 02-      CARE ([facility])   SD Sullivan                              EP -                              PRIMARY      [facility] FAM[facility]  Next Visit: 10-      CARE ([facility])   SD Sullivan                                                            Last  Test          Frequency    Reason                     Performed    Due Date  --------------------------------------------------------------------------------    CMP.........  12 months..  rosuvastatin.............  12- 11-    [facility] Embedded Care Due Messages. Reference number: [id number].   8/04/2025 1:52:52 AM CDT

## 2025-08-05 RX ORDER — LEVOTHYROXINE SODIUM 88 UG/1
88 TABLET ORAL
Qty: 90 TABLET | Refills: 3 | Status: SHIPPED | OUTPATIENT
Start: 2025-08-05

## 2025-08-05 NOTE — TELEPHONE ENCOUNTER
Refill Routing Note   Medication(s) are not appropriate for processing by [facility] for the following reason(s):        Required labs outdated    [facility] action(s):  Defer      Medication Therapy Plan: FLOS      Appointments  past 12m or future 3m with PCP    Date Provider   Last Visit   2/4/2025 James Sullivan MD   Next Visit   10/2/2025 James Sullivan MD   ED visits in past 90 days: 0        Note composed:8:40 PM 08/04/2025

## 2025-08-14 ENCOUNTER — OFFICE VISIT (OUTPATIENT)
Dept: FAMILY MEDICINE | Facility: CLINIC | Age: 80
End: 2025-08-14
Payer: MEDICARE

## 2025-08-14 VITALS
DIASTOLIC BLOOD PRESSURE: 60 MMHG | OXYGEN SATURATION: 97 % | HEIGHT: 69 IN | WEIGHT: 252.44 LBS | SYSTOLIC BLOOD PRESSURE: 144 MMHG | BODY MASS INDEX: 37.39 KG/M2 | HEART RATE: 60 BPM

## 2025-08-14 DIAGNOSIS — I70.0 AORTIC ATHEROSCLEROSIS: ICD-10-CM

## 2025-08-14 DIAGNOSIS — Z99.89 DEPENDENCE ON OTHER ENABLING MACHINES AND DEVICES: ICD-10-CM

## 2025-08-14 DIAGNOSIS — R19.5 LOOSE STOOLS: ICD-10-CM

## 2025-08-14 DIAGNOSIS — E11.3293 TYPE 2 DIABETES MELLITUS WITH MILD NONPROLIFERATIVE RETINOPATHY OF BOTH EYES, WITH LONG-TERM CURRENT USE OF INSULIN, MACULAR EDEMA PRESENCE UNSPECIFIED: ICD-10-CM

## 2025-08-14 DIAGNOSIS — E11.22 TYPE 2 DIABETES MELLITUS WITH STAGE 3B CHRONIC KIDNEY DISEASE, WITH LONG-TERM CURRENT USE OF INSULIN: ICD-10-CM

## 2025-08-14 DIAGNOSIS — I73.9 PAD (PERIPHERAL ARTERY DISEASE): ICD-10-CM

## 2025-08-14 DIAGNOSIS — N18.32 TYPE 2 DIABETES MELLITUS WITH STAGE 3B CHRONIC KIDNEY DISEASE, WITH LONG-TERM CURRENT USE OF INSULIN: ICD-10-CM

## 2025-08-14 DIAGNOSIS — I50.32 CHRONIC DIASTOLIC HEART FAILURE: ICD-10-CM

## 2025-08-14 DIAGNOSIS — R26.9 ABNORMALITY OF GAIT AND MOBILITY: ICD-10-CM

## 2025-08-14 DIAGNOSIS — N25.81 SECONDARY HYPERPARATHYROIDISM OF RENAL ORIGIN: ICD-10-CM

## 2025-08-14 DIAGNOSIS — G81.91 RIGHT HEMIPLEGIA: ICD-10-CM

## 2025-08-14 DIAGNOSIS — H35.3290 EXUDATIVE AGE-RELATED MACULAR DEGENERATION, UNSPECIFIED LATERALITY, UNSPECIFIED STAGE: ICD-10-CM

## 2025-08-14 DIAGNOSIS — E03.9 ACQUIRED HYPOTHYROIDISM: ICD-10-CM

## 2025-08-14 DIAGNOSIS — E11.3211 TYPE 2 DIABETES MELLITUS WITH RIGHT EYE AFFECTED BY MILD NONPROLIFERATIVE RETINOPATHY AND MACULAR EDEMA, WITH LONG-TERM CURRENT USE OF INSULIN: ICD-10-CM

## 2025-08-14 DIAGNOSIS — Z79.4 TYPE 2 DIABETES MELLITUS WITH RIGHT EYE AFFECTED BY MILD NONPROLIFERATIVE RETINOPATHY AND MACULAR EDEMA, WITH LONG-TERM CURRENT USE OF INSULIN: ICD-10-CM

## 2025-08-14 DIAGNOSIS — Z00.00 ENCOUNTER FOR MEDICARE ANNUAL WELLNESS EXAM: Primary | ICD-10-CM

## 2025-08-14 DIAGNOSIS — I48.0 PAROXYSMAL ATRIAL FIBRILLATION: ICD-10-CM

## 2025-08-14 DIAGNOSIS — Z79.4 TYPE 2 DIABETES MELLITUS WITH MILD NONPROLIFERATIVE RETINOPATHY OF BOTH EYES, WITH LONG-TERM CURRENT USE OF INSULIN, MACULAR EDEMA PRESENCE UNSPECIFIED: ICD-10-CM

## 2025-08-14 DIAGNOSIS — N18.32 STAGE 3B CHRONIC KIDNEY DISEASE: ICD-10-CM

## 2025-08-14 DIAGNOSIS — E78.5 DYSLIPIDEMIA: ICD-10-CM

## 2025-08-14 DIAGNOSIS — Z79.4 TYPE 2 DIABETES MELLITUS WITH STAGE 3B CHRONIC KIDNEY DISEASE, WITH LONG-TERM CURRENT USE OF INSULIN: ICD-10-CM

## 2025-08-14 DIAGNOSIS — E66.01 SEVERE OBESITY (BMI 35.0-39.9) WITH COMORBIDITY: ICD-10-CM

## 2025-08-14 PROBLEM — E66.09 OBESITY DUE TO EXCESS CALORIES WITH SERIOUS COMORBIDITY: Status: ACTIVE | Noted: 2022-04-12

## 2025-08-14 PROCEDURE — 99999 PR PBB SHADOW E&M-EST. PATIENT-LVL V: CPT | Mod: PBBFAC,,, | Performed by: NURSE PRACTITIONER

## 2025-08-20 DIAGNOSIS — E78.5 DYSLIPIDEMIA: ICD-10-CM

## 2025-08-20 RX ORDER — ROSUVASTATIN CALCIUM 20 MG/1
20 TABLET, COATED ORAL
Qty: 100 TABLET | Refills: 1 | Status: SHIPPED | OUTPATIENT
Start: 2025-08-20

## 2025-09-04 ENCOUNTER — HOSPITAL ENCOUNTER (OUTPATIENT)
Dept: RADIOLOGY | Facility: HOSPITAL | Age: 80
Discharge: HOME | End: 2025-09-04
Attending: STUDENT IN AN ORGANIZED HEALTH CARE EDUCATION/TRAINING PROGRAM
Payer: MEDICARE

## 2025-09-04 DIAGNOSIS — I73.9 CLAUDICATION IN PERIPHERAL VASCULAR DISEASE: ICD-10-CM

## 2025-09-04 PROCEDURE — 93925 LOWER EXTREMITY STUDY: CPT | Mod: TC,HCNC,PO

## 2025-09-04 PROCEDURE — 93925 LOWER EXTREMITY STUDY: CPT | Mod: 26,HCNC,, | Performed by: RADIOLOGY

## 2025-09-04 PROCEDURE — 93922 UPR/L XTREMITY ART 2 LEVELS: CPT | Mod: 26,HCNC,, | Performed by: RADIOLOGY

## (undated) DEVICE — PAD DEFIB CADENCE ADULT R2

## (undated) DEVICE — VISE RADIFOCUS MULTI TORQUE

## (undated) DEVICE — CATH IMA INFINITI 4FRX100CM

## (undated) DEVICE — DEVICE PERCLOSE SUT CLSR 6FR

## (undated) DEVICE — SHEATH INTRODUCER 6FR 11CM

## (undated) DEVICE — Device

## (undated) DEVICE — PADS RADI PERIPHERAL SHIELD

## (undated) DEVICE — CATH CXI ANG 2.6F .018IN 150CM

## (undated) DEVICE — CATH LUTONIX DCB 018X130X7X40

## (undated) DEVICE — CATH DIAMONDBACK 1.5MM 145CM

## (undated) DEVICE — GLIDESHEATH SLENDER SS 5FR10CM

## (undated) DEVICE — SHEATH DESTINATION 6F X 65CM

## (undated) DEVICE — SHEATH INTRODUCER 7FR 11CM

## (undated) DEVICE — CATH IMPULSE 5FR PIGTAIL 125CM

## (undated) DEVICE — GUIDEWIRE HT COMMAND 25X300CM

## (undated) DEVICE — KIT INTRODUCER MICROPUNCTR 4F

## (undated) DEVICE — HEMOSTAT VASC BAND REG 24CM

## (undated) DEVICE — CATH NAVICROSS .035X150 ANGLE

## (undated) DEVICE — GUIDEWIRE ADVNTG 035X260CM ANG

## (undated) DEVICE — CONTRAST VISIPAQUE 150ML

## (undated) DEVICE — GUIDEWIRE FIELDER XT.014X300CM

## (undated) DEVICE — KIT LEFT HEART MANIFOLD CUSTOM

## (undated) DEVICE — CATH LUTONIX DCB 018X130X6X150

## (undated) DEVICE — CATH EMBOSHIELD NAV6 7.2X190CM

## (undated) DEVICE — TAPE RADIOPLAQUE VIPER TRAC

## (undated) DEVICE — CATH LUTONIX DCB 018X130X6X100

## (undated) DEVICE — SEE MEDLINE ITEM 157187

## (undated) DEVICE — CATH 5FR MP 125CM 5/BX

## (undated) DEVICE — HEMOSTAT VASC BAND LONG 27CM

## (undated) DEVICE — CATH LUTONIX DCB 018X130X6X220

## (undated) DEVICE — CATH LUTONIX DCB 018X130X5X220

## (undated) DEVICE — SYR MARK 7 ARTERION 150ML

## (undated) DEVICE — INFLATOR ENCORE 26 BLLN INFL

## (undated) DEVICE — GUIDEWIRE EMERALD 150CM PTFE

## (undated) DEVICE — COVER PROBE US 5.5X58L NON LTX

## (undated) DEVICE — CATH ULTRAVERSE 018 5X300X150

## (undated) DEVICE — SET MICRO PUNCT 4FR/MPIS-401

## (undated) DEVICE — GUIDEWIRE VIPER FIRM .017IN

## (undated) DEVICE — KIT INTRODUCER W/GUIDEWIRE

## (undated) DEVICE — TUBING HPCIL ROT M/F ADPT 48IN

## (undated) DEVICE — CONTRAST VISIPAQUE 50ML

## (undated) DEVICE — SHEATH DESTINATION 7F X 65CM

## (undated) DEVICE — KIT GLIDESHEATH SLEND 6FR 10CM

## (undated) DEVICE — SHEATH INTRODUCER 4FR 11CM

## (undated) DEVICE — LUBRICANT VIPERSLIDE 100ML BAG

## (undated) DEVICE — CATH EAGLE EYE ST .014X20X150

## (undated) DEVICE — CATH ULTRAVERSE 014 4X300X150

## (undated) DEVICE — TUBING SET ANGIASSIST MED GAS

## (undated) DEVICE — DEVICE SPIDER FX 7MMX320CM

## (undated) DEVICE — CATH MINNIE .035X150CM